# Patient Record
Sex: FEMALE | Race: WHITE | Employment: OTHER | URBAN - METROPOLITAN AREA
[De-identification: names, ages, dates, MRNs, and addresses within clinical notes are randomized per-mention and may not be internally consistent; named-entity substitution may affect disease eponyms.]

---

## 2017-01-17 ENCOUNTER — OFFICE VISIT (OUTPATIENT)
Dept: INTERNAL MEDICINE CLINIC | Age: 62
End: 2017-01-17

## 2017-01-17 VITALS
HEIGHT: 65 IN | OXYGEN SATURATION: 97 % | DIASTOLIC BLOOD PRESSURE: 74 MMHG | HEART RATE: 83 BPM | SYSTOLIC BLOOD PRESSURE: 125 MMHG | RESPIRATION RATE: 16 BRPM | TEMPERATURE: 97.9 F | WEIGHT: 215 LBS | BODY MASS INDEX: 35.82 KG/M2

## 2017-01-17 DIAGNOSIS — I10 ESSENTIAL HYPERTENSION: Primary | Chronic | ICD-10-CM

## 2017-01-17 DIAGNOSIS — E11.65 UNCONTROLLED TYPE 2 DIABETES MELLITUS WITH HYPERGLYCEMIA, WITH LONG-TERM CURRENT USE OF INSULIN (HCC): Chronic | ICD-10-CM

## 2017-01-17 DIAGNOSIS — S39.012A LUMBAR STRAIN, INITIAL ENCOUNTER: ICD-10-CM

## 2017-01-17 DIAGNOSIS — Z79.4 UNCONTROLLED TYPE 2 DIABETES MELLITUS WITH HYPERGLYCEMIA, WITH LONG-TERM CURRENT USE OF INSULIN (HCC): Chronic | ICD-10-CM

## 2017-01-17 DIAGNOSIS — E78.2 MIXED HYPERLIPIDEMIA: Chronic | ICD-10-CM

## 2017-01-17 DIAGNOSIS — E03.9 ACQUIRED HYPOTHYROIDISM: ICD-10-CM

## 2017-01-17 DIAGNOSIS — Z23 ENCOUNTER FOR IMMUNIZATION: ICD-10-CM

## 2017-01-17 PROBLEM — E66.9 OBESITY (BMI 30-39.9): Chronic | Status: ACTIVE | Noted: 2017-01-17

## 2017-01-17 RX ORDER — METFORMIN HYDROCHLORIDE 500 MG/1
500 TABLET, EXTENDED RELEASE ORAL 2 TIMES DAILY
COMMUNITY
Start: 2016-10-28 | End: 2019-09-26 | Stop reason: SDUPTHER

## 2017-01-17 RX ORDER — PEN NEEDLE, DIABETIC 32 GX 1/6"
NEEDLE, DISPOSABLE MISCELLANEOUS
COMMUNITY
Start: 2017-01-13 | End: 2019-12-20 | Stop reason: SDUPTHER

## 2017-01-17 RX ORDER — INSULIN LISPRO 100 [IU]/ML
20 INJECTION, SUSPENSION SUBCUTANEOUS 2 TIMES DAILY
COMMUNITY
Start: 2016-12-21 | End: 2019-06-20

## 2017-01-17 RX ORDER — EZETIMIBE 10 MG
10 TABLET ORAL DAILY
COMMUNITY
Start: 2016-12-21 | End: 2017-07-17 | Stop reason: ALTCHOICE

## 2017-01-17 RX ORDER — PREDNISONE 20 MG/1
TABLET ORAL
Qty: 18 TAB | Refills: 0 | Status: SHIPPED | OUTPATIENT
Start: 2017-01-17 | End: 2017-03-31

## 2017-01-17 RX ORDER — BUPROPION HYDROCHLORIDE 150 MG/1
150 TABLET ORAL
Qty: 30 TAB | Refills: 5 | Status: SHIPPED | OUTPATIENT
Start: 2017-01-17 | End: 2017-08-15 | Stop reason: SDUPTHER

## 2017-01-17 RX ORDER — DIAZEPAM 2 MG/1
2 TABLET ORAL
Qty: 15 TAB | Refills: 0 | Status: SHIPPED | OUTPATIENT
Start: 2017-01-17 | End: 2017-03-31

## 2017-01-17 NOTE — PATIENT INSTRUCTIONS
Learning About Carbohydrate  What is carbohydrate? Carbohydrate is an important nutrient you get from food. It's a great source of energy for your body and helps your brain and nervous system work properly. How does your body use carbohydrate? After you eat food with carbs in it, your body digests the carbohydrate and turns it into a kind of sugar that goes into your blood. The blood carries this sugar to the cells in your body. The cells use the sugar to give you energy. Extra sugar is stored in the cells for later use. If it isn't used, it turns into fat. Where does carbohydrate come from? The healthiest carbohydrate choices are breads, cereals, and pastas made with whole grains; brown rice; low-fat dairy products; vegetables; legumes such as peas, lentils, and beans; and fruits. Foods made from refined flour, including bread, pasta, doughnuts, cookies, and desserts, also contain carbohydrate. So do sweets such as candy and soda. How can you get the right kind and amount of carbs? Eating too much of anything can lead to weight gain. And that can lead to other health problems. Here are some tips to help you eat the right amount of the right kind of carbs so you have the nutrition and the energy you need:  · Eat 3 to 8 servings of grains (breads, cereals, rice, pasta) each day. For example, a serving is 1 slice of bread, 1 cup of boxed cereal, or ½ cup of cooked rice, cooked pasta, or cooked cereal. Go to www. choosemyplate.gov to learn how many servings you need. ¨ Buy bread that lists whole wheat (or other whole grains), stone-ground wheat, or cracked wheat as the first ingredient. ¨ Eat brown rice, bulgur, or millet instead of white rice. ¨ Eat pasta and cereals made from whole grain flour instead of refined flour. · Eat several servings a day of fresh fruits and vegetables.  These include raspberries, apples, figs, oranges, pears, prunes, broccoli, brussels sprouts, carrots, corn, peas, and beans. And there are lots of other fruits and vegetables to choose from. · Limit the amount of candy, desserts, and soda in your diet. Where can you learn more? Go to http://cira-corona.info/. Enter F199 in the search box to learn more about \"Learning About Carbohydrate. \"  Current as of: July 26, 2016  Content Version: 11.1  © 2006-2016 [employer]. Care instructions adapted under license by [employer] (which disclaims liability or warranty for this information). If you have questions about a medical condition or this instruction, always ask your healthcare professional. [place] [address] any warranty or liability for your use of this information. Back Strain: Care Instructions  Your Care Instructions    Back strain happens when you overstretch, or pull, a muscle in your back. You may hurt your back in an accident or when you exercise or lift something. Most back pain will get better with rest and time. You can take care of yourself at home to help your back heal.  Follow-up care is a key part of your treatment and safety. Be sure to make and go to all appointments, and call your doctor if you are having problems. It's also a good idea to know your test results and keep a list of the medicines you take. How can you care for yourself at home? · Try to stay as active as you can, but stop or reduce any activity that causes pain. · Put ice or a cold pack on the sore muscle for 10 to 20 minutes at a time to stop swelling. Try this every 1 to 2 hours for 3 days (when you are awake) or until the swelling goes down. Put a thin cloth between the ice pack and your skin. · After 2 or 3 days, apply a heating pad on low or a warm cloth to your back. Some doctors suggest that you go back and forth between hot and cold treatments. · Take pain medicines exactly as directed.   ¨ If the doctor gave you a prescription medicine for pain, take it as prescribed. ¨ If you are not taking a prescription pain medicine, ask your doctor if you can take an over-the-counter medicine. · Try sleeping on your side with a pillow between your legs. Or put a pillow under your knees when you lie on your back. These measures can ease pain in your lower back. · Return to your usual level of activity slowly. When should you call for help? Call 911 anytime you think you may need emergency care. For example, call if:  · You are unable to move a leg at all. Call your doctor now or seek immediate medical care if:  · You have new or worse symptoms in your legs, belly, or buttocks. Symptoms may include:  ¨ Numbness or tingling. ¨ Weakness. ¨ Pain. · You lose bladder or bowel control. Watch closely for changes in your health, and be sure to contact your doctor if you are not getting better as expected. Where can you learn more? Go to http://cria-corona.info/. Enter Z749 in the search box to learn more about \"Back Strain: Care Instructions. \"  Current as of: May 23, 2016  Content Version: 11.1  © 6975-9336 [employer]. Care instructions adapted under license by [employer] (which disclaims liability or warranty for this information). If you have questions about a medical condition or this instruction, always ask your healthcare professional. [address] any warranty or liability for your use of this information. Use ice to low back for 15 minutes twice a day. Do NOT use xanax when you take valium.

## 2017-01-17 NOTE — MR AVS SNAPSHOT
Visit Information Date & Time Provider Department Dept. Phone Encounter #  
 1/17/2017  9:15 AM Gill Palmer, [address] [phone] [id number] Follow-up Instructions Return in about 6 months (around 7/17/2017). Upcoming Health Maintenance Date Due HEMOGLOBIN A1C Q6M 1955 LIPID PANEL Q1 1953 MICROALBUMIN Q1 1/19/1963 EYE EXAM RETINAL OR DILATED Q1 1/19/1963 Pneumococcal 19-64 Medium Risk (1 of 1 - PPSV23) 1/19/1972 DTaP/Tdap/Td series (1 - Tdap) 1/19/1974 PAP AKA CERVICAL CYTOLOGY 1/19/1974 FOBT Q 1 YEAR AGE 50-75 8/6/2010 FOOT EXAM Q1 6/1/2017 BREAST CANCER SCRN MAMMOGRAM 4/11/2018 Allergies as of 1/17/2017  Review Complete On: 1/17/2017 By: [email or web address] III, DO Severity Noted Reaction Type Reactions Celebrex [Celecoxib]  10/24/2012    Other (comments) Hallucinations Codeine  10/24/2012    Nausea and Vomiting Erythromycin  10/24/2012    Nausea and Vomiting Current Immunizations  Never Reviewed No immunizations on file. Not reviewed this visit You Were Diagnosed With   
  
 Codes Comments Essential hypertension    -  Primary ICD-10-CM: I10 
ICD-9-CM: 401.9 Mixed hyperlipidemia     ICD-10-CM: E78.2 ICD-9-CM: 272.2 Acquired hypothyroidism     ICD-10-CM: E03.9 ICD-9-CM: 244.9 Uncontrolled type 2 diabetes mellitus with hyperglycemia, with long-term current use of insulin (HCC)     ICD-10-CM: E11.65, Z79.4 ICD-9-CM: 250.02, V58.67 Lumbar strain, initial encounter     ICD-10-CM: S39.012A ICD-9-CM: 811. 2 Vitals BP Pulse Temp Resp Height(growth percentile) Weight(growth percentile) 125/74 (BP 1 Location: Left arm, BP Patient Position: Sitting) 83 97.9 °F (36.6 °C) (Oral) 16 5' 5\" (1.651 m) 215 lb (97.5 kg) SpO2 BMI OB Status Smoking Status 97% 35.78 kg/m2 Postmenopausal Never Smoker Vitals History BMI and BSA Data Body Mass Index Body Surface Area 35.78 kg/m 2 2.11 m 2 Preferred Pharmacy Pharmacy Name Phone [address] [facility] [phone] Your Updated Medication List  
  
   
This list is accurate as of: 1/17/17  9:42 AM.  Always use your most recent med list.  
  
  
  
  
 atorvastatin 80 mg tablet Commonly known as:  LIPITOR Take 80 mg by mouth daily. buPROPion  mg tablet Commonly known as:  Ying Galas Take 1 Tab by mouth every morning. diazePAM 2 mg tablet Commonly known as:  VALIUM Take 1 Tab by mouth nightly as needed. Max Daily Amount: 2 mg. Indications: MUSCLE SPASM  
  
 furosemide 20 mg tablet Commonly known as:  LASIX Take 20 mg by mouth daily as needed. HumaLOG Mix 75-25 KwikPen 100 unit/mL (75-25) Inpn Generic drug:  Insulin Lisp & Lisp Prot (Hum) HYDROcodone-acetaminophen 5-325 mg per tablet Commonly known as:  Nelma Felix Take 1 Tab by mouth every four (4) hours as needed for Pain. Max Daily Amount: 6 Tabs. JANUMET 50-1,000 mg per tablet Generic drug:  SITagliptin-metFORMIN Take 1 Tab by mouth two (2) times daily (with meals). metFORMIN  mg tablet Commonly known as:  GLUCOPHAGE XR  
  
 mirabegron ER 25 mg ER tablet Commonly known as:  MYRBETRIQ Take 25 mg by mouth daily. nitroglycerin 0.4 mg SL tablet Commonly known as:  NITROSTAT Take 1 Tab by mouth every five (5) minutes as needed for Chest Pain (x3) for up to 360 days. NOVOFINE PLUS 32 gauge x 1/6\" Ndle Generic drug:  pen needle, diabetic ONETOUCH VERIO strip Generic drug:  glucose blood VI test strips  
  
 predniSONE 20 mg tablet Commonly known as:  DELTASONE  
60 mg daily x 3 days, then 40 mg daily x 3 days, then 20 mg daily x 3 days, then stop. PROBIOTIC 4X 10-15 mg Tbec Generic drug:  B.infantis-B.ani-B.long-B.bifi Take  by mouth. SYNTHROID 300 mcg tablet Generic drug:  levothyroxine Take 600 mcg by mouth every , Saturday. Indications: 600mg monday -saturday and 300mg on  TRESIBA FLEXTOUCH U-200 200 unit/mL (3 mL) Inpn Generic drug:  insulin degludec 40 Units by SubCUTAneous route daily. ZETIA 10 mg tablet Generic drug:  ezetimibe Prescriptions Printed Refills  
 diazePAM (VALIUM) 2 mg tablet 0 Sig: Take 1 Tab by mouth nightly as needed. Max Daily Amount: 2 mg. Indications: MUSCLE SPASM Class: Print Route: Oral  
  
Prescriptions Sent to Pharmacy Refills buPROPion XL (WELLBUTRIN XL) 150 mg tablet 5 Sig: Take 1 Tab by mouth every morning. Class: Normal  
 Pharmacy: [address], [address] Ph #: [phone] Route: Oral  
 predniSONE (DELTASONE) 20 mg tablet 0 Si mg daily x 3 days, then 40 mg daily x 3 days, then 20 mg daily x 3 days, then stop. Class: Normal  
 Pharmacy: [address], [address] Ph #: [phone] We Performed the Following HEMOGLOBIN A1C WITH EAG [58320 CPT(R)] LIPID PANEL [43046 CPT(R)] MICROALBUMIN, UR, RAND W/ MICROALBUMIN/CREA RATIO [id number] CPT(R)] TSH 3RD GENERATION [58524 CPT(R)] Follow-up Instructions Return in about 6 months (around 2017). Patient Instructions Learning About Carbohydrate What is carbohydrate? Carbohydrate is an important nutrient you get from food. It's a great source of energy for your body and helps your brain and nervous system work properly. How does your body use carbohydrate? After you eat food with carbs in it, your body digests the carbohydrate and turns it into a kind of sugar that goes into your blood. The blood carries this sugar to the cells in your body.  The cells use the sugar to give you energy. Extra sugar is stored in the cells for later use. If it isn't used, it turns into fat. Where does carbohydrate come from? The healthiest carbohydrate choices are breads, cereals, and pastas made with whole grains; brown rice; low-fat dairy products; vegetables; legumes such as peas, lentils, and beans; and fruits. Foods made from refined flour, including bread, pasta, doughnuts, cookies, and desserts, also contain carbohydrate. So do sweets such as candy and soda. How can you get the right kind and amount of carbs? Eating too much of anything can lead to weight gain. And that can lead to other health problems. Here are some tips to help you eat the right amount of the right kind of carbs so you have the nutrition and the energy you need: 
· Eat 3 to 8 servings of grains (breads, cereals, rice, pasta) each day. For example, a serving is 1 slice of bread, 1 cup of boxed cereal, or ½ cup of cooked rice, cooked pasta, or cooked cereal. Go to www. choosemyplate.gov to learn how many servings you need. ¨ Buy bread that lists whole wheat (or other whole grains), stone-ground wheat, or cracked wheat as the first ingredient. ¨ Eat brown rice, bulgur, or millet instead of white rice. ¨ Eat pasta and cereals made from whole grain flour instead of refined flour. · Eat several servings a day of fresh fruits and vegetables. These include raspberries, apples, figs, oranges, pears, prunes, broccoli, brussels sprouts, carrots, corn, peas, and beans. And there are lots of other fruits and vegetables to choose from. · Limit the amount of candy, desserts, and soda in your diet. Where can you learn more? Go to http://cira-corona.info/. Enter F199 in the search box to learn more about \"Learning About Carbohydrate. \" Current as of: July 26, 2016 Content Version: 11.1 © 6181-0888 [employer].  Care instructions adapted under license by [address] (which disclaims liability or warranty for this information). If you have questions about a medical condition or this instruction, always ask your healthcare professional. [address] any warranty or liability for your use of this information. Back Strain: Care Instructions Your Care Instructions Back strain happens when you overstretch, or pull, a muscle in your back. You may hurt your back in an accident or when you exercise or lift something. Most back pain will get better with rest and time. You can take care of yourself at home to help your back heal. 
Follow-up care is a key part of your treatment and safety. Be sure to make and go to all appointments, and call your doctor if you are having problems. It's also a good idea to know your test results and keep a list of the medicines you take. How can you care for yourself at home? · Try to stay as active as you can, but stop or reduce any activity that causes pain. · Put ice or a cold pack on the sore muscle for 10 to 20 minutes at a time to stop swelling. Try this every 1 to 2 hours for 3 days (when you are awake) or until the swelling goes down. Put a thin cloth between the ice pack and your skin. · After 2 or 3 days, apply a heating pad on low or a warm cloth to your back. Some doctors suggest that you go back and forth between hot and cold treatments. · Take pain medicines exactly as directed. ¨ If the doctor gave you a prescription medicine for pain, take it as prescribed. ¨ If you are not taking a prescription pain medicine, ask your doctor if you can take an over-the-counter medicine. · Try sleeping on your side with a pillow between your legs. Or put a pillow under your knees when you lie on your back. These measures can ease pain in your lower back. · Return to your usual level of activity slowly. When should you call for help? Call 911 anytime you think you may need emergency care. For example, call if: 
· You are unable to move a leg at all. Call your doctor now or seek immediate medical care if: 
· You have new or worse symptoms in your legs, belly, or buttocks. Symptoms may include: ¨ Numbness or tingling. ¨ Weakness. ¨ Pain. · You lose bladder or bowel control. Watch closely for changes in your health, and be sure to contact your doctor if you are not getting better as expected. Where can you learn more? Go to http://cira-corona.info/. Enter A149 in the search box to learn more about \"Back Strain: Care Instructions. \" Current as of: May 23, 2016 Content Version: 11.1 © 2245-8353 [employer]. Care instructions adapted under license by [employer] (which disclaims liability or warranty for this information). If you have questions about a medical condition or this instruction, always ask your healthcare professional. [place] [address] any warranty or liability for your use of this information. Use ice to low back for 15 minutes twice a day. Do NOT use xanax when you take valium. Introducing [facility]! Dear Jyoti Dumont: 
Thank you for requesting a [employer] account. Our records indicate that you already have an active ImpactMedia account. You can access your account anytime at [email or web address] Did you know that you can access your hospital and ER discharge instructions at any time in ImpactMedia? You can also review all of your test results from your hospital stay or ER visit. Additional Information If you have questions, please visit the Frequently Asked Questions section of the ImpactMedia website at [email or web address] Remember, ImpactMedia is NOT to be used for urgent needs. For medical emergencies, dial 911. Now available from your iPhone and Android! Please provide this summary of care documentation to your next provider. Your primary care clinician is listed as Cathy Bose If you have any questions after today's visit, please call [phone].

## 2017-01-17 NOTE — PROGRESS NOTES
Thalia Howell is a 59 y.o. female who presents for evaluation of routine follow up. Last seen by me oct 6. Had been doing well since then until last week, when she strained her low back with some right sided sciatica. Has had 3 low back sx in the past already.       ROS:  Constitutional: negative for fevers, chills, anorexia and weight loss  Eyes:   negative for visual disturbance and irritation  ENT:   negative for tinnitus,sore throat,nasal congestion,ear pain,hoarseness  Respiratory:  negative for cough, hemoptysis, dyspnea,wheezing  CV:   negative for chest pain, palpitations, lower extremity edema  GI:   negative for nausea, vomiting, diarrhea, abdominal pain,melena  Genitourinary: negative for frequency, dysuria and hematuria  Musculoskel: negative for myalgias, arthralgias, muscle weakness, joint pain.  ++low back pain  Neurological:  negative for headaches, dizziness, focal weakness, numbness  Psychiatric:    ++ for depression or anxiety      Past Medical History   Diagnosis Date    Depression     Diabetes ([facility] [address]     Hypercholesteremia     Hypertension     Hyperthyroidism     IBS (irritable bowel syndrome)     Urinary incontinence        Past Surgical History   Procedure Laterality Date    Hx back surgery       x2    Hx hysterectomy      Hx bunionectomy      Hx other surgical       Collapsed lung    Hx wisdom teeth extraction      Hx blepharoplasty Right        Family History   Problem Relation Age of Onset    Diabetes Mother     Heart Disease Mother     Cancer Father      pancreatic    Diabetes Sister     Anxiety Sister     Diabetes Brother     Anxiety Brother     Diabetes Brother     Anxiety Brother     Diabetes Sister     Anxiety Sister     Diabetes Sister     Anxiety Sister     Cancer Sister      lung and brain    Anxiety Sister     Anxiety Sister        Social History     Social History    Marital status:      Spouse name: N/A    Number of children: N/A    Years of education: N/A     Occupational History    Not on file. Social History Main Topics    Smoking status: Never Smoker    Smokeless tobacco: Never Used    Alcohol use Yes      Comment: occasionally    Drug use: No    Sexual activity: No     Other Topics Concern    Not on file     Social History Narrative            Visit Vitals    /74 (BP 1 Location: Left arm, BP Patient Position: Sitting)    Pulse 83    Temp 97.9 °F (36.6 °C) (Oral)    Resp 16    Ht 5' 5\" (1.651 m)    Wt 215 lb (97.5 kg)    SpO2 97%    BMI 35.78 kg/m2       Physical Examination:   General - Well appearing female  HEENT - PERRL, TM no erythema/opacification, normal nasal turbinates, no oropharyngeal erythema or exudate, MMM  Neck - supple, no bruits, no thyroidomegaly, no lymphadenopathy  Pulm - clear to auscultation bilaterally  Cardio - RRR, normal S1 S2, no murmur  Abd - soft, nontender, no masses, no HSM  Extrem - no edema, +2 distal pulses. ++mild straight leg raising on right. Negative on left. Neuro-  No focal deficits, CN intact     Assessment/Plan:    1. Lumbar strain with right sided sciatica--rx for prednisone and valium qhs. Add ice bid. Used to see Flexenclosure, but her prior sx has retired. 2.  Dm, type 2--was switched to 75/25 and glucophage by endo, dr Barney Arreola. Check a1c, urine micro  3.  hyperlipids--on lipitor and zetia, check flp  4. Hypothyroid--on synthroid, check tsh  5. Anxiety and depression--on wellbutrin  6.  oab--on mirabegron  7. Osteoporosis--on fosamax  8. Routine adult health maintenance--tdap given today. Had colon done over summer with dr Anjum Shelton, will get report. States had pneumovax about 4 years ago.       rtc 6 months        Aiden Castorena III, DO

## 2017-01-17 NOTE — PROGRESS NOTES
Reviewed record in preparation for visit and have obtained necessary documentation. Identified pt with two pt identifiers(name and ). Chief Complaint   Patient presents with    Diabetes     follow up    Cholesterol Problem       Health Maintenance Due   Topic Date Due    HEMOGLOBIN A1C Q6M  1953    LIPID PANEL Q1  1953    MICROALBUMIN Q1  1963    EYE EXAM RETINAL OR DILATED Q1  1963    Pneumococcal 19-64 Medium Risk (1 of 1 - PPSV23) 1972    DTaP/Tdap/Td series (1 - Tdap) 1974    PAP AKA CERVICAL CYTOLOGY  1974    FOBT Q 1 YEAR AGE 50-75  2010       Ms. Ab Espitia has a reminder for a \"due or due soon\" health maintenance. I have asked that she discuss health maintenance topic(s) due with Her  primary care provider. Coordination of Care Questionnaire:  :     1) Have you been to an emergency room, urgent care clinic since your last visit? no   Hospitalized since your last visit? no             2) Have you seen or consulted any other health care providers outside of [address] [place] [address] since your last visit? no  (Include any pap smears or colon screenings in this section.)      Patient is accompanied by self I have received verbal consent from Shaneka Fierro to discuss any/all medical information while they are present in the room.

## 2017-01-18 LAB
ALBUMIN/CREAT UR: 2.8 MG/G CREAT (ref 0–30)
CHOLEST SERPL-MCNC: 161 MG/DL (ref 100–199)
CREAT UR-MCNC: 122.8 MG/DL
EST. AVERAGE GLUCOSE BLD GHB EST-MCNC: 240 MG/DL
HBA1C MFR BLD: 10 % (ref 4.8–5.6)
HDLC SERPL-MCNC: 59 MG/DL
LDLC SERPL CALC-MCNC: 84 MG/DL (ref 0–99)
MICROALBUMIN UR-MCNC: 3.4 UG/ML
TRIGL SERPL-MCNC: 91 MG/DL (ref 0–149)
TSH SERPL DL<=0.005 MIU/L-ACNC: 42.85 UIU/ML (ref 0.45–4.5)
VLDLC SERPL CALC-MCNC: 18 MG/DL (ref 5–40)

## 2017-01-18 NOTE — PROGRESS NOTES
Diabetes very poorly controlled, a1c 10.0. Hopefully new changes by dr Karen Andrea will help. Cholesterol levels look fine. Thyroid test elevated, have akash add ft4. Will likely need to increase dose of synthroid.

## 2017-01-19 LAB
SPECIMEN STATUS REPORT, ROLRST: NORMAL
T4 FREE SERPL-MCNC: 0.85 NG/DL (ref 0.82–1.77)

## 2017-01-20 NOTE — PROGRESS NOTES
I called and spoke with patient. Two patient identifiers verified. Pt was made aware of  results and/or recommendations. Pt verbalized understanding. Pt states that she is already taking 600mcg of synthroid.

## 2017-01-20 NOTE — PROGRESS NOTES
Would have her take 600 mcg of synthroid every day now (tsh was very elevated 42.85, but ft4 was low normal 0.85).

## 2017-03-29 ENCOUNTER — HOSPITAL ENCOUNTER (OUTPATIENT)
Dept: MRI IMAGING | Age: 62
Discharge: HOME | End: 2017-03-29
Attending: ORTHOPAEDIC SURGERY
Payer: COMMERCIAL

## 2017-03-29 DIAGNOSIS — M54.32 BILATERAL SCIATICA: ICD-10-CM

## 2017-03-29 DIAGNOSIS — M54.31 BILATERAL SCIATICA: ICD-10-CM

## 2017-03-29 DIAGNOSIS — M54.50 LOW BACK PAIN: ICD-10-CM

## 2017-03-29 DIAGNOSIS — M17.0: ICD-10-CM

## 2017-03-29 PROCEDURE — 72158 MRI LUMBAR SPINE W/O & W/DYE: CPT

## 2017-03-29 PROCEDURE — A9585 GADOBUTROL INJECTION: HCPCS | Performed by: ORTHOPAEDIC SURGERY

## 2017-03-29 PROCEDURE — 74011250636 HC RX REV CODE- 250/636: Performed by: ORTHOPAEDIC SURGERY

## 2017-03-29 RX ADMIN — GADOBUTROL 10 ML: 604.72 INJECTION INTRAVENOUS at 18:47

## 2017-03-31 ENCOUNTER — OFFICE VISIT (OUTPATIENT)
Dept: INTERNAL MEDICINE CLINIC | Age: 62
End: 2017-03-31

## 2017-03-31 VITALS
DIASTOLIC BLOOD PRESSURE: 72 MMHG | TEMPERATURE: 98 F | HEART RATE: 88 BPM | RESPIRATION RATE: 16 BRPM | OXYGEN SATURATION: 96 % | BODY MASS INDEX: 34.82 KG/M2 | WEIGHT: 209 LBS | HEIGHT: 65 IN | SYSTOLIC BLOOD PRESSURE: 113 MMHG

## 2017-03-31 DIAGNOSIS — E03.9 ACQUIRED HYPOTHYROIDISM: ICD-10-CM

## 2017-03-31 DIAGNOSIS — H61.23 EXCESSIVE EAR WAX, BILATERAL: Primary | ICD-10-CM

## 2017-03-31 DIAGNOSIS — I10 ESSENTIAL HYPERTENSION: Chronic | ICD-10-CM

## 2017-03-31 DIAGNOSIS — E11.65 UNCONTROLLED TYPE 2 DIABETES MELLITUS WITH HYPERGLYCEMIA, WITH LONG-TERM CURRENT USE OF INSULIN (HCC): Chronic | ICD-10-CM

## 2017-03-31 DIAGNOSIS — Z79.4 UNCONTROLLED TYPE 2 DIABETES MELLITUS WITH HYPERGLYCEMIA, WITH LONG-TERM CURRENT USE OF INSULIN (HCC): Chronic | ICD-10-CM

## 2017-03-31 DIAGNOSIS — E78.2 MIXED HYPERLIPIDEMIA: Chronic | ICD-10-CM

## 2017-03-31 NOTE — PROGRESS NOTES
Florence Pinto is a 57 y.o. female who presents for evaluation of bilateral ear wax. Went to have her hearing aids tested and was told that both ears were full of wax, and that they were not allowed to remove ear wax there. Pt with no other new complaints--has chronic low back pains, as well as feet pains and PN.       ROS:  Constitutional: negative for fevers, chills, anorexia and weight loss  Eyes:   negative for visual disturbance and irritation  ENT:   negative for tinnitus,sore throat,nasal congestion,ear pain,hoarseness  Respiratory:  negative for cough, hemoptysis, dyspnea,wheezing  CV:   negative for chest pain, palpitations, lower extremity edema  GI:   negative for nausea, vomiting, diarrhea, abdominal pain,melena  Genitourinary: negative for frequency, dysuria and hematuria  Musculoskel: negative for myalgias, arthralgias, back pain, muscle weakness, joint pain  Neurological:  negative for headaches, dizziness, focal weakness, numbness  Psychiatric:     Negative for depression or anxiety      Past Medical History:   Diagnosis Date    Depression     [facility]     Hypercholesteremia     Hypertension     Hyperthyroidism     IBS (irritable bowel syndrome)     Urinary incontinence        Past Surgical History:   Procedure Laterality Date    HX BACK SURGERY      x2    HX BLEPHAROPLASTY Right     HX BUNIONECTOMY      HX HYSTERECTOMY      HX OTHER SURGICAL      Collapsed lung    HX WISDOM TEETH EXTRACTION         Family History   Problem Relation Age of Onset    Diabetes Mother     Heart Disease Mother     Cancer Father      pancreatic    Diabetes Sister     Anxiety Sister     Diabetes Brother     Anxiety Brother     Diabetes Brother     Anxiety Brother     Diabetes Sister     Anxiety Sister     Diabetes Sister     Anxiety Sister     Cancer Sister      lung and brain    Anxiety Sister     Anxiety Sister        Social History     Social History    Marital status:  Spouse name: N/A    Number of children: N/A    Years of education: N/A     Occupational History    Not on file. Social History Main Topics    Smoking status: Never Smoker    Smokeless tobacco: Never Used    Alcohol use Yes      Comment: occasionally    Drug use: No    Sexual activity: No     Other Topics Concern    Not on file     Social History Narrative            Visit Vitals    /72 (BP 1 Location: Left arm, BP Patient Position: Sitting)    Pulse 88    Temp 98 °F (36.7 °C) (Oral)    Resp 16    Ht 5' 5\" (1.651 m)    Wt 209 lb (94.8 kg)    SpO2 96%    BMI 34.78 kg/m2       Physical Examination:   General - Well appearing female  HEENT - PERRL, TM no erythema/opacification, normal nasal turbinates, no oropharyngeal erythema or exudate, MMM  Neck - supple, no bruits, no thyroidomegaly, no lymphadenopathy  Pulm - clear to auscultation bilaterally  Cardio - RRR, normal S1 S2, no murmur  Abd - soft, nontender, no masses, no HSM  Extrem - no edema, +2 distal pulses  Neuro-  No focal deficits, CN intact     Assessment/Plan:    1.  bilaral excessive ear wax--flushing them out, can use debrox at home  2. Uncontrolled dm, type 2--on 75/25 and glucophage. Follows with endo, dr Jing Casillas  3. Hypothyroid--on synthroid, very large doses, with dr Jing Casillas  4. Low back pain--follows with dr Yogi Dias, ortho. Scheduled for mri soon  5.  hyperlipids--on lipitor    rtc prn for regular visit.         Sinan Tirado III,

## 2017-03-31 NOTE — PROGRESS NOTES
Reviewed record in preparation for visit and have obtained necessary documentation. Identified pt with two pt identifiers(name and ). Chief Complaint   Patient presents with    Ear Fullness     bilateral       Health Maintenance Due   Topic Date Due    EYE EXAM RETINAL OR DILATED Q1  1963    Pneumococcal 19-64 Medium Risk (1 of 1 - PPSV23) 1972    PAP AKA CERVICAL CYTOLOGY  1974       Ms. Ricardo Rodriguez has a reminder for a \"due or due soon\" health maintenance. I have asked that she discuss health maintenance topic(s) due with Her  primary care provider. Coordination of Care Questionnaire:  :     1) Have you been to an emergency room, urgent care clinic since your last visit? no   Hospitalized since your last visit? no             2) Have you seen or consulted any other health care providers outside of [address] [place] [address] since your last visit? no  (Include any pap smears or colon screenings in this section.)      Patient is accompanied by self I have received verbal consent from Carry Roles to discuss any/all medical information while they are present in the room.

## 2017-03-31 NOTE — PATIENT INSTRUCTIONS
Earwax Blockage: Care Instructions  Your Care Instructions    Earwax is a natural substance that protects the ear canal. Normally, earwax drains from the ears and does not cause problems. Sometimes earwax builds up and hardens. Earwax blockage (also called cerumen impaction) can cause some loss of hearing and pain. When wax is tightly packed, you will need to have your doctor remove it. Follow-up care is a key part of your treatment and safety. Be sure to make and go to all appointments, and call your doctor if you are having problems. Its also a good idea to know your test results and keep a list of the medicines you take. How can you care for yourself at home? · Do not try to remove earwax with cotton swabs, fingers, or other objects. This can make the blockage worse and damage the eardrum. · If your doctor recommends that you try to remove earwax at home:  ¨ Soften and loosen the earwax with warm mineral oil. You also can try hydrogen peroxide mixed with an equal amount of room temperature water. Place 2 drops of the fluid, warmed to body temperature, in the ear two times a day for up to 5 days. ¨ Once the wax is loose and soft, all that is usually needed to remove it from the ear canal is a gentle, warm shower. Direct the water into the ear, then tip your head to let the earwax drain out. Dry your ear thoroughly with a hair dryer set on low. Hold the dryer several inches from your ear. ¨ If the warm mineral oil and shower do not work, use an over-the-counter wax softener followed by gentle flushing with an ear syringe each night for a week or two. Make sure the flushing solution is body temperature. Cool or hot fluids in the ear can cause dizziness. When should you call for help? Call your doctor now or seek immediate medical care if:  · Pus or blood drains from your ear. · Your ears are ringing or feel full. · You have a loss of hearing.   Watch closely for changes in your health, and be sure to contact your doctor if:  · You have pain or reduced hearing after 1 week of home treatment. · You have any new symptoms, such as nausea or balance problems. Where can you learn more? Go to http://cira-corona.info/. Enter S839 in the search box to learn more about \"Earwax Blockage: Care Instructions. \"  Current as of: May 27, 2016  Content Version: 11.2  © 7061-6629 [employer]oop[employer]. Care instructions adapted under license by IP[employer] (which disclaims liability or warranty for this information). If you have questions about a medical condition or this instruction, always ask your healthcare professional. [address] any warranty or liability for your use of this information. Can use debrox at home to help prevent excessive ear wax build up.

## 2017-03-31 NOTE — MR AVS SNAPSHOT
Visit Information Date & Time Provider Department Dept. Phone Encounter #  
 3/31/2017  9:00 AM Cathy Bose, [address] [id number] Follow-up Instructions Return if symptoms worsen or fail to improve. Your Appointments 7/17/2017  9:00 AM  
ROUTINE CARE with Mary Vee III, DO [facility] [address]) Appt Note: 6 month follow up  
 [facility] [address] [address]  
[address] [facility] [address] Upcoming Health Maintenance Date Due  
 EYE EXAM RETINAL OR DILATED Q1 1/19/1963 Pneumococcal 19-64 Medium Risk (1 of 1 - PPSV23) 1/19/1972 PAP AKA CERVICAL CYTOLOGY 1/19/1974 FOOT EXAM Q1 6/1/2017 HEMOGLOBIN A1C Q6M 7/17/2017 MICROALBUMIN Q1 1/17/2018 LIPID PANEL Q1 1/17/2018 BREAST CANCER SCRN MAMMOGRAM 4/11/2018 COLONOSCOPY 7/18/2026 DTaP/Tdap/Td series (2 - Td) 1/17/2027 Allergies as of 3/31/2017  Review Complete On: 3/31/2017 By: Mary Vee III, DO Severity Noted Reaction Type Reactions Celebrex [Celecoxib]  10/24/2012    Other (comments) Hallucinations Codeine  10/24/2012    Nausea and Vomiting Erythromycin  10/24/2012    Nausea and Vomiting Current Immunizations  Never Reviewed Name Date Tdap 1/17/2017 Not reviewed this visit You Were Diagnosed With   
  
 Codes Comments Excessive ear wax, bilateral    -  Primary ICD-10-CM: H61.23 
ICD-9-CM: 380.4 Uncontrolled type 2 diabetes mellitus with hyperglycemia, with long-term current use of insulin (HCC)     ICD-10-CM: E11.65, Z79.4 ICD-9-CM: 250.02, V58.67 Essential hypertension     ICD-10-CM: I10 
ICD-9-CM: 401.9 Acquired hypothyroidism     ICD-10-CM: E03.9 ICD-9-CM: 244.9 Mixed hyperlipidemia     ICD-10-CM: E78.2 ICD-9-CM: 272.2 Vitals BP Pulse Temp Resp Height(growth percentile) Weight(growth percentile) 113/72 (BP 1 Location: Left arm, BP Patient Position: Sitting) 88 98 °F (36.7 °C) (Oral) 16 5' 5\" (1.651 m) 209 lb (94.8 kg) SpO2 BMI OB Status Smoking Status 96% 34.78 kg/m2 Postmenopausal Never Smoker Vitals History BMI and BSA Data Body Mass Index Body Surface Area 34.78 kg/m 2 2.09 m 2 Preferred Pharmacy Pharmacy Name Phone [facility] [phone] , [facility] [place] [address] [phone] Your Updated Medication List  
  
   
This list is accurate as of: 3/31/17  9:41 AM.  Always use your most recent med list.  
  
  
  
  
 atorvastatin 80 mg tablet Commonly known as:  LIPITOR Take 80 mg by mouth daily. buPROPion  mg tablet Commonly known as:  Johnice Issa Take 1 Tab by mouth every morning. furosemide 20 mg tablet Commonly known as:  LASIX Take 20 mg by mouth daily as needed. HumaLOG Mix 75-25 KwikPen 100 unit/mL (75-25) Inpn Generic drug:  Insulin Lisp & Lisp Prot (Hum)  
  
 metFORMIN  mg tablet Commonly known as:  GLUCOPHAGE XR  
  
 mirabegron ER 25 mg ER tablet Commonly known as:  MYRBETRIQ Take 25 mg by mouth daily. NOVOFINE PLUS 32 gauge x 1/6\" Ndle Generic drug:  pen needle, diabetic ONETOUCH VERIO strip Generic drug:  glucose blood VI test strips PROBIOTIC 4X 10-15 mg Tbec Generic drug:  B.infantis-B.ani-B.long-B.bifi Take  by mouth. SYNTHROID 300 mcg tablet Generic drug:  levothyroxine Take 600 mcg by mouth every Tuesday Thursday, Saturday. Indications: 600mg monday -saturday and 300mg on Sunday ZETIA 10 mg tablet Generic drug:  ezetimibe Follow-up Instructions Return if symptoms worsen or fail to improve. Patient Instructions Earwax Blockage: Care Instructions Your Care Instructions Earwax is a natural substance that protects the ear canal. Normally, earwax drains from the ears and does not cause problems. Sometimes earwax builds up and hardens. Earwax blockage (also called cerumen impaction) can cause some loss of hearing and pain. When wax is tightly packed, you will need to have your doctor remove it. Follow-up care is a key part of your treatment and safety. Be sure to make and go to all appointments, and call your doctor if you are having problems. Its also a good idea to know your test results and keep a list of the medicines you take. How can you care for yourself at home? · Do not try to remove earwax with cotton swabs, fingers, or other objects. This can make the blockage worse and damage the eardrum. · If your doctor recommends that you try to remove earwax at home: ¨ Soften and loosen the earwax with warm mineral oil. You also can try hydrogen peroxide mixed with an equal amount of room temperature water. Place 2 drops of the fluid, warmed to body temperature, in the ear two times a day for up to 5 days. ¨ Once the wax is loose and soft, all that is usually needed to remove it from the ear canal is a gentle, warm shower. Direct the water into the ear, then tip your head to let the earwax drain out. Dry your ear thoroughly with a hair dryer set on low. Hold the dryer several inches from your ear. ¨ If the warm mineral oil and shower do not work, use an over-the-counter wax softener followed by gentle flushing with an ear syringe each night for a week or two. Make sure the flushing solution is body temperature. Cool or hot fluids in the ear can cause dizziness. When should you call for help? Call your doctor now or seek immediate medical care if: · Pus or blood drains from your ear. · Your ears are ringing or feel full. · You have a loss of hearing. Watch closely for changes in your health, and be sure to contact your doctor if: · You have pain or reduced hearing after 1 week of home treatment. · You have any new symptoms, such as nausea or balance problems. Where can you learn more? Go to http://cira-corona.info/. Enter U703 in the search box to learn more about \"Earwax Blockage: Care Instructions. \" Current as of: May 27, 2016 Content Version: 11.2 © 5546-7931 Securlinx Integration Software. Care instructions adapted under license by Nacuii (which disclaims liability or warranty for this information). If you have questions about a medical condition or this instruction, always ask your healthcare professional. [address] any warranty or liability for your use of this information. Can use debrox at home to help prevent excessive ear wax build up. Introducing [facility] & HEALTH SERVICES! Dear [facility]: 
Thank you for requesting a Bioscan account. Our records indicate that you already have an active Bioscan account. You can access your account anytime at [email or web address] Did you know that you can access your hospital and ER discharge instructions at any time in Bioscan? You can also review all of your test results from your hospital stay or ER visit. Additional Information If you have questions, please visit the Frequently Asked Questions section of the Bioscan website at [email or web address] Remember, Bioscan is NOT to be used for urgent needs. For medical emergencies, dial 911. Now available from your iPhone and Android! Please provide this summary of care documentation to your next provider. Your [facility] care clinician [facility] If you have any questions after today's visit, please call [phone].

## 2017-05-03 ENCOUNTER — OFFICE VISIT (OUTPATIENT)
Dept: INTERNAL MEDICINE CLINIC | Age: 62
End: 2017-05-03

## 2017-05-03 VITALS
DIASTOLIC BLOOD PRESSURE: 74 MMHG | SYSTOLIC BLOOD PRESSURE: 153 MMHG | HEART RATE: 87 BPM | RESPIRATION RATE: 18 BRPM | OXYGEN SATURATION: 96 % | TEMPERATURE: 97.9 F | HEIGHT: 65 IN | BODY MASS INDEX: 35.59 KG/M2 | WEIGHT: 213.6 LBS

## 2017-05-03 DIAGNOSIS — R55 SYNCOPE, UNSPECIFIED SYNCOPE TYPE: Primary | ICD-10-CM

## 2017-05-03 NOTE — MR AVS SNAPSHOT
Visit Information Date & Time Provider Department Dept. Phone Encounter #  
 5/3/2017  3:00 PM Michelle Pedersen, [place] [address] [place] [address],[address] [phone] [id number] Follow-up Instructions Return if symptoms worsen or fail to improve. Follow-up and Disposition History Your Appointments 7/17/2017  9:00 AM  
ROUTINE CARE with Jeronimo DO SHON Funes III [facility]) Appt Note: 6 month follow up  
 [facility] [address] [address]  
[address] [facility] [address] Upcoming Health Maintenance Date Due  
 FOOT EXAM Q1 6/1/2017 HEMOGLOBIN A1C Q6M 7/17/2017 INFLUENZA AGE 9 TO ADULT 8/1/2017 MICROALBUMIN Q1 1/17/2018 LIPID PANEL Q1 1/17/2018 EYE EXAM RETINAL OR DILATED Q1 2/6/2018 BREAST CANCER SCRN MAMMOGRAM 4/11/2018 PAP AKA CERVICAL CYTOLOGY 3/31/2020 COLONOSCOPY 7/18/2026 DTaP/Tdap/Td series (2 - Td) 1/17/2027 Allergies as of 5/3/2017  Review Complete On: 5/3/2017 By: Trini Mendenhall Severity Noted Reaction Type Reactions Celebrex [Celecoxib]  10/24/2012    Other (comments) Hallucinations Codeine  10/24/2012    Nausea and Vomiting Erythromycin  10/24/2012    Nausea and Vomiting Current Immunizations  Never Reviewed Name Date Tdap 1/17/2017 Not reviewed this visit You Were Diagnosed With   
  
 Codes Comments Syncope, unspecified syncope type    -  Primary ICD-10-CM: R55 
ICD-9-CM: 780. 2 Vitals BP Pulse Temp Resp Height(growth percentile) Weight(growth percentile) 153/74 (BP 1 Location: Left arm, BP Patient Position: Sitting) 87 97.9 °F (36.6 °C) (Oral) 18 5' 5\" (1.651 m) 213 lb 9.6 oz (96.9 kg) SpO2 BMI OB Status Smoking Status 96% 35.54 kg/m2 Postmenopausal Never Smoker Vitals History BMI and BSA Data Body Mass Index Body Surface Area 35.54 kg/m 2 2.11 m 2 Preferred Pharmacy Pharmacy Name Phone [facility] [phone] - [phone], [address] [place] [address] [phone] Your Updated Medication List  
  
   
This list is accurate as of: 5/3/17  4:19 PM.  Always use your most recent med list.  
  
  
  
  
 atorvastatin 80 mg tablet Commonly known as:  LIPITOR Take 80 mg by mouth daily. buPROPion  mg tablet Commonly known as:  Stu Gurrola Take 1 Tab by mouth every morning. furosemide 20 mg tablet Commonly known as:  LASIX Take 20 mg by mouth daily as needed. HumaLOG Mix 75-25 KwikPen 100 unit/mL (75-25) Inpn Generic drug:  Insulin Lisp & Lisp Prot (Hum)  
  
 metFORMIN  mg tablet Commonly known as:  GLUCOPHAGE XR  
  
 mirabegron ER 25 mg ER tablet Commonly known as:  MYRBETRIQ Take 25 mg by mouth daily. NOVOFINE PLUS 32 gauge x 1/6\" Ndle Generic drug:  pen needle, diabetic ONETOUCH VERIO strip Generic drug:  glucose blood VI test strips PROBIOTIC 4X 10-15 mg Tbec Generic drug:  B.infantis-B.ani-B.long-B.bifi Take  by mouth. SYNTHROID 300 mcg tablet Generic drug:  levothyroxine Take 600 mcg by mouth every Tuesday Thursday, Saturday. Indications: 600mg monday -saturday and 300mg on Sunday ZETIA 10 mg tablet Generic drug:  ezetimibe We Performed the Following CBC WITH AUTOMATED DIFF [87127 CPT(R)] METABOLIC PANEL, COMPREHENSIVE [46137 CPT(R)] Follow-up Instructions Return if symptoms worsen or fail to improve. Introducing [facility] & HEALTH SERVICES! Dear Yancy Miranda: 
Thank you for requesting a MyEdu account. Our records indicate that you already have an active MyEdu account. You can access your account anytime at [email or web address] Did you know that you can access your hospital and ER discharge instructions at any time in MultiPON [employer]? You can also review all of your test results from your hospital stay or ER visit. Additional Information If you have questions, please visit the Frequently Asked Questions section of the MultiPON Networks website at [email or web address] Remember, MultiPON Networks is NOT to be used for urgent needs. For medical emergencies, dial 911. Now available from your iPhone and Android! Please provide this summary of care documentation to your next provider. Your primary care clinician is listed as Wilver Infdeshaun If you have any questions after today's visit, please call [phone].

## 2017-05-03 NOTE — PROGRESS NOTES
SUBJECTIVE  Ms. Caty Davis presents today acutely for     Chief Complaint   Patient presents with    Loss of Consciousness     pt fainted on 5/1/17, once revived pt was disoriented, chills and jittery; pt did not go to ER    Jaw Pain     pt c.o jaw pain L side when yarning        Monday, 2 days ago: \"I took my morning sugar and it was 182. I took my 25 units of insulin and was getting ready to go down to eat. I called my friend and was on the phone with her. All of a sudden this feeling came over me and I blacked out. Then I woke up and was hollering. I didn't know where I was. My boyfriend came over and I couldn't get the meter to get my sugar reading. So, I took two glucose tablets, and that revived me a bit. The rest of the day I was cold and jittery. \"  Checked glc and it was 157. Called endocrinologist, Dr. Romaine Hook, who suggested follow up with PCP. \"Yesterday my sugar dropped from 331 to 77. \"     No N/V. No fevers. She had cardiac work up last year, including stress test, normal.     OBJECTIVE  Visit Vitals    /74 (BP 1 Location: Left arm, BP Patient Position: Sitting)    Pulse 87    Temp 97.9 °F (36.6 °C) (Oral)    Resp 18    Ht 5' 5\" (1.651 m)    Wt 213 lb 9.6 oz (96.9 kg)    SpO2 96%    BMI 35.54 kg/m2     Gen: Pleasant 57 y.o.  female in NAD.   HEENT: PERRLA. EOMI. OP moist and pink.  Neck: Supple.  No LAD.  HEART: RRR, No M/G/R.   LUNGS: CTAB No W/R.   ABDOMEN: S, NT, ND, BS+.   EXTREMITIES: Warm. No C/C/E. MUSCULOSKELETAL: Normal ROM, muscle strength 5/5 all groups. NEURO: Alert and oriented x 3.  Cranial nerves grossly intact.  No focal sensory or motor deficits noted. SKIN: Warm. Dry. No rashes or other lesions noted. ASSESSMENT / PLAN    ICD-10-CM ICD-9-CM    1. Syncope, unspecified syncope type--may have had hypoglycemia, though the chills and shakiness persisted during a time when gluc was normal or high--? Viral illness. BP not low; heart is RRR today.  Might want to rule out electrolyte abnormality or anemia. Negative recent cardiac work up [address], COMPREHENSIVE      CBC WITH AUTOMATED DIFF     I have reviewed with the patient details of the assessment and plan and all questions were answered. Relevant patient education was performed. Follow-up Disposition:  Return if symptoms worsen or fail to improve.

## 2017-05-04 LAB
ALBUMIN SERPL-MCNC: 4 G/DL (ref 3.6–4.8)
ALBUMIN/GLOB SERPL: 1.8 {RATIO} (ref 1.2–2.2)
ALP SERPL-CCNC: 60 IU/L (ref 39–117)
ALT SERPL-CCNC: 20 IU/L (ref 0–32)
AST SERPL-CCNC: 13 IU/L (ref 0–40)
BASOPHILS # BLD AUTO: 0 X10E3/UL (ref 0–0.2)
BASOPHILS NFR BLD AUTO: 0 %
BILIRUB SERPL-MCNC: 0.3 MG/DL (ref 0–1.2)
BUN SERPL-MCNC: 20 MG/DL (ref 8–27)
BUN/CREAT SERPL: 21 (ref 12–28)
CALCIUM SERPL-MCNC: 9.4 MG/DL (ref 8.7–10.3)
CHLORIDE SERPL-SCNC: 101 MMOL/L (ref 96–106)
CO2 SERPL-SCNC: 24 MMOL/L (ref 18–29)
CREAT SERPL-MCNC: 0.97 MG/DL (ref 0.57–1)
EOSINOPHIL # BLD AUTO: 0.2 X10E3/UL (ref 0–0.4)
EOSINOPHIL NFR BLD AUTO: 2 %
ERYTHROCYTE [DISTWIDTH] IN BLOOD BY AUTOMATED COUNT: 13.5 % (ref 12.3–15.4)
GLOBULIN SER CALC-MCNC: 2.2 G/DL (ref 1.5–4.5)
GLUCOSE SERPL-MCNC: 236 MG/DL (ref 65–99)
HCT VFR BLD AUTO: 36.1 % (ref 34–46.6)
HGB BLD-MCNC: 12 G/DL (ref 11.1–15.9)
IMM GRANULOCYTES # BLD: 0 X10E3/UL (ref 0–0.1)
IMM GRANULOCYTES NFR BLD: 0 %
LYMPHOCYTES # BLD AUTO: 2.5 X10E3/UL (ref 0.7–3.1)
LYMPHOCYTES NFR BLD AUTO: 36 %
MCH RBC QN AUTO: 28.5 PG (ref 26.6–33)
MCHC RBC AUTO-ENTMCNC: 33.2 G/DL (ref 31.5–35.7)
MCV RBC AUTO: 86 FL (ref 79–97)
MONOCYTES # BLD AUTO: 0.6 X10E3/UL (ref 0.1–0.9)
MONOCYTES NFR BLD AUTO: 9 %
NEUTROPHILS # BLD AUTO: 3.7 X10E3/UL (ref 1.4–7)
NEUTROPHILS NFR BLD AUTO: 53 %
PLATELET # BLD AUTO: 132 X10E3/UL (ref 150–379)
POTASSIUM SERPL-SCNC: 4.3 MMOL/L (ref 3.5–5.2)
PROT SERPL-MCNC: 6.2 G/DL (ref 6–8.5)
RBC # BLD AUTO: 4.21 X10E6/UL (ref 3.77–5.28)
SODIUM SERPL-SCNC: 139 MMOL/L (ref 134–144)
WBC # BLD AUTO: 7 X10E3/UL (ref 3.4–10.8)

## 2017-07-17 ENCOUNTER — OFFICE VISIT (OUTPATIENT)
Dept: INTERNAL MEDICINE CLINIC | Age: 62
End: 2017-07-17

## 2017-07-17 VITALS
OXYGEN SATURATION: 95 % | DIASTOLIC BLOOD PRESSURE: 71 MMHG | RESPIRATION RATE: 18 BRPM | WEIGHT: 210.5 LBS | BODY MASS INDEX: 35.07 KG/M2 | TEMPERATURE: 98.1 F | HEIGHT: 65 IN | SYSTOLIC BLOOD PRESSURE: 116 MMHG | HEART RATE: 81 BPM

## 2017-07-17 DIAGNOSIS — Z12.31 VISIT FOR SCREENING MAMMOGRAM: ICD-10-CM

## 2017-07-17 DIAGNOSIS — Z79.4 UNCONTROLLED TYPE 2 DIABETES MELLITUS WITH HYPERGLYCEMIA, WITH LONG-TERM CURRENT USE OF INSULIN (HCC): Primary | Chronic | ICD-10-CM

## 2017-07-17 DIAGNOSIS — I10 ESSENTIAL HYPERTENSION: Chronic | ICD-10-CM

## 2017-07-17 DIAGNOSIS — E78.2 MIXED HYPERLIPIDEMIA: Chronic | ICD-10-CM

## 2017-07-17 DIAGNOSIS — E03.9 ACQUIRED HYPOTHYROIDISM: ICD-10-CM

## 2017-07-17 DIAGNOSIS — E11.65 UNCONTROLLED TYPE 2 DIABETES MELLITUS WITH HYPERGLYCEMIA, WITH LONG-TERM CURRENT USE OF INSULIN (HCC): Primary | Chronic | ICD-10-CM

## 2017-07-17 DIAGNOSIS — E66.9 OBESITY (BMI 30-39.9): Chronic | ICD-10-CM

## 2017-07-17 RX ORDER — MELATONIN
5000 DAILY
COMMUNITY
End: 2021-12-21

## 2017-07-17 RX ORDER — LEVOTHYROXINE SODIUM 300 UG/1
600 TABLET ORAL
COMMUNITY
End: 2019-06-24

## 2017-07-17 NOTE — PATIENT INSTRUCTIONS
Diabetes Foot Health: Care Instructions  Your Care Instructions    When you have diabetes, your feet need extra care and attention. Diabetes can damage the nerve endings and blood vessels in your feet, making you less likely to notice when your feet are injured. Diabetes also limits your body's ability to fight infection and get blood to areas that need it. If you get a minor foot injury, it could become an ulcer or a serious infection. With good foot care, you can prevent most of these problems. Caring for your feet can be quick and easy. Most of the care can be done when you are bathing or getting ready for bed. Follow-up care is a key part of your treatment and safety. Be sure to make and go to all appointments, and call your doctor if you are having problems. Its also a good idea to know your test results and keep a list of the medicines you take. How can you care for yourself at home? · Keep your blood sugar close to normal by watching what and how much you eat, monitoring blood sugar, taking medicines if prescribed, and getting regular exercise. · Do not smoke. Smoking affects blood flow and can make foot problems worse. If you need help quitting, talk to your doctor about stop-smoking programs and medicines. These can increase your chances of quitting for good. · Eat a diet that is low in fats. High fat intake can cause fat to build up in your blood vessels and decrease blood flow. · Inspect your feet daily for blisters, cuts, cracks, or sores. If you cannot see well, use a mirror or have someone help you. · Take care of your feet:  [facility] your feet every day. Use warm (not hot) water. Check the water temperature with your wrists or other part of your body, not your feet. ¨ Dry your feet well. Pat them dry. Do not rub the skin on your feet too hard. Dry well between your toes. If the skin on your feet stays moist, bacteria or a fungus can grow, which can lead to infection.   ¨ Keep your skin soft. Use moisturizing skin cream to keep the skin on your feet soft and prevent calluses and cracks. But do not put the cream between your toes, and stop using any cream that causes a rash. ¨ Clean underneath your toenails carefully. Do not use a sharp object to clean underneath your toenails. Use the blunt end of a nail file or other rounded tool. ¨ Trim and file your toenails straight across to prevent ingrown toenails. Use a nail clipper, not scissors. Use an emery board to smooth the edges. · Change socks daily. Socks without seams are best, because seams often rub the feet. You can find socks for people with diabetes from specialty catalogs. · Look inside your shoes every day for things like gravel or torn linings, which could cause blisters or sores. · Buy shoes that fit well:  ¨ Look for shoes that have plenty of space around the toes. This helps prevent bunions and blisters. ¨ Try on shoes while wearing the kind of socks you will usually wear with the shoes. ¨ Avoid plastic shoes. They may rub your feet and cause blisters. Good shoes should be made of materials that are flexible and breathable, such as leather or cloth. ¨ Break in new shoes slowly by wearing them for no more than an hour a day for several days. Take extra time to check your feet for red areas, blisters, or other problems after you wear new shoes. · Do not go barefoot. Do not wear sandals, and do not wear shoes with very thin soles. Thin soles are easy to puncture. They also do not protect your feet from hot pavement or cold weather. · Have your doctor check your feet during each visit. If you have a foot problem, see your doctor. Do not try to treat an early foot problem at home. Home remedies or treatments that you can buy without a prescription (such as corn removers) can be harmful. · Always get early treatment for foot problems. A minor irritation can lead to a major problem if not properly cared for early.   When should you call for help? Call your doctor now or seek immediate medical care if:  · You have a foot sore, an ulcer or break in the skin that is not healing after 4 days, bleeding corns or calluses, or an ingrown toenail. · You have blue or black areas, which can mean bruising or blood flow problems. · You have peeling skin or tiny blisters between your toes or cracking or oozing of the skin. · You have a fever for more than 24 hours and a foot sore. · You have new numbness or tingling in your feet that does not go away after you move your feet or change positions. · You have unexplained or unusual swelling of the foot or ankle. Watch closely for changes in your health, and be sure to contact your doctor if:  · You cannot do proper foot care. Where can you learn more? Go to http://ciraRomotivecorona.info/. Enter A739 in the search box to learn more about \"Diabetes Foot Health: Care Instructions. \"  Current as of: March 13, 2017  Content Version: 11.3  © 8000-7493 [employer]. Care instructions adapted under license by [employer] (which disclaims liability or warranty for this information). If you have questions about a medical condition or this instruction, always ask your healthcare professional. [address] any warranty or liability for your use of this information. Learning About Low-Carbohydrate Diets for Weight Loss  What is a low-carbohydrate diet? Low-carb diets avoid foods that are high in carbohydrate. These high-carb foods include pasta, bread, rice, cereal, fruits, and starchy vegetables. Instead, these diets usually have you eat foods that are high in fat and protein. Many people lose weight quickly on a low-carb diet. But the early weight loss is water. People on this diet often gain the weight back after they start eating carbs again. Not all diet plans are safe or work well.  A lot of the evidence shows that low-carb diets aren't healthy. That's because these diets often don't include healthy foods like fruits and vegetables. Losing weight safely means balancing protein, fat, and carbs with every meal and snack. And low-carb diets don't always provide the vitamins, minerals, and fiber you need. If you have a serious medical condition, talk to your doctor before you try any diet. These conditions include kidney disease, heart disease, type 2 diabetes, high cholesterol, and high blood pressure. If you are pregnant, it may not be safe for your baby if you are on a low-carb diet. How can you lose weight safely? You might have heard that a diet plan helped another person lose weight. But that doesn't mean that it will work for you. It is very hard to stay on a diet that includes lots of big changes in your eating habits. If you want to get to a healthy weight and stay there, making healthy lifestyle changes will often work better than dieting. These steps can help. · Make a plan for change. Work with your doctor to create a plan that is right for you. · See a dietitian. He or she can show you how to make healthy changes in your eating habits. · Manage stress. If you have a lot of stress in your life, it can be hard to focus on making healthy changes to your daily habits. · Track your food and activity. You are likely to do better at losing weight if you keep track of what you eat and what you do. Follow-up care is a key part of your treatment and safety. Be sure to make and go to all appointments, and call your doctor if you are having problems. It's also a good idea to know your test results and keep a list of the medicines you take. Where can you learn more? Go to http://cira-corona.info/. Enter A121 in the search box to learn more about \"Learning About Low-Carbohydrate Diets for Weight Loss. \"  Current as of: December 8, 2016  Content Version: 11.3  © 7700-2714 [employer].  Care instructions adapted under license by [employer] (which disclaims liability or warranty for this information). If you have questions about a medical condition or this instruction, always ask your healthcare professional. [address] any warranty or liability for your use of this information.

## 2017-07-17 NOTE — PROGRESS NOTES
Jigar Burgos is a 57 y.o. female who presents for evaluation of routine follow up. Last seen by me march 31, 2017. Doing well. No complaints.  'sugars are up and down, but overall I feel great'  Has appt with endo on aug 1.       ROS:  Constitutional: negative for fevers, chills, anorexia and weight loss  Eyes:   negative for visual disturbance and irritation  ENT:   negative for tinnitus,sore throat,nasal congestion,ear pain,hoarseness  Respiratory:  negative for cough, hemoptysis, dyspnea,wheezing  CV:   negative for chest pain, palpitations, lower extremity edema  GI:   negative for nausea, vomiting, diarrhea, abdominal pain,melena  Genitourinary: negative for frequency, dysuria and hematuria  Musculoskel: negative for myalgias, arthralgias, back pain, muscle weakness, joint pain  Neurological:  negative for headaches, dizziness, focal weakness, numbness  Psychiatric:     Negative for depression or anxiety      Past Medical History:   Diagnosis Date    Depression     Diabetes ([facility] [address]     Hypercholesteremia     Hypertension     Hyperthyroidism     IBS (irritable bowel syndrome)     Urinary incontinence        Past Surgical History:   Procedure Laterality Date    HX BACK SURGERY      x2    HX BLEPHAROPLASTY Right     HX BUNIONECTOMY      HX HYSTERECTOMY      HX OTHER SURGICAL      Collapsed lung    HX WISDOM TEETH EXTRACTION         Family History   Problem Relation Age of Onset    Diabetes Mother     Heart Disease Mother     Cancer Father      pancreatic    Diabetes Sister     Anxiety Sister     Diabetes Brother     Anxiety Brother     Diabetes Brother     Anxiety Brother     Diabetes Sister     Anxiety Sister     Diabetes Sister     Anxiety Sister     Cancer Sister      lung and brain    Anxiety Sister     Anxiety Sister        Social History     Social History    Marital status:      Spouse name: N/A    Number of children: N/A    Years of education: N/A Occupational History    Not on file. Social History Main Topics    Smoking status: Never Smoker    Smokeless tobacco: Never Used    Alcohol use Yes      Comment: occasionally    Drug use: No    Sexual activity: No     Other Topics Concern    Not on file     Social History Narrative            Visit Vitals    /71 (BP 1 Location: Left arm, BP Patient Position: Sitting)    Pulse 81    Temp 98.1 °F (36.7 °C) (Oral)    Resp 18    Ht 5' 5\" (1.651 m)    Wt 210 lb 8 oz (95.5 kg)    SpO2 95%    BMI 35.03 kg/m2       Physical Examination:   General - Well appearing female  HEENT - PERRL, TM no erythema/opacification, normal nasal turbinates, no oropharyngeal erythema or exudate, MMM  Neck - supple, no bruits, no thyroidomegaly, no lymphadenopathy  Pulm - clear to auscultation bilaterally  Cardio - RRR, normal S1 S2, no murmur  Abd - soft, nontender, no masses, no HSM  Extrem - no edema, +2 distal pulses  Neuro-  No focal deficits, CN intact         Diabetic foot exam performed by Latia Barfield III, DO     Measurement  Response Nurse Comment Physician Comment   Monofilament  R - normal sensation with micro filament  L - normal sensation with micro filament     Pulse DP R - present  L - present     Pulse TP R - present  L - present     Structural deformity R - None  L - None     Skin Integrity / Deformity R - None  L - None        Reviewed by:                Assessment/Plan:    1. Uncontrolled dm, type 2--on 75/25 and glucophage. Last a1c 10.0, check again. Has follow up with endo in 2 weeks  2.  hyperlipids--check flp, on lipitor  3. PN from diabetes--supportive care, not currently on any meds  4.  oab--continue with myrbetriq  5. Hypothyroid--check tsh, also managed by endo  6.   Routine adult health maintenance--mammogram ordered    rtc 6 months       Latia Barfield III, DO

## 2017-07-17 NOTE — MR AVS SNAPSHOT
Visit Information Date & Time Provider Department Dept. Phone Encounter #  
 7/17/2017  9:00 AM Professor Ferrera [facility], [address] [id number] Follow-up Instructions Return in about 6 months (around 1/17/2018). Upcoming Health Maintenance Date Due HEMOGLOBIN A1C Q6M 7/17/2017 INFLUENZA AGE 9 TO ADULT 8/1/2017 MICROALBUMIN Q1 1/17/2018 LIPID PANEL Q1 1/17/2018 EYE EXAM RETINAL OR DILATED Q1 2/6/2018 BREAST CANCER SCRN MAMMOGRAM 4/11/2018 FOOT EXAM Q1 7/17/2018 PAP AKA CERVICAL CYTOLOGY 3/31/2020 COLONOSCOPY 7/18/2026 DTaP/Tdap/Td series (2 - Td) 1/17/2027 Allergies as of 7/17/2017  Review Complete On: 7/17/2017 By: Nidia Garcia III, DO Severity Noted Reaction Type Reactions Celebrex [Celecoxib]  10/24/2012    Other (comments) Hallucinations Codeine  10/24/2012    Nausea and Vomiting Erythromycin  10/24/2012    Nausea and Vomiting Current Immunizations  Never Reviewed Name Date Tdap 1/17/2017 Not reviewed this visit You Were Diagnosed With   
  
 Codes Comments Uncontrolled type 2 diabetes mellitus with hyperglycemia, with long-term current use of insulin (HCC)    -  Primary ICD-10-CM: E11.65, Z79.4 ICD-9-CM: 250.02, V58.67 Obesity (BMI 30-39. 9)     ICD-10-CM: E66.9 ICD-9-CM: 278.00 Mixed hyperlipidemia     ICD-10-CM: E78.2 ICD-9-CM: 272.2 Essential hypertension     ICD-10-CM: I10 
ICD-9-CM: 401.9 Acquired hypothyroidism     ICD-10-CM: E03.9 ICD-9-CM: 502. 9 Visit for screening mammogram     ICD-10-CM: Z12.31 
ICD-9-CM: V76.12 Vitals BP Pulse Temp Resp Height(growth percentile) Weight(growth percentile) 116/71 (BP 1 Location: Left arm, BP Patient Position: Sitting) 81 98.1 °F (36.7 °C) (Oral) 18 5' 5\" (1.651 m) 210 lb 8 oz (95.5 kg) SpO2 BMI OB Status Smoking Status 95% 35.03 kg/m2 Postmenopausal Never Smoker Vitals History BMI and BSA Data Body Mass Index Body Surface Area 35.03 kg/m 2 2.09 m 2 Preferred Pharmacy Pharmacy Name Phone [facility] [address] - [address], [address] AT [place] [address] [phone] Your Updated Medication List  
  
   
This list is accurate as of: 7/17/17  9:31 AM.  Always use your most recent med list.  
  
  
  
  
 atorvastatin 80 mg tablet Commonly known as:  LIPITOR Take 80 mg by mouth daily. buPROPion  mg tablet Commonly known as:  Chan Lacey Take 1 Tab by mouth every morning. furosemide 20 mg tablet Commonly known as:  LASIX Take 20 mg by mouth daily as needed. HumaLOG Mix 75-25 KwikPen 100 unit/mL (75-25) Inpn Generic drug:  Insulin Lisp & Lisp Prot (Hum) 20 Units as needed. metFORMIN  mg tablet Commonly known as:  GLUCOPHAGE XR Take 500 mg by mouth daily (with dinner). mirabegron ER 25 mg ER tablet Commonly known as:  MYRBETRIQ Take 25 mg by mouth daily. NOVOFINE PLUS 32 gauge x 1/6\" Ndle Generic drug:  pen needle, diabetic ONETOUCH VERIO strip Generic drug:  glucose blood VI test strips PROBIOTIC 4X 10-15 mg Tbec Generic drug:  B.infantis-B.ani-B.long-B.bifi Take  by mouth. SYNTHROID 300 mcg tablet Generic drug:  levothyroxine Take 600 mcg by mouth Daily (before breakfast). VITAMIN D3 1,000 unit tablet Generic drug:  cholecalciferol Take 1,000 Units by mouth daily. We Performed the Following HEMOGLOBIN A1C WITH EAG [57121 CPT(R)] LIPID PANEL [80997 CPT(R)] TSH 3RD GENERATION [84505 CPT(R)] Follow-up Instructions Return in about 6 months (around 1/17/2018). To-Do List   
 07/17/2017 Imaging:  [facility] MAMMO BI SCREENING INCL CAD Patient Instructions Diabetes Foot Health: Care Instructions Your Care Instructions When you have diabetes, your feet need extra care and attention. Diabetes can damage the nerve endings and blood vessels in your feet, making you less likely to notice when your feet are injured. Diabetes also limits your body's ability to fight infection and get blood to areas that need it. If you get a minor foot injury, it could become an ulcer or a serious infection. With good foot care, you can prevent most of these problems. Caring for your feet can be quick and easy. Most of the care can be done when you are bathing or getting ready for bed. Follow-up care is a key part of your treatment and safety. Be sure to make and go to all appointments, and call your doctor if you are having problems. Its also a good idea to know your test results and keep a list of the medicines you take. How can you care for yourself at home? · Keep your blood sugar close to normal by watching what and how much you eat, monitoring blood sugar, taking medicines if prescribed, and getting regular exercise. · Do not smoke. Smoking affects blood flow and can make foot problems worse. If you need help quitting, talk to your doctor about stop-smoking programs and medicines. These can increase your chances of quitting for good. · Eat a diet that is low in fats. High fat intake can cause fat to build up in your blood vessels and decrease blood flow. · Inspect your feet daily for blisters, cuts, cracks, or sores. If you cannot see well, use a mirror or have someone help you. · Take care of your feet: 
[facility] your feet every day. Use warm (not hot) water. Check the water temperature with your wrists or other part of your body, not your feet. ¨ Dry your feet well. Pat them dry. Do not rub the skin on your feet too hard. Dry well between your toes. If the skin on your feet stays moist, bacteria or a fungus can grow, which can lead to infection. ¨ Keep your skin soft.  Use moisturizing skin cream to keep the skin on your feet soft and prevent calluses and cracks. But do not put the cream between your toes, and stop using any cream that causes a rash. ¨ Clean underneath your toenails carefully. Do not use a sharp object to clean underneath your toenails. Use the blunt end of a nail file or other rounded tool. ¨ Trim and file your toenails straight across to prevent ingrown toenails. Use a nail clipper, not scissors. Use an emery board to smooth the edges. · Change socks daily. Socks without seams are best, because seams often rub the feet. You can find socks for people with diabetes from specialty catalogs. · Look inside your shoes every day for things like gravel or torn linings, which could cause blisters or sores. · Buy shoes that fit well: 
¨ Look for shoes that have plenty of space around the toes. This helps prevent bunions and blisters. ¨ Try on shoes while wearing the kind of socks you will usually wear with the shoes. ¨ Avoid plastic shoes. They may rub your feet and cause blisters. Good shoes should be made of materials that are flexible and breathable, such as leather or cloth. ¨ Break in new shoes slowly by wearing them for no more than an hour a day for several days. Take extra time to check your feet for red areas, blisters, or other problems after you wear new shoes. · Do not go barefoot. Do not wear sandals, and do not wear shoes with very thin soles. Thin soles are easy to puncture. They also do not protect your feet from hot pavement or cold weather. · Have your doctor check your feet during each visit. If you have a foot problem, see your doctor. Do not try to treat an early foot problem at home. Home remedies or treatments that you can buy without a prescription (such as corn removers) can be harmful. · Always get early treatment for foot problems. A minor irritation can lead to a major problem if not properly cared for early. When should you call for help? Call your doctor now or seek immediate medical care if: 
· You have a foot sore, an ulcer or break in the skin that is not healing after 4 days, bleeding corns or calluses, or an ingrown toenail. · You have blue or black areas, which can mean bruising or blood flow problems. · You have peeling skin or tiny blisters between your toes or cracking or oozing of the skin. · You have a fever for more than 24 hours and a foot sore. · You have new numbness or tingling in your feet that does not go away after you move your feet or change positions. · You have unexplained or unusual swelling of the foot or ankle. Watch closely for changes in your health, and be sure to contact your doctor if: 
· You cannot do proper foot care. Where can you learn more? Go to http://cira-corona.info/. Enter A739 in the search box to learn more about \"Diabetes Foot Health: Care Instructions. \" Current as of: March 13, 2017 Content Version: 11.3 © 0940-9654 [email or web address]. Care instructions adapted under license by [employer] (which disclaims liability or warranty for this information). If you have questions about a medical condition or this instruction, always ask your healthcare professional. [address] any warranty or liability for your use of this information. Learning About Low-Carbohydrate Diets for Weight Loss What is a low-carbohydrate diet? Low-carb diets avoid foods that are high in carbohydrate. These high-carb foods include pasta, bread, rice, cereal, fruits, and starchy vegetables. Instead, these diets usually have you eat foods that are high in fat and protein. Many people lose weight quickly on a low-carb diet. But the early weight loss is water. People on this diet often gain the weight back after they start eating carbs again. Not all diet plans are safe or work well.  A lot of the evidence shows that low-carb diets aren't healthy. That's because these diets often don't include healthy foods like fruits and vegetables. Losing weight safely means balancing protein, fat, and carbs with every meal and snack. And low-carb diets don't always provide the vitamins, minerals, and fiber you need. If you have a serious medical condition, talk to your doctor before you try any diet. These conditions include kidney disease, heart disease, type 2 diabetes, high cholesterol, and high blood pressure. If you are pregnant, it may not be safe for your baby if you are on a low-carb diet. How can you lose weight safely? You might have heard that a diet plan helped another person lose weight. But that doesn't mean that it will work for you. It is very hard to stay on a diet that includes lots of big changes in your eating habits. If you want to get to a healthy weight and stay there, making healthy lifestyle changes will often work better than dieting. These steps can help. · Make a plan for change. Work with your doctor to create a plan that is right for you. · See a dietitian. He or she can show you how to make healthy changes in your eating habits. · Manage stress. If you have a lot of stress in your life, it can be hard to focus on making healthy changes to your daily habits. · Track your food and activity. You are likely to do better at losing weight if you keep track of what you eat and what you do. Follow-up care is a key part of your treatment and safety. Be sure to make and go to all appointments, and call your doctor if you are having problems. It's also a good idea to know your test results and keep a list of the medicines you take. Where can you learn more? Go to http://cira-corona.info/. Enter A121 in the search box to learn more about \"Learning About Low-Carbohydrate Diets for Weight Loss. \" Current as of: December 8, 2016 Content Version: 11.3 © 0190-8848 [employer]wise[employer]orporated. Care instructions adapted under license by [employer] (which disclaims liability or warranty for this information). If you have questions about a medical condition or this instruction, always ask your healthcare professional. [address] any warranty or liability for your use of this information. Introducing [facility]! Dear Dioni Soto: 
Thank you for requesting a I-Pulse account. Our records indicate that you already have an active I-Pulse account. You can access your account anytime at [email or web address] Did you know that you can access your hospital and ER discharge instructions at any time in I-Pulse? You can also review all of your test results from your hospital stay or ER visit. Additional Information If you have questions, please visit the Frequently Asked Questions section of the I-Pulse website at [email or web address] Remember, I-Pulse is NOT to be used for urgent needs. For medical emergencies, dial 911. Now available from your iPhone and Android! Please provide this summary of care documentation to your next provider. Your primary care clinician is listed as Martha Hawkins If you have any questions after today's visit, please call [phone].

## 2017-07-18 LAB
CHOLEST SERPL-MCNC: 182 MG/DL (ref 100–199)
EST. AVERAGE GLUCOSE BLD GHB EST-MCNC: 258 MG/DL
HBA1C MFR BLD: 10.6 % (ref 4.8–5.6)
HDLC SERPL-MCNC: 67 MG/DL
LDLC SERPL CALC-MCNC: 99 MG/DL (ref 0–99)
TRIGL SERPL-MCNC: 81 MG/DL (ref 0–149)
TSH SERPL DL<=0.005 MIU/L-ACNC: 3.02 UIU/ML (ref 0.45–4.5)
VLDLC SERPL CALC-MCNC: 16 MG/DL (ref 5–40)

## 2017-07-18 NOTE — PROGRESS NOTES
Diabetes remains poorly controlled, a1c 10.6. Thyroid and cholesterol levels look good though. We will forward these labs to dr Niki Nicolas for his review, so he can adjust your insulin at your next visit with him in 2 weeks.

## 2017-07-18 NOTE — PROGRESS NOTES
Lab results reviewed with patient. Patient verbalized understanding and does not have any questions at this time. A1C results faxed to Dr. Perry Reyez office.

## 2017-08-02 ENCOUNTER — TELEPHONE (OUTPATIENT)
Dept: INTERNAL MEDICINE CLINIC | Age: 62
End: 2017-08-02

## 2017-08-22 ENCOUNTER — HOSPITAL ENCOUNTER (OUTPATIENT)
Dept: MAMMOGRAPHY | Age: 62
Discharge: HOME | End: 2017-08-22
Attending: INTERNAL MEDICINE
Payer: COMMERCIAL

## 2017-08-22 DIAGNOSIS — Z12.31 VISIT FOR SCREENING MAMMOGRAM: ICD-10-CM

## 2017-08-22 PROCEDURE — 77067 SCR MAMMO BI INCL CAD: CPT

## 2017-09-01 ENCOUNTER — TELEPHONE (OUTPATIENT)
Dept: INTERNAL MEDICINE CLINIC | Age: 62
End: 2017-09-01

## 2017-09-01 NOTE — TELEPHONE ENCOUNTER
Pt would like a call back from the nurse to find out information about a test that was recommended by the facility the pt had her mammogram with. Based on the results of the mammogram they are requesting that the pt have an additional test done. Pt needs to know if she needs to call contact her insurance company to get authorization for the test to make sure it is covered. Pt can be reached at [phone].        Message received & copied from [facility]

## 2017-09-05 ENCOUNTER — HOSPITAL ENCOUNTER (OUTPATIENT)
Dept: MAMMOGRAPHY | Age: 62
Discharge: HOME | End: 2017-09-05
Attending: INTERNAL MEDICINE
Payer: COMMERCIAL

## 2017-09-05 ENCOUNTER — HOSPITAL ENCOUNTER (OUTPATIENT)
Dept: ULTRASOUND IMAGING | Age: 62
Discharge: HOME | End: 2017-09-05
Attending: INTERNAL MEDICINE
Payer: COMMERCIAL

## 2017-09-05 DIAGNOSIS — R92.8 ABNORMAL MAMMOGRAM OF LEFT BREAST: ICD-10-CM

## 2017-09-05 PROCEDURE — 77065 DX MAMMO INCL CAD UNI: CPT

## 2017-09-05 PROCEDURE — 76642 ULTRASOUND BREAST LIMITED: CPT

## 2017-09-05 NOTE — TELEPHONE ENCOUNTER
Spoke with patient after 2 patient identifiers being note and she advised that she had had procedure done and that she was just hoping the insurance would cover procedure. Patient expressed understanding and has no further questions at this time.

## 2017-09-05 NOTE — LETTER
9/6/2017 3:42 PM 
 
Ms. Basil Rocha [facility]. [phone] Dear Basil Rocha: 
 
Please find your most recent results below. Resulted Orders US BREAST LT LIMITED=<3 QUAD Narrative INDICATION:  Abnormal screening mammogram, questioned left breast asymmetry. Family history breast carcinoma paternal aunt. COMPARISON:  8/22/2017 and before. FINDINGS:  The patient returned for additional digital MLO, ML and spot 
compression and MLO views of the left breast.  CAD analysis was utilized. The 
breast parenchyma is heterogeneously dense which may diminish the sensitivity of 
mammography. On the additional views the questioned asymmetry in the inferior 
left breast spreads out blending with the breast parenchyma. No suspicious 
masses or calcifications are identified. LEFT BREAST ULTRASOUND: Sonographic real-time images were obtained of the 
inferior left breast. No cystic or solid abnormality is detected. Impression IMPRESSION:  BI-RADS Assessment Category 2: Benign finding. Follow-up is suggested with mammography in one year or earlier if clinical 
symptoms warrant. The results of the mammogram and of the ultrasound examination were relayed to 
the patient. Tamara Maria RECOMMENDATIONS: 
Mammogram and ultrasound ok.  Repeat mammogram in one year. Please call me if you have any questions: [phone] Sincerely, 
 
 
Dr. Shruthi Vanegas

## 2017-09-06 NOTE — PROGRESS NOTES
I have attempted to contact this patient by phone with the following results:   Mammogram and ultrasound ok. Repeat mammogram in one year. Results mailed to patient's home.

## 2017-12-11 ENCOUNTER — TELEPHONE (OUTPATIENT)
Dept: INTERNAL MEDICINE CLINIC | Age: 62
End: 2017-12-11

## 2017-12-11 NOTE — TELEPHONE ENCOUNTER
On call message:  Patient called in on 12/11/17 complaining of worsening cough, pleuritic pain ,and low grade temperature elevation. She was advised to go to the urgent care center for further evaluation and possible chest xray.

## 2017-12-12 RX ORDER — AZITHROMYCIN 250 MG/1
250 TABLET, FILM COATED ORAL SEE ADMIN INSTRUCTIONS
Qty: 6 TAB | Refills: 0 | Status: SHIPPED | OUTPATIENT
Start: 2017-12-12 | End: 2017-12-17

## 2018-02-02 ENCOUNTER — TELEPHONE (OUTPATIENT)
Dept: INTERNAL MEDICINE CLINIC | Age: 63
End: 2018-02-02

## 2018-02-02 NOTE — TELEPHONE ENCOUNTER
Patient states she needs a call back in reference to getting an appt to be seen for persistent cough since [place] & also discuss getting Previnar 13 injection. Please call.  Thank you

## 2018-02-02 NOTE — TELEPHONE ENCOUNTER
Spoke with patient after 2 patient identifiers being note and advised per of appt on Thursday, February 15, 2018 10:30 AM, patient accepted . Patient expressed understanding and has no further questions at this time.

## 2018-02-15 ENCOUNTER — OFFICE VISIT (OUTPATIENT)
Dept: INTERNAL MEDICINE CLINIC | Age: 63
End: 2018-02-15

## 2018-02-15 ENCOUNTER — HOSPITAL ENCOUNTER (OUTPATIENT)
Dept: LAB | Age: 63
Discharge: HOME | End: 2018-02-15
Payer: MEDICARE

## 2018-02-15 VITALS
HEART RATE: 98 BPM | BODY MASS INDEX: 34.49 KG/M2 | WEIGHT: 207 LBS | HEIGHT: 65 IN | RESPIRATION RATE: 16 BRPM | SYSTOLIC BLOOD PRESSURE: 113 MMHG | DIASTOLIC BLOOD PRESSURE: 68 MMHG | TEMPERATURE: 98.1 F | OXYGEN SATURATION: 98 %

## 2018-02-15 DIAGNOSIS — E11.69 HYPERLIPIDEMIA ASSOCIATED WITH TYPE 2 DIABETES MELLITUS (HCC): Chronic | ICD-10-CM

## 2018-02-15 DIAGNOSIS — Z13.6 SCREENING FOR AAA (ABDOMINAL AORTIC ANEURYSM): ICD-10-CM

## 2018-02-15 DIAGNOSIS — E03.9 ACQUIRED HYPOTHYROIDISM: ICD-10-CM

## 2018-02-15 DIAGNOSIS — E78.5 HYPERLIPIDEMIA ASSOCIATED WITH TYPE 2 DIABETES MELLITUS (HCC): Chronic | ICD-10-CM

## 2018-02-15 DIAGNOSIS — I10 ESSENTIAL HYPERTENSION: Chronic | ICD-10-CM

## 2018-02-15 DIAGNOSIS — E11.65 UNCONTROLLED TYPE 2 DIABETES MELLITUS WITH HYPERGLYCEMIA, WITH LONG-TERM CURRENT USE OF INSULIN (HCC): Chronic | ICD-10-CM

## 2018-02-15 DIAGNOSIS — N32.81 OAB (OVERACTIVE BLADDER): Chronic | ICD-10-CM

## 2018-02-15 DIAGNOSIS — Z23 ENCOUNTER FOR IMMUNIZATION: ICD-10-CM

## 2018-02-15 DIAGNOSIS — Z00.00 WELCOME TO MEDICARE PREVENTIVE VISIT: Primary | ICD-10-CM

## 2018-02-15 DIAGNOSIS — M81.0 OSTEOPOROSIS WITHOUT CURRENT PATHOLOGICAL FRACTURE, UNSPECIFIED OSTEOPOROSIS TYPE: Chronic | ICD-10-CM

## 2018-02-15 DIAGNOSIS — Z79.4 UNCONTROLLED TYPE 2 DIABETES MELLITUS WITH HYPERGLYCEMIA, WITH LONG-TERM CURRENT USE OF INSULIN (HCC): Chronic | ICD-10-CM

## 2018-02-15 PROCEDURE — 83036 HEMOGLOBIN GLYCOSYLATED A1C: CPT

## 2018-02-15 PROCEDURE — 80053 COMPREHEN METABOLIC PANEL: CPT

## 2018-02-15 PROCEDURE — 36415 COLL VENOUS BLD VENIPUNCTURE: CPT

## 2018-02-15 PROCEDURE — 82043 UR ALBUMIN QUANTITATIVE: CPT

## 2018-02-15 RX ORDER — BUPROPION HYDROCHLORIDE 150 MG/1
150 TABLET ORAL
Qty: 90 TAB | Refills: 3 | Status: SHIPPED | OUTPATIENT
Start: 2018-02-15 | End: 2019-04-21 | Stop reason: SDUPTHER

## 2018-02-15 NOTE — PROGRESS NOTES
This is a \"Welcome to [employer]"  Initial Preventive Physical Examination (IPPE) providing Personalized Prevention Plan Services (Performed in the first 12 months of enrollment)    I have reviewed the patient's medical history in detail and updated the computerized patient record. History     Past Medical History:   Diagnosis Date    Depression     Diabetes ([address]     Hypercholesteremia     Hypertension     Hyperthyroidism     IBS (irritable bowel syndrome)     Urinary incontinence       Past Surgical History:   Procedure Laterality Date    HX BACK SURGERY      x2    HX BLEPHAROPLASTY Right     HX BUNIONECTOMY      HX HYSTERECTOMY      HX OTHER SURGICAL      Collapsed lung    HX WISDOM TEETH EXTRACTION       Current Outpatient Prescriptions   Medication Sig Dispense Refill    buPROPion XL (WELLBUTRIN XL) 150 mg tablet Take 1 Tab by mouth every morning. 90 Tab 3    levothyroxine (SYNTHROID) 300 mcg tablet Take 600 mcg by mouth Daily (before breakfast).  cholecalciferol (VITAMIN D3) 1,000 unit tablet Take 1,000 Units by mouth daily.  HUMALOG MIX 75-25 KWIKPEN 100 unit/mL (75-25) inpn 20 Units two (2) times a day.  metFORMIN ER (GLUCOPHAGE XR) 500 mg tablet Take 500 mg by mouth two (2) times a day.  NOVOFINE PLUS 32 gauge x 1/6\" ndle       atorvastatin (LIPITOR) 80 mg tablet Take 80 mg by mouth daily.  ONETOUCH VERIO strip       furosemide (LASIX) 20 mg tablet Take 20 mg by mouth daily as needed.  B.infantis-B.ani-B.long-B.bifi (PROBIOTIC 4X) 10-15 mg TbEC Take  by mouth.  mirabegron ER (MYRBETRIQ) 25 mg ER tablet Take 25 mg by mouth daily.        Allergies   Allergen Reactions    Celebrex [Celecoxib] Other (comments)     Hallucinations    Codeine Nausea and Vomiting    Erythromycin Nausea and Vomiting     Family History   Problem Relation Age of Onset    Diabetes Mother     Heart Disease Mother     Cancer Father      pancreatic    Diabetes Sister    [facility] Anxiety Sister     Diabetes Brother     Anxiety Brother     Diabetes Brother     Anxiety Brother     Diabetes Sister     Anxiety Sister     Diabetes Sister     Anxiety Sister     Cancer Sister      lung and brain    Anxiety Sister     Anxiety Sister     Breast Cancer Paternal Aunt      under 48     Social History   Substance Use Topics    Smoking status: Never Smoker    Smokeless tobacco: Never Used    Alcohol use Yes      Comment: occasionally     Diet, Lifestyle: The patient is prescribed and does not follow the special diet. Exercise level: moderately active    Depression Risk Screen     PHQ over the last two weeks 10/6/2016   Little interest or pleasure in doing things Not at all   Feeling down, depressed or hopeless Not at all   Total Score PHQ 2 0     Alcohol Risk Screen   You do not drink alcohol or very rarely. Typically 1-2 drinks 1-2 times per week. Functional Ability and Level of Safety   Hearing Loss  Hearing is good. Vision Screening  Vision is good. No exam data present      Activities of Daily Living  The home contains: handrails and grab bars  Patient does total self care    Fall Risk Screen  Fall Risk Assessment, last 12 mths 2/15/2018   Able to walk? Yes   Fall in past 12 months? No       Abuse Screen  Patient is not abused    Screening EKG   EKG order placed: Yes    Patient Care Team   Patient Care Team:  Clive Lennox, DO as PCP - General (Internal Medicine)  Aura Helms MD (Obstetrics & Gynecology)  Helder Black MD (Orthopedic Surgery)  Maik Barr MD (Ophthalmology)  Celeste Loza DO (Pain Management)  Aristides Botello MD (Cardiology)  Zach Engel MD (Gastroenterology)  Chivo Morris MD (Orthopedic Surgery)  Sabrina Goldstein MD (Endocrinology)     End of Life Planning   Advanced care planning directives were discussed with the patient and /or family/caregiver.      Assessment/Plan   Education and counseling provided:  Are appropriate based on today's review and evaluation  End-of-Life planning (with patient's consent)  Pneumococcal Vaccine  Influenza Vaccine  Screening Mammography  Screening Pap and pelvic (covered once every 2 years)  Colorectal cancer screening tests  Screening for glaucoma    Diagnoses and all orders for this visit:    1. Welcome to Medicare preventive visit  -     AMB POC EKG ROUTINE W/ 12 LEADS, INTER & REP    Other orders  -     buPROPion XL (WELLBUTRIN XL) 150 mg tablet; Take 1 Tab by mouth every morning. Health Maintenance Due   Topic Date Due    HEMOGLOBIN A1C Q6M  01/17/2018    MICROALBUMIN Q1  01/17/2018    Pneumococcal 65+ Low/Medium Risk (1 of 2 - PCV13) 01/19/2018    MEDICARE YEARLY EXAM  01/19/2018    EYE EXAM RETINAL OR DILATED Q1  02/06/2018       Bilateral great toe onychomycosis--check cmp, if lfts ok, will send in rx for lamisil  Uncontrolled dm, type 2--check a1c, urine micro. Follows with dr Corey Quiros 13 given today. abd us ordered to screen for AAA. Was never a smoker.     rtc 6 months

## 2018-02-15 NOTE — PATIENT INSTRUCTIONS
Medicare Wellness Visit, Female    The best way to live healthy is to have a healthy lifestyle by eating a well-balanced diet, exercising regularly, limiting alcohol and stopping smoking. Regular physical exams and screening tests are another way to keep healthy. Preventive exams provided by your health care provider can find health problems before they become diseases or illnesses. Preventive services including immunizations, screening tests, monitoring and exams can help you take care of your own health. All people over age 72 should have a pneumovax  and and a prevnar shot to prevent pneumonia. These are once in a lifetime unless you and your provider decide differently. All people over 65 should have a yearly flu shot and a tetanus vaccine every 10 years. A bone mass density to screen for osteoporosis or thinning of the bones should be done every 2 years after 65. Screening for diabetes mellitus with a blood sugar test should be done every year. Glaucoma is a disease of the eye due to increased ocular pressure that can lead to blindness and it should be done every year by an eye professional.    Cardiovascular screening tests that check for elevated lipids (fatty part of blood) which can lead to heart disease and strokes should be done every 5 years. Colorectal screening that evaluates for blood or polyps in your colon should be done yearly as a stool test or every five years as a flexible sigmoidoscope or every 10 years as a colonoscopy up to age 76. Breast cancer screening with a mammogram is recommended biennially  for women age 54-69. Screening for cervical cancer with a pap smear and pelvic exam is recommended for women after age 72 years every 2 years up to age 79 or when the provider and patient decide to stop. If there is a history of cervical abnormalities or other increased risk for cancer then the test is recommended yearly.     Hepatitis C screening is also recommended for anyone born between [birth year] through [address]. A shingles vaccine is also recommended once in a lifetime after age 61. Your Medicare Wellness Exam is recommended annually. Here is a list of your current Health Maintenance items with a due date:  Health Maintenance Due   Topic Date Due    Hemoglobin A1C    01/17/2018    Albumin Urine Test  01/17/2018    Pneumococcal Vaccine (1 of 2 - PCV13) 01/19/2018    Annual Well Visit  01/19/2018    Eye Exam  02/06/2018          Toenail Fungus: Care Instructions  Your Care Instructions  A toenail that is infected by a fungus usually turns white or yellow. As the fungus spreads, the nail turns a darker color and gets thicker, and its edges start to turn ragged and crumble. A bad infection can cause toe pain, and the nail may pull away from the toe. Toenails that are exposed to moisture and warmth a lot are more likely to get infected by a fungus. This can happen from wearing sweaty shoes often and from walking barefoot on shower floors. It is hard to treat toenail fungus, and the infection can return after it has cleared up. But medicines can sometimes get rid of toenail fungus for good. If the infection is very bad, or if it causes a lot of pain, you may need to have the nail removed. Follow-up care is a key part of your treatment and safety. Be sure to make and go to all appointments, and call your doctor if you are having problems. It's also a good idea to know your test results and keep a list of the medicines you take. How can you care for yourself at home? · Take your medicines exactly as prescribed. Call your doctor if you have any problems with your medicine. You will get more details on the specific medicines your doctor prescribes. · If your doctor gave you a cream or liquid to put on your toenail, use it exactly as directed. · Wash your feet often, and wash your hands after touching your feet. · Keep your toenails clean and dry.  Dry your feet completely after you bathe and before you put on shoes and socks. · Keep your toenails trimmed. · Change socks often. Wear dry socks that absorb moisture. · Do not go barefoot in public places. · Use a spray or powder that fights fungus on your feet and in your shoes. · Do not pick at the skin around your nails. · Do not use nail polish or fake nails on your toenails. When should you call for help? Call your doctor now or seek immediate medical care if:  ? · You have signs of infection, such as:  ¨ Increased pain, swelling, warmth, or redness. ¨ Red streaks leading from the site. ¨ Pus draining from the site. ¨ A fever. ? · You have new or increased toe pain. ? Watch closely for changes in your health, and be sure to contact your doctor if:  ? · You do not get better as expected. Where can you learn more? Go to http://cira-corona.info/. Enter D202 in the search box to learn more about \"Toenail Fungus: Care Instructions. \"  Current as of: October 13, 2016  Content Version: 11.4  © 1001-1782 [employer]. Care instructions adapted under license by [employer] (which disclaims liability or warranty for this information). If you have questions about a medical condition or this instruction, always ask your healthcare professional. [address] any warranty or liability for your use of this information. If your liver tests look ok, I will send in a prescription for you--most likely early next week.

## 2018-02-15 NOTE — MR AVS SNAPSHOT
[address] [facility] 
[phone] Patient: Chiquita Soto MRN: [id number] Visit Information Date & Time Provider Department Dept. Phone Encounter #  
 2/15/2018 10:30 AM Anne Shoemaker, [address] [id number] Follow-up Instructions Return in about 3 months (around 5/15/2018). Upcoming Health Maintenance Date Due HEMOGLOBIN A1C Q6M 1/17/2018 MICROALBUMIN Q1 1/17/2018 EYE EXAM RETINAL OR DILATED Q1 2/6/2018 FOOT EXAM Q1 7/17/2018 LIPID PANEL Q1 7/17/2018 GLAUCOMA SCREENING Q2Y 2/6/2019 Pneumococcal 65+ Low/Medium Risk (2 of 2 - PPSV23) 2/15/2019 MEDICARE YEARLY EXAM 2/16/2019 BREAST CANCER SCRN MAMMOGRAM 9/5/2019 PAP AKA CERVICAL CYTOLOGY 3/31/2020 COLONOSCOPY 7/18/2026 DTaP/Tdap/Td series (2 - Td) 1/17/2027 Allergies as of 2/15/2018  Review Complete On: 2/15/2018 By: Jonathan Currie III, DO Severity Noted Reaction Type Reactions Celebrex [Celecoxib]  10/24/2012    Other (comments) Hallucinations Codeine  10/24/2012    Nausea and Vomiting Erythromycin  10/24/2012    Nausea and Vomiting Current Immunizations  Reviewed on 2/15/2018 Name Date Tdap 1/17/2017 Reviewed by Anne Shoemaker DO on 2/15/2018 at 11:31 AM  
You Were Diagnosed With   
  
 Codes Comments Welcome to Medicare preventive visit    -  Primary ICD-10-CM: Z00.00 ICD-9-CM: V70.0 Essential hypertension     ICD-10-CM: I10 
ICD-9-CM: 401.9 Uncontrolled type 2 diabetes mellitus with hyperglycemia, with long-term current use of insulin (HCC)     ICD-10-CM: E11.65, Z79.4 ICD-9-CM: 250.02, V58.67 Hyperlipidemia associated with type 2 diabetes mellitus ([facility] [address]     ICD-10-CM: E11.69, E78.5 ICD-9-CM: 250.80, 272.4  Osteoporosis without current pathological fracture, unspecified osteoporosis type     ICD-10-CM: M81.0 ICD-9-CM: 733.00   
 OAB (overactive bladder)     ICD-10-CM: N32.81 ICD-9-CM: 596.51 Acquired hypothyroidism     ICD-10-CM: E03.9 ICD-9-CM: 244.9 Screening for AAA (abdominal aortic aneurysm)     ICD-10-CM: Z13.6 ICD-9-CM: V81.2 Vitals BP Pulse Temp Resp Height(growth percentile) Weight(growth percentile) 113/68 (BP 1 Location: Left arm, BP Patient Position: Sitting) 98 98.1 °F (36.7 °C) (Oral) 16 5' 5\" (1.651 m) 207 lb (93.9 kg) SpO2 BMI OB Status Smoking Status 98% 34.45 kg/m2 Postmenopausal Never Smoker Vitals History BMI and BSA Data Body Mass Index Body Surface Area 34.45 kg/m 2 2.08 m 2 Preferred Pharmacy Pharmacy Name Phone [address] [facility] [phone] Your Updated Medication List  
  
   
This list is accurate as of: 2/15/18 11:40 AM.  Always use your most recent med list.  
  
  
  
  
 atorvastatin 80 mg tablet Commonly known as:  LIPITOR Take 80 mg by mouth daily. buPROPion  mg tablet Commonly known as:  Ema Hire Take 1 Tab by mouth every morning. furosemide 20 mg tablet Commonly known as:  LASIX Take 20 mg by mouth daily as needed. HumaLOG Mix 75-25 KwikPen 100 unit/mL (75-25) Inpn Generic drug:  insulin lispro  & lisp protamine (human) 20 Units two (2) times a day. metFORMIN  mg tablet Commonly known as:  GLUCOPHAGE XR Take 500 mg by mouth two (2) times a day. mirabegron ER 25 mg ER tablet Commonly known as:  MYRBETRIQ Take 25 mg by mouth daily. NOVOFINE PLUS 32 gauge x 1/6\" Ndle Generic drug:  pen needle, diabetic ONETOUCH VERIO strip Generic drug:  glucose blood VI test strips PROBIOTIC 4X 10-15 mg Tbec Generic drug:  B.infantis-B.ani-B.long-B.bifi Take  by mouth. SYNTHROID 300 mcg tablet Generic drug:  levothyroxine Take 600 mcg by mouth Daily (before breakfast). VITAMIN D3 1,000 unit tablet Generic drug:  cholecalciferol Take 1,000 Units by mouth daily. Prescriptions Sent to Pharmacy Refills buPROPion XL (WELLBUTRIN XL) 150 mg tablet 3 Sig: Take 1 Tab by mouth every morning. Class: Normal  
 Pharmacy: [address], [address] [place] [address] #: [phone] Route: Oral  
  
We Performed the Following AMB POC EKG ROUTINE W/ 12 LEADS, INTER & REP [20909 CPT(R)] HEMOGLOBIN A1C WITH EAG [73572 CPT(R)] METABOLIC PANEL, COMPREHENSIVE [62767 CPT(R)] MICROALBUMIN, UR, RAND W/ MICROALBUMIN/CREA RATIO B6110792 CPT(R)] Follow-up Instructions Return in about 3 months (around 5/15/2018). To-Do List   
 02/18/2018 Imaging:  US EXAM SCREENING AAA Patient Instructions Medicare Wellness Visit, Female The best way to live healthy is to have a healthy lifestyle by eating a well-balanced diet, exercising regularly, limiting alcohol and stopping smoking. Regular physical exams and screening tests are another way to keep healthy. Preventive exams provided by your health care provider can find health problems before they become diseases or illnesses. Preventive services including immunizations, screening tests, monitoring and exams can help you take care of your own health. All people over age 72 should have a pneumovax  and and a prevnar shot to prevent pneumonia. These are once in a lifetime unless you and your provider decide differently. All people over 65 should have a yearly flu shot and a tetanus vaccine every 10 years. A bone mass density to screen for osteoporosis or thinning of the bones should be done every 2 years after 65. Screening for diabetes mellitus with a blood sugar test should be done every year.  
 
Glaucoma is a disease of the eye due to increased ocular pressure that can lead to blindness and it should be done every year by an eye professional. 
 
Cardiovascular screening tests that check for elevated lipids (fatty part of blood) which can lead to heart disease and strokes should be done every 5 years. Colorectal screening that evaluates for blood or polyps in your colon should be done yearly as a stool test or every five years as a flexible sigmoidoscope or every 10 years as a colonoscopy up to age 76. Breast cancer screening with a mammogram is recommended biennially  for women age 54-69. Screening for cervical cancer with a pap smear and pelvic exam is recommended for women after age 72 years every 2 years up to age 79 or when the provider and patient decide to stop. If there is a history of cervical abnormalities or other increased risk for cancer then the test is recommended yearly. Hepatitis C screening is also recommended for anyone born between 80 through [address]. A shingles vaccine is also recommended once in a lifetime after age 61. Your Medicare Wellness Exam is recommended annually. Here is a list of your current Health Maintenance items with a due date: 
Health Maintenance Due Topic Date Due  
 Hemoglobin A1C    01/17/2018  Albumin Urine Test  01/17/2018  Pneumococcal Vaccine (1 of 2 - PCV13) 01/19/2018 [address] [place] [address] Annual Well Visit  01/19/2018 [address] [place] [address] Eye Exam  02/06/2018 Toenail Fungus: Care Instructions Your Care Instructions A toenail that is infected by a fungus usually turns white or yellow. As the fungus spreads, the nail turns a darker color and gets thicker, and its edges start to turn ragged and crumble. A bad infection can cause toe pain, and the nail may pull away from the toe. Toenails that are exposed to moisture and warmth a lot are more likely to get infected by a fungus. This can happen from wearing sweaty shoes often and from walking barefoot on shower floors. It is hard to treat toenail fungus, and the infection can return after it has cleared up. But medicines can sometimes get rid of toenail fungus for good. If the infection is very bad, or if it causes a lot of pain, you may need to have the nail removed. Follow-up care is a key part of your treatment and safety. Be sure to make and go to all appointments, and call your doctor if you are having problems. It's also a good idea to know your test results and keep a list of the medicines you take. How can you care for yourself at home? · Take your medicines exactly as prescribed. Call your doctor if you have any problems with your medicine. You will get more details on the specific medicines your doctor prescribes. · If your doctor gave you a cream or liquid to put on your toenail, use it exactly as directed. · Wash your feet often, and wash your hands after touching your feet. · Keep your toenails clean and dry. Dry your feet completely after you bathe and before you put on shoes and socks. · Keep your toenails trimmed. · Change socks often. Wear dry socks that absorb moisture. · Do not go barefoot in public places. · Use a spray or powder that fights fungus on your feet and in your shoes. · Do not pick at the skin around your nails. · Do not use nail polish or fake nails on your toenails. When should you call for help? Call your doctor now or seek immediate medical care if: 
? · You have signs of infection, such as: 
¨ Increased pain, swelling, warmth, or redness. ¨ Red streaks leading from the site. ¨ Pus draining from the site. ¨ A fever. ? · You have new or increased toe pain. ? Watch closely for changes in your health, and be sure to contact your doctor if: 
? · You do not get better as expected. Where can you learn more? Go to http://cira-corona.info/. Enter D202 in the search box to learn more about \"Toenail Fungus: Care Instructions. \" Current as of: October 13, 2016 Content Version: 11.4 © 5295-5626 [employer]. Care instructions adapted under license by [employer] (which disclaims liability or warranty for this information). If you have questions about a medical condition or this instruction, always ask your healthcare professional. [address] any warranty or liability for your use of this information. If your liver tests look ok, I will send in a prescription for you--most likely early next week. Introducing [facility]! Dear Royal Engel: 
Thank you for requesting a My[employer] account. Our records indicate that you already have an active Myo[employer] account. You can access your account anytime at [email or web address] Did you know that you can access your hospital and ER discharge instructions at any time in MyoScience? You can also review all of your test results from your hospital stay or ER visit. Additional Information If you have questions, please visit the Frequently Asked Questions section of the MyoScience website at [email or web address] Remember, MyoScience is NOT to be used for urgent needs. For medical emergencies, dial 911. Now available from your iPhone and Android! Please provide this summary of care documentation to your next provider. Your primary care clinician is listed as Brittany Almeida If you have any questions after today's visit, please call [phone].

## 2018-02-15 NOTE — PROGRESS NOTES
Reviewed record in preparation for visit and have obtained necessary documentation. Identified pt with two pt identifiers(name and ). Chief Complaint   Patient presents with    Welcome To Medicare       Health Maintenance Due   Topic Date Due    HEMOGLOBIN A1C Q6M  2018    MICROALBUMIN Q1  2018    Pneumococcal 65+ Low/Medium Risk (1 of 2 - PCV13) 2018    MEDICARE YEARLY EXAM  2018    EYE EXAM RETINAL OR DILATED Q1  2018       Ms. Get Dawson has a reminder for a \"due or due soon\" health maintenance. I have asked that she discuss health maintenance topic(s) due with Her  primary care provider. Coordination of Care Questionnaire:  :     1) Have you been to an emergency room, urgent care clinic since your last visit? no   Hospitalized since your last visit? no             2) Have you seen or consulted any other health care providers outside of [address] [place] [address] since your last visit? no  (Include any pap smears or colon screenings in this section.)    3) Do you have an Advance Directive on file? no    4) Are you interested in receiving information on Advance Directives? yes    Patient is accompanied by self I have received verbal consent from Vania Roberto to discuss any/all medical information while they are present in the room.

## 2018-02-16 LAB
ALBUMIN SERPL-MCNC: 4.1 G/DL (ref 3.6–4.8)
ALBUMIN/CREAT UR: 3 MG/G CREAT (ref 0–30)
ALBUMIN/GLOB SERPL: 1.5 {RATIO} (ref 1.2–2.2)
ALP SERPL-CCNC: 72 IU/L (ref 39–117)
ALT SERPL-CCNC: 12 IU/L (ref 0–32)
AST SERPL-CCNC: 11 IU/L (ref 0–40)
BILIRUB SERPL-MCNC: 0.3 MG/DL (ref 0–1.2)
BUN SERPL-MCNC: 13 MG/DL (ref 8–27)
BUN/CREAT SERPL: 15 (ref 12–28)
CALCIUM SERPL-MCNC: 9.5 MG/DL (ref 8.7–10.3)
CHLORIDE SERPL-SCNC: 98 MMOL/L (ref 96–106)
CO2 SERPL-SCNC: 21 MMOL/L (ref 18–29)
CREAT SERPL-MCNC: 0.84 MG/DL (ref 0.57–1)
CREAT UR-MCNC: 188.3 MG/DL
EST. AVERAGE GLUCOSE BLD GHB EST-MCNC: 260 MG/DL
GFR SERPLBLD CREATININE-BSD FMLA CKD-EPI: 73 ML/MIN/1.73
GFR SERPLBLD CREATININE-BSD FMLA CKD-EPI: 84 ML/MIN/1.73
GLOBULIN SER CALC-MCNC: 2.7 G/DL (ref 1.5–4.5)
GLUCOSE SERPL-MCNC: 358 MG/DL (ref 65–99)
HBA1C MFR BLD: 10.7 % (ref 4.8–5.6)
MICROALBUMIN UR-MCNC: 5.6 UG/ML
POTASSIUM SERPL-SCNC: 4.1 MMOL/L (ref 3.5–5.2)
PROT SERPL-MCNC: 6.8 G/DL (ref 6–8.5)
SODIUM SERPL-SCNC: 141 MMOL/L (ref 134–144)

## 2018-02-18 NOTE — PROGRESS NOTES
Diabetes remains poorly controlled. Please have dr Jaclyn Espinosa do something to help, otherwise I would like you to see an endocrinologist here.

## 2018-02-19 NOTE — PROGRESS NOTES
Lab results reviewed with patient. Patient verbalized understanding and does not have any questions at this time. Per patient, she has an appointment scheduled with Dr. Emerald Fam on 04/09/18.

## 2018-02-21 ENCOUNTER — HOSPITAL ENCOUNTER (OUTPATIENT)
Dept: ULTRASOUND IMAGING | Age: 63
Discharge: HOME | End: 2018-02-21
Attending: INTERNAL MEDICINE
Payer: MEDICARE

## 2018-02-21 DIAGNOSIS — Z13.6 SCREENING FOR AAA (ABDOMINAL AORTIC ANEURYSM): ICD-10-CM

## 2018-02-21 PROCEDURE — 76706 US ABDL AORTA SCREEN AAA: CPT

## 2018-02-21 NOTE — LETTER
2/26/2018 4:59 PM 
 
Ms. Lexi George [address] [facility] [address] Dear Lexi George: 
 
Please find your most recent results below. Resulted Orders US EXAM SCREENING AAA Narrative Clinical indication: AAA screening. Real-time ultrasonography of the abdominal aorta. Proximally measurements are 23 
x 20 fourth millimeter. Mid aorta 21 x 22, distal aorta 20 x 1.9 cm. There is a 
large amount of bowel gas limiting somewhat evaluation. A focal aneurysm is not 
seen, the iliac arteries are of normal caliber 1.2 cm. Impression  
  impression: Mild tortuosity of the distal abdominal aorta. Limited by gas. RECOMMENDATIONS: 
  
    
  No signs of any aneurysm, but lots of gas!!!   
 
 
 
Please call me if you have any questions: [phone] Sincerely, 
 
 
Dr. Kolby Vieyra

## 2018-02-26 NOTE — PROGRESS NOTES
I have attempted to contact this patient by phone with the following results:  No signs of any aneurysm, but lots of gas!!! Detailed message left on patient's voicemail. Results mailed to patient's home.

## 2018-05-14 ENCOUNTER — OFFICE VISIT (OUTPATIENT)
Dept: INTERNAL MEDICINE CLINIC | Age: 63
End: 2018-05-14

## 2018-05-14 VITALS
RESPIRATION RATE: 18 BRPM | HEIGHT: 65 IN | SYSTOLIC BLOOD PRESSURE: 130 MMHG | DIASTOLIC BLOOD PRESSURE: 65 MMHG | TEMPERATURE: 97.9 F | BODY MASS INDEX: 34.16 KG/M2 | WEIGHT: 205 LBS | OXYGEN SATURATION: 97 % | HEART RATE: 91 BPM

## 2018-05-14 DIAGNOSIS — E78.5 HYPERLIPIDEMIA ASSOCIATED WITH TYPE 2 DIABETES MELLITUS (HCC): Chronic | ICD-10-CM

## 2018-05-14 DIAGNOSIS — R10.30 LOWER ABDOMINAL PAIN: Primary | ICD-10-CM

## 2018-05-14 DIAGNOSIS — Z79.4 UNCONTROLLED TYPE 2 DIABETES MELLITUS WITH HYPERGLYCEMIA, WITH LONG-TERM CURRENT USE OF INSULIN (HCC): Chronic | ICD-10-CM

## 2018-05-14 DIAGNOSIS — N32.81 OAB (OVERACTIVE BLADDER): Chronic | ICD-10-CM

## 2018-05-14 DIAGNOSIS — E03.9 ACQUIRED HYPOTHYROIDISM: ICD-10-CM

## 2018-05-14 DIAGNOSIS — E11.69 HYPERLIPIDEMIA ASSOCIATED WITH TYPE 2 DIABETES MELLITUS (HCC): Chronic | ICD-10-CM

## 2018-05-14 DIAGNOSIS — I10 ESSENTIAL HYPERTENSION: Chronic | ICD-10-CM

## 2018-05-14 DIAGNOSIS — F32.A DEPRESSION, UNSPECIFIED DEPRESSION TYPE: Chronic | ICD-10-CM

## 2018-05-14 DIAGNOSIS — E11.65 UNCONTROLLED TYPE 2 DIABETES MELLITUS WITH HYPERGLYCEMIA, WITH LONG-TERM CURRENT USE OF INSULIN (HCC): Chronic | ICD-10-CM

## 2018-05-14 NOTE — MR AVS SNAPSHOT
[address] 
[phone] Patient: Carlito Herbert MRN: [id number] DJE:[birth year] Visit Information Date & Time Provider Department Dept. Phone Encounter #  
 5/14/2018  8:45 AM Srinath Holliday, [address] [id number] Follow-up Instructions Return in about 6 months (around 11/14/2018). Upcoming Health Maintenance Date Due  
 EYE EXAM RETINAL OR DILATED Q1 5/31/2018* FOOT EXAM Q1 7/17/2018 LIPID PANEL Q1 7/17/2018 Influenza Age 5 to Adult 8/1/2018 HEMOGLOBIN A1C Q6M 8/15/2018 GLAUCOMA SCREENING Q2Y 2/6/2019 Pneumococcal 65+ Low/Medium Risk (2 of 2 - PPSV23) 2/15/2019 MICROALBUMIN Q1 2/15/2019 MEDICARE YEARLY EXAM 2/16/2019 BREAST CANCER SCRN MAMMOGRAM 9/5/2019 COLONOSCOPY 7/18/2026 DTaP/Tdap/Td series (2 - Td) 1/17/2027 *Topic was postponed. The date shown is not the original due date. Allergies as of 5/14/2018  Review Complete On: 5/14/2018 By: Sharyle Irani III, DO Severity Noted Reaction Type Reactions Celebrex [Celecoxib]  10/24/2012    Other (comments) Hallucinations Codeine  10/24/2012    Nausea and Vomiting Erythromycin  10/24/2012    Nausea and Vomiting Current Immunizations  Reviewed on 2/15/2018 Name Date Pneumococcal Conjugate (PCV-13) 2/15/2018 Tdap 1/17/2017 Not reviewed this visit You Were Diagnosed With   
  
 Codes Comments Lower abdominal pain    -  Primary ICD-10-CM: R10.30 ICD-9-CM: 789.09 Hyperlipidemia associated with type 2 diabetes mellitus ([facility] [address]     ICD-10-CM: E11.69, E78.5 ICD-9-CM: 250.80, 272.4 Uncontrolled type 2 diabetes mellitus with hyperglycemia, with long-term current use of insulin (HCC)     ICD-10-CM: E11.65, Z79.4 ICD-9-CM: 250.02, V58.67 Essential hypertension     ICD-10-CM: I10 
ICD-9-CM: 401.9 Depression, unspecified depression type     ICD-10-CM: F32.9 ICD-9-CM: 059 Acquired hypothyroidism     ICD-10-CM: E03.9 ICD-9-CM: 244.9   
 OAB (overactive bladder)     ICD-10-CM: N32.81 ICD-9-CM: 596.51 Vitals BP Pulse Temp Resp Height(growth percentile) Weight(growth percentile) 130/65 (BP 1 Location: Left arm, BP Patient Position: Sitting) 91 97.9 °F (36.6 °C) (Oral) 18 5' 5\" (1.651 m) 205 lb (93 kg) SpO2 BMI OB Status Smoking Status 97% 34.11 kg/m2 Postmenopausal Never Smoker BMI and BSA Data Body Mass Index Body Surface Area  
 34.11 kg/m 2 2.07 m 2 Preferred Pharmacy Pharmacy Name Phone [facility], [facility] [phone] Your Updated Medication List  
  
   
This list is accurate as of 5/14/18  9:39 AM.  Always use your most recent med list.  
  
  
  
  
 atorvastatin 80 mg tablet Commonly known as:  LIPITOR Take 80 mg by mouth daily. buPROPion  mg tablet Commonly known as:  Vernestine Salk Take 1 Tab by mouth every morning. furosemide 20 mg tablet Commonly known as:  LASIX Take 20 mg by mouth daily as needed. HumaLOG Mix 75-25 KwikPen 100 unit/mL (75-25) Inpn Generic drug:  insulin lispro  & lisp protamine (human) 20 Units two (2) times a day. metFORMIN  mg tablet Commonly known as:  GLUCOPHAGE XR Take 500 mg by mouth two (2) times a day. NOVOFINE PLUS 32 gauge x 1/6\" Ndle Generic drug:  pen needle, diabetic ONETOUCH VERIO strip Generic drug:  glucose blood VI test strips PROBIOTIC 4X 10-15 mg Tbec Generic drug:  B.infantis-B.ani-B.long-B.bifi Take  by mouth. SYNTHROID 300 mcg tablet Generic drug:  levothyroxine Take 600 mcg by mouth Daily (before breakfast). VITAMIN D3 1,000 unit tablet Generic drug:  cholecalciferol Take 1,000 Units by mouth daily. Follow-up Instructions Return in about 6 months (around 11/14/2018). To-Do List   
 05/14/2018 Imaging:  CT ABD W CONT   
  
 05/14/2018 Imaging:  CT PELV W CONT Patient Instructions Nutrition Tips for Diabetes: After Your Visit Your Care Instructions A healthy diet is important to manage diabetes. It helps you lose weight (if you need to) and keep it off. It gives you the nutrition and energy your body needs and helps prevent heart disease. But a diet for diabetes does not mean that you have to eat special foods. You can eat what your family eats, including occasional sweets and other favorites. But you do have to pay attention to how often you eat and how much you eat of certain foods. The right plan for you will give you meals that help you keep your blood sugar at healthy levels. Try to eat a variety of foods and to spread carbohydrate throughout the day. Carbohydrate raises blood sugar higher and more quickly than any other nutrient does. Carbohydrate is found in sugar, breads and cereals, fruit, starchy vegetables such as potatoes and corn, and milk and yogurt. You may want to work with a dietitian or diabetes educator to help you plan meals and snacks. A dietitian or diabetes educator also can help you lose weight if that is one of your goals. The following tips can help you enjoy your meals and stay healthy. Follow-up care is a key part of your treatment and safety. Be sure to make and go to all appointments, and call your doctor if you are having problems. Its also a good idea to know your test results and keep a list of the medicines you take. How can you care for yourself at home? · Learn which foods have carbohydrate and how much carbohydrate to eat. A dietitian or diabetes educator can help you learn to keep track of how much carbohydrate you eat. · Spread carbohydrate throughout the day. Eat some carbohydrate at all meals, but do not eat too much at any one time. · Plan meals to include food from all the food groups. These are the food groups and some example portion sizes: ¨ Grains: 1 slice of bread (1 ounce), ½ cup of cooked cereal, and 1/3 cup of cooked pasta or rice. These have about 15 grams of carbohydrate in a serving. Choose whole grains such as whole wheat bread or crackers, oatmeal, and brown rice more often than refined grains. ¨ Fruit: 1 small fresh fruit, such as an apple or orange; ½ of a banana; ½ cup of chopped, cooked, or canned fruit; ½ cup of fruit juice; 1 cup of melon or raspberries; and 2 tablespoons of dried fruit. These have about 15 grams of carbohydrate in a serving. ¨ Dairy: 1 cup of nonfat or low-fat milk and 2/3 cup of plain yogurt. These have about 15 grams of carbohydrate in a serving. ¨ Protein foods: Beef, chicken, turkey, fish, eggs, tofu, cheese, cottage cheese, and peanut butter. A serving size of meat is 3 ounces, which is about the size of a deck of cards. Examples of meat substitute serving sizes (equal to 1 ounce of meat) are 1/4 cup of cottage cheese, 1 egg, 1 tablespoon of peanut butter, and ½ cup of tofu. These have very little or no carbohydrate per serving. ¨ Vegetables: Starchy vegetables such as ½ cup of cooked dried beans, peas, potatoes, or corn have about 15 grams of carbohydrate. Nonstarchy vegetables have very little carbohydrate, such as 1 cup of raw leafy vegetables (such as spinach), ½ cup of other vegetables (cooked or chopped), and 3/4 cup of vegetable juice. · Use the plate format to plan meals. It is a good, quick way to make sure that you have a balanced meal. It also helps you spread carbohydrate throughout the day. You divide your plate by types of foods. Put vegetables on half the plate, meat or meat substitutes on one-quarter of the plate, and a grain or starchy vegetable (such as brown rice or a potato) in the final quarter of the plate.  To this you can add a small piece of fruit and 1 cup of milk or yogurt, depending on how much carbohydrate you are supposed to eat at a meal. 
· Talk to your dietitian or diabetes educator about ways to add limited amounts of sweets into your meal plan. You can eat these foods now and then, as long as you include the amount of carbohydrate they have in your daily carbohydrate allowance. · If you drink alcohol, limit it to no more than 1 drink a day for women and 2 drinks a day for men. If you are pregnant, no amount of alcohol is known to be safe. · Protein, fat, and fiber do not raise blood sugar as much as carbohydrate does. If you eat a lot of these nutrients in a meal, your blood sugar will rise more slowly than it would otherwise. · Limit saturated fats, such as those from meat and dairy products. Try to replace it with monounsaturated fat, such as olive oil. This is a healthier choice because people who have diabetes are at higher-than-average risk of heart disease. But use a modest amount of olive oil. A tablespoon of olive oil has 14 grams of fat and 120 calories. · Exercise lowers blood sugar. If you take insulin by shots or pump, you can use less than you would if you were not exercising. Keep in mind that timing matters. If you exercise within 1 hour after a meal, your body may need less insulin for that meal than it would if you exercised 3 hours after the meal. Test your blood sugar to find out how exercise affects your need for insulin. · Exercise on most days of the week. Aim for at least 30 minutes. Exercise helps you stay at a healthy weight and helps your body use insulin. Walking is an easy way to get exercise. Gradually increase the amount you walk every day. You also may want to swim, bike, or do other activities. When you eat out · Learn to estimate the serving sizes of foods that have carbohydrate. If you measure food at home, it will be easier to estimate the amount in a serving of restaurant food. · If the meal you order has too much carbohydrate (such as potatoes, corn, or baked beans), ask to have a low-carbohydrate food instead. Ask for a salad or green vegetables. · If you use insulin, check your blood sugar before and after eating out to help you plan how much to eat in the future. · If you eat more carbohydrate at a meal than you had planned, take a walk or do other exercise. This will help lower your blood sugar. Where can you learn more? Go to Talkray.be Enter O996 in the search box to learn more about \"Nutrition Tips for Diabetes: After Your Visit. \"  
© 9762-8311 Healthwise, [employer]. Care instructions adapted under license by [employer] (which disclaims liability or warranty for this information). This care instruction is for use with your licensed healthcare professional. If you have questions about a medical condition or this instruction, always ask your healthcare professional. [place] [address] any warranty or liability for your use of this information. Content Version: 64.5.042334; Current as of: June 4, 2014 Abdominal Pain: Care Instructions Your Care Instructions Abdominal pain has many possible causes. Some aren't serious and get better on their own in a few days. Others need more testing and treatment. If your pain continues or gets worse, you need to be rechecked and may need more tests to find out what is wrong. You may need surgery to correct the problem. Don't ignore new symptoms, such as fever, nausea and vomiting, urination problems, pain that gets worse, and dizziness. These may be signs of a more serious problem. Your doctor may have recommended a follow-up visit in the next 8 to 12 hours. If you are not getting better, you may need more tests or treatment. The doctor has checked you carefully, but problems can develop later. If you notice any problems or new symptoms, get medical treatment right away. Follow-up care is a key part of your treatment and safety. Be sure to make and go to all appointments, and call your doctor if you are having problems. It's also a good idea to know your test results and keep a list of the medicines you take. How can you care for yourself at home? · Rest until you feel better. · To prevent dehydration, drink plenty of fluids, enough so that your urine is light yellow or clear like water. Choose water and other caffeine-free clear liquids until you feel better. If you have kidney, heart, or liver disease and have to limit fluids, talk with your doctor before you increase the amount of fluids you drink. · If your stomach is upset, eat mild foods, such as rice, dry toast or crackers, bananas, and applesauce. Try eating several small meals instead of two or three large ones. · Wait until 48 hours after all symptoms have gone away before you have spicy foods, alcohol, and drinks that contain caffeine. · Do not eat foods that are high in fat. · Avoid anti-inflammatory medicines such as aspirin, ibuprofen (Advil, Motrin), and naproxen (Aleve). These can cause stomach upset. Talk to your doctor if you take daily aspirin for another health problem. When should you call for help? Call 911 anytime you think you may need emergency care. For example, call if: 
? · You passed out (lost consciousness). ? · You pass maroon or very bloody stools. ? · You vomit blood or what looks like coffee grounds. ? · You have new, severe belly pain. ?Call your doctor now or seek immediate medical care if: 
? · Your pain gets worse, especially if it becomes focused in one area of your belly. ? · You have a new or higher fever. ? · Your stools are black and look like tar, or they have streaks of blood. ? · You have unexpected vaginal bleeding. ? · You have symptoms of a urinary tract infection. These may include: 
¨ Pain when you urinate.  
¨ Urinating more often than usual. 
 ¨ Blood in your urine. ? · You are dizzy or lightheaded, or you feel like you may faint. ? Watch closely for changes in your health, and be sure to contact your doctor if: 
? · You are not getting better after 1 day (24 hours). Where can you learn more? Go to http://cira-corona.info/. Enter L712 in the search box to learn more about \"Abdominal Pain: Care Instructions. \" Current as of: March 20, 2017 Content Version: 11.4 © 7478-4399 [employer]. Care instructions adapted under license by [employer] (which disclaims liability or warranty for this information). If you have questions about a medical condition or this instruction, always ask your healthcare professional. [address] any warranty or liability for your use of this information. Introducing [facility] HE[facility]! Dear John Villatoro: 
Thank you for requesting a NiftyThrifty account. Our records indicate that you already have an active NiftyThrifty account. You can access your account anytime at [email or web address] Did you know that you can access your hospital and ER discharge instructions at any time in NiftyThrifty? You can also review all of your test results from your hospital stay or ER visit. Additional Information If you have questions, please visit the Frequently Asked Questions section of the NiftyThrifty website at [email or web address] Remember, NiftyThrifty is NOT to be used for urgent needs. For medical emergencies, dial 911. Now available from your iPhone and Android! Please provide this summary of care documentation to your next provider. Your primary care clinician is listed as Henrique Camargo If you have any questions after today's visit, please call [phone].

## 2018-05-14 NOTE — PROGRESS NOTES
Flavia Haro is a 70 y.o. female who presents for evaluation of routine follow up. Last seen by me feb 15, 2018 in welcome to medicare visit. Sugars remain poorly controlled. Her complaint today though is persistent yet intermittent bilateral groin pains. Typically 3-4 x per day, each episode lasting about 5 minutes. No change in bowels or bladder function. Has been having these pains for over a year.   Has plans to go to [place] this weekend for a music festival.      ROS:  Constitutional: negative for fevers, chills, anorexia and weight loss  Eyes:   negative for visual disturbance and irritation  ENT:   negative for tinnitus,sore throat,nasal congestion,ear pain,hoarseness  Respiratory:  negative for cough, hemoptysis, dyspnea,wheezing  CV:   negative for chest pain, palpitations, lower extremity edema  GI:   negative for nausea, vomiting, diarrhea, abdominal pain,melena  Genitourinary: negative for frequency, dysuria and hematuria  Musculoskel: negative for myalgias, arthralgias, back pain, muscle weakness, joint pain  Neurological:  negative for headaches, dizziness, focal weakness, numbness  Psychiatric:     Negative for depression or anxiety      Past Medical History:   Diagnosis Date    Depression     Diabetes ([facility] [address]     Hypercholesteremia     Hypertension     Hyperthyroidism     IBS (irritable bowel syndrome)     Urinary incontinence        Past Surgical History:   Procedure Laterality Date    HX BACK SURGERY      x2    HX BLEPHAROPLASTY Right     HX BUNIONECTOMY      HX HYSTERECTOMY      HX OTHER SURGICAL      Collapsed lung    HX WISDOM TEETH EXTRACTION         Family History   Problem Relation Age of Onset    Diabetes Mother     Heart Disease Mother     Cancer Father      pancreatic    Diabetes Sister     Anxiety Sister     Diabetes Brother     Anxiety Brother     Diabetes Brother     Anxiety Brother     Diabetes Sister     Anxiety Sister     Diabetes Sister    Kathy Wooten Anxiety Sister     Cancer Sister      lung and brain    Anxiety Sister     Anxiety Sister     Breast Cancer Paternal Aunt      under 48       Social History     Social History    Marital status:      Spouse name: N/A    Number of children: N/A    Years of education: N/A     Occupational History    Not on file. Social History Main Topics    Smoking status: Never Smoker    Smokeless tobacco: Never Used    Alcohol use Yes      Comment: occasionally    Drug use: No    Sexual activity: No     Other Topics Concern    Not on file     Social History Narrative            Visit Vitals    /65 (BP 1 Location: Left arm, BP Patient Position: Sitting)    Pulse 91    Temp 97.9 °F (36.6 °C) (Oral)    Resp 18    Ht 5' 5\" (1.651 m)    Wt 205 lb (93 kg)    SpO2 97%    BMI 34.11 kg/m2       Physical Examination:   General - Well appearing female  HEENT - PERRL, TM no erythema/opacification, normal nasal turbinates, no oropharyngeal erythema or exudate, MMM  Neck - supple, no bruits, no thyroidomegaly, no lymphadenopathy  Pulm - clear to auscultation bilaterally  Cardio - RRR, normal S1 S2, no murmur  Abd - soft, nontender, no masses, no HSM. Benign, no guard or rebound. Extrem - no edema, +2 distal pulses  Neuro-  No focal deficits, CN intact     Assessment/Plan:    1. Pelvic pain, lower abd pain--had transvaginal us done oct 2016 that was unremarkable. Will check CT abd/pelvis  2. Uncontrolled dm, type 2--follows with dr Isaiah Smith, on 75/25 and glucophage. Last a1c 10.7  3. Hypothyroid--on synthroid  4. Anxiety and depression--continue wellbutrin  5.  oab--no longer uses myrbetriq  6.  hyperlipids--continue lipitor  7. Excessive ear wax, r>l--flushed out today.     rtc 6 months        Rayma December III, DO

## 2018-05-14 NOTE — PROGRESS NOTES
Reviewed record in preparation for visit and have obtained necessary documentation. Identified pt with two pt identifiers(name and ). Chief Complaint   Patient presents with    Follow-up     3 mos f/u       There are no preventive care reminders to display for this patient. Ms. Sam Mike has a reminder for a \"due or due soon\" health maintenance. I have asked that she discuss this further with her primary care provider for follow-up on this health maintenance. Coordination of Care Questionnaire:  :     1) Have you been to an emergency room, urgent care clinic since your last visit? no   Hospitalized since your last visit? no             2) Have you seen or consulted any other health care providers outside of [address] [place] [address] since your last visit? no  (Include any pap smears or colon screenings in this section.)    3) In the event something were to happen to you and you were unable to speak on your behalf, do you have an Advance Directive/ Living Will in place stating your wishes? YES    Do you have an Advance Directive on file? no    4) Are you interested in receiving information on Advance Directives? NO    Patient is accompanied by self I have received verbal consent from Alon Saini to discuss any/all medical information while they are present in the room.

## 2018-05-14 NOTE — PATIENT INSTRUCTIONS
Nutrition Tips for Diabetes: After Your Visit  Your Care Instructions  A healthy diet is important to manage diabetes. It helps you lose weight (if you need to) and keep it off. It gives you the nutrition and energy your body needs and helps prevent heart disease. But a diet for diabetes does not mean that you have to eat special foods. You can eat what your family eats, including occasional sweets and other favorites. But you do have to pay attention to how often you eat and how much you eat of certain foods. The right plan for you will give you meals that help you keep your blood sugar at healthy levels. Try to eat a variety of foods and to spread carbohydrate throughout the day. Carbohydrate raises blood sugar higher and more quickly than any other nutrient does. Carbohydrate is found in sugar, breads and cereals, fruit, starchy vegetables such as potatoes and corn, and milk and yogurt. You may want to work with a dietitian or diabetes educator to help you plan meals and snacks. A dietitian or diabetes educator also can help you lose weight if that is one of your goals. The following tips can help you enjoy your meals and stay healthy. Follow-up care is a key part of your treatment and safety. Be sure to make and go to all appointments, and call your doctor if you are having problems. Its also a good idea to know your test results and keep a list of the medicines you take. How can you care for yourself at home? · Learn which foods have carbohydrate and how much carbohydrate to eat. A dietitian or diabetes educator can help you learn to keep track of how much carbohydrate you eat. · Spread carbohydrate throughout the day. Eat some carbohydrate at all meals, but do not eat too much at any one time. · Plan meals to include food from all the food groups.  These are the food groups and some example portion sizes:  ¨ Grains: 1 slice of bread (1 ounce), ½ cup of cooked cereal, and 1/3 cup of cooked pasta or rice. These have about 15 grams of carbohydrate in a serving. Choose whole grains such as whole wheat bread or crackers, oatmeal, and brown rice more often than refined grains. ¨ Fruit: 1 small fresh fruit, such as an apple or orange; ½ of a banana; ½ cup of chopped, cooked, or canned fruit; ½ cup of fruit juice; 1 cup of melon or raspberries; and 2 tablespoons of dried fruit. These have about 15 grams of carbohydrate in a serving. ¨ Dairy: 1 cup of nonfat or low-fat milk and 2/3 cup of plain yogurt. These have about 15 grams of carbohydrate in a serving. ¨ Protein foods: Beef, chicken, turkey, fish, eggs, tofu, cheese, cottage cheese, and peanut butter. A serving size of meat is 3 ounces, which is about the size of a deck of cards. Examples of meat substitute serving sizes (equal to 1 ounce of meat) are 1/4 cup of cottage cheese, 1 egg, 1 tablespoon of peanut butter, and ½ cup of tofu. These have very little or no carbohydrate per serving. ¨ Vegetables: Starchy vegetables such as ½ cup of cooked dried beans, peas, potatoes, or corn have about 15 grams of carbohydrate. Nonstarchy vegetables have very little carbohydrate, such as 1 cup of raw leafy vegetables (such as spinach), ½ cup of other vegetables (cooked or chopped), and 3/4 cup of vegetable juice. · Use the plate format to plan meals. It is a good, quick way to make sure that you have a balanced meal. It also helps you spread carbohydrate throughout the day. You divide your plate by types of foods. Put vegetables on half the plate, meat or meat substitutes on one-quarter of the plate, and a grain or starchy vegetable (such as brown rice or a potato) in the final quarter of the plate. To this you can add a small piece of fruit and 1 cup of milk or yogurt, depending on how much carbohydrate you are supposed to eat at a meal.  · Talk to your dietitian or diabetes educator about ways to add limited amounts of sweets into your meal plan.  You can eat these foods now and then, as long as you include the amount of carbohydrate they have in your daily carbohydrate allowance. · If you drink alcohol, limit it to no more than 1 drink a day for women and 2 drinks a day for men. If you are pregnant, no amount of alcohol is known to be safe. · Protein, fat, and fiber do not raise blood sugar as much as carbohydrate does. If you eat a lot of these nutrients in a meal, your blood sugar will rise more slowly than it would otherwise. · Limit saturated fats, such as those from meat and dairy products. Try to replace it with monounsaturated fat, such as olive oil. This is a healthier choice because people who have diabetes are at higher-than-average risk of heart disease. But use a modest amount of olive oil. A tablespoon of olive oil has 14 grams of fat and 120 calories. · Exercise lowers blood sugar. If you take insulin by shots or pump, you can use less than you would if you were not exercising. Keep in mind that timing matters. If you exercise within 1 hour after a meal, your body may need less insulin for that meal than it would if you exercised 3 hours after the meal. Test your blood sugar to find out how exercise affects your need for insulin. · Exercise on most days of the week. Aim for at least 30 minutes. Exercise helps you stay at a healthy weight and helps your body use insulin. Walking is an easy way to get exercise. Gradually increase the amount you walk every day. You also may want to swim, bike, or do other activities. When you eat out  · Learn to estimate the serving sizes of foods that have carbohydrate. If you measure food at home, it will be easier to estimate the amount in a serving of restaurant food. · If the meal you order has too much carbohydrate (such as potatoes, corn, or baked beans), ask to have a low-carbohydrate food instead. Ask for a salad or green vegetables.   · If you use insulin, check your blood sugar before and after eating out to help you plan how much to eat in the future. · If you eat more carbohydrate at a meal than you had planned, take a walk or do other exercise. This will help lower your blood sugar. Where can you learn more? Go to sofatutor.be  Enter N627 in the search box to learn more about \"Nutrition Tips for Diabetes: After Your Visit. \"   © 4036-9412 [employer]. Care instructions adapted under license by [facility] (which disclaims liability or warranty for this information). This care instruction is for use with your licensed healthcare professional. If you have questions about a medical condition or this instruction, always ask your healthcare professional. [address] any warranty or liability for your use of this information. Content Version: 31.8.527844; Current as of: June 4, 2014                 Abdominal Pain: Care Instructions  Your Care Instructions    Abdominal pain has many possible causes. Some aren't serious and get better on their own in a few days. Others need more testing and treatment. If your pain continues or gets worse, you need to be rechecked and may need more tests to find out what is wrong. You may need surgery to correct the problem. Don't ignore new symptoms, such as fever, nausea and vomiting, urination problems, pain that gets worse, and dizziness. These may be signs of a more serious problem. Your doctor may have recommended a follow-up visit in the next 8 to 12 hours. If you are not getting better, you may need more tests or treatment. The doctor has checked you carefully, but problems can develop later. If you notice any problems or new symptoms, get medical treatment right away. Follow-up care is a key part of your treatment and safety. Be sure to make and go to all appointments, and call your doctor if you are having problems. It's also a good idea to know your test results and keep a list of the medicines you take.   How can you care for yourself at home? · Rest until you feel better. · To prevent dehydration, drink plenty of fluids, enough so that your urine is light yellow or clear like water. Choose water and other caffeine-free clear liquids until you feel better. If you have kidney, heart, or liver disease and have to limit fluids, talk with your doctor before you increase the amount of fluids you drink. · If your stomach is upset, eat mild foods, such as rice, dry toast or crackers, bananas, and applesauce. Try eating several small meals instead of two or three large ones. · Wait until 48 hours after all symptoms have gone away before you have spicy foods, alcohol, and drinks that contain caffeine. · Do not eat foods that are high in fat. · Avoid anti-inflammatory medicines such as aspirin, ibuprofen (Advil, Motrin), and naproxen (Aleve). These can cause stomach upset. Talk to your doctor if you take daily aspirin for another health problem. When should you call for help? Call 911 anytime you think you may need emergency care. For example, call if:  ? · You passed out (lost consciousness). ? · You pass maroon or very bloody stools. ? · You vomit blood or what looks like coffee grounds. ? · You have new, severe belly pain. ?Call your doctor now or seek immediate medical care if:  ? · Your pain gets worse, especially if it becomes focused in one area of your belly. ? · You have a new or higher fever. ? · Your stools are black and look like tar, or they have streaks of blood. ? · You have unexpected vaginal bleeding. ? · You have symptoms of a urinary tract infection. These may include:  ¨ Pain when you urinate. ¨ Urinating more often than usual.  ¨ Blood in your urine. ? · You are dizzy or lightheaded, or you feel like you may faint. ? Watch closely for changes in your health, and be sure to contact your doctor if:  ? · You are not getting better after 1 day (24 hours). Where can you learn more?   Go to http://cira-corona.info/. Enter E023 in the search box to learn more about \"Abdominal Pain: Care Instructions. \"  Current as of: March 20, 2017  Content Version: 11.4  © 0701-7733 [employer]. Care instructions adapted under license by [employer] (which disclaims liability or warranty for this information). If you have questions about a medical condition or this instruction, always ask your healthcare professional. [place] [address] any warranty or liability for your use of this information.

## 2018-05-22 ENCOUNTER — TELEPHONE (OUTPATIENT)
Dept: INTERNAL MEDICINE CLINIC | Age: 63
End: 2018-05-22

## 2018-05-22 DIAGNOSIS — F41.9 ANXIETY: Primary | ICD-10-CM

## 2018-05-22 RX ORDER — ALPRAZOLAM 0.25 MG/1
.25-.5 TABLET ORAL
Qty: 4 TAB | Refills: 0 | Status: SHIPPED | OUTPATIENT
Start: 2018-05-22 | End: 2019-03-13 | Stop reason: SDUPTHER

## 2018-05-22 NOTE — TELEPHONE ENCOUNTER
Patient states she needs a call back in reference to Labs/Pre-test to be done for kidneys Prior to having CT scan done when over a certain age. Please call to advise.  Thank you

## 2018-05-22 NOTE — TELEPHONE ENCOUNTER
Spoke with patient after 2 patient identifiers being note and advised per Dr. Chris Farrell that he did not think she needed any labs prior to CT scan. Patient expressed understanding and has no further questions at this time.

## 2018-05-23 ENCOUNTER — HOSPITAL ENCOUNTER (OUTPATIENT)
Dept: CT IMAGING | Age: 63
Discharge: HOME | End: 2018-05-23
Attending: INTERNAL MEDICINE
Payer: MEDICARE

## 2018-05-23 DIAGNOSIS — R10.30 LOWER ABDOMINAL PAIN: ICD-10-CM

## 2018-05-23 LAB — CREAT BLD-MCNC: 0.8 MG/DL (ref 0.6–1.3)

## 2018-05-23 PROCEDURE — 74177 CT ABD & PELVIS W/CONTRAST: CPT

## 2018-05-23 PROCEDURE — 74011250636 HC RX REV CODE- 250/636: Performed by: INTERNAL MEDICINE

## 2018-05-23 PROCEDURE — 82565 ASSAY OF CREATININE: CPT

## 2018-05-23 PROCEDURE — 74011636320 HC RX REV CODE- 636/320: Performed by: INTERNAL MEDICINE

## 2018-05-23 PROCEDURE — 74011000255 HC RX REV CODE- 255: Performed by: INTERNAL MEDICINE

## 2018-05-23 RX ORDER — SODIUM CHLORIDE 0.9 % (FLUSH) 0.9 %
10 SYRINGE (ML) INJECTION
Status: COMPLETED | OUTPATIENT
Start: 2018-05-23 | End: 2018-05-23

## 2018-05-23 RX ORDER — SODIUM CHLORIDE 9 MG/ML
50 INJECTION, SOLUTION INTRAVENOUS
Status: COMPLETED | OUTPATIENT
Start: 2018-05-23 | End: 2018-05-23

## 2018-05-23 RX ORDER — BARIUM SULFATE 20 MG/ML
900 SUSPENSION ORAL
Status: COMPLETED | OUTPATIENT
Start: 2018-05-23 | End: 2018-05-23

## 2018-05-23 RX ADMIN — IOPAMIDOL 100 ML: 755 INJECTION, SOLUTION INTRAVENOUS at 07:37

## 2018-05-23 RX ADMIN — SODIUM CHLORIDE 50 ML/HR: 900 INJECTION, SOLUTION INTRAVENOUS at 07:37

## 2018-05-23 RX ADMIN — Medication 10 ML: at 07:37

## 2018-05-23 RX ADMIN — BARIUM SULFATE 900 ML: 21 SUSPENSION ORAL at 07:37

## 2018-05-23 NOTE — PROGRESS NOTES
Ct shows some gallstones, but otherwise ok. Gallbladder does not appear inflamed though, so doubt that is the cause of her abd pains. No new recs x work on weight loss.

## 2018-05-23 NOTE — LETTER
5/24/2018 9:14 AM 
 
Ms. Jeneen Skiff [address] [facility] [id number] Dear Jeneen Skiff: 
 
Please find your most recent results below. Resulted Orders CT ABD PELV W CONT Narrative INDICATION:  Lower abdominal pain, unspecified  Lower abdominal pain EXAM: CT Abdomen and CT Pelvis is performed with 100 mL Isovue 370 contrast IV 
without complication. CT dose reduction was achieved through use of a 
standardized protocol tailored for this examination and automatic exposure 
control for dose modulation. FINDINGS:  
There is no inflammation, ascites, pneumoperitoneum or significant adenopathy. Liver shows no significant finding. Gallbladder contains stones, without 
apparent inflammation. Bile ducts are not enlarged. Pancreas shows no mass or 
inflammation. Spleen is unremarkable. Adrenal glands are normal in size. Kidneys 
show no mass or hydronephrosis. Aorta is without aneurysm. Uterus is absent. Appendix is not seen. Bowels are not dilated. There is mild 
diffuse stool. The bladder is not distended. The distal ureters are not dilated. There is no apparent pelvic mass. Impression IMPRESSION: Cholelithiasis. No Acute Disease. RECOMMENDATIONS: 
CT Scan shows some gallstones, but otherwise ok. Gallbladder does not appear inflammed though, so doubt that is the cause of your abdominal pains. No new recommendations accept work on weight loss. Please call me if you have any questions: [phone] Sincerely, 
 
Dr. See Forrest

## 2018-05-24 ENCOUNTER — TELEPHONE (OUTPATIENT)
Dept: INTERNAL MEDICINE CLINIC | Age: 63
End: 2018-05-24

## 2018-05-24 NOTE — PROGRESS NOTES
I have attempted to contact this patient by phone with the following results:   CT Scan shows some gallstones, but otherwise ok. Gallbladder does not appear inflammed though, so doubt that is the cause of your abdominal pains. No new recommendations accept work on weight loss. Results mailed to patient's home.

## 2018-05-24 NOTE — TELEPHONE ENCOUNTER
Unable to reach patient LVM to return call. Want to tell her CT Scan shows some gallstones, but otherwise ok. Gallbladder does not appear inflammed though, so doubt that is the cause of your abdominal pains. No new recommendations accept work on weight loss.

## 2018-05-24 NOTE — TELEPHONE ENCOUNTER
Spoke with patient after 2 patient identifiers being note and advised per Dr. Jessie Norton shows some gallstones, but otherwise ok.  Gallbladder does not appear inflammed though, so doubt that is the cause of your abdominal pains.  No new recommendations accept work on weight loss. . Patient expressed understanding and has no further questions at this time.

## 2018-05-24 NOTE — TELEPHONE ENCOUNTER
Pt is returning a phone call to [employer] or [address]    Best contact: [phone]       Message received & copied from [facility]

## 2018-05-24 NOTE — TELEPHONE ENCOUNTER
Pt returning a call to practice regarding  results. [phone].        Message received & copied from [facility]

## 2018-08-13 ENCOUNTER — TELEPHONE (OUTPATIENT)
Dept: INTERNAL MEDICINE CLINIC | Age: 63
End: 2018-08-13

## 2018-08-14 ENCOUNTER — TELEPHONE (OUTPATIENT)
Dept: INTERNAL MEDICINE CLINIC | Age: 63
End: 2018-08-14

## 2018-08-14 NOTE — TELEPHONE ENCOUNTER
----- Message from Jazmin Raza sent at 8/14/2018 12:59 PM EDT -----  Regarding: Dr. Anisha Gee is returning a call from Ce within the office.  Best contact number: [phone]        Message copied/pasted from [facility]

## 2018-08-14 NOTE — TELEPHONE ENCOUNTER
Pt would like a call back in reference to an order being needed for a mammogram. Best contact number: [phone]       Message received & copied from [facility]

## 2018-08-14 NOTE — TELEPHONE ENCOUNTER
Pt is returning a call from Ce within the office.  Best contact number: [phone]     Copy/paste [facility]

## 2018-08-15 NOTE — TELEPHONE ENCOUNTER
Pt is returning Renee's call and would like for her to call back.  Callback: [phone]       Message received & copied from [facility]

## 2018-08-17 NOTE — TELEPHONE ENCOUNTER
Spoke with patient after 2 patient identifiers being note and advised that the mammo order had been placed. Patient expressed understanding and has no further questions at this time.

## 2018-10-22 ENCOUNTER — OFFICE VISIT (OUTPATIENT)
Dept: INTERNAL MEDICINE CLINIC | Age: 63
End: 2018-10-22

## 2018-10-22 VITALS
TEMPERATURE: 98 F | BODY MASS INDEX: 34.55 KG/M2 | OXYGEN SATURATION: 98 % | RESPIRATION RATE: 14 BRPM | WEIGHT: 207.4 LBS | HEIGHT: 65 IN | DIASTOLIC BLOOD PRESSURE: 71 MMHG | SYSTOLIC BLOOD PRESSURE: 124 MMHG | HEART RATE: 77 BPM

## 2018-10-22 DIAGNOSIS — B35.1 ONYCHOMYCOSIS: ICD-10-CM

## 2018-10-22 DIAGNOSIS — B37.2 YEAST INFECTION OF THE SKIN: ICD-10-CM

## 2018-10-22 DIAGNOSIS — E11.65 UNCONTROLLED TYPE 2 DIABETES MELLITUS WITH HYPERGLYCEMIA, WITH LONG-TERM CURRENT USE OF INSULIN (HCC): Primary | ICD-10-CM

## 2018-10-22 DIAGNOSIS — I10 ESSENTIAL HYPERTENSION: ICD-10-CM

## 2018-10-22 DIAGNOSIS — M25.552 PAIN OF BOTH HIP JOINTS: Primary | ICD-10-CM

## 2018-10-22 DIAGNOSIS — R10.30 INGUINAL PAIN, UNSPECIFIED LATERALITY: Primary | ICD-10-CM

## 2018-10-22 DIAGNOSIS — Z23 ENCOUNTER FOR IMMUNIZATION: ICD-10-CM

## 2018-10-22 DIAGNOSIS — E78.5 HYPERLIPIDEMIA ASSOCIATED WITH TYPE 2 DIABETES MELLITUS (HCC): ICD-10-CM

## 2018-10-22 DIAGNOSIS — R10.31 BILATERAL GROIN PAIN: ICD-10-CM

## 2018-10-22 DIAGNOSIS — R10.32 BILATERAL GROIN PAIN: ICD-10-CM

## 2018-10-22 DIAGNOSIS — R05.9 COUGH: ICD-10-CM

## 2018-10-22 DIAGNOSIS — N32.81 OAB (OVERACTIVE BLADDER): ICD-10-CM

## 2018-10-22 DIAGNOSIS — E03.9 ACQUIRED HYPOTHYROIDISM: ICD-10-CM

## 2018-10-22 DIAGNOSIS — Z79.4 UNCONTROLLED TYPE 2 DIABETES MELLITUS WITH HYPERGLYCEMIA, WITH LONG-TERM CURRENT USE OF INSULIN (HCC): Primary | ICD-10-CM

## 2018-10-22 DIAGNOSIS — E11.69 HYPERLIPIDEMIA ASSOCIATED WITH TYPE 2 DIABETES MELLITUS (HCC): ICD-10-CM

## 2018-10-22 DIAGNOSIS — H61.23 EXCESSIVE EAR WAX, BILATERAL: ICD-10-CM

## 2018-10-22 DIAGNOSIS — M25.551 PAIN OF BOTH HIP JOINTS: Primary | ICD-10-CM

## 2018-10-22 RX ORDER — TERBINAFINE HYDROCHLORIDE 250 MG/1
250 TABLET ORAL DAILY
Qty: 30 TAB | Refills: 3 | Status: SHIPPED | OUTPATIENT
Start: 2018-10-22 | End: 2018-11-20 | Stop reason: SDUPTHER

## 2018-10-22 RX ORDER — BENZONATATE 100 MG/1
100 CAPSULE ORAL
Qty: 30 CAP | Refills: 0 | Status: SHIPPED | OUTPATIENT
Start: 2018-10-22 | End: 2018-10-29

## 2018-10-22 RX ORDER — NYSTATIN 100000 [USP'U]/G
POWDER TOPICAL 4 TIMES DAILY
Qty: 15 G | Refills: 1 | Status: SHIPPED | OUTPATIENT
Start: 2018-10-22 | End: 2019-06-20

## 2018-10-22 NOTE — PROGRESS NOTES
Chief Complaint Patient presents with  Ear Pain Right Ear 1. Have you been to the ER, urgent care clinic since your last visit? Hospitalized since your last visit? No 
 
2. Have you seen or consulted any other health care providers outside of the [address] [place] [address] since your last visit? Include any pap smears or colon screening. No  
 
Flu vaccine- Pt refused Eye Exam4/2018

## 2018-10-22 NOTE — PATIENT INSTRUCTIONS
Yeast Skin Infection: Care Instructions Your Care Instructions Yeast normally lives on your skin. Sometimes too much yeast can overgrow in certain areas of the skin and cause an infection. The infection causes red, scaly, moist patches on your skin that may itch. Common areas for skin yeast infections are skin folds under the breasts or belly area. The warm and moist areas in the skin folds can make it easier for yeast to overgrow. Yeast infections also can be found on other parts of the body such as the groin or armpits. You will probably get a cream or ointment that contains an antifungal medicine. Examples of these are miconazole and clotrimazole. You put it on your skin to treat the infection. Your doctor may give you a prescription for the cream or ointment. Or you may be able to buy it without a prescription at most drugstores. If the infection is severe, the doctor will prescribe antifungal pills. A yeast infection usually goes away after about a week of treatment. But it's important to use the medicine for as long as your doctor tells you to. Follow-up care is a key part of your treatment and safety. Be sure to make and go to all appointments, and call your doctor if you are having problems. It's also a good idea to know your test results and keep a list of the medicines you take. How can you care for yourself at home? · Be safe with medicines. Take your medicines exactly as prescribed. Call your doctor if you think you are having a problem with your medicine. · Keep your skin clean and dry. Your doctor may suggest using powder that contains an antifungal medicine in the skin folds. · Wear loose clothing. When should you call for help? Call your doctor now or seek immediate medical care if: 
  · You have symptoms of infection, such as: 
? Increased pain, swelling, warmth, or redness. ? Red streaks leading from the area. ? Pus draining from the area. ? A fever.  Watch closely for changes in your health, and be sure to contact your doctor if: 
  · You do not get better as expected. Where can you learn more? Go to http://cira-corona.info/. Enter T062 in the search box to learn more about \"Yeast Skin Infection: Care Instructions. \" Current as of: April 18, 2018 Content Version: 11.8 © 3580-6830 [employer]. Care instructions adapted under license by [employer] (which disclaims liability or warranty for this information). If you have questions about a medical condition or this instruction, always ask your healthcare professional. [address] any warranty or liability for your use of this information. Kegel Exercises: Care Instructions Your Care Instructions Kegel exercises strengthen muscles around the bladder. These muscles control the flow of urine. Kegel exercises are sometime called \"pelvic floor\" exercises. They can help prevent urine leakage and keep the pelvic organs in place. A woman who just had a baby might want to try Kegel exercises. They can strengthen pelvic muscles that have been weakened by pregnancy and childbirth. A man or woman may use Kegel exercises to treat urine leakage. You do Kegel exercises by tightening the muscles you use when you urinate. You will likely need to do these exercises for several weeks to get better. Follow-up care is a key part of your treatment and safety. Be sure to make and go to all appointments, and call your doctor if you are having problems. It's also a good idea to know your test results and keep a list of the medicines you take. How can you care for yourself at home? · Do Kegel exercises. ? Find the muscles you need to strengthen. To do this, tighten the muscles that stop your urine while you are going to the bathroom. These are the same muscles you squeeze during Kegel exercises. ? Squeeze the muscles as hard as you can. Your belly and thighs should not move. ? Hold the squeeze for 3 seconds. Then relax for 3 seconds. ? Start with 3 seconds, and then add 1 second each week until you are able to squeeze for 10 seconds. ? Repeat the exercise 10 to 15 times for each session. Do three or more sessions each day. · You can check to see if you are using the right muscles. Place a finger in your vagina and squeeze around it. You are doing them right when you feel pressure around your finger. Your doctor may also suggest that you put special weights in your vagina while you do the exercises. · Do not smoke. It can irritate the bladder. If you need help quitting, talk to your doctor about stop-smoking programs and medicines. These can increase your chances of quitting for good. Where can you learn more? Go to http://ciraeZonocorona.info/. Enter R141 in the search box to learn more about \"Kegel Exercises: Care Instructions. \" Current as of: March 21, 2018 Content Version: 11.8 © 8810-2536 [employer]. Care instructions adapted under license by [employer] (which disclaims liability or warranty for this information). If you have questions about a medical condition or this instruction, always ask your healthcare professional. [place] [address] any warranty or liability for your use of this information. Vaccine Information Statement Influenza (Flu) Vaccine (Inactivated or Recombinant): What you need to know Many Vaccine Information Statements are available in Upper sorbian and other languages. See www.immunize.org/vis Hojas de Información Sobre Vacunas están disponibles en Español y en muchos otros idiomas. Visite www.immunize.org/vis 1. Why get vaccinated? Influenza (flu) is a contagious disease that spreads around the [place] every year, usually between October and May. Flu is caused by influenza viruses, and is spread mainly by coughing, sneezing, and close contact. Anyone can get flu. Flu strikes suddenly and can last several days. Symptoms vary by age, but can include: 
 fever/chills  sore throat  muscle aches  fatigue  cough  headache  runny or stuffy nose Flu can also lead to pneumonia and blood infections, and cause diarrhea and seizures in children. If you have a medical condition, such as heart or lung disease, flu can make it worse. Flu is more dangerous for some people. Infants and young children, people 72years of age and older, pregnant women, and people with certain health conditions or a weakened immune system are at greatest risk. Each year thousands of people in the [facility] die from flu, and many more are hospitalized. Flu vaccine can: 
 keep you from getting flu, 
 make flu less severe if you do get it, and 
 keep you from spreading flu to your family and other people. 2. Inactivated and recombinant flu vaccines A dose of flu vaccine is recommended every flu season. Children 6 months through 6years of age may need two doses during the same flu season. Everyone else needs only one dose each flu season. Some inactivated flu vaccines contain a very small amount of a mercury-based preservative called thimerosal. Studies have not shown thimerosal in vaccines to be harmful, but flu vaccines that do not contain thimerosal are available. There is no live flu virus in flu shots. They cannot cause the flu. There are many flu viruses, and they are always changing. Each year a new flu vaccine is made to protect against three or four viruses that are likely to cause disease in the upcoming flu season. But even when the vaccine doesnt exactly match these viruses, it may still provide some protection Flu vaccine cannot prevent: 
 flu that is caused by a virus not covered by the vaccine, or 
  illnesses that look like flu but are not. It takes about 2 weeks for protection to develop after vaccination, and protection lasts through the flu season. 3. Some people should not get this vaccine Tell the person who is giving you the vaccine:  If you have any severe, life-threatening allergies. If you ever had a life-threatening allergic reaction after a dose of flu vaccine, or have a severe allergy to any part of this vaccine, you may be advised not to get vaccinated. Most, but not all, types of flu vaccine contain a small amount of egg protein.  If you ever had Guillain-Barré Syndrome (also called GBS). Some people with a history of GBS should not get this vaccine. This should be discussed with your doctor.  If you are not feeling well. It is usually okay to get flu vaccine when you have a mild illness, but you might be asked to come back when you feel better. 4. Risks of a vaccine reaction With any medicine, including vaccines, there is a chance of reactions. These are usually mild and go away on their own, but serious reactions are also possible. Most people who get a flu shot do not have any problems with it. Minor problems following a flu shot include:  
 soreness, redness, or swelling where the shot was given  hoarseness  sore, red or itchy eyes  cough  fever  aches  headache  itching  fatigue If these problems occur, they usually begin soon after the shot and last 1 or 2 days. More serious problems following a flu shot can include the following:  There may be a small increased risk of Guillain-Barré Syndrome (GBS) after inactivated flu vaccine. This risk has been estimated at 1 or 2 additional cases per million people vaccinated. This is much lower than the risk of severe complications from flu, which can be prevented by flu vaccine.    
 
 Young children who get the flu shot along with pneumococcal vaccine (PCV13) and/or DTaP vaccine at the same time might be slightly more likely to have a seizure caused by fever. Ask your doctor for more information. Tell your doctor if a child who is getting flu vaccine has ever had a seizure. Problems that could happen after any injected vaccine:  People sometimes faint after a medical procedure, including vaccination. Sitting or lying down for about 15 minutes can help prevent fainting, and injuries caused by a fall. Tell your doctor if you feel dizzy, or have vision changes or ringing in the ears.  Some people get severe pain in the shoulder and have difficulty moving the arm where a shot was given. This happens very rarely.  Any medication can cause a severe allergic reaction. Such reactions from a vaccine are very rare, estimated at about 1 in a million doses, and would happen within a few minutes to a few hours after the vaccination. As with any medicine, there is a very remote chance of a vaccine causing a serious injury or death. The safety of vaccines is always being monitored. For more information, visit: www.cdc.gov/vaccinesafety/ 
 
 
The [facility] Vaccine Injury Compensation Program (VICP) is a federal program that was created to compensate people who may have been injured by certain vaccines. Persons who believe they may have been injured by a vaccine can learn about the program and about filing a claim by calling 1-503.170.2259 or visiting the [employer] website at www.[facility].gov/vaccinecompensation. There is a time limit to file a claim for compensation. 7. How can I learn more?  Ask your healthcare provider. He or she can give you the vaccine package insert or suggest other sources of information.  Call your local or state health department.  Contact the Centers for Disease Control and Prevention (CDC): 
- Call 0-325.653.9249 (1-800-CDC-INFO) or 
- Visit CDCs website at www.cdc.gov/flu Vaccine Information Statement Inactivated Influenza Vaccine 8/7/2015 
42 EZEKIEL Jackson [id number]-[id number] Department of [facility] and [employer] Centers for Disease Control and Prevention Office Use Only

## 2018-10-22 NOTE — PROGRESS NOTES
Freddy Larose is a 70 y.o. female who presents for evaluation of numerous complaints: Both ears hurt, both groins hurt, had rash under her left breast that still bothers her, (though it is much better than before), has toenail fungus, and having more issues with her oab. Had been on myrbetriq briefly since last visit with me (may 14) as it was started by uro/gyn then. She took it briefly and then stopped. States has been having cough and bilateral ear pains for about 3 weeks, and wanted to make sure it was not morphing into bronchitis. No f/c or other systemic complaints. Sugars remain poorly controlled. ROS: 
Constitutional: negative for fevers, chills, anorexia and weight loss Eyes:   negative for visual disturbance and irritation ENT:   negative for tinnitus,sore throat,nasal congestion,ear pain,hoarseness Respiratory:  negative for cough, hemoptysis, dyspnea,wheezing CV:   negative for chest pain, palpitations, lower extremity edema GI:   negative for nausea, vomiting, diarrhea, abdominal pain,melena Genitourinary: negative for frequency, dysuria and hematuria Musculoskel: negative for myalgias, arthralgias, back pain, muscle weakness, joint pain Neurological:  negative for headaches, dizziness, focal weakness, numbness Psychiatric:     Negative for depression or anxiety Past Medical History:  
Diagnosis Date  Depression  Diabetes ([address]  Hypercholesteremia  Hypertension  Hyperthyroidism  IBS (irritable bowel syndrome)  Urinary incontinence Past Surgical History:  
Procedure Laterality Date  HX BACK SURGERY    
 x2  
 HX BLEPHAROPLASTY Right  HX BUNIONECTOMY  HX HYSTERECTOMY  HX OTHER SURGICAL Collapsed lung  HX WISDOM TEETH EXTRACTION Family History Problem Relation Age of Onset  Diabetes Mother  Heart Disease Mother  Cancer Father   
     pancreatic  Diabetes Sister  Anxiety Sister  Diabetes Brother  Anxiety Brother  Diabetes Brother  Anxiety Brother  Diabetes Sister  Anxiety Sister  Diabetes Sister  Anxiety Sister  Cancer Sister   
     lung and brain  Anxiety Sister  Anxiety Sister  Breast Cancer Paternal Aunt   
     under 48 Social History Socioeconomic History  Marital status:  Spouse name: Not on file  Number of children: Not on file  Years of education: Not on file  Highest education level: Not on file Social Needs  Financial resource strain: Not on file  Food insecurity - worry: Not on file  Food insecurity - inability: Not on file  Transportation needs - medical: Not on file  Transportation needs - non-medical: Not on file Occupational History  Not on file Tobacco Use  Smoking status: Never Smoker  Smokeless tobacco: Never Used Substance and Sexual Activity  Alcohol use: Yes Comment: occasionally  Drug use: No  
 Sexual activity: No  
Other Topics Concern  Not on file Social History Narrative  Not on file Visit Vitals /71 (BP 1 Location: Left arm, BP Patient Position: Sitting) Pulse 77 Temp 98 °F (36.7 °C) (Oral) Resp 14 Ht 5' 5\" (1.651 m) Wt 207 lb 6.4 oz (94.1 kg) SpO2 98% BMI 34.51 kg/m² Physical Examination:  
General - Well appearing female HEENT - PERRL, TM no erythema/opacification, normal nasal turbinates, no oropharyngeal erythema or exudate, MMM.  ++wax in right ear Neck - supple, no bruits, no thyroidomegaly, no lymphadenopathy Pulm - clear to auscultation bilaterally--good air flow Cardio - RRR, normal S1 S2, no murmur Abd - soft, nontender, no masses, no HSM. Currently no signs of any yeast rash under either breast. 
Extrem - no edema, +2 distal pulses Neuro-  No focal deficits, CN intact Assessment/Plan: 1.  oab--rx to resume myrbetriq 2.  Onychomycosis--rx for lamisil, lfts were ok when last checked earlier this year 3. Uncontrolled dm, type 2--on insulin 75/25 and glucophage 4.  hyperlipids--on lipitor 5. Hypothyroid--on synthroid 6. Anxiety and depression--continue wellbutrin, prn xanax 7. Bilateral groin pain--xray ordered to check both hips 8. Breast skin infection--currently cleared up, but will give rx for nystatin powder 9. Cough--rx for tessalon pearles 10. Routine adult health maintenance--flu shot given today 
 
rtc nov for routine visit.  
 
 
 
Stacy Kohler III, DO

## 2018-11-01 ENCOUNTER — HOSPITAL ENCOUNTER (OUTPATIENT)
Dept: GENERAL RADIOLOGY | Age: 63
Discharge: HOME | End: 2018-11-01
Attending: ORTHOPAEDIC SURGERY
Payer: MEDICARE

## 2018-11-01 DIAGNOSIS — M16.12 PRIMARY OSTEOARTHRITIS OF LEFT HIP: ICD-10-CM

## 2018-11-01 DIAGNOSIS — M16.11 PRIMARY OSTEOARTHRITIS OF RIGHT HIP: ICD-10-CM

## 2018-11-01 PROCEDURE — 74011636320 HC RX REV CODE- 636/320: Performed by: ORTHOPAEDIC SURGERY

## 2018-11-01 PROCEDURE — 74011250636 HC RX REV CODE- 250/636: Performed by: ORTHOPAEDIC SURGERY

## 2018-11-01 PROCEDURE — 77030014113 XR INJ ASP LARGE JOINT / BURSA

## 2018-11-01 PROCEDURE — 77002 NEEDLE LOCALIZATION BY XRAY: CPT

## 2018-11-01 PROCEDURE — 74011000250 HC RX REV CODE- 250: Performed by: ORTHOPAEDIC SURGERY

## 2018-11-01 RX ORDER — TRIAMCINOLONE ACETONIDE 40 MG/ML
INJECTION, SUSPENSION INTRA-ARTICULAR; INTRAMUSCULAR
Status: DISPENSED
Start: 2018-11-01 | End: 2018-11-02

## 2018-11-01 RX ORDER — BUPIVACAINE HYDROCHLORIDE 5 MG/ML
5 INJECTION, SOLUTION EPIDURAL; INTRACAUDAL
Status: COMPLETED | OUTPATIENT
Start: 2018-11-01 | End: 2018-11-01

## 2018-11-01 RX ORDER — TRIAMCINOLONE ACETONIDE 40 MG/ML
80 INJECTION, SUSPENSION INTRA-ARTICULAR; INTRAMUSCULAR
Status: COMPLETED | OUTPATIENT
Start: 2018-11-01 | End: 2018-11-01

## 2018-11-01 RX ORDER — LIDOCAINE HYDROCHLORIDE 10 MG/ML
10 INJECTION INFILTRATION; PERINEURAL
Status: COMPLETED | OUTPATIENT
Start: 2018-11-01 | End: 2018-11-01

## 2018-11-01 RX ADMIN — TRIAMCINOLONE ACETONIDE 80 MG: 40 INJECTION, SUSPENSION INTRA-ARTICULAR; INTRAMUSCULAR at 12:06

## 2018-11-01 RX ADMIN — BUPIVACAINE HYDROCHLORIDE 25 MG: 5 INJECTION, SOLUTION EPIDURAL; INTRACAUDAL; PERINEURAL at 12:06

## 2018-11-01 RX ADMIN — SODIUM BICARBONATE 5 ML: 0.2 INJECTION, SOLUTION INTRAVENOUS at 12:02

## 2018-11-01 RX ADMIN — IOHEXOL 20 ML: 180 INJECTION INTRAVENOUS at 12:03

## 2018-11-01 RX ADMIN — LIDOCAINE HYDROCHLORIDE 1 ML: 10 INJECTION, SOLUTION INFILTRATION; PERINEURAL at 12:02

## 2018-11-01 NOTE — PROCEDURES
PROCEDURE:Bilateral hip injections. INDICATION:pain. ANESTHESIA:local.  COMPLICATION:NONE. SPECIMENS REMOVED:none. BLOOD LOSS:NONE. /ASSISTANT:ANOOP Morgan RECOMMENDATIONS:none. CONSENT OBTAINED:YES.  NOTES:none.

## 2018-11-12 ENCOUNTER — HOSPITAL ENCOUNTER (OUTPATIENT)
Dept: LAB | Age: 63
Discharge: HOME | End: 2018-11-12
Payer: MEDICARE

## 2018-11-12 ENCOUNTER — OFFICE VISIT (OUTPATIENT)
Dept: INTERNAL MEDICINE CLINIC | Age: 63
End: 2018-11-12

## 2018-11-12 VITALS
HEART RATE: 90 BPM | DIASTOLIC BLOOD PRESSURE: 76 MMHG | TEMPERATURE: 98 F | WEIGHT: 210 LBS | HEIGHT: 65 IN | BODY MASS INDEX: 34.99 KG/M2 | RESPIRATION RATE: 16 BRPM | OXYGEN SATURATION: 99 % | SYSTOLIC BLOOD PRESSURE: 176 MMHG

## 2018-11-12 DIAGNOSIS — J20.9 ACUTE BRONCHITIS, UNSPECIFIED ORGANISM: Primary | ICD-10-CM

## 2018-11-12 DIAGNOSIS — E11.69 HYPERLIPIDEMIA ASSOCIATED WITH TYPE 2 DIABETES MELLITUS (HCC): ICD-10-CM

## 2018-11-12 DIAGNOSIS — N32.81 OAB (OVERACTIVE BLADDER): ICD-10-CM

## 2018-11-12 DIAGNOSIS — B35.1 ONYCHOMYCOSIS: ICD-10-CM

## 2018-11-12 DIAGNOSIS — R05.9 COUGH: ICD-10-CM

## 2018-11-12 DIAGNOSIS — I10 ESSENTIAL HYPERTENSION: ICD-10-CM

## 2018-11-12 DIAGNOSIS — E78.5 HYPERLIPIDEMIA ASSOCIATED WITH TYPE 2 DIABETES MELLITUS (HCC): ICD-10-CM

## 2018-11-12 DIAGNOSIS — E11.65 UNCONTROLLED TYPE 2 DIABETES MELLITUS WITH HYPERGLYCEMIA, WITH LONG-TERM CURRENT USE OF INSULIN (HCC): ICD-10-CM

## 2018-11-12 DIAGNOSIS — Z79.4 UNCONTROLLED TYPE 2 DIABETES MELLITUS WITH HYPERGLYCEMIA, WITH LONG-TERM CURRENT USE OF INSULIN (HCC): ICD-10-CM

## 2018-11-12 PROCEDURE — 80053 COMPREHEN METABOLIC PANEL: CPT

## 2018-11-12 PROCEDURE — 36415 COLL VENOUS BLD VENIPUNCTURE: CPT

## 2018-11-12 PROCEDURE — 80061 LIPID PANEL: CPT

## 2018-11-12 PROCEDURE — 83036 HEMOGLOBIN GLYCOSYLATED A1C: CPT

## 2018-11-12 RX ORDER — HYDROCODONE POLISTIREX AND CHLORPHENIRAMINE POLISTIREX 10; 8 MG/5ML; MG/5ML
5 SUSPENSION, EXTENDED RELEASE ORAL
Qty: 115 ML | Refills: 0 | Status: SHIPPED | OUTPATIENT
Start: 2018-11-12 | End: 2019-03-15 | Stop reason: ALTCHOICE

## 2018-11-12 RX ORDER — AMOXICILLIN AND CLAVULANATE POTASSIUM 875; 125 MG/1; MG/1
1 TABLET, FILM COATED ORAL EVERY 12 HOURS
Qty: 20 TAB | Refills: 0 | Status: SHIPPED | OUTPATIENT
Start: 2018-11-12 | End: 2018-11-22

## 2018-11-12 RX ORDER — MELOXICAM 7.5 MG/1
15 TABLET ORAL DAILY
COMMUNITY
Start: 2018-10-26 | End: 2018-11-19 | Stop reason: SDUPTHER

## 2018-11-12 NOTE — PROGRESS NOTES
Reviewed record in preparation for visit and have obtained necessary documentation. Identified pt with two pt identifiers(name and ). Chief Complaint Patient presents with  Follow-up 6 month Health Maintenance Due Topic Date Due  Shingrix Vaccine Age 50> (1 of 2) 2003  
 EYE EXAM RETINAL OR DILATED Q1  2018  
 FOOT EXAM Q1  2018  LIPID PANEL Q1  2018  HEMOGLOBIN A1C Q6M  08/15/2018 Ms. Jigar Perez has a reminder for a \"due or due soon\" health maintenance. I have asked that she discuss health maintenance topic(s) due with Her  primary care provider. Coordination of Care Questionnaire: 
:  
 
1) Have you been to an emergency room, urgent care clinic since your last visit? no  
Hospitalized since your last visit? no          
 
2) Have you seen or consulted any other health care providers outside of [address] [place] [address] since your last visit? no  (Include any pap smears or colon screenings in this section.) 3) Do you have an Advance Directive on file? no 
 
4) Are you interested in receiving information on Advance Directives? NO Patient is accompanied by self I have received verbal consent from Jorden Mancia to discuss any/all medical information while they are present in the room.

## 2018-11-12 NOTE — PROGRESS NOTES
Sravanthi Davis is a 70 y.o. female who presents for evaluation of sick visit. Last seen by me oct 22, 2018. Started lamisil then for toenail fungus. Also started myrbetriq then to help with oab, but still has issues with leakage and oab. Comes in today with 2 weeks of steady, minimally productive cough. No f/c. Seemed to start with sinus congestion, and she continues to have headache. ROS: 
Constitutional: negative for fevers, chills, anorexia and weight loss Eyes:   negative for visual disturbance and irritation ENT:   negative for tinnitus,sore throat,nasal congestion,ear pain,hoarseness Respiratory:  negative for  hemoptysis, dyspnea,wheezing.  ++cough CV:   negative for chest pain, palpitations, lower extremity edema GI:   negative for nausea, vomiting, diarrhea, abdominal pain,melena Genitourinary: negative for frequency, dysuria and hematuria Musculoskel: negative for myalgias, arthralgias, back pain, muscle weakness, joint pain Neurological:  negative for headaches, dizziness, focal weakness, numbness Psychiatric:     Negative for depression or anxiety Past Medical History:  
Diagnosis Date  Depression  Diabetes [address] [address]  Hypercholesteremia  Hypertension  Hyperthyroidism  IBS (irritable bowel syndrome)  Urinary incontinence Past Surgical History:  
Procedure Laterality Date  HX BACK SURGERY    
 x2  
 HX BLEPHAROPLASTY Right  HX BUNIONECTOMY  HX HYSTERECTOMY  HX OTHER SURGICAL Collapsed lung  HX WISDOM TEETH EXTRACTION Family History Problem Relation Age of Onset  Diabetes Mother  Heart Disease Mother  Cancer Father   
     pancreatic  Diabetes Sister  Anxiety Sister  Diabetes Brother  Anxiety Brother  Diabetes Brother  Anxiety Brother  Diabetes Sister  Anxiety Sister  Diabetes Sister  Anxiety Sister  Cancer Sister   
     lung and brain  Anxiety Sister  Anxiety Sister  Breast Cancer Paternal Aunt   
     under 48 Social History Socioeconomic History  Marital status: SINGLE Spouse name: Not on file  Number of children: Not on file  Years of education: Not on file  Highest education level: Not on file Social Needs  Financial resource strain: Not on file  Food insecurity - worry: Not on file  Food insecurity - inability: Not on file  Transportation needs - medical: Not on file  Transportation needs - non-medical: Not on file Occupational History  Not on file Tobacco Use  Smoking status: Never Smoker  Smokeless tobacco: Never Used Substance and Sexual Activity  Alcohol use: Yes Comment: occasionally  Drug use: No  
 Sexual activity: No  
Other Topics Concern  Not on file Social History Narrative  Not on file Visit Vitals /76 (BP 1 Location: Left arm, BP Patient Position: Sitting) Pulse 90 Temp 98 °F (36.7 °C) (Oral) Resp 16 Ht 5' 5\" (1.651 m) Wt 210 lb (95.3 kg) SpO2 99% BMI 34.95 kg/m² Physical Examination:  
General - Well appearing female HEENT - PERRL, TM no erythema/opacification, normal nasal turbinates, no oropharyngeal erythema or exudate, MMM Neck - supple, no bruits, no thyroidomegaly, no lymphadenopathy Pulm - clear to auscultation bilaterally--surprisingly clear with good air flow Cardio - RRR, normal S1 S2, no murmur Abd - soft, nontender, no masses, no HSM Extrem - no edema, +2 distal pulses Neuro-  No focal deficits, CN intact Diabetic foot exam performed by Jody Valencia III, DO Measurement  Response Nurse Comment Physician Comment Monofilament  R - reduced sensation with micro filament L - reduced sensation with micro filament Pulse DP R - present L - present Pulse TP R - present L - present Structural deformity R - None L - None Skin Integrity / Deformity R - None L - None Reviewed by:   
 
  
 
  
Assessment/Plan: 1. Acute bronchitis with cough--rx for augmentin, tussionex. Continue with tessalon pearles as needed 2.  oab--continue myrbetriq, referral to urogyn, dr Ellen Mcdaniel 3. Uncontrolled dm, type 2--on 75/25 and glucophage. Check a1c. Has follow up with endo in dec 4.  hyperlipids--on lipitor, check flp 5.  dionisio--using cpap again 6. Anxiety and depression--continue wellbutrin, prn xanax 7. Onychomycosis, bilateral feet--on lamisil, check lfts 8. Hypothyroid--on synthroid 9.  htn--only on lasix, continue to monitor at home. rtc 6 months Puneet Couch III, DO

## 2018-11-12 NOTE — PATIENT INSTRUCTIONS
Bronchitis: Care Instructions Your Care Instructions Bronchitis is inflammation of the bronchial tubes, which carry air to the lungs. The tubes swell and produce mucus, or phlegm. The mucus and inflamed bronchial tubes make you cough. You may have trouble breathing. Most cases of bronchitis are caused by viruses like those that cause colds. Antibiotics usually do not help and they may be harmful. Bronchitis usually develops rapidly and lasts about 2 to 3 weeks in otherwise healthy people. Follow-up care is a key part of your treatment and safety. Be sure to make and go to all appointments, and call your doctor if you are having problems. It's also a good idea to know your test results and keep a list of the medicines you take. How can you care for yourself at home? · Take all medicines exactly as prescribed. Call your doctor if you think you are having a problem with your medicine. · Get some extra rest. 
· Take an over-the-counter pain medicine, such as acetaminophen (Tylenol), ibuprofen (Advil, Motrin), or naproxen (Aleve) to reduce fever and relieve body aches. Read and follow all instructions on the label. · Do not take two or more pain medicines at the same time unless the doctor told you to. Many pain medicines have acetaminophen, which is Tylenol. Too much acetaminophen (Tylenol) can be harmful. · Take an over-the-counter cough medicine that contains dextromethorphan to help quiet a dry, hacking cough so that you can sleep. Avoid cough medicines that have more than one active ingredient. Read and follow all instructions on the label. · Breathe moist air from a humidifier, hot shower, or sink filled with hot water. The heat and moisture will thin mucus so you can cough it out. · Do not smoke. Smoking can make bronchitis worse. If you need help quitting, talk to your doctor about stop-smoking programs and medicines. These can increase your chances of quitting for good. When should you call for help? Call 911 anytime you think you may need emergency care. For example, call if: 
  · You have severe trouble breathing.  
 Call your doctor now or seek immediate medical care if: 
  · You have new or worse trouble breathing.  
  · You cough up dark brown or bloody mucus (sputum).  
  · You have a new or higher fever.  
  · You have a new rash.  
 Watch closely for changes in your health, and be sure to contact your doctor if: 
  · You cough more deeply or more often, especially if you notice more mucus or a change in the color of your mucus.  
  · You are not getting better as expected. Where can you learn more? Go to http://cira-corona.info/. Enter H333 in the search box to learn more about \"Bronchitis: Care Instructions. \" Current as of: December 6, 2017 Content Version: 11.8 © 1154-1768 [employer], [employer]. Care instructions adapted under license by [employer] (which disclaims liability or warranty for this information). If you have questions about a medical condition or this instruction, always ask your healthcare professional. [address] any warranty or liability for your use of this information.

## 2018-11-13 LAB
ALBUMIN SERPL-MCNC: 4.2 G/DL (ref 3.6–4.8)
ALBUMIN/GLOB SERPL: 1.8 {RATIO} (ref 1.2–2.2)
ALP SERPL-CCNC: 91 IU/L (ref 39–117)
ALT SERPL-CCNC: 67 IU/L (ref 0–32)
AST SERPL-CCNC: 46 IU/L (ref 0–40)
BILIRUB SERPL-MCNC: 0.3 MG/DL (ref 0–1.2)
BUN SERPL-MCNC: 16 MG/DL (ref 8–27)
BUN/CREAT SERPL: 20 (ref 12–28)
CALCIUM SERPL-MCNC: 9.5 MG/DL (ref 8.7–10.3)
CHLORIDE SERPL-SCNC: 105 MMOL/L (ref 96–106)
CHOLEST SERPL-MCNC: 139 MG/DL (ref 100–199)
CO2 SERPL-SCNC: 23 MMOL/L (ref 20–29)
CREAT SERPL-MCNC: 0.8 MG/DL (ref 0.57–1)
EST. AVERAGE GLUCOSE BLD GHB EST-MCNC: 278 MG/DL
GLOBULIN SER CALC-MCNC: 2.3 G/DL (ref 1.5–4.5)
GLUCOSE SERPL-MCNC: 42 MG/DL (ref 65–99)
HBA1C MFR BLD: 11.3 % (ref 4.8–5.6)
HDLC SERPL-MCNC: 78 MG/DL
LDLC SERPL CALC-MCNC: 54 MG/DL (ref 0–99)
POTASSIUM SERPL-SCNC: 3.9 MMOL/L (ref 3.5–5.2)
PROT SERPL-MCNC: 6.5 G/DL (ref 6–8.5)
SODIUM SERPL-SCNC: 144 MMOL/L (ref 134–144)
TRIGL SERPL-MCNC: 36 MG/DL (ref 0–149)
VLDLC SERPL CALC-MCNC: 7 MG/DL (ref 5–40)

## 2018-11-13 RX ORDER — LISINOPRIL 5 MG/1
5 TABLET ORAL DAILY
Qty: 90 TAB | Refills: 3 | Status: SHIPPED | OUTPATIENT
Start: 2018-11-13 | End: 2018-11-19 | Stop reason: SDUPTHER

## 2018-11-13 NOTE — PROGRESS NOTES
Diabetes continues to worsen, a1c up to 11.3 for average sugar of 278. Make sure she follows up with endocrine. Lisinopril sent in to help with bp control, as well as to protect her kidneys from diabetes.

## 2018-11-13 NOTE — PROGRESS NOTES
Called, spoke to pt. Two identifiers confirmed. Pt notified of results per Dr. Mercedes Norris Pt stated she has an appointment with her endocrinologist next month. Pt verbalized understanding of information discussed w/ no further questions at this time.

## 2018-11-16 ENCOUNTER — HOSPITAL ENCOUNTER (OUTPATIENT)
Dept: MAMMOGRAPHY | Age: 63
Discharge: HOME | End: 2018-11-16
Attending: INTERNAL MEDICINE
Payer: MEDICARE

## 2018-11-16 DIAGNOSIS — Z12.31 VISIT FOR SCREENING MAMMOGRAM: ICD-10-CM

## 2018-11-16 PROCEDURE — 77063 BREAST TOMOSYNTHESIS BI: CPT

## 2018-11-19 RX ORDER — MELOXICAM 7.5 MG/1
15 TABLET ORAL DAILY
Qty: 180 TAB | Refills: 3 | Status: SHIPPED | OUTPATIENT
Start: 2018-11-19 | End: 2019-09-03 | Stop reason: SDUPTHER

## 2018-11-19 RX ORDER — TERBINAFINE HYDROCHLORIDE 250 MG/1
250 TABLET ORAL DAILY
Qty: 90 TAB | Refills: 3 | OUTPATIENT
Start: 2018-11-19

## 2018-11-19 RX ORDER — LISINOPRIL 5 MG/1
5 TABLET ORAL DAILY
Qty: 90 TAB | Refills: 3 | Status: SHIPPED | OUTPATIENT
Start: 2018-11-19 | End: 2020-02-07

## 2018-11-19 RX ORDER — ATORVASTATIN CALCIUM 80 MG/1
80 TABLET, FILM COATED ORAL DAILY
Qty: 90 TAB | Refills: 3 | Status: SHIPPED | OUTPATIENT
Start: 2018-11-19 | End: 2020-09-16 | Stop reason: ALTCHOICE

## 2018-11-19 NOTE — PROGRESS NOTES
Called, spoke to pt. Two identifiers confirmed. Pt notified of results per Dr. Garret David  Pt verbalized understanding of information discussed w/ no further questions at this time.

## 2018-12-05 ENCOUNTER — TELEPHONE (OUTPATIENT)
Dept: INTERNAL MEDICINE CLINIC | Age: 63
End: 2018-12-05

## 2018-12-05 NOTE — TELEPHONE ENCOUNTER
Pt is requesting a call back regarding referral information for \"Urologist\" due to misplacing paperwork .    Best contact: [phone]       Message received & copied from [facility]

## 2018-12-07 NOTE — TELEPHONE ENCOUNTER
Spoke with patient after 2 patient identifiers being note and advised of urology referral name and number. Patient expressed understanding and has no further questions at this time.

## 2019-01-03 ENCOUNTER — OFFICE VISIT (OUTPATIENT)
Dept: URGENT CARE | Age: 64
End: 2019-01-03

## 2019-01-03 ENCOUNTER — TELEPHONE (OUTPATIENT)
Dept: INTERNAL MEDICINE CLINIC | Age: 64
End: 2019-01-03

## 2019-01-03 VITALS
TEMPERATURE: 98 F | SYSTOLIC BLOOD PRESSURE: 147 MMHG | DIASTOLIC BLOOD PRESSURE: 70 MMHG | HEIGHT: 65 IN | RESPIRATION RATE: 14 BRPM | HEART RATE: 90 BPM | BODY MASS INDEX: 34.99 KG/M2 | WEIGHT: 210 LBS | OXYGEN SATURATION: 98 %

## 2019-01-03 DIAGNOSIS — S20.212A RIB CONTUSION, LEFT, INITIAL ENCOUNTER: Primary | ICD-10-CM
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
## 2019-01-03 RX ORDER — ACETAMINOPHEN 500 MG
500 TABLET ORAL
Qty: 30 TAB | Refills: 0 | Status: SHIPPED | OUTPATIENT
Start: 2019-01-03

## 2019-01-03 NOTE — TELEPHONE ENCOUNTER
Received triage call. Two pt identifiers confirmed. Pt states falling last night in the bath tub. Pt states that she fell and hit the side of the bath tub. Pt states hitting her L flank more underneath her L breast.   Pt c/o sharp pain (10/10) and having pain when breathing. Pt informed that there is no availability in the office this afternoon. Pt advised to go to UC or ED. Pt states she will go to UC. Pt verbalized understanding of information discussed w/ no further questions at this time.

## 2019-01-03 NOTE — PROGRESS NOTES
Here for left rib injury 1 day ago  Suction bath  came off wall and she fell and hit her ribs on edge of tub  Has since had pain. 7/10 worse with deep breathing, pushing on area and with torso rotation. Denies any head or neck injury or pain. Feels well otherwise without SOB/trouble breathing, hemoptysis, abdominal pain, abdominal distension. Is on Mobic for chronic pain. Has not taken any other meds. Overall unchanged. has a past medical history of Depression, Diabetes ([address] Hypercholesteremia, Hypertension, Hyperthyroidism, IBS (irritable bowel syndrome), and Urinary incontinence.              Past Medical History:   Diagnosis Date    Depression     Diabetes ([address]     Hypercholesteremia     Hypertension     Hyperthyroidism     IBS (irritable bowel syndrome)     Urinary incontinence         Past Surgical History:   Procedure Laterality Date    HX BACK SURGERY      x2    HX BLEPHAROPLASTY Right     HX BUNIONECTOMY      HX HYSTERECTOMY      HX OTHER SURGICAL      Collapsed lung    HX WISDOM TEETH EXTRACTION           Family History   Problem Relation Age of Onset    Diabetes Mother     Heart Disease Mother     Cancer Father         pancreatic    Diabetes Sister     Anxiety Sister     Diabetes Brother     Anxiety Brother     Diabetes Brother     Anxiety Brother     Diabetes Sister     Anxiety Sister     Diabetes Sister     Anxiety Sister     Cancer Sister         lung and brain    Anxiety Sister     Anxiety Sister     Breast Cancer Paternal Aunt         under 48        Social History     Socioeconomic History    Marital status: SINGLE     Spouse name: Not on file    Number of children: Not on file    Years of education: Not on file    Highest education level: Not on file   Social Needs    Financial resource strain: Not on file    Food insecurity - worry: Not on file    Food insecurity - inability: Not on file    Transportation needs - medical: Not on file   [address] [place] [address] Transportation needs - non-medical: Not on file   Occupational History    Not on file   Tobacco Use    Smoking status: Never Smoker    Smokeless tobacco: Never Used   Substance and Sexual Activity    Alcohol use: Yes     Comment: occasionally    Drug use: No    Sexual activity: No   Other Topics Concern    Not on file   Social History Narrative    Not on file                ALLERGIES: Celebrex [celecoxib]; Codeine; and Erythromycin    Review of Systems   Constitutional: Negative for chills and fever. Respiratory: Negative for cough, chest tightness, shortness of breath and wheezing. Cardiovascular: Negative for chest pain and palpitations. Gastrointestinal: Negative for blood in stool, nausea and vomiting. Musculoskeletal: Negative for gait problem and neck pain. Neurological: Negative for dizziness, syncope, weakness and headaches. All other systems reviewed and are negative. Vitals:    01/03/19 1530 01/03/19 1535   BP:  147/70   Pulse:  90   Resp:  14   Temp:  98 °F (36.7 °C)   SpO2:  98%   Weight: 210 lb (95.3 kg)    Height: 5' 5\" (1.651 m)        Physical Exam   Constitutional: She is oriented to person, place, and time. She appears well-developed and well-nourished. No distress. HENT:   Head: Normocephalic and atraumatic. Mouth/Throat: Oropharynx is clear and moist. No oropharyngeal exudate. TMs normal   Eyes: Conjunctivae and EOM are normal. Pupils are equal, round, and reactive to light. Abdominal: Soft. Bowel sounds are normal. She exhibits no distension and no mass. There is no tenderness. There is no rebound and no guarding. No abdominal pain on exam. No flank, abdominal or bruising on back. No distension. Negative cullens sign. Negative grey turners sign. Musculoskeletal:        Cervical back: Normal.        Arms:  Neurological: She is alert and oriented to person, place, and time. No cranial nerve deficit. Skin: Skin is warm and dry. No rash noted.  She is not diaphoretic. No pallor. Psychiatric: She has a normal mood and affect. Her behavior is normal. Thought content normal.       [facility]     Differential Diagnosis; Clinical Impression; Plan:       CLINICAL IMPRESSION:  (U21.853F) Rib contusion, left, initial encounter  (primary encounter diagnosis)  (R56.SMYN) Fall, initial encounter    Orders Placed This Encounter      XR RIBS LT W PA CXR MIN 3 V      acetaminophen (TYLENOL) 500 mg tablet      Plan:  1. No acute fracture noted on x ray  2. On Mobic, will ad on tylenol to therapy (cautioned with other acetiminophen containing drugs; dont mix)  3. Deep breathing exercises  4. ED immediately for new, worsening  Or changes      We have reviewed concerning signs/symptoms, normal vs abnormal progression of medical condition and when to seek immediate medical attention. XR Results (most recent):  Results from Appointment encounter on 01/03/19   XR RIBS LT W PA CXR MIN 3 V    Narrative EXAM:  XR RIBS LT W PA CXR MIN 3 V    INDICATION: Anterior lower left rib pain after fall yesterday    COMPARISON: 10/20/2016. TECHNIQUE: Frontal upright chest view and 3 views of the left ribs    FINDINGS: The cardiomediastinal contours are stable. The lungs and pleural  spaces are clear. There is no pneumothorax. There is no acute rib fracture or  other bony abnormality. The upper abdomen is unremarkable.       Impression IMPRESSION: No acute rib fracture or other acute abnormality in the chest.          Procedures

## 2019-01-03 NOTE — PATIENT INSTRUCTIONS
Any new, worsening or changes go immediately to emergency department. Bruised Rib: Care Instructions  Overview    You can get a bruised rib if you fall or get hit, such as in an accident or while playing sports. The medical term for a bruise is \"contusion. \" Small blood vessels get torn and leak blood under the skin. Most people think of a bruise as a black-and-blue area. But bones and muscles can also get bruised. An injury may damage the rib but not cause a bruise that you can see. Sometimes it can be hard to tell if a rib is bruised or broken. The symptoms may be the same. And a broken bone can't always be seen on an X-ray. But the treatment for a bruised rib is often the same as treatment for a broken one. An injury to the ribs can cause pain. The pain may be worse when you breathe deeply, cough, or sneeze. In most cases, a bruised rib will heal on its own. You can take pain medicine while the rib mends. Pain relief allows you to take deep breaths. Follow-up care is a key part of your treatment and safety. Be sure to make and go to all appointments, and call your doctor if you are having problems. It's also a good idea to know your test results and keep a list of the medicines you take. How can you care for yourself at home? · Rest and protect the injured or sore area. Stop, change, or take a break from any activity that causes pain. · Put ice or a cold pack on the area for 10 to 20 minutes at a time. Put a thin cloth between the ice and your skin. · After 2 or 3 days, if your swelling is gone, put a heating pad set on low or a warm cloth on your chest. Some doctors suggest that you go back and forth between hot and cold. Put a thin cloth between the heating pad and your skin. · Ask your doctor if you can take an over-the-counter pain medicine, such as acetaminophen (Tylenol), ibuprofen (Advil, Motrin), or naproxen (Aleve). Be safe with medicines.  Read and follow all instructions on the label.  · As your pain gets better, slowly return to your normal activities. Be patient. Rib bruises can take weeks or months to heal. If the pain gets worse, it may be a sign that you need to rest a while longer. When should you call for help? [id number] anytime you think you may need emergency care. For example, call if:    · You have severe trouble breathing.     Call your doctor now or seek immediate medical care if:    · You have trouble breathing.     · You have a fever.     · You have a new or worse cough.     · You have new or worse pain.    Watch closely for changes in your health, and be sure to contact your doctor if:    · You do not get better as expected. Where can you learn more? Go to http://cira-corona.info/. Enter R125 in the search box to learn more about \"Bruised Rib: Care Instructions. \"  Current as of: April 19, 2018  Content Version: 11.8  © 0578-8630 [employer]. Care instructions adapted under license by [employer] (which disclaims liability or warranty for this information). If you have questions about a medical condition or this instruction, always ask your healthcare professional. [address] any warranty or liability for your use of this information.

## 2019-01-10 ENCOUNTER — APPOINTMENT (OUTPATIENT)
Dept: GENERAL RADIOLOGY | Age: 64
End: 2019-01-10
Attending: EMERGENCY MEDICINE
Payer: MEDICARE

## 2019-01-10 ENCOUNTER — APPOINTMENT (OUTPATIENT)
Dept: CT IMAGING | Age: 64
End: 2019-01-10
Attending: EMERGENCY MEDICINE
Payer: MEDICARE

## 2019-01-10 ENCOUNTER — HOSPITAL ENCOUNTER (EMERGENCY)
Age: 64
Discharge: HOME | End: 2019-01-10
Attending: EMERGENCY MEDICINE | Admitting: EMERGENCY MEDICINE
Payer: MEDICARE

## 2019-01-10 VITALS
OXYGEN SATURATION: 100 % | BODY MASS INDEX: 34.99 KG/M2 | WEIGHT: 210 LBS | TEMPERATURE: 97.7 F | HEIGHT: 65 IN | RESPIRATION RATE: 20 BRPM | DIASTOLIC BLOOD PRESSURE: 90 MMHG | SYSTOLIC BLOOD PRESSURE: 158 MMHG | HEART RATE: 91 BPM

## 2019-01-10 DIAGNOSIS — R07.81 RIB PAIN ON LEFT SIDE: Primary | ICD-10-CM
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
## 2019-01-10 LAB
ALBUMIN SERPL-MCNC: 3.6 G/DL (ref 3.5–5)
ALBUMIN/GLOB SERPL: 0.9 {RATIO} (ref 1.1–2.2)
ALP SERPL-CCNC: 84 U/L (ref 45–117)
ALT SERPL-CCNC: 34 U/L (ref 12–78)
ANION GAP SERPL CALC-SCNC: 6 MMOL/L (ref 5–15)
AST SERPL-CCNC: 14 U/L (ref 15–37)
BASOPHILS # BLD: 0 K/UL (ref 0–0.1)
BASOPHILS NFR BLD: 1 % (ref 0–1)
BILIRUB SERPL-MCNC: 0.4 MG/DL (ref 0.2–1)
BUN SERPL-MCNC: 27 MG/DL (ref 6–20)
BUN/CREAT SERPL: 25 (ref 12–20)
CALCIUM SERPL-MCNC: 9.3 MG/DL (ref 8.5–10.1)
CHLORIDE SERPL-SCNC: 104 MMOL/L (ref 97–108)
CO2 SERPL-SCNC: 27 MMOL/L (ref 21–32)
CREAT BLD-MCNC: 0.9 MG/DL (ref 0.6–1.3)
CREAT SERPL-MCNC: 1.07 MG/DL (ref 0.55–1.02)
DIFFERENTIAL METHOD BLD: ABNORMAL
EOSINOPHIL # BLD: 0.2 K/UL (ref 0–0.4)
EOSINOPHIL NFR BLD: 2 % (ref 0–7)
ERYTHROCYTE [DISTWIDTH] IN BLOOD BY AUTOMATED COUNT: 12.7 % (ref 11.5–14.5)
GLOBULIN SER CALC-MCNC: 3.8 G/DL (ref 2–4)
GLUCOSE SERPL-MCNC: 312 MG/DL (ref 65–100)
HCT VFR BLD AUTO: 34.9 % (ref 35–47)
HGB BLD-MCNC: 12.3 G/DL (ref 11.5–16)
IMM GRANULOCYTES # BLD AUTO: 0 K/UL (ref 0–0.04)
IMM GRANULOCYTES NFR BLD AUTO: 0 % (ref 0–0.5)
LYMPHOCYTES # BLD: 2.9 K/UL (ref 0.8–3.5)
LYMPHOCYTES NFR BLD: 35 % (ref 12–49)
MCH RBC QN AUTO: 29.9 PG (ref 26–34)
MCHC RBC AUTO-ENTMCNC: 35.2 G/DL (ref 30–36.5)
MCV RBC AUTO: 84.9 FL (ref 80–99)
MONOCYTES # BLD: 0.7 K/UL (ref 0–1)
MONOCYTES NFR BLD: 9 % (ref 5–13)
NEUTS SEG # BLD: 4.5 K/UL (ref 1.8–8)
NEUTS SEG NFR BLD: 54 % (ref 32–75)
NRBC # BLD: 0 K/UL (ref 0–0.01)
NRBC BLD-RTO: 0 PER 100 WBC
PLATELET # BLD AUTO: 144 K/UL (ref 150–400)
PMV BLD AUTO: 11.3 FL (ref 8.9–12.9)
POTASSIUM SERPL-SCNC: 4.2 MMOL/L (ref 3.5–5.1)
PROT SERPL-MCNC: 7.4 G/DL (ref 6.4–8.2)
RBC # BLD AUTO: 4.11 M/UL (ref 3.8–5.2)
SODIUM SERPL-SCNC: 137 MMOL/L (ref 136–145)
WBC # BLD AUTO: 8.3 K/UL (ref 3.6–11)

## 2019-01-10 PROCEDURE — 36415 COLL VENOUS BLD VENIPUNCTURE: CPT

## 2019-01-10 PROCEDURE — 85025 COMPLETE CBC W/AUTO DIFF WBC: CPT

## 2019-01-10 PROCEDURE — 96372 THER/PROPH/DIAG INJ SC/IM: CPT

## 2019-01-10 PROCEDURE — 82565 ASSAY OF CREATININE: CPT

## 2019-01-10 PROCEDURE — 80053 COMPREHEN METABOLIC PANEL: CPT

## 2019-01-10 PROCEDURE — 74011250636 HC RX REV CODE- 250/636: Performed by: EMERGENCY MEDICINE

## 2019-01-10 PROCEDURE — 74177 CT ABD & PELVIS W/CONTRAST: CPT

## 2019-01-10 PROCEDURE — 99283 EMERGENCY DEPT VISIT LOW MDM: CPT

## 2019-01-10 PROCEDURE — 74011636320 HC RX REV CODE- 636/320: Performed by: EMERGENCY MEDICINE

## 2019-01-10 PROCEDURE — 71101 X-RAY EXAM UNILAT RIBS/CHEST: CPT

## 2019-01-10 PROCEDURE — 96374 THER/PROPH/DIAG INJ IV PUSH: CPT

## 2019-01-10 PROCEDURE — 74011250637 HC RX REV CODE- 250/637: Performed by: EMERGENCY MEDICINE

## 2019-01-10 PROCEDURE — 74011000250 HC RX REV CODE- 250: Performed by: EMERGENCY MEDICINE

## 2019-01-10 RX ORDER — SODIUM CHLORIDE 0.9 % (FLUSH) 0.9 %
10 SYRINGE (ML) INJECTION
Status: COMPLETED | OUTPATIENT
Start: 2019-01-10 | End: 2019-01-10

## 2019-01-10 RX ORDER — KETOROLAC TROMETHAMINE 30 MG/ML
15 INJECTION, SOLUTION INTRAMUSCULAR; INTRAVENOUS
Status: COMPLETED | OUTPATIENT
Start: 2019-01-10 | End: 2019-01-10

## 2019-01-10 RX ORDER — SODIUM CHLORIDE 0.9 % (FLUSH) 0.9 %
5-40 SYRINGE (ML) INJECTION AS NEEDED
Status: DISCONTINUED | OUTPATIENT
Start: 2019-01-10 | End: 2019-01-10 | Stop reason: HOSPADM

## 2019-01-10 RX ORDER — LIDOCAINE 4 G/100G
1 PATCH TOPICAL EVERY 24 HOURS
Status: DISCONTINUED | OUTPATIENT
Start: 2019-01-10 | End: 2019-01-10 | Stop reason: HOSPADM

## 2019-01-10 RX ORDER — MORPHINE SULFATE 10 MG/ML
5 INJECTION, SOLUTION INTRAMUSCULAR; INTRAVENOUS
Status: COMPLETED | OUTPATIENT
Start: 2019-01-10 | End: 2019-01-10

## 2019-01-10 RX ORDER — METHOCARBAMOL 500 MG/1
500 TABLET, FILM COATED ORAL 4 TIMES DAILY
Qty: 20 TAB | Refills: 0 | Status: SHIPPED | OUTPATIENT
Start: 2019-01-10 | End: 2019-06-27 | Stop reason: ALTCHOICE

## 2019-01-10 RX ORDER — HYDROCODONE BITARTRATE AND ACETAMINOPHEN 5; 325 MG/1; MG/1
1 TABLET ORAL
Qty: 15 TAB | Refills: 0 | Status: SHIPPED | OUTPATIENT
Start: 2019-01-10 | End: 2019-06-20 | Stop reason: ALTCHOICE

## 2019-01-10 RX ORDER — SODIUM CHLORIDE 9 MG/ML
50 INJECTION, SOLUTION INTRAVENOUS
Status: COMPLETED | OUTPATIENT
Start: 2019-01-10 | End: 2019-01-10

## 2019-01-10 RX ORDER — HYDROCODONE BITARTRATE AND ACETAMINOPHEN 7.5; 325 MG/1; MG/1
1 TABLET ORAL
Status: COMPLETED | OUTPATIENT
Start: 2019-01-10 | End: 2019-01-10

## 2019-01-10 RX ORDER — SODIUM CHLORIDE 0.9 % (FLUSH) 0.9 %
5-40 SYRINGE (ML) INJECTION EVERY 8 HOURS
Status: DISCONTINUED | OUTPATIENT
Start: 2019-01-10 | End: 2019-01-10 | Stop reason: HOSPADM

## 2019-01-10 RX ORDER — ONDANSETRON 4 MG/1
4 TABLET, ORALLY DISINTEGRATING ORAL
Status: COMPLETED | OUTPATIENT
Start: 2019-01-10 | End: 2019-01-10

## 2019-01-10 RX ADMIN — IOPAMIDOL 100 ML: 755 INJECTION, SOLUTION INTRAVENOUS at 09:23

## 2019-01-10 RX ADMIN — Medication 10 ML: at 09:23

## 2019-01-10 RX ADMIN — SODIUM CHLORIDE 50 ML/HR: 900 INJECTION, SOLUTION INTRAVENOUS at 09:23

## 2019-01-10 RX ADMIN — HYDROCODONE BITARTRATE AND ACETAMINOPHEN 1 TABLET: 7.5; 325 TABLET ORAL at 10:27

## 2019-01-10 RX ADMIN — MORPHINE SULFATE 5 MG: 10 INJECTION INTRAVENOUS at 08:30

## 2019-01-10 RX ADMIN — KETOROLAC TROMETHAMINE 15 MG: 30 INJECTION, SOLUTION INTRAMUSCULAR at 10:27

## 2019-01-10 RX ADMIN — ONDANSETRON 4 MG: 4 TABLET, ORALLY DISINTEGRATING ORAL at 08:30

## 2019-01-10 NOTE — ED TRIAGE NOTES
Patient discharged by Dr. Karma Ward. Patient provided with discharge instructions Rx and instructions on follow up care. Patient out of ED ambulatory accompanied by family.

## 2019-01-10 NOTE — LETTER
[address] 
[facility] EMERGENCY DEPT 
[address] [place] [address] [id number] [phone] Work Note Date: 1/10/2019 To Whom It May concern: Basil Rocha was seen and treated today in the emergency room by the following provider(s): 
Attending Provider: Jacob Sandoval DO. Basil Rocha may return to work on 1/14/2019.  
 
Sincerely, 
 
 
Elvis Perez DO

## 2019-01-10 NOTE — ED TRIAGE NOTES
Pt. Presents to Ed today for complaints of increased L rib pain. Patient states that she fell on her side and has been seen for the same problem but she has not gotten better. Pt. Alert and oriented x4. PT. Placed in position of comfort with call bell in reach.

## 2019-01-10 NOTE — ED PROVIDER NOTES
EMERGENCY DEPARTMENT HISTORY AND PHYSICAL EXAM 
 
 
Date: 1/10/2019 Patient Name: Gurmeet Silva History of Presenting Illness Chief Complaint Patient presents with  Rib Pain Pt states she fell 1 week ago and hit her LEFT rib cage on the side of the tub. Pt was seen after the fall and had xrays done. Pt states the pain has gotten worse x 2 days. LEFT rib pain History Provided By: Patient HPI: Gurmeet Silva, 70 y.o. female with PMHx significant for hyperthyroidism, hypercholesteremia, DM, IBS, and HTN, presents ambulatory to the ED with cc of sharp shooting 10/10 left rib pain since last night. She notes she had a fall last week on the bathtub and she was hit her left rib cage on the side of the tub. Pt notes having her xray done with no significant finding. However, she notes the sharp shooting pain returned yesterday worse with movement and breathing. She also notes associated SOB and nausea secondary to the pain. She denies cardiac history. She specifically denies cold like sxs like cough and fever, and denies vomiting and diarrhea. There are no other complaints, changes, or physical findings at this time. PCP: Becka Palomo,  No current facility-administered medications on file prior to encounter. Current Outpatient Medications on File Prior to Encounter Medication Sig Dispense Refill  acetaminophen (TYLENOL) 500 mg tablet Take 1 Tab by mouth every six (6) hours as needed for Pain. 30 Tab 0  
 terbinafine HCl (LAMISIL) 250 mg tablet Take 1 Tab by mouth daily. 90 Tab 0  
 meloxicam (MOBIC) 7.5 mg tablet Take 2 Tabs by mouth daily. 180 Tab 3  
 lisinopril (PRINIVIL, ZESTRIL) 5 mg tablet Take 1 Tab by mouth daily. 90 Tab 3  
 mirabegron ER (MYRBETRIQ) 50 mg ER tablet Take 1 Tab by mouth daily. 90 Tab 3  
 atorvastatin (LIPITOR) 80 mg tablet Take 1 Tab by mouth daily. 90 Tab 3  chlorpheniramine-HYDROcodone (TUSSIONEX) 10-8 mg/5 mL suspension Take 5 mL by mouth every twelve (12) hours as needed for Cough. Max Daily Amount: 10 mL. 115 mL 0  
 nystatin (MYCOSTATIN) powder Apply  to affected area four (4) times daily. 15 g 1  
 ALPRAZolam (XANAX) 0.25 mg tablet Take 1-2 Tabs by mouth daily as needed for Anxiety. Indications: anxiety 4 Tab 0  
 buPROPion XL (WELLBUTRIN XL) 150 mg tablet Take 1 Tab by mouth every morning. 90 Tab 3  
 levothyroxine (SYNTHROID) 300 mcg tablet Take 600 mcg by mouth Daily (before breakfast).  cholecalciferol (VITAMIN D3) 1,000 unit tablet Take 1,000 Units by mouth daily.  HUMALOG MIX 75-25 KWIKPEN 100 unit/mL (75-25) inpn 20 Units two (2) times a day.  metFORMIN ER (GLUCOPHAGE XR) 500 mg tablet Take 500 mg by mouth two (2) times a day.  NOVOFINE PLUS 32 gauge x 1/6\" ndle  B.infantis-B.ani-B.long-B.bifi (PROBIOTIC 4X) 10-15 mg TbEC Take  by mouth.  ONETOUCH VERIO strip  furosemide (LASIX) 20 mg tablet Take 20 mg by mouth daily as needed. Past History Past Medical History: 
Past Medical History:  
Diagnosis Date  Depression  Diabetes ([address]  Hypercholesteremia  Hypertension  Hyperthyroidism  IBS (irritable bowel syndrome)  Urinary incontinence Past Surgical History: 
Past Surgical History:  
Procedure Laterality Date  HX BACK SURGERY    
 x2  
 HX BLEPHAROPLASTY Right  HX BUNIONECTOMY  HX HYSTERECTOMY  HX OTHER SURGICAL Collapsed lung  HX WISDOM TEETH EXTRACTION Family History: 
Family History Problem Relation Age of Onset  Diabetes Mother  Heart Disease Mother  Cancer Father   
     pancreatic  Diabetes Sister  Anxiety Sister  Diabetes Brother  Anxiety Brother  Diabetes Brother  Anxiety Brother  Diabetes Sister  Anxiety Sister  Diabetes Sister  Anxiety Sister  Cancer Sister   
     lung and brain  Anxiety Sister  Anxiety Sister  Breast Cancer Paternal Aunt   
     under 48 Social History: 
Social History Tobacco Use  Smoking status: Never Smoker  Smokeless tobacco: Never Used Substance Use Topics  Alcohol use: Yes Comment: occasionally  Drug use: No  
 
 
Allergies: Allergies Allergen Reactions  Celebrex [Celecoxib] Other (comments) Hallucinations  Codeine Nausea and Vomiting  Erythromycin Nausea and Vomiting Review of Systems Review of Systems Constitutional: Negative. Negative for appetite change, chills, fatigue and fever. HENT: Negative. Negative for congestion, rhinorrhea, sinus pressure and sore throat. Eyes: Negative. Respiratory: Positive for shortness of breath. Negative for cough, choking, chest tightness and wheezing. Cardiovascular: Negative. Negative for chest pain, palpitations and leg swelling. Gastrointestinal: Positive for nausea. Negative for abdominal pain, constipation, diarrhea and vomiting. Endocrine: Negative. Genitourinary: Negative. Negative for difficulty urinating, dysuria, flank pain and urgency. Musculoskeletal: Negative. +left rib pain Skin: Negative. Neurological: Negative. Negative for dizziness, speech difficulty, weakness, light-headedness, numbness and headaches. Psychiatric/Behavioral: Negative. All other systems reviewed and are negative. Physical Exam  
Physical Exam  
Constitutional: She is oriented to person, place, and time. She appears well-developed and well-nourished. She appears distressed. HENT:  
Head: Normocephalic and atraumatic. Mouth/Throat: Oropharynx is clear and moist. No oropharyngeal exudate. Eyes: Conjunctivae and EOM are normal. Pupils are equal, round, and reactive to light. Neck: Normal range of motion. Neck supple. No JVD present. No tracheal deviation present. Cardiovascular: Normal rate, regular rhythm, normal heart sounds and intact distal pulses. No murmur heard. Pulmonary/Chest: Effort normal and breath sounds normal. No stridor. No respiratory distress. She has no wheezes. She has no rales. She exhibits tenderness (Left ribs, no crepitus, no sub q emphysema). Abdominal: Soft. She exhibits no distension. There is no tenderness. There is no rebound and no guarding. Musculoskeletal: Normal range of motion. She exhibits no edema or tenderness. Neurological: She is alert and oriented to person, place, and time. No cranial nerve deficit. No gross motor or sensory deficits Skin: Skin is warm and dry. She is not diaphoretic. Psychiatric: She has a normal mood and affect. Her behavior is normal.  
Nursing note and vitals reviewed. Diagnostic Study Results Labs - Recent Results (from the past 12 hour(s)) CBC WITH AUTOMATED DIFF Collection Time: 01/10/19  8:37 AM  
Result Value Ref Range WBC 8.3 3.6 - 11.0 K/uL  
 RBC 4.11 3.80 - 5.20 M/uL  
 HGB 12.3 11.5 - 16.0 g/dL HCT 34.9 (L) 35.0 - 47.0 % MCV 84.9 80.0 - 99.0 FL  
 MCH 29.9 26.0 - 34.0 PG  
 MCHC 35.2 30.0 - 36.5 g/dL  
 RDW 12.7 11.5 - 14.5 % PLATELET 001 (L) 040 - 400 K/uL MPV 11.3 8.9 - 12.9 FL  
 NRBC 0.0 0  WBC ABSOLUTE NRBC 0.00 0.00 - 0.01 K/uL NEUTROPHILS 54 32 - 75 % LYMPHOCYTES 35 12 - 49 % MONOCYTES 9 5 - 13 % EOSINOPHILS 2 0 - 7 % BASOPHILS 1 0 - 1 % IMMATURE GRANULOCYTES 0 0.0 - 0.5 % ABS. NEUTROPHILS 4.5 1.8 - 8.0 K/UL  
 ABS. LYMPHOCYTES 2.9 0.8 - 3.5 K/UL  
 ABS. MONOCYTES 0.7 0.0 - 1.0 K/UL  
 ABS. EOSINOPHILS 0.2 0.0 - 0.4 K/UL  
 ABS. BASOPHILS 0.0 0.0 - 0.1 K/UL  
 ABS. IMM. GRANS. 0.0 0.00 - 0.04 K/UL  
 DF AUTOMATED METABOLIC PANEL, COMPREHENSIVE Collection Time: 01/10/19  8:37 AM  
Result Value Ref Range Sodium 137 136 - 145 mmol/L Potassium 4.2 3.5 - 5.1 mmol/L Chloride 104 97 - 108 mmol/L  
 CO2 27 21 - 32 mmol/L Anion gap 6 5 - 15 mmol/L Glucose 312 (H) 65 - 100 mg/dL BUN 27 (H) 6 - 20 MG/DL Creatinine 1.07 (H) 0.55 - 1.02 MG/DL  
 BUN/Creatinine ratio 25 (H) 12 - 20 GFR est AA >60 >60 ml/min/1.73m2 GFR est non-AA 51 (L) >60 ml/min/1.73m2 Calcium 9.3 8.5 - 10.1 MG/DL Bilirubin, total 0.4 0.2 - 1.0 MG/DL  
 ALT (SGPT) 34 12 - 78 U/L  
 AST (SGOT) 14 (L) 15 - 37 U/L Alk. phosphatase 84 45 - 117 U/L Protein, total 7.4 6.4 - 8.2 g/dL Albumin 3.6 3.5 - 5.0 g/dL Globulin 3.8 2.0 - 4.0 g/dL A-G Ratio 0.9 (L) 1.1 - 2.2 POC CREATININE Collection Time: 01/10/19  8:52 AM  
Result Value Ref Range Creatinine (POC) 0.9 0.6 - 1.3 mg/dL GFRAA, POC >60 >60 ml/min/1.73m2 GFRNA, POC >60 >60 ml/min/1.73m2 Radiologic Studies -  
XR RIBS LT W PA CXR MIN 3 V Final Result INDICATION:  Left chest wall pain 
  
COMPARISON: 1/3/2019 
  
FINDINGS: 
  
PA view of the chest demonstrates a normal cardiomediastinal silhouette. The 
lungs are adequately expanded. There is no edema, effusion, consolidation, or 
pneumothorax. 3 views of the left ribs demonstrate no displaced fracture. 
  
IMPRESSION IMPRESSION: 
No acute process. CT Results  (Last 48 hours) 01/10/19 0923  CT ABD PELV W CONT Final result Impression:  IMPRESSION:  
Cholelithiasis. No acute abnormality. Narrative:  EXAM: CT ABD PELV W CONT INDICATION: severe left flank pain, GLF 1 week ago COMPARISON: 5/23/2018 CONTRAST: 100 mL of Isovue-370. TECHNIQUE:   
Following the uneventful intravenous administration of contrast, thin axial  
images were obtained through the abdomen and pelvis. Coronal and sagittal  
reconstructions were generated. Oral contrast was not administered. CT dose  
reduction was achieved through use of a standardized protocol tailored for this  
examination and automatic exposure control for dose modulation. FINDINGS:   
LUNG BASES: Clear. INCIDENTALLY IMAGED HEART AND MEDIASTINUM: Unremarkable. LIVER: Tiny hypodensity right hepatic lobe is stable. GALLBLADDER: Gallstones are noted. SPLEEN: No mass. PANCREAS: No mass or ductal dilatation. ADRENALS: Unremarkable. KIDNEYS: Subcentimeter left renal cyst is unchanged. STOMACH: Unremarkable. SMALL BOWEL: No dilatation or wall thickening. COLON: No dilatation or wall thickening. APPENDIX: Not visualized. PERITONEUM: No ascites or pneumoperitoneum. RETROPERITONEUM: No lymphadenopathy or aortic aneurysm. REPRODUCTIVE ORGANS: The uterus is surgically absent. URINARY BLADDER: No mass or calculus. BONES: The patient is status post fusion of the lumbar spine. ADDITIONAL COMMENTS: N/A Medical Decision Making I am the first provider for this patient. I reviewed the vital signs, available nursing notes, past medical history, past surgical history, family history and social history. Vital Signs-Reviewed the patient's vital signs. Patient Vitals for the past 12 hrs: 
 Temp Pulse Resp BP SpO2  
01/10/19 0722 97.7 °F (36.5 °C) 91 20 158/90 100 % Pulse Oximetry Analysis - 100% on RA Records Reviewed: Nursing Notes and Old Medical Records Provider Notes (Medical Decision Making): DDx: rib strain, rib fracture, PNX, constipation ED Course:  
Initial assessment performed. The patients presenting problems have been discussed, and they are in agreement with the care plan formulated and outlined with them. I have encouraged them to ask questions as they arise throughout their visit. PROGRESS NOTE: 
10:49 AM 
Has re-evaluated the patient. Pt notes feeling better. Will discharge pt. Written by DANIELLE Lofton, as dictated by Tiara Yanez DO. Disposition: 
DISCHARGE NOTE: 
10:55 AM 
The patient is ready for discharge. The patients signs, symptoms, diagnosis, and instructions for discharge have been discussed and the pt has conveyed their understanding.  The patient is to follow up as recommended with PCP or return to the ER should their symptoms worsen. Plan has been discussed and patient has conveyed their agreement. PLAN: 
1. Discharge Home Current Discharge Medication List  
  
START taking these medications Details HYDROcodone-acetaminophen (NORCO) 5-325 mg per tablet Take 1 Tab by mouth every four (4) hours as needed for Pain. Max Daily Amount: 6 Tabs. Qty: 15 Tab, Refills: 0 Associated Diagnoses: Rib pain on left side  
  
methocarbamol (ROBAXIN) 500 mg tablet Take 1 Tab by mouth four (4) times daily. Qty: 20 Tab, Refills: 0  
  
  
 
2. Follow-up Information Follow up With Specialties Details Why Contact Info Karen Rodriguez DO Internal Medicine   [address] [place] [address] 
[phone] Return to ED if worse Diagnosis Clinical Impression: 1. Rib pain on left side 2. Fall, initial encounter Attestations: This note is prepared by Yamil Vallejo, acting as Scribe for Laith Wang, [facility] DO Caridad: The scribe's documentation has been prepared under my direction and personally reviewed by me in its entirety. I confirm that the note above accurately reflects all work, treatment, procedures, and medical decision making performed by me.

## 2019-01-10 NOTE — DISCHARGE INSTRUCTIONS

## 2019-02-13 ENCOUNTER — TELEPHONE (OUTPATIENT)
Dept: INTERNAL MEDICINE CLINIC | Age: 64
End: 2019-02-13

## 2019-02-13 NOTE — TELEPHONE ENCOUNTER
Patient states she needs a call back in reference to being advised what to do about constipation x 2 weeks. Patient states she's tried Over the Counter & has not helped. Please call to advise.  Thank you

## 2019-02-14 NOTE — TELEPHONE ENCOUNTER
Called, spoke to pt. Two identifiers confirmed. Pt stated she has been constipated for 2 weeks. Pt is able to pass small 'balls' of stool but is still very distended. Pt stated she is taking OTC stool softeners, miralax and docolex. Pt also tried using an enema. Notified pt to try milk of magnesia and stop taking other stool softeners. Notified pt to call the office if she gets no relief. Pt verbalized understanding of information discussed w/ no further questions at this time.

## 2019-02-18 ENCOUNTER — TELEPHONE (OUTPATIENT)
Dept: INTERNAL MEDICINE CLINIC | Age: 64
End: 2019-02-18

## 2019-02-18 NOTE — TELEPHONE ENCOUNTER
Patient states she needs a call back to discuss constipation for over 1 week & why she would've had this happen. Patient states she has went to the bathroom now but wants to know what she could do so she won't have constipation that long again. Please call.  Thank you

## 2019-02-19 NOTE — TELEPHONE ENCOUNTER
Spoke with patient after 2 patient identifiers being note and advised that she had been constipated for like a week and a half, took MOM, and mag citrate. Gave contact info for Dr. Edin Hicks at [phone]. Patient expressed understanding and has no further questions at this time.

## 2019-02-28 ENCOUNTER — HOSPITAL ENCOUNTER (OUTPATIENT)
Dept: GENERAL RADIOLOGY | Age: 64
Discharge: HOME | End: 2019-02-28
Payer: MEDICARE

## 2019-02-28 DIAGNOSIS — K59.04 CHRONIC IDIOPATHIC CONSTIPATION: ICD-10-CM

## 2019-02-28 PROCEDURE — 74018 RADEX ABDOMEN 1 VIEW: CPT

## 2019-03-01 NOTE — PROGRESS NOTES
abd xray shows constipation. Would increase fluids, would add colace or senna-s as a stool softener, and add miralax twice daily. Also increase fiber in diet, fiber one cereal has lots of fiber in it.

## 2019-03-04 ENCOUNTER — TELEPHONE (OUTPATIENT)
Dept: INTERNAL MEDICINE CLINIC | Age: 64
End: 2019-03-04

## 2019-03-04 NOTE — TELEPHONE ENCOUNTER
Returned call to patient. Abdominal Xray results reviewed with patient. Patient verbalized understanding. Patient to follow instructions of DO and to office back with any questions or concerns.

## 2019-03-28 DIAGNOSIS — F41.9 ANXIETY: ICD-10-CM

## 2019-03-28 RX ORDER — ALPRAZOLAM 0.25 MG/1
0.25 TABLET ORAL
Qty: 10 TAB | Refills: 0 | Status: CANCELLED | OUTPATIENT
Start: 2019-03-28

## 2019-03-28 NOTE — TELEPHONE ENCOUNTER
Pt is asking for a one month script to go to [facility] and a 90 day to [facility]  (she states [facility] requested these two weeks ago?)       Pt also needs the Terbinafine  mg that is in previous medications, but not showing up on the current meds.

## 2019-04-01 DIAGNOSIS — F41.9 ANXIETY: Primary | ICD-10-CM

## 2019-04-01 RX ORDER — TERBINAFINE HYDROCHLORIDE 250 MG/1
250 TABLET ORAL DAILY
Qty: 90 TAB | Refills: 0 | Status: SHIPPED | OUTPATIENT
Start: 2019-04-01 | End: 2019-06-27 | Stop reason: ALTCHOICE

## 2019-04-01 RX ORDER — ALPRAZOLAM 0.25 MG/1
0.25 TABLET ORAL AS NEEDED
Qty: 10 TAB | Refills: 0 | Status: SHIPPED | OUTPATIENT
Start: 2019-04-01 | End: 2019-06-20 | Stop reason: SDUPTHER

## 2019-04-01 NOTE — TELEPHONE ENCOUNTER
----- Message from Phyllis Caller sent at 3/29/2019  4:54 PM EDT -----  Regarding: Dr. Alvaro Malloy   Pt is requesting a call back regarding her refill request for Rx Alprazolam .25mg, Terbinafine 250mg. Pt is completley out of Rx. Best contact is [phone].         Copy/paste envera

## 2019-04-21 RX ORDER — BUPROPION HYDROCHLORIDE 150 MG/1
TABLET ORAL
Qty: 90 TAB | Refills: 3 | Status: SHIPPED | OUTPATIENT
Start: 2019-04-21 | End: 2020-11-17 | Stop reason: DRUGHIGH

## 2019-05-21 ENCOUNTER — HOSPITAL ENCOUNTER (OUTPATIENT)
Dept: GENERAL RADIOLOGY | Age: 64
Discharge: HOME | End: 2019-05-21
Attending: PHYSICIAN ASSISTANT
Payer: MEDICARE

## 2019-05-21 DIAGNOSIS — E03.9 HYPOTHYROIDISM: ICD-10-CM

## 2019-05-21 DIAGNOSIS — K58.1 IRRITABLE BOWEL SYNDROME WITH CONSTIPATION: ICD-10-CM

## 2019-05-21 DIAGNOSIS — R13.10 DYSPHAGIA: ICD-10-CM

## 2019-05-21 DIAGNOSIS — Z79.1 LONG TERM (CURRENT) USE OF NON-STEROIDAL ANTI-INFLAMMATORIES (NSAID): ICD-10-CM

## 2019-05-21 PROCEDURE — 92611 MOTION FLUOROSCOPY/SWALLOW: CPT | Performed by: SPEECH-LANGUAGE PATHOLOGIST

## 2019-05-21 PROCEDURE — 74230 X-RAY XM SWLNG FUNCJ C+: CPT

## 2019-05-21 NOTE — PROGRESS NOTES
[address] [place] [address]    Speech Pathology Modified barium swallow Study  Patient: Shaneka Fierro (75 y.o. female)  Date: 5/21/2019  Referring Provider:  Lashawn Schmidt    SUBJECTIVE:   Patient reports sensation of food and drink becoming stuck, points above thyroid notch. Per patient UGI scheduled for July 5 ,2019. OBJECTIVE:   Past Medical History:   Past Medical History:   Diagnosis Date    Depression     Diabetes ([address]     Hypercholesteremia     Hypertension     Hyperthyroidism     IBS (irritable bowel syndrome)     Urinary incontinence      Past Surgical History:   Procedure Laterality Date    HX BACK SURGERY      x2    HX BLEPHAROPLASTY Right     HX BUNIONECTOMY      HX HYSTERECTOMY      HX OTHER SURGICAL      Collapsed lung    HX WISDOM TEETH EXTRACTION       Current Dietary Status:  Regular diet, thin liquids  Radiologist: Dr. Chacko Keepche Views: Lateral;Fluoro  Patient Position: Standing    Trial 1:   Consistency Presented: Thin liquid;Puree; Solid;Pill/Tablet   How Presented: Self-fed/presented;SLP-fed/presented;Cup/sip;Spoon;Straw;Successive swallows       Bolus Acceptance: No impairment   Bolus Formation/Control: No impairment:     Propulsion: No impairment   Oral Residue: None   Initiation of Swallow: No impairment   Timing: No impairment   Penetration: None   Aspiration/Timing: No evidence of aspiration   Pharyngeal Clearance: No residue                       Decreased Tongue Base Retraction?: No  Laryngeal Elevation: WFL (within functional limits)  Aspiration/Penetration Score: 1 (No penetration or aspiration-Contrast does not enter the airway)  Pharyngeal Symmetry: Not assessed  Pharyngeal-Esophageal Segment: No impairment  Pharyngeal Dysfunction: None    Oral Phase Severity: No impairment  Pharyngeal Phase Severity: N/A    ASSESSMENT :  Based on the objective data described above, the patient presents with intact oropharyngeal swallow. Patient with timely and complete mastication of solids, timely pharyngeal swallow and absent penetration/aspiration. Patient reported sensation of solids becoming stuck during MBS - pointed to base of neck. No evidence of pharyngeal or esophageal residue noted with this complaint. Risk of aspiration is low. Patient may safely resume regular diet, thin liquids. Given complaints consistent with esophageal dysphagia agree with UGI. PLAN/RECOMMENDATIONS :  Regular diet  Thin liquids  UGI - already scheduled     COMMUNICATION/EDUCATION:   The above findings and recommendations were discussed with: patient who verbalized understanding.     Thank you for this referral.  POLINA Simon  Time Calculation: 22 mins

## 2019-06-20 ENCOUNTER — OFFICE VISIT (OUTPATIENT)
Dept: INTERNAL MEDICINE CLINIC | Age: 64
End: 2019-06-20

## 2019-06-20 VITALS
WEIGHT: 204 LBS | BODY MASS INDEX: 33.99 KG/M2 | OXYGEN SATURATION: 97 % | DIASTOLIC BLOOD PRESSURE: 73 MMHG | TEMPERATURE: 97.6 F | HEIGHT: 65 IN | HEART RATE: 85 BPM | SYSTOLIC BLOOD PRESSURE: 128 MMHG | RESPIRATION RATE: 18 BRPM

## 2019-06-20 DIAGNOSIS — E03.9 ACQUIRED HYPOTHYROIDISM: ICD-10-CM

## 2019-06-20 DIAGNOSIS — N32.81 OAB (OVERACTIVE BLADDER): ICD-10-CM

## 2019-06-20 DIAGNOSIS — F41.9 ANXIETY: ICD-10-CM

## 2019-06-20 DIAGNOSIS — E11.65 UNCONTROLLED TYPE 2 DIABETES MELLITUS WITH HYPERGLYCEMIA, WITH LONG-TERM CURRENT USE OF INSULIN (HCC): ICD-10-CM

## 2019-06-20 DIAGNOSIS — Z00.00 MEDICARE ANNUAL WELLNESS VISIT, SUBSEQUENT: Primary | ICD-10-CM

## 2019-06-20 DIAGNOSIS — E11.21 TYPE 2 DIABETES WITH NEPHROPATHY (HCC): ICD-10-CM

## 2019-06-20 DIAGNOSIS — Z79.4 UNCONTROLLED TYPE 2 DIABETES MELLITUS WITH HYPERGLYCEMIA, WITH LONG-TERM CURRENT USE OF INSULIN (HCC): ICD-10-CM

## 2019-06-20 DIAGNOSIS — M81.0 OSTEOPOROSIS WITHOUT CURRENT PATHOLOGICAL FRACTURE, UNSPECIFIED OSTEOPOROSIS TYPE: ICD-10-CM

## 2019-06-20 DIAGNOSIS — E78.5 HYPERLIPIDEMIA ASSOCIATED WITH TYPE 2 DIABETES MELLITUS (HCC): ICD-10-CM

## 2019-06-20 DIAGNOSIS — E11.69 HYPERLIPIDEMIA ASSOCIATED WITH TYPE 2 DIABETES MELLITUS (HCC): ICD-10-CM

## 2019-06-20 DIAGNOSIS — I10 ESSENTIAL HYPERTENSION: ICD-10-CM

## 2019-06-20 DIAGNOSIS — B35.1 ONYCHOMYCOSIS: ICD-10-CM

## 2019-06-20 DIAGNOSIS — K58.1 IRRITABLE BOWEL SYNDROME WITH CONSTIPATION: ICD-10-CM

## 2019-06-20 RX ORDER — TROSPIUM CHLORIDE 20 MG/1
20 TABLET, FILM COATED ORAL
COMMUNITY
Start: 2019-06-18 | End: 2020-11-03 | Stop reason: ALTCHOICE

## 2019-06-20 RX ORDER — INSULIN GLARGINE 300 U/ML
20 INJECTION, SOLUTION SUBCUTANEOUS DAILY
COMMUNITY
Start: 2019-04-15 | End: 2019-06-27

## 2019-06-20 RX ORDER — INSULIN LISPRO 100 [IU]/ML
INJECTION, SOLUTION INTRAVENOUS; SUBCUTANEOUS
COMMUNITY
Start: 2019-06-12 | End: 2019-08-08

## 2019-06-20 RX ORDER — ALPRAZOLAM 0.25 MG/1
0.25 TABLET ORAL
Qty: 10 TAB | Refills: 0 | Status: SHIPPED | OUTPATIENT
Start: 2019-06-20 | End: 2020-01-23 | Stop reason: SDUPTHER

## 2019-06-20 NOTE — PROGRESS NOTES
Reviewed record in preparation for visit and have obtained necessary documentation. Identified pt with two pt identifiers(name and ). Chief Complaint   Patient presents with   Margaux Xavier Annual Wellness Visit       Health Maintenance Due   Topic Date Due    EYE EXAM RETINAL OR DILATED  1963    Shingrix Vaccine Age 50> (1 of 2) 2003    GLAUCOMA SCREENING Q2Y  2019    MICROALBUMIN Q1  02/15/2019    MEDICARE YEARLY EXAM  2019    HEMOGLOBIN A1C Q6M  2019       Ms. Ashley Linton has a reminder for a \"due or due soon\" health maintenance. I have asked that she discuss health maintenance topic(s) due with Her  primary care provider. Coordination of Care Questionnaire:  :     1) Have you been to an emergency room, urgent care clinic since your last visit? no   Hospitalized since your last visit? no             2) Have you seen or consulted any other health care providers outside of [address] [place] [address] since your last visit? no  (Include any pap smears or colon screenings in this section.)    3) Do you have an Advance Directive on file? no    4) Are you interested in receiving information on Advance Directives? NO    Patient is accompanied by self I have received verbal consent from Malathi Molina to discuss any/all medical information while they are present in the room.

## 2019-06-20 NOTE — PATIENT INSTRUCTIONS
Medicare Wellness Visit, Female The best way to live healthy is to have a lifestyle where you eat a well-balanced diet, exercise regularly, limit alcohol use, and quit all forms of tobacco/nicotine, if applicable. Regular preventive services are another way to keep healthy. Preventive services (vaccines, screening tests, monitoring & exams) can help personalize your care plan, which helps you manage your own care. Screening tests can find health problems at the earliest stages, when they are easiest to treat. [facility] follows the current, evidence-based guidelines published by the [facility] ([facility]) when recommending preventive services for our patients. Because we follow these guidelines, sometimes recommendations change over time as research supports it. (For example, mammograms used to be recommended annually. Even though Medicare will still pay for an annual mammogram, the newer guidelines recommend a mammogram every two years for women of average risk.) Of course, you and your doctor may decide to screen more often for some diseases, based on your risk and your health status. Preventive services for you include: - Medicare offers their members a free annual wellness visit, which is time for you and your primary care provider to discuss and plan for your preventive service needs. Take advantage of this benefit every year! 
-All adults over the age of 72 should receive the recommended pneumonia vaccines. Current USPSTF guidelines recommend a series of two vaccines for the best pneumonia protection.  
-All adults should have a flu vaccine yearly and a tetanus vaccine every 10 years. All adults age 61 and older should receive a shingles vaccine once in their lifetime.   
-A bone mass density test is recommended when a woman turns 65 to screen for osteoporosis. This test is only recommended one time, as a screening. Some providers will use this same test as a disease monitoring tool if you already have osteoporosis. -All adults age 38-68 who are overweight should have a diabetes screening test once every three years.  
-Other screening tests and preventive services for persons with diabetes include: an eye exam to screen for diabetic retinopathy, a kidney function test, a foot exam, and stricter control over your cholesterol.  
-Cardiovascular screening for adults with routine risk involves an electrocardiogram (ECG) at intervals determined by your doctor.  
-Colorectal cancer screenings should be done for adults age 54-65 with no increased risk factors for colorectal cancer. There are a number of acceptable methods of screening for this type of cancer. Each test has its own benefits and drawbacks. Discuss with your doctor what is most appropriate for you during your annual wellness visit. The different tests include: colonoscopy (considered the best screening method), a fecal occult blood test, a fecal DNA test, and sigmoidoscopy. -Breast cancer screenings are recommended every other year for women of normal risk, age 54-69. 
-Cervical cancer screenings for women over age 72 are only recommended with certain risk factors.  
-All adults born between [place] should be screened once for Hepatitis C. Here is a list of your current Health Maintenance items (your personalized list of preventive services) with a due date: 
Health Maintenance Due Topic Date Due [facility] Eye Exam  01/19/1965  Shingles Vaccine (1 of 2) 01/19/2003  Glaucoma Screening   02/06/2019  Albumin Urine Test  02/15/2019  Hemoglobin A1C    05/12/2019

## 2019-06-20 NOTE — PROGRESS NOTES
Yesica Lewis is a 75 y.o. female who presents for evaluation of awv. Last seen by me nov 12, 2018. Overall doing better/well. Weight down 6 lbs. Has been having some issues with constipation, though it has improved since started linzess. Has egd coming up in early July, dr Shanna Rutherford. Left knee bothers her at times, gets bolt of pain, and then feels like it is buckling. Has not had any falls. Denies any trauma.       ROS:  Constitutional: negative for fevers, chills, anorexia and weight loss  Eyes:   negative for visual disturbance and irritation  ENT:   negative for tinnitus,sore throat,nasal congestion,ear pain,hoarseness  Respiratory:  negative for cough, hemoptysis, dyspnea,wheezing  CV:   negative for chest pain, palpitations, lower extremity edema  GI:   negative for nausea, vomiting, diarrhea, abdominal pain,melena  Genitourinary: negative for frequency, dysuria and hematuria  Musculoskel: negative for myalgias, arthralgias, back pain, muscle weakness, joint pain  Neurological:  negative for headaches, dizziness, focal weakness, numbness  Psychiatric:     ++ for depression or anxiety      Past Medical History:   Diagnosis Date    Depression     Diabetes ([facility] [address]     Hypercholesteremia     Hypertension     Hyperthyroidism     IBS (irritable bowel syndrome)     Urinary incontinence        Past Surgical History:   Procedure Laterality Date    HX BACK SURGERY      x2    HX BLEPHAROPLASTY Right     HX BUNIONECTOMY      HX HYSTERECTOMY      HX OTHER SURGICAL      Collapsed lung    HX WISDOM TEETH EXTRACTION         Family History   Problem Relation Age of Onset    Diabetes Mother     Heart Disease Mother     Cancer Father         pancreatic    Diabetes Sister     Anxiety Sister     Diabetes Brother     Anxiety Brother     Diabetes Brother     Anxiety Brother     Diabetes Sister     Anxiety Sister     Diabetes Sister     Anxiety Sister     Cancer Sister         lung and brain    Anxiety Sister     Anxiety Sister     Breast Cancer Paternal Aunt         under 48       Social History     Socioeconomic History    Marital status: SINGLE     Spouse name: Not on file    Number of children: Not on file    Years of education: Not on file    Highest education level: Not on file   Occupational History    Not on file   Social Needs    Financial resource strain: Not on file    Food insecurity:     Worry: Not on file     Inability: Not on file    Transportation needs:     Medical: Not on file     Non-medical: Not on file   Tobacco Use    Smoking status: Never Smoker    Smokeless tobacco: Never Used   Substance and Sexual Activity    Alcohol use: Yes     Comment: occasionally    Drug use: No    Sexual activity: Not Currently   Lifestyle    Physical activity:     Days per week: Not on file     Minutes per session: Not on file    Stress: Not on file   Relationships    Social connections:     Talks on phone: Not on file     Gets together: Not on file     Attends [employer] service: Not on file     Active member of club or organization: Not on file     Attends meetings of clubs or organizations: Not on file     Relationship status: Not on file    Intimate partner violence:     Fear of current or ex partner: Not on file     Emotionally abused: Not on file     Physically abused: Not on file     Forced sexual activity: Not on file   Other Topics Concern    Not on file   Social History Narrative    Not on file            Visit Vitals  /73 (BP 1 Location: Right arm, BP Patient Position: Sitting)   Pulse 85   Temp 97.6 °F (36.4 °C) (Oral)   Resp 18   Ht 5' 5\" (1.651 m)   Wt 204 lb (92.5 kg)   SpO2 97%   BMI 33.95 kg/m²       Physical Examination:   General - Well appearing female  HEENT - PERRL, TM no erythema/opacification, normal nasal turbinates, no oropharyngeal erythema or exudate, MMM  Neck - supple, no bruits, no thyroidomegaly, no lymphadenopathy  Pulm - clear to auscultation bilaterally  Cardio - RRR, normal S1 S2, no murmur  Abd - soft, nontender, no masses, no HSM  Extrem - no edema, +2 distal pulses  Neuro-  No focal deficits, CN intact     Assessment/Plan:    1. Onychomycosis--lamisil has helped, will resume if lfts ok  2. Uncontrolled dm, type 2--check a1c, urine micro. Last was 11.3. On toujeo, glucophage. She follows with [place], dr Amelia Faye, but suspect will see if she wants to meet with dr glynn here, our pharmd.  Would like to try her on glp1. Pt states she tried victoza but side effects prohibited her from continuing. 3.  dionisio--continue cpap  4. Left knee pain, buckling--with no trauma, don't think xray will be of any value. If continues or worsens, would have her see ortho, but she declines referral today  5.  htn--controlled iwht lisinopril  6. Hypothyroid--on synthroid, check tsh  7. Anxiety and depression--doing well with wellbutrin  8.  oab--continue sanctura  9. Anxiety--rx given for xanax, which she uses sparingly to help her sleep    Has eye exam in august.  rtc 6 months. Has rx for shingrix. Dina Lockett III, DO            This is the Subsequent Medicare Annual Wellness Exam, performed 12 months or more after the Initial AWV or the last Subsequent AWV    I have reviewed the patient's medical history in detail and updated the computerized patient record.      History     Past Medical History:   Diagnosis Date    Depression     Diabetes ([facility] [address]     Hypercholesteremia     Hypertension     Hyperthyroidism     IBS (irritable bowel syndrome)     Urinary incontinence       Past Surgical History:   Procedure Laterality Date    HX BACK SURGERY      x2    HX BLEPHAROPLASTY Right     HX BUNIONECTOMY      HX HYSTERECTOMY      HX OTHER SURGICAL      Collapsed lung    HX WISDOM TEETH EXTRACTION       Current Outpatient Medications   Medication Sig Dispense Refill    TOUJEO SOLOSTAR U-300 INSULIN 300 unit/mL (1.5 mL) inpn pen 20 Units by SubCUTAneous route daily.  HUMALOG KWIKPEN INSULIN 100 unit/mL kwikpen by SubCUTAneous route Before breakfast, lunch, and dinner. Sliding scale 4-6 units TID before meals      trospium (SANCTURA) 20 mg tablet Take 20 mg by mouth Before breakfast and dinner.  linaclotide (LINZESS) 290 mcg cap capsule Take 290 mcg by mouth Daily (before breakfast).  buPROPion XL (WELLBUTRIN XL) 150 mg tablet TAKE 1 TABLET EVERY MORNING 90 Tab 3    ALPRAZolam (XANAX) 0.25 mg tablet Take 1 Tab by mouth daily as needed for Anxiety. 10 Tab 0    methocarbamol (ROBAXIN) 500 mg tablet Take 1 Tab by mouth four (4) times daily. 20 Tab 0    acetaminophen (TYLENOL) 500 mg tablet Take 1 Tab by mouth every six (6) hours as needed for Pain. 30 Tab 0    meloxicam (MOBIC) 7.5 mg tablet Take 2 Tabs by mouth daily. 180 Tab 3    lisinopril (PRINIVIL, ZESTRIL) 5 mg tablet Take 1 Tab by mouth daily. 90 Tab 3    atorvastatin (LIPITOR) 80 mg tablet Take 1 Tab by mouth daily. 90 Tab 3    levothyroxine (SYNTHROID) 300 mcg tablet Take 600 mcg by mouth Daily (before breakfast).  cholecalciferol (VITAMIN D3) 1,000 unit tablet Take 1,000 Units by mouth daily.  metFORMIN ER (GLUCOPHAGE XR) 500 mg tablet Take 500 mg by mouth two (2) times a day.  NOVOFINE PLUS 32 gauge x 1/6\" ndle       B.infantis-B.ani-B.long-B.bifi (PROBIOTIC 4X) 10-15 mg TbEC Take  by mouth.  ONETOUCH VERIO strip       furosemide (LASIX) 20 mg tablet Take 20 mg by mouth daily as needed.  terbinafine HCl (LAMISIL) 250 mg tablet Take 1 Tab by mouth daily. Indications: fungal infection of toenail 90 Tab 0    mirabegron ER (MYRBETRIQ) 50 mg ER tablet Take 1 Tab by mouth daily.  90 Tab 3     Allergies   Allergen Reactions    Celebrex [Celecoxib] Other (comments)     Hallucinations    Codeine Nausea and Vomiting    Erythromycin Nausea and Vomiting     Family History   Problem Relation Age of Onset    Diabetes Mother     Heart Disease Mother    [facility] Cancer Father         pancreatic    Diabetes Sister     Anxiety Sister     Diabetes Brother     Anxiety Brother     Diabetes Brother     Anxiety Brother     Diabetes Sister     Anxiety Sister     Diabetes Sister     Anxiety Sister     Cancer Sister         lung and brain    Anxiety Sister     Anxiety Sister     Breast Cancer Paternal Aunt         under 48     Social History     Tobacco Use    Smoking status: Never Smoker    Smokeless tobacco: Never Used   Substance Use Topics    Alcohol use: Yes     Comment: occasionally     Patient Active Problem List   Diagnosis Code    Acquired hypothyroidism E03.9    Osteoporosis M81.0    OAB (overactive bladder) N32.81    Essential hypertension I10    Depression F32.9    Obesity (BMI 30-39. 9) E66.9    Uncontrolled type 2 diabetes mellitus with hyperglycemia, with long-term current use of insulin ([facility]) E11.65, Z79.4    Hyperlipidemia associated with type 2 diabetes mellitus ([facility] [address] E11.69, E78.5       Depression Risk Factor Screening:     3 most recent PHQ Screens 10/6/2016   Little interest or pleasure in doing things Not at all   Feeling down, depressed, irritable, or hopeless Not at all   Total Score PHQ 2 0     Alcohol Risk Factor Screening: You do not drink alcohol or very rarely. Maybe one drink per week. Functional Ability and Level of Safety:   Hearing Loss  The patient wears hearing aids. Has used hearing aids in past, but not currently. Activities of Daily Living  The home contains: no safety equipment. Patient does total self care    Fall Risk  Fall Risk Assessment, last 12 mths 6/20/2019   Able to walk? Yes   Fall in past 12 months? No       Abuse Screen  Patient is not abused. Lives with  of 20 years.     Cognitive Screening   Evaluation of Cognitive Function:  Has your family/caregiver stated any concerns about your memory: no  Normal    Patient Care Team   Patient Care Team:  Rhonda Wiley DO as PCP - General (Internal Medicine)  Nic Bradshaw MD (Obstetrics & Gynecology)  Herb Caruso MD (Orthopedic Surgery)  Leydi Colvin MD (Ophthalmology)  Bradly Garcia DO (Pain Management)  Kell Maldonado MD (Cardiology)  Sarthak Denny MD (Gastroenterology)  Jovita Obregon MD (Orthopedic Surgery)  Brock Islas MD (Endocrinology)    Assessment/Plan   Education and counseling provided:  Are appropriate based on today's review and evaluation  End-of-Life planning (with patient's consent)  Pneumococcal Vaccine  Screening for glaucoma    Diagnoses and all orders for this visit:    1. Medicare annual wellness visit, subsequent    2.  Hyperlipidemia associated with type 2 diabetes mellitus [facility])        Health Maintenance Due   Topic Date Due    EYE EXAM RETINAL OR DILATED  01/19/1965    Shingrix Vaccine Age 50> (1 of 2) 01/19/2003    GLAUCOMA SCREENING Q2Y  02/06/2019    MICROALBUMIN Q1  02/15/2019    HEMOGLOBIN A1C Q6M  05/12/2019

## 2019-06-21 LAB
ALBUMIN SERPL-MCNC: 4.1 G/DL (ref 3.6–4.8)
ALBUMIN/CREAT UR: 3.3 MG/G CREAT (ref 0–30)
ALBUMIN/GLOB SERPL: 1.6 {RATIO} (ref 1.2–2.2)
ALP SERPL-CCNC: 85 IU/L (ref 39–117)
ALT SERPL-CCNC: 19 IU/L (ref 0–32)
AST SERPL-CCNC: 13 IU/L (ref 0–40)
BILIRUB SERPL-MCNC: 0.3 MG/DL (ref 0–1.2)
BUN SERPL-MCNC: 19 MG/DL (ref 8–27)
BUN/CREAT SERPL: 20 (ref 12–28)
CALCIUM SERPL-MCNC: 9.7 MG/DL (ref 8.7–10.3)
CHLORIDE SERPL-SCNC: 106 MMOL/L (ref 96–106)
CHOLEST SERPL-MCNC: 165 MG/DL (ref 100–199)
CO2 SERPL-SCNC: 24 MMOL/L (ref 20–29)
CREAT SERPL-MCNC: 0.93 MG/DL (ref 0.57–1)
CREAT UR-MCNC: 158.8 MG/DL
EST. AVERAGE GLUCOSE BLD GHB EST-MCNC: 246 MG/DL
GLOBULIN SER CALC-MCNC: 2.5 G/DL (ref 1.5–4.5)
GLUCOSE SERPL-MCNC: 100 MG/DL (ref 65–99)
HBA1C MFR BLD: 10.2 % (ref 4.8–5.6)
HDLC SERPL-MCNC: 69 MG/DL
LDLC SERPL CALC-MCNC: 83 MG/DL (ref 0–99)
MICROALBUMIN UR-MCNC: 5.3 UG/ML
POTASSIUM SERPL-SCNC: 4.4 MMOL/L (ref 3.5–5.2)
PROT SERPL-MCNC: 6.6 G/DL (ref 6–8.5)
SODIUM SERPL-SCNC: 140 MMOL/L (ref 134–144)
T4 FREE SERPL-MCNC: 2.99 NG/DL (ref 0.82–1.77)
TRIGL SERPL-MCNC: 65 MG/DL (ref 0–149)
TSH SERPL DL<=0.005 MIU/L-ACNC: 0.04 UIU/ML (ref 0.45–4.5)
VLDLC SERPL CALC-MCNC: 13 MG/DL (ref 5–40)

## 2019-06-21 NOTE — PROGRESS NOTES
Diabetes remains poorly controlled, though a bit improved. a1c down to 10.2. She says she has tried victoza before, but would like to try another glp1, perhaps once weekly? Please help her. Her thyroid is also off, need to decrease dose of her synthroid. i'm guessing endo, dr Marybeth Vergara has been managing that as well. If she has really been on 600 mcg daily, I would decrease it to 575. Cholesterol, kidneys, and liver all look fine.

## 2019-06-21 NOTE — PROGRESS NOTES
Notified patient using two identifiers. Patient was informed of recent lab. Patient verbalized understanding.

## 2019-06-24 ENCOUNTER — TELEPHONE (OUTPATIENT)
Dept: INTERNAL MEDICINE CLINIC | Age: 64
End: 2019-06-24

## 2019-06-24 RX ORDER — LEVOTHYROXINE SODIUM 300 UG/1
450 TABLET ORAL
Qty: 90 TAB | Refills: 0
Start: 2019-06-24 | End: 2019-10-01 | Stop reason: DRUGHIGH

## 2019-06-24 NOTE — TELEPHONE ENCOUNTER
Pharmacy Progress Note - Telephone Encounter    S/O: Ms. Bjorn Skaggs 75 y.o. female, referred by Dr. Cade Lopez, was contacted via an outbound telephone call to discuss her recent lab results and diabetes management today. Verified patients identifiers (name & ) per HIPAA policy. T2DM:  - Latest A1c - 10.2% - pt aware that her goal should be \"7-8%\" ; hx of working with Dr. Cantu Reveal   - Reports hx of being on mixed insulin - experienced hypoglycemia w/ this on days when she skipped meals  - Now on Toujeo 20 units daily and Humalog 4-6 units TID - has been on this combo since May 2019  - Reports compliance to meds ; endorses Humalog is expensive for her  - Checking SMBGs - reports AM readings are 150-160s ; PM readings usually in the 200s   - Works at a [employer] store    Hypothyroidism:  - Pt reports her levels are always \"changing\"  - Pt reports to taking levothyroxine 600 mcg (using 300 mcg tab) daily Monday - Friday, 300 mcg on Saturday and none on   - Direction differs from med list direction of 600 mcg daily   - Most recent TSH was 0.036     Lab Results   Component Value Date/Time    Sodium 140 2019 08:50 AM    Potassium 4.4 2019 08:50 AM    Chloride 106 2019 08:50 AM    CO2 24 2019 08:50 AM    Anion gap 6 01/10/2019 08:37 AM    Glucose 100 (H) 2019 08:50 AM    BUN 19 2019 08:50 AM    Creatinine 0.93 2019 08:50 AM    BUN/Creatinine ratio 20 2019 08:50 AM    GFR est AA 74 2019 08:50 AM    GFR est non-AA 64 2019 08:50 AM    Calcium 9.7 2019 08:50 AM    Bilirubin, total 0.3 2019 08:50 AM    AST (SGOT) 13 2019 08:50 AM    Alk.  phosphatase 85 2019 08:50 AM    Protein, total 6.6 2019 08:50 AM    Albumin 4.1 2019 08:50 AM    Globulin 3.8 01/10/2019 08:37 AM    A-G Ratio 1.6 2019 08:50 AM    ALT (SGPT) 19 2019 08:50 AM     Lab Results   Component Value Date/Time    TSH 0.036 (L) 06/20/2019 08:50 AM    TSH 3.020 07/17/2017 10:25 AM     Lab Results   Component Value Date/Time    Cholesterol, total 165 06/20/2019 08:50 AM    HDL Cholesterol 69 06/20/2019 08:50 AM    LDL, calculated 83 06/20/2019 08:50 AM    VLDL, calculated 13 06/20/2019 08:50 AM    Triglyceride 65 06/20/2019 08:50 AM     Lab Results   Component Value Date/Time    Hemoglobin A1c 10.2 (H) 06/20/2019 08:50 AM     Lab Results   Component Value Date/Time    Microalb/Creat ratio (ug/mg creat.) 3.3 06/20/2019 08:49 AM       Estimated Creatinine Clearance: 66.9 mL/min (based on SCr of 0.93 mg/dL). A/P:  - Discussed patient's lab results. Type 2 Diabetes:  - A1c elevated for her goal < 7%. - Pt will come in for follow up on Thursday, 6/27/19  - Bring in glucometer for review. Will further discuss med access at this visit     Hypothyroidism:  - Will clarify levothyroxine directions w/ provider   - Patient endorses understanding to the provided information. All questions were answered at this time.      Thank you,  Tavia Calzada, PharmD, CDE

## 2019-06-24 NOTE — TELEPHONE ENCOUNTER
Clarified levothyroxine recommendations w/ Dr. Arreola Him. Given pt's current levothyroxine 300 mcg tab strength and reported regimen - recommend adjusting regimen to 450 mcg daily. Recommendations accepted. Attempted to contact patient. Voicemail left for patient to return my call.     Tavia Ohara, PharmD, CDE

## 2019-06-27 ENCOUNTER — OFFICE VISIT (OUTPATIENT)
Dept: INTERNAL MEDICINE CLINIC | Age: 64
End: 2019-06-27

## 2019-06-27 DIAGNOSIS — Z79.4 TYPE 2 DIABETES MELLITUS WITH HYPERGLYCEMIA, WITH LONG-TERM CURRENT USE OF INSULIN (HCC): Primary | ICD-10-CM

## 2019-06-27 DIAGNOSIS — E11.65 TYPE 2 DIABETES MELLITUS WITH HYPERGLYCEMIA, WITH LONG-TERM CURRENT USE OF INSULIN (HCC): Primary | ICD-10-CM

## 2019-06-27 DIAGNOSIS — Z71.89 ENCOUNTER FOR MEDICATION REVIEW AND COUNSELING: ICD-10-CM

## 2019-06-27 RX ORDER — BISMUTH SUBSALICYLATE 262 MG
1 TABLET,CHEWABLE ORAL DAILY
COMMUNITY
End: 2021-12-21

## 2019-06-27 RX ORDER — INSULIN GLARGINE 300 U/ML
24 INJECTION, SOLUTION SUBCUTANEOUS DAILY
Qty: 15 ML | Refills: 0
Start: 2019-06-27 | End: 2019-09-03

## 2019-06-27 RX ORDER — CALCIUM POLYCARBOPHIL 625 MG
625 TABLET ORAL DAILY
COMMUNITY
End: 2021-12-21

## 2019-06-27 NOTE — PATIENT INSTRUCTIONS
Your Visit Summary:     · For your levothyroxine 300 mcg - take 1.5 tablets daily in the morning  · Continue metformin 500 mg - 1 tablet twice daily   · Increase Toujeo 24 units daily  · Continue with Humalog: give before eat meal  · For blood sugar between 130 - 170: give 4 units  · For blood sugar between 171 - 200: give 6 units  · For blood sugar between 201 - 250: give 8 units    Check and document your blood sugar first thing in the morning (fasting), before a meal, and before bedtime. Bring your meter/log to all future visits. Your blood sugar goals:  - Fasting blood sugar goal: 80 - 130   - 2 hours after a meal: less than 180     When you experience symptoms of low blood sugar (example: less than 70):  - Confirm low reading by checking your blood sugar.   - Then treat with 15 grams of carbohydrates. - Wait 15 minutes to recheck blood sugar.   - Then eat a protein containing meal/snack to prevent another low blood sugar episode. In addition to taking your medications as directed, improving your blood sugar involves modifying your nutrition and maximizing the amount of physical activity. Nutrition:  - When reviewing a nutrition label, focus on the serving size, total calories, fat (and type of fats), total carbohydrates, sugar (and amount of added sugar), amount of fiber (good for your digestive), and amount of protein. Refer to your nutrition label guide for more information.  - For a meal : max 45 - 60 grams of carbohydrates  - For a snack: max 15 grams of carbohydrates  - Reduce amount of saturated and trans fat. Consider more unsaturated fat options as they are better for your heart health.    - Have at least 1 serving of lean fat protein with each meal.    - Increase fiber intake slowly to prevent constipation.   - Substitute fruit juices for the whole fruit    Physical Activity:  - Aim for 30 minutes of consistent, moderately intensive, physical activity a day for 5 days or an average of 150 minutes per week. Start slow, increase as tolerated. For example: Walk every day, working up to 30 minutes of brisk walking, 5 days a week--or split the 30 minutes into two 15-minute or three 10-minute walks.     - If you sit for a long time, get up and move/stretch every 90 minutes

## 2019-06-28 NOTE — PROGRESS NOTES
Pharmacy Progress Note - Diabetes Management    S/O: Ms. Angela Diana is a 75 y.o. female , referred by Dr. Sandie Penny DO, with a PMH of T2DM, HTN, HLD, Hypothyroidism, OAB, IBS, Osteoporosis, was seen today for diabetes management. Also sees Dr. Kyle Salmeron (endo). Patient's last A1c was 10.2% (June 2019). SHx:  - Works part time at [employer]  - Lives w/ her   - Traveling to  [facility] in mid July to end of July     Medication Adherence/Access:  - Brought in home medications including prescription/non-prescriptions:  yes  - Endorses barriers to affording/accessing medications : yes - states she's paying $200-$300 for 3 month supply for Humalog & Toujeo each  - Uses a pill box/other methods to organize medications: no  - Endorses adherence to current regimen?: yes   Note - Several pill bottles (qty #90) filled from December - January still had > 1/2 tabs remaining in them  - Uses "[facility] mail order pharmacy ;  Rx insurance is: [employer] [facility]     Diabetes Management:  Diabetes History:  - Endorses 20 yr hx of diabetes ; hx of participating in a DSME class  - Family hx: both parents had DM ;   - Previous anti-hyperglycemic agents includes:  - Started on mixed insulin 75/25 in 2015 - transitioned to [facility] and Humalog 4 months ago d/t frequency of hypoglycemia    Current anti-hyperglycemic regimen includes:    - Metformin  mg BID   - Toujeo 20 units daily   - Humalog 4-6 units TID --- hx of taking up to 10 units TID \"my blood sugars dropped\"   - If BG < 170 - gives 4 units  - If BG > 170 - gives 6 units -- gives 6 units most days of the week    ACC/AHA ASCVD Risk Assessment/Prevention:  Risk Factors: Age and Diabetes    - On ASA: NO  - On a moderate/high intensity statin: YES -  Atorvastatin 80 mg ; LDL: 83 ; HDL: 69 ; non-HDL: 96  Does not like to take atorvastatin 80 mg d/t it's large size   - On ACE/ARB therapy: YES - lisinopril   - Nicotine dependence?:  No    ROS:  Endorses: at this time  - Symptoms of Hyperglycemia: none  - Symptoms of Hypoglycemia: none  - \"very scared of blood sugar dropping\"     Self Monitoring Blood Glucose (SMBG) or CGM:  - Brought in home glucometer today:  yes - has 2 meter - one she keeps upstairs for \"fasting\" and one \"for the rest of the day\"  - Conducts/scans: generally 2x/day  - interested in [employer] system    Date Before Breakfast Before Dinner   6/19/2019  447   6/20/2019 213    6/22/2019 171 326   6/23/2019 222    6/24/2019 153 134   6/25/2019 173 354   6/26/2019 263 327   Avg 199 318     Nutrition:  - Eats 2-3 meals/day - \"not sure what I should eat because I'm concern about my bowel movements\" ; \"I was told I could not eat anything sugar free\"   - has an endoscopy in July w/ Dr. Charles Kaufman ; on linzess for constipation   - Breakfast: (usually 10 - 11 AM) most days cereal w/ milk or toast ; sundays - large breakfast - egg, pascal/sausage / 2 toasts, gravy, potatoes  - Lunch: usually skips but when she does - burgers/fries ; lunch yesterday: turkey club + taco salad  - Dinner (7-8 PM): tends to prepare \"Home \" ready made meals - varies depending on the week ; yesterday: taco salad + watermelon  - Snack(s): chocolate, chips, cookies, fritos - \"weakness for her\"   - Beverage(s): water, diet donna mist, not a big fan of juices  - Alcohol consumption? No    Physical Activity:   None at this time  States she has a pool - used to do aquatic exercises 2-3x/week    Vitals:   Wt Readings from Last 3 Encounters:   06/20/19 204 lb (92.5 kg)   01/10/19 210 lb (95.3 kg)   01/03/19 210 lb (95.3 kg)     BP Readings from Last 3 Encounters:   06/20/19 128/73   01/10/19 158/90   01/03/19 147/70     Pulse Readings from Last 3 Encounters:   06/20/19 85   01/10/19 91   01/03/19 90       Past Medical History:   Diagnosis Date    Depression     [facility]     Hypercholesteremia     Hypertension     Hyperthyroidism     IBS (irritable bowel syndrome)     Urinary incontinence      Allergies   Allergen Reactions    Celebrex [Celecoxib] Other (comments)     Hallucinations    Codeine Nausea and Vomiting    Erythromycin Nausea and Vomiting       Current Outpatient Medications   Medication Sig    calcium polycarbophil (FIBER LAXATIVE, CA POLYCARBO,) 625 mg tablet Take 625 mg by mouth daily.  multivitamin (ONE A DAY) tablet Take 1 Tab by mouth daily.  TOUJEO SOLOSTAR U-300 INSULIN 300 unit/mL (1.5 mL) inpn pen 24 Units by SubCUTAneous route daily.  levothyroxine (SYNTHROID) 300 mcg tablet Take 1.5 Tabs by mouth Daily (before breakfast).  HUMALOG KWIKPEN INSULIN 100 unit/mL kwikpen by SubCUTAneous route Before breakfast, lunch, and dinner. Sliding scale 4-6 units TID before meals    trospium (SANCTURA) 20 mg tablet Take 20 mg by mouth Before breakfast and dinner.  linaclotide (LINZESS) 290 mcg cap capsule Take 290 mcg by mouth Daily (before breakfast).  buPROPion XL (WELLBUTRIN XL) 150 mg tablet TAKE 1 TABLET EVERY MORNING    meloxicam (MOBIC) 7.5 mg tablet Take 2 Tabs by mouth daily.  lisinopril (PRINIVIL, ZESTRIL) 5 mg tablet Take 1 Tab by mouth daily.  cholecalciferol (VITAMIN D3) 1,000 unit tablet Take 2,000 Units by mouth daily.  metFORMIN ER (GLUCOPHAGE XR) 500 mg tablet Take 500 mg by mouth two (2) times a day.  NOVOFINE PLUS 32 gauge x 1/6\" ndle     B.infantis-B.ani-B.long-B.bifi (PROBIOTIC 4X) 10-15 mg TbEC Take 2 Gum by mouth daily.  ONETOUCH VERIO strip     ALPRAZolam (XANAX) 0.25 mg tablet Take 1 Tab by mouth daily as needed for Anxiety. Indications: anxious    acetaminophen (TYLENOL) 500 mg tablet Take 1 Tab by mouth every six (6) hours as needed for Pain.  atorvastatin (LIPITOR) 80 mg tablet Take 1 Tab by mouth daily.  furosemide (LASIX) 20 mg tablet Take 20 mg by mouth daily as needed. No current facility-administered medications for this visit.         Lab Results   Component Value Date/Time    Sodium 140 06/20/2019 08:50 AM    Potassium 4.4 06/20/2019 08:50 AM    Chloride 106 06/20/2019 08:50 AM    CO2 24 06/20/2019 08:50 AM    Anion gap 6 01/10/2019 08:37 AM    Glucose 100 (H) 06/20/2019 08:50 AM    BUN 19 06/20/2019 08:50 AM    Creatinine 0.93 06/20/2019 08:50 AM    BUN/Creatinine ratio 20 06/20/2019 08:50 AM    GFR est AA 74 06/20/2019 08:50 AM    GFR est non-AA 64 06/20/2019 08:50 AM    Calcium 9.7 06/20/2019 08:50 AM       Lab Results   Component Value Date/Time    WBC 8.3 01/10/2019 08:37 AM    HGB 12.3 01/10/2019 08:37 AM    HCT 34.9 (L) 01/10/2019 08:37 AM    PLATELET 501 (L) 02/99/1209 08:37 AM    MCV 84.9 01/10/2019 08:37 AM       Lab Results   Component Value Date/Time    Microalb/Creat ratio (ug/mg creat.) 3.3 06/20/2019 08:49 AM     HbA1c:  Lab Results   Component Value Date/Time    Hemoglobin A1c 10.2 (H) 06/20/2019 08:50 AM     CrCl: 67 ml/min    A/P:    Medication Adherence/Access:  - Note several pill bottles from her current supply had > 1 /2 of qty present. Diabetes Management:  - Per ADA guidelines, Pt's A1c is not at her goal < 7%  - Discussed the role of insulin resistance, recent A1c relative to diabetes complications, current pharmacological therapy and patient's glycemic control.  - Increase Toujeo 24 units daily  · - Continue with Humalog: give before eat meal  ? For blood sugar between 130 - 170: give 4 units  ? For blood sugar between 171 - 200: give 6 units  ? For blood sugar between 201 - 250: give 8 units    - Continue Metformin 500 mg BID   - Will check rx coverage for statin alternative, would recommend crestor 40 mg daily for smaller pill size.    - Will look into  Julien coverage option     SMBG/CGM Review:  - Current SMBG(s) elevated for goal of < 130 (fasting) and < 180 (post prandial)  - Reviewed and provided resource discussing s/sx of hypoglycemia and management  - Bring glucometer/log/ to all future visits    Nutrition/Lifestyle Modifications:  - Pt is good baseline understanding of the role of carbohydrates & her glycemic control. Was able to recall recommended carb allowance per meal/snack. Recognize her IBS/constipation have been challenging her in meal planning  - Recommend ~30 minutes consistent, moderately intensive, exercise/day or ~150 minutes/week. Start small, stay consistent, and increase length and types of exercise, as tolerated. - Discuss goal is to reduce at least 7% of current total body weight.   - Reviewed pt about reading nutrition labels w/ a focus on carbohydrate/protein/sugar/fiber/fat content.  - Recommend moderating and diversifying carbohydrate intake to max of~45-60 grams/meal and 15 grams/snack ; at least 1 serving of protein/meal.  Reviewed low-carb alternatives to existing food choices. - Provided ADA resources on 1500 calories diet, nutrition label, food groups to assist Pt w/ decision making/meal planning. Established patient-centered goal(s) to follow up on at the next visit:    - Resume going to the pool 2-3x/week    Medication reconciliation was completed during the visit. Medications Discontinued During This Encounter   Medication Reason    methocarbamol (ROBAXIN) 500 mg tablet Therapy Completed    terbinafine HCl (LAMISIL) 250 mg tablet Therapy Completed    TOUJEO SOLOSTAR U-300 INSULIN 300 unit/mL (1.5 mL) inpn pen        Patient verbalized understanding of the information presented and all of the patients questions were answered. AVS was handed to the patient. Patient advised to call the office with any additional questions or concerns. Notifications of recommendations will be sent to Dr. Edin Diaz DO for review. Patient will return to clinic in 2 week(s) for follow up.      Thank you for the consult,  Tavia Oliveros, PharmD, CDE

## 2019-07-01 ENCOUNTER — TELEPHONE (OUTPATIENT)
Dept: INTERNAL MEDICINE CLINIC | Age: 64
End: 2019-07-01

## 2019-07-01 DIAGNOSIS — B35.1 ONYCHOMYCOSIS: Primary | ICD-10-CM

## 2019-07-01 DIAGNOSIS — E03.9 ACQUIRED HYPOTHYROIDISM: ICD-10-CM

## 2019-07-01 RX ORDER — TERBINAFINE HYDROCHLORIDE 250 MG/1
250 TABLET ORAL DAILY
Qty: 30 TAB | Refills: 1 | Status: SHIPPED | OUTPATIENT
Start: 2019-07-01 | End: 2019-09-03 | Stop reason: SDUPTHER

## 2019-07-01 NOTE — TELEPHONE ENCOUNTER
Pharmacy Progress Note - Telephone Encounter    S/O: Ms. Bravo Poag 75 y.o. female contacted office/me via an inbound telephone call to discuss her Lamisil refill today. Verified patients identifiers (name & ) per HIPAA policy.     - States refill approval requesting during PCP visit was delayed until her liver function was checked. Lab Results   Component Value Date/Time    Sodium 140 2019 08:50 AM    Potassium 4.4 2019 08:50 AM    Chloride 106 2019 08:50 AM    CO2 24 2019 08:50 AM    Anion gap 6 01/10/2019 08:37 AM    Glucose 100 (H) 2019 08:50 AM    BUN 19 2019 08:50 AM    Creatinine 0.93 2019 08:50 AM    BUN/Creatinine ratio 20 2019 08:50 AM    GFR est AA 74 2019 08:50 AM    GFR est non-AA 64 2019 08:50 AM    Calcium 9.7 2019 08:50 AM    Bilirubin, total 0.3 2019 08:50 AM    AST (SGOT) 13 2019 08:50 AM    Alk. phosphatase 85 2019 08:50 AM    Protein, total 6.6 2019 08:50 AM    Albumin 4.1 2019 08:50 AM    Globulin 3.8 01/10/2019 08:37 AM    A-G Ratio 1.6 2019 08:50 AM    ALT (SGPT) 19 2019 08:50 AM     Estimated Creatinine Clearance: 66.9 mL/min (based on SCr of 0.93 mg/dL). A/P:  - LFTs remain within range. - Will send in Lamisil 250 mg PO daily. Qty #30 w/ 1 refill.   VORB per Dr. Jose Martin Tomas   - Recheck labs in 6-8 weeks    Thank you,  Tavia Chirinos, PharmD, CDE

## 2019-07-01 NOTE — TELEPHONE ENCOUNTER
Pharmacy Progress Note - Telephone Call    Ms. Melinda Bowens 75 y.o. was contacted via an outbound telephone call regarding her Lamisil refill request today. A voicemail was left for patient to return my call.        Thank you,  Tavia Calzada, PharmD, CDE

## 2019-07-05 ENCOUNTER — HOSPITAL ENCOUNTER (OUTPATIENT)
Age: 64
Setting detail: OUTPATIENT SURGERY
Discharge: HOME | End: 2019-07-05
Attending: SPECIALIST | Admitting: SPECIALIST
Payer: MEDICARE

## 2019-07-05 ENCOUNTER — ANESTHESIA (OUTPATIENT)
Dept: ENDOSCOPY | Age: 64
End: 2019-07-05
Payer: MEDICARE

## 2019-07-05 ENCOUNTER — ANESTHESIA EVENT (OUTPATIENT)
Dept: ENDOSCOPY | Age: 64
End: 2019-07-05
Payer: MEDICARE

## 2019-07-05 VITALS
BODY MASS INDEX: 32.97 KG/M2 | SYSTOLIC BLOOD PRESSURE: 158 MMHG | TEMPERATURE: 97.6 F | HEART RATE: 75 BPM | HEIGHT: 66 IN | WEIGHT: 205.13 LBS | RESPIRATION RATE: 17 BRPM | OXYGEN SATURATION: 96 % | DIASTOLIC BLOOD PRESSURE: 78 MMHG

## 2019-07-05 LAB
GLUCOSE BLD STRIP.AUTO-MCNC: 156 MG/DL (ref 65–100)
SERVICE CMNT-IMP: ABNORMAL

## 2019-07-05 PROCEDURE — 74011250636 HC RX REV CODE- 250/636

## 2019-07-05 PROCEDURE — 76040000019: Performed by: SPECIALIST

## 2019-07-05 PROCEDURE — 74011250636 HC RX REV CODE- 250/636: Performed by: SPECIALIST

## 2019-07-05 PROCEDURE — 76060000031 HC ANESTHESIA FIRST 0.5 HR: Performed by: SPECIALIST

## 2019-07-05 PROCEDURE — 88305 TISSUE EXAM BY PATHOLOGIST: CPT

## 2019-07-05 PROCEDURE — 77030019988 HC FCPS ENDOSC DISP BSC -B: Performed by: SPECIALIST

## 2019-07-05 PROCEDURE — 82962 GLUCOSE BLOOD TEST: CPT

## 2019-07-05 RX ORDER — LIDOCAINE HYDROCHLORIDE 20 MG/ML
INJECTION, SOLUTION EPIDURAL; INFILTRATION; INTRACAUDAL; PERINEURAL AS NEEDED
Status: DISCONTINUED | OUTPATIENT
Start: 2019-07-05 | End: 2019-07-05 | Stop reason: HOSPADM

## 2019-07-05 RX ORDER — SODIUM CHLORIDE 0.9 % (FLUSH) 0.9 %
5-40 SYRINGE (ML) INJECTION EVERY 8 HOURS
Status: DISCONTINUED | OUTPATIENT
Start: 2019-07-05 | End: 2019-07-05 | Stop reason: HOSPADM

## 2019-07-05 RX ORDER — PROPOFOL 10 MG/ML
INJECTION, EMULSION INTRAVENOUS AS NEEDED
Status: DISCONTINUED | OUTPATIENT
Start: 2019-07-05 | End: 2019-07-05 | Stop reason: HOSPADM

## 2019-07-05 RX ORDER — DEXTROMETHORPHAN/PSEUDOEPHED 2.5-7.5/.8
1.2 DROPS ORAL
Status: DISCONTINUED | OUTPATIENT
Start: 2019-07-05 | End: 2019-07-05 | Stop reason: HOSPADM

## 2019-07-05 RX ORDER — SODIUM CHLORIDE 0.9 % (FLUSH) 0.9 %
5-40 SYRINGE (ML) INJECTION AS NEEDED
Status: DISCONTINUED | OUTPATIENT
Start: 2019-07-05 | End: 2019-07-05 | Stop reason: HOSPADM

## 2019-07-05 RX ORDER — EPINEPHRINE 0.1 MG/ML
1 INJECTION INTRACARDIAC; INTRAVENOUS
Status: DISCONTINUED | OUTPATIENT
Start: 2019-07-05 | End: 2019-07-05 | Stop reason: HOSPADM

## 2019-07-05 RX ORDER — ATROPINE SULFATE 0.1 MG/ML
0.5 INJECTION INTRAVENOUS
Status: DISCONTINUED | OUTPATIENT
Start: 2019-07-05 | End: 2019-07-05 | Stop reason: HOSPADM

## 2019-07-05 RX ORDER — OMEPRAZOLE 20 MG/1
20 CAPSULE, DELAYED RELEASE ORAL DAILY
Qty: 30 CAP | Refills: 3 | Status: SHIPPED | OUTPATIENT
Start: 2019-07-05 | End: 2019-09-03 | Stop reason: ALTCHOICE

## 2019-07-05 RX ORDER — FLUMAZENIL 0.1 MG/ML
0.2 INJECTION INTRAVENOUS
Status: DISCONTINUED | OUTPATIENT
Start: 2019-07-05 | End: 2019-07-05 | Stop reason: HOSPADM

## 2019-07-05 RX ORDER — SODIUM CHLORIDE 9 MG/ML
50 INJECTION, SOLUTION INTRAVENOUS CONTINUOUS
Status: DISCONTINUED | OUTPATIENT
Start: 2019-07-05 | End: 2019-07-05 | Stop reason: HOSPADM

## 2019-07-05 RX ADMIN — PROPOFOL 50 MG: 10 INJECTION, EMULSION INTRAVENOUS at 09:01

## 2019-07-05 RX ADMIN — SODIUM CHLORIDE 50 ML/HR: 900 INJECTION, SOLUTION INTRAVENOUS at 08:06

## 2019-07-05 RX ADMIN — PROPOFOL 50 MG: 10 INJECTION, EMULSION INTRAVENOUS at 09:04

## 2019-07-05 RX ADMIN — LIDOCAINE HYDROCHLORIDE 100 MG: 20 INJECTION, SOLUTION EPIDURAL; INFILTRATION; INTRACAUDAL; PERINEURAL at 09:01

## 2019-07-05 RX ADMIN — PROPOFOL 50 MG: 10 INJECTION, EMULSION INTRAVENOUS at 09:07

## 2019-07-05 NOTE — H&P
Gastroenterology Outpatient History and Physical    Patient: Mavis Green    Physician: Denise Peter MD    Vital Signs: Blood pressure 138/66, pulse 82, temperature 98 °F (36.7 °C), resp. rate 12, height 5' 5.5\" (1.664 m), weight 93 kg (205 lb 2 oz), SpO2 98 %. Allergies: Allergies   Allergen Reactions    Celebrex [Celecoxib] Other (comments)     Hallucinations    Codeine Nausea and Vomiting    Erythromycin Nausea and Vomiting       Chief Complaint: Dysphagia    History of Present Illness: 74 yo F for EGD for dysphagia.     Justification for Procedure: above    History:  Past Medical History:   Diagnosis Date    Depression     Diabetes ([facility]     Hypercholesteremia     Hypertension     Hyperthyroidism     IBS (irritable bowel syndrome)     Urinary incontinence       Past Surgical History:   Procedure Laterality Date    HX BACK SURGERY      x2    HX BLEPHAROPLASTY Right     HX BUNIONECTOMY      HX HYSTERECTOMY      HX OTHER SURGICAL      Collapsed lung    HX WISDOM TEETH EXTRACTION        Social History     Socioeconomic History    Marital status: SINGLE     Spouse name: Not on file    Number of children: Not on file    Years of education: Not on file    Highest education level: Not on file   Tobacco Use    Smoking status: Never Smoker    Smokeless tobacco: Never Used   Substance and Sexual Activity    Alcohol use: Yes     Frequency: 2-3 times a week     Comment: occasionally    Drug use: No    Sexual activity: Not Currently      Family History   Problem Relation Age of Onset    Diabetes Mother     Heart Disease Mother     Cancer Father         pancreatic    Diabetes Sister    Kenyshazia Jesús Anxiety Sister     Diabetes Brother     Anxiety Brother     Diabetes Brother     Anxiety Brother     Diabetes Sister     Anxiety Sister     Diabetes Sister     Anxiety Sister     Cancer Sister         lung and brain    Anxiety Sister     Anxiety Sister     Breast Cancer Paternal Aunt under 50       Medications:   Prior to Admission medications    Medication Sig Start Date End Date Taking? Authorizing Provider   terbinafine HCl (LAMISIL) 250 mg tablet Take 1 Tab by mouth daily. 7/1/19  Yes Soham Cameron III, DO   calcium polycarbophil (FIBER LAXATIVE, CA POLYCARBO,) 625 mg tablet Take 625 mg by mouth daily. Yes Provider, Historical   multivitamin (ONE A DAY) tablet Take 1 Tab by mouth daily. Yes Provider, Historical   TOUJEO SOLOSTAR U-300 INSULIN 300 unit/mL (1.5 mL) inpn pen 24 Units by SubCUTAneous route daily. 6/27/19  Yes Soham Cameron III, DO   levothyroxine (SYNTHROID) 300 mcg tablet Take 1.5 Tabs by mouth Daily (before breakfast). 6/24/19  Yes Soham Cameron III, DO   HUMALOG KWIKPEN INSULIN 100 unit/mL kwikpen by SubCUTAneous route Before breakfast, lunch, and dinner. Sliding scale 4-6 units TID before meals 6/12/19  Yes Provider, Historical   trospium (SANCTURA) 20 mg tablet Take 20 mg by mouth Before breakfast and dinner. 6/18/19  Yes Provider, Historical   linaclotide (LINZESS) 290 mcg cap capsule Take 290 mcg by mouth Daily (before breakfast). Yes Provider, Historical   ALPRAZolam (XANAX) 0.25 mg tablet Take 1 Tab by mouth daily as needed for Anxiety. Indications: anxious 6/20/19  Yes Soham Cameron III, DO   buPROPion XL (WELLBUTRIN XL) 150 mg tablet TAKE 1 TABLET EVERY MORNING 4/21/19  Yes Soham Cameron III, DO   meloxicam (MOBIC) 7.5 mg tablet Take 2 Tabs by mouth daily. 11/19/18 11/19/19 Yes Soham Cameron III, DO   lisinopril (PRINIVIL, ZESTRIL) 5 mg tablet Take 1 Tab by mouth daily. 11/19/18  Yes Soham Cameron III, DO   atorvastatin (LIPITOR) 80 mg tablet Take 1 Tab by mouth daily. 11/19/18  Yes Soham Cameron III, DO   cholecalciferol (VITAMIN D3) 1,000 unit tablet Take 2,000 Units by mouth daily. Yes Provider, Historical   metFORMIN ER (GLUCOPHAGE XR) 500 mg tablet Take 500 mg by mouth two (2) times a day. 10/28/16  Yes Provider, Historical   NOVOFINE PLUS 32 gauge x 1/6\" ndle  1/13/17  Yes Provider, Historical   B.infantis-B.ani-B.long-B.bifi (PROBIOTIC 4X) 10-15 mg TbEC Take 2 Gum by mouth daily. Yes Other, MD Eryn   furosemide (LASIX) 20 mg tablet Take 20 mg by mouth daily as needed. 1/30/16  Yes Provider, Historical   acetaminophen (TYLENOL) 500 mg tablet Take 1 Tab by mouth every six (6) hours as needed for Pain. 1/3/19   Boby Man NP   ONETOUCH VERIO strip  12/15/15   Provider, Historical       Physical Exam:   General: alert, no distress   HEENT: Head: Normocephalic, no lesions, without obvious abnormality.    Heart: regular rate and rhythm, S1, S2 normal, no murmur, click, rub or gallop   Lungs: chest clear, no wheezing, rales, normal symmetric air entry   Abdominal: soft,NT/ND + BS   Neurological: Grossly normal   Extremities: extremities normal, atraumatic, no cyanosis or edema     Findings/Diagnosis: Dysphagia    Plan of Care/Planned Procedure: EGD

## 2019-07-05 NOTE — ANESTHESIA POSTPROCEDURE EVALUATION
Procedure(s):  ESOPHAGOGASTRODUODENOSCOPY (EGD)  ESOPHAGOGASTRODUODENAL (EGD) BIOPSY  ESOPHAGEAL DILATION. total IV anesthesia    Anesthesia Post Evaluation        Patient location during evaluation: PACU  Note status: Adequate. Level of consciousness: responsive to verbal stimuli and sleepy but conscious  Pain management: satisfactory to patient  Airway patency: patent  Anesthetic complications: no  Cardiovascular status: acceptable  Respiratory status: acceptable  Hydration status: acceptable  Comments: +Post-Anesthesia Evaluation and Assessment    Patient: Mitchell Alcantar MRN: [id number]  SSN: [id number]   YOB: 1955  Age: 75 y.o. Sex: female      Cardiovascular Function/Vital Signs    /78   Pulse 87   Temp 36.4 °C (97.6 °F)   Resp 14   Ht 5' 5.5\" (1.664 m)   Wt 93 kg (205 lb 2 oz)   SpO2 96%   BMI 33.62 kg/m²     Patient is status post Procedure(s):  ESOPHAGOGASTRODUODENOSCOPY (EGD)  ESOPHAGOGASTRODUODENAL (EGD) BIOPSY  ESOPHAGEAL DILATION. Nausea/Vomiting: Controlled. Postoperative hydration reviewed and adequate. Pain:  Pain Scale 1: Numeric (0 - 10) (07/05/19 0932)  Pain Intensity 1: 0 (07/05/19 0932)   Managed. Neurological Status: At baseline. Mental Status and Level of Consciousness: Arousable. Pulmonary Status:   O2 Device: Room air (07/05/19 5951)   Adequate oxygenation and airway patent. Complications related to anesthesia: None    Post-anesthesia assessment completed. No concerns. Signed By: Shreya Peña DO    7/5/2019  Post anesthesia nausea and vomiting:  controlled      Vitals Value Taken Time   /78 7/5/2019  9:44 AM   Temp 36.4 °C (97.6 °F) 7/5/2019  9:32 AM   Pulse 78 7/5/2019  9:45 AM   Resp 14 7/5/2019  9:45 AM   SpO2 96 % 7/5/2019  9:45 AM   Vitals shown include unvalidated device data.

## 2019-07-05 NOTE — ANESTHESIA PREPROCEDURE EVALUATION
Relevant Problems   No relevant active problems       Anesthetic History   No history of anesthetic complications            Review of Systems / Medical History  Patient summary reviewed, nursing notes reviewed and pertinent labs reviewed    Pulmonary  Within defined limits                 Neuro/Psych         Psychiatric history (depression)     Cardiovascular    Hypertension          Hyperlipidemia    Exercise tolerance: >4 METS  Comments: 2-2018 EKG:  SR   GI/Hepatic/Renal               Comments: IBS    Normal modified Barium Swallow 5-21-19 Endo/Other    Diabetes: using insulin  Hypothyroidism  Obesity     Other Findings   Comments: Hx of collapsed lung           Physical Exam    Airway  Mallampati: II  TM Distance: 4 - 6 cm  Neck ROM: normal range of motion   Mouth opening: Normal     Cardiovascular  Regular rate and rhythm,  S1 and S2 normal,  no murmur, click, rub, or gallop             Dental    Dentition: Bridges     Pulmonary  Breath sounds clear to auscultation               Abdominal  GI exam deferred       Other Findings            Anesthetic Plan    ASA: 3  Anesthesia type: total IV anesthesia          Induction: Intravenous  Anesthetic plan and risks discussed with: Patient

## 2019-07-05 NOTE — DISCHARGE INSTRUCTIONS
Basil Gutierrez  [id number]  1955    EGD DISCHARGE INSTRUCTIONS  Discomfort:  Sore throat- throat lozenges or warm salt water gargle  redness at IV site- apply warm compress to area; if redness or soreness persist- contact your physician  Gaseous discomfort- walking, belching will help relieve any discomfort  You may not operate a vehicle for 12 hours  You may not engage in an occupation involving machinery or appliances for rest of today. You may not drink alcoholic beverages for at least 12 hours  Avoid making any critical decisions for at least 24 hour  DIET  You may resume your regular diet - however -  remember your colon is empty and a heavy meal will produce gas. Avoid these foods:  vegetables, fried / greasy foods, carbonated drinks  MEDICATIONS        Regarding Aspirin or Nonsteroidal medications specifically, please see below. ACTIVITY  You may resume your normal daily activities. Spend the remainder of the day resting -  avoid any strenuous activity. CALL M.D. ANY SIGN OF   Increasing pain, nausea, vomiting  Abdominal distension (swelling)  New increased bleeding (oral or rectal)  Fever (chills)  Pain in chest area  Bloody discharge from nose or mouth  Shortness of breath    ONLY  Tylenol as needed for pain. Follow-up Instructions:   Call Dr. Anju Browne for results of procedure / biopsy in 4-5 days at telephone #  [phone]              Take omeprazole for reflux and make a follow up visit with Dr. Anju Browne' office.

## 2019-07-05 NOTE — ROUTINE PROCESS
Viktor Martha 1955 
[id number] Situation: 
Verbal report received from: Braden Kirby RN Procedure: Procedure(s): ESOPHAGOGASTRODUODENOSCOPY (EGD) ESOPHAGOGASTRODUODENAL (EGD) BIOPSY 
ESOPHAGEAL DILATION Background: 
 
Preoperative diagnosis: IRRITABLE BOWEL SYNDROME, DYSPHAGIA Postoperative diagnosis: esophagitis :  Dr. Charity Foley Assistant(s): Endoscopy Technician-1: Severiano Agee Endoscopy RN-1: Holly Gibbons Specimens:  
ID Type Source Tests Collected by Time Destination 1 : gastric biopsy for pathology Preservative Gastric  Lionel Gomez MD 7/5/2019 9072 Pathology 2 : duodenum biopsy for pathology Preservative Duodenum  Lionel Gomez MD 7/5/2019 1193 Pathology 3 : Gastroesophageal junction (GE) biopsy for pathology Preservative GE Junction  Lionel Gomez MD 7/5/2019 1074 Pathology 4 : mid esophagus biopsy for pathology Preservative Esophagus, Mid  Lionel Gomez MD 7/5/2019 6750 Pathology H. Pylori  no Assessment: 
Intra-procedure medications Anesthesia gave intra-procedure sedation and medications, see anesthesia flow sheet yes Intravenous fluids: NS@ [facility] Vital signs stable yes Abdominal assessment: round and soft  yes Recommendation: 
Discharge patient per MD order yes. Family or Anshul Flick, friend Permission to share finding with family or friend yes

## 2019-07-05 NOTE — H&P
Gastroenterology Outpatient History and Physical    Patient: Mily Garay    Physician: Edgard Patterson MD    Vital Signs: Blood pressure 138/66, pulse 82, temperature 98 °F (36.7 °C), resp. rate 12, height 5' 5.5\" (1.664 m), weight 93 kg (205 lb 2 oz), SpO2 98 %. Allergies:    Allergies   Allergen Reactions    Celebrex [Celecoxib] Other (comments)     Hallucinations    Codeine Nausea and Vomiting    Erythromycin Nausea and Vomiting     Chief Complaint: Dysphagia    History of Present Illness: 76 yo WF for dysphagia    Justification for Procedure: above    History:  Past Medical History:   Diagnosis Date    Depression     Diabetes ([facility] [address]     Hypercholesteremia     Hypertension     Hyperthyroidism     IBS (irritable bowel syndrome)     Urinary incontinence       Past Surgical History:   Procedure Laterality Date    HX BACK SURGERY      x2    HX BLEPHAROPLASTY Right     HX BUNIONECTOMY      HX HYSTERECTOMY      HX OTHER SURGICAL      Collapsed lung    HX WISDOM TEETH EXTRACTION        Social History     Socioeconomic History    Marital status: SINGLE     Spouse name: Not on file    Number of children: Not on file    Years of education: Not on file    Highest education level: Not on file   Tobacco Use    Smoking status: Never Smoker    Smokeless tobacco: Never Used   Substance and Sexual Activity    Alcohol use: Yes     Frequency: 2-3 times a week     Comment: occasionally    Drug use: No    Sexual activity: Not Currently      Family History   Problem Relation Age of Onset    Diabetes Mother     Heart Disease Mother     Cancer Father         pancreatic    Diabetes Sister    [facility] Anxiety Sister     Diabetes Brother     Anxiety Brother     Diabetes Brother     Anxiety Brother     Diabetes Sister     Anxiety Sister     Diabetes Sister     Anxiety Sister     Cancer Sister         lung and brain    Anxiety Sister     Anxiety Sister     Breast Cancer Paternal Aunt         under 48 Medications:   Prior to Admission medications    Medication Sig Start Date End Date Taking? Authorizing Provider   terbinafine HCl (LAMISIL) 250 mg tablet Take 1 Tab by mouth daily. 7/1/19  Yes Soham Cameron III, DO   calcium polycarbophil (FIBER LAXATIVE, CA POLYCARBO,) 625 mg tablet Take 625 mg by mouth daily. Yes Provider, Historical   multivitamin (ONE A DAY) tablet Take 1 Tab by mouth daily. Yes Provider, Historical   TOUJEO SOLOSTAR U-300 INSULIN 300 unit/mL (1.5 mL) inpn pen 24 Units by SubCUTAneous route daily. 6/27/19  Yes Soham Cameron III, DO   levothyroxine (SYNTHROID) 300 mcg tablet Take 1.5 Tabs by mouth Daily (before breakfast). 6/24/19  Yes Soham Cameron III, DO   HUMALOG KWIKPEN INSULIN 100 unit/mL kwikpen by SubCUTAneous route Before breakfast, lunch, and dinner. Sliding scale 4-6 units TID before meals 6/12/19  Yes Provider, Historical   trospium (SANCTURA) 20 mg tablet Take 20 mg by mouth Before breakfast and dinner. 6/18/19  Yes Provider, Historical   linaclotide (LINZESS) 290 mcg cap capsule Take 290 mcg by mouth Daily (before breakfast). Yes Provider, Historical   ALPRAZolam (XANAX) 0.25 mg tablet Take 1 Tab by mouth daily as needed for Anxiety. Indications: anxious 6/20/19  Yes Soham Cameron III, DO   buPROPion XL (WELLBUTRIN XL) 150 mg tablet TAKE 1 TABLET EVERY MORNING 4/21/19  Yes Soham Cameron III, DO   meloxicam (MOBIC) 7.5 mg tablet Take 2 Tabs by mouth daily. 11/19/18 11/19/19 Yes Soham Cameron III, DO   lisinopril (PRINIVIL, ZESTRIL) 5 mg tablet Take 1 Tab by mouth daily. 11/19/18  Yes Soham Cameron III, DO   atorvastatin (LIPITOR) 80 mg tablet Take 1 Tab by mouth daily. 11/19/18  Yes Soham Cameron III, DO   cholecalciferol (VITAMIN D3) 1,000 unit tablet Take 2,000 Units by mouth daily. Yes Provider, Historical   metFORMIN ER (GLUCOPHAGE XR) 500 mg tablet Take 500 mg by mouth two (2) times a day.  10/28/16 Yes Provider, Historical   NOVOFINE PLUS 32 gauge x 1/6\" ndle  1/13/17  Yes Provider, Historical   B.infantis-B.ani-B.long-B.bifi (PROBIOTIC 4X) 10-15 mg TbEC Take 2 Gum by mouth daily. Yes Other, MD Eryn   furosemide (LASIX) 20 mg tablet Take 20 mg by mouth daily as needed. 1/30/16  Yes Provider, Historical   acetaminophen (TYLENOL) 500 mg tablet Take 1 Tab by mouth every six (6) hours as needed for Pain. 1/3/19   Nico Rae NP   ONETOUCH VERIO strip  12/15/15   Provider, Historical       Physical Exam:   General: alert, no distress   HEENT: Head: Normocephalic, no lesions, without obvious abnormality.    Heart: regular rate and rhythm, S1, S2 normal, no murmur, click, rub or gallop   Lungs: chest clear, no wheezing, rales, normal symmetric air entry   Abdominal: soft, NT/ND+ BS   Neurological: Grossly normal   Extremities: extremities normal, atraumatic, no cyanosis or edema     Findings/Diagnosis: Dysphagia    Plan of Care/Planned Procedure: EGD with possible dilation

## 2019-07-05 NOTE — PROCEDURES
Esophagogastroduodenoscopy Procedure Note      Loli Palomo  1955  [id number]    Indication:  Dysphagia     Endoscopist: Elier Fu MD    Referring Provider:  Mago Millard DO    Sedation:  MAC anesthesia Propofol    Procedure Details:  After infomed consent was obtained for the procedure, with all risks and benefits of procedure explained the patient was taken to the endoscopy suite and placed in the left lateral decubitus position. Following sequential administration of sedation as per above, the endoscope was inserted into the mouth and advanced under direct vision to second portion of the duodenum. A careful inspection was made as the gastroscope was withdrawn, including a retroflexed view of the proximal stomach; findings and interventions are described below. Findings:     Esophagus:   + The distal esophagus showed an irregular squamocolumnar junction (Z line) located at 38 cm from the incisors that was approximately 1 cm in lengthy. S/P Bx. There was a linear erosion just above area c/w reflux esophagitis s/p Bx.  + Normal lumen s/p mid esophageal bx to r/o EoE.    + Mild resistance with scope advancement s/p dilation with 54 FR Savary over wire. Stomach:   + Mild erythema c/w gastritis s/p Bx. Duodenum:   - The bulb and post bulbar mucosa is normal in appearance to the second portion. The duodenal folds appeared normal.  Cold forceps biopsies to r/o celiac. Therapies:    esophageal dilation with savary sizes 54 FR  biopsy of esophagus  biopsy of stomach antrum  biopsy of duodenal second portion    Specimen: Specimens were collected as described and send to the laboratory. Complications:   None were encountered during the procedure. EBL:  < 10 ml.           Recommendations:   -Start Acid suppression  -F/U Path  -F/U with Dr. Roxana Lenz MD  7/5/2019  9:15 AM

## 2019-07-08 ENCOUNTER — OFFICE VISIT (OUTPATIENT)
Dept: INTERNAL MEDICINE CLINIC | Age: 64
End: 2019-07-08

## 2019-07-08 DIAGNOSIS — E11.65 TYPE 2 DIABETES MELLITUS WITH HYPERGLYCEMIA, WITH LONG-TERM CURRENT USE OF INSULIN (HCC): Primary | ICD-10-CM

## 2019-07-08 DIAGNOSIS — Z79.4 TYPE 2 DIABETES MELLITUS WITH HYPERGLYCEMIA, WITH LONG-TERM CURRENT USE OF INSULIN (HCC): Primary | ICD-10-CM

## 2019-07-08 NOTE — PROGRESS NOTES
Called to inform patient that [facility] system is not covered by Pt's Medicare plan.      Tavia Braden, JayleenD, CDE

## 2019-07-08 NOTE — PATIENT INSTRUCTIONS
· Continue Toujeo 24 units daily  · Increase Humalog to 8 units before each meal.  If your blood sugar before a meal is between 130 -170, give 6 units. · I will check in to see how much your [place] will be with your insurance.

## 2019-07-08 NOTE — PROGRESS NOTES
Pharmacy Progress Note - Diabetes Management    S/O: Ms. Sherice Hendrickson is a 75 y.o. female , referred by Dr. Elba Dumont DO, with a PMH of T2DM, HTN, HLD, Hypothyroidism, OAB, IBS, Osteoporosis, was seen today for diabetes management. Also sees Dr. Ana Oakes (endo). Patient's last A1c was 10.2% (June 2019). Interim history: S/p endoscopy w/ Dr. Elba Dumont on 7/5/19. Dx'ed with GERD and started on omeprazole 20 mg daily. Waiting on biopsy results. Reports \"still feels tired\" after this procedure. Dysphagia has improved. Denies any changes to her bowel movements. Current regimen: Toujeo 24 units daily  Metformin 500 mg BID  Humalog -   ? For blood sugar between 130 - 170: give 4 units  ? For blood sugar between 171 - 200: give 6 units  ? For blood sugar between 201 - 250: give 8 units    - Very hesitant about any increase in Humalog dose - \"worried about my sugars dropping fast\" ;  Reports to giving most 4-6 units most days    SMBG:  - Brought in glucometer for review  - Checking 2-3x/day  - Fasting today: 222    Date Before Breakfast Before Lunch Before Dinner Bedtime Notes   6/28/2019   165     6/30/2019 115  164     7/1/2019 214  162     7/2/2019 180  297     7/3/2019 299   503 Gave additional Humalog 8 units   7/4/2019  250  243    7/5/2019 229  205     7/6/2019 164 205 273     7/7/2019 207  348     7/8/2019 222       Avg 204 228 231 373      Nutrition  - Reports large meals yesterday   - Breakfast was - egg + 1 serving of hash browns + polish sausage + 2 slices of toast + watermelon  - Dinner: 1 hamburger + 1 serving pasta salad + 1 cup of watermelon  - Leaving for trip to [place] on Sunday - will not be back until the end of July    Physical Activity:  - Reports to going back to the pool 3-4 x/week      Past Medical History:   Diagnosis Date    Depression     Diabetes ([address]     Hypercholesteremia     Hypertension     Hyperthyroidism     IBS (irritable bowel syndrome)     Urinary incontinence      Allergies   Allergen Reactions    Celebrex [Celecoxib] Other (comments)     Hallucinations    Codeine Nausea and Vomiting    Erythromycin Nausea and Vomiting     Current Outpatient Medications   Medication Sig    omeprazole (PRILOSEC) 20 mg capsule Take 1 Cap by mouth daily. Indications: gastroesophageal reflux disease    terbinafine HCl (LAMISIL) 250 mg tablet Take 1 Tab by mouth daily.  calcium polycarbophil (FIBER LAXATIVE, CA POLYCARBO,) 625 mg tablet Take 625 mg by mouth daily.  multivitamin (ONE A DAY) tablet Take 1 Tab by mouth daily.  TOUJEO SOLOSTAR U-300 INSULIN 300 unit/mL (1.5 mL) inpn pen 24 Units by SubCUTAneous route daily.  levothyroxine (SYNTHROID) 300 mcg tablet Take 1.5 Tabs by mouth Daily (before breakfast).  HUMALOG KWIKPEN INSULIN 100 unit/mL kwikpen by SubCUTAneous route Before breakfast, lunch, and dinner. Sliding scale 4-6 units TID before meals    trospium (SANCTURA) 20 mg tablet Take 20 mg by mouth Before breakfast and dinner.  linaclotide (LINZESS) 290 mcg cap capsule Take 290 mcg by mouth Daily (before breakfast).  ALPRAZolam (XANAX) 0.25 mg tablet Take 1 Tab by mouth daily as needed for Anxiety. Indications: anxious    buPROPion XL (WELLBUTRIN XL) 150 mg tablet TAKE 1 TABLET EVERY MORNING    acetaminophen (TYLENOL) 500 mg tablet Take 1 Tab by mouth every six (6) hours as needed for Pain.  meloxicam (MOBIC) 7.5 mg tablet Take 2 Tabs by mouth daily.  lisinopril (PRINIVIL, ZESTRIL) 5 mg tablet Take 1 Tab by mouth daily.  atorvastatin (LIPITOR) 80 mg tablet Take 1 Tab by mouth daily.  cholecalciferol (VITAMIN D3) 1,000 unit tablet Take 2,000 Units by mouth daily.  metFORMIN ER (GLUCOPHAGE XR) 500 mg tablet Take 500 mg by mouth two (2) times a day.  NOVOFINE PLUS 32 gauge x 1/6\" ndle     B.infantis-B.ani-B.long-B.bifi (PROBIOTIC 4X) 10-15 mg TbEC Take 2 Gum by mouth daily.     ONETOUCH VERIO strip     furosemide (LASIX) 20 mg tablet Take 20 mg by mouth daily as needed. No current facility-administered medications for this visit. Wt Readings from Last 3 Encounters:   07/05/19 205 lb 2 oz (93 kg)   06/20/19 204 lb (92.5 kg)   01/10/19 210 lb (95.3 kg)     BP Readings from Last 3 Encounters:   07/05/19 158/78   06/20/19 128/73   01/10/19 158/90     Lab Results   Component Value Date/Time    Hemoglobin A1c 10.2 (H) 06/20/2019 08:50 AM    Hemoglobin A1c 11.3 (H) 11/12/2018 09:50 AM    Hemoglobin A1c 10.7 (H) 02/15/2018 11:52 AM     A/P:  - Per ADA,  A1c is not at her goal of <7%. SMBGs remain elevated for fasting and post-prandial  - Increase Humalog to 8 units before meals. If pre-meal check is between 130 - 170, decrease dose to 6 units  - Continue Toujeo 24 units and Metformin 500 mg BID    - Emphasize importance of reducing overall carb intake and consistent physical activity. - Will check BP/weight at the next visit    - Continue to check SMBG three times daily - bring meter to future visits   - Will send in [employer] system to check on Medicare Part B coverage.     - RTC in 4 weeks for diabetes management  - Pt endorsed understanding of the information provided. All questions were answered.      Thank you for the consult,  Tavia Lion, PharmD, CDE

## 2019-07-10 ENCOUNTER — TELEPHONE (OUTPATIENT)
Dept: INTERNAL MEDICINE CLINIC | Age: 64
End: 2019-07-10

## 2019-07-10 NOTE — TELEPHONE ENCOUNTER
Pharmacy Progress Note - Telephone Encounter    S/O: Ms. Douglas Cardenas 75 y.o. female, was contacted via an outbound telephone call today. Verified patients identifiers (name & ) per HIPAA policy.     - Informed pt that [facility] is not covered by her Medicare. Encourage Pt to continue checking SMBGs. - Pt informs biopsy results from recent endoscopy were benign. Dr. Debbie Escudero wants her to take an acid reducer in addition to new prilosec 20 mg therapy. Does not know which one to get. Advised patient to contact GI for clarification.    - Patient endorses understanding to the provided information. All questions were answered at this time.      Thank you,  Tavia Dai, PharmD, CDE

## 2019-07-29 ENCOUNTER — TELEPHONE (OUTPATIENT)
Dept: INTERNAL MEDICINE CLINIC | Age: 64
End: 2019-07-29

## 2019-07-29 NOTE — TELEPHONE ENCOUNTER
Pharmacy Progress Note - Telephone Encounter    S/O: Ms. Suze Kim 75 y.o. female, referred by Dr. Andrés Estrada, was contacted via an outbound telephone call to discuss her diabetes missed appt today. Verified patients identifiers (name & ) per HIPAA policy. Current regimen: Toujeo 24 units daily  Metformin 500 mg BID  Humalog to 8 units before meals. If pre-meal check is between 130 - 170, decrease dose to 6 units    - Recently got back from her [facility] vacation. Forgot about her appt today. - Reports SMBGs have improved. \"Can still be better. \"     Lab Results   Component Value Date/Time    Hemoglobin A1c 10.2 (H) 2019 08:50 AM     A/P:  - Continue w/ current regimen.   - Rescheduled visit for  @ 10:30 AM.     - Patient endorses understanding to the provided information. All questions were answered at this time.      Thank you,  Tavia Escudero, PharmD, CDE

## 2019-08-08 ENCOUNTER — OFFICE VISIT (OUTPATIENT)
Dept: INTERNAL MEDICINE CLINIC | Age: 64
End: 2019-08-08

## 2019-08-08 VITALS
HEART RATE: 94 BPM | BODY MASS INDEX: 33.49 KG/M2 | HEIGHT: 65 IN | SYSTOLIC BLOOD PRESSURE: 122 MMHG | DIASTOLIC BLOOD PRESSURE: 75 MMHG | WEIGHT: 201 LBS | OXYGEN SATURATION: 96 %

## 2019-08-08 DIAGNOSIS — E11.21 TYPE 2 DIABETES WITH NEPHROPATHY (HCC): Primary | ICD-10-CM

## 2019-08-08 RX ORDER — INSULIN LISPRO 100 [IU]/ML
4-20 INJECTION, SOLUTION INTRAVENOUS; SUBCUTANEOUS
Qty: 15 ML | Refills: 0
Start: 2019-08-08 | End: 2020-12-16

## 2019-08-08 RX ORDER — LACTULOSE 10 G/15ML
20 SOLUTION ORAL; RECTAL
COMMUNITY
End: 2020-09-16

## 2019-08-08 NOTE — PROGRESS NOTES
Pharmacy Progress Note - Diabetes Management    S/O: Ms. Vee Rousseau is a 75 y.o. female , referred by Dr. Clair Coleman DO, with a PMH of T2DM, HTN, HLD, Hypothyroidism, OAB, IBS, Osteoporosis, was seen today for diabetes management.     Also sees Dr. Michele Pablo (endo). Patient's last A1c was 10.2% (June 2019).      Interim update:   Started lactulose for constipation - taking only ~ 10 mL TID PRN. Reports this is working well for her sx. No longer on miralax. Diabetes Management:  Current anti-hyperglycemic regimen includes:    - Toujeo 24 units daily   - Humalog KwikPen - 8 units before each meals - if blood sugar is between 130 - 170 give 6 units. Pt reports using the following self adjusted scale for Humalog  - BG > 200 - give 4 units  - BG > 250 - give 8 units  - BG > 300 - give 10 units ---> reports to giving this 3-4x/week     ROS:  Endorses:  - Symptoms of Hyperglycemia: none  - Symptoms of Hypoglycemia: none today  - Reports BG spikes but then sharply decreases after drinking alcohol . Reports waking up in the middle of the night w/ low BG at least once a week. Could not report specific readings. Self Monitoring Blood Glucose (SMBG) or CGM:  - Brought in 1 of her two glucometers  - fasting today: 249  - Reports fasting (175-299)  And pre dinner range: 250-300    Date Before Breakfast Before Lunch Before Dinner   8/2/2019 314     8/7/2019   232   8/8/2019 249         Nutrition:  - Eats 2 meals/day   - Breakfast: no variance - none yesterday   - Lunch: 1 TV dinner (roast beef + mashedpotato + green peas) - (50 grams/carb)  - Dinner: last night - small hamburger (w/ bun), small fries + 1 smirnoff ice (32 grams carb/botttl  - Snack(s): endorses to eating candy, 1 bar of chocolate sarmad cracker (24 grams/carb)  - Beverage(s):  No variance  - Alcohol consumption?  Yes - endorses 1-2x/day week    Physical Activity:   Limited to swimming in her pool - still 1-2 x/week  Wt down 4 lbs since July     Other:  Brought in CBD, hemp oil solution and cream for review. Received as a \"sample. \" Interested in using them for pain and insomnia       Vitals: Wt Readings from Last 3 Encounters:   08/08/19 201 lb (91.2 kg)   07/05/19 205 lb 2 oz (93 kg)   06/20/19 204 lb (92.5 kg)     BP Readings from Last 3 Encounters:   08/08/19 122/75   07/05/19 158/78   06/20/19 128/73     Pulse Readings from Last 3 Encounters:   08/08/19 94   07/05/19 75   06/20/19 85       Past Medical History:   Diagnosis Date    Depression     Diabetes ([facility] [address]     Hypercholesteremia     Hypertension     Hyperthyroidism     IBS (irritable bowel syndrome)     Urinary incontinence      Allergies   Allergen Reactions    Celebrex [Celecoxib] Other (comments)     Hallucinations    Codeine Nausea and Vomiting    Erythromycin Nausea and Vomiting       Current Outpatient Medications   Medication Sig    HUMALOG KWIKPEN INSULIN 100 unit/mL kwikpen 4-20 Units by SubCUTAneous route Before breakfast, lunch, and dinner.  omeprazole (PRILOSEC) 20 mg capsule Take 1 Cap by mouth daily. Indications: gastroesophageal reflux disease    terbinafine HCl (LAMISIL) 250 mg tablet Take 1 Tab by mouth daily.  calcium polycarbophil (FIBER LAXATIVE, CA POLYCARBO,) 625 mg tablet Take 625 mg by mouth daily.  levothyroxine (SYNTHROID) 300 mcg tablet Take 1.5 Tabs by mouth Daily (before breakfast).  meloxicam (MOBIC) 7.5 mg tablet Take 2 Tabs by mouth daily.  lisinopril (PRINIVIL, ZESTRIL) 5 mg tablet Take 1 Tab by mouth daily.  cholecalciferol (VITAMIN D3) 1,000 unit tablet Take 2,000 Units by mouth daily.  B.infantis-B.ani-B.long-B.bifi (PROBIOTIC 4X) 10-15 mg TbEC Take 2 Gum by mouth daily.  furosemide (LASIX) 20 mg tablet Take 20 mg by mouth daily as needed.  lactulose (CHRONULAC) 10 gram/15 mL solution Take 20 g by mouth three (3) times daily as needed.  Taking 10 ml TID as needed    multivitamin (ONE A DAY) tablet Take 1 Tab by mouth daily.  TOUJEO SOLOSTAR U-300 INSULIN 300 unit/mL (1.5 mL) inpn pen 24 Units by SubCUTAneous route daily.  trospium (SANCTURA) 20 mg tablet Take 20 mg by mouth Before breakfast and dinner.  linaclotide (LINZESS) 290 mcg cap capsule Take 290 mcg by mouth Daily (before breakfast).  ALPRAZolam (XANAX) 0.25 mg tablet Take 1 Tab by mouth daily as needed for Anxiety. Indications: anxious    buPROPion XL (WELLBUTRIN XL) 150 mg tablet TAKE 1 TABLET EVERY MORNING    acetaminophen (TYLENOL) 500 mg tablet Take 1 Tab by mouth every six (6) hours as needed for Pain.  atorvastatin (LIPITOR) 80 mg tablet Take 1 Tab by mouth daily.  metFORMIN ER (GLUCOPHAGE XR) 500 mg tablet Take 500 mg by mouth two (2) times a day.  NOVOFINE PLUS 32 gauge x 1/6\" ndle     ONETOUCH VERIO strip      No current facility-administered medications for this visit.         Lab Results   Component Value Date/Time    Sodium 140 06/20/2019 08:50 AM    Potassium 4.4 06/20/2019 08:50 AM    Chloride 106 06/20/2019 08:50 AM    CO2 24 06/20/2019 08:50 AM    Anion gap 6 01/10/2019 08:37 AM    Glucose 100 (H) 06/20/2019 08:50 AM    BUN 19 06/20/2019 08:50 AM    Creatinine 0.93 06/20/2019 08:50 AM    BUN/Creatinine ratio 20 06/20/2019 08:50 AM    GFR est AA 74 06/20/2019 08:50 AM    GFR est non-AA 64 06/20/2019 08:50 AM    Calcium 9.7 06/20/2019 08:50 AM       Lab Results   Component Value Date/Time    WBC 8.3 01/10/2019 08:37 AM    HGB 12.3 01/10/2019 08:37 AM    HCT 34.9 (L) 01/10/2019 08:37 AM    PLATELET 227 (L) 52/93/4897 08:37 AM    MCV 84.9 01/10/2019 08:37 AM       Lab Results   Component Value Date/Time    Microalb/Creat ratio (ug/mg creat.) 3.3 06/20/2019 08:49 AM       HbA1c:  Lab Results   Component Value Date/Time    Hemoglobin A1c 10.2 (H) 06/20/2019 08:50 AM     No components found for: 2     Lab Results   Component Value Date/Time    Hemoglobin A1c 10.2 (H) 06/20/2019 08:50 AM    Hemoglobin A1c 11.3 (H) 11/12/2018 09:50 AM    Hemoglobin A1c 10.7 (H) 02/15/2018 11:52 AM     Estimated Creatinine Clearance: 66.4 mL/min (based on SCr of 0.93 mg/dL). A/P:    Medication Adherence/Access:  - Pt is adherent to current medication regimen. Diabetes Management:  - Per ADA guidelines, Pt's A1c is not at her goal of <7%. BP remains at goal of <130/80   - Hyperglycemia significantly influenced by her meal excursions.  - Continue with Toujeo 24 units daily. Pt unwilling to adjust Metformin regimen d/t GI concern. - Adjust Humalog to:  (need to check before meal): give before each meal   · If less than 100 - no Humalog  · 101 - 150 - give 4 units   · 151 - 180 - give 8 units   · 181 - 200 - give 12 units  · 201 - 250 - give 16 units  · Above 250 - give 20 units     SMBG/CGM Review:  - Current SMBG(s) remains elevated for fasting and post prandial goals. Reviewed these again today. - Recommend checking fasting, pre meal, pre-HS. Need to document nocturnal hypoglycemia. - Reviewed and provided resource discussing s/sx of hypoglycemia and management  - Bring glucometer/log/ to all future visits. Nutrition/Lifestyle Modifications:  - Carbs remain a significant portion of pt's meal planning. Reviewed recommended max allowance for snacks and meals.   - Discussed the effects of acute alcohol consumption on glycemic control. Discussed serving size. Other:  - Will review CBD & hemp oil samples. Discussed potential for DDI w/ current medications. Established patient-centered goal(s) to follow up on at the next visit:    Need to check and document frequency of her nocturnal hypoglycemia       Medication reconciliation was completed during the visit. Medications Discontinued During This Encounter   Medication Reason    HUMALOG KWIKPEN INSULIN 100 unit/mL kwikpen        Patient verbalized understanding of the information presented and all of the patients questions were answered. AVS was handed to the patient.  Patient advised to call the office with any additional questions or concerns. Notifications of recommendations will be sent to Dr. Tiff Rodriguez DO for review. Patient will return to clinic in 3 week(s) for follow up.      Thank you for the consult,  Tavia Shaw, PharmD, CDE

## 2019-08-08 NOTE — PATIENT INSTRUCTIONS
· Check and document when you experience a low blood sugar episode in the middle of the night. Whether or not you had an alcoholic beverage that day. · Bring your meter in to the next visit. · Continue Toujeo 24 units once a day. · For your Humalog (meal time insulin):  If your pre-meal blood sugar is between  · If less than 100 - no Humalog  · 101 - 150 - give 4 units   · 151 - 180 - give 8 units   · 181 - 200 - give 12 units  · 201 - 250 - give 16 units  · Above 250 - give 20 units

## 2019-08-27 ENCOUNTER — TELEPHONE (OUTPATIENT)
Dept: INTERNAL MEDICINE CLINIC | Age: 64
End: 2019-08-27

## 2019-08-27 NOTE — TELEPHONE ENCOUNTER
Pharmacy Progress Note - Telephone Encounter    S/O: Ms. Namita Murphy 75 y.o. female, referred by Dr. Sandoval Friday, was contacted via an outbound telephone call to discuss r/s diabetes management today. Verified patients identifiers (name & ) per HIPAA policy. Reports SMBGs Krystyna Moss doing better. \" Eating less sweets and drinking less alcohol compared to the last visit. - Fasting 91 today. Yesterday was 102. - Last dinner at HS: 300 --> Humalog 6 units before dinner ; has not needed to more than 10 units of Humalog.  - Still on Toujeo 24 units daily. A/P:  - Continue present regimen. - Appointment r/s'ed for  at 10:15 AM.   - Patient endorses understanding to the provided information. All questions were answered at this time.        Thank you,  Tavia Alicea, PharmD, CDE

## 2019-08-27 NOTE — TELEPHONE ENCOUNTER
Patient states she needs to re-schedule her cancelled appt from today, 8/27/19 as she is sick. Patient states she would like to re-schedule for something Early in the morning. Please call. Thank you

## 2019-08-29 ENCOUNTER — HOSPITAL ENCOUNTER (OUTPATIENT)
Dept: MRI IMAGING | Age: 64
Discharge: HOME | End: 2019-08-29
Attending: SPECIALIST
Payer: MEDICARE

## 2019-08-29 DIAGNOSIS — K59.04 CHRONIC IDIOPATHIC CONSTIPATION: ICD-10-CM

## 2019-08-29 DIAGNOSIS — K59.02 OUTLET DYSFUNCTION CONSTIPATION: ICD-10-CM

## 2019-08-29 PROCEDURE — 72195 MRI PELVIS W/O DYE: CPT

## 2019-09-03 ENCOUNTER — OFFICE VISIT (OUTPATIENT)
Dept: INTERNAL MEDICINE CLINIC | Age: 64
End: 2019-09-03

## 2019-09-03 DIAGNOSIS — E11.21 TYPE 2 DIABETES WITH NEPHROPATHY (HCC): Primary | ICD-10-CM

## 2019-09-03 RX ORDER — INSULIN GLARGINE 300 U/ML
26 INJECTION, SOLUTION SUBCUTANEOUS DAILY
Qty: 15 ML | Refills: 0
Start: 2019-09-03 | End: 2020-09-16 | Stop reason: DRUGHIGH

## 2019-09-03 RX ORDER — FUROSEMIDE 20 MG/1
20 TABLET ORAL
Qty: 90 TAB | Refills: 0 | Status: SHIPPED | OUTPATIENT
Start: 2019-09-03 | End: 2020-09-13

## 2019-09-03 RX ORDER — MELOXICAM 7.5 MG/1
15 TABLET ORAL DAILY
Qty: 180 TAB | Refills: 3 | Status: SHIPPED | OUTPATIENT
Start: 2019-09-03 | End: 2020-07-20 | Stop reason: ALTCHOICE

## 2019-09-03 RX ORDER — TERBINAFINE HYDROCHLORIDE 250 MG/1
250 TABLET ORAL DAILY
Qty: 30 TAB | Refills: 0 | Status: SHIPPED | OUTPATIENT
Start: 2019-09-03 | End: 2019-11-08 | Stop reason: SDUPTHER

## 2019-09-03 RX ORDER — BLOOD SUGAR DIAGNOSTIC
STRIP MISCELLANEOUS
Qty: 100 STRIP | Refills: 11 | Status: SHIPPED | OUTPATIENT
Start: 2019-09-03 | End: 2020-04-09 | Stop reason: SDUPTHER

## 2019-09-03 NOTE — PATIENT INSTRUCTIONS
· Continue Metformin 500 mg - 1 tab twice daily  · Increase Toujeo to 26 units once day    · For your meal time insulin (Humalog): Give 6 units before each meal.  If your blood sugar is above 250, give 8 units.      · Bring your meters to all future appointments

## 2019-09-03 NOTE — PROGRESS NOTES
Pharmacy Progress Note - Diabetes Management    S/O: Ms. Vee Rousseau is a 75 y.o. female , referred by Dr. Galdino Canada, DO, with a PMH of T2DM, HTN, HLD, Hypothyroidism, OAB, IBS, Osteoporosis, was seen today for diabetes management.     Also sees Dr. Anoop Villela (endo). Patient's last A1c was 10.2% (June 2019).       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 With straining there is an anterior rectocele and minimal  vaginal prolapse. \"  States she's waiting to hear back from Dr. Galdino Canada about next steps. Working on Jongla at this time. Still on Lactulose 10 mL TID - taking every other day. Still on Linzess daily. No longer on omeprazole. - Reports upcoming right eye cataract surgery w/ Dr. Maribell Lieberman ([address] [place] [address]).) on 10/21/19  - She will be out of town for the next 2 weeks.      Diabetes Management:  Current anti-hyperglycemic regimen includes:    - Toujeo Solarstar - 24 units daily ---> reports compliance to this dose   - Humalog - gives before meals  ---> states she usually give 4-6 units if BG > 200 ; max 8 units if BG > 300   · If less than 100 - no Humalog  · 101 - 150 - give 4 units   · 151 - 180 - give 8 units   · 181 - 200 - give 12 units  · 201 - 250 - give 16 units  · Above 250 - give 20 units   - Metformin 500 mg ER BID      ROS:  Endorses:  - Symptoms of Hyperglycemia: none  - Symptoms of Hypoglycemia: none    Self Monitoring Blood Glucose (SMBG) or CGM:  - Brought in home glucometer today:  yes  - she has 2 meters   - Conducts/scans: ~2-3x/day   - Fasting SMBG today: 91    Date Before Breakfast Before Lunch Before Dinner Bedtime   8/12/2019 196  154 196   8/13/2019   170 319   8/14/2019 174  161    8/15/2019 110   176   8/16/2019 123  198    8/17/2019 148   153   8/18/2019 133      8/19/2019  152     8/23/2019  246 162    8/24/2019 268   264   8/25/2019 102  174 8/26/2019 97   358   8/27/2019 91 196 105 183   8/28/2019  207 273 275   8/30/2019 223 97 182    8/31/2019   292    9/1/2019 216      9/2/2019  270     9/3/2019 91      Avg 152 195 196 241     Nutrition:  - Eats 3 meals/day ; denies significant changes to variety  - Dinner: last night - leftover hamburger + side of potato salad @ 6PM  - Snack(s): reports decreased in \"amounts of sweets\" - could not specify qty   - Beverage(s): water, diet sodas  - Alcohol consumption? Yes  - states 2 drinks in the past week - reports decreased in qty compared to previous visits    Physical Activity:   yes  - Swims in her pool - usually at least 2 days/week  - last drink was on Sunday (1 smirnoff)    Other:  - Request refills for meloxicam 7.5 mg , terbinafine 250 mg tabs and test strips    Vitals: Wt Readings from Last 3 Encounters:   08/08/19 201 lb (91.2 kg)   07/05/19 205 lb 2 oz (93 kg)   06/20/19 204 lb (92.5 kg)     BP Readings from Last 3 Encounters:   08/08/19 122/75   07/05/19 158/78   06/20/19 128/73     Pulse Readings from Last 3 Encounters:   08/08/19 94   07/05/19 75   06/20/19 85       Past Medical History:   Diagnosis Date    Depression     [facility] ([email or web address]     Hypercholesteremia     Hypertension     Hyperthyroidism     IBS (irritable bowel syndrome)     Urinary incontinence      Allergies   Allergen Reactions    Celebrex [Celecoxib] Other (comments)     Hallucinations    Codeine Nausea and Vomiting    Erythromycin Nausea and Vomiting       Current Outpatient Medications   Medication Sig    HUMALOG KWIKPEN INSULIN 100 unit/mL kwikpen 4-20 Units by SubCUTAneous route Before breakfast, lunch, and dinner.  lactulose (CHRONULAC) 10 gram/15 mL solution Take 20 g by mouth three (3) times daily as needed. Taking 10 ml TID as needed    omeprazole (PRILOSEC) 20 mg capsule Take 1 Cap by mouth daily.  Indications: gastroesophageal reflux disease    terbinafine HCl (LAMISIL) 250 mg tablet Take 1 Tab by mouth daily.    calcium polycarbophil (FIBER LAXATIVE, CA POLYCARBO,) 625 mg tablet Take 625 mg by mouth daily.  multivitamin (ONE A DAY) tablet Take 1 Tab by mouth daily.  TOUJEO SOLOSTAR U-300 INSULIN 300 unit/mL (1.5 mL) inpn pen 24 Units by SubCUTAneous route daily.  levothyroxine (SYNTHROID) 300 mcg tablet Take 1.5 Tabs by mouth Daily (before breakfast).  trospium (SANCTURA) 20 mg tablet Take 20 mg by mouth Before breakfast and dinner.  linaclotide (LINZESS) 290 mcg cap capsule Take 290 mcg by mouth Daily (before breakfast).  ALPRAZolam (XANAX) 0.25 mg tablet Take 1 Tab by mouth daily as needed for Anxiety. Indications: anxious    buPROPion XL (WELLBUTRIN XL) 150 mg tablet TAKE 1 TABLET EVERY MORNING    acetaminophen (TYLENOL) 500 mg tablet Take 1 Tab by mouth every six (6) hours as needed for Pain.  meloxicam (MOBIC) 7.5 mg tablet Take 2 Tabs by mouth daily.  lisinopril (PRINIVIL, ZESTRIL) 5 mg tablet Take 1 Tab by mouth daily.  atorvastatin (LIPITOR) 80 mg tablet Take 1 Tab by mouth daily.  cholecalciferol (VITAMIN D3) 1,000 unit tablet Take 2,000 Units by mouth daily.  metFORMIN ER (GLUCOPHAGE XR) 500 mg tablet Take 500 mg by mouth two (2) times a day.  NOVOFINE PLUS 32 gauge x 1/6\" ndle     B.infantis-B.ani-B.long-B.bifi (PROBIOTIC 4X) 10-15 mg TbEC Take 2 Gum by mouth daily.  ONETOUCH VERIO strip     furosemide (LASIX) 20 mg tablet Take 20 mg by mouth daily as needed. No current facility-administered medications for this visit.         Lab Results   Component Value Date/Time    Sodium 140 06/20/2019 08:50 AM    Potassium 4.4 06/20/2019 08:50 AM    Chloride 106 06/20/2019 08:50 AM    CO2 24 06/20/2019 08:50 AM    Anion gap 6 01/10/2019 08:37 AM    Glucose 100 (H) 06/20/2019 08:50 AM    BUN 19 06/20/2019 08:50 AM    Creatinine 0.93 06/20/2019 08:50 AM    BUN/Creatinine ratio 20 06/20/2019 08:50 AM    GFR est AA 74 06/20/2019 08:50 AM    GFR est non-AA 64 06/20/2019 08:50 AM    Calcium 9.7 06/20/2019 08:50 AM    Bilirubin, total 0.3 06/20/2019 08:50 AM    AST (SGOT) 13 06/20/2019 08:50 AM    Alk. phosphatase 85 06/20/2019 08:50 AM    Protein, total 6.6 06/20/2019 08:50 AM    Albumin 4.1 06/20/2019 08:50 AM    Globulin 3.8 01/10/2019 08:37 AM    A-G Ratio 1.6 06/20/2019 08:50 AM    ALT (SGPT) 19 06/20/2019 08:50 AM     Lab Results   Component Value Date/Time    Cholesterol, total 165 06/20/2019 08:50 AM    HDL Cholesterol 69 06/20/2019 08:50 AM    LDL, calculated 83 06/20/2019 08:50 AM    VLDL, calculated 13 06/20/2019 08:50 AM    Triglyceride 65 06/20/2019 08:50 AM     Lab Results   Component Value Date/Time    WBC 8.3 01/10/2019 08:37 AM    HGB 12.3 01/10/2019 08:37 AM    HCT 34.9 (L) 01/10/2019 08:37 AM    PLATELET 111 (L) 99/77/4699 08:37 AM    MCV 84.9 01/10/2019 08:37 AM       Lab Results   Component Value Date/Time    Microalb/Creat ratio (ug/mg creat.) 3.3 06/20/2019 08:49 AM       HbA1c:  Lab Results   Component Value Date/Time    Hemoglobin A1c 10.2 (H) 06/20/2019 08:50 AM     Lab Results   Component Value Date/Time    Hemoglobin A1c 10.2 (H) 06/20/2019 08:50 AM    Hemoglobin A1c 11.3 (H) 11/12/2018 09:50 AM    Hemoglobin A1c 10.7 (H) 02/15/2018 11:52 AM       A/P:    Medication Adherence/Access:  - Pt is not adherent to current medication regimen. Diabetes Management:  - Per ADA guidelines, Pt's A1c is not at her goal <7%. - Pt remains resistant to make any changes to metformin therapy. Fearful of GI SE. Continue with 500 mg BID.    - Increase Toujeo to 26 units daily   - She is resistant to Humalog adjustment.  Simplify Humalog dose to 6 units before each meal.  If BG > 250, give 8 units.     - Recommend A1c, TSH at the next visit   - Weight check at the next visit    SMBG/CGM Review:  - Current SMBG(s) remains elevated for goal of <130 and <180 post prandials ; reviewed goals again today  - Bring glucometer/log to all future visits  - Will send in test strip rx to mail order pharmacy per pt's request     Nutrition/Lifestyle Modifications:  - Continue to recommend decrease in alcohol intake and carb intake    Medication reconciliation was completed during the visit. Medications Discontinued During This Encounter   Medication Reason    omeprazole (PRILOSEC) 20 mg capsule Therapy Completed    ONETOUCH VERIO strip Reorder    furosemide (LASIX) 20 mg tablet Reorder    TOUJEO SOLOSTAR U-300 INSULIN 300 unit/mL (1.5 mL) inpn pen      Patient verbalized understanding of the information presented and all of the patients questions were answered. AVS was handed to the patient. Patient advised to call the office with any additional questions or concerns. Notifications of recommendations will be sent to Dr. Blanca López DO for review. Patient will return to clinic in 3 week(s) for follow up.      Thank you for the consult,  Tavia Quick, PharmD, CDE

## 2019-09-25 ENCOUNTER — OFFICE VISIT (OUTPATIENT)
Dept: INTERNAL MEDICINE CLINIC | Age: 64
End: 2019-09-25

## 2019-09-25 ENCOUNTER — HOSPITAL ENCOUNTER (OUTPATIENT)
Dept: LAB | Age: 64
Discharge: HOME | End: 2019-09-25
Payer: MEDICARE

## 2019-09-25 VITALS
SYSTOLIC BLOOD PRESSURE: 127 MMHG | HEIGHT: 65 IN | HEART RATE: 80 BPM | BODY MASS INDEX: 34.32 KG/M2 | OXYGEN SATURATION: 97 % | WEIGHT: 206 LBS | DIASTOLIC BLOOD PRESSURE: 71 MMHG

## 2019-09-25 DIAGNOSIS — E11.65 UNCONTROLLED TYPE 2 DIABETES MELLITUS WITH HYPERGLYCEMIA, WITH LONG-TERM CURRENT USE OF INSULIN (HCC): Primary | Chronic | ICD-10-CM

## 2019-09-25 DIAGNOSIS — Z79.4 UNCONTROLLED TYPE 2 DIABETES MELLITUS WITH HYPERGLYCEMIA, WITH LONG-TERM CURRENT USE OF INSULIN (HCC): Primary | Chronic | ICD-10-CM

## 2019-09-25 DIAGNOSIS — E03.9 ACQUIRED HYPOTHYROIDISM: ICD-10-CM

## 2019-09-25 PROCEDURE — 36415 COLL VENOUS BLD VENIPUNCTURE: CPT

## 2019-09-25 PROCEDURE — 80053 COMPREHEN METABOLIC PANEL: CPT

## 2019-09-25 PROCEDURE — 83036 HEMOGLOBIN GLYCOSYLATED A1C: CPT

## 2019-09-25 PROCEDURE — 84443 ASSAY THYROID STIM HORMONE: CPT

## 2019-09-25 NOTE — PATIENT INSTRUCTIONS
· Resume Metformin 500 mg tablet once a day  · Continue Toujeo to 26 units once day     · For your meal time insulin (Humalog): Give 6 units before each meal.  If your blood sugar is above 250, give 8 units.      · Bring your meters to all future appointments

## 2019-09-25 NOTE — PROGRESS NOTES
Pharmacy Progress Note - Diabetes Management    S/O: Ms. Vee Rousseau is a 75 y.o. female , referred by Dr. Grajeda Patient, DO, with a PMH of T2DM, HTN, HLD, Hypothyroidism, OAB, IBS, Osteoporosis, was seen today for diabetes management.     Also sees Dr. Nicole Massey (endo). Patient's last A1c was 10.2% (June 2019).        Interim update:   Recently returned from [facility] on Saturday. Endorses to [facility] Rousseau a lot. Did have some alcohol\"   Saw Dr. Johny Hilton about \"rectal prolapse\" last week ; still having constipation - was instructed to avoid all caffeine and artificial sweetener containing food items/beverages; keeping a daily log of bathroom trips/urinary output; will have follow up in 8 weeks.    Still needs to schedule PT appt with [facility]  Will have cataract surgery mid Oct    Medication Adherence/Access:  - Brought in home medications including prescription/non-prescriptions:  no  - Endorses barriers to affording/accessing medications : no  - Uses a pill box/other methods to organize medications: no  - Endorses adherence to current regimen?: No -----> has not been taking Metformin --\"does not like the way it makes my stomach feels\" ; feeling \"comes and goes\"     Diabetes Management:  Current anti-hyperglycemic regimen includes:    - Toujeo Solarstar 26 units daily  - Humalog - give 6 units before meals - if > 250, give 8 units   - Metformin 500 mg ER BID     ROS:  Today, Pt endorses:  - Symptoms of Hyperglycemia: none  - Symptoms of Hypoglycemia: none    Self Monitoring Blood Glucose (SMBG) or CGM:  - Brought in home glucometers today:  yes - she has 2  - Conducts/scans: 2-3x/day  - Fasting SMBG today: 162  - SMBG range:    Date Before Breakfast Before Lunch Before Dinner Bedtime   9/4/2019  102 146    9/5/2019 150  148    9/6/2019  238     9/7/2019 181  188    9/8/2019 138   236   9/9/2019 73  169    9/10/2019    196   9/11/2019 210      9/12/2019  82  207   9/13/2019  115     9/14/2019 93      9/15/2019 125  65    9/16/2019 93 114 131    9/17/2019 118  242    9/18/2019   266    9/19/2019 129      9/20/2019  268     9/21/2019 165      9/22/2019  219 136    9/24/2019  106 157    9/25/2019 162      Avg 136 156 165 213     Nutrition:  - Eats 2-3meals/day   - see note above   - Alcohol consumption? Yes    Physical Activity:   no  None recently       Vitals: Wt Readings from Last 3 Encounters:   09/25/19 206 lb (93.4 kg)   08/08/19 201 lb (91.2 kg)   07/05/19 205 lb 2 oz (93 kg)     BP Readings from Last 3 Encounters:   09/25/19 127/71   08/08/19 122/75   07/05/19 158/78     Pulse Readings from Last 3 Encounters:   09/25/19 80   08/08/19 94   07/05/19 75       Past Medical History:   Diagnosis Date    Depression     [facility] ([facility]     Hypercholesteremia     Hypertension     Hyperthyroidism     IBS (irritable bowel syndrome)     Urinary incontinence      Allergies   Allergen Reactions    Celebrex [Celecoxib] Other (comments)     Hallucinations    Codeine Nausea and Vomiting    Erythromycin Nausea and Vomiting       Current Outpatient Medications   Medication Sig    ONETOUCH VERIO strip Use to check blood sugar three times daily. E11.9    furosemide (LASIX) 20 mg tablet Take 1 Tab by mouth daily as needed (for edema).  TOUJEO SOLOSTAR U-300 INSULIN 300 unit/mL (1.5 mL) inpn pen 26 Units by SubCUTAneous route daily.  terbinafine HCl (LAMISIL) 250 mg tablet Take 1 Tab by mouth daily.  meloxicam (MOBIC) 7.5 mg tablet Take 2 Tabs by mouth daily.  HUMALOG KWIKPEN INSULIN 100 unit/mL kwikpen 4-20 Units by SubCUTAneous route Before breakfast, lunch, and dinner. (Patient taking differently: 6-8 Units by SubCUTAneous route Before breakfast, lunch, and dinner.)    calcium polycarbophil (FIBER LAXATIVE, CA POLYCARBO,) 625 mg tablet Take 625 mg by mouth daily.  multivitamin (ONE A DAY) tablet Take 1 Tab by mouth daily.     levothyroxine (SYNTHROID) 300 mcg tablet Take 1.5 Tabs by mouth Daily (before breakfast).  trospium (SANCTURA) 20 mg tablet Take 20 mg by mouth Before breakfast and dinner.  linaclotide (LINZESS) 290 mcg cap capsule Take 290 mcg by mouth Daily (before breakfast).  ALPRAZolam (XANAX) 0.25 mg tablet Take 1 Tab by mouth daily as needed for Anxiety. Indications: anxious    buPROPion XL (WELLBUTRIN XL) 150 mg tablet TAKE 1 TABLET EVERY MORNING    acetaminophen (TYLENOL) 500 mg tablet Take 1 Tab by mouth every six (6) hours as needed for Pain.  lisinopril (PRINIVIL, ZESTRIL) 5 mg tablet Take 1 Tab by mouth daily.  atorvastatin (LIPITOR) 80 mg tablet Take 1 Tab by mouth daily.  cholecalciferol (VITAMIN D3) 1,000 unit tablet Take 2,000 Units by mouth daily.  NOVOFINE PLUS 32 gauge x 1/6\" ndle     B.infantis-B.ani-B.long-B.bifi (PROBIOTIC 4X) 10-15 mg TbEC Take 2 Gum by mouth daily.  lactulose (CHRONULAC) 10 gram/15 mL solution Take 20 g by mouth three (3) times daily as needed. Taking 10 ml TID as needed ; taking every other day    metFORMIN ER (GLUCOPHAGE XR) 500 mg tablet Take 500 mg by mouth two (2) times a day. No current facility-administered medications for this visit.         Lab Results   Component Value Date/Time    Sodium 140 06/20/2019 08:50 AM    Potassium 4.4 06/20/2019 08:50 AM    Chloride 106 06/20/2019 08:50 AM    CO2 24 06/20/2019 08:50 AM    Anion gap 6 01/10/2019 08:37 AM    Glucose 100 (H) 06/20/2019 08:50 AM    BUN 19 06/20/2019 08:50 AM    Creatinine 0.93 06/20/2019 08:50 AM    BUN/Creatinine ratio 20 06/20/2019 08:50 AM    GFR est AA 74 06/20/2019 08:50 AM    GFR est non-AA 64 06/20/2019 08:50 AM    Calcium 9.7 06/20/2019 08:50 AM       Lab Results   Component Value Date/Time    Cholesterol, total 165 06/20/2019 08:50 AM    HDL Cholesterol 69 06/20/2019 08:50 AM    LDL, calculated 83 06/20/2019 08:50 AM    VLDL, calculated 13 06/20/2019 08:50 AM    Triglyceride 65 06/20/2019 08:50 AM       Lab Results   Component Value Date/Time WBC 8.3 01/10/2019 08:37 AM    HGB 12.3 01/10/2019 08:37 AM    HCT 34.9 (L) 01/10/2019 08:37 AM    PLATELET 845 (L) 56/77/7907 08:37 AM    MCV 84.9 01/10/2019 08:37 AM       Lab Results   Component Value Date/Time    Microalb/Creat ratio (ug/mg creat.) 3.3 06/20/2019 08:49 AM       HbA1c:  Lab Results   Component Value Date/Time    Hemoglobin A1c 10.2 (H) 06/20/2019 08:50 AM     No components found for: 2     Lab Results   Component Value Date/Time    Hemoglobin A1c 10.2 (H) 06/20/2019 08:50 AM    Hemoglobin A1c 11.3 (H) 11/12/2018 09:50 AM    Hemoglobin A1c 10.7 (H) 02/15/2018 11:52 AM     Estimated Creatinine Clearance: 67.3 mL/min (by C-G formula based on SCr of 0.93 mg/dL). A/P:    Medication Adherence/Access:  - Pt is not adherent to current medication regimen. Diabetes Management:  - Per ADA guidelines, Pt's A1c is not at her goal of  < 7%. BP is at goal of <130/80.   - Afternoon elevated BG driven primarily by patient's nutritional excursions. Discussed importance of limiting alcohol intake to max 1 serving/day. - Recognize patient's concern for GI intolerance w/ metformin. She is on ER dose. Metformin's previous fill date supports nonadherence. - Recommend patient resume metformin 500 mg ER at 1 tab daily with largest meal  - Continue Toujeo at 26 units daily and Humalog 6 units before each meals (give 8 units if BG > 250) for now. Anticipate we may need to adjust more. Recall patient has been very hesitant with insulin adjustments  - Will check A1c, TSH, and CMP today. Medication reconciliation was completed during the visit. There are no discontinued medications. Patient verbalized understanding of the information presented and all of the patients questions were answered. AVS was handed to the patient. Patient advised to call the office with any additional questions or concerns.     Notifications of recommendations will be sent to Dr. Clair Coleman DO for review. Patient will return to clinic in 5 week(s) for follow up.      Thank you for the consult,  Tavia Chirinos, JayleenD, CDE

## 2019-09-26 ENCOUNTER — TELEPHONE (OUTPATIENT)
Dept: INTERNAL MEDICINE CLINIC | Age: 64
End: 2019-09-26

## 2019-09-26 LAB
ALBUMIN SERPL-MCNC: 4.1 G/DL (ref 3.6–4.8)
ALBUMIN/GLOB SERPL: 2 {RATIO} (ref 1.2–2.2)
ALP SERPL-CCNC: 78 IU/L (ref 39–117)
ALT SERPL-CCNC: 13 IU/L (ref 0–32)
AST SERPL-CCNC: 7 IU/L (ref 0–40)
BILIRUB SERPL-MCNC: 0.3 MG/DL (ref 0–1.2)
BUN SERPL-MCNC: 22 MG/DL (ref 8–27)
BUN/CREAT SERPL: 23 (ref 12–28)
CALCIUM SERPL-MCNC: 9.4 MG/DL (ref 8.7–10.3)
CHLORIDE SERPL-SCNC: 104 MMOL/L (ref 96–106)
CO2 SERPL-SCNC: 24 MMOL/L (ref 20–29)
CREAT SERPL-MCNC: 0.97 MG/DL (ref 0.57–1)
EST. AVERAGE GLUCOSE BLD GHB EST-MCNC: 166 MG/DL
GLOBULIN SER CALC-MCNC: 2.1 G/DL (ref 1.5–4.5)
GLUCOSE SERPL-MCNC: 187 MG/DL (ref 65–99)
HBA1C MFR BLD: 7.4 % (ref 4.8–5.6)
POTASSIUM SERPL-SCNC: 4.6 MMOL/L (ref 3.5–5.2)
PROT SERPL-MCNC: 6.2 G/DL (ref 6–8.5)
SODIUM SERPL-SCNC: 141 MMOL/L (ref 134–144)
TSH SERPL DL<=0.005 MIU/L-ACNC: <0.006 UIU/ML (ref 0.45–4.5)

## 2019-09-26 RX ORDER — METFORMIN HYDROCHLORIDE 500 MG/1
500 TABLET, EXTENDED RELEASE ORAL 2 TIMES DAILY
Qty: 180 TAB | Refills: 11 | Status: SHIPPED | OUTPATIENT
Start: 2019-09-26 | End: 2020-10-06

## 2019-09-26 NOTE — TELEPHONE ENCOUNTER
Pharmacy Progress Note - Telephone Encounter    S/O: Ms. Sherice Hendrickson 75 y.o. female, referred by Dr. Wade Dillon, was contacted via an outbound telephone call to discuss her recent lab results today. Verified patients identifiers (name & ) per HIPAA policy. Lab Results   Component Value Date/Time    Sodium 141 2019 10:56 AM    Potassium 4.6 2019 10:56 AM    Chloride 104 2019 10:56 AM    CO2 24 2019 10:56 AM    Anion gap 6 01/10/2019 08:37 AM    Glucose 187 (H) 2019 10:56 AM    BUN 22 2019 10:56 AM    Creatinine 0.97 2019 10:56 AM    BUN/Creatinine ratio 23 2019 10:56 AM    GFR est AA 70 2019 10:56 AM    GFR est non-AA 61 2019 10:56 AM    Calcium 9.4 2019 10:56 AM    Bilirubin, total 0.3 2019 10:56 AM    AST (SGOT) 7 2019 10:56 AM    Alk. phosphatase 78 2019 10:56 AM    Protein, total 6.2 2019 10:56 AM    Albumin 4.1 2019 10:56 AM    Globulin 3.8 01/10/2019 08:37 AM    A-G Ratio 2.0 2019 10:56 AM    ALT (SGPT) 13 2019 10:56 AM     Lab Results   Component Value Date/Time    Hemoglobin A1c 7.4 (H) 2019 10:56 AM    Hemoglobin A1c 10.2 (H) 2019 08:50 AM    Hemoglobin A1c 11.3 (H) 2018 09:50 AM     Estimated Creatinine Clearance: 64.5 mL/min (based on SCr of 0.97 mg/dL). A/P:    - Discussed lab findings with patient. TSH/FT4 remains pending.   - Continue with present regimen. - Will send Metformin 500 mg ER rx to [facility] mail order per patient's request.   - Patient endorses understanding to the provided information. All questions were answered at this time.      Thank you,  Reny Reynoso, PharmD, CDE     Medications Discontinued During This Encounter   Medication Reason    metFORMIN ER (GLUCOPHAGE XR) 500 mg tablet Reorder

## 2019-09-30 ENCOUNTER — TELEPHONE (OUTPATIENT)
Dept: INTERNAL MEDICINE CLINIC | Age: 64
End: 2019-09-30

## 2019-09-30 RX ORDER — NITROFURANTOIN 25; 75 MG/1; MG/1
100 CAPSULE ORAL 2 TIMES DAILY
COMMUNITY
End: 2019-11-06 | Stop reason: ALTCHOICE

## 2019-09-30 NOTE — TELEPHONE ENCOUNTER
Pharmacy Progress Note - Telephone Encounter    S/O: Ms. Lakisha Shah 75 y.o. female contacted office/me via an inbound telephone call to discuss new abx started today today. Verified patients identifiers (name & ) per HIPAA policy. - Reports to seeing Dr. Sophie Green. Was told she had bacteria in her urine.  - Macrobid 100 mg BID x 5 days started for UTI. A/P:  - Appreciate patient's update. Will update her med list.    - Patient endorses understanding to the provided information. All questions were answered at this time.        Thank you,  Tavia Wakefield, PharmD, CDE

## 2019-10-01 ENCOUNTER — TELEPHONE (OUTPATIENT)
Dept: INTERNAL MEDICINE CLINIC | Age: 64
End: 2019-10-01

## 2019-10-01 RX ORDER — LEVOTHYROXINE SODIUM 100 UG/1
100 TABLET ORAL SEE ADMIN INSTRUCTIONS
Qty: 60 TAB | Refills: 1 | Status: SHIPPED | OUTPATIENT
Start: 2019-10-01 | End: 2020-03-05 | Stop reason: SDUPTHER

## 2019-10-01 RX ORDER — LEVOTHYROXINE SODIUM 300 UG/1
300 TABLET ORAL SEE ADMIN INSTRUCTIONS
Qty: 60 TAB | Refills: 1
Start: 2019-10-01 | End: 2020-06-25

## 2019-10-18 LAB
SPECIMEN STATUS REPORT, ROLRST: NORMAL
T4 FREE SERPL-MCNC: 2.89 NG/DL (ref 0.82–1.77)

## 2019-10-18 NOTE — PROGRESS NOTES
Thyroid dose is too high. I think we already adjusted her synthroid dose down another notch or two, but just wanted to confirm with you.

## 2019-10-28 ENCOUNTER — TELEPHONE (OUTPATIENT)
Dept: INTERNAL MEDICINE CLINIC | Age: 64
End: 2019-10-28

## 2019-10-28 NOTE — TELEPHONE ENCOUNTER
Pharmacy Progress Note - Telephone Encounter    S/O: Ms. Colt Lopez 75 y.o. female contacted office/me via an inbound telephone call to discuss her upcoming DM appointment today. Verified patients identifiers (name & ) per HIPAA policy.     - Request to r/s appt to next week d/t schedule conflicts. Has appt with Dr. Savannah Fong () that day. - Request for assistance w/ ordering new CPAP equipment - \"mine broke\"  - Reports to taking updated levothyroxine dosing w/o issues    A/P:  - Diabetes management appt rescheduled for  @ 9:15 AM. Bring in glucometer.  - Will defer to [facility] regarding CPAP equipment.   - Patient endorses understanding to the provided information. All questions were answered at this time.        Thank you,  Tavia Marrero, PharmD, CDE

## 2019-10-29 ENCOUNTER — TELEPHONE (OUTPATIENT)
Dept: INTERNAL MEDICINE CLINIC | Age: 64
End: 2019-10-29

## 2019-10-29 NOTE — TELEPHONE ENCOUNTER
Called, spoke to pt. Two identifiers confirmed. Notified pt to either contact her sleep doctor to get a new CPAP machine or call the company for replacement. Pt verbalized understanding of information discussed w/ no further questions at this time.

## 2019-10-29 NOTE — TELEPHONE ENCOUNTER
Samara, [address] [facility]             Patient return call     Caller's first and last name and relationship (if not the patient):       Best contact number(s): [phone]       Whose call is being returned: Jeremy Coronado       Details to clarify the request: Pt is returning Sarita's phone call in reference to a CPap machine.        [employer]     Message received & copied from [employer]

## 2019-11-06 ENCOUNTER — OFFICE VISIT (OUTPATIENT)
Dept: INTERNAL MEDICINE CLINIC | Age: 64
End: 2019-11-06

## 2019-11-06 VITALS
OXYGEN SATURATION: 95 % | HEIGHT: 65 IN | WEIGHT: 204 LBS | DIASTOLIC BLOOD PRESSURE: 75 MMHG | SYSTOLIC BLOOD PRESSURE: 139 MMHG | BODY MASS INDEX: 33.99 KG/M2 | HEART RATE: 91 BPM

## 2019-11-06 DIAGNOSIS — E11.21 TYPE 2 DIABETES WITH NEPHROPATHY (HCC): Primary | ICD-10-CM

## 2019-11-06 NOTE — PROGRESS NOTES
Pharmacy Progress Note - Diabetes Management    S/O: Ms. Vee Rousseau is a 75 y.o. female , referred by Dr. Raffaele Gutierrez DO, with a PMH of T2DM, HTN, HLD, Hypothyroidism, OAB, IBS, Osteoporosis, was seen today for diabetes management.   Also sees Dr. Florence Arevalo (endo). Patient's last A1c was 7.4% (Sept 2019). Current anti-hyperglycemic regimen includes:    - Toujeo 26 units daily  - Humalog - 6 units before each meals three times daily; if BG > 250, give 8 units  - Metformin 500 mg --> reports she's taking 1 tab BID now; recall she stopped therapy during last visit. ROS:  Today, Pt endorses:  - Symptoms of Hyperglycemia: fatigue  - Symptoms of Hypoglycemia: none    Self Monitoring Blood Glucose (SMBG) or CGM:  - Brought in home glucometer/blood glucose log today:  yes - has 2 glucometers  - Fasting today: 153  Date Before Breakfast Before Lunch Before Dinner Bedtime   10/16/2019    176   10/17/2019 80      10/19/2019  150     10/20/2019   100 178   10/21/2019   246    10/22/2019 141  207 169   10/23/2019 95 181 159 198   10/24/2019 122 165  140   10/25/2019  137 140 175   10/26/2019 306 108 159    10/27/2019 304 177 82    10/28/2019 113 135 144 140   10/29/2019   128    10/30/2019 111  234 192   10/31/2019  106 284    11/1/2019  171  297   11/2/2019 97 162     11/3/2019  224  194   11/4/2019  128 285 198   11/5/2019 125  222    11/6/2019 152      Avg 150 154 184 187     Nutrition/Lifestyle Modifications:  - Eats 3 meals/day   - Denies much changes to nutrition  - States she's not snacking on sweets as much  - Alcohol consumption? Yes - endorses to 2-3 beverages 2 weeks ago     - Physical Activity:   - Endorses bilateral knee pain ; taking mobic 15 mg BID   - Wt down 2 lbs today; Pt states home checks have been at 200 lbs. Vitals:   Wt Readings from Last 3 Encounters:   11/06/19 204 lb (92.5 kg)   09/25/19 206 lb (93.4 kg)   08/08/19 201 lb (91.2 kg)     BP Readings from Last 3 Encounters: 11/06/19 139/75   09/25/19 127/71   08/08/19 122/75     Pulse Readings from Last 3 Encounters:   11/06/19 91   09/25/19 80   08/08/19 94       Past Medical History:   Diagnosis Date    Depression     Diabetes (HCC)     Hypercholesteremia     Hypertension     Hyperthyroidism     IBS (irritable bowel syndrome)     Urinary incontinence      Allergies   Allergen Reactions    Celebrex [Celecoxib] Other (comments)     Hallucinations    Codeine Nausea and Vomiting    Erythromycin Nausea and Vomiting       Current Outpatient Medications   Medication Sig    levothyroxine (SYNTHROID) 100 mcg tablet Take 1 Tab by mouth See Admin Instructions. Take 1 tablet (100 mcg) by mouth daily with 300 mcg dosage strength to total 400 mcg daily.  levothyroxine (SYNTHROID) 300 mcg tablet Take 1 Tab by mouth See Admin Instructions. Take 1 tablet (100 mcg) daily with 300 mcg dosage strength to total 400 mcg daily.  nitrofurantoin, macrocrystal-monohydrate, (MACROBID) 100 mg capsule Take 100 mg by mouth two (2) times a day.  metFORMIN ER (GLUCOPHAGE XR) 500 mg tablet Take 1 Tab by mouth two (2) times a day.  ONETOUCH VERIO strip Use to check blood sugar three times daily. E11.9    furosemide (LASIX) 20 mg tablet Take 1 Tab by mouth daily as needed (for edema).  TOUJEO SOLOSTAR U-300 INSULIN 300 unit/mL (1.5 mL) inpn pen 26 Units by SubCUTAneous route daily.  terbinafine HCl (LAMISIL) 250 mg tablet Take 1 Tab by mouth daily.  meloxicam (MOBIC) 7.5 mg tablet Take 2 Tabs by mouth daily.  HUMALOG KWIKPEN INSULIN 100 unit/mL kwikpen 4-20 Units by SubCUTAneous route Before breakfast, lunch, and dinner. (Patient taking differently: 6-8 Units by SubCUTAneous route Before breakfast, lunch, and dinner.)    lactulose (CHRONULAC) 10 gram/15 mL solution Take 20 g by mouth three (3) times daily as needed.  Taking 10 ml TID as needed ; taking every other day    calcium polycarbophil (FIBER LAXATIVE, CA POLYCARBO,) 625 mg tablet Take 625 mg by mouth daily.  multivitamin (ONE A DAY) tablet Take 1 Tab by mouth daily.  trospium (SANCTURA) 20 mg tablet Take 20 mg by mouth Before breakfast and dinner.  linaclotide (LINZESS) 290 mcg cap capsule Take 290 mcg by mouth Daily (before breakfast).  ALPRAZolam (XANAX) 0.25 mg tablet Take 1 Tab by mouth daily as needed for Anxiety. Indications: anxious    buPROPion XL (WELLBUTRIN XL) 150 mg tablet TAKE 1 TABLET EVERY MORNING    acetaminophen (TYLENOL) 500 mg tablet Take 1 Tab by mouth every six (6) hours as needed for Pain.  lisinopril (PRINIVIL, ZESTRIL) 5 mg tablet Take 1 Tab by mouth daily.  atorvastatin (LIPITOR) 80 mg tablet Take 1 Tab by mouth daily.  cholecalciferol (VITAMIN D3) 1,000 unit tablet Take 2,000 Units by mouth daily.  NOVOFINE PLUS 32 gauge x 1/6\" ndle     B.infantis-B.ani-B.long-B.bifi (PROBIOTIC 4X) 10-15 mg TbEC Take 2 Gum by mouth daily. No current facility-administered medications for this visit.         Lab Results   Component Value Date/Time    Sodium 141 09/25/2019 10:56 AM    Potassium 4.6 09/25/2019 10:56 AM    Chloride 104 09/25/2019 10:56 AM    CO2 24 09/25/2019 10:56 AM    Anion gap 6 01/10/2019 08:37 AM    Glucose 187 (H) 09/25/2019 10:56 AM    BUN 22 09/25/2019 10:56 AM    Creatinine 0.97 09/25/2019 10:56 AM    BUN/Creatinine ratio 23 09/25/2019 10:56 AM    GFR est AA 70 09/25/2019 10:56 AM    GFR est non-AA 61 09/25/2019 10:56 AM    Calcium 9.4 09/25/2019 10:56 AM       Lab Results   Component Value Date/Time    Protein, total 6.2 09/25/2019 10:56 AM    Albumin 4.1 09/25/2019 10:56 AM       Lab Results   Component Value Date/Time    Cholesterol, total 165 06/20/2019 08:50 AM    HDL Cholesterol 69 06/20/2019 08:50 AM    LDL, calculated 83 06/20/2019 08:50 AM    VLDL, calculated 13 06/20/2019 08:50 AM    Triglyceride 65 06/20/2019 08:50 AM       Lab Results   Component Value Date/Time    WBC 8.3 01/10/2019 08:37 AM    HGB 12.3 01/10/2019 08:37 AM    HCT 34.9 (L) 01/10/2019 08:37 AM    PLATELET 581 (L) 61/50/4000 08:37 AM    MCV 84.9 01/10/2019 08:37 AM       Lab Results   Component Value Date/Time    Microalb/Creat ratio (ug/mg creat.) 3.3 06/20/2019 08:49 AM     HbA1c:  Lab Results   Component Value Date/Time    Hemoglobin A1c 7.4 (H) 09/25/2019 10:56 AM     Lab Results   Component Value Date/Time    Hemoglobin A1c 7.4 (H) 09/25/2019 10:56 AM    Hemoglobin A1c 10.2 (H) 06/20/2019 08:50 AM    Hemoglobin A1c 11.3 (H) 11/12/2018 09:50 AM     Estimated Creatinine Clearance: 64.1 mL/min (by C-G formula based on SCr of 0.97 mg/dL). A/P:    Diabetes Management:  - Per ADA guidelines, Pt's A1c is not at her goal of <7%. - Current SMBG fluctuations driven by nutrition variance. Continue to emphasize importance of limiting alcohol intake. - Conitnue Toujeo 26 units daily. - Continue Humalog 6 units before meals ; if BG > 250, give 8 units    - Continue with Metformin 500 mg BID   - Recommend annual flu shot. Pt wants to get at [address] Check: Vitals, Weight and HbA1c at the next visit. Medication reconciliation was completed during the visit. There are no discontinued medications. Patient verbalized understanding of the information presented and all of the patients questions were answered. AVS was handed to the patient. Patient advised to call the office with any additional questions or concerns. Notifications of recommendations will be sent to Dr. Eleno Gonzalez DO for review. Patient will return to clinic in 5 week(s) for follow up.      Thank you for the consult,  Tavia Jerome, PharmD, CDE

## 2019-11-06 NOTE — PATIENT INSTRUCTIONS
For your insulin(s):    · Toujeo is your long acting insulin. Give  26 units once daily  · Humalog is your short acting (meal time) insulin. Give  6 units before each meal, if blood sugar is above 250, give 8 units.      · Continue Metformin 500 mg - 1 tab twice daily

## 2019-11-08 RX ORDER — TERBINAFINE HYDROCHLORIDE 250 MG/1
250 TABLET ORAL DAILY
Qty: 30 TAB | Refills: 0 | Status: SHIPPED | OUTPATIENT
Start: 2019-11-08 | End: 2020-01-23 | Stop reason: SDUPTHER

## 2019-12-02 ENCOUNTER — OFFICE VISIT (OUTPATIENT)
Dept: INTERNAL MEDICINE CLINIC | Age: 64
End: 2019-12-02

## 2019-12-02 ENCOUNTER — TELEPHONE (OUTPATIENT)
Dept: INTERNAL MEDICINE CLINIC | Age: 64
End: 2019-12-02

## 2019-12-02 VITALS
SYSTOLIC BLOOD PRESSURE: 177 MMHG | WEIGHT: 211 LBS | OXYGEN SATURATION: 98 % | RESPIRATION RATE: 16 BRPM | HEIGHT: 65 IN | TEMPERATURE: 97.8 F | DIASTOLIC BLOOD PRESSURE: 91 MMHG | HEART RATE: 88 BPM | BODY MASS INDEX: 35.16 KG/M2

## 2019-12-02 DIAGNOSIS — E11.21 TYPE 2 DIABETES WITH NEPHROPATHY (HCC): ICD-10-CM

## 2019-12-02 DIAGNOSIS — I10 ESSENTIAL HYPERTENSION: ICD-10-CM

## 2019-12-02 DIAGNOSIS — Z79.4 TYPE 2 DIABETES MELLITUS WITH HYPERGLYCEMIA, WITH LONG-TERM CURRENT USE OF INSULIN (HCC): ICD-10-CM

## 2019-12-02 DIAGNOSIS — J01.10 ACUTE NON-RECURRENT FRONTAL SINUSITIS: Primary | ICD-10-CM

## 2019-12-02 DIAGNOSIS — B34.9 VIRAL ILLNESS: ICD-10-CM

## 2019-12-02 DIAGNOSIS — E11.65 TYPE 2 DIABETES MELLITUS WITH HYPERGLYCEMIA, WITH LONG-TERM CURRENT USE OF INSULIN (HCC): ICD-10-CM

## 2019-12-02 DIAGNOSIS — E66.01 SEVERE OBESITY (HCC): ICD-10-CM

## 2019-12-02 DIAGNOSIS — E03.9 ACQUIRED HYPOTHYROIDISM: ICD-10-CM

## 2019-12-02 LAB
QUICKVUE INFLUENZA TEST: NEGATIVE
VALID INTERNAL CONTROL?: YES

## 2019-12-02 NOTE — TELEPHONE ENCOUNTER
Called, spoke to pt. Two identifiers confirmed. Appointment scheduled for today @ 215 with Dr. Stefanie Zhou.   Pt verbalized understanding of information discussed w/ no further questions at this time.

## 2019-12-02 NOTE — PROGRESS NOTES
Vishnu Bunn is a 75 y.o. female who presents for evaluation of sick visit. Last seen by me June 20, 2019 in [facility]. Was feeling pretty normal over weekend, but woke up this am with headache, cough, sinus pressure and sore throat. Wanted to be checked out, scheduled to return to work on Thursday. ROS:  Constitutional: negative for fevers, chills, anorexia and weight loss  Eyes:   negative for visual disturbance and irritation  ENT:   negative for tinnitus,ear pain,hoarseness.   ++sore throat and nasal congestion  Respiratory:  negative for  hemoptysis, dyspnea,wheezing.  ++cough  CV:   negative for chest pain, palpitations, lower extremity edema  GI:   negative for nausea, vomiting, diarrhea, abdominal pain,melena  Genitourinary: negative for frequency, dysuria and hematuria  Musculoskel: negative for myalgias, arthralgias, back pain, muscle weakness, joint pain  Neurological:  negative for headaches, dizziness, focal weakness, numbness  Psychiatric:     Negative for depression or anxiety      Past Medical History:   Diagnosis Date    Depression     Diabetes ([facility] [address]     Hypercholesteremia     Hypertension     Hyperthyroidism     IBS (irritable bowel syndrome)     Urinary incontinence        Past Surgical History:   Procedure Laterality Date    HX BACK SURGERY      x2    HX BLEPHAROPLASTY Right     HX BUNIONECTOMY      HX HYSTERECTOMY      HX OTHER SURGICAL      Collapsed lung    HX WISDOM TEETH EXTRACTION         Family History   Problem Relation Age of Onset    Diabetes Mother     Heart Disease Mother     Cancer Father         pancreatic    Diabetes Sister     Anxiety Sister     Diabetes Brother     Anxiety Brother     Diabetes Brother     Anxiety Brother     Diabetes Sister     Anxiety Sister     Diabetes Sister     Anxiety Sister     Cancer Sister         lung and brain    Anxiety Sister     Anxiety Sister     Breast Cancer Paternal Aunt         under 48       Social History     Socioeconomic History    Marital status: SINGLE     Spouse name: Not on file    Number of children: Not on file    Years of education: Not on file    Highest education level: Not on file   Occupational History    Not on file   Social Needs    Financial resource strain: Not on file    Food insecurity:     Worry: Not on file     Inability: Not on file    Transportation needs:     Medical: Not on file     Non-medical: Not on file   Tobacco Use    Smoking status: Never Smoker    Smokeless tobacco: Never Used   Substance and Sexual Activity    Alcohol use: Yes     Frequency: 2-3 times a week     Comment: occasionally    Drug use: No    Sexual activity: Not Currently   Lifestyle    Physical activity:     Days per week: Not on file     Minutes per session: Not on file    Stress: Not on file   Relationships    Social connections:     Talks on phone: Not on file     Gets together: Not on file     Attends Congregation service: Not on file     Active member of club or organization: Not on file     Attends meetings of clubs or organizations: Not on file     Relationship status: Not on file    Intimate partner violence:     Fear of current or ex partner: Not on file     Emotionally abused: Not on file     Physically abused: Not on file     Forced sexual activity: Not on file   Other Topics Concern    Not on file   Social History Narrative    Not on file            Visit Vitals  BP (!) 177/91 (BP 1 Location: Left arm, BP Patient Position: Sitting)   Pulse 88   Temp 97.8 °F (36.6 °C) (Oral)   Resp 16   Ht 5' 5\" (1.651 m)   Wt 211 lb (95.7 kg)   SpO2 98%   BMI 35.11 kg/m²       Physical Examination:   General - Well appearing female  HEENT - PERRL, TM no erythema/opacification, normal nasal turbinates, no oropharyngeal erythema or exudate, MMM  Neck - supple, no bruits, no thyroidomegaly, no lymphadenopathy  Pulm - clear to auscultation bilaterally  Cardio - RRR, normal S1 S2, no murmur  Abd - soft, nontender, no masses, no HSM  Extrem - no edema, +2 distal pulses  Neuro-  No focal deficits, CN intact    Rapid flu negative. Assessment/Plan:    1. Acute viral illness--swab for flu. Supportive care otherwise. Stay hydrated. 2.  Sinusitis--add flonase or nasocort. Don't think she needs any abx  3.  dionisio--continue cpap  4. Dm, type 2--on toujeo. Last a1c down to 7.4  5. Hypothyroid--on synthroid  6. Anxiety and depression--continue wellbutrin  7.   Rectal prolapse--was supposed to start therapy this am  8.  hyperlipids--on lipitor    rtc for regular visit        Cesilia Amaya III, DO

## 2019-12-02 NOTE — PATIENT INSTRUCTIONS
Office Policies Phone calls/patient messages: Please allow up to 24 hours for someone in the office to contact you about your call or message. Be mindful your provider may be out of the office or your message may require further review. We encourage you to use [employer] for your messages as this is a faster, more efficient way to communicate with our office Medication Refills: 
         
Prescription medications require 48-72 business hours to process. We encourage you to use [employer] for your refills. For controlled medications: Please allow 72 business hours to process. Certain medications may require you to  a written prescription at our office. NO narcotic/controlled medications will be prescribed after 4pm Monday through Friday or on weekends Form/Paperwork Completion: 
         
Please note a $25 fee may incur for all paperwork for completed by our providers. We ask that you allow 7-10 business days. Pre-payment is due prior to picking up/faxing the completed form. You may also download your forms to [employer] to have your doctor print off. Viral Infections: Care Instructions Your Care Instructions You don't feel well, but it's not clear what's causing it. You may have a viral infection. Viruses cause many illnesses, such as the common cold, influenza, fever, rashes, and the diarrhea, nausea, and vomiting that are often called \"stomach flu. \" You may wonder if antibiotic medicines could make you feel better. But antibiotics only treat infections caused by bacteria. They don't work on viruses. The good news is that viral infections usually aren't serious. Most will go away in a few days without medical treatment. In the meantime, there are a few things you can do to make yourself more comfortable. Follow-up care is a key part of your treatment and safety.  Be sure to make and go to all appointments, and call your doctor if you are having problems. It's also a good idea to know your test results and keep a list of the medicines you take. How can you care for yourself at home? · Get plenty of rest if you feel tired. · Take an over-the-counter pain medicine if needed, such as acetaminophen (Tylenol), ibuprofen (Advil, Motrin), or naproxen (Aleve). Read and follow all instructions on the label. · Be careful when taking over-the-counter cold or flu medicines and Tylenol at the same time. Many of these medicines have acetaminophen, which is Tylenol. Read the labels to make sure that you are not taking more than the recommended dose. Too much acetaminophen (Tylenol) can be harmful. · Drink plenty of fluids, enough so that your urine is light yellow or clear like water. If you have kidney, heart, or liver disease and have to limit fluids, talk with your doctor before you increase the amount of fluids you drink. · Stay home from work, school, and other public places while you have a fever. When should you call for help? Call 911 anytime you think you may need emergency care. For example, call if: 
  · You have severe trouble breathing.  
  · You passed out (lost consciousness).  
 Call your doctor now or seek immediate medical care if: 
  · You seem to be getting much sicker.  
  · You have a new or higher fever.  
  · You have blood in your stools.  
  · You have new belly pain, or your pain gets worse.  
  · You have a new rash.  
 Watch closely for changes in your health, and be sure to contact your doctor if: 
  · You start to get better and then get worse.  
  · You do not get better as expected. Where can you learn more? Go to http://cira-corona.info/. Enter D078 in the search box to learn more about \"Viral Infections: Care Instructions. \" Current as of: June 9, 2019 Content Version: 12.2 © 0094-8795 [employer]. Care instructions adapted under license by [employer] (which disclaims liability or warranty for this information). If you have questions about a medical condition or this instruction, always ask your healthcare professional. [address] any warranty or liability for your use of this information. Drink plenty of fluids. Can use honey to help with sore throat. Add flonase or nasocort to help with sinus congestion.

## 2019-12-02 NOTE — TELEPHONE ENCOUNTER
#[phone] pt has ear ache, sore throat, cough (terrible)  and feels like laying in bed only. Pt wants to be seen today. Is there any way she can? Please call as soon as possible.

## 2019-12-09 ENCOUNTER — TELEPHONE (OUTPATIENT)
Dept: INTERNAL MEDICINE CLINIC | Age: 64
End: 2019-12-09

## 2019-12-09 RX ORDER — AMOXICILLIN AND CLAVULANATE POTASSIUM 875; 125 MG/1; MG/1
1 TABLET, FILM COATED ORAL EVERY 12 HOURS
Qty: 20 TAB | Refills: 0 | Status: SHIPPED | OUTPATIENT
Start: 2019-12-09 | End: 2019-12-19

## 2019-12-09 NOTE — TELEPHONE ENCOUNTER
Soham Cameron III, DO  You 6 minutes ago (12:58 PM)     Augmentin sent in. Routing comment        Pt notified.

## 2019-12-09 NOTE — TELEPHONE ENCOUNTER
----- Message from Renae Lo sent at 12/9/2019  9:02 AM EST -----  Regarding: Dr. Casrto Adalberto Message/Vendor Calls    Caller's first and last name: Patient      Reason for call: Patient is requesting to obtain a cough prescription for her viral infection. Ms. Bee Prabhakar stated that she isn't feel better.        Callback required yes/no and why:      Best contact number(s): [phone]      Details to clarify the request:      Renae Lo      Copy/paste envera

## 2019-12-20 ENCOUNTER — OFFICE VISIT (OUTPATIENT)
Dept: INTERNAL MEDICINE CLINIC | Age: 64
End: 2019-12-20

## 2019-12-20 VITALS
DIASTOLIC BLOOD PRESSURE: 82 MMHG | TEMPERATURE: 97.9 F | SYSTOLIC BLOOD PRESSURE: 123 MMHG | HEART RATE: 90 BPM | BODY MASS INDEX: 33.82 KG/M2 | OXYGEN SATURATION: 98 % | HEIGHT: 65 IN | RESPIRATION RATE: 16 BRPM | WEIGHT: 203 LBS

## 2019-12-20 DIAGNOSIS — N32.81 OAB (OVERACTIVE BLADDER): ICD-10-CM

## 2019-12-20 DIAGNOSIS — Z79.4 UNCONTROLLED TYPE 2 DIABETES MELLITUS WITH HYPERGLYCEMIA, WITH LONG-TERM CURRENT USE OF INSULIN (HCC): Primary | Chronic | ICD-10-CM

## 2019-12-20 DIAGNOSIS — M25.562 CHRONIC PAIN OF BOTH KNEES: ICD-10-CM

## 2019-12-20 DIAGNOSIS — G89.29 CHRONIC PAIN OF BOTH KNEES: ICD-10-CM

## 2019-12-20 DIAGNOSIS — E11.21 TYPE 2 DIABETES WITH NEPHROPATHY (HCC): ICD-10-CM

## 2019-12-20 DIAGNOSIS — Z79.4 TYPE 2 DIABETES MELLITUS WITH HYPERGLYCEMIA, WITH LONG-TERM CURRENT USE OF INSULIN (HCC): ICD-10-CM

## 2019-12-20 DIAGNOSIS — E11.65 TYPE 2 DIABETES MELLITUS WITH HYPERGLYCEMIA, WITH LONG-TERM CURRENT USE OF INSULIN (HCC): ICD-10-CM

## 2019-12-20 DIAGNOSIS — M25.561 CHRONIC PAIN OF BOTH KNEES: ICD-10-CM

## 2019-12-20 DIAGNOSIS — E03.9 ACQUIRED HYPOTHYROIDISM: ICD-10-CM

## 2019-12-20 DIAGNOSIS — G47.33 OSA (OBSTRUCTIVE SLEEP APNEA): ICD-10-CM

## 2019-12-20 DIAGNOSIS — I10 ESSENTIAL HYPERTENSION: ICD-10-CM

## 2019-12-20 DIAGNOSIS — E11.65 UNCONTROLLED TYPE 2 DIABETES MELLITUS WITH HYPERGLYCEMIA, WITH LONG-TERM CURRENT USE OF INSULIN (HCC): Primary | Chronic | ICD-10-CM

## 2019-12-20 LAB — HBA1C MFR BLD HPLC: 7.2 %

## 2019-12-20 RX ORDER — PEN NEEDLE, DIABETIC 32 GX 1/6"
NEEDLE, DISPOSABLE MISCELLANEOUS
Qty: 300 PEN NEEDLE | Refills: 3 | Status: SHIPPED | OUTPATIENT
Start: 2019-12-20

## 2019-12-20 NOTE — PROGRESS NOTES
Identified pt with two pt identifiers(name and ). Reviewed record in preparation for visit and have obtained necessary documentation. Chief Complaint   Patient presents with    Hypertension    Diabetes    Cholesterol Problem      Visit Vitals  /82 (BP 1 Location: Left arm, BP Patient Position: Sitting)   Pulse 90   Temp 97.9 °F (36.6 °C) (Oral)   Resp 16   Ht 5' 5\" (1.651 m)   Wt 203 lb (92.1 kg)   SpO2 98%   BMI 33.78 kg/m²       Pain Scale: 0 - No pain/10  Pain Location:     Health Maintenance Due   Topic    EYE EXAM RETINAL OR DILATED     Shingrix Vaccine Age 50> (1 of 2)    GLAUCOMA SCREENING Q2Y     FOOT EXAM Q1        Coordination of Care Questionnaire:  :   1) Have you been to an emergency room, urgent care, or hospitalized since your last visit? If yes, where when, and reason for visit? no       2. Have seen or consulted any other health care provider since your last visit? If yes, where when, and reason for visit? NO      3) Do you have an Advanced Directive/ Living Will in place? NO  If yes, do we have a copy on file NO  If no, would you like information NO    Patient is accompanied by self I have received verbal consent from Samira Levine to discuss any/all medical information while they are present in the room.

## 2019-12-20 NOTE — PROGRESS NOTES
Aniya Kumar is a 75 y.o. female who presents for evaluation of routine follow up. Last seen by me dec 2, 2019 in sick visit. Eventually got better, called in augmentin about a week later. Still with mild cough, but markedly improved. Weight down 8 lbs. Both knees have been hurting her, no falls though. ROS:  Constitutional: negative for fevers, chills, anorexia and weight loss  Eyes:   negative for visual disturbance and irritation  ENT:   negative for tinnitus,sore throat,nasal congestion,ear pain,hoarseness  Respiratory:  negative for cough, hemoptysis, dyspnea,wheezing  CV:   negative for chest pain, palpitations, lower extremity edema  GI:   negative for nausea, vomiting, diarrhea, abdominal pain,melena  Genitourinary: negative for frequency, dysuria and hematuria  Musculoskel: negative for myalgias, arthralgias, back pain, muscle weakness.   ++both knees joint pain  Neurological:  negative for headaches, dizziness, focal weakness, numbness  Psychiatric:     Negative for depression or anxiety      Past Medical History:   Diagnosis Date    Depression     [facility] ([facility] [address]     Hypercholesteremia     Hypertension     Hyperthyroidism     IBS (irritable bowel syndrome)     Urinary incontinence        Past Surgical History:   Procedure Laterality Date    HX BACK SURGERY      x2    HX BLEPHAROPLASTY Right     HX BUNIONECTOMY      HX HYSTERECTOMY      HX OTHER SURGICAL      Collapsed lung    HX WISDOM TEETH EXTRACTION         Family History   Problem Relation Age of Onset    Diabetes Mother     Heart Disease Mother     Cancer Father         pancreatic    Diabetes Sister     Anxiety Sister     Diabetes Brother     Anxiety Brother     Diabetes Brother     Anxiety Brother     Diabetes Sister     Anxiety Sister     Diabetes Sister     Anxiety Sister     Cancer Sister         lung and brain    Anxiety Sister     Anxiety Sister     Breast Cancer Paternal Aunt         under 48 Social History     Socioeconomic History    Marital status: SINGLE     Spouse name: Not on file    Number of children: Not on file    Years of education: Not on file    Highest education level: Not on file   Occupational History    Not on file   Social Needs    Financial resource strain: Not on file    Food insecurity:     Worry: Not on file     Inability: Not on file    Transportation needs:     Medical: Not on file     Non-medical: Not on file   Tobacco Use    Smoking status: Never Smoker    Smokeless tobacco: Never Used   Substance and Sexual Activity    Alcohol use: Yes     Frequency: 2-3 times a week     Comment: occasionally    Drug use: No    Sexual activity: Not Currently   Lifestyle    Physical activity:     Days per week: Not on file     Minutes per session: Not on file    Stress: Not on file   Relationships    Social connections:     Talks on phone: Not on file     Gets together: Not on file     Attends Scientologist service: Not on file     Active member of club or organization: Not on file     Attends meetings of clubs or organizations: Not on file     Relationship status: Not on file    Intimate partner violence:     Fear of current or ex partner: Not on file     Emotionally abused: Not on file     Physically abused: Not on file     Forced sexual activity: Not on file   Other Topics Concern    Not on file   Social History Narrative    Not on file            Visit Vitals  /82 (BP 1 Location: Left arm, BP Patient Position: Sitting)   Pulse 90   Temp 97.9 °F (36.6 °C) (Oral)   Resp 16   Ht 5' 5\" (1.651 m)   Wt 203 lb (92.1 kg)   SpO2 98%   BMI 33.78 kg/m²       Physical Examination:   General - Well appearing female  HEENT - PERRL, TM no erythema/opacification, normal nasal turbinates, no oropharyngeal erythema or exudate, MMM  Neck - supple, no bruits, no thyroidomegaly, no lymphadenopathy  Pulm - clear to auscultation bilaterally  Cardio - RRR, normal S1 S2, no murmur  Abd - soft, nontender, no masses, no HSM  Extrem - no edema, +2 distal pulses  Neuro-  No focal deficits, CN intact     Assessment/Plan:    1. Bilateral knee pain--suspect oa. Referral to dr Janki Witt. 2.  dionisio--has not had cpap in years. Referral to pulmonary  3. Uncontrolled dm, type 2--a1c today down to 7.2. Continue with glucophage, toujeo and ssi humalog. Has been working with dr glynn, our pharmd, and has made great progress. 4.  htn--controlled with lisinopril  5.  hyperlipids--on lipitor  6. Hypothyroid--on synthroid  7. Anxiety and depression--continue wellbutrin  8. Rectal prolapse    Had eye exam with dr dede hoffman.   rtc 6 months        Tasneem Ortiz III, DO

## 2019-12-27 ENCOUNTER — TELEPHONE (OUTPATIENT)
Dept: INTERNAL MEDICINE CLINIC | Age: 64
End: 2019-12-27

## 2019-12-27 ENCOUNTER — CLINICAL SUPPORT (OUTPATIENT)
Dept: INTERNAL MEDICINE CLINIC | Age: 64
End: 2019-12-27

## 2019-12-27 ENCOUNTER — HOSPITAL ENCOUNTER (OUTPATIENT)
Dept: LAB | Age: 64
Discharge: HOME | End: 2019-12-27
Payer: MEDICARE

## 2019-12-27 DIAGNOSIS — R35.0 URINE FREQUENCY: Primary | ICD-10-CM

## 2019-12-27 LAB
BILIRUB UR QL STRIP: NORMAL
GLUCOSE UR-MCNC: NORMAL MG/DL
KETONES P FAST UR STRIP-MCNC: NORMAL MG/DL
PH UR STRIP: 5 [PH] (ref 4.6–8)
PROT UR QL STRIP: NORMAL
SP GR UR STRIP: 1.02 (ref 1–1.03)
UA UROBILINOGEN AMB POC: NORMAL (ref 0.2–1)
URINALYSIS CLARITY POC: CLEAR
URINALYSIS COLOR POC: NORMAL
URINE BLOOD POC: NORMAL
URINE LEUKOCYTES POC: NORMAL
URINE NITRITES POC: POSITIVE

## 2019-12-27 PROCEDURE — 87088 URINE BACTERIA CULTURE: CPT

## 2019-12-27 PROCEDURE — 87077 CULTURE AEROBIC IDENTIFY: CPT

## 2019-12-27 PROCEDURE — 87086 URINE CULTURE/COLONY COUNT: CPT

## 2019-12-27 PROCEDURE — 87186 SC STD MICRODIL/AGAR DIL: CPT

## 2019-12-27 RX ORDER — NITROFURANTOIN 25; 75 MG/1; MG/1
100 CAPSULE ORAL 2 TIMES DAILY
Qty: 10 CAP | Refills: 0 | Status: SHIPPED | OUTPATIENT
Start: 2019-12-27 | End: 2020-01-01

## 2019-12-27 NOTE — TELEPHONE ENCOUNTER
----- Message from Lauren Eid sent at 12/27/2019  8:55 AM EST -----  Regarding: Nisah/Telephone  Contact: [phone]  Caller's first and last name: Maricruz Sellers  Reason for call: Pt request meds for UTI. Callback required yes/no and why: Yes  Best contact number(s): [phone]  Details to clarify the request: Please send rx to the [address] [phone].

## 2019-12-27 NOTE — TELEPHONE ENCOUNTER
Called, spoke to pt. Two identifiers confirmed. Notified pt to come by the office for urine check. Pt verbalized understanding of information discussed w/ no further questions at this time.

## 2019-12-27 NOTE — TELEPHONE ENCOUNTER
Pt states she needs something called in for UTI. Pt went to work and had to go home due to the pain. Please call pt to let her know.   #[phone]

## 2019-12-29 LAB — BACTERIA UR CULT: ABNORMAL

## 2019-12-29 NOTE — PROGRESS NOTES
Urine cx grew enterobacter, sn to macrobid, so she should be feeling better. Make sure to finish rx.

## 2019-12-31 ENCOUNTER — TELEPHONE (OUTPATIENT)
Dept: INTERNAL MEDICINE CLINIC | Age: 64
End: 2019-12-31

## 2019-12-31 NOTE — TELEPHONE ENCOUNTER
----- Message from John Charles sent at 12/30/2019  4:47 PM EST -----  Regarding: Dr. Gordon George first and last name: Austin Garcia  Reason for call:  returning call  Callback required yes/no and why: yes   Best contact number(s): [phone]  Details to clarify the request: Pt received a call at 4:20 today and she was wondering what it was for.

## 2020-01-06 ENCOUNTER — OFFICE VISIT (OUTPATIENT)
Dept: INTERNAL MEDICINE CLINIC | Age: 65
End: 2020-01-06

## 2020-01-06 VITALS — OXYGEN SATURATION: 96 % | DIASTOLIC BLOOD PRESSURE: 76 MMHG | HEART RATE: 86 BPM | SYSTOLIC BLOOD PRESSURE: 119 MMHG

## 2020-01-06 DIAGNOSIS — R35.0 URINE FREQUENCY: Primary | ICD-10-CM

## 2020-01-06 LAB
BILIRUB UR QL STRIP: NEGATIVE
GLUCOSE UR-MCNC: NEGATIVE MG/DL
KETONES P FAST UR STRIP-MCNC: NEGATIVE MG/DL
PH UR STRIP: 5.5 [PH] (ref 4.6–8)
PROT UR QL STRIP: NEGATIVE
SP GR UR STRIP: 1.02 (ref 1–1.03)
UA UROBILINOGEN AMB POC: NORMAL (ref 0.2–1)
URINALYSIS CLARITY POC: CLEAR
URINALYSIS COLOR POC: YELLOW
URINE BLOOD POC: NEGATIVE
URINE LEUKOCYTES POC: NORMAL
URINE NITRITES POC: NEGATIVE

## 2020-01-06 RX ORDER — NITROFURANTOIN 25; 75 MG/1; MG/1
100 CAPSULE ORAL 2 TIMES DAILY
Qty: 10 CAP | Refills: 0 | Status: SHIPPED | OUTPATIENT
Start: 2020-01-06 | End: 2020-04-15 | Stop reason: ALTCHOICE

## 2020-01-06 NOTE — PATIENT INSTRUCTIONS
· Keep up the great work. · Continue Metformin 500 mg ER twice daily. · Continue Toujeo 26 units daily. · Humalog is your short acting (meal time) insulin. Give  6 units before each meal, if blood sugar is above 250, give 8 units.

## 2020-01-06 NOTE — PROGRESS NOTES
Pharmacy Progress Note - Diabetes Management    S/O: Ms. Vee Rousseau is a 75 y.o. female , referred by Dr. Pia Harrington DO, with a PMH of T2DM, HTN, HLD, Hypothyroidism, OAB, IBS, Osteoporosis, was seen today for diabetes management.   Also sees Dr. Barbara Alvarez (endo). Patient's last A1c was 7.2% (Dec 2019), a decrease from 7.4% (Sept 2019). \"I feel awful today. \"      Interim update:   Completed Augmentin therapy earlier in December for URI. Urine cx on 12/27/19 grew > 100K colonies E. Cloacea. Completed Macrobid 100 mg BID x 5 days on Friday. Reports dysuria and polyuria persists. \"May had had a low grade fever last week\"     Current anti-hyperglycemic regimen includes: Toujeo 26 units daily  Humalog 6 units before each meals TID ; if BG > 250, give 8 units ---> reports to giving 10 units if BG > 250   Metformin 500 mg ER - 1 tab BID    ROS:  Today, Pt endorses:  - Symptoms of Hyperglycemia: none  - Symptoms of Hypoglycemia: jitteriness and weak - today --> Reports fasting was 258 this morning; POC BG in office was 79 ---> corrected symptomatic hypoBG w/ apple juice --> improved to 125    Self Monitoring Blood Glucose (SMBG) or CGM:  - Brought in home glucometers  today:  yes - note she has 2 meters  - Reports SMBGs are much higher recently w/ recent illness    Date Before Breakfast Before Lunch Before Dinner Bedtime   12/27/2019 403      12/28/2019    174   12/29/2019   266 303   12/30/2019   324    12/31/2019   350    1/1/2020 166   192   1/2/2020  142     1/3/2020  108  179   1/4/2020 169   182   1/5/2020    493   Avg 246 125 313 253        Nutrition/Lifestyle Modifications:  - Eats 2-3 meals/day   - Endorses lack of appetite ; could not recall dinner last night;  - Beverage(s): water   - Alcohol consumption? Yes - 1 beverage this past Friday    - Physical Activity:   None recently     Vitals:   Wt Readings from Last 3 Encounters:   12/20/19 203 lb (92.1 kg)   12/02/19 211 lb (95.7 kg)   11/06/19 204 lb (92.5 kg)     BP Readings from Last 3 Encounters:   01/06/20 119/76   12/20/19 123/82   12/02/19 (!) 177/91     Pulse Readings from Last 3 Encounters:   01/06/20 86   12/20/19 90   12/02/19 88       Past Medical History:   Diagnosis Date    Depression     Diabetes (HCC)     Hypercholesteremia     Hypertension     Hyperthyroidism     IBS (irritable bowel syndrome)     Urinary incontinence      Allergies   Allergen Reactions    Celebrex [Celecoxib] Other (comments)     Hallucinations    Codeine Nausea and Vomiting    Erythromycin Nausea and Vomiting       Current Outpatient Medications   Medication Sig    NOVOFINE PLUS 32 gauge x 1/6\" ndle Check sugars 4x daily.  terbinafine HCl (LAMISIL) 250 mg tablet Take 1 Tab by mouth daily.  levothyroxine (SYNTHROID) 100 mcg tablet Take 1 Tab by mouth See Admin Instructions. Take 1 tablet (100 mcg) by mouth daily with 300 mcg dosage strength to total 400 mcg daily.  levothyroxine (SYNTHROID) 300 mcg tablet Take 1 Tab by mouth See Admin Instructions. Take 1 tablet (100 mcg) daily with 300 mcg dosage strength to total 400 mcg daily.  metFORMIN ER (GLUCOPHAGE XR) 500 mg tablet Take 1 Tab by mouth two (2) times a day.  ONETOUCH VERIO strip Use to check blood sugar three times daily. E11.9    furosemide (LASIX) 20 mg tablet Take 1 Tab by mouth daily as needed (for edema).  TOUJEO SOLOSTAR U-300 INSULIN 300 unit/mL (1.5 mL) inpn pen 26 Units by SubCUTAneous route daily.  meloxicam (MOBIC) 7.5 mg tablet Take 2 Tabs by mouth daily.  HUMALOG KWIKPEN INSULIN 100 unit/mL kwikpen 4-20 Units by SubCUTAneous route Before breakfast, lunch, and dinner. (Patient taking differently: 6-8 Units by SubCUTAneous route Before breakfast, lunch, and dinner.)    lactulose (CHRONULAC) 10 gram/15 mL solution Take 20 g by mouth three (3) times daily as needed.  Taking 10 ml TID as needed ; taking every other day    calcium polycarbophil (FIBER LAXATIVE, CA POLYCARBO,) 625 mg tablet Take 625 mg by mouth daily.  multivitamin (ONE A DAY) tablet Take 1 Tab by mouth daily.  trospium (SANCTURA) 20 mg tablet Take 20 mg by mouth Before breakfast and dinner.  linaclotide (LINZESS) 290 mcg cap capsule Take 290 mcg by mouth Daily (before breakfast).  ALPRAZolam (XANAX) 0.25 mg tablet Take 1 Tab by mouth daily as needed for Anxiety. Indications: anxious    buPROPion XL (WELLBUTRIN XL) 150 mg tablet TAKE 1 TABLET EVERY MORNING    acetaminophen (TYLENOL) 500 mg tablet Take 1 Tab by mouth every six (6) hours as needed for Pain.  lisinopril (PRINIVIL, ZESTRIL) 5 mg tablet Take 1 Tab by mouth daily.  atorvastatin (LIPITOR) 80 mg tablet Take 1 Tab by mouth daily.  cholecalciferol (VITAMIN D3) 1,000 unit tablet Take 2,000 Units by mouth daily.  B.infantis-B.ani-B.long-B.bifi (PROBIOTIC 4X) 10-15 mg TbEC Take 2 Gum by mouth daily. No current facility-administered medications for this visit.         Lab Results   Component Value Date/Time    Sodium 141 09/25/2019 10:56 AM    Potassium 4.6 09/25/2019 10:56 AM    Chloride 104 09/25/2019 10:56 AM    CO2 24 09/25/2019 10:56 AM    Anion gap 6 01/10/2019 08:37 AM    Glucose 187 (H) 09/25/2019 10:56 AM    BUN 22 09/25/2019 10:56 AM    Creatinine 0.97 09/25/2019 10:56 AM    BUN/Creatinine ratio 23 09/25/2019 10:56 AM    GFR est AA 70 09/25/2019 10:56 AM    GFR est non-AA 61 09/25/2019 10:56 AM    Calcium 9.4 09/25/2019 10:56 AM       Lab Results   Component Value Date/Time    Protein, total 6.2 09/25/2019 10:56 AM    Albumin 4.1 09/25/2019 10:56 AM       Lab Results   Component Value Date/Time    Cholesterol, total 165 06/20/2019 08:50 AM    HDL Cholesterol 69 06/20/2019 08:50 AM    LDL, calculated 83 06/20/2019 08:50 AM    VLDL, calculated 13 06/20/2019 08:50 AM    Triglyceride 65 06/20/2019 08:50 AM       Lab Results   Component Value Date/Time    WBC 8.3 01/10/2019 08:37 AM    HGB 12.3 01/10/2019 08:37 AM    HCT 34.9 (L) 01/10/2019 08:37 AM    PLATELET 517 (L) 07/73/5101 08:37 AM    MCV 84.9 01/10/2019 08:37 AM       Lab Results   Component Value Date/Time    Microalb/Creat ratio (ug/mg creat.) 3.3 06/20/2019 08:49 AM       HbA1c:  Lab Results   Component Value Date/Time    Hemoglobin A1c 7.4 (H) 09/25/2019 10:56 AM    Hemoglobin A1c (POC) 7.2 12/20/2019 08:22 AM     Lab Results   Component Value Date/Time    Hemoglobin A1c 7.4 (H) 09/25/2019 10:56 AM    Hemoglobin A1c 10.2 (H) 06/20/2019 08:50 AM    Hemoglobin A1c 11.3 (H) 11/12/2018 09:50 AM       Last Point of Care HGB A1C  Hemoglobin A1c (POC)   Date Value Ref Range Status   12/20/2019 7.2 % Final      Results for orders placed or performed in visit on 01/06/20   AMB POC URINALYSIS DIP STICK AUTO W/O MICRO     Status: None   Result Value Ref Range Status    Color (UA POC) Yellow  Final    Clarity (UA POC) Clear  Final    Glucose (UA POC) Negative Negative Final    Bilirubin (UA POC) Negative Negative Final    Ketones (UA POC) Negative Negative Final    Specific gravity (UA POC) 1.020 1.001 - 1.035 Final    Blood (UA POC) Negative Negative Final    pH (UA POC) 5.5 4.6 - 8.0 Final    Protein (UA POC) Negative Negative Final    Urobilinogen (UA POC) 0.2 mg/dL 0.2 - 1 Final    Nitrites (UA POC) Negative Negative Final    Leukocyte esterase (UA POC) 1+ Negative Final     A/P:    Diabetes Management:  - Per ADA guidelines, Pt's A1c is almost at her goal of <7%. BP controlled for goal.   - Concern for recent labile glycemia and risk for hypoglycemia.    - Continue Toujeo 26 units daily  - Emphasize Humalog direction of 6 units before meals TID; if BG > 250, give 8 units.  - Continue Metformin 500 mg ER BID    UTI:  - POC UA grew 1+ leuk. - Will send in Marobid 100 mg BID x 5 days VORD per Dr. Antonio Gaona. Check: Vitals and Weight at the next visit. Medication reconciliation was completed during the visit. There are no discontinued medications.     Patient verbalized understanding of the information presented and all of the patients questions were answered. AVS was handed to the patient. Patient advised to call the office with any additional questions or concerns. Notifications of recommendations will be sent to Dr. Dinesh Pang DO for review. Patient will return to clinic in 4 week(s) for follow up.      Thank you for the consult,  Tavia Melendez, PharmD, BCACP, CDE

## 2020-01-16 ENCOUNTER — TELEPHONE (OUTPATIENT)
Dept: INTERNAL MEDICINE CLINIC | Age: 65
End: 2020-01-16

## 2020-01-16 NOTE — TELEPHONE ENCOUNTER
----- Message from Michael Araiza sent at 1/15/2020  6:35 PM EST -----  Regarding: Dr. Raf Calhoun first and last name: self    Reason for call: referral    Callback required yes/no and why: yes    Best contact number(s): [phone]    Details to clarify the request: Pt stated she did receive the name of the Dr. she was referred to on the second referral page for [facility]

## 2020-01-16 NOTE — TELEPHONE ENCOUNTER
Called, spoke to pt. Two identifiers confirmed. Contact information provided to pt for Dr. Guille Kam. Pt verbalized understanding of information discussed w/ no further questions at this time.

## 2020-01-23 DIAGNOSIS — F41.9 ANXIETY: ICD-10-CM

## 2020-01-24 RX ORDER — ALPRAZOLAM 0.25 MG/1
0.25 TABLET ORAL
Qty: 10 TAB | Refills: 0 | Status: SHIPPED | OUTPATIENT
Start: 2020-01-24 | End: 2020-12-16 | Stop reason: SDUPTHER

## 2020-01-24 RX ORDER — TERBINAFINE HYDROCHLORIDE 250 MG/1
250 TABLET ORAL DAILY
Qty: 30 TAB | Refills: 0 | Status: SHIPPED | OUTPATIENT
Start: 2020-01-24 | End: 2020-03-26 | Stop reason: SDUPTHER

## 2020-01-24 NOTE — TELEPHONE ENCOUNTER
PCP: Sandie Penny DO    Last appt: 1/6/2020  Future Appointments   Date Time Provider Cj Love   2/12/2020 10:30 AM [place], [address] [place] [address]   6/22/2020  8:45 AM Sandie Penny DO [facility] STEPHANIE SCHED       Requested Prescriptions     Pending Prescriptions Disp Refills    ALPRAZolam (XANAX) 0.25 mg tablet 10 Tab 0     Sig: Take 1 Tab by mouth daily as needed for Anxiety. Indications: anxious    terbinafine HCl (LAMISIL) 250 mg tablet 30 Tab 0     Sig: Take 1 Tab by mouth daily.

## 2020-02-07 RX ORDER — LISINOPRIL 5 MG/1
TABLET ORAL
Qty: 90 TAB | Refills: 4 | Status: SHIPPED | OUTPATIENT
Start: 2020-02-07 | End: 2021-02-24 | Stop reason: SDUPTHER

## 2020-02-10 NOTE — PROGRESS NOTES
Pharmacy Progress Note - Diabetes Management    S/O: Ms. Vee Rousseau is a 75 y.o. female , referred by Dr. Shahram Vang DO, with a PMH of T2DM, HTN, HLD, Hypothyroidism, OAB, IBS, Osteoporosis, was seen today for diabetes management.   Also sees Dr. Mireya Jacobo (endo). Patient's last A1c was 7.2% (Dec 2019). Interim update: Patient saw doctor for her knee two weeks ago and was prescribed a steroid taper pack. She took two tablets of dose pack and BG spiked to over 600. She reports she did not take anymore doses of the steroid pack. Current anti-hyperglycemic regimen includes: Toujeo 26 units daily  Humalog 6 units before each meals TID ; if BG > 250, give 8 units  Metformin 500 mg ER - 1 tab BID    ROS:  Today, Pt endorses:  - Symptoms of Hyperglycemia: none  - Symptoms of Hypoglycemia: dizziness (typically occurs between 10 am - 12 pm)    Self Monitoring Blood Glucose (SMBG) or CGM:  - Brought in home glucometer/blood glucose log/CGM reader today:  yes  - Conducts/scans: Yes   Date AM PM   2/4/2020 298 176   2/5/2020 385 (2:45am)  140 (10:00am) 213    2/6/2020 130 (12:00pm) 225   2/7/2020 125 214    2/8/2020 168 (12:00pm) 155   2/9/2020 - 403    2/10/2020 242 -   2/11/2020 - 265   2/12/2020 173 -       Vitals:   Wt Readings from Last 3 Encounters:   02/12/20 206 lb 12.8 oz (93.8 kg)   12/20/19 203 lb (92.1 kg)   12/02/19 211 lb (95.7 kg)     BP Readings from Last 3 Encounters:   02/12/20 134/79   01/06/20 119/76   12/20/19 123/82     Pulse Readings from Last 3 Encounters:   02/12/20 85   01/06/20 86   12/20/19 90       Past Medical History:   Diagnosis Date    Depression     Diabetes ([address]     Hypercholesteremia     Hypertension     Hyperthyroidism     IBS (irritable bowel syndrome)     Urinary incontinence      Allergies   Allergen Reactions    Celebrex [Celecoxib] Other (comments)     Hallucinations    Codeine Nausea and Vomiting    Erythromycin Nausea and Vomiting Current Outpatient Medications   Medication Sig    lisinopril (PRINIVIL, ZESTRIL) 5 mg tablet TAKE 1 TABLET DAILY    ALPRAZolam (XANAX) 0.25 mg tablet Take 1 Tab by mouth daily as needed for Anxiety. Indications: anxious    terbinafine HCl (LAMISIL) 250 mg tablet Take 1 Tab by mouth daily.  nitrofurantoin, macrocrystal-monohydrate, (MACROBID) 100 mg capsule Take 1 Cap by mouth two (2) times a day. For 5 days    NOVOFINE PLUS 32 gauge x 1/6\" ndle Check sugars 4x daily.  levothyroxine (SYNTHROID) 100 mcg tablet Take 1 Tab by mouth See Admin Instructions. Take 1 tablet (100 mcg) by mouth daily with 300 mcg dosage strength to total 400 mcg daily.  levothyroxine (SYNTHROID) 300 mcg tablet Take 1 Tab by mouth See Admin Instructions. Take 1 tablet (100 mcg) daily with 300 mcg dosage strength to total 400 mcg daily.  metFORMIN ER (GLUCOPHAGE XR) 500 mg tablet Take 1 Tab by mouth two (2) times a day.  ONETOUCH VERIO strip Use to check blood sugar three times daily. E11.9    furosemide (LASIX) 20 mg tablet Take 1 Tab by mouth daily as needed (for edema).  TOUJEO SOLOSTAR U-300 INSULIN 300 unit/mL (1.5 mL) inpn pen 26 Units by SubCUTAneous route daily.  meloxicam (MOBIC) 7.5 mg tablet Take 2 Tabs by mouth daily.  HUMALOG KWIKPEN INSULIN 100 unit/mL kwikpen 4-20 Units by SubCUTAneous route Before breakfast, lunch, and dinner. (Patient taking differently: 6-8 Units by SubCUTAneous route Before breakfast, lunch, and dinner.)    lactulose (CHRONULAC) 10 gram/15 mL solution Take 20 g by mouth three (3) times daily as needed. Taking 10 ml TID as needed ; taking every other day    calcium polycarbophil (FIBER LAXATIVE, CA POLYCARBO,) 625 mg tablet Take 625 mg by mouth daily.  multivitamin (ONE A DAY) tablet Take 1 Tab by mouth daily.  trospium (SANCTURA) 20 mg tablet Take 20 mg by mouth Before breakfast and dinner.     linaclotide (LINZESS) 290 mcg cap capsule Take 290 mcg by mouth Daily (before breakfast).  buPROPion XL (WELLBUTRIN XL) 150 mg tablet TAKE 1 TABLET EVERY MORNING    acetaminophen (TYLENOL) 500 mg tablet Take 1 Tab by mouth every six (6) hours as needed for Pain.  atorvastatin (LIPITOR) 80 mg tablet Take 1 Tab by mouth daily.  cholecalciferol (VITAMIN D3) 1,000 unit tablet Take 2,000 Units by mouth daily.  B.infantis-B.ani-B.long-B.bifi (PROBIOTIC 4X) 10-15 mg TbEC Take 2 Gum by mouth daily. No current facility-administered medications for this visit. Lab Results   Component Value Date/Time    Sodium 141 09/25/2019 10:56 AM    Potassium 4.6 09/25/2019 10:56 AM    Chloride 104 09/25/2019 10:56 AM    CO2 24 09/25/2019 10:56 AM    Anion gap 6 01/10/2019 08:37 AM    Glucose 187 (H) 09/25/2019 10:56 AM    BUN 22 09/25/2019 10:56 AM    Creatinine 0.97 09/25/2019 10:56 AM    BUN/Creatinine ratio 23 09/25/2019 10:56 AM    GFR est AA 70 09/25/2019 10:56 AM    GFR est non-AA 61 09/25/2019 10:56 AM    Calcium 9.4 09/25/2019 10:56 AM    Bilirubin, total 0.3 09/25/2019 10:56 AM    AST (SGOT) 7 09/25/2019 10:56 AM    Alk.  phosphatase 78 09/25/2019 10:56 AM    Protein, total 6.2 09/25/2019 10:56 AM    Albumin 4.1 09/25/2019 10:56 AM    Globulin 3.8 01/10/2019 08:37 AM    A-G Ratio 2.0 09/25/2019 10:56 AM    ALT (SGPT) 13 09/25/2019 10:56 AM     Lab Results   Component Value Date/Time    Cholesterol, total 165 06/20/2019 08:50 AM    HDL Cholesterol 69 06/20/2019 08:50 AM    LDL, calculated 83 06/20/2019 08:50 AM    VLDL, calculated 13 06/20/2019 08:50 AM    Triglyceride 65 06/20/2019 08:50 AM     Lab Results   Component Value Date/Time    WBC 8.3 01/10/2019 08:37 AM    HGB 12.3 01/10/2019 08:37 AM    HCT 34.9 (L) 01/10/2019 08:37 AM    PLATELET 308 (L) 82/26/6716 08:37 AM    MCV 84.9 01/10/2019 08:37 AM       Lab Results   Component Value Date/Time    Microalb/Creat ratio (ug/mg creat.) 3.3 06/20/2019 08:49 AM       Lab Results   Component Value Date/Time    Hemoglobin A1c 7.4 (H) 09/25/2019 10:56 AM    Hemoglobin A1c 10.2 (H) 06/20/2019 08:50 AM    Hemoglobin A1c 11.3 (H) 11/12/2018 09:50 AM     Hemoglobin A1c (POC)   Date Value Ref Range Status   12/20/2019 7.2 % Final        Estimated Creatinine Clearance: 63.7 mL/min (by C-G formula based on SCr of 0.97 mg/dL). A/P:    Medication Adherence/Access:  - Pt is adherent to current medication regimen. Diabetes Management:  - Per ADA guidelines, Pt's A1c is not at goal of < 7%. - Current SMBG(s)/CGM trend above goal of  mg/dL for FBG and < 180 mg/dL for PPBG. Patient BG did spike (above 600 around beginning of February) during her course of steroids and was trending down towards the end of last week. Instructed patient to continue current diabetic regimen since she is no longer on steroids and is trending back down to normal levels. - Continue Toujeo 26 units daily, Humalog 6 units three times daily and increase to 8 units if BG > 250, metformin 500 mg ER twice daily  - Take Humalog 2 units if BG is over 600   - Bring glucometer/log/ CGM reader to all future visits      Check: Vitals, Weight and HbA1c at the next visit. Medication reconciliation was completed during the visit. There are no discontinued medications. Patient verbalized understanding of the information presented and all of the patients questions were answered. AVS was handed to the patient. Patient advised to call the office with any additional questions or concerns. Notifications of recommendations will be sent to Dr. Tawana Rick DO for review. Patient will return to clinic in 9 week(s) for follow up.      Thank you for the consult,  Odilia Schilling, PharmD

## 2020-02-12 ENCOUNTER — OFFICE VISIT (OUTPATIENT)
Dept: INTERNAL MEDICINE CLINIC | Age: 65
End: 2020-02-12

## 2020-02-12 VITALS
OXYGEN SATURATION: 98 % | HEIGHT: 65 IN | WEIGHT: 206.8 LBS | BODY MASS INDEX: 34.45 KG/M2 | DIASTOLIC BLOOD PRESSURE: 79 MMHG | HEART RATE: 85 BPM | SYSTOLIC BLOOD PRESSURE: 134 MMHG

## 2020-02-12 DIAGNOSIS — Z79.4 UNCONTROLLED TYPE 2 DIABETES MELLITUS WITH HYPERGLYCEMIA, WITH LONG-TERM CURRENT USE OF INSULIN (HCC): Primary | ICD-10-CM

## 2020-02-12 DIAGNOSIS — E11.65 UNCONTROLLED TYPE 2 DIABETES MELLITUS WITH HYPERGLYCEMIA, WITH LONG-TERM CURRENT USE OF INSULIN (HCC): Primary | ICD-10-CM

## 2020-02-12 NOTE — PATIENT INSTRUCTIONS
· Keep up the great work. · Continue Metformin 500 mg ER twice daily. · Continue Toujeo 26 units daily. · Humalog is your short acting (meal time) insulin.  Give  6 units before each meal, if blood sugar is above 250, give 8 units.  
· If sugar is above 600 give 2 units of Humalog and recheck in 2 to 3 hours.

## 2020-02-13 NOTE — PROGRESS NOTES
Pharmacy Progress Note - Diabetes Management    S/O: Ms. Thalia Howell 77 y.o. was seen today together with Neto Bustillo, PharmD. I have read and agree with Hattie's documentation. Last A1c 7.2% (Dec 2019). Interim history:  - Completed previous macrobid therapy for UTI. No other abx since. - Recent course of steroid pack for arthritis. Stopped after two doses d/t hyperglycemia (BG > 600). - No recent follow with Dr. Jessica Sanchez (GI) - appt was r/s.   - Has sleep study appt scheduled for April   - Has follow up with Dr. Wade Chen (Endo) at the end of this month. Current regimen: Toujeo 26 units daily  Humalog 6 units TID before meals. If pre-meal BG > 250, give 8 units  Metformin 500 mg ER twice daily     Missed dose 1-2x since last visit   Endorses trouble affording her insulin and Linzess. SMBG:  - Continues to monitor SMBG - with her 2 meters. See Hattie's note for details. - Fasting today: 173  - Reported feeling \"low\" several times in the morning - does not check SMBG when this happens. Addresses sx with food which later resolves. - No EtOH. Wt Readings from Last 3 Encounters:   02/12/20 206 lb 12.8 oz (93.8 kg)   12/20/19 203 lb (92.1 kg)   12/02/19 211 lb (95.7 kg)     BP Readings from Last 3 Encounters:   02/12/20 134/79   01/06/20 119/76   12/20/19 123/82     Pulse Readings from Last 3 Encounters:   02/12/20 85   01/06/20 86   12/20/19 90     Lab Results   Component Value Date/Time    Hemoglobin A1c 7.4 (H) 09/25/2019 10:56 AM    Hemoglobin A1c 10.2 (H) 06/20/2019 08:50 AM    Hemoglobin A1c 11.3 (H) 11/12/2018 09:50 AM     Estimated Creatinine Clearance: 63.7 mL/min (by C-G formula based on SCr of 0.97 mg/dL). A/P:  Medication Access:  - Depending on patient's income and medical expense report, she may qualify for insulin PAP and linzess PAP. Provide patient w/ this information today. T2DM:  - Last A1c was close her goal of <7%. BP controlled.    - SMBG still elevated for fasting and post prandial goals. Emphasized importance of checking BG when she experiences s/sx consistent w/ hypoglycemia to prevent overcorrection.   - Continue with Toujeo 26 units daily and Metformin 500 mg ER BID  - For Humalog - 6 units TID before meals. If pre-meal BG > 250, give 8 units.    - During an acute course of steroid therapy, if BG was as high as 600, call office to discuss plan. Would recommend patient give an extra 2 units and monitor SMBG closely thereafter.      - Patient will RTC in 8 weeks for follow up.     Thank you for the consult,  Tavia Calzada, JayleenD, CDE

## 2020-03-12 RX ORDER — LEVOTHYROXINE SODIUM 100 UG/1
100 TABLET ORAL SEE ADMIN INSTRUCTIONS
Qty: 90 TAB | Refills: 3 | Status: SHIPPED | OUTPATIENT
Start: 2020-03-12 | End: 2020-03-16 | Stop reason: SDUPTHER

## 2020-03-16 RX ORDER — LEVOTHYROXINE SODIUM 100 UG/1
100 TABLET ORAL SEE ADMIN INSTRUCTIONS
Qty: 90 TAB | Refills: 3 | Status: SHIPPED | OUTPATIENT
Start: 2020-03-16 | End: 2020-06-25 | Stop reason: DRUGHIGH

## 2020-03-26 RX ORDER — TERBINAFINE HYDROCHLORIDE 250 MG/1
250 TABLET ORAL DAILY
Qty: 30 TAB | Refills: 0 | Status: SHIPPED | OUTPATIENT
Start: 2020-03-26 | End: 2020-06-04 | Stop reason: SDUPTHER

## 2020-04-10 RX ORDER — BLOOD SUGAR DIAGNOSTIC
STRIP MISCELLANEOUS
Qty: 100 STRIP | Refills: 11 | Status: SHIPPED | OUTPATIENT
Start: 2020-04-10 | End: 2020-04-15 | Stop reason: ALTCHOICE

## 2020-04-10 NOTE — TELEPHONE ENCOUNTER
PCP: Marissa Patterson DO    Last appt: 2/12/2020  Future Appointments   Date Time Provider Cj Love   4/15/2020  9:30 AM Idris Paniagua, [address] [place] [address]   6/22/2020  8:45 AM Anyi Arriola DO [facility] STEPHANIE SCHED       Requested Prescriptions     Pending Prescriptions Disp Refills    OneTouch Verio strip 100 Strip 11     Sig: Use to check blood sugar three times daily.  E11.9

## 2020-04-13 ENCOUNTER — TELEPHONE (OUTPATIENT)
Dept: INTERNAL MEDICINE CLINIC | Age: 65
End: 2020-04-13

## 2020-04-15 ENCOUNTER — VIRTUAL VISIT (OUTPATIENT)
Dept: INTERNAL MEDICINE CLINIC | Age: 65
End: 2020-04-15

## 2020-04-15 DIAGNOSIS — E11.21 TYPE 2 DIABETES WITH NEPHROPATHY (HCC): Primary | ICD-10-CM

## 2020-04-15 NOTE — PROGRESS NOTES
Pharmacy Progress Note - Diabetes Management    S/O: Ms. Vee Rousseau is a 75 y.o. female , referred by Dr. Fahad Jeong DO, with a PMH of T2DM, HTN, HLD, Hypothyroidism, OAB, IBS, Osteoporosis, was seen today for diabetes management.   Also sees Dr. Fly Coates (endo). Patient's last A1c was 7.2% (UPN 3921).      Pt consented to virtual visit d/t COVID 19. Interim update:  Reports insurance requires a new glucometer. Ran out of One Touch Verio test strips for the past week. Still waiting on new meter to arrive  Recently saw Dr. Fly Coates. Reports A1c was in the 8%. No changes to DM regimen. TSH checked - reports levothyroxine dose was adjusted to 400 mg Monday - Saturday daily. None on Sunday. Completed sleep study. CPAP adjusted. Current anti-hyperglycemic regimen include(s):    - Toujeo 26 units daily  Humalog 6 units TID before meals. If pre-meal BG > 250, give 8 units  Metformin 500 mg ER twice daily       ROS:  Today, Pt endorses:  - Symptoms of Hyperglycemia: none  - Symptoms of Hypoglycemia: none    Self Monitoring Blood Glucose (SMBG) or CGM:  Can't afford CGM - $120 /month  AM BG range: 72, 155, -156, 199 - usually 120-150s  PM BG range: 150-190s ; some readings in the 220, 240s. Did have several BG < 70 usually in the early afternoon. 156 --- > 46 ---. Recheck to 123 after correction    Nutrition/Lifestyle Modifications:  - Reports decreased in appetite ; no alcohol  - Not as active d/t COVID 19. No recent wt check. Vitals:   Wt Readings from Last 3 Encounters:   02/12/20 206 lb 12.8 oz (93.8 kg)   12/20/19 203 lb (92.1 kg)   12/02/19 211 lb (95.7 kg)     BP Readings from Last 3 Encounters:   02/12/20 134/79   01/06/20 119/76   12/20/19 123/82     Pulse Readings from Last 3 Encounters:   02/12/20 85   01/06/20 86   12/20/19 90       Past Medical History:   Diagnosis Date    Depression     Diabetes ([facility] [address]     Hypercholesteremia     Hypertension     Hyperthyroidism     IBS (irritable bowel syndrome)     Urinary incontinence      Allergies   Allergen Reactions    Celebrex [Celecoxib] Other (comments)     Hallucinations    Codeine Nausea and Vomiting    Erythromycin Nausea and Vomiting       Current Outpatient Medications   Medication Sig    OneTouch Verio strip Use to check blood sugar three times daily. E11.9    terbinafine HCL (LAMISIL) 250 mg tablet Take 1 Tab by mouth daily.  levothyroxine (SYNTHROID) 100 mcg tablet Take 1 Tab by mouth See Admin Instructions. Take 1 tablet (100 mcg) by mouth daily with 300 mcg dosage strength to total 400 mcg daily.  lisinopril (PRINIVIL, ZESTRIL) 5 mg tablet TAKE 1 TABLET DAILY    ALPRAZolam (XANAX) 0.25 mg tablet Take 1 Tab by mouth daily as needed for Anxiety. Indications: anxious    nitrofurantoin, macrocrystal-monohydrate, (MACROBID) 100 mg capsule Take 1 Cap by mouth two (2) times a day. For 5 days    NOVOFINE PLUS 32 gauge x 1/6\" ndle Check sugars 4x daily.  levothyroxine (SYNTHROID) 300 mcg tablet Take 1 Tab by mouth See Admin Instructions. Take 1 tablet (100 mcg) daily with 300 mcg dosage strength to total 400 mcg daily.  metFORMIN ER (GLUCOPHAGE XR) 500 mg tablet Take 1 Tab by mouth two (2) times a day.  furosemide (LASIX) 20 mg tablet Take 1 Tab by mouth daily as needed (for edema).  TOUJEO SOLOSTAR U-300 INSULIN 300 unit/mL (1.5 mL) inpn pen 26 Units by SubCUTAneous route daily.  meloxicam (MOBIC) 7.5 mg tablet Take 2 Tabs by mouth daily.  HUMALOG KWIKPEN INSULIN 100 unit/mL kwikpen 4-20 Units by SubCUTAneous route Before breakfast, lunch, and dinner. (Patient taking differently: 6-8 Units by SubCUTAneous route Before breakfast, lunch, and dinner.)    lactulose (CHRONULAC) 10 gram/15 mL solution Take 20 g by mouth three (3) times daily as needed.  Taking 10 ml TID as needed ; taking every other day    calcium polycarbophil (FIBER LAXATIVE, CA POLYCARBO,) 625 mg tablet Take 625 mg by mouth daily.  multivitamin (ONE A DAY) tablet Take 1 Tab by mouth daily.  trospium (SANCTURA) 20 mg tablet Take 20 mg by mouth Before breakfast and dinner.  linaclotide (LINZESS) 290 mcg cap capsule Take 290 mcg by mouth Daily (before breakfast).  buPROPion XL (WELLBUTRIN XL) 150 mg tablet TAKE 1 TABLET EVERY MORNING    acetaminophen (TYLENOL) 500 mg tablet Take 1 Tab by mouth every six (6) hours as needed for Pain.  atorvastatin (LIPITOR) 80 mg tablet Take 1 Tab by mouth daily.  cholecalciferol (VITAMIN D3) 1,000 unit tablet Take 2,000 Units by mouth daily.  B.infantis-B.ani-B.long-B.bifi (PROBIOTIC 4X) 10-15 mg TbEC Take 2 Gum by mouth daily. No current facility-administered medications for this visit. Lab Results   Component Value Date/Time    Sodium 141 09/25/2019 10:56 AM    Potassium 4.6 09/25/2019 10:56 AM    Chloride 104 09/25/2019 10:56 AM    CO2 24 09/25/2019 10:56 AM    Anion gap 6 01/10/2019 08:37 AM    Glucose 187 (H) 09/25/2019 10:56 AM    BUN 22 09/25/2019 10:56 AM    Creatinine 0.97 09/25/2019 10:56 AM    BUN/Creatinine ratio 23 09/25/2019 10:56 AM    GFR est AA 70 09/25/2019 10:56 AM    GFR est non-AA 61 09/25/2019 10:56 AM    Calcium 9.4 09/25/2019 10:56 AM    Bilirubin, total 0.3 09/25/2019 10:56 AM    AST (SGOT) 7 09/25/2019 10:56 AM    Alk.  phosphatase 78 09/25/2019 10:56 AM    Protein, total 6.2 09/25/2019 10:56 AM    Albumin 4.1 09/25/2019 10:56 AM    Globulin 3.8 01/10/2019 08:37 AM    A-G Ratio 2.0 09/25/2019 10:56 AM    ALT (SGPT) 13 09/25/2019 10:56 AM       Lab Results   Component Value Date/Time    Cholesterol, total 165 06/20/2019 08:50 AM    HDL Cholesterol 69 06/20/2019 08:50 AM    LDL, calculated 83 06/20/2019 08:50 AM    VLDL, calculated 13 06/20/2019 08:50 AM    Triglyceride 65 06/20/2019 08:50 AM     Lab Results   Component Value Date/Time    WBC 8.3 01/10/2019 08:37 AM    HGB 12.3 01/10/2019 08:37 AM    HCT 34.9 (L) 01/10/2019 08:37 AM PLATELET 732 (L) 30/42/6410 08:37 AM    MCV 84.9 01/10/2019 08:37 AM     Lab Results   Component Value Date/Time    Microalb/Creat ratio (ug/mg creat.) 3.3 06/20/2019 08:49 AM     HbA1c:  Lab Results   Component Value Date/Time    Hemoglobin A1c 7.4 (H) 09/25/2019 10:56 AM    Hemoglobin A1c (POC) 7.2 12/20/2019 08:22 AM     Last Point of Care HGB A1C  Hemoglobin A1c (POC)   Date Value Ref Range Status   12/20/2019 7.2 % Final        CrCl cannot be calculated (Patient's most recent lab result is older than the maximum 180 days allowed. ). A/P:    Diabetes Management:  - Per ADA guidelines, Pt's A1c is not at goal of < 7%.   - Continue with Humalog 6 units TID - if pre-meal BG < 130, give 2 units.   - Continue Toujeo 26 units daily  And Metformin 500 mg ER BID  - Still waiting on proof of income to assist with insulin PAP. - Will request ov notes from Dr. Jamie Dawn. Check: Vitals, Weight and Medication Adherence at the next visit. Medication reconciliation was completed during the visit. Medications Discontinued During This Encounter   Medication Reason    OneTouch Verio strip Alternate Therapy    nitrofurantoin, macrocrystal-monohydrate, (MACROBID) 100 mg capsule Therapy Completed       Notifications of recommendations will be sent to Dr. Jannet Salamanca DO for review. Will follow up with patient in 4 week(s). Thank you for the consult,  Tavia Santamaria, PharmD, BCACP, CDE                                  CLINICAL PHARMACY CONSULT: MED RECONCILIATION/REVIEW ADDENDUM    For Pharmacy Admin Tracking Only    PHSO: PHSO Patient?: No  Total # of Interventions Recommended: Count: 3  - Discontinued Medication #: 2 Discontinue Reason(s): Acute Therapy Complete and No Longer Used  - Updated Order #: 1 Updated Order Reason(s):  Other    Time Spent (min): 30

## 2020-05-11 ENCOUNTER — DOCUMENTATION ONLY (OUTPATIENT)
Dept: INTERNAL MEDICINE CLINIC | Age: 65
End: 2020-05-11

## 2020-05-11 NOTE — PROGRESS NOTES
Pharmacy Progress Note     Patient assistance applications for [facility], and [place] submitted today.          Thank you for the consult,  Tavia Grewal, PharmD, BCACP, CDE

## 2020-05-14 ENCOUNTER — TELEPHONE (OUTPATIENT)
Dept: INTERNAL MEDICINE CLINIC | Age: 65
End: 2020-05-14

## 2020-05-14 NOTE — TELEPHONE ENCOUNTER
Pharmacy Progress Note - Diabetes Management    S/O: Ms. Vee Rousseau is a 77 y.o. female , referred by Dr. Andrea Smith DO, with a PMH of T2DM, HTN, HLD, Hypothyroidism, OAB, IBS, Osteoporosis, was seen today for diabetes management.   Also sees Dr. Ana Flor (endo). Patient's last A1c was 7.2% (QAG 5888).      Interim update:   Eye exam yesterday - no changes. At [employer]. Now unemployed     Current anti-hyperglycemic regimen include(s):    - Toujeo 26 units daily  - Humalog 6 units TID before meals.  If pre-meal BG > 250, give 8 units  - Metformin 500 mg ER twice daily     ROS:  Today, Pt endorses:  - Symptoms of Hyperglycemia: fatigue  - Symptoms of Hypoglycemia: none    Self Monitoring Blood Glucose (SMBG) or CGM:  Reports to checking 2x/day; not checking daily  Fasting and pre HS readings now back up to 200-300 range within the past 2-3 weeks  \"Not eating as I should\"    Nutrition/Lifestyle Modifications:  - Eating 1-2 meals/day max ; not hungry. When she does eat - will overeat   - No alcohol    Not very active. Hoping to get back to swimming in her pool w/ warmer weather      Vitals: Wt Readings from Last 3 Encounters:   02/12/20 206 lb 12.8 oz (93.8 kg)   12/20/19 203 lb (92.1 kg)   12/02/19 211 lb (95.7 kg)     BP Readings from Last 3 Encounters:   02/12/20 134/79   01/06/20 119/76   12/20/19 123/82     Pulse Readings from Last 3 Encounters:   02/12/20 85   01/06/20 86   12/20/19 90       Past Medical History:   Diagnosis Date    Depression     Diabetes ([facility] [address]     Hypercholesteremia     Hypertension     Hyperthyroidism     IBS (irritable bowel syndrome)     Urinary incontinence      Allergies   Allergen Reactions    Celebrex [Celecoxib] Other (comments)     Hallucinations    Codeine Nausea and Vomiting    Erythromycin Nausea and Vomiting       Current Outpatient Medications   Medication Sig    terbinafine HCL (LAMISIL) 250 mg tablet Take 1 Tab by mouth daily.     levothyroxine (SYNTHROID) 100 mcg tablet Take 1 Tab by mouth See Admin Instructions. Take 1 tablet (100 mcg) by mouth daily with 300 mcg dosage strength to total 400 mcg daily. (Patient taking differently: Take 100 mcg by mouth See Admin Instructions. Take 1 tablet (100 mcg) by mouth daily except for Sunday with 300 mcg dosage strength to total 400 mcg daily. None on Sunday.)    lisinopril (PRINIVIL, ZESTRIL) 5 mg tablet TAKE 1 TABLET DAILY    ALPRAZolam (XANAX) 0.25 mg tablet Take 1 Tab by mouth daily as needed for Anxiety. Indications: anxious    NOVOFINE PLUS 32 gauge x 1/6\" ndle Check sugars 4x daily.  levothyroxine (SYNTHROID) 300 mcg tablet Take 1 Tab by mouth See Admin Instructions. Take 1 tablet (100 mcg) daily with 300 mcg dosage strength to total 400 mcg daily. (Patient taking differently: Take 300 mcg by mouth See Admin Instructions. Take 1 tablet (100 mcg) by mouth daily except for Sunday with 300 mcg dosage strength to total 400 mcg daily. None on Sunday)    metFORMIN ER (GLUCOPHAGE XR) 500 mg tablet Take 1 Tab by mouth two (2) times a day.  furosemide (LASIX) 20 mg tablet Take 1 Tab by mouth daily as needed (for edema).  TOUJEO SOLOSTAR U-300 INSULIN 300 unit/mL (1.5 mL) inpn pen 26 Units by SubCUTAneous route daily.  meloxicam (MOBIC) 7.5 mg tablet Take 2 Tabs by mouth daily.  HUMALOG KWIKPEN INSULIN 100 unit/mL kwikpen 4-20 Units by SubCUTAneous route Before breakfast, lunch, and dinner. (Patient taking differently: 6-8 Units by SubCUTAneous route Before breakfast, lunch, and dinner.)    lactulose (CHRONULAC) 10 gram/15 mL solution Take 20 g by mouth three (3) times daily as needed. Taking 10 ml TID as needed ; taking every other day    calcium polycarbophil (FIBER LAXATIVE, CA POLYCARBO,) 625 mg tablet Take 625 mg by mouth daily.  multivitamin (ONE A DAY) tablet Take 1 Tab by mouth daily.  trospium (SANCTURA) 20 mg tablet Take 20 mg by mouth Before breakfast and dinner.     linaclotide (LINZESS) 290 mcg cap capsule Take 290 mcg by mouth Daily (before breakfast).  buPROPion XL (WELLBUTRIN XL) 150 mg tablet TAKE 1 TABLET EVERY MORNING    acetaminophen (TYLENOL) 500 mg tablet Take 1 Tab by mouth every six (6) hours as needed for Pain.  atorvastatin (LIPITOR) 80 mg tablet Take 1 Tab by mouth daily.  cholecalciferol (VITAMIN D3) 1,000 unit tablet Take 2,000 Units by mouth daily.  B.infantis-B.ani-B.long-B.bifi (PROBIOTIC 4X) 10-15 mg TbEC Take 2 Gum by mouth daily. No current facility-administered medications for this visit. Lab Results   Component Value Date/Time    Sodium 141 09/25/2019 10:56 AM    Potassium 4.6 09/25/2019 10:56 AM    Chloride 104 09/25/2019 10:56 AM    CO2 24 09/25/2019 10:56 AM    Anion gap 6 01/10/2019 08:37 AM    Glucose 187 (H) 09/25/2019 10:56 AM    BUN 22 09/25/2019 10:56 AM    Creatinine 0.97 09/25/2019 10:56 AM    BUN/Creatinine ratio 23 09/25/2019 10:56 AM    GFR est AA 70 09/25/2019 10:56 AM    GFR est non-AA 61 09/25/2019 10:56 AM    Calcium 9.4 09/25/2019 10:56 AM    Bilirubin, total 0.3 09/25/2019 10:56 AM    AST (SGOT) 7 09/25/2019 10:56 AM    Alk.  phosphatase 78 09/25/2019 10:56 AM    Protein, total 6.2 09/25/2019 10:56 AM    Albumin 4.1 09/25/2019 10:56 AM    Globulin 3.8 01/10/2019 08:37 AM    A-G Ratio 2.0 09/25/2019 10:56 AM    ALT (SGPT) 13 09/25/2019 10:56 AM       Lab Results   Component Value Date/Time    Cholesterol, total 165 06/20/2019 08:50 AM    HDL Cholesterol 69 06/20/2019 08:50 AM    LDL, calculated 83 06/20/2019 08:50 AM    VLDL, calculated 13 06/20/2019 08:50 AM    Triglyceride 65 06/20/2019 08:50 AM       Lab Results   Component Value Date/Time    WBC 8.3 01/10/2019 08:37 AM    HGB 12.3 01/10/2019 08:37 AM    HCT 34.9 (L) 01/10/2019 08:37 AM    PLATELET 254 (L) 71/07/7295 08:37 AM    MCV 84.9 01/10/2019 08:37 AM       Lab Results   Component Value Date/Time    Microalb/Creat ratio (ug/mg creat.) 3.3 06/20/2019 08:49 AM HbA1c:  Lab Results   Component Value Date/Time    Hemoglobin A1c 7.4 (H) 09/25/2019 10:56 AM    Hemoglobin A1c (POC) 7.2 12/20/2019 08:22 AM     No components found for: 2     Last Point of Care HGB A1C  Hemoglobin A1c (POC)   Date Value Ref Range Status   12/20/2019 7.2 % Final        CrCl cannot be calculated (Patient's most recent lab result is older than the maximum 180 days allowed. ). A/P:    Diabetes Management:  - Per ADA guidelines, Pt's A1c is not at goal of < 7%   - Based on reported SMBG readings, will increase Toujeo to 28 units daily.  - Increase Humalog to 8 units TID. If pre meal BG > 200, give 9 units. > 250, give 10 units. Recall she's very sensitive to Humalog therapy,  - Continue metformin at 500 mg ER BID    - Emphasized importance of eating smaller, more frequent meals throughout the day to minimize risk of over eating. Check: Vitals, Weight, HbA1c and Medication Adherence at the next visit. Medication reconciliation was completed during the visit. There are no discontinued medications. Notifications of recommendations will be sent to Dr. Etelvina Xiong DO for review.     Will follow up with patient in 2-3 week(s) telephonically    Thank you for the consult,  Tavia Marina, PharmD, BCACP, CDE                                                      CLINICAL PHARMACY CONSULT: MED RECONCILIATION/REVIEW ADDENDUM    For Pharmacy Admin Tracking Only    PHSO: PHSO Patient?: Yes  Total # of Interventions Recommended: Count: 1  - Increased Dose #: 1    20 mins

## 2020-06-02 ENCOUNTER — TELEPHONE (OUTPATIENT)
Dept: INTERNAL MEDICINE CLINIC | Age: 65
End: 2020-06-02

## 2020-06-02 DIAGNOSIS — B35.1 ONYCHOMYCOSIS: Primary | ICD-10-CM

## 2020-06-02 RX ORDER — TERBINAFINE HYDROCHLORIDE 250 MG/1
250 TABLET ORAL DAILY
Qty: 30 TAB | Refills: 0 | OUTPATIENT
Start: 2020-06-02

## 2020-06-02 NOTE — TELEPHONE ENCOUNTER
Pharmacy Progress Note - Telephone Call    Ms. Stevenson Slot 77 y.o. was contacted via an outbound telephone call regarding her Linzess PAP today. A voicemail was left for patient to return my call.      Linzess 290 mcg is now available for     Thank you,  Tavia Menard, JayleenD, BCACP, CDE          CLINICAL PHARMACY CONSULT: MED RECONCILIATION/REVIEW ADDENDUM    For Pharmacy Admin Tracking Only    PHSO: PHSO Patient?: Yes  Total # of Interventions Recommended: Count: 1    Time Spent (min): 5

## 2020-06-02 NOTE — TELEPHONE ENCOUNTER
Pharmacy Progress Note - Telephone Encounter    S/O: Ms. Madelene Riedel 77 y.o. female contacted office/me via an inbound telephone call today. Verified patients identifiers (name & ) per HIPAA policy.     - Shared updates regarding [facility] Vivas. - Pt states she will  this PAP supply. - Patient endorses understanding to the provided information. All questions answered at this time.        Thank you,  Tavia Jolley, PharmD, BCACP, CDE

## 2020-06-03 ENCOUNTER — HOSPITAL ENCOUNTER (OUTPATIENT)
Dept: LAB | Age: 65
Discharge: HOME | End: 2020-06-03
Payer: MEDICARE

## 2020-06-03 PROCEDURE — 80076 HEPATIC FUNCTION PANEL: CPT

## 2020-06-03 PROCEDURE — 36415 COLL VENOUS BLD VENIPUNCTURE: CPT

## 2020-06-04 LAB
ALBUMIN SERPL-MCNC: 3.9 G/DL (ref 3.8–4.8)
ALP SERPL-CCNC: 85 IU/L (ref 39–117)
ALT SERPL-CCNC: 15 IU/L (ref 0–32)
AST SERPL-CCNC: 13 IU/L (ref 0–40)
BILIRUB DIRECT SERPL-MCNC: 0.09 MG/DL (ref 0–0.4)
BILIRUB SERPL-MCNC: 0.4 MG/DL (ref 0–1.2)
PROT SERPL-MCNC: 6.4 G/DL (ref 6–8.5)

## 2020-06-04 RX ORDER — TERBINAFINE HYDROCHLORIDE 250 MG/1
250 TABLET ORAL DAILY
Qty: 30 TAB | Refills: 1 | Status: SHIPPED | OUTPATIENT
Start: 2020-06-04 | End: 2020-07-28 | Stop reason: SDUPTHER

## 2020-06-04 NOTE — PROGRESS NOTES
Called, spoke to pt. Two identifiers confirmed. Pt notified of results/recommendations per Dr. Sindhu Holder. Pt verbalized understanding of information discussed w/ no further questions at this time.

## 2020-06-22 ENCOUNTER — VIRTUAL VISIT (OUTPATIENT)
Dept: INTERNAL MEDICINE CLINIC | Age: 65
End: 2020-06-22

## 2020-06-22 ENCOUNTER — TELEPHONE (OUTPATIENT)
Dept: INTERNAL MEDICINE CLINIC | Age: 65
End: 2020-06-22

## 2020-06-22 DIAGNOSIS — B35.1 ONYCHOMYCOSIS: ICD-10-CM

## 2020-06-22 DIAGNOSIS — N32.81 OAB (OVERACTIVE BLADDER): ICD-10-CM

## 2020-06-22 DIAGNOSIS — M81.0 OSTEOPOROSIS WITHOUT CURRENT PATHOLOGICAL FRACTURE, UNSPECIFIED OSTEOPOROSIS TYPE: ICD-10-CM

## 2020-06-22 DIAGNOSIS — E66.01 SEVERE OBESITY (HCC): ICD-10-CM

## 2020-06-22 DIAGNOSIS — I10 ESSENTIAL HYPERTENSION: ICD-10-CM

## 2020-06-22 DIAGNOSIS — Z00.00 MEDICARE ANNUAL WELLNESS VISIT, SUBSEQUENT: Primary | ICD-10-CM

## 2020-06-22 DIAGNOSIS — Z79.4 UNCONTROLLED TYPE 2 DIABETES MELLITUS WITH HYPERGLYCEMIA, WITH LONG-TERM CURRENT USE OF INSULIN (HCC): ICD-10-CM

## 2020-06-22 DIAGNOSIS — M25.552 HIP PAIN, BILATERAL: ICD-10-CM

## 2020-06-22 DIAGNOSIS — E78.5 HYPERLIPIDEMIA ASSOCIATED WITH TYPE 2 DIABETES MELLITUS (HCC): ICD-10-CM

## 2020-06-22 DIAGNOSIS — E11.65 UNCONTROLLED TYPE 2 DIABETES MELLITUS WITH HYPERGLYCEMIA, WITH LONG-TERM CURRENT USE OF INSULIN (HCC): ICD-10-CM

## 2020-06-22 DIAGNOSIS — M25.551 HIP PAIN, BILATERAL: ICD-10-CM

## 2020-06-22 DIAGNOSIS — E11.21 TYPE 2 DIABETES WITH NEPHROPATHY (HCC): ICD-10-CM

## 2020-06-22 DIAGNOSIS — E03.9 ACQUIRED HYPOTHYROIDISM: ICD-10-CM

## 2020-06-22 DIAGNOSIS — E11.69 HYPERLIPIDEMIA ASSOCIATED WITH TYPE 2 DIABETES MELLITUS (HCC): ICD-10-CM

## 2020-06-22 NOTE — PATIENT INSTRUCTIONS
Office Policies Phone calls/patient messages: Please allow up to 24 hours for someone in the office to contact you about your call or message. Be mindful your provider may be out of the office or your message may require further review. We encourage you to use [employer] for your messages as this is a faster, more efficient way to communicate with our office Medication Refills: 
         
Prescription medications require 48-72 business hours to process. We encourage you to use [employer] for your refills. For controlled medications: Please allow 72 business hours to process. Certain medications may require you to  a written prescription at our office. NO narcotic/controlled medications will be prescribed after 4pm Monday through Friday or on weekends Form/Paperwork Completion: 
         
Please note a $25 fee may incur for all paperwork for completed by our providers. We ask that you allow 7-10 business days. Pre-payment is due prior to picking up/faxing the completed form. You may also download your forms to [employer] to have your doctor print off. This is an established visit conducted via telemedicine. The patient has been instructed that this meets HIPAA criteria and acknowledges and agrees to this method of visitation. Panda Carl LPN 
36/96/92 
0:93 PM 
 
Medicare Wellness Visit, Female The best way to live healthy is to have a lifestyle where you eat a well-balanced diet, exercise regularly, limit alcohol use, and quit all forms of tobacco/nicotine, if applicable. Regular preventive services are another way to keep healthy. Preventive services (vaccines, screening tests, monitoring & exams) can help personalize your care plan, which helps you manage your own care. Screening tests can find health problems at the earliest stages, when they are easiest to treat. [facility] follows the current, evidence-based guidelines published by the [facility] ([facility]) when recommending preventive services for our patients. Because we follow these guidelines, sometimes recommendations change over time as research supports it. (For example, mammograms used to be recommended annually. Even though Medicare will still pay for an annual mammogram, the newer guidelines recommend a mammogram every two years for women of average risk). Of course, you and your doctor may decide to screen more often for some diseases, based on your risk and your co-morbidities (chronic disease you are already diagnosed with). Preventive services for you include: - Medicare offers their members a free annual wellness visit, which is time for you and your primary care provider to discuss and plan for your preventive service needs. Take advantage of this benefit every year! 
-All adults over the age of 72 should receive the recommended pneumonia vaccines. Current USPSTF guidelines recommend a series of two vaccines for the best pneumonia protection.  
-All adults should have a flu vaccine yearly and a tetanus vaccine every 10 years.  
-All adults age 48 and older should receive the shingles vaccines (series of two vaccines).      
-All adults age 38-68 who are overweight should have a diabetes screening test once every three years.  
-All adults born between [birth year] and 1967 should be screened once for Hepatitis C. 
-Other screening tests and preventive services for persons with diabetes include: an eye exam to screen for diabetic retinopathy, a kidney function test, a foot exam, and stricter control over your cholesterol.  
-Cardiovascular screening for adults with routine risk involves an electrocardiogram (ECG) at intervals determined by your doctor.  
-Colorectal cancer screenings should be done for adults age 54-65 with no increased risk factors for colorectal cancer. There are a number of acceptable methods of screening for this type of cancer. Each test has its own benefits and drawbacks. Discuss with your doctor what is most appropriate for you during your annual wellness visit. The different tests include: colonoscopy (considered the best screening method), a fecal occult blood test, a fecal DNA test, and sigmoidoscopy. 
 
-A bone mass density test is recommended when a woman turns 65 to screen for osteoporosis. This test is only recommended one time, as a screening. Some providers will use this same test as a disease monitoring tool if you already have osteoporosis. -Breast cancer screenings are recommended every other year for women of normal risk, age 54-69. 
-Cervical cancer screenings for women over age 72 are only recommended with certain risk factors. Here is a list of your current Health Maintenance items (your personalized list of preventive services) with a due date: 
Health Maintenance Due Topic Date Due  Shingles Vaccine (1 of 2) 01/19/2003 [address] [place] [address] Diabetic Foot Care  11/12/2019 [address] [place] [address] Annual Well Visit  06/20/2020

## 2020-06-22 NOTE — PROGRESS NOTES
Ghazal Bethea is a 77 y.o. female who was seen by synchronous (real-time) audio-video technology on 6/22/2020. Consent: Ghazal Bethea, who was seen by synchronous (real-time) audio-video technology, and/or her healthcare decision maker, is aware that this patient-initiated, Telehealth encounter on 6/22/2020 is a billable service, with coverage as determined by her insurance carrier. She is aware that she may receive a bill and has provided verbal consent to proceed: Yes. Assessment & Plan:   Diagnoses and all orders for this visit:    1. Medicare annual wellness visit, subsequent    2. Type 2 diabetes with nephropathy ([facility] [address] [facility]    3. Severe obesity ([facility] [address]    4. Uncontrolled type 2 diabetes mellitus with hyperglycemia, with long-term current use of insulin ([facility] [address]    5. Onychomycosis    6. Acquired hypothyroidism    7. Essential hypertension    8. OAB (overactive bladder)    9. Osteoporosis without current pathological fracture, unspecified osteoporosis type        I spent at least 35 minutes on this visit with this established patient. [id number]  Subjective: Ghazal Bethea is a 77 y.o. female who was seen for Annual Wellness Visit    Last seen by me dec 20, 2019. Overall doing well, though still has lots of orthopedics aches and pains. Did not find dr Zeferino Grier very helpful. No longer taking lipitor, but diabetes has improved since working with dr glynn, our pharmd.  This is a virtual visit due to covid 23. Prior to Admission medications    Medication Sig Start Date End Date Taking? Authorizing Provider   terbinafine HCL (LAMISIL) 250 mg tablet Take 1 Tab by mouth daily. 6/4/20  Yes Soham Cameron III, DO   levothyroxine (SYNTHROID) 100 mcg tablet Take 1 Tab by mouth See Admin Instructions. Take 1 tablet (100 mcg) by mouth daily with 300 mcg dosage strength to total 400 mcg daily. Patient taking differently: Take 100 mcg by mouth See Admin Instructions.  Take 1 tablet (100 mcg) by mouth daily except for Sunday with 300 mcg dosage strength to total 400 mcg daily. None on Sunday. 3/16/20  Yes Soham Cameron III,    lisinopril (PRINIVIL, ZESTRIL) 5 mg tablet TAKE 1 TABLET DAILY 2/7/20  Yes Soham Cameron III, DO   ALPRAZolam (XANAX) 0.25 mg tablet Take 1 Tab by mouth daily as needed for Anxiety. Indications: anxious 1/24/20  Yes Daron Moore, DO   NOVOFINE PLUS 32 gauge x 1/6\" ndle Check sugars 4x daily. 12/20/19  Yes Soham Cameron III, DO   levothyroxine (SYNTHROID) 300 mcg tablet Take 1 Tab by mouth See Admin Instructions. Take 1 tablet (100 mcg) daily with 300 mcg dosage strength to total 400 mcg daily. Patient taking differently: Take 300 mcg by mouth See Admin Instructions. Take 1 tablet (100 mcg) by mouth daily except for Sunday with 300 mcg dosage strength to total 400 mcg daily. None on Omid 10/1/19  Yes Soham Cameron III,    metFORMIN ER (GLUCOPHAGE XR) 500 mg tablet Take 1 Tab by mouth two (2) times a day. 9/26/19  Yes Soham Cameron III, DO   furosemide (LASIX) 20 mg tablet Take 1 Tab by mouth daily as needed (for edema). 9/3/19  Yes Soham Cameron III, DO   TOUJEO SOLOSTAR U-300 INSULIN 300 unit/mL (1.5 mL) inpn pen 26 Units by SubCUTAneous route daily. Patient taking differently: 28 Units by SubCUTAneous route daily. 9/3/19  Yes Soham Cameron III, DO   HUMALOG KWIKPEN INSULIN 100 unit/mL kwikpen 4-20 Units by SubCUTAneous route Before breakfast, lunch, and dinner. Patient taking differently: 6-8 Units by SubCUTAneous route Before breakfast, lunch, and dinner. 8/8/19  Yes Soham Cameron III, DO   lactulose (CHRONULAC) 10 gram/15 mL solution Take 20 g by mouth three (3) times daily as needed. Taking 10 ml TID as needed ; taking every other day   Yes Provider, Historical   calcium polycarbophil (FIBER LAXATIVE, CA POLYCARBO,) 625 mg tablet Take 625 mg by mouth daily.    Yes Provider, Historical   multivitamin (ONE A DAY) tablet Take 1 Tab by mouth daily. Yes Provider, Historical   trospium (SANCTURA) 20 mg tablet Take 20 mg by mouth Before breakfast and dinner. 6/18/19  Yes Provider, Historical   linaclotide (LINZESS) 290 mcg cap capsule Take 290 mcg by mouth Daily (before breakfast). Yes Provider, Historical   buPROPion XL (WELLBUTRIN XL) 150 mg tablet TAKE 1 TABLET EVERY MORNING 4/21/19  Yes Soham Cameron III, DO   acetaminophen (TYLENOL) 500 mg tablet Take 1 Tab by mouth every six (6) hours as needed for Pain. 1/3/19  Yes Vandana Harry NP   cholecalciferol (VITAMIN D3) 1,000 unit tablet Take 2,000 Units by mouth daily. Yes Provider, Historical   B.infantis-B.ani-B.long-B.bifi (PROBIOTIC 4X) 10-15 mg TbEC Take 2 Gum by mouth daily. Yes Other, MD Eryn   meloxicam (MOBIC) 7.5 mg tablet Take 2 Tabs by mouth daily. 9/3/19 9/2/20  Tacos FU III, DO   atorvastatin (LIPITOR) 80 mg tablet Take 1 Tab by mouth daily. 11/19/18   Tacos Horton III, DO     Allergies   Allergen Reactions    Celebrex [Celecoxib] Other (comments)     Hallucinations    Codeine Nausea and Vomiting    Erythromycin Nausea and Vomiting           ROS      Objective: There were no vitals taken for this visit. General: alert, cooperative, no distress   Mental  status: normal mood, behavior, speech, dress, motor activity, and thought processes, able to follow commands   HENT: NCAT   Neck: no visualized mass   Resp: no respiratory distress   Neuro: no gross deficits   Skin: no discoloration or lesions of concern on visible areas   Psychiatric: normal affect, consistent with stated mood, no evidence of hallucinations     Additional exam findings:     1. Uncontrolled dm, type 2--check a1c, urine micro. On toujeo, glucophage, meal time humalog  2.  hyperlipids--off lipitor, check flp, cmp  3. Bilateral hip and knee pains--referral to ortho, dr George Russ. She asks about getting handicap placard--I declined for now.   4. Hypothyroid--on synthroid, check tsh  5. htn--continue lasix, lisinopril  6. Onychomycosis--check flp, on lamisil  7.  oab--continue sanctura  8.  dionisio--not sure if she is using her cpap  9. anxieity and depression--continue wellbutrin, prn xanax  10. Post menopausal bone loss--check dexa scan    Reminded to get shingrix. rtc 6 months. We discussed the expected course, resolution and complications of the diagnosis(es) in detail. Medication risks, benefits, costs, interactions, and alternatives were discussed as indicated. I advised her to contact the office if her condition worsens, changes or fails to improve as anticipated. She expressed understanding with the diagnosis(es) and plan. Karlene Ann is a 77 y.o. female who was evaluated by a video visit encounter for concerns as above. Patient identification was verified prior to start of the visit. A caregiver was present when appropriate. Due to this being a TeleHealth encounter (During [facility] public [facility] emergency), evaluation of the following organ systems was limited: Vitals/Constitutional/EENT/Resp/CV/GI//MS/Neuro/Skin/Heme-Lymph-Imm. Pursuant to the emergency declaration under the [facility], [facility]5 waiver authority and the [employer] Act, this Virtual  Visit was conducted, with patient's (and/or legal guardian's) consent, to reduce the patient's risk of exposure to COVID-19 and provide necessary medical care. Services were provided through a video synchronous discussion virtually to substitute for in-person clinic visit. Patient and provider were located at their individual homes. Kadie Allred III, DO          This is the Subsequent Medicare Annual Wellness Exam, performed 12 months or more after the Initial AWV or the last Subsequent AWV    Consent:  Karlene Ann, who was seen by synchronous (real-time) audio-video technology, and/or her healthcare decision maker, is aware that this patient-initiated, Telehealth encounter on 6/22/2020 is a billable service. While AWVs are fully covered by Medicare, any services rendered on this date that are not included in an AWV are subject to additional billing, with coverage as determined by her insurance carrier. She is aware that she may receive a bill for any such additional services and has provided verbal consent to proceed: Yes. I have reviewed the patient's medical history in detail and updated the computerized patient record. History     Patient Active Problem List   Diagnosis Code    Acquired hypothyroidism E03.9    Osteoporosis M81.0    OAB (overactive bladder) N32.81    Essential hypertension I10    Depression F32.9    Obesity (BMI 30-39. 9) E66.9    Uncontrolled type 2 diabetes mellitus with hyperglycemia, with long-term current use of insulin ([facility]) E11.65, Z79.4    Hyperlipidemia associated with type 2 diabetes mellitus ([facility] [address] E11.69, E78.5    Type 2 diabetes with nephropathy ([facility]) E11.21    Severe obesity ([facility] [address] E66.01     Past Medical History:   Diagnosis Date    Depression     Diabetes ([facility] [address]     Hypercholesteremia     Hypertension     Hyperthyroidism     IBS (irritable bowel syndrome)     Urinary incontinence       Past Surgical History:   Procedure Laterality Date    HX BACK SURGERY      x2    HX BLEPHAROPLASTY Right     HX BUNIONECTOMY      HX HYSTERECTOMY      HX OTHER SURGICAL      Collapsed lung    HX WISDOM TEETH EXTRACTION       Current Outpatient Medications   Medication Sig Dispense Refill    terbinafine HCL (LAMISIL) 250 mg tablet Take 1 Tab by mouth daily. 30 Tab 1    levothyroxine (SYNTHROID) 100 mcg tablet Take 1 Tab by mouth See Admin Instructions. Take 1 tablet (100 mcg) by mouth daily with 300 mcg dosage strength to total 400 mcg daily. (Patient taking differently: Take 100 mcg by mouth See Admin Instructions.  Take 1 tablet (100 mcg) by mouth daily except for Sunday with 300 mcg dosage strength to total 400 mcg daily. None on Sunday.) 90 Tab 3    lisinopril (PRINIVIL, ZESTRIL) 5 mg tablet TAKE 1 TABLET DAILY 90 Tab 4    ALPRAZolam (XANAX) 0.25 mg tablet Take 1 Tab by mouth daily as needed for Anxiety. Indications: anxious 10 Tab 0    NOVOFINE PLUS 32 gauge x 1/6\" ndle Check sugars 4x daily. 300 Pen Needle 3    levothyroxine (SYNTHROID) 300 mcg tablet Take 1 Tab by mouth See Admin Instructions. Take 1 tablet (100 mcg) daily with 300 mcg dosage strength to total 400 mcg daily. (Patient taking differently: Take 300 mcg by mouth See Admin Instructions. Take 1 tablet (100 mcg) by mouth daily except for Sunday with 300 mcg dosage strength to total 400 mcg daily. None on Sunday) 60 Tab 1    metFORMIN ER (GLUCOPHAGE XR) 500 mg tablet Take 1 Tab by mouth two (2) times a day. 180 Tab 11    furosemide (LASIX) 20 mg tablet Take 1 Tab by mouth daily as needed (for edema). 90 Tab 0    TOUJEO SOLOSTAR U-300 INSULIN 300 unit/mL (1.5 mL) inpn pen 26 Units by SubCUTAneous route daily. (Patient taking differently: 28 Units by SubCUTAneous route daily. ) 15 mL 0    HUMALOG KWIKPEN INSULIN 100 unit/mL kwikpen 4-20 Units by SubCUTAneous route Before breakfast, lunch, and dinner. (Patient taking differently: 6-8 Units by SubCUTAneous route Before breakfast, lunch, and dinner.) 15 mL 0    lactulose (CHRONULAC) 10 gram/15 mL solution Take 20 g by mouth three (3) times daily as needed. Taking 10 ml TID as needed ; taking every other day      calcium polycarbophil (FIBER LAXATIVE, CA POLYCARBO,) 625 mg tablet Take 625 mg by mouth daily.  multivitamin (ONE A DAY) tablet Take 1 Tab by mouth daily.  trospium (SANCTURA) 20 mg tablet Take 20 mg by mouth Before breakfast and dinner.  linaclotide (LINZESS) 290 mcg cap capsule Take 290 mcg by mouth Daily (before breakfast).       buPROPion XL (WELLBUTRIN XL) 150 mg tablet TAKE 1 TABLET EVERY MORNING 90 Tab 3  acetaminophen (TYLENOL) 500 mg tablet Take 1 Tab by mouth every six (6) hours as needed for Pain. 30 Tab 0    cholecalciferol (VITAMIN D3) 1,000 unit tablet Take 2,000 Units by mouth daily.  B.infantis-B.ani-B.long-B.bifi (PROBIOTIC 4X) 10-15 mg TbEC Take 2 Gum by mouth daily.  meloxicam (MOBIC) 7.5 mg tablet Take 2 Tabs by mouth daily. 180 Tab 3    atorvastatin (LIPITOR) 80 mg tablet Take 1 Tab by mouth daily. 90 Tab 3     Allergies   Allergen Reactions    Celebrex [Celecoxib] Other (comments)     Hallucinations    Codeine Nausea and Vomiting    Erythromycin Nausea and Vomiting       Family History   Problem Relation Age of Onset    Diabetes Mother     Heart Disease Mother     Cancer Father         pancreatic    Diabetes Sister     Anxiety Sister     Diabetes Brother     Anxiety Brother     Diabetes Brother     Anxiety Brother     Diabetes Sister     Anxiety Sister     Diabetes Sister     Anxiety Sister     Cancer Sister         lung and brain    Anxiety Sister     Anxiety Sister     Breast Cancer Paternal Aunt         under 48     Social History     Tobacco Use    Smoking status: Never Smoker    Smokeless tobacco: Never Used   Substance Use Topics    Alcohol use: Yes     Frequency: 2-3 times a week     Comment: occasionally       Depression Risk Factor Screening:     3 most recent PHQ Screens 6/22/2020   Little interest or pleasure in doing things Not at all   Feeling down, depressed, irritable, or hopeless Not at all   Total Score PHQ 2 0       Alcohol Risk Factor Screening:   Do you average 1 drink per night or more than 7 drinks a week:  No    On any one occasion in the past three months have you have had more than 3 drinks containing alcohol:  No      Functional Ability and Level of Safety:   Hearing: Hearing is good. Activities of Daily Living:   The home contains: chair lift  Patient does total self care     Ambulation: with mild difficulty, but does not need any devices. Fall Risk:  Fall Risk Assessment, last 12 mths 6/22/2020   Able to walk? Yes   Fall in past 12 months? No     Abuse Screen:  Patient is not abused       Cognitive Screening   Has your family/caregiver stated any concerns about your memory: no    Cognitive Screening: Normal - MMSE (Mini Mental Status Exam)    Patient Care Team   Patient Care Team:  Mayank Roe DO as PCP - General (Internal Medicine)  Mayank Roe DO as PCP - [facility] Empaneled Provider  Abida Galvan MD (Obstetrics & Gynecology)  Manjula Rutledge MD (Orthopedic Surgery)  Dennis Carbone MD (Ophthalmology)  Mian Harvey DO (Pain Management)  Dragan Fontenot MD (Cardiology)  Ashwini Rees MD (Gastroenterology)  Fletcher Garcia MD (Orthopedic Surgery)  Jessica Hunter MD (Endocrinology)  Matt Mejia as Pharmacist (Internal Medicine)  Kelby Sena MD (Ophthalmology)    Assessment/Plan   Education and counseling provided:  Are appropriate based on today's review and evaluation  End-of-Life planning (with patient's consent)  Pneumococcal Vaccine  Influenza Vaccine  Screening Mammography  Bone mass measurement (DEXA)    Diagnoses and all orders for this visit:    1. Medicare annual wellness visit, subsequent        Health Maintenance Due   Topic Date Due    Shingrix Vaccine Age 49> (1 of 2) 01/19/2003    Foot Exam Q1  11/12/2019    Medicare Yearly Exam  06/20/2020       Alirio Graf is a 77 y.o. female who was evaluated by an audio-video encounter for concerns as above. Patient identification was verified prior to start of the visit. A caregiver was present when appropriate. Due to this being a TeleHealth encounter (During [facility] public health emergency), evaluation of the following organ systems was limited: Vitals/Constitutional/EENT/Resp/CV/GI//MS/Neuro/Skin/Heme-Lymph-Imm.   Pursuant to the emergency declaration under the [facility], [address] waiver authority and the [employer] Act, this Virtual Visit was conducted, with patient's (and/or legal guardian's) consent, to reduce the patient's risk of exposure to COVID-19 and provide necessary medical care. Services were provided through a synchronous discussion virtually to substitute for in-person clinic visit. I was in the office. The patient was at home.       Mady Andrews III, DO

## 2020-06-23 ENCOUNTER — TELEPHONE (OUTPATIENT)
Dept: INTERNAL MEDICINE CLINIC | Age: 65
End: 2020-06-23

## 2020-06-23 ENCOUNTER — HOSPITAL ENCOUNTER (OUTPATIENT)
Dept: LAB | Age: 65
Discharge: HOME | End: 2020-06-23
Payer: MEDICARE

## 2020-06-23 PROCEDURE — 84439 ASSAY OF FREE THYROXINE: CPT

## 2020-06-23 PROCEDURE — 80061 LIPID PANEL: CPT

## 2020-06-23 PROCEDURE — 82043 UR ALBUMIN QUANTITATIVE: CPT

## 2020-06-23 PROCEDURE — 80053 COMPREHEN METABOLIC PANEL: CPT

## 2020-06-23 PROCEDURE — 83036 HEMOGLOBIN GLYCOSYLATED A1C: CPT

## 2020-06-23 PROCEDURE — 84443 ASSAY THYROID STIM HORMONE: CPT

## 2020-06-23 PROCEDURE — 36415 COLL VENOUS BLD VENIPUNCTURE: CPT

## 2020-06-23 PROCEDURE — 85025 COMPLETE CBC W/AUTO DIFF WBC: CPT

## 2020-06-23 NOTE — TELEPHONE ENCOUNTER
----- Message from Page Ivory sent at 6/23/2020 11:49 AM EDT -----  Regarding: Dr. Lotus Espinal first and last name:   Viridiana Hill  Reason for call:  wanted to see if labwork was sent over to [facility] before pt would go to there  Callback required yes/no and why: yes   Best contact number(s): [phone]  Details to clarify the request:

## 2020-06-24 LAB
ALBUMIN SERPL-MCNC: 4.3 G/DL (ref 3.8–4.8)
ALBUMIN/CREAT UR: 3 MG/G CREAT (ref 0–29)
ALBUMIN/GLOB SERPL: 1.9 {RATIO} (ref 1.2–2.2)
ALP SERPL-CCNC: 96 IU/L (ref 39–117)
ALT SERPL-CCNC: 16 IU/L (ref 0–32)
AST SERPL-CCNC: 9 IU/L (ref 0–40)
BASOPHILS # BLD AUTO: 0.1 X10E3/UL (ref 0–0.2)
BASOPHILS NFR BLD AUTO: 1 %
BILIRUB SERPL-MCNC: 0.4 MG/DL (ref 0–1.2)
BUN SERPL-MCNC: 15 MG/DL (ref 8–27)
BUN/CREAT SERPL: 17 (ref 12–28)
CALCIUM SERPL-MCNC: 9.7 MG/DL (ref 8.7–10.3)
CHLORIDE SERPL-SCNC: 101 MMOL/L (ref 96–106)
CHOLEST SERPL-MCNC: 188 MG/DL (ref 100–199)
CO2 SERPL-SCNC: 23 MMOL/L (ref 20–29)
CREAT SERPL-MCNC: 0.9 MG/DL (ref 0.57–1)
CREAT UR-MCNC: 164.9 MG/DL
EOSINOPHIL # BLD AUTO: 0.1 X10E3/UL (ref 0–0.4)
EOSINOPHIL NFR BLD AUTO: 2 %
ERYTHROCYTE [DISTWIDTH] IN BLOOD BY AUTOMATED COUNT: 12.7 % (ref 11.7–15.4)
EST. AVERAGE GLUCOSE BLD GHB EST-MCNC: 194 MG/DL
GLOBULIN SER CALC-MCNC: 2.3 G/DL (ref 1.5–4.5)
GLUCOSE SERPL-MCNC: 250 MG/DL (ref 65–99)
HBA1C MFR BLD: 8.4 % (ref 4.8–5.6)
HCT VFR BLD AUTO: 35.2 % (ref 34–46.6)
HDLC SERPL-MCNC: 60 MG/DL
HGB BLD-MCNC: 12.3 G/DL (ref 11.1–15.9)
IMM GRANULOCYTES # BLD AUTO: 0 X10E3/UL (ref 0–0.1)
IMM GRANULOCYTES NFR BLD AUTO: 0 %
LDLC SERPL CALC-MCNC: 108 MG/DL (ref 0–99)
LYMPHOCYTES # BLD AUTO: 2.3 X10E3/UL (ref 0.7–3.1)
LYMPHOCYTES NFR BLD AUTO: 35 %
MCH RBC QN AUTO: 30 PG (ref 26.6–33)
MCHC RBC AUTO-ENTMCNC: 34.9 G/DL (ref 31.5–35.7)
MCV RBC AUTO: 86 FL (ref 79–97)
MICROALBUMIN UR-MCNC: 5.6 UG/ML
MONOCYTES # BLD AUTO: 0.5 X10E3/UL (ref 0.1–0.9)
MONOCYTES NFR BLD AUTO: 8 %
NEUTROPHILS # BLD AUTO: 3.6 X10E3/UL (ref 1.4–7)
NEUTROPHILS NFR BLD AUTO: 54 %
PLATELET # BLD AUTO: 144 X10E3/UL (ref 150–450)
POTASSIUM SERPL-SCNC: 4.1 MMOL/L (ref 3.5–5.2)
PROT SERPL-MCNC: 6.6 G/DL (ref 6–8.5)
RBC # BLD AUTO: 4.1 X10E6/UL (ref 3.77–5.28)
SODIUM SERPL-SCNC: 138 MMOL/L (ref 134–144)
T4 FREE SERPL-MCNC: 2.38 NG/DL (ref 0.82–1.77)
TRIGL SERPL-MCNC: 101 MG/DL (ref 0–149)
TSH SERPL DL<=0.005 MIU/L-ACNC: 0.02 UIU/ML (ref 0.45–4.5)
VLDLC SERPL CALC-MCNC: 20 MG/DL (ref 5–40)
WBC # BLD AUTO: 6.6 X10E3/UL (ref 3.4–10.8)

## 2020-06-25 ENCOUNTER — TELEPHONE (OUTPATIENT)
Dept: INTERNAL MEDICINE CLINIC | Age: 65
End: 2020-06-25

## 2020-06-25 RX ORDER — LEVOTHYROXINE SODIUM 300 UG/1
300 TABLET ORAL DAILY
Qty: 60 TAB | Refills: 1
Start: 2020-06-25 | End: 2021-06-16

## 2020-06-25 NOTE — TELEPHONE ENCOUNTER
Pharmacy Progress Note - Telephone Encounter    S/O: Ms. Twan Severino 77 y.o. female contacted office/me via an inbound telephone call to discuss her recent lab results today. Verified patients identifiers (name & ) per HIPAA policy. - Reports she went to the podiatrist today for an ingrown toenail.    - Has not been feeling well. - Reports to giving Toujeo 28 units daily. States Aly [place] has been \"stinging more\" even w/ rotating sites. - Continues with Humalog dose. 6 units TID - if pre-meal BG < 130, give 2 units.     - Admits several readings of 200-300s this past three days. Averaging between 8-10 units of Humalog.     - Reports to giving levothyroxine (has 100 mcg and 300 mcg tab strengths): 400 mg on Monday - Saturday. None on .   - Reports Dr. Jenny Llamas wants for her to try a different statin. She has been off of lipitor for at least 3 months. Could not afford crestor. Wt Readings from Last 3 Encounters:   20 206 lb 12.8 oz (93.8 kg)   19 203 lb (92.1 kg)   19 211 lb (95.7 kg)     Lab Results   Component Value Date/Time    Sodium 138 2020 01:20 PM    Potassium 4.1 2020 01:20 PM    Chloride 101 2020 01:20 PM    CO2 23 2020 01:20 PM    Anion gap 6 01/10/2019 08:37 AM    Glucose 250 (H) 2020 01:20 PM    BUN 15 2020 01:20 PM    Creatinine 0.90 2020 01:20 PM    BUN/Creatinine ratio 17 2020 01:20 PM    GFR est AA 77 2020 01:20 PM    GFR est non-AA 66 2020 01:20 PM    Calcium 9.7 2020 01:20 PM    Bilirubin, total 0.4 2020 01:20 PM    Alk.  phosphatase 96 2020 01:20 PM    Protein, total 6.6 2020 01:20 PM    Albumin 4.3 2020 01:20 PM    Globulin 3.8 01/10/2019 08:37 AM    A-G Ratio 1.9 2020 01:20 PM    ALT (SGPT) 16 2020 01:20 PM    AST (SGOT) 9 2020 01:20 PM     Lab Results   Component Value Date/Time    Cholesterol, total 188 2020 01:20 PM    HDL Cholesterol 60 06/23/2020 01:20 PM    LDL, calculated 108 (H) 06/23/2020 01:20 PM    VLDL, calculated 20 06/23/2020 01:20 PM    Triglyceride 101 06/23/2020 01:20 PM     Lab Results   Component Value Date/Time    TSH 0.022 (L) 06/23/2020 01:20 PM    TSH <0.006 (L) 09/25/2019 10:56 AM     Lab Results   Component Value Date/Time    Hemoglobin A1c 8.4 (H) 06/23/2020 01:20 PM    Hemoglobin A1c 7.4 (H) 09/25/2019 10:56 AM    Hemoglobin A1c 10.2 (H) 06/20/2019 08:50 AM     Estimated Creatinine Clearance: 68.7 mL/min (by C-G formula based on SCr of 0.9 mg/dL). A/P:  - Discuss patient's recent lab results. A1c above goal of <7%. LDL above goal < 70. Would recommend resuming statin therapy   - Given Toujeo's acidic profile, will see if she would tolerate Jenna Graces. Can provide sample. - Continue with Humalog dose for now. - Given current TSH level, decrease levothyroxine to 300 mg daily (~10% reduction from current dose). - Patient endorses understanding to the provided information. All questions answered at this time.        Thank you,  Tavia Araiza, PharmD, BCACP, CDE     Medications Discontinued During This Encounter   Medication Reason    levothyroxine (SYNTHROID) 100 mcg tablet Dose Adjustment    levothyroxine (SYNTHROID) 300 mcg tablet                        CLINICAL PHARMACY CONSULT: MED RECONCILIATION/REVIEW ADDENDUM    For Pharmacy Admin Tracking Only    PHSO: PHSO Patient?: Yes  Total # of Interventions Recommended: Count: 3  - Discontinued Medication #: 2 Discontinue Reason(s): Needs Additional Medication Therapy  - New Order #: 1 New Medication Order Reason(s): Needs Additional Medication Therapy    Time Spent (min): 15

## 2020-06-25 NOTE — TELEPHONE ENCOUNTER
Pharmacy Progress Note - Telephone Call    Ms. Christena Nissen 77 y.o. was contacted via an outbound telephone call regarding her recent lab results today. A voicemail was left for patient to return my call.        Thank you,  Tavia Mata, PharmD, BCACP, CDE          CLINICAL PHARMACY CONSULT: MED RECONCILIATION/REVIEW ADDENDUM    For Pharmacy Admin Tracking Only    PHSO: PHSO Patient?: Yes

## 2020-06-25 NOTE — PROGRESS NOTES
Dm bit worse, but still markedly improved. Thyroid dose appears to still be bit too much. Would decrease dose by 10% (could you figure out how to do that for her).

## 2020-06-30 RX ORDER — INSULIN DEGLUDEC INJECTION 100 U/ML
INJECTION, SOLUTION SUBCUTANEOUS
Qty: 1 PEN | Refills: 0 | Status: SHIPPED | COMMUNITY
Start: 2020-06-30 | End: 2020-09-16

## 2020-06-30 NOTE — PROGRESS NOTES
Pharmacy Progress Note     Pt picked up Tresiba sample to replace Toujeo and Linzess PAP medications today. Pt reports Dr. Shaikh Oar adjusted levothyroxine to 400 mcg daily - Monday to Friday. None on Saturday and Sunday.        Thank you for the consult,  Tavia Schuler, PharmD, BCACP, CDE         CLINICAL PHARMACY CONSULT: MED RECONCILIATION/REVIEW ADDENDUM    For Pharmacy Admin Tracking Only    PHSO: PHSO Patient?: Yes  Total # of Interventions Recommended: Count: 1  - New Order #: 1 New Medication Order Reason(s): Cost Conversion    Time Spent (min): 15

## 2020-07-08 DIAGNOSIS — M25.552 LEFT HIP PAIN: ICD-10-CM

## 2020-07-08 DIAGNOSIS — M25.562 LEFT KNEE PAIN, UNSPECIFIED CHRONICITY: Primary | ICD-10-CM

## 2020-07-08 DIAGNOSIS — M25.561 RIGHT KNEE PAIN, UNSPECIFIED CHRONICITY: ICD-10-CM

## 2020-07-08 DIAGNOSIS — M54.50 LUMBAR PAIN: ICD-10-CM

## 2020-07-08 DIAGNOSIS — M25.551 RIGHT HIP PAIN: ICD-10-CM

## 2020-07-15 ENCOUNTER — OFFICE VISIT (OUTPATIENT)
Dept: ORTHOPEDIC SURGERY | Age: 65
End: 2020-07-15

## 2020-07-15 VITALS
HEART RATE: 102 BPM | TEMPERATURE: 97.3 F | RESPIRATION RATE: 20 BRPM | OXYGEN SATURATION: 98 % | SYSTOLIC BLOOD PRESSURE: 133 MMHG | DIASTOLIC BLOOD PRESSURE: 73 MMHG

## 2020-07-15 DIAGNOSIS — M70.61 TROCHANTERIC BURSITIS, RIGHT HIP: Primary | ICD-10-CM

## 2020-07-16 PROBLEM — M70.61 TROCHANTERIC BURSITIS, RIGHT HIP: Status: ACTIVE | Noted: 2020-07-16

## 2020-07-16 RX ORDER — BETAMETHASONE SODIUM PHOSPHATE AND BETAMETHASONE ACETATE 3; 3 MG/ML; MG/ML
6 INJECTION, SUSPENSION INTRA-ARTICULAR; INTRALESIONAL; INTRAMUSCULAR; SOFT TISSUE ONCE
Qty: 1 ML | Refills: 0
Start: 2020-07-16 | End: 2020-07-16

## 2020-07-16 NOTE — PROGRESS NOTES
7/16/2020    Chief Complaint: Right hip pain    HPI: This is a 69-year-old female with a complicated history of low back pain, history of fusion, who also complains of right-sided hip pain and bilateral knee pain. Complains of right hip pain which is activity dependent. The patient has had activity dependent pain for years. The pain is located in the lateral aspect of the hip, it radiates somewhat distally and laterally, it is severe in intensity. The patient complains of difficulty sleeping on the right side, limitation in the normal activities of daily living. The patient has tried activity modification, she has taken over the counter pain medications, she has not had physical therapy, injections have not been attempted. No other surgery or pertinent history to this hip. Past Medical History:   Diagnosis Date    Depression     Diabetes ([address]     Hypercholesteremia     Hypertension     Hyperthyroidism     IBS (irritable bowel syndrome)     Urinary incontinence        Past Surgical History:   Procedure Laterality Date    HX BACK SURGERY      x2    HX BLEPHAROPLASTY Right     HX BUNIONECTOMY      HX HYSTERECTOMY      HX OTHER SURGICAL      Collapsed lung    HX WISDOM TEETH EXTRACTION         Current Outpatient Medications on File Prior to Visit   Medication Sig Dispense Refill    insulin degludec Aleksandar Pouch FlexTouch U-100) 100 unit/mL (3 mL) inpn Inject 28 units under the skin daily 1 Pen 0    levothyroxine (SYNTHROID) 300 mcg tablet Take 1 Tab by mouth daily. 60 Tab 1    terbinafine HCL (LAMISIL) 250 mg tablet Take 1 Tab by mouth daily. 30 Tab 1    lisinopril (PRINIVIL, ZESTRIL) 5 mg tablet TAKE 1 TABLET DAILY 90 Tab 4    ALPRAZolam (XANAX) 0.25 mg tablet Take 1 Tab by mouth daily as needed for Anxiety. Indications: anxious 10 Tab 0    NOVOFINE PLUS 32 gauge x 1/6\" ndle Check sugars 4x daily.  300 Pen Needle 3    metFORMIN ER (GLUCOPHAGE XR) 500 mg tablet Take 1 Tab by mouth two (2) times a day. 180 Tab 11    furosemide (LASIX) 20 mg tablet Take 1 Tab by mouth daily as needed (for edema). 90 Tab 0    TOUJEO SOLOSTAR U-300 INSULIN 300 unit/mL (1.5 mL) inpn pen 26 Units by SubCUTAneous route daily. (Patient taking differently: 28 Units by SubCUTAneous route daily. ) 15 mL 0    meloxicam (MOBIC) 7.5 mg tablet Take 2 Tabs by mouth daily. 180 Tab 3    HUMALOG KWIKPEN INSULIN 100 unit/mL kwikpen 4-20 Units by SubCUTAneous route Before breakfast, lunch, and dinner. (Patient taking differently: 6-8 Units by SubCUTAneous route Before breakfast, lunch, and dinner.) 15 mL 0    lactulose (CHRONULAC) 10 gram/15 mL solution Take 20 g by mouth three (3) times daily as needed. Taking 10 ml TID as needed ; taking every other day      calcium polycarbophil (FIBER LAXATIVE, CA POLYCARBO,) 625 mg tablet Take 625 mg by mouth daily.  multivitamin (ONE A DAY) tablet Take 1 Tab by mouth daily.  trospium (SANCTURA) 20 mg tablet Take 20 mg by mouth Before breakfast and dinner.  linaclotide (LINZESS) 290 mcg cap capsule Take 290 mcg by mouth Daily (before breakfast).  buPROPion XL (WELLBUTRIN XL) 150 mg tablet TAKE 1 TABLET EVERY MORNING 90 Tab 3    acetaminophen (TYLENOL) 500 mg tablet Take 1 Tab by mouth every six (6) hours as needed for Pain. 30 Tab 0    atorvastatin (LIPITOR) 80 mg tablet Take 1 Tab by mouth daily. 90 Tab 3    cholecalciferol (VITAMIN D3) 1,000 unit tablet Take 2,000 Units by mouth daily.  B.infantis-B.ani-B.long-B.bifi (PROBIOTIC 4X) 10-15 mg TbEC Take 2 Gum by mouth daily. No current facility-administered medications on file prior to visit.         Allergies   Allergen Reactions    Celebrex [Celecoxib] Other (comments)     Hallucinations    Codeine Nausea and Vomiting    Erythromycin Nausea and Vomiting       Family History   Problem Relation Age of Onset    Diabetes Mother     Heart Disease Mother     Cancer Father         pancreatic    Diabetes Sister  Anxiety Sister     Diabetes Brother     Anxiety Brother     Diabetes Brother     Anxiety Brother     Diabetes Sister     Anxiety Sister     Diabetes Sister     Anxiety Sister     Cancer Sister         lung and brain    Anxiety Sister     Anxiety Sister     Breast Cancer Paternal Aunt         under 48       Social History     Socioeconomic History    Marital status: SINGLE     Spouse name: Not on file    Number of children: Not on file    Years of education: Not on file    Highest education level: Not on file   Tobacco Use    Smoking status: Never Smoker    Smokeless tobacco: Never Used   Substance and Sexual Activity    Alcohol use: Yes     Frequency: 2-3 times a week     Comment: occasionally    Drug use: No    Sexual activity: Not Currently         Review of Systems:       General: Denies headache, lethargy, fever, weight loss  Ears/Nose/Throat: Denies ear discharge, drainage, nosebleeds, hoarse voice, dental problems  Cardiovascular: Denies chest pain, shortness of breath  Lungs: Denies chest pain, breathing problems, wheezing, pneumonia  Stomach: Denies stomach pain, heartburn, constipation, irritable bowel  Skin: Denies rash, sores, open wounds  Musculoskeletal: Admits to lateral hip pain, this pain is severe and causes difficulty walking. This pain is severe, made better by rest.   There is decreased mobility, and moderate difficulty doing normal activities of daily living secondary to the pain. Genitourinary: Denies dysuria, hematuria, polyuria  Gastrointestinal: Denies constipation, obstipation, diarrhea  Neurological: Denies changes in sight, smell, hearing, taste, seizures. Denies loss of consciousness.   Psychiatric: Denies depression, sleep pattern changes, anxiety, change in personality  Endocrine: Denies mood swings, heat or cold intolerance  Hematologic/Lymphatic: Denies anemia, purpura, petechia  Allergic/Immunologic: Denies swelling of throat, pain or swelling at lymph nodes      Physical Examination:    Visit Vitals  /73 (BP 1 Location: Right arm, BP Patient Position: Sitting)   Pulse (!) 102   Temp 97.3 °F (36.3 °C)   Resp 20   SpO2 98%        General: AOX3, no apparent distress  Psychiatric: mood and affect appropriate  Lungs: breathing is symmetric and unlabored bilaterally  Heart: regular rate and rhythm  Abdomen: no guarding  Head: normocephalic, atraumatic  Skin: No significant abnormalities, good turgor  Sensation intact to light touch: L1-S1 dermatomes  Muscular exam: 5/5 strength in all major muscle groups unless noted in specialty exam.    Extremities      Left upper extremity: Full active and passive range of motion without pain, deformity, no open wound, strength 5/5 in all major muscle groups. Right upper extremity: Full active and passive range of motion without pain, deformity, no open wound, strength 5/5 in all major muscle groups. Left lower extremity: Knee is noted to have a mild effusion. medial joint line tenderness to palpation with a varus deformity. Patellar crepitus with range of motion is noted. Range of motion is 0 to 120. Sensation is intact to light touch L1-S1 dermatomes. Knee flexion and extension strength is 5/5 with Tibialis anterior, EHL, FHL being 5/5 as well. No other gross deformity or deficit is noted. Right lower extremity:  No deformity is noted. Circumduction of the hip causes no pain in the groin. Palpation of the abductors as well as lateral aspect of the hip at the peritrochanteric bursa region is  painful and reproductive of the chief complaint. Range of motion is limited, with a equivocal impingement sign. BROOKE test is equivocal.  Gait is antalgic. Sensation is intact to light touch in the L1-S1 dermatomes. Skin is intact about the hip.   Hip flexion strength is 5/5 and abduction strength is 5/5, hip extension and knee extension as well as tibialis anterior and EHL/GCS are 5/5 strength. Diagnostics:    Pertinent Xrays: No x-rays are available of the pelvis, however knees indicate no significant osteoarthritic changes. Assessment: Peritrochanteric pain syndrome, right hip,  Early OA bilateral knees    Plan: This patient and I did discuss at length the anatomy, which I drew out in office. We discussed the potential causes of the issue, as well as the treatment options, which are largely nonoperative. We discussed that a mixture of anti-inflammatory analgesics, cortisone injections, as well as consistent physical therapy for 4-6 weeks is the standard of care of this issue. Evidence indicates that over 90% of patients can begin to resolve their issues in that time. The patient will proceed with bursa injection. The patient will then follow up in 4-6 weeks for a clinical check. Procedures:    After consent was signed, the patient's right hip was prepped using a chlorhexidine scrub. Once  sterile, a timeout was performed and a 22 gauge needle was used to inject 5cc of 1% lidocaine through the subcutaneous tissues and iliotibial band in the peritrochanteric area nearest the apex of tenderness. Once adequate anesthesia was confirmed, a mixture of 6mg of betamethasone and 5 cc of 1% lidocaine were injected below the iliotibial band in the same area. Needle was removed and a sterile dressing applied. Patient tolerated well without complication. Post injection instructions were given. Follow up 1 week    Ms. Gera Kasper has a reminder for a \"due or due soon\" health maintenance. I have asked that she contact her primary care provider for follow-up on this health maintenance.

## 2020-07-20 ENCOUNTER — TELEPHONE (OUTPATIENT)
Dept: ORTHOPEDIC SURGERY | Age: 65
End: 2020-07-20

## 2020-07-20 DIAGNOSIS — M70.61 TROCHANTERIC BURSITIS, RIGHT HIP: Primary | ICD-10-CM

## 2020-07-20 RX ORDER — DICLOFENAC SODIUM 50 MG/1
50 TABLET, DELAYED RELEASE ORAL 3 TIMES DAILY
Qty: 60 TAB | Refills: 1 | Status: SHIPPED | OUTPATIENT
Start: 2020-07-20 | End: 2020-11-03 | Stop reason: ALTCHOICE

## 2020-07-20 NOTE — TELEPHONE ENCOUNTER
Pt wanting to know if you can send in an antiinflammatory for her. She doesn't want any gabapentin and she doesn't want anything that's going to mess with her sugar levels.

## 2020-07-21 ENCOUNTER — HOSPITAL ENCOUNTER (OUTPATIENT)
Dept: BONE DENSITY | Age: 65
Discharge: HOME | End: 2020-07-21
Attending: INTERNAL MEDICINE
Payer: MEDICARE

## 2020-07-21 DIAGNOSIS — M81.0 OSTEOPOROSIS WITHOUT CURRENT PATHOLOGICAL FRACTURE, UNSPECIFIED OSTEOPOROSIS TYPE: ICD-10-CM

## 2020-07-21 PROCEDURE — 77080 DXA BONE DENSITY AXIAL: CPT

## 2020-07-28 ENCOUNTER — HOSPITAL ENCOUNTER (OUTPATIENT)
Dept: PHYSICAL THERAPY | Age: 65
Discharge: HOME | End: 2020-07-28
Payer: MEDICARE

## 2020-07-28 PROCEDURE — 97161 PT EVAL LOW COMPLEX 20 MIN: CPT

## 2020-07-28 PROCEDURE — 97110 THERAPEUTIC EXERCISES: CPT

## 2020-07-28 PROCEDURE — 97014 ELECTRIC STIMULATION THERAPY: CPT

## 2020-07-28 RX ORDER — TERBINAFINE HYDROCHLORIDE 250 MG/1
250 TABLET ORAL DAILY
Qty: 30 TAB | Refills: 1 | Status: SHIPPED | OUTPATIENT
Start: 2020-07-28 | End: 2020-10-04 | Stop reason: SDUPTHER

## 2020-07-28 NOTE — PROGRESS NOTES
PT INITIAL EVALUATION NOTE - [facility] 2-15    Patient Name: Vishnu Frame  Date:2020  : 1955  [x]  Patient  Verified  Payor: [place] MED[place]ARE / Plan: [place] MEDICARE PART A & B / Product Type: Medicare /    In time:0900  Out time:1015  Total Treatment Time (min): 40  Total Timed Codes (min): 25  1:1 Treatment Time ( only): 25   Visit #: 1     Treatment Area: Pain in right hip [M25.551]    SUBJECTIVE  Pain Level (0-10 scale): 2/10 now, worst 10/10,  Any medication changes, allergies to medications, adverse drug reactions, diagnosis change, or new procedure performed?: [] No    [x] Yes (see summary sheet for update)  Subjective:    R hip pain began years ago but worsen over the past 6 months especially over quaratine. Pain is worse walking, bending over and standing prolonged, better lying down. Pain is burning and sharp and stays around the hip. Had cortisone injection 7/15/20 but wore off after 3 days.  prescribed Diclofenac which she is slowly taking up to prescribed amount. Walking tolerance is about 5 min before she has to stop and sit. Has had 3 back surgeries in the past the last was  L4-L5 fusion. OBJECTIVE/EXAMINATION  Posture: Forward trunk, forward head, decreased lumbar lordosis. Other Observations:  --  Gait and Functional Mobility:  Ambulates  Without device, no trunk leaning but little arm swing or trunk rotation, decreased step length bilaterally. Patient reports her gait is more antalgic after walking a few more minutes  Palpation: TTP over glut insertion at [facility] joint and along iliac crest on the R, mild tenderness in the glut mms belly, piriformis, glut med and greater trochanter. R hip ROM:   AROM   PROM   Flexion   90   100   Extension/PF  --   --   Abd   --   --   Add   --   --   IR/Sup/Ev  3   5 p! ER/Pron/Inv  30   40    Joint Mobility Assessment: decreased R hip joint mobility in all ranges in the R>L.  Decreased SI joint mobility on the R    Flexibility: very poor hamstring flexibility bilat, ~45deg knee ext with hip flexed to 90deg, poor hip rotator flexibility especially in the IR direction      LOWER QUARTER   MUSCLE STRENGTH  KEY       R  L  0 - No Contraction  Knee ext  3+/5  4/5  1 - Trace            flex  3+/5  4/5  2 - Poor   Hip ext   3+/5 p!  4/5  3 - Fair          flex   3+/5  4/5  4 - Good         abd  3+/5  4/5  5 - Normal         add  3+/5  4/5            MMT:   SLR: R x6 to fatigue, L x10 to fatigue  Difficulty achieving full range bridge against gravity  Neurological: Reflexes / Sensations: denies numbness/tingling or sensation changes  Special Tests: Trendelenberg: neg    Stork: positive      Manohar: neg     Wojciech: positive                 H.S. SLR: neg    Piriformis Ext: --        Hip Scour: pos       Modality rationale: decrease pain, increase tissue extensibility and increase muscle contraction/control to improve the patients ability to walk without pain   Min Type Additional Details   15 [] Estim: []Att   [x]Unatt        []TENS instruct                  [x]IFC  []Premod   []NMES                     []Other:  []w/US   []w/ice   [x]w/heat  Position: [facility]  Location: low back    []  Traction: [] Cervical       []Lumbar                       [] Prone          []Supine                       []Intermittent   []Continuous Lbs:  [] before manual  [] after manual  []w/heat    []  Ultrasound: []Continuous   [] Pulsed at:                           []1MHz   []3MHz Location:  W/cm2:    [] Paraffin         Location:   []w/heat    []  Ice     []  Heat  []  Ice massage Position:  Location:    []  Laser  []  Other: Position:  Location:      []  Vasopneumatic Device Pressure:       [] lo [] med [] hi   Temperature:      [x] Skin assessment post-treatment:  [x]intact []redness- no adverse reaction    []redness  adverse reaction:     25 min Therapeutic Exercise:  [x] See flow sheet :   Rationale: increase ROM and increase strength to improve the patients ability to walk without pain            With   [] TE   [] TA   [] neuro   [] other: Patient Education: [x] Review HEP    [] Progressed/Changed HEP based on:   [] positioning   [] body mechanics   [] transfers   [] heat/ice application    [] other:      Other Objective/Functional Measures:  FOTO: 52/100    Pain Level (0-10 scale) post treatment: 3/10    ASSESSMENT/Changes in Function:     [x]  See Plan of [address] [place] [address], PT 7/28/2020

## 2020-07-28 NOTE — PROGRESS NOTES
[address] [facility]. [address] [place], [address]    Phone: [phone]  Fax: [phone]        Plan of Care/Statement of Necessity for Physical Therapy Services  2-15    Patient name: Edward Verde  : 1955  Provider#: [id number]  Referral source: Shelba Sandifer, DO      Medical/Treatment Diagnosis: Pain in right hip [M25.551]     Prior Hospitalization: see medical history     Comorbidities: DM, arthritis, HTN, depression,  Hx of back sx x3  Prior Level of Function: independent and working, working out a few times a week  Medications: Verified on Patient Summary List  Start of Care: 18      Onset Date: 6 months prior   The Plan of Care and following information is based on the information from the initial evaluation. Assessment/ key information: Patient is a 77 y.o female who presents to OP clinic with c/o of R hip pain that worsens during ambulation and standing activities. She demonstrates decreased hip and core strength, decreased hip ROM, poor felxibility, tissue tenderness and turgor, impaired gait mechanics and overall decreased functional mobility. S/s consistent with SI joint dysfunction. She will benefit from skilled PT to address impairements to allow decreased pain with walking, standing, lifting heavy loads and house chores.      Evaluation Complexity History HIGH Complexity :3+ comorbidities / personal factors will impact the outcome/ POC ; Examination HIGH Complexity : 4+ Standardized tests and measures addressing body structure, function, activity limitation and / or participation in recreation  ;Presentation LOW Complexity : Stable, uncomplicated  ;Clinical Decision Making MEDIUM Complexity : FOTO score of 26-74  Overall Complexity Rating: LOW     Problem List: pain affecting function, decrease ROM, decrease strength, impaired gait/ balance, decrease ADL/ functional abilitiies, decrease activity tolerance, decrease flexibility/ joint mobility and decrease transfer abilities   Treatment Plan may include any combination of the following: Therapeutic exercise, Therapeutic activities, Neuromuscular re-education, Physical agent/modality, Gait/balance training, Manual therapy, Patient education, Self Care training, Functional mobility training and Home safety training  Patient / Family readiness to learn indicated by: asking questions and trying to perform skills  Persons(s) to be included in education: patient (P)  Barriers to Learning/Limitations: None  Patient Goal (s): no pain  Patient Self Reported Health Status: good  Rehabilitation Potential: good    Short Term Goals: To be accomplished in 4 visits treatments:   1. Patient will be independent with HEP. 2. Patient will be able to walk 5 minutes without increased pain. 3. Patient will be able to stand 5 minutes without increased pain. Long Term Goals: To be accomplished in 8 treatments:   1. Patient will be able to perform 1 hour of housework with no more than 2/10 pain. 2. Patient will be able to walk 15 minutes without increased pain. 3. Patient will improve FOTO score to 55/100 to demonstrate increased function. Frequency / Duration: Patient to be seen 2 times per week for 4-6 weeks. Patient/ Caregiver education and instruction: self care, activity modification and exercises    [x]  Plan of care has been reviewed with PTA      Certification Period: 7/28/20 - 10/27/20  Rosi Flores, PT 7/28/2020     ________________________________________________________________________    I certify that the above Therapy Services are being furnished while the patient is under my care. I agree with the treatment plan and certify that this therapy is necessary.     [de-identified] Signature:____________________  Date:____________Time: _________

## 2020-08-03 ENCOUNTER — APPOINTMENT (OUTPATIENT)
Dept: PHYSICAL THERAPY | Age: 65
End: 2020-08-03
Payer: MEDICARE

## 2020-08-04 ENCOUNTER — HOSPITAL ENCOUNTER (OUTPATIENT)
Dept: PHYSICAL THERAPY | Age: 65
Discharge: HOME | End: 2020-08-04
Payer: MEDICARE

## 2020-08-04 PROCEDURE — 97014 ELECTRIC STIMULATION THERAPY: CPT

## 2020-08-04 PROCEDURE — 97110 THERAPEUTIC EXERCISES: CPT

## 2020-08-04 NOTE — PROGRESS NOTES
PT DAILY TREATMENT NOTE - [facility] 2-15    Patient Name: Edward Verde  Date:2020  : 1955  [x]  Patient  Verified  Payor: [place] MEDICARE / Plan: [place] MEDICARE PART A & B / Product Type: Medicare /    In GVCE:[id number]  Out time:1055  Total Treatment Time (min): 68  Total Timed Codes (min): 53  1:1 Treatment Time ( [address] only): 39   Visit #:  2    Treatment Area: Pain in right hip [M25.551]    SUBJECTIVE  Pain Level (0-10 scale): 0/10  Any medication changes, allergies to medications, adverse drug reactions, diagnosis change, or new procedure performed?: [x] No    [] Yes (see summary sheet for update)  Subjective functional status/changes:   [] No changes reported  Feeling much better after IE. Doing exercises in the pool. OBJECTIVE              Modality rationale: decrease pain, increase tissue extensibility and increase muscle contraction/control to improve the patients ability to walk without pain    Min Type Additional Details   15 []? Estim: []? Att   [x]? Unatt        []? TENS instruct                  [x]?IFC  []? Premod   []? NMES                     []?Other:  []?w/US   []?w/ice   [x]?w/heat  Position: [facility]  Location: low back     []? Traction: []? Cervical       []? Lumbar                       []? Prone          []? Supine                       []?Intermittent   []? Continuous Lbs:  []? before manual  []? after manual  []?w/heat     []? Ultrasound: []? Continuous   []? Pulsed at:                           []? 1MHz   []? 3MHz Location:  W/cm2:     []? Paraffin         Location:   []?w/heat     []? Ice     []? Heat  []? Ice massage Position:  Location:     []? Laser  []? Other: Position:  Location:        []? Vasopneumatic Device Pressure:       []? lo []? med []? hi   Temperature:       [x]? Skin assessment post-treatment:  [x]? intact []? redness- no adverse reaction    []? redness - adverse reaction:      45 min Therapeutic Exercise:  [x]?  See flow sheet :   Rationale: increase ROM and increase strength to improve the patients ability to walk without pain            With   [] TE   [] TA   [] neuro   [] other: Patient Education: [x] Review HEP    [] Progressed/Changed HEP based on:   [] positioning   [] body mechanics   [] transfers   [] heat/ice application    [] other:      Other Objective/Functional Measures: --     Pain Level (0-10 scale) post treatment: 0/10    ASSESSMENT/Changes in Function:   Tolerated addition of new exercises well without increased pain. Stretches very effective today as she has limited flexibility in many joints. Patient will continue to benefit from skilled PT services to modify and progress therapeutic interventions, address functional mobility deficits, address ROM deficits, address strength deficits, analyze and address soft tissue restrictions, analyze and cue movement patterns, analyze and modify body mechanics/ergonomics and assess and modify postural abnormalities to attain remaining goals. []  See Plan of Care  []  See progress note/recertification  []  See Discharge Summary         Progress towards goals / Updated goals:  Short Term Goals: To be accomplished in 4 visits treatments:              1. Patient will be independent with HEP. 2. Patient will be able to walk 5 minutes without increased pain. 3. Patient will be able to stand 5 minutes without increased pain. Long Term Goals: To be accomplished in 8 treatments:              1. Patient will be able to perform 1 hour of housework with no more than 2/10 pain. 2. Patient will be able to walk 15 minutes without increased pain. 3. Patient will improve FOTO score to 55/100 to demonstrate increased function.     PLAN  [x]  Upgrade activities as tolerated     [x]  Continue plan of care  [x]  Update interventions per flow sheet       []  Discharge due to:_  []  Other:_      Emil Solomon, PT 8/4/2020

## 2020-08-06 ENCOUNTER — APPOINTMENT (OUTPATIENT)
Dept: PHYSICAL THERAPY | Age: 65
End: 2020-08-06
Payer: MEDICARE

## 2020-08-11 ENCOUNTER — HOSPITAL ENCOUNTER (OUTPATIENT)
Dept: PHYSICAL THERAPY | Age: 65
Discharge: HOME | End: 2020-08-11
Payer: MEDICARE

## 2020-08-11 PROCEDURE — 97014 ELECTRIC STIMULATION THERAPY: CPT | Performed by: PHYSICAL THERAPIST

## 2020-08-11 PROCEDURE — 97110 THERAPEUTIC EXERCISES: CPT | Performed by: PHYSICAL THERAPIST

## 2020-08-11 NOTE — PROGRESS NOTES
PT DAILY TREATMENT NOTE - [facility] 2-15    Patient Name: Samira Levine  Date:2020  : 1955  [x]  Patient  Verified  Payor: [place] MEDICARE / Plan: [place] MEDICARE PART A & B / Product Type: Medicare /    In time: 950 A  Out time:1055 A  Total Treatment Time (min): 65  Total Timed Codes (min): 50  1:1 Treatment Time ([facility] only): 40  Visit #:  3    Treatment Area: Pain in right hip [M25.551]    SUBJECTIVE  Pain Level (0-10 scale):  4  Any medication changes, allergies to medications, adverse drug reactions, diagnosis change, or new procedure performed?: [x] No    [] Yes (see summary sheet for update)  Subjective functional status/changes:   [] No changes reported    Pt reports pain is a bit higher today because she slept wrong last night. Pool has been broken so she hasn't been in it as much as she was in the past.     OBJECTIVE              Modality rationale: decrease pain, increase tissue extensibility and increase muscle contraction/control to improve the patients ability to walk without pain    Min Type Additional Details   15 []? Estim: []? Att   [x]? Unatt        []? TENS instruct                  [x]?IFC  []? Premod   []? NMES                     []?Other:  []?w/US   []?w/ice   [x]?w/heat  Position: hooklying  Location: low back      [x]? Skin assessment post-treatment:  [x]? intact []? redness- no adverse reaction    []? redness - adverse reaction:      50 min Therapeutic Exercise:  [x]? See flow sheet :   Rationale: increase ROM and increase strength to improve the patients ability to walk without pain            With   [] TE   [] TA   [] neuro   [] other: Patient Education: [x] Review HEP    [] Progressed/Changed HEP based on:   [] positioning   [] body mechanics   [] transfers   [] heat/ice application    [] other:      Other Objective/Functional Measures:    Pain Level (0-10 scale) post treatment: 0/10    ASSESSMENT/Changes in Function:     Significant difficulty noted with sit to stand at hi low table. Requires max cues for posterior weight shift and glute utilization. []  See Plan of Care  []  See progress note/recertification  []  See Discharge Summary         Progress towards goals / Updated goals:  Short Term Goals: To be accomplished in 4 visits treatments:              1. Patient will be independent with HEP. 2. Patient will be able to walk 5 minutes without increased pain. 3. Patient will be able to stand 5 minutes without increased pain. Long Term Goals: To be accomplished in 8 treatments:              1. Patient will be able to perform 1 hour of housework with no more than 2/10 pain. 2. Patient will be able to walk 15 minutes without increased pain. 3. Patient will improve FOTO score to 55/100 to demonstrate increased function. PLAN  [x]  Upgrade activities as tolerated     [x]  Continue plan of care  [x]  Update interventions per flow sheet       []  Discharge due to:_  []  Other:_      Varsha Elizabeth.  Manolo, PT 8/11/2020

## 2020-08-13 ENCOUNTER — HOSPITAL ENCOUNTER (OUTPATIENT)
Dept: PHYSICAL THERAPY | Age: 65
Discharge: HOME | End: 2020-08-13
Payer: MEDICARE

## 2020-08-13 PROCEDURE — 97110 THERAPEUTIC EXERCISES: CPT | Performed by: PHYSICAL THERAPIST

## 2020-08-13 PROCEDURE — 97014 ELECTRIC STIMULATION THERAPY: CPT | Performed by: PHYSICAL THERAPIST

## 2020-08-13 NOTE — PROGRESS NOTES
PT DAILY TREATMENT NOTE - [facility] 2-15    Patient Name: Beverly Villarreal  Date:2020  : 1955  [x]  Patient  Verified  Payor: [place] MEDICARE / Plan: [place] MEDICARE PART A & B / Product Type: Medicare /    In time: 945 A  Out time: 1100 A  Total Treatment Time (min): 75  Total Timed Codes (min): 60  1:1 Treatment Time [facility] only):60  Visit #:  4    Treatment Area: Pain in right hip [M25.551]    SUBJECTIVE  Pain Level (0-10 scale):  2-3   Any medication changes, allergies to medications, adverse drug reactions, diagnosis change, or new procedure performed?: [x] No    [] Yes (see summary sheet for update)  Subjective functional status/changes:   [] No changes reported    Pt reports increased soreness after last session. OBJECTIVE              Modality rationale: decrease pain, increase tissue extensibility and increase muscle contraction/control to improve the patients ability to walk without pain    Min Type Additional Details   15 []? Estim: []? Att   [x]? Unatt        []? TENS instruct                  [x]?IFC  []? Premod   []? NMES                     []?Other:  []?w/US   []?w/ice   [x]?w/heat  Position: hooklying  Location: low back      [x]? Skin assessment post-treatment:  [x]? intact []? redness- no adverse reaction    []? redness - adverse reaction:      60 min Therapeutic Exercise:  [x]? See flow sheet :   Rationale: increase ROM and increase strength to improve the patients ability to walk without pain            With   [] TE   [] TA   [] neuro   [] other: Patient Education: [x] Review HEP    [] Progressed/Changed HEP based on:   [] positioning   [] body mechanics   [] transfers   [] heat/ice application    [] other:      Other Objective/Functional Measures:    Pain Level (0-10 scale) post treatment: 0/10    ASSESSMENT/Changes in Function:     Pt requires cues for upright posture during modified tandem balance today.   []  See Plan of Care  []  See progress note/recertification  []  See Discharge Summary Progress towards goals / Updated goals:  Short Term Goals: To be accomplished in 4 visits treatments:              1. Patient will be independent with HEP. 2. Patient will be able to walk 5 minutes without increased pain. 3. Patient will be able to stand 5 minutes without increased pain. Long Term Goals: To be accomplished in 8 treatments:              1. Patient will be able to perform 1 hour of housework with no more than 2/10 pain. 2. Patient will be able to walk 15 minutes without increased pain. 3. Patient will improve FOTO score to 55/100 to demonstrate increased function. PLAN  [x]  Upgrade activities as tolerated     [x]  Continue plan of care  [x]  Update interventions per flow sheet       []  Discharge due to:_  []  Other:_      Mervin Hatchet. Creger, PT 8/13/2020

## 2020-08-19 ENCOUNTER — HOSPITAL ENCOUNTER (OUTPATIENT)
Dept: PHYSICAL THERAPY | Age: 65
Discharge: HOME | End: 2020-08-19
Payer: MEDICARE

## 2020-08-19 PROCEDURE — 97014 ELECTRIC STIMULATION THERAPY: CPT

## 2020-08-19 PROCEDURE — 97110 THERAPEUTIC EXERCISES: CPT

## 2020-08-19 NOTE — PROGRESS NOTES
PT DAILY TREATMENT NOTE - [facility] 2-15    Patient Name: Georgiana Lion  Date:2020  : 1955  [x]  Patient  Verified  Payor: Aleta Dhaliwal / Plan: [place] MEDICARE PART A & B / Product Type: Medicare /    In BPVZ:1045 Out time: 1100  Total Treatment Time (min): 65  Total Timed Codes (min): 40  1:1 Treatment Time [facility] only):40  Visit #:  5    Treatment Area: Pain in right hip [M25.551]    SUBJECTIVE  Pain Level (0-10 scale):  2/10  Any medication changes, allergies to medications, adverse drug reactions, diagnosis change, or new procedure performed?: [x] No    [] Yes (see summary sheet for update)  Subjective functional status/changes:   [] No changes reported  Knees bothering her more recently. Still haven't been able to get in the pool. OBJECTIVE              Modality rationale: decrease pain, increase tissue extensibility and increase muscle contraction/control to improve the patients ability to walk without pain    Min Type Additional Details   15 []? Estim: []? Att   [x]? Unatt        []? TENS instruct                  [x]?IFC  []? Premod   []? NMES                     []?Other:  []?w/US   []?w/ice   [x]?w/heat  Position: hooklying  Location: low back      [x]? Skin assessment post-treatment:  [x]? intact []? redness- no adverse reaction    []? redness  adverse reaction:      50 min Therapeutic Exercise:  [x]? See flow sheet :   Rationale: increase ROM and increase strength to improve the patients ability to walk without pain            With   [] TE   [] TA   [] neuro   [] other: Patient Education: [x] Review HEP    [] Progressed/Changed HEP based on:   [] positioning   [] body mechanics   [] transfers   [] heat/ice application    [] other:      Other Objective/Functional Measures:    Pain Level (0-10 scale) post treatment: 0/10    ASSESSMENT/Changes in Function:   Increased stiffness and soreness continues today but may be related to OA and the weather.   Patient will continue to benefit from skilled PT services to modify and progress therapeutic interventions, address functional mobility deficits, address ROM deficits, address strength deficits, analyze and address soft tissue restrictions, analyze and cue movement patterns, analyze and modify body mechanics/ergonomics and assess and modify postural abnormalities to attain remaining goals. []  See Plan of Care  []  See progress note/recertification  []  See Discharge Summary         Progress towards goals / Updated goals:  Short Term Goals: To be accomplished in 4 visits treatments:              1. Patient will be independent with HEP. MET              2. Patient will be able to walk 5 minutes without increased pain. Partially met              3. Patient will be able to stand 5 minutes without increased pain. MET  Long Term Goals: To be accomplished in 8 treatments:              1. Patient will be able to perform 1 hour of housework with no more than 2/10 pain. 2. Patient will be able to walk 15 minutes without increased pain. 3. Patient will improve FOTO score to 55/100 to demonstrate increased function.     PLAN  [x]  Upgrade activities as tolerated     [x]  Continue plan of care  [x]  Update interventions per flow sheet       []  Discharge due to:_  []  Other:_      Yulissa Ingram, PT 8/19/2020

## 2020-08-20 ENCOUNTER — TELEPHONE (OUTPATIENT)
Dept: INTERNAL MEDICINE CLINIC | Age: 65
End: 2020-08-20

## 2020-08-20 NOTE — TELEPHONE ENCOUNTER
Pharmacy Progress Note - Telephone Encounter    S/O: Ms. Aniya Kumar 77 y.o. female contacted office/me via an inbound telephone call today. Verified patients identifiers (name & ) per HIPAA policy.     - Discuss she is running low on your Linzess PAP supply (has ~20 left). Would like assistance w/ refill  - Discuss [facility] PAP sent her a letter asking for additional information. A/P:  - Pt due for DM f/u  - Will look into Linzess refill  - Now scheduled for 20 for DM f/u  - Patient endorses understanding to the provided information. All questions answered at this time.        Thank you,  Tavia Ibarra, PharmD, BCACP, CDE     CLINICAL PHARMACY CONSULT: MED RECONCILIATION/REVIEW ADDENDUM    For Pharmacy Admin Tracking Only    PHSO: PHSO Patient?: Yes

## 2020-08-21 ENCOUNTER — APPOINTMENT (OUTPATIENT)
Dept: PHYSICAL THERAPY | Age: 65
End: 2020-08-21
Payer: MEDICARE

## 2020-08-26 ENCOUNTER — TELEPHONE (OUTPATIENT)
Dept: INTERNAL MEDICINE CLINIC | Age: 65
End: 2020-08-26

## 2020-08-26 ENCOUNTER — HOSPITAL ENCOUNTER (OUTPATIENT)
Dept: PHYSICAL THERAPY | Age: 65
Discharge: HOME | End: 2020-08-26
Payer: MEDICARE

## 2020-08-26 PROCEDURE — 97110 THERAPEUTIC EXERCISES: CPT

## 2020-08-26 PROCEDURE — 97014 ELECTRIC STIMULATION THERAPY: CPT

## 2020-08-26 PROCEDURE — 97140 MANUAL THERAPY 1/> REGIONS: CPT

## 2020-08-26 NOTE — TELEPHONE ENCOUNTER
Pharmacy Progress Note - Medication Access    Submitted request for  Linzess refill. Pt # [id number]. Ms. Marco Duncan 77 y.o. is approved until the end of Dec 2020.      Tavia Cortes, JayleenD, BCACP, CDE         CLINICAL PHARMACY CONSULT: MED RECONCILIATION/REVIEW ADDENDUM    For Pharmacy Admin Tracking Only    PHSO: PHSO Patient?: Yes  Total # of Interventions Recommended: Count: 1  - Refills Provided #: 1

## 2020-08-26 NOTE — PROGRESS NOTES
PT DAILY TREATMENT NOTE - [facility] 2-15    Patient Name: Tracee Crystal  Date:2020  : 1955  [x]  Patient  Verified  Payor: VA MEDICARE / Plan: VA MEDICARE PART A & B / Product Type: Medicare /    In [id number]:[id number] Out time: 1009  Total Treatment Time (min): 76  Total Timed Codes (min): 60  1:1 Treatment Time [facility] only):60  Visit #:  6    Treatment Area: Pain in right hip [M25.551]    SUBJECTIVE  Pain Level (0-10 scale):  0/10  Any medication changes, allergies to medications, adverse drug reactions, diagnosis change, or new procedure performed?: [x] No    [] Yes (see summary sheet for update)  Subjective functional status/changes:   [] No changes reported  Was able to get back into the pool which is helping some. OBJECTIVE              Modality rationale: decrease pain, increase tissue extensibility and increase muscle contraction/control to improve the patients ability to walk without pain    Min Type Additional Details   15 []? Estim: []? Att   [x]? Unatt        []? TENS instruct                  [x]?IFC  []? Premod   []? NMES                     []?Other:  []?w/US   []?w/ice   [x]?w/heat  Position: hooklying  Location: low back      [x]? Skin assessment post-treatment:  [x]? intact []? redness- no adverse reaction    []? redness  adverse reaction:      50 min Therapeutic Exercise:  [x]?  See flow sheet :   Rationale: increase ROM and increase strength to improve the patients ability to walk without pain      10 min Manual Therapy: STM/MMFR to Right glut muscles along R sacral boarder    Rationale: decrease pain, increase ROM, increase tissue extensibility, decrease trigger points and increase postural awareness to improve the patients ability to walk without pain            With   [] TE   [] TA   [] neuro   [] other: Patient Education: [x] Review HEP    [] Progressed/Changed HEP based on:   [] positioning   [] body mechanics   [] transfers   [] heat/ice application    [] other:      Other Objective/Functional Measures:    Pain Level (0-10 scale) post treatment: 0/10    ASSESSMENT/Changes in Function:   Decreased tightness and turgor in the R glut max and med but still bothersome in weight bearing activities. Tolerating further distances walking. Patient will continue to benefit from skilled PT services to modify and progress therapeutic interventions, address functional mobility deficits, address ROM deficits, address strength deficits, analyze and address soft tissue restrictions, analyze and cue movement patterns, analyze and modify body mechanics/ergonomics and assess and modify postural abnormalities to attain remaining goals. []  See Plan of Care  []  See progress note/recertification  []  See Discharge Summary         Progress towards goals / Updated goals:  Short Term Goals: To be accomplished in 4 visits treatments:              1. Patient will be independent with HEP. MET              2. Patient will be able to walk 5 minutes without increased pain. Partially met              3. Patient will be able to stand 5 minutes without increased pain. MET  Long Term Goals: To be accomplished in 8 treatments:              1. Patient will be able to perform 1 hour of housework with no more than 2/10 pain. 2. Patient will be able to walk 15 minutes without increased pain. 3. Patient will improve FOTO score to 55/100 to demonstrate increased function.     PLAN  [x]  Upgrade activities as tolerated     [x]  Continue plan of care  [x]  Update interventions per flow sheet       []  Discharge due to:_  []  Other:_      Marvin Olivia, PT 8/26/2020

## 2020-08-28 ENCOUNTER — HOSPITAL ENCOUNTER (OUTPATIENT)
Dept: PHYSICAL THERAPY | Age: 65
Discharge: HOME | End: 2020-08-28
Payer: MEDICARE

## 2020-08-28 PROCEDURE — 97014 ELECTRIC STIMULATION THERAPY: CPT

## 2020-08-28 PROCEDURE — 97110 THERAPEUTIC EXERCISES: CPT

## 2020-08-28 NOTE — PROGRESS NOTES
[address] [place] [address] Physical Therapy  [address] [place] [address]    Phone: [phone]  Fax: [phone]        Progress Note    Name: Denys Bunn   : 1955   MD: Christopher Turner, DO       Treatment Diagnosis: Pain in right hip [M25.551]  Start of Care: 20    Visits from Start of Care:  7  Missed Visits: 1    Summary of Care:Mrs. Arianna Goodman has been making great progress since beginning OPPT. She has improved core and hip strength, gait mechanics and flexibility allowing her to walk longer distances. She continues to have increased pain with prolonged standing and walking and recreational activities; will continue to benefit from skilled PT to address impairments to achieve goals listed below. Short Term Goals: To be accomplished in 4 visits treatments:               0. Patient will be independent with HEP. MET              2. Patient will be able to walk 5 minutes without increased pain. Partially met  [phone]. Patient will be able to stand 5 minutes without increased pain. MET    Long Term Goals: To be accomplished in 8 treatments:              1. Patient will be able to perform 1 hour of housework with no more than 2/10 pain.              2. Patient will be able to walk 15 minutes without increased pain.              3. Patient will improve FOTO score to 55/100 to demonstrate increased function.     Assessment / Recommendations:     Continue PT 2x/week for 3-4 more weeks      Ana Rai, PT 2020     ________________________________________________________________________  NOTE TO PHYSICIAN:  Please complete the following and fax to: [facility]: VBA:[phone]   . Retain this original for your records. If you are unable to process this request in 24 hours, please contact our office.        ____ I have read the above report and request that my patient continue therapy with the following changes/special instructions:  ____ I have read the above report and request that my patient be discharged from therapy    Physician's Signature:_________________ Date:___________Time:__________

## 2020-08-28 NOTE — PROGRESS NOTES
PT DAILY TREATMENT NOTE - [facility] 2-15    Patient Name: Bree Espinoza  Date:2020  : 1955  [x]  Patient  Verified  Payor: Salma Gal / Plan: [place] MEDICARE PART A & B / Product Type: Medicare /    In time:1200 Out time: 1304  Total Treatment Time (min): 64  Total Timed Codes (min): 49  1:1 Treatment Time [facility] only):45  Visit #:  7    Treatment Area: Pain in right hip [M25.551]    SUBJECTIVE  Pain Level (0-10 scale):  0/10  Any medication changes, allergies to medications, adverse drug reactions, diagnosis change, or new procedure performed?: [x] No    [] Yes (see summary sheet for update)  Subjective functional status/changes:   [] No changes reported  Did stretched with the ball at home which helped    OBJECTIVE              Modality rationale: decrease pain, increase tissue extensibility and increase muscle contraction/control to improve the patients ability to walk without pain    Min Type Additional Details   15 []? Estim: []? Att   [x]? Unatt        []? TENS instruct                  [x]?IFC  []? Premod   []? NMES                     []?Other:  []?w/US   []?w/ice   [x]?w/heat  Position: hooklying  Location: low back      [x]? Skin assessment post-treatment:  [x]? intact []? redness- no adverse reaction    []? redness  adverse reaction:      49 min Therapeutic Exercise:  [x]?  See flow sheet :   Rationale: increase ROM and increase strength to improve the patients ability to walk without pain      - min Manual Therapy: STM/MMFR to Right glut muscles along R sacral boarder    Rationale: decrease pain, increase ROM, increase tissue extensibility, decrease trigger points and increase postural awareness to improve the patients ability to walk without pain            With   [] TE   [] TA   [] neuro   [] other: Patient Education: [x] Review HEP    [] Progressed/Changed HEP based on:   [] positioning   [] body mechanics   [] transfers   [] heat/ice application    [] other:      Other Objective/Functional Measures: FOTO: 47/100    Pain Level (0-10 scale) post treatment: 0/10    ASSESSMENT/Changes in Function:   Good tolerance, less pain today. Improving gait mechanics  Patient will continue to benefit from skilled PT services to modify and progress therapeutic interventions, address functional mobility deficits, address ROM deficits, address strength deficits, analyze and address soft tissue restrictions, analyze and cue movement patterns, analyze and modify body mechanics/ergonomics and assess and modify postural abnormalities to attain remaining goals. []  See Plan of Care  [x]  See progress note/recertification  []  See Discharge Summary         Progress towards goals / Updated goals:  Short Term Goals: To be accomplished in 4 visits treatments:               1. Patient will be independent with HEP. MET              2. Patient will be able to walk 5 minutes without increased pain. Partially met              3. Patient will be able to stand 5 minutes without increased pain. MET  Long Term Goals: To be accomplished in 8 treatments:              1. Patient will be able to perform 1 hour of housework with no more than 2/10 pain. 2. Patient will be able to walk 15 minutes without increased pain. 3. Patient will improve FOTO score to 55/100 to demonstrate increased function.     PLAN  [x]  Upgrade activities as tolerated     [x]  Continue plan of care  [x]  Update interventions per flow sheet       []  Discharge due to:_  []  Other:_      Tommy Tyson, PT 8/28/2020

## 2020-09-02 ENCOUNTER — HOSPITAL ENCOUNTER (OUTPATIENT)
Dept: PHYSICAL THERAPY | Age: 65
Discharge: HOME | End: 2020-09-02
Payer: MEDICARE

## 2020-09-02 PROCEDURE — 97110 THERAPEUTIC EXERCISES: CPT

## 2020-09-02 PROCEDURE — 97014 ELECTRIC STIMULATION THERAPY: CPT

## 2020-09-02 NOTE — PROGRESS NOTES
PT DAILY TREATMENT NOTE - [facility] 2-15    Patient Name: Francisco Chris  Date:2020  : 1955  [x]  Patient  Verified  Payor: VA MEDICARE / Plan: VA MEDICARE PART A & B / Product Type: Medicare /    In time: 0900 Out time: 1020  Total Treatment Time (min): 80  Total Timed Codes (min): 60  1:1 Treatment Time [facility] only):53  Visit #:  8    Treatment Area: Pain in right hip [M25.551]    SUBJECTIVE  Pain Level (0-10 scale):  0/10  Any medication changes, allergies to medications, adverse drug reactions, diagnosis change, or new procedure performed?: [x] No    [] Yes (see summary sheet for update)  Subjective functional status/changes:   [] No changes reported  Was able to get in the pool last weekend. Sat in the car then at the hairdresser for longer than tolerated and feeling stiff today. OBJECTIVE              Modality rationale: decrease pain, increase tissue extensibility and increase muscle contraction/control to improve the patients ability to walk without pain    Min Type Additional Details   15 []? Estim: []? Att  [x]? Unatt        []? TENS instruct                  [x]?IFC  []? Premod   []? NMES                     []?Other:  []?w/US   []?w/ice   [x]?w/heat  Position: hooklying  Location: low back      [x]? Skin assessment post-treatment:  [x]? intact []? redness- no adverse reaction    []? redness - adverse reaction:      60 min Therapeutic Exercise:  [x]?  See flow sheet :   Rationale: increase ROM and increase strength to improve the patients ability to walk without pain      - min Manual Therapy: STM/MMFR to Right glut muscles along R sacral boarder    Rationale: decrease pain, increase ROM, increase tissue extensibility, decrease trigger points and increase postural awareness to improve the patients ability to walk without pain            With   [] TE   [] TA   [] neuro   [] other: Patient Education: [x] Review HEP    [] Progressed/Changed HEP based on:   [] positioning   [] body mechanics   [] transfers [] heat/ice application    [] other:      Other Objective/Functional Measures:--    Pain Level (0-10 scale) post treatment: 0/10    ASSESSMENT/Changes in Function:   Increased stiffness in hip and low back joints today. Improving with recall of exercises but still requiring cues throughout. Patient will continue to benefit from skilled PT services to modify and progress therapeutic interventions, address functional mobility deficits, address ROM deficits, address strength deficits, analyze and address soft tissue restrictions, analyze and cue movement patterns, analyze and modify body mechanics/ergonomics and assess and modify postural abnormalities to attain remaining goals. []  See Plan of Care  []  See progress note/recertification  []  See Discharge Summary         Progress towards goals / Updated goals:  Short Term Goals: To be accomplished in 4 visits treatments:               1. Patient will be independent with HEP. MET              2. Patient will be able to walk 5 minutes without increased pain. Partially met              3. Patient will be able to stand 5 minutes without increased pain. MET  Long Term Goals: To be accomplished in 8 treatments:              1. Patient will be able to perform 1 hour of housework with no more than 2/10 pain. progressing              2. Patient will be able to walk 15 minutes without increased pain. - progressing              3. Patient will improve FOTO score to 55/100 to demonstrate increased function. - progressing    PLAN  [x]  Upgrade activities as tolerated     [x]  Continue plan of care  [x]  Update interventions per flow sheet       []  Discharge due to:_  []  Other:_      Les Austin, PT 9/2/2020

## 2020-09-04 ENCOUNTER — HOSPITAL ENCOUNTER (OUTPATIENT)
Dept: PHYSICAL THERAPY | Age: 65
Discharge: HOME | End: 2020-09-04
Payer: MEDICARE

## 2020-09-04 ENCOUNTER — APPOINTMENT (OUTPATIENT)
Dept: PHYSICAL THERAPY | Age: 65
End: 2020-09-04
Payer: MEDICARE

## 2020-09-04 PROCEDURE — 97110 THERAPEUTIC EXERCISES: CPT

## 2020-09-04 PROCEDURE — 97014 ELECTRIC STIMULATION THERAPY: CPT

## 2020-09-09 ENCOUNTER — HOSPITAL ENCOUNTER (OUTPATIENT)
Dept: PHYSICAL THERAPY | Age: 65
Discharge: HOME | End: 2020-09-09
Payer: MEDICARE

## 2020-09-09 PROCEDURE — 97014 ELECTRIC STIMULATION THERAPY: CPT

## 2020-09-09 PROCEDURE — 97110 THERAPEUTIC EXERCISES: CPT

## 2020-09-09 NOTE — PROGRESS NOTES
PT DAILY TREATMENT NOTE - [facility] 2-15    Patient Name: Marco Duncan  Date:2020  : 1955  [x]  Patient  Verified  Payor: [place] MED[place]ARE / Plan: [place] MEDICARE PART A & B / Product Type: Medicare /    In time: 1000 Out time: 1105  Total Treatment Time (min): 65  Total Timed Codes (min): 50  1:1 Treatment Time [facility] only):50  Visit #:  10    Treatment Area: Pain in right hip [M25.551]    SUBJECTIVE  Pain Level (0-10 scale):  0/10  Any medication changes, allergies to medications, adverse drug reactions, diagnosis change, or new procedure performed?: [x] No    [] Yes (see summary sheet for update)  Subjective functional status/changes:   [] No changes reported  Had a few hypoglycemic episodes over the weekend which has made her fatigued. OBJECTIVE              Modality rationale: decrease pain, increase tissue extensibility and increase muscle contraction/control to improve the patients ability to walk without pain    Min Type Additional Details   15 []? Estim: []? Att  [x]? Unatt        []? TENS instruct                  [x]?IFC  []? Premod   []? NMES                     []?Other:  []?w/US   []?w/ice   [x]?w/heat  Position: hooklying  Location: low back      [x]? Skin assessment post-treatment:  [x]? intact []? redness- no adverse reaction    []? redness - adverse reaction:      50 min Therapeutic Exercise:  [x]?  See flow sheet :   Rationale: increase ROM and increase strength to improve the patients ability to walk without pain      - min Manual Therapy: STM/MMFR to Right glut muscles along R sacral boarder    Rationale: decrease pain, increase ROM, increase tissue extensibility, decrease trigger points and increase postural awareness to improve the patients ability to walk without pain            With   [] TE   [] TA   [] neuro   [] other: Patient Education: [x] Review HEP    [] Progressed/Changed HEP based on:   [] positioning   [] body mechanics   [] transfers   [] heat/ice application    [] other:      Other Objective/Functional Measures:--    Pain Level (0-10 scale) post treatment: 0/10    ASSESSMENT/Changes in Function:   Overall decreased energy and endurance from low blood sugars over the weekend but overall she has much less carryover over the weekend back into her Wednesday appts. Patient will continue to benefit from skilled PT services to modify and progress therapeutic interventions, address functional mobility deficits, address ROM deficits, address strength deficits, analyze and address soft tissue restrictions, analyze and cue movement patterns, analyze and modify body mechanics/ergonomics and assess and modify postural abnormalities to attain remaining goals. []  See Plan of Care  []  See progress note/recertification  []  See Discharge Summary         Progress towards goals / Updated goals:  Short Term Goals: To be accomplished in 4 visits treatments:               1. Patient will be independent with HEP. MET              2. Patient will be able to walk 5 minutes without increased pain. Partially met              3. Patient will be able to stand 5 minutes without increased pain. MET  Long Term Goals: To be accomplished in 8 treatments:              1. Patient will be able to perform 1 hour of housework with no more than 2/10 pain. progressing              2. Patient will be able to walk 15 minutes without increased pain. - progressing              3. Patient will improve FOTO score to 55/100 to demonstrate increased function. - progressing    PLAN  [x]  Upgrade activities as tolerated     [x]  Continue plan of care  [x]  Update interventions per flow sheet       []  Discharge due to:_  []  Other:_      Nadia Norris, PT 9/9/2020

## 2020-09-11 ENCOUNTER — HOSPITAL ENCOUNTER (OUTPATIENT)
Dept: PHYSICAL THERAPY | Age: 65
Discharge: HOME | End: 2020-09-11
Payer: MEDICARE

## 2020-09-11 PROCEDURE — 97014 ELECTRIC STIMULATION THERAPY: CPT

## 2020-09-11 PROCEDURE — 97110 THERAPEUTIC EXERCISES: CPT

## 2020-09-11 NOTE — PROGRESS NOTES
PT DAILY TREATMENT NOTE - [facility] 2-15    Patient Name: Samir Chandler  Date:2020  : 1955  [x]  Patient  Verified  Payor: [place] MEDICARE / Plan: [place] MEDICARE PART A & B / Product Type: Medicare /    In time: 1200 Out time: 105  Total Treatment Time (min): 65  Total Timed Codes (min): 50  1:1 Treatment Time [facility] only):50  Visit #:  11    Treatment Area: Pain in right hip [M25.551]    SUBJECTIVE  Pain Level (0-10 scale):  0/10  Any medication changes, allergies to medications, adverse drug reactions, diagnosis change, or new procedure performed?: [x] No    [] Yes (see summary sheet for update)  Subjective functional status/changes:   [] No changes reported  Walking is still her biggest limitation. Her tolerance is ~ 3 minutes before she experiences the onset of right low back pain. No other changes. OBJECTIVE              Modality rationale: decrease pain, increase tissue extensibility and increase muscle contraction/control to improve the patients ability to walk without pain    Min Type Additional Details   15 []? Estim: []? Att  [x]? Unatt        []? TENS instruct                  [x]?IFC  []? Premod   []? NMES                     []?Other:  []?w/US   []?w/ice   [x]?w/heat  Position: hooklying  Location: low back      [x]? Skin assessment post-treatment:  [x]? intact []? redness- no adverse reaction    []? redness - adverse reaction:      43 min Therapeutic Exercise:  [x]?  See flow sheet :   Rationale: increase ROM and increase strength to improve the patients ability to walk without pain      7 min Manual Therapy: manual stretches:  Hip flexor, hamstring, piriformis, SKC, LTR   Rationale: decrease pain, increase ROM, increase tissue extensibility, decrease trigger points and increase postural awareness to improve the patients ability to walk without pain            With   [] TE   [] TA   [] neuro   [] other: Patient Education: [x] Review HEP    [] Progressed/Changed HEP based on:   [] positioning   [] body mechanics   [] transfers   [] heat/ice application    [] other:      Other Objective/Functional Measures:--    Pain Level (0-10 scale) post treatment: 0/10    ASSESSMENT/Changes in Function:   Patient with very limited length of her hip flexor's and quads b/l. Deconditioned. Patient will continue to benefit from skilled PT services to modify and progress therapeutic interventions, address functional mobility deficits, address ROM deficits, address strength deficits, analyze and address soft tissue restrictions, analyze and cue movement patterns, analyze and modify body mechanics/ergonomics and assess and modify postural abnormalities to attain remaining goals. []  See Plan of Care  []  See progress note/recertification  []  See Discharge Summary         Progress towards goals / Updated goals:  Short Term Goals: To be accomplished in 4 visits treatments:               1. Patient will be independent with HEP. MET              2. Patient will be able to walk 5 minutes without increased pain. Partially met              3. Patient will be able to stand 5 minutes without increased pain. MET  Long Term Goals: To be accomplished in 8 treatments:              1. Patient will be able to perform 1 hour of housework with no more than 2/10 pain. progressing              2. Patient will be able to walk 15 minutes without increased pain. - progressing              3. Patient will improve FOTO score to 55/100 to demonstrate increased function. - progressing    PLAN  [x]  Upgrade activities as tolerated     [x]  Continue plan of care  [x]  Update interventions per flow sheet       []  Discharge due to:_  []  Other:_      Tez Ohara, PT 9/11/2020

## 2020-09-13 RX ORDER — FUROSEMIDE 20 MG/1
TABLET ORAL
Qty: 90 TAB | Refills: 3 | Status: SHIPPED | OUTPATIENT
Start: 2020-09-13 | End: 2021-12-21 | Stop reason: SDUPTHER

## 2020-09-15 ENCOUNTER — TELEPHONE (OUTPATIENT)
Dept: INTERNAL MEDICINE CLINIC | Age: 65
End: 2020-09-15

## 2020-09-16 ENCOUNTER — HOSPITAL ENCOUNTER (OUTPATIENT)
Dept: PHYSICAL THERAPY | Age: 65
Discharge: HOME | End: 2020-09-16
Payer: MEDICARE

## 2020-09-16 ENCOUNTER — OFFICE VISIT (OUTPATIENT)
Dept: INTERNAL MEDICINE CLINIC | Age: 65
End: 2020-09-16

## 2020-09-16 VITALS
TEMPERATURE: 97.3 F | OXYGEN SATURATION: 98 % | SYSTOLIC BLOOD PRESSURE: 118 MMHG | BODY MASS INDEX: 35.32 KG/M2 | DIASTOLIC BLOOD PRESSURE: 73 MMHG | WEIGHT: 212 LBS | HEART RATE: 77 BPM | HEIGHT: 65 IN

## 2020-09-16 DIAGNOSIS — E11.21 TYPE 2 DIABETES WITH NEPHROPATHY (HCC): Primary | ICD-10-CM

## 2020-09-16 PROCEDURE — 97110 THERAPEUTIC EXERCISES: CPT

## 2020-09-16 PROCEDURE — 97014 ELECTRIC STIMULATION THERAPY: CPT

## 2020-09-16 RX ORDER — LEVOTHYROXINE SODIUM 100 UG/1
100 TABLET ORAL SEE ADMIN INSTRUCTIONS
COMMUNITY
End: 2021-06-17 | Stop reason: CLARIF

## 2020-09-16 RX ORDER — PRAVASTATIN SODIUM 20 MG/1
20 TABLET ORAL
COMMUNITY
End: 2021-10-25 | Stop reason: SDUPTHER

## 2020-09-16 RX ORDER — INSULIN GLARGINE 300 U/ML
30 INJECTION, SOLUTION SUBCUTANEOUS DAILY
Qty: 2 PEN | Refills: 0 | Status: SHIPPED | COMMUNITY
Start: 2020-09-16 | End: 2020-09-30 | Stop reason: SDUPTHER

## 2020-09-16 NOTE — PROGRESS NOTES
Pharmacy Progress Note - Diabetes Management    S/O: Ms. Vee Rousseau is a 77 y.o. female , referred by Dr. Joseph Castle DO, with a PMH of T2DM, HTN, HLD, Hypothyroidism, OAB, IBS, Osteoporosis, was seen today for diabetes management.   Last A1c was 8.4% (June 2020), an increase from 7.2% (Dec 2019). Interim update:   Now taking diclofenac 50 mg TID for knee pain (Dr. Joel Matias). Reports some improvement. Also doing PT twice weekly  -- note on PT days, she will wait to give her Humalog dose after PT and meals. Current anti-hyperglycemic regimen include(s):    - Toujeo 30 units daily  - Humalog sliding scale - reports to giving mostly 8 units - 10 units BID   - Metformin 500 mg ER - 1 BID    - Recently restarted pravastatin 20 mg daily     ROS:  Today, Pt endorses:  - Symptoms of Hyperglycemia: none  - Symptoms of Hypoglycemia: none  Usually sx if BG < 100. Admits she will overcorrect w/ sodas and food    Self Monitoring Blood Glucose (SMBG) or CGM:  - Brought in home glucometer today:  yes   Note date/time settings were off. Fasting     54 107    83  264   90     119     110  246   228  317   94 146    132  253   89  244   108  172   102  197   132       Nutrition/Lifestyle Modifications:  - Reports to eating less. \"BG spikes when I eat things I should not\"   - Last EtOH was 2 weeks ago. Vitals:   Wt Readings from Last 3 Encounters:   09/16/20 96.2 kg (212 lb)   02/12/20 93.8 kg (206 lb 12.8 oz)   12/20/19 92.1 kg (203 lb)     BP Readings from Last 3 Encounters:   09/16/20 118/73   07/15/20 133/73   02/12/20 134/79     Pulse Readings from Last 3 Encounters:   09/16/20 77   07/15/20 (!) 102   02/12/20 85       Past Medical History:   Diagnosis Date    Depression     Diabetes (HCC)     Hypercholesteremia     Hypertension     Hyperthyroidism     IBS (irritable bowel syndrome)     Urinary incontinence      Allergies   Allergen Reactions    Celebrex [Celecoxib] Other (comments) Hallucinations    Codeine Nausea and Vomiting    Erythromycin Nausea and Vomiting       Current Outpatient Medications   Medication Sig    furosemide (LASIX) 20 mg tablet TAKE 1 TABLET DAILY AS NEEDED FOR EDEMA    terbinafine HCL (LAMISIL) 250 mg tablet Take 1 Tab by mouth daily.  diclofenac EC (VOLTAREN) 50 mg EC tablet Take 1 Tab by mouth three (3) times daily.  insulin degludec Evan Ree FlexTouch U-100) 100 unit/mL (3 mL) inpn Inject 28 units under the skin daily    levothyroxine (SYNTHROID) 300 mcg tablet Take 1 Tab by mouth daily.  lisinopril (PRINIVIL, ZESTRIL) 5 mg tablet TAKE 1 TABLET DAILY    ALPRAZolam (XANAX) 0.25 mg tablet Take 1 Tab by mouth daily as needed for Anxiety. Indications: anxious    NOVOFINE PLUS 32 gauge x 1/6\" ndle Check sugars 4x daily.  metFORMIN ER (GLUCOPHAGE XR) 500 mg tablet Take 1 Tab by mouth two (2) times a day.  TOUJEO SOLOSTAR U-300 INSULIN 300 unit/mL (1.5 mL) inpn pen 26 Units by SubCUTAneous route daily. (Patient taking differently: 28 Units by SubCUTAneous route daily.)    HUMALOG KWIKPEN INSULIN 100 unit/mL kwikpen 4-20 Units by SubCUTAneous route Before breakfast, lunch, and dinner. (Patient taking differently: 6-8 Units by SubCUTAneous route Before breakfast, lunch, and dinner.)    lactulose (CHRONULAC) 10 gram/15 mL solution Take 20 g by mouth three (3) times daily as needed. Taking 10 ml TID as needed ; taking every other day    calcium polycarbophil (FIBER LAXATIVE, CA POLYCARBO,) 625 mg tablet Take 625 mg by mouth daily.  multivitamin (ONE A DAY) tablet Take 1 Tab by mouth daily.  trospium (SANCTURA) 20 mg tablet Take 20 mg by mouth Before breakfast and dinner.  linaclotide (LINZESS) 290 mcg cap capsule Take 290 mcg by mouth Daily (before breakfast).  buPROPion XL (WELLBUTRIN XL) 150 mg tablet TAKE 1 TABLET EVERY MORNING    acetaminophen (TYLENOL) 500 mg tablet Take 1 Tab by mouth every six (6) hours as needed for Pain.     atorvastatin (LIPITOR) 80 mg tablet Take 1 Tab by mouth daily.  cholecalciferol (VITAMIN D3) 1,000 unit tablet Take 2,000 Units by mouth daily.  B.infantis-B.ani-B.long-B.bifi (PROBIOTIC 4X) 10-15 mg TbEC Take 2 Gum by mouth daily. No current facility-administered medications for this visit. Lab Results   Component Value Date/Time    Sodium 138 06/23/2020 01:20 PM    Potassium 4.1 06/23/2020 01:20 PM    Chloride 101 06/23/2020 01:20 PM    CO2 23 06/23/2020 01:20 PM    Anion gap 6 01/10/2019 08:37 AM    Glucose 250 (H) 06/23/2020 01:20 PM    BUN 15 06/23/2020 01:20 PM    Creatinine 0.90 06/23/2020 01:20 PM    BUN/Creatinine ratio 17 06/23/2020 01:20 PM    GFR est AA 77 06/23/2020 01:20 PM    GFR est non-AA 66 06/23/2020 01:20 PM    Calcium 9.7 06/23/2020 01:20 PM    Bilirubin, total 0.4 06/23/2020 01:20 PM    Alk.  phosphatase 96 06/23/2020 01:20 PM    Protein, total 6.6 06/23/2020 01:20 PM    Albumin 4.3 06/23/2020 01:20 PM    Globulin 3.8 01/10/2019 08:37 AM    A-G Ratio 1.9 06/23/2020 01:20 PM    ALT (SGPT) 16 06/23/2020 01:20 PM       Lab Results   Component Value Date/Time    Cholesterol, total 188 06/23/2020 01:20 PM    HDL Cholesterol 60 06/23/2020 01:20 PM    LDL, calculated 108 (H) 06/23/2020 01:20 PM    VLDL, calculated 20 06/23/2020 01:20 PM    Triglyceride 101 06/23/2020 01:20 PM       Lab Results   Component Value Date/Time    WBC 6.6 06/23/2020 01:20 PM    HGB 12.3 06/23/2020 01:20 PM    HCT 35.2 06/23/2020 01:20 PM    PLATELET 570 (L) 15/52/5061 01:20 PM    MCV 86 06/23/2020 01:20 PM       Lab Results   Component Value Date/Time    Microalb/Creat ratio (ug/mg creat.) 3 06/23/2020 01:20 PM       HbA1c:  Lab Results   Component Value Date/Time    Hemoglobin A1c 8.4 (H) 06/23/2020 01:20 PM    Hemoglobin A1c (POC) 7.2 12/20/2019 08:22 AM     No components found for: 2     Last Point of Care HGB A1C  Hemoglobin A1c (POC)   Date Value Ref Range Status   12/20/2019 7.2 % Final        Estimated Creatinine Clearance: 69.6 mL/min (by C-G formula based on SCr of 0.9 mg/dL). A/P:    Diabetes Management:  - Per ADA guidelines, Pt's A1c is not at goal of < 7%. - Emphasize importance of not overcorrect for hypoglycemic episodes. - Continue Toujeo 30 units daily. - Continue with Humalog sliding scale of 8-10 units before meals.  - Continue with metformin  500 mg ER BID  - Still waiting on insulin PAP verdict.    - Flu shot at the next visit per patient's request.     Medication reconciliation was completed during the visit. Medications Discontinued During This Encounter   Medication Reason    insulin degludec Perla Clunes FlexTouch U-100) 100 unit/mL (3 mL) inpn Not A Current Medication    TOUJEO SOLOSTAR U-300 INSULIN 300 unit/mL (1.5 mL) inpn pen Dose Adjustment    atorvastatin (LIPITOR) 80 mg tablet Alternate Therapy    lactulose (CHRONULAC) 10 gram/15 mL solution Not A Current Medication       Patient verbalized understanding of the information presented and all of the patients questions were answered. AVS was handed to the patient. Patient advised to call the office with any additional questions or concerns. Notifications of recommendations will be sent to Dr. Aracelis Choi DO for review. Patient will return to clinic in 6 week(s) for follow up. Thank you for the consult,  Thu Aye Weiss PharmD, BCACP, CDE          CLINICAL PHARMACY CONSULT: MED RECONCILIATION/REVIEW ADDENDUM    For Pharmacy Admin Tracking Only    PHSO: PHSO Patient?: No  Total # of Interventions Recommended: Count: 5  - Discontinued Medication #: 4 Discontinue Reason(s): Needs Additional Medication Therapy and No Longer Used  - New Order #: 1 New Medication Order Reason(s):  Adherence    Time Spent (min): 30    Thu Wil Sierra PharmD  [address]

## 2020-09-16 NOTE — PROGRESS NOTES
PT DAILY TREATMENT NOTE - [facility] 2-15    Patient Name: Chevy Hilton  Date:2020  : 1955  [x]  Patient  Verified  Payor: [place] MEDICARE / Plan: [place] MEDICARE PART A & B / Product Type: Medicare /    In time: 3636 Out time: 1050  Total Treatment Time (min): 63  Total Timed Codes (min): 48  1:1 Treatment Time [facility] only):48  Visit #:  12    Treatment Area: Pain in right hip [M25.551]    SUBJECTIVE  Pain Level (0-10 scale):  2/10  Any medication changes, allergies to medications, adverse drug reactions, diagnosis change, or new procedure performed?: [x] No    [] Yes (see summary sheet for update)  Subjective functional status/changes:   [] No changes reported  Patient reports feeling a little nauseous today. Going to see her doctor today afterwards. Blood Glucose was good today. Pain is about the same. OBJECTIVE              Modality rationale: decrease pain, increase tissue extensibility and increase muscle contraction/control to improve the patients ability to walk without pain    Min Type Additional Details   15 []? Estim: []? Att  [x]? Unatt        []? TENS instruct                  [x]?IFC  []? Premod   []? NMES                     []?Other:  []?w/US   []?w/ice   [x]?w/heat  Position: hooklying  Location: low back      [x]? Skin assessment post-treatment:  [x]? intact []? redness- no adverse reaction    []? redness - adverse reaction:      43 min Therapeutic Exercise:  [x]?  See flow sheet :   Rationale: increase ROM and increase strength to improve the patients ability to walk without pain      -- min Manual Therapy: manual stretches:  Hip flexor, hamstring, piriformis, SKC, LTR   Rationale: decrease pain, increase ROM, increase tissue extensibility, decrease trigger points and increase postural awareness to improve the patients ability to walk without pain            With   [] TE   [] TA   [] neuro   [] other: Patient Education: [x] Review HEP    [] Progressed/Changed HEP based on:   [] positioning   [] body mechanics   [] transfers   [] heat/ice application    [] other:      Other Objective/Functional Measures:--    Pain Level (0-10 scale) post treatment: 1/10    ASSESSMENT/Changes in Function:   Overall not feeling well today. Going to see her MD afterwards. Poor tolerance. Patient will continue to benefit from skilled PT services to modify and progress therapeutic interventions, address functional mobility deficits, address ROM deficits, address strength deficits, analyze and address soft tissue restrictions, analyze and cue movement patterns, analyze and modify body mechanics/ergonomics and assess and modify postural abnormalities to attain remaining goals. []  See Plan of Care  []  See progress note/recertification  []  See Discharge Summary         Progress towards goals / Updated goals:  Short Term Goals: To be accomplished in 4 visits treatments:               1. Patient will be independent with HEP. MET              2. Patient will be able to walk 5 minutes without increased pain. Partially met              3. Patient will be able to stand 5 minutes without increased pain. MET  Long Term Goals: To be accomplished in 8 treatments:              1. Patient will be able to perform 1 hour of housework with no more than 2/10 pain. progressing              2. Patient will be able to walk 15 minutes without increased pain. - progressing              3. Patient will improve FOTO score to 55/100 to demonstrate increased function. - progressing    PLAN  [x]  Upgrade activities as tolerated     [x]  Continue plan of care  [x]  Update interventions per flow sheet       []  Discharge due to:_  []  Other:_      Rosalio Montero, PT 9/16/2020

## 2020-09-16 NOTE — PATIENT INSTRUCTIONS
· Continue Toujeo 30 units daily · Continue current Humalog dose. · Remember to get your annual flu shot · Bring a snack with you to your physical therapy session

## 2020-09-23 ENCOUNTER — HOSPITAL ENCOUNTER (OUTPATIENT)
Dept: PHYSICAL THERAPY | Age: 65
Discharge: HOME | End: 2020-09-23
Payer: MEDICARE

## 2020-09-23 PROCEDURE — 97110 THERAPEUTIC EXERCISES: CPT

## 2020-09-23 PROCEDURE — 97014 ELECTRIC STIMULATION THERAPY: CPT

## 2020-09-23 NOTE — PROGRESS NOTES
PT DAILY TREATMENT NOTE - [facility] 2-15    Patient Name: Clista Apley  Date:2020  : 1955  [x]  Patient  Verified  Payor: [place] MEDICARE / Plan: [place] MEDICARE PART A & B / Product Type: Medicare /    In time: 8581 Out time: 1013  Total Treatment Time (min): 66  Total Timed Codes (min): 51  1:1 Treatment Time ( only): 45  Visit #:  13    Treatment Area: Pain in right hip [M25.551]    SUBJECTIVE  Pain Level (0-10 scale):  1/10  Any medication changes, allergies to medications, adverse drug reactions, diagnosis change, or new procedure performed?: [x] No    [] Yes (see summary sheet for update)  Subjective functional status/changes:   [] No changes reported  Patient reports she went to a music festival over the weekend and walked around on grass which was fine but felt pain when walking on the sidewalk. Felt better with rest and not sore the next day    OBJECTIVE              Modality rationale: decrease pain, increase tissue extensibility and increase muscle contraction/control to improve the patients ability to walk without pain    Min Type Additional Details   15 []? Estim: []? Att  [x]? Unatt        []? TENS instruct                  [x]?IFC  []? Premod   []? NMES                     []?Other:  []?w/US   []?w/ice   [x]?w/heat  Position: hooklying  Location: low back      [x]? Skin assessment post-treatment:  [x]? intact []? redness- no adverse reaction    []? redness  adverse reaction:      51 min Therapeutic Exercise:  [x]?  See flow sheet :   Rationale: increase ROM and increase strength to improve the patients ability to walk without pain      -- min Manual Therapy: manual stretches:  Hip flexor, hamstring, piriformis, SKC, LTR   Rationale: decrease pain, increase ROM, increase tissue extensibility, decrease trigger points and increase postural awareness to improve the patients ability to walk without pain            With   [] TE   [] TA   [] neuro   [] other: Patient Education: [x] Review HEP    [] Progressed/Changed HEP based on:   [] positioning   [] body mechanics   [] transfers   [] heat/ice application    [] other:      Other Objective/Functional Measures:--    Pain Level (0-10 scale) post treatment: 1/10    ASSESSMENT/Changes in Function:   Performing exercises much better today. Has been able to manage better on her own at home and over the weekends. Will work towards d/c soon. Patient will continue to benefit from skilled PT services to modify and progress therapeutic interventions, address functional mobility deficits, address ROM deficits, address strength deficits, analyze and address soft tissue restrictions, analyze and cue movement patterns, analyze and modify body mechanics/ergonomics and assess and modify postural abnormalities to attain remaining goals. []  See Plan of Care  []  See progress note/recertification  []  See Discharge Summary         Progress towards goals / Updated goals:  Short Term Goals: To be accomplished in 4 visits treatments:               1. Patient will be independent with HEP. MET              2. Patient will be able to walk 5 minutes without increased pain. Partially met              3. Patient will be able to stand 5 minutes without increased pain. MET  Long Term Goals: To be accomplished in 8 treatments:              1. Patient will be able to perform 1 hour of housework with no more than 2/10 pain. progressing              2. Patient will be able to walk 15 minutes without increased pain. - progressing              3. Patient will improve FOTO score to 55/100 to demonstrate increased function. - progressing    PLAN  [x]  Upgrade activities as tolerated     [x]  Continue plan of care  [x]  Update interventions per flow sheet       []  Discharge due to:_  []  Other:_      Macie Gómez, PT 9/23/2020

## 2020-09-25 ENCOUNTER — HOSPITAL ENCOUNTER (OUTPATIENT)
Dept: PHYSICAL THERAPY | Age: 65
Discharge: HOME | End: 2020-09-25
Payer: MEDICARE

## 2020-09-25 PROCEDURE — 97110 THERAPEUTIC EXERCISES: CPT

## 2020-09-25 PROCEDURE — 97014 ELECTRIC STIMULATION THERAPY: CPT

## 2020-09-25 NOTE — PROGRESS NOTES
PT DAILY TREATMENT NOTE - [facility] 2-15    Patient Name: Vishnu Frame  Date:2020  : 1955  [x]  Patient  Verified  Payor: [place] MEDICARE / Plan: [place] MEDICARE PART A & B / Product Type: Medicare /    In time: 5233 Out time: 1135  Total Treatment Time (min): 65  Total Timed Codes (min): 50  1:1 Treatment Time ( only): 45  Visit #:  14    Treatment Area: Pain in right hip [M25.551]    SUBJECTIVE  Pain Level (0-10 scale):  0/10  Any medication changes, allergies to medications, adverse drug reactions, diagnosis change, or new procedure performed?: [x] No    [] Yes (see summary sheet for update)  Subjective functional status/changes:   [] No changes reported  Feeling good today in the hips. Cold weather bothers her knee OA    OBJECTIVE              Modality rationale: decrease pain, increase tissue extensibility and increase muscle contraction/control to improve the patients ability to walk without pain    Min Type Additional Details   15 []? Estim: []? Att  [x]? Unatt        []? TENS instruct                  [x]?IFC  []? Premod   []? NMES                     []?Other:  []?w/US   []?w/ice   [x]?w/heat  Position: hooklying  Location: low back      [x]? Skin assessment post-treatment:  [x]? intact []? redness- no adverse reaction    []? redness  adverse reaction:      50 min Therapeutic Exercise:  [x]?  See flow sheet :   Rationale: increase ROM and increase strength to improve the patients ability to walk without pain      -- min Manual Therapy: manual stretches:  Hip flexor, hamstring, piriformis, SKC, LTR   Rationale: decrease pain, increase ROM, increase tissue extensibility, decrease trigger points and increase postural awareness to improve the patients ability to walk without pain            With   [] TE   [] TA   [] neuro   [] other: Patient Education: [x] Review HEP    [] Progressed/Changed HEP based on:   [] positioning   [] body mechanics   [] transfers   [] heat/ice application    [] other:      Other Objective/Functional Measures:--    Pain Level (0-10 scale) post treatment: 1/10    ASSESSMENT/Changes in Function:   Increased stiffness and a little sore today but may be due to colder weather and low sugars this morning. Good recall of exercises. Will prep for d/c next session. Patient will continue to benefit from skilled PT services to modify and progress therapeutic interventions, address functional mobility deficits, address ROM deficits, address strength deficits, analyze and address soft tissue restrictions, analyze and cue movement patterns, analyze and modify body mechanics/ergonomics and assess and modify postural abnormalities to attain remaining goals. []  See Plan of Care  []  See progress note/recertification  []  See Discharge Summary         Progress towards goals / Updated goals:  Short Term Goals: To be accomplished in 4 visits treatments:               1. Patient will be independent with HEP. MET              2. Patient will be able to walk 5 minutes without increased pain. Partially met              3. Patient will be able to stand 5 minutes without increased pain. MET  Long Term Goals: To be accomplished in 8 treatments:              1. Patient will be able to perform 1 hour of housework with no more than 2/10 pain. progressing              2. Patient will be able to walk 15 minutes without increased pain. - progressing              3. Patient will improve FOTO score to 55/100 to demonstrate increased function. - progressing    PLAN  [x]  Upgrade activities as tolerated     [x]  Continue plan of care  [x]  Update interventions per flow sheet       []  Discharge due to:_  []  Other:_      Maikol Ponce, PT 9/25/2020

## 2020-09-30 ENCOUNTER — TELEPHONE (OUTPATIENT)
Dept: INTERNAL MEDICINE CLINIC | Age: 65
End: 2020-09-30

## 2020-09-30 ENCOUNTER — HOSPITAL ENCOUNTER (OUTPATIENT)
Dept: PHYSICAL THERAPY | Age: 65
Discharge: HOME | End: 2020-09-30
Payer: MEDICARE

## 2020-09-30 PROCEDURE — 97110 THERAPEUTIC EXERCISES: CPT

## 2020-09-30 PROCEDURE — 97014 ELECTRIC STIMULATION THERAPY: CPT

## 2020-09-30 RX ORDER — INSULIN GLARGINE 300 U/ML
30 INJECTION, SOLUTION SUBCUTANEOUS DAILY
Qty: 2 PEN | Refills: 0 | Status: SHIPPED | COMMUNITY
Start: 2020-09-30 | End: 2020-11-03 | Stop reason: ALTCHOICE

## 2020-09-30 NOTE — PROGRESS NOTES
Toujeo sample reorder.       Thu Tamala Duverney, PharmD, BCACP, [facility]      CLINICAL PHARMACY CONSULT: MED RECONCILIATION/REVIEW ADDENDUM    For Pharmacy Admin Tracking Only    PHSO: PHSO Patient?: Yes  Total # of Interventions Recommended: Count: 1  - Refills Provided #: 1

## 2020-09-30 NOTE — PROGRESS NOTES
[facility] Physical Therapy  [address] [place] [address]    Phone: [phone]  Fax: [phone]      Discharge Summary  2-15    Patient name: Tracy Joshua  : 1955  Provider#: [id number]  Referral source: Tika Palomo,       Medical/Treatment Diagnosis: Pain in right hip [M25.551]     Prior Hospitalization: see medical history     Comorbidities: DM, arthritis, HTN, depression,  Hx of back sx x3  Prior Level of Function:independent and working a few days a week  Medications: Verified on Patient Summary List    Start of Care: 20      Onset Date:6 months prior   Visits from Start of Care: 15     Missed Visits: 1  Reporting Period : 20 to 20      ASSESSMENT/SUMMARY OF CARE: Ms. Kalee Francois has done a great job in PT working to improve flexibility, core strength, and decreased R hip pain allowing her to walk longer and further distances with less pain. She has been diligent with her home exercise program and is now ready to d/c from clinic and continue on her own at home. Will d/c at this time. Short Term Goals: To be accomplished in 4 visits treatments:              1. Patient will be independent with HEP.- MET              2. Patient will be able to walk 5 minutes without increased pain. - MET              3. Patient will be able to stand 5 minutes without increased pain. - MET  Long Term Goals: To be accomplished in 8 treatments:              1. Patient will be able to perform 1 hour of housework with no more than 2/10 pain - Not met              2. Patient will be able to walk 15 minutes without increased pain. MET              3. Patient will improve FOTO score to 55/100 to demonstrate increased function. - Almost met        RECOMMENDATIONS:  [x]Discontinue therapy: [x]Patient has reached or is progressing toward set goals      []Patient is non-compliant or has abdicated      []Due to lack of appreciable progress towards set goals    Rui Jacobsen, PT 9/30/2020

## 2020-09-30 NOTE — PROGRESS NOTES
PT DAILY TREATMENT NOTE - [facility] 2-15    Patient Name: Tanisha Deal  Date:2020  : 1955  [x]  Patient  Verified  Payor: VA MEDICARE / Plan: VA MEDICARE PART A & B / Product Type: Medicare /    In time: 0900 Out time: 1004  Total Treatment Time (min): 64   Total Timed Codes (min): 49  1:1 Treatment Time ([facility] only): 45   Visit #:  15    Treatment Area: Pain in right hip [M25.551]    SUBJECTIVE  Pain Level (0-10 scale):  0/10  Any medication changes, allergies to medications, adverse drug reactions, diagnosis change, or new procedure performed?: [x] No    [] Yes (see summary sheet for update)  Subjective functional status/changes:   [] No changes reported  Says she is feeling good today and had a pep in her step coming in today    OBJECTIVE              Modality rationale: decrease pain, increase tissue extensibility and increase muscle contraction/control to improve the patients ability to walk without pain    Min Type Additional Details   15 []? Estim: []? Att  [x]? Unatt        []? TENS instruct                  [x]?IFC  []? Premod   []? NMES                     []?Other:  []?w/US   []?w/ice   [x]?w/heat  Position: hooklying  Location: low back      [x]? Skin assessment post-treatment:  [x]? intact []? redness- no adverse reaction    []? redness  adverse reaction:      49 min Therapeutic Exercise:  [x]?  See flow sheet :   Rationale: increase ROM and increase strength to improve the patients ability to walk without pain      -- min Manual Therapy: manual stretches:  Hip flexor, hamstring, piriformis, SKC, LTR   Rationale: decrease pain, increase ROM, increase tissue extensibility, decrease trigger points and increase postural awareness to improve the patients ability to walk without pain            With   [] TE   [] TA   [] neuro   [] other: Patient Education: [x] Review HEP    [] Progressed/Changed HEP based on:   [] positioning   [] body mechanics   [] transfers   [] heat/ice application    [] other: Other Objective/Functional Measures:54/100    Pain Level (0-10 scale) post treatment: 0/10    ASSESSMENT/Changes in Function:   See Discharge Summary    []  See Plan of Care  []  See progress note/recertification  [x]  See Discharge Summary         Progress towards goals / Updated goals:  Short Term Goals: To be accomplished in 4 visits treatments:               1. Patient will be independent with HEP. MET              2. Patient will be able to walk 5 minutes without increased pain. MET              3. Patient will be able to stand 5 minutes without increased pain. MET  Long Term Goals: To be accomplished in 8 treatments:              1. Patient will be able to perform 1 hour of housework with no more than 2/10 pain. MET              2. Patient will be able to walk 15 minutes without increased pain. - Not Met              3. Patient will improve FOTO score to 55/100 to demonstrate increased function. - Almost MET  PLAN  []  Upgrade activities as tolerated     []  Continue plan of care  []  Update interventions per flow sheet       [x]  Discharge due to:_HEP  []  Other:_      Trever Spear, PT 9/30/2020

## 2020-10-05 RX ORDER — TERBINAFINE HYDROCHLORIDE 250 MG/1
250 TABLET ORAL DAILY
Qty: 30 TAB | Refills: 1 | Status: SHIPPED | OUTPATIENT
Start: 2020-10-05 | End: 2021-01-07 | Stop reason: SDUPTHER

## 2020-10-06 RX ORDER — METFORMIN HYDROCHLORIDE 500 MG/1
TABLET, EXTENDED RELEASE ORAL
Qty: 180 TAB | Refills: 3 | Status: SHIPPED | OUTPATIENT
Start: 2020-10-06 | End: 2020-12-16

## 2020-10-07 ENCOUNTER — TELEPHONE (OUTPATIENT)
Dept: INTERNAL MEDICINE CLINIC | Age: 65
End: 2020-10-07

## 2020-10-07 NOTE — TELEPHONE ENCOUNTER
Pharmacy Progress Note - Telephone Encounter    S/O: Ms. Gonzalez Me 77 y.o. female contacted office/me via an inbound telephone call to discuss her diabetes management today. Verified patients identifiers (name & ) per HIPAA policy.     - Had an appt with ELIZABETH Macdonald last week. Had A1c completed there. F/u on 10/15/20. [facility]. Ph# [phone]. Fax # [phone]. Pt states PA would like for me to contact office to discuss patient's recent DM management.     - Reports Virgle Hose was added to current regimen. - Toujeo 30 units daily was replaced by Aquilino Justin 25 units daily  - Recommend to take Humalog 10 units TID before meals. Skip if BG < 70. If BG < 90, give 5 units. +2 units for every BG > 150. Pt reports she continues to give 8-10 units before meals.    - Continues Metformin 1 gm in the morning.     - Was given a 14 day trial of Julien CGM. Now scanning once every 4 hrs. Reports more readings in the 60s. Highest reading was 186. Wt Readings from Last 3 Encounters:   20 212 lb (96.2 kg)   20 206 lb 12.8 oz (93.8 kg)   19 203 lb (92.1 kg)     Lab Results   Component Value Date/Time    Sodium 138 2020 01:20 PM    Potassium 4.1 2020 01:20 PM    Chloride 101 2020 01:20 PM    CO2 23 2020 01:20 PM    Anion gap 6 01/10/2019 08:37 AM    Glucose 250 (H) 2020 01:20 PM    BUN 15 2020 01:20 PM    Creatinine 0.90 2020 01:20 PM    BUN/Creatinine ratio 17 2020 01:20 PM    GFR est AA 77 2020 01:20 PM    GFR est non-AA 66 2020 01:20 PM    Calcium 9.7 2020 01:20 PM     Lab Results   Component Value Date/Time    Hemoglobin A1c 8.4 (H) 2020 01:20 PM    Hemoglobin A1c 7.4 (H) 2019 10:56 AM    Hemoglobin A1c 10.2 (H) 2019 08:50 AM     Estimated Creatinine Clearance: 69.6 mL/min (by C-G formula based on SCr of 0.9 mg/dL).     A/P:  - Appreciate patient's update.  - Need to stay hydrated w/ jardiance therapy. - Continue to scan Galdamez often. - Will request for lab results. - Patient endorses understanding to the provided information. All questions answered at this time.      Thank you,  Tavia Martinez, PharmD, BCACP, [facility]          CLINICAL PHARMACY CONSULT: MED RECONCILIATION/REVIEW ADDENDUM    For Pharmacy Admin Tracking Only    PHSO: PHSO Patient?: Yes

## 2020-10-07 NOTE — TELEPHONE ENCOUNTER
Pharmacy Progress Note     Called and left voicemail for Edwardo Gomezma regarding Ms. Kay Lighter 77 steve Carney

## 2020-10-07 NOTE — LETTER
10/7/2020 Ms. Kinsey Smith [address] [facility] [id number] : 34- To whom this may concern: 
 
Please see Ms. Rousseau's recent lab results below. Recent diabetes management regimen include: Toujeo 30 units daily, Humalog sliding scale (patient usually gives 8-10 units before meals three times daily), and metformin 500 mg BID. Consistent insulin access has been a challenge for Ms. Gela Fay. We are waiting to hear back from [employer] regarding insulin patient assistance. She was denied assistance by [employer]. Wt Readings from Last 3 Encounters:  
20 212 lb (96.2 kg) 20 206 lb 12.8 oz (93.8 kg) 19 203 lb (92.1 kg) BP Readings from Last 3 Encounters:  
20 118/73  
07/15/20 133/73  
20 134/79 Pulse Readings from Last 3 Encounters:  
20 77  
07/15/20 (!) 102  
20 85 Lab Results Component Value Date/Time Sodium 138 2020 01:20 PM  
 Potassium 4.1 2020 01:20 PM  
 Chloride 101 2020 01:20 PM  
 CO2 23 2020 01:20 PM  
 Anion gap 6 01/10/2019 08:37 AM  
 Glucose 250 (H) 2020 01:20 PM  
 BUN 15 2020 01:20 PM  
 Creatinine 0.90 2020 01:20 PM  
 BUN/Creatinine ratio 17 2020 01:20 PM  
 GFR est AA 77 2020 01:20 PM  
 GFR est non-AA 66 2020 01:20 PM  
 Calcium 9.7 2020 01:20 PM  
 
Lab Results Component Value Date/Time Cholesterol, total 188 2020 01:20 PM  
 HDL Cholesterol 60 2020 01:20 PM  
 LDL, calculated 108 (H) 2020 01:20 PM  
 VLDL, calculated 20 2020 01:20 PM  
 Triglyceride 101 2020 01:20 PM  
 
Lab Results Component Value Date/Time Hemoglobin A1c 8.4 (H) 2020 01:20 PM  
 Hemoglobin A1c 7.4 (H) 2019 10:56 AM  
 Hemoglobin A1c 10.2 (H) 2019 08:50 AM  
 
Estimated Creatinine Clearance: 69.6 mL/min (by C-G formula based on SCr of 0.9 mg/dL).  
 
 
Sincerely, 
 
 
 Thu Parul Valencia, PharmD, [facility]

## 2020-11-02 ENCOUNTER — DOCUMENTATION ONLY (OUTPATIENT)
Dept: INTERNAL MEDICINE CLINIC | Age: 65
End: 2020-11-02

## 2020-11-02 NOTE — PROGRESS NOTES
Pharmacy Progress Note     Confirmed with [employer]. Ms. Rossi Mendiola 77 y.o.  is now approved for [place] and Novolog until Dec 31, 2020.       Thank you for the consult,  Tavia Robertson, PharmD, BCACP, [facility]          CLINICAL PHARMACY CONSULT: MED RECONCILIATION/REVIEW ADDENDUM    For Pharmacy Admin Tracking Only    PHSO: PHSO Patient?: Yes  Time Spent (min): 15

## 2020-11-02 NOTE — PROGRESS NOTES
Pharmacy Progress Note - Diabetes Management    S/O: Ms. Vee Rousseau is a 77 y.o. female , referred by Dr. Angela Bloom DO, with a PMH of T2DM, HTN, HLD, Hypothyroidism, OAB, IBS, Osteoporosis, was seen today for diabetes management.   Last A1c was 8% (Oct 2020), a decrease from 8.4% (June 2020), an increase from 7.2% (Dec 2019). Interim update: Followed by ELIZABETH Hernandez (Endocrine):  Now on [place] instead of Toujeo. Jardiance added last month. A1c was 8% (Oct 2020). No longer taking trospium and diclofenac. Levothyroxine dose was adjusted to 250 mcg daily with 100 mcg and 300 mcg dose strengths. Vitamin D increased to 4000 international units daily    Current anti-hyperglycemic regimen include(s):    - Tresiba 20 units daily  - Humalog 10 units before meals TID. Skip if BG< 70  ---> reports dose recently changed to 6 units breakfast, 6 units lunch, and 8 units with dinner ---> mainly takes 3-6 units before meals. - Metformin 1 gm daily  - Jardiance 25 mg daily    ROS:  Today, Pt endorses:  - Symptoms of Hyperglycemia: none  - Symptoms of Hypoglycemia: none    Self Monitoring Blood Glucose (SMBG) or CGM:  Still using Julien 2  CGM  Scanning 5-6x/day  Fasting today 177; HS last night 189. Midnight - 6 AM 6 AM - Noon Noon - 6 PM 6 PM - Midnight Average % Time in Target Range   7 Day Average 142 110 147 139 134 82%   14 Day Average 148 116 138 151 138    30 Day Average 125 102 121 135 121      Low blood sugar:     Midnight - 6 AM 6 AM - Noon Noon - 6 PM 6 PM - Midnight   7 Day Low  1 1     14 Day Low 2 1 1 2       Vitals:   Wt Readings from Last 3 Encounters:   11/03/20 203 lb 6.4 oz (92.3 kg)   09/16/20 212 lb (96.2 kg)   02/12/20 206 lb 12.8 oz (93.8 kg)     BP Readings from Last 3 Encounters:   11/03/20 126/68   09/16/20 118/73   07/15/20 133/73     Pulse Readings from Last 3 Encounters:   11/03/20 75   09/16/20 77   07/15/20 (!) 102       Past Medical History:   Diagnosis Date    Depression     Diabetes ([facility]     Hypercholesteremia     Hypertension     Hyperthyroidism     IBS (irritable bowel syndrome)     Urinary incontinence      Allergies   Allergen Reactions    Celebrex [Celecoxib] Other (comments)     Hallucinations    Codeine Nausea and Vomiting    Erythromycin Nausea and Vomiting       Current Outpatient Medications   Medication Sig    metFORMIN ER (GLUCOPHAGE XR) 500 mg tablet TAKE 1 TABLET TWICE A DAY    terbinafine HCL (LAMISIL) 250 mg tablet Take 1 Tab by mouth daily.  insulin glargine U-300 conc (Toujeo SoloStar U-300 Insulin) 300 unit/mL (1.5 mL) inpn pen 30 Units by SubCUTAneous route daily. Indications: type 2 diabetes mellitus    pravastatin (PRAVACHOL) 20 mg tablet Take 20 mg by mouth nightly.  levothyroxine (SYNTHROID) 100 mcg tablet Take 400 mcg by mouth See Admin Instructions. Take 400 mg daily Monday- Friday. Has 100 mg and 300 mg strengths.  furosemide (LASIX) 20 mg tablet TAKE 1 TABLET DAILY AS NEEDED FOR EDEMA (Patient taking differently: Take 20 mg by mouth daily as needed.)    diclofenac EC (VOLTAREN) 50 mg EC tablet Take 1 Tab by mouth three (3) times daily.  levothyroxine (SYNTHROID) 300 mcg tablet Take 1 Tab by mouth daily. (Patient taking differently: Take 400 mcg by mouth See Admin Instructions. Take 400 mg daily Monday- Friday. Has 100 mg and 300 mg strengths.)    lisinopril (PRINIVIL, ZESTRIL) 5 mg tablet TAKE 1 TABLET DAILY (Patient taking differently: Take 5 mg by mouth daily. Sandie Farris )    ALPRAZolam (XANAX) 0.25 mg tablet Take 1 Tab by mouth daily as needed for Anxiety. Indications: anxious    NOVOFINE PLUS 32 gauge x 1/6\" ndle Check sugars 4x daily.  HUMALOG KWIKPEN INSULIN 100 unit/mL kwikpen 4-20 Units by SubCUTAneous route Before breakfast, lunch, and dinner.  (Patient taking differently: 8-10 Units by SubCUTAneous route Before breakfast, lunch, and dinner.)    calcium polycarbophil (FIBER LAXATIVE, CA POLYCARBO,) 625 mg tablet Take 625 mg by mouth daily.  multivitamin (ONE A DAY) tablet Take 1 Tab by mouth daily.  trospium (SANCTURA) 20 mg tablet Take 20 mg by mouth Before breakfast and dinner.  linaclotide (LINZESS) 290 mcg cap capsule Take 290 mcg by mouth Daily (before breakfast).  buPROPion XL (WELLBUTRIN XL) 150 mg tablet TAKE 1 TABLET EVERY MORNING (Patient taking differently: Take 150 mg by mouth daily.)    acetaminophen (TYLENOL) 500 mg tablet Take 1 Tab by mouth every six (6) hours as needed for Pain.  cholecalciferol (VITAMIN D3) 1,000 unit tablet Take 2,000 Units by mouth daily.  B.infantis-B.ani-B.long-B.bifi (PROBIOTIC 4X) 10-15 mg TbEC Take 2 Gum by mouth daily. No current facility-administered medications for this visit. Lab Results   Component Value Date/Time    Sodium 138 06/23/2020 01:20 PM    Potassium 4.1 06/23/2020 01:20 PM    Chloride 101 06/23/2020 01:20 PM    CO2 23 06/23/2020 01:20 PM    Anion gap 6 01/10/2019 08:37 AM    Glucose 250 (H) 06/23/2020 01:20 PM    BUN 15 06/23/2020 01:20 PM    Creatinine 0.90 06/23/2020 01:20 PM    BUN/Creatinine ratio 17 06/23/2020 01:20 PM    GFR est AA 77 06/23/2020 01:20 PM    GFR est non-AA 66 06/23/2020 01:20 PM    Calcium 9.7 06/23/2020 01:20 PM    Bilirubin, total 0.4 06/23/2020 01:20 PM    Alk.  phosphatase 96 06/23/2020 01:20 PM    Protein, total 6.6 06/23/2020 01:20 PM    Albumin 4.3 06/23/2020 01:20 PM    Globulin 3.8 01/10/2019 08:37 AM    A-G Ratio 1.9 06/23/2020 01:20 PM    ALT (SGPT) 16 06/23/2020 01:20 PM       Lab Results   Component Value Date/Time    Cholesterol, total 188 06/23/2020 01:20 PM    HDL Cholesterol 60 06/23/2020 01:20 PM    LDL, calculated 108 (H) 06/23/2020 01:20 PM    VLDL, calculated 20 06/23/2020 01:20 PM    Triglyceride 101 06/23/2020 01:20 PM       Lab Results   Component Value Date/Time    WBC 6.6 06/23/2020 01:20 PM    HGB 12.3 06/23/2020 01:20 PM    HCT 35.2 06/23/2020 01:20 PM    PLATELET 460 (L) 06/23/2020 01:20 PM    MCV 86 06/23/2020 01:20 PM       Lab Results   Component Value Date/Time    Microalb/Creat ratio (ug/mg creat.) 3 06/23/2020 01:20 PM       HbA1c:  Lab Results   Component Value Date/Time    Hemoglobin A1c 8.4 (H) 06/23/2020 01:20 PM    Hemoglobin A1c (POC) 7.2 12/20/2019 08:22 AM     Last Point of Care HGB A1C  Hemoglobin A1c (POC)   Date Value Ref Range Status   12/20/2019 7.2 % Final        Estimated Creatinine Clearance: 68.1 mL/min (by C-G formula based on SCr of 0.9 mg/dL). A/P:    Diabetes Management:  - Per ADA guidelines, Pt's A1c is not at goal of < 7%. - Now covered for What's [employer] PAP   - Continue Tresiba 20 units daily. Jardiance 25 mg daily and Metformin 1 gm daily  - For Humalog:  · Give 6 units before each meals three times daily. · If pre meal sugar is below 90, skip  · If above , give 3 units  · If above 150, continue 6 units  - Need to scan sensor more often    Medication reconciliation was completed during the visit. Medications Discontinued During This Encounter   Medication Reason    trospium (SANCTURA) 20 mg tablet Therapy Completed    diclofenac EC (VOLTAREN) 50 mg EC tablet Therapy Completed    insulin glargine U-300 conc (Toujeo SoloStar U-300 Insulin) 300 unit/mL (1.5 mL) inpn pen Alternate Therapy     Patient verbalized understanding of the information presented and all of the patients questions were answered. AVS was handed to the patient. Patient advised to call the office with any additional questions or concerns. Notifications of recommendations will be sent to Dr. Kane Mir DO for review. Patient will return to clinic in 6 week(s) for follow up. Same day as her PCP appt.      Thank you for the consult,  Tavia Ram, PharmD, [facility]

## 2020-11-03 ENCOUNTER — OFFICE VISIT (OUTPATIENT)
Dept: INTERNAL MEDICINE CLINIC | Age: 65
End: 2020-11-03

## 2020-11-03 VITALS
TEMPERATURE: 97.5 F | WEIGHT: 203.4 LBS | OXYGEN SATURATION: 95 % | DIASTOLIC BLOOD PRESSURE: 68 MMHG | HEART RATE: 75 BPM | HEIGHT: 65 IN | SYSTOLIC BLOOD PRESSURE: 126 MMHG | BODY MASS INDEX: 33.89 KG/M2

## 2020-11-03 DIAGNOSIS — E11.65 UNCONTROLLED TYPE 2 DIABETES MELLITUS WITH HYPERGLYCEMIA, WITH LONG-TERM CURRENT USE OF INSULIN (HCC): Primary | ICD-10-CM

## 2020-11-03 DIAGNOSIS — Z79.4 UNCONTROLLED TYPE 2 DIABETES MELLITUS WITH HYPERGLYCEMIA, WITH LONG-TERM CURRENT USE OF INSULIN (HCC): Primary | ICD-10-CM

## 2020-11-03 RX ORDER — FLASH GLUCOSE SENSOR
1 KIT MISCELLANEOUS SEE ADMIN INSTRUCTIONS
COMMUNITY
Start: 2020-10-16

## 2020-11-03 RX ORDER — EMPAGLIFLOZIN 25 MG/1
26 TABLET, FILM COATED ORAL DAILY
COMMUNITY
Start: 2020-10-15 | End: 2020-12-16

## 2020-11-03 RX ORDER — INSULIN DEGLUDEC INJECTION 100 U/ML
20 INJECTION, SOLUTION SUBCUTANEOUS DAILY
COMMUNITY
Start: 2020-10-15 | End: 2021-02-14

## 2020-11-03 NOTE — PATIENT INSTRUCTIONS
· Keep up the great work. Continue to scan your sensor often. · Continue with Tresiba 20 units daily, Jardiance 25 mg daily, and metformin 1000 mg daily. · For your Humalog: continue with the current scale. · Give 6 units before each meals three times daily. · If pre meal sugar is below 90, skip · If above , give 3 units · If above 150, continue 6 units I will see you on the same day as your appointment with Dr. Leann Lord

## 2020-11-12 ENCOUNTER — TELEPHONE (OUTPATIENT)
Dept: INTERNAL MEDICINE CLINIC | Age: 65
End: 2020-11-12

## 2020-11-17 RX ORDER — BUPROPION HYDROCHLORIDE 300 MG/1
300 TABLET ORAL
Qty: 90 TAB | Refills: 3 | Status: SHIPPED | OUTPATIENT
Start: 2020-11-17 | End: 2021-12-21 | Stop reason: ALTCHOICE

## 2020-12-09 ENCOUNTER — TELEPHONE (OUTPATIENT)
Dept: INTERNAL MEDICINE CLINIC | Age: 65
End: 2020-12-09

## 2020-12-09 NOTE — TELEPHONE ENCOUNTER
Pharmacy Progress Note - Telephone Encounter    S/O: Ms. Kay Lighter 77 y.o. female, referred by Dr. Sherryle Brodie, was contacted via an outbound telephone call to discuss her Linzess  today. Verified patients identifiers (name & ) per HIPAA policy.     - Linzess 648 mcg PAP supply came in. #90    A/P:  - Patient will stop by to  the supply next week. - Patient endorses understanding to the provided information. All questions answered at this time.        Thank you,  Tavia Cardenas, PharmD, BCACP, [facility]      CLINICAL PHARMACY CONSULT: MED RECONCILIATION/REVIEW ADDENDUM    For Pharmacy Admin Tracking Only    PHSO: PHSO Patient?: Yes    Time Spent (min): 5

## 2020-12-16 ENCOUNTER — OFFICE VISIT (OUTPATIENT)
Dept: INTERNAL MEDICINE CLINIC | Age: 65
End: 2020-12-16
Payer: MEDICARE

## 2020-12-16 ENCOUNTER — TRANSCRIBE ORDER (OUTPATIENT)
Dept: SCHEDULING | Age: 65
End: 2020-12-16

## 2020-12-16 ENCOUNTER — OFFICE VISIT (OUTPATIENT)
Dept: INTERNAL MEDICINE CLINIC | Age: 65
End: 2020-12-16

## 2020-12-16 VITALS
HEART RATE: 88 BPM | TEMPERATURE: 97.2 F | BODY MASS INDEX: 34.16 KG/M2 | OXYGEN SATURATION: 98 % | HEIGHT: 65 IN | RESPIRATION RATE: 20 BRPM | SYSTOLIC BLOOD PRESSURE: 124 MMHG | DIASTOLIC BLOOD PRESSURE: 82 MMHG | WEIGHT: 205 LBS

## 2020-12-16 VITALS
SYSTOLIC BLOOD PRESSURE: 124 MMHG | BODY MASS INDEX: 34.16 KG/M2 | HEIGHT: 65 IN | WEIGHT: 205 LBS | OXYGEN SATURATION: 98 % | HEART RATE: 88 BPM | DIASTOLIC BLOOD PRESSURE: 82 MMHG | TEMPERATURE: 97.2 F

## 2020-12-16 DIAGNOSIS — E11.65 UNCONTROLLED TYPE 2 DIABETES MELLITUS WITH HYPERGLYCEMIA, WITH LONG-TERM CURRENT USE OF INSULIN (HCC): Primary | ICD-10-CM

## 2020-12-16 DIAGNOSIS — Z12.31 SCREENING MAMMOGRAM, ENCOUNTER FOR: Primary | ICD-10-CM

## 2020-12-16 DIAGNOSIS — N32.81 OAB (OVERACTIVE BLADDER): ICD-10-CM

## 2020-12-16 DIAGNOSIS — I10 ESSENTIAL HYPERTENSION: ICD-10-CM

## 2020-12-16 DIAGNOSIS — F41.1 GAD (GENERALIZED ANXIETY DISORDER): ICD-10-CM

## 2020-12-16 DIAGNOSIS — F41.9 ANXIETY: ICD-10-CM

## 2020-12-16 DIAGNOSIS — Z99.89 OSA ON CPAP: ICD-10-CM

## 2020-12-16 DIAGNOSIS — E11.69 HYPERLIPIDEMIA ASSOCIATED WITH TYPE 2 DIABETES MELLITUS (HCC): ICD-10-CM

## 2020-12-16 DIAGNOSIS — Z79.4 UNCONTROLLED TYPE 2 DIABETES MELLITUS WITH HYPERGLYCEMIA, WITH LONG-TERM CURRENT USE OF INSULIN (HCC): Primary | ICD-10-CM

## 2020-12-16 DIAGNOSIS — E03.9 ACQUIRED HYPOTHYROIDISM: ICD-10-CM

## 2020-12-16 DIAGNOSIS — E11.21 TYPE 2 DIABETES WITH NEPHROPATHY (HCC): ICD-10-CM

## 2020-12-16 DIAGNOSIS — M81.0 OSTEOPOROSIS WITHOUT CURRENT PATHOLOGICAL FRACTURE, UNSPECIFIED OSTEOPOROSIS TYPE: ICD-10-CM

## 2020-12-16 DIAGNOSIS — G47.33 OSA ON CPAP: ICD-10-CM

## 2020-12-16 DIAGNOSIS — E78.5 HYPERLIPIDEMIA ASSOCIATED WITH TYPE 2 DIABETES MELLITUS (HCC): ICD-10-CM

## 2020-12-16 PROCEDURE — G8752 SYS BP LESS 140: HCPCS | Performed by: INTERNAL MEDICINE

## 2020-12-16 PROCEDURE — G8427 DOCREV CUR MEDS BY ELIG CLIN: HCPCS | Performed by: INTERNAL MEDICINE

## 2020-12-16 PROCEDURE — G0463 HOSPITAL OUTPT CLINIC VISIT: HCPCS | Performed by: INTERNAL MEDICINE

## 2020-12-16 PROCEDURE — G9899 SCRN MAM PERF RSLTS DOC: HCPCS | Performed by: INTERNAL MEDICINE

## 2020-12-16 PROCEDURE — G8754 DIAS BP LESS 90: HCPCS | Performed by: INTERNAL MEDICINE

## 2020-12-16 PROCEDURE — G8417 CALC BMI ABV UP PARAM F/U: HCPCS | Performed by: INTERNAL MEDICINE

## 2020-12-16 PROCEDURE — 3017F COLORECTAL CA SCREEN DOC REV: CPT | Performed by: INTERNAL MEDICINE

## 2020-12-16 PROCEDURE — 1101F PT FALLS ASSESS-DOCD LE1/YR: CPT | Performed by: INTERNAL MEDICINE

## 2020-12-16 PROCEDURE — G8536 NO DOC ELDER MAL SCRN: HCPCS | Performed by: INTERNAL MEDICINE

## 2020-12-16 PROCEDURE — 1090F PRES/ABSN URINE INCON ASSESS: CPT | Performed by: INTERNAL MEDICINE

## 2020-12-16 PROCEDURE — 2022F DILAT RTA XM EVC RTNOPTHY: CPT | Performed by: INTERNAL MEDICINE

## 2020-12-16 PROCEDURE — 99213 OFFICE O/P EST LOW 20 MIN: CPT | Performed by: INTERNAL MEDICINE

## 2020-12-16 PROCEDURE — G9717 DOC PT DX DEP/BP F/U NT REQ: HCPCS | Performed by: INTERNAL MEDICINE

## 2020-12-16 PROCEDURE — 3052F HG A1C>EQUAL 8.0%<EQUAL 9.0%: CPT | Performed by: INTERNAL MEDICINE

## 2020-12-16 RX ORDER — EMPAGLIFLOZIN 25 MG/1
25 TABLET, FILM COATED ORAL DAILY
Qty: 30 TAB | Refills: 2
Start: 2020-12-16 | End: 2022-05-25 | Stop reason: ALTCHOICE

## 2020-12-16 RX ORDER — ALPRAZOLAM 0.25 MG/1
0.25 TABLET ORAL
Qty: 30 TAB | Refills: 0 | Status: SHIPPED | OUTPATIENT
Start: 2020-12-16 | End: 2022-06-22 | Stop reason: SDUPTHER

## 2020-12-16 RX ORDER — INSULIN LISPRO 100 [IU]/ML
3-4 INJECTION, SOLUTION INTRAVENOUS; SUBCUTANEOUS
Qty: 15 ML | Refills: 0
Start: 2020-12-16 | End: 2022-03-31 | Stop reason: CLARIF

## 2020-12-16 RX ORDER — METFORMIN HYDROCHLORIDE 500 MG/1
2000 TABLET, EXTENDED RELEASE ORAL
Qty: 180 TAB | Refills: 3
Start: 2020-12-16 | End: 2020-12-16

## 2020-12-16 RX ORDER — METFORMIN HYDROCHLORIDE 500 MG/1
1000 TABLET, EXTENDED RELEASE ORAL
Qty: 180 TAB | Refills: 3
Start: 2020-12-16 | End: 2021-08-16 | Stop reason: SDUPTHER

## 2020-12-16 NOTE — PATIENT INSTRUCTIONS
· Adjust Humalog to: · If blood sugar before meal is between 150-199: give 3 units · If blood sugar before meal is between 200-250: give 4 units · If blood sugar before meal is between 251-300: give 5 units · Continue Tresiba 20 units daily · Continue metformin 1000 mg daily in the morning with food and jardiance 25 mg daily.

## 2020-12-16 NOTE — PROGRESS NOTES
Genesis Cheung is a 77 y.o. female who presents for evaluation of routine follow up. Last seen by me June 22, 2020 in awv, also a virtual visit. Since then, had to increase her dose of wellbutrin for her EFFIE, and it has helped nicely. Continues to make progress with her DM, a1c down to 8.4. Has a free style sarah device now, and is having some readings into the 60s on occasion. Sleeping better with cpap. Was never called to start prolia.       ROS:  Constitutional: negative for fevers, chills, anorexia and weight loss  Eyes:   negative for visual disturbance and irritation  ENT:   negative for tinnitus,sore throat,nasal congestion,ear pain,hoarseness  Respiratory:  negative for cough, hemoptysis, dyspnea,wheezing  CV:   negative for chest pain, palpitations, lower extremity edema  GI:   negative for nausea, vomiting, diarrhea, abdominal pain,melena  Genitourinary: negative for frequency, dysuria and hematuria  Musculoskel: negative for myalgias, arthralgias, back pain, muscle weakness, joint pain  Neurological:  negative for headaches, dizziness, focal weakness, numbness  Psychiatric:     Negative for depression or anxiety      Past Medical History:   Diagnosis Date    Depression     Diabetes ([facility] [address]     Hypercholesteremia     Hypertension     Hyperthyroidism     IBS (irritable bowel syndrome)     Urinary incontinence        Past Surgical History:   Procedure Laterality Date    HX BACK SURGERY      x2    HX BLEPHAROPLASTY Right     HX BUNIONECTOMY      HX HYSTERECTOMY      HX OTHER SURGICAL      Collapsed lung    HX WISDOM TEETH EXTRACTION         Family History   Problem Relation Age of Onset    Diabetes Mother     Heart Disease Mother     Cancer Father         pancreatic    Diabetes Sister     Anxiety Sister     Diabetes Brother     Anxiety Brother     Diabetes Brother     Anxiety Brother     Diabetes Sister     Anxiety Sister     Diabetes Sister     Anxiety Sister     Cancer Sister         lung and brain    Anxiety Sister     Anxiety Sister     Breast Cancer Paternal Aunt         under 48       Social History     Socioeconomic History    Marital status: SINGLE     Spouse name: Not on file    Number of children: Not on file    Years of education: Not on file    Highest education level: Not on file   Occupational History    Not on file   Social Needs    Financial resource strain: Not on file    Food insecurity     Worry: Not on file     Inability: Not on file    Transportation needs     Medical: Not on file     Non-medical: Not on file   Tobacco Use    Smoking status: Never Smoker    Smokeless tobacco: Never Used   Substance and Sexual Activity    Alcohol use: Yes     Frequency: 2-3 times a week     Comment: occasionally    Drug use: No    Sexual activity: Not Currently   Lifestyle    Physical activity     Days per week: Not on file     Minutes per session: Not on file    Stress: Not on file   Relationships    Social connections     Talks on phone: Not on file     Gets together: Not on file     Attends Yazidism service: Not on file     Active member of club or organization: Not on file     Attends meetings of clubs or organizations: Not on file     Relationship status: Not on file    Intimate partner violence     Fear of current or ex partner: Not on file     Emotionally abused: Not on file     Physically abused: Not on file     Forced sexual activity: Not on file   Other Topics Concern    Not on file   Social History Narrative    Not on file            Visit Vitals  /82 (BP 1 Location: Left arm, BP Patient Position: Sitting)   Pulse 88   Temp 97.2 °F (36.2 °C) (Temporal)   Resp 20   Ht 5' 5\" (1.651 m)   Wt 205 lb (93 kg)   SpO2 98%   BMI 34.11 kg/m²       Physical Examination:   General - Well appearing female  HEENT - PERRL, TM no erythema/opacification, normal nasal turbinates, no oropharyngeal erythema or exudate, MMM  Neck - supple, no bruits, no thyroidomegaly, no lymphadenopathy  Pulm - clear to auscultation bilaterally  Cardio - RRR, normal S1 S2, no murmur  Abd - soft, nontender, no masses, no HSM  Extrem - no edema, +2 distal pulses  Neuro-  No focal deficits, CN intact     Assessment/Plan:    1.  Dm, type 2--has free style sarah now. Continue with jardiance, glucoophage, tresiba, mealtime humalog. Last a1c 8.4  2.  hyperlipids--on pravachol  3.  dionisio--continue cpap  4. EFFIE--much improved with increased dose of wellbutrin. Refill for prn xanax given  5. Hypothyroid--on synthroid  6.  ibs-constipation--on linzess  7.  oab--could not continue trospium due to cost, but might resume in new year with new insurance. Also discussed botox injections. 8.  Osteoporosis--prolia ordered again  9.  bilaterla hip and knee osteoarthritis--she plans to contact ortho, dr Yohannes Babin, again    Has mamm next week.   rtc 6 months        Dez Martínezu III, DO

## 2020-12-16 NOTE — PATIENT INSTRUCTIONS

## 2020-12-16 NOTE — PROGRESS NOTES
Pharmacy Progress Note - Diabetes Management    S/O: Ms. Vee Rousseau is a 65 y. o. female , referred by Dr. Shaina Michele DO, with a PMH of T2DM, HTN, HLD, Hypothyroidism, OAB, IBS, Osteoporosis, was seen today for diabetes management.   Last A1c was 8% (Oct 2020), a decrease from 8.4% (June 2020), an increase from 7.2% (Dec 2019).      Interim update:   Changing rx insurance next year to [facility] Medicare Advantage: [phone]  ID: [id number]    Current anti-hyperglycemic regimen include(s):    - Tresiba 20 units daily  - Humalog - Give 6 units before each meals three times daily. · If pre meal sugar is below 90, skip  · If above , give 3 units  · If above 150, continue 6 units  - Reports to giving 3 units if pre-meal BG > 150 , otherwise, BG drops <100. Giving Humalog 2-3x/day  - Metformin 1000 mg daily in the morning   - Jardiance 25 mg daily    ROS:  Today, Pt endorses:  - Symptoms of Hyperglycemia: none  - Symptoms of Hypoglycemia: none    Self Monitoring Blood Glucose (SMBG) or CGM:  Using Julien 2 CGM. C/o sensor falling off often. Scanning 7-8x/day      Midnight - 6 AM 6 AM - Noon Noon - 6 PM 6 PM - Midnight Average   7 Day Average 130 123 143 170 140   14 Day Average 132 115 142 180 142   30 Day Average 127 107 138 178 138     Hypoglycemia:       Midnight - 6 AM 6 AM - Noon Noon - 6 PM 6 PM - Midnight Total Lows   7 Day Low  1 0 1 0 2   14 Day Low 3 0 1 0 4   30 Day Low 7 2 5 1 15        Nutrition/Lifestyle Modifications:  Denies alcohol consumption  2-3 meals/day. Denies changes to portion sizes  Dinner last night: country fried steak + \"spoonful of potato\"  Beverage: powerade zero, water, or coffee   Snacking on m&m and ice cream bar occasionally at night   Wt 200 lbs at home       Vitals:   Wt Readings from Last 3 Encounters:   12/16/20 205 lb (93 kg)   11/03/20 203 lb 6.4 oz (92.3 kg)   09/16/20 212 lb (96.2 kg)     BP Readings from Last 3 Encounters:   12/16/20 124/82   12/16/20 124/82   11/03/20 126/68     Pulse Readings from Last 3 Encounters:   12/16/20 88   12/16/20 88   11/03/20 75       Past Medical History:   Diagnosis Date    Depression     Diabetes (HCC)     Hypercholesteremia     Hypertension     Hyperthyroidism     IBS (irritable bowel syndrome)     Urinary incontinence      Allergies   Allergen Reactions    Celebrex [Celecoxib] Other (comments)     Hallucinations    Codeine Nausea and Vomiting    Erythromycin Nausea and Vomiting       Current Outpatient Medications   Medication Sig    buPROPion XL (WELLBUTRIN XL) 300 mg XL tablet Take 1 Tab by mouth every morning.  Jardiance 25 mg tablet Take 26 mg by mouth daily. [facility] Vickie Cuff FlexTouch U-100 100 unit/mL (3 mL) inpn 20 Units by SubCUTAneous route daily.  FreeStyle Julien 2 Sensor kit 1 Each by Other route See Salvatore Shaw. Use to scan sensor three times daily    metFORMIN ER (GLUCOPHAGE XR) 500 mg tablet TAKE 1 TABLET TWICE A DAY (Patient taking differently: Take 1,000 mg by mouth daily.)    terbinafine HCL (LAMISIL) 250 mg tablet Take 1 Tab by mouth daily.  pravastatin (PRAVACHOL) 20 mg tablet Take 20 mg by mouth nightly.  levothyroxine (SYNTHROID) 100 mcg tablet Take 100 mcg by mouth See Admin Instructions. Take together with 300 mcg dose to total 250 mcg daily    furosemide (LASIX) 20 mg tablet TAKE 1 TABLET DAILY AS NEEDED FOR EDEMA (Patient taking differently: Take 20 mg by mouth daily as needed.)    levothyroxine (SYNTHROID) 300 mcg tablet Take 1 Tab by mouth daily. (Patient taking differently: Take 150 mcg by mouth See Admin Instructions. Take together with 100 mcg dose to total 250 mcg daily)    lisinopril (PRINIVIL, ZESTRIL) 5 mg tablet TAKE 1 TABLET DAILY (Patient taking differently: Take 5 mg by mouth daily. [place] )    ALPRAZolam (XANAX) 0.25 mg tablet Take 1 Tab by mouth daily as needed for Anxiety. Indications: anxious    NOVOFINE PLUS 32 gauge x 1/6\" ndle Check sugars 4x daily.     HUMALOG KWIKPEN INSULIN 100 unit/mL kwikpen 4-20 Units by SubCUTAneous route Before breakfast, lunch, and dinner. (Patient taking differently: 4-6 Units by SubCUTAneous route Before breakfast, lunch, and dinner.)    calcium polycarbophil (FIBER LAXATIVE, CA POLYCARBO,) 625 mg tablet Take 625 mg by mouth daily.  multivitamin (ONE A DAY) tablet Take 1 Tab by mouth daily.  linaclotide (LINZESS) 290 mcg cap capsule Take 290 mcg by mouth Daily (before breakfast).  acetaminophen (TYLENOL) 500 mg tablet Take 1 Tab by mouth every six (6) hours as needed for Pain.  cholecalciferol (VITAMIN D3) 1,000 unit tablet Take 4,000 Units by mouth daily.  B.infantis-B.ani-B.long-B.bifi (PROBIOTIC 4X) 10-15 mg TbEC Take 2 Gum by mouth daily. No current facility-administered medications for this visit. Lab Results   Component Value Date/Time    Sodium 138 06/23/2020 01:20 PM    Potassium 4.1 06/23/2020 01:20 PM    Chloride 101 06/23/2020 01:20 PM    CO2 23 06/23/2020 01:20 PM    Anion gap 6 01/10/2019 08:37 AM    Glucose 250 (H) 06/23/2020 01:20 PM    BUN 15 06/23/2020 01:20 PM    Creatinine 0.90 06/23/2020 01:20 PM    BUN/Creatinine ratio 17 06/23/2020 01:20 PM    GFR est AA 77 06/23/2020 01:20 PM    GFR est non-AA 66 06/23/2020 01:20 PM    Calcium 9.7 06/23/2020 01:20 PM    Bilirubin, total 0.4 06/23/2020 01:20 PM    Alk.  phosphatase 96 06/23/2020 01:20 PM    Protein, total 6.6 06/23/2020 01:20 PM    Albumin 4.3 06/23/2020 01:20 PM    Globulin 3.8 01/10/2019 08:37 AM    A-G Ratio 1.9 06/23/2020 01:20 PM    ALT (SGPT) 16 06/23/2020 01:20 PM       Lab Results   Component Value Date/Time    Cholesterol, total 188 06/23/2020 01:20 PM    HDL Cholesterol 60 06/23/2020 01:20 PM    LDL, calculated 108 (H) 06/23/2020 01:20 PM    VLDL, calculated 20 06/23/2020 01:20 PM    Triglyceride 101 06/23/2020 01:20 PM       Lab Results   Component Value Date/Time    WBC 6.6 06/23/2020 01:20 PM    HGB 12.3 06/23/2020 01:20 PM HCT 35.2 06/23/2020 01:20 PM    PLATELET 620 (L) 78/91/7594 01:20 PM    MCV 86 06/23/2020 01:20 PM       Lab Results   Component Value Date/Time    Microalb/Creat ratio (ug/mg creat.) 3 06/23/2020 01:20 PM       HbA1c:  Lab Results   Component Value Date/Time    Hemoglobin A1c 8.4 (H) 06/23/2020 01:20 PM    Hemoglobin A1c (POC) 7.2 12/20/2019 08:22 AM     No components found for: 2     Last Point of Care HGB A1C  Hemoglobin A1c (POC)   Date Value Ref Range Status   12/20/2019 7.2 % Final        Estimated Creatinine Clearance: 68.4 mL/min (by C-G formula based on SCr of 0.9 mg/dL). A/P:    Diabetes Management:  - Per ADA guidelines, Pt's A1c is not at goal of < 7%.   - CGM trends are improving. Discuss concern for nocturnal hypoglycemia  - Adjust Humalog to:  - premeal -199: give 3 units  - pre meal -250: give 4 units   - pre meal BG  251-300: give 5 units   - Continue Tresiba 20 units daily and jardiance 25 mg daily  - Continue metformin 1 gm daily in the AM  - 2nd shingrix dose will be due next month     Medication reconciliation was completed during the visit. Medications Discontinued During This Encounter   Medication Reason    HUMALOG KWIKPEN INSULIN 100 unit/mL kwikpen     Jardiance 25 mg tablet     metFORMIN ER (GLUCOPHAGE XR) 500 mg tablet     metFORMIN ER (GLUCOPHAGE XR) 500 mg tablet      Orders Placed This Encounter    HumaLOG KwikPen Insulin 100 unit/mL kwikpen     Sig: 3-4 Units by SubCUTAneous route Before breakfast, lunch, and dinner. Give 3 units for blood sugar between 150-200; give 4 units for blood sugar 200-250. Indications: type 2 diabetes mellitus     Dispense:  15 mL     Refill:  0    Jardiance 25 mg tablet     Sig: Take 1 Tab by mouth daily. Dispense:  30 Tab     Refill:  2    DISCONTD: metFORMIN ER (GLUCOPHAGE XR) 500 mg tablet     Sig: Take 4 Tabs by mouth daily (with breakfast).      Dispense:  180 Tab     Refill:  3    metFORMIN ER (GLUCOPHAGE XR) 500 mg tablet     Sig: Take 2 Tabs by mouth daily (with breakfast). Dispense:  180 Tab     Refill:  3       Patient verbalized understanding of the information presented and all of the patients questions were answered. AVS was handed to the patient. Patient advised to call the office with any additional questions or concerns. Notifications of recommendations will be sent to Dr. Alistair Harvey DO for review. Patient will return to clinic in 9 week(s) for follow up. Thank you for the consult,  Tavia Frances, PharmD, BCACP, CDCES        CLINICAL PHARMACY CONSULT: MED RECONCILIATION/REVIEW ADDENDUM    For Pharmacy Admin Tracking Only    PHSO: PHSO Patient?: Yes  Total # of Interventions Recommended: Count: 4  - Discontinued Medication #: 2 Discontinue Reason(s): Needs Additional Medication Therapy  - Updated Order #: 2 Updated Order Reason(s):  Other    Time Spent (min): 20

## 2020-12-23 ENCOUNTER — HOSPITAL ENCOUNTER (OUTPATIENT)
Dept: MAMMOGRAPHY | Age: 65
Discharge: HOME | End: 2020-12-23
Attending: INTERNAL MEDICINE
Payer: MEDICARE

## 2020-12-23 DIAGNOSIS — Z12.31 SCREENING MAMMOGRAM, ENCOUNTER FOR: ICD-10-CM

## 2020-12-23 PROCEDURE — 77063 BREAST TOMOSYNTHESIS BI: CPT

## 2020-12-28 RX ORDER — NYSTATIN 100000 [USP'U]/G
POWDER TOPICAL 4 TIMES DAILY
Qty: 30 G | Refills: 2 | Status: SHIPPED | OUTPATIENT
Start: 2020-12-28 | End: 2021-12-21

## 2021-01-05 DIAGNOSIS — B35.1 ONYCHOMYCOSIS: Primary | ICD-10-CM

## 2021-01-05 RX ORDER — TERBINAFINE HYDROCHLORIDE 250 MG/1
250 TABLET ORAL DAILY
Qty: 30 TAB | Refills: 1 | OUTPATIENT
Start: 2021-01-05

## 2021-01-06 ENCOUNTER — HOSPITAL ENCOUNTER (OUTPATIENT)
Dept: LAB | Age: 66
Discharge: HOME | End: 2021-01-06
Payer: MEDICARE

## 2021-01-06 PROCEDURE — 80053 COMPREHEN METABOLIC PANEL: CPT

## 2021-01-06 PROCEDURE — 36415 COLL VENOUS BLD VENIPUNCTURE: CPT

## 2021-01-07 LAB
ALBUMIN SERPL-MCNC: 4.4 G/DL (ref 3.8–4.8)
ALBUMIN/GLOB SERPL: 1.8 {RATIO} (ref 1.2–2.2)
ALP SERPL-CCNC: 75 IU/L (ref 39–117)
ALT SERPL-CCNC: 15 IU/L (ref 0–32)
AST SERPL-CCNC: 13 IU/L (ref 0–40)
BILIRUB SERPL-MCNC: 0.4 MG/DL (ref 0–1.2)
BUN SERPL-MCNC: 19 MG/DL (ref 8–27)
BUN/CREAT SERPL: 16 (ref 12–28)
CALCIUM SERPL-MCNC: 9.5 MG/DL (ref 8.7–10.3)
CHLORIDE SERPL-SCNC: 104 MMOL/L (ref 96–106)
CO2 SERPL-SCNC: 24 MMOL/L (ref 20–29)
CREAT SERPL-MCNC: 1.2 MG/DL (ref 0.57–1)
GLOBULIN SER CALC-MCNC: 2.4 G/DL (ref 1.5–4.5)
GLUCOSE SERPL-MCNC: 204 MG/DL (ref 65–99)
POTASSIUM SERPL-SCNC: 4.2 MMOL/L (ref 3.5–5.2)
PROT SERPL-MCNC: 6.8 G/DL (ref 6–8.5)
SODIUM SERPL-SCNC: 141 MMOL/L (ref 134–144)

## 2021-01-07 RX ORDER — TERBINAFINE HYDROCHLORIDE 250 MG/1
250 TABLET ORAL DAILY
Qty: 30 TAB | Refills: 1 | Status: SHIPPED | OUTPATIENT
Start: 2021-01-07 | End: 2021-02-11 | Stop reason: SDUPTHER

## 2021-01-11 ENCOUNTER — DOCUMENTATION ONLY (OUTPATIENT)
Dept: INTERNAL MEDICINE CLINIC | Age: 66
End: 2021-01-11

## 2021-01-11 NOTE — PROGRESS NOTES
Pharmacy Progress Note - Medication Access    PAP application for Jardiance submitted for Ms. Conn Mar 77 y.o. today.      Thank you for the consult,  Jayleen SmithD, BCACP, [facility]        CLINICAL PHARMACY CONSULT: MED RECONCILIATION/REVIEW ADDENDUM    For Pharmacy Admin Tracking Only    PHSO: PHSO Patient?: Yes  Total # of Interventions Recommended: Count: 1  - New Order #: 1 New Medication Order Reason(s): Cost Conversion

## 2021-01-19 ENCOUNTER — TELEPHONE (OUTPATIENT)
Dept: INTERNAL MEDICINE CLINIC | Age: 66
End: 2021-01-19

## 2021-01-19 NOTE — TELEPHONE ENCOUNTER
Pharmacy Progress Note - Telephone Encounter    Ms. Colette Ortega 74 y.o. female, referred by Dr. Tomy Bowling, was contacted via an outbound telephone call to discuss her PAP  today. Verified patients identifiers (name & ) per HIPAA policy.     - Now approved for Jardiance PAP until Dec 31, 2021  - Patient endorses understanding to the provided information. All questions answered at this time.        Thank you,  Tavia Brunson, PharmD, BCACP, [facility]      CLINICAL PHARMACY CONSULT: MED RECONCILIATION/REVIEW ADDENDUM    For Pharmacy Admin Tracking Only    PHSO: PHSO Patient?: Yes

## 2021-02-11 RX ORDER — TERBINAFINE HYDROCHLORIDE 250 MG/1
250 TABLET ORAL DAILY
Qty: 30 TAB | Refills: 1 | Status: SHIPPED | OUTPATIENT
Start: 2021-02-11 | End: 2021-05-02

## 2021-02-12 ENCOUNTER — TELEPHONE (OUTPATIENT)
Dept: INTERNAL MEDICINE CLINIC | Age: 66
End: 2021-02-12

## 2021-02-14 RX ORDER — INSULIN DEGLUDEC INJECTION 100 U/ML
17 INJECTION, SOLUTION SUBCUTANEOUS DAILY
Qty: 15 ML | Refills: 1
Start: 2021-02-14 | End: 2021-06-16

## 2021-02-14 NOTE — TELEPHONE ENCOUNTER
Pharmacy Progress Note - Telephone Encounter    S/O: Ms. Joey Arvizu 74 y.o. female contacted office/me via an inbound telephone call to discuss her recent DM regimen changes today. Verified patients identifiers (name & ) per HIPAA policy. - Saw Eduardo Ware NP ([facility]) this week for routine follow up. Had labs checked. - Reports having a 7 lb wt loss. Braxton Марина reduced to 17 units daily  - Humlog scale modified to:   - 140-200: 2 units   - 201-250: 3 units   - 251-300: 5 units   - 301-350: 6 units  - Still on Jardiance 25 mg daily. Received PAP supply. A/P:  - Appreciate patient's updates. Will update med list.  - Patient endorses understanding to the provided information. All questions answered at this time. Medications Discontinued During This Encounter   Medication Reason   Mary Rumpf FlexTouch U-100 100 unit/mL (3 mL) inpn      Orders Placed This Encounter   Mary Rumpf FlexTouch U-100 100 unit/mL (3 mL) inpn     Si Units by SubCUTAneous route daily. Indications: type 2 diabetes mellitus     Dispense:  15 mL     Refill:  1       Thank you,  Tavia Merchant, PharmD, BCACP, CDCES      CLINICAL PHARMACY CONSULT: MED RECONCILIATION/REVIEW ADDENDUM    For Pharmacy Admin Tracking Only    PHSO: PHSO Patient?: Yes  Total # of Interventions Recommended: Count: 1  - Updated Order #: 1 Updated Order Reason(s):  Other    Time Spent (min): 10

## 2021-02-23 ENCOUNTER — TELEPHONE (OUTPATIENT)
Dept: INTERNAL MEDICINE CLINIC | Age: 66
End: 2021-02-23

## 2021-02-23 NOTE — TELEPHONE ENCOUNTER
Pharmacy Progress Note - Telephone Encounter    S/O: Ms. Declan Woodward 74 y.o. female contacted office/me via an inbound telephone call to discuss her Humalog today. Verified patients identifiers (name & ) per HIPAA policy.     - Has 1 box of Humalog left  - PAP  Dec 31, 2020. A/P:  - Will address at her upcoming appt with me on 3/3/21.   - Patient endorses understanding to the provided information. All questions answered at this time.      Thank you,  Tavia Santamaria, PharmD, BCACP, [facility]      CLINICAL PHARMACY CONSULT: MED RECONCILIATION/REVIEW ADDENDUM    For Pharmacy Admin Tracking Only    PHSO: PHSO Patient?: Yes

## 2021-02-24 NOTE — TELEPHONE ENCOUNTER
Yuridia Arthur, from [facility]   [phone]  order [id number]    Daniel Ortiz states the patient requested a refill from [facility] for Lisinopril. She states this is the final attempt and multiple faxes have been sent for the request (electronic fax).     She states please refill:   lisinopril (PRINIVIL, ZESTRIL) 5 mg tablet    To:  [address], [address] [place] [address]  [phone]

## 2021-02-25 RX ORDER — LISINOPRIL 5 MG/1
TABLET ORAL
Qty: 90 TAB | Refills: 3 | Status: SHIPPED | OUTPATIENT
Start: 2021-02-25 | End: 2022-05-17

## 2021-03-03 ENCOUNTER — OFFICE VISIT (OUTPATIENT)
Dept: INTERNAL MEDICINE CLINIC | Age: 66
End: 2021-03-03

## 2021-03-03 VITALS
HEART RATE: 79 BPM | DIASTOLIC BLOOD PRESSURE: 74 MMHG | HEIGHT: 65 IN | BODY MASS INDEX: 33.22 KG/M2 | OXYGEN SATURATION: 97 % | WEIGHT: 199.4 LBS | SYSTOLIC BLOOD PRESSURE: 118 MMHG

## 2021-03-03 DIAGNOSIS — E11.65 UNCONTROLLED TYPE 2 DIABETES MELLITUS WITH HYPERGLYCEMIA, WITH LONG-TERM CURRENT USE OF INSULIN (HCC): Primary | ICD-10-CM

## 2021-03-03 DIAGNOSIS — E11.21 TYPE 2 DIABETES WITH NEPHROPATHY (HCC): ICD-10-CM

## 2021-03-03 DIAGNOSIS — Z79.4 UNCONTROLLED TYPE 2 DIABETES MELLITUS WITH HYPERGLYCEMIA, WITH LONG-TERM CURRENT USE OF INSULIN (HCC): Primary | ICD-10-CM
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
## 2021-03-03 NOTE — PATIENT INSTRUCTIONS
· Keep up the great work! · Consider changing your claritin to zyrtec --- kid's version (chewable) is 5 mg -- so take 2 tabs daily · Continue Jardiance 25 mg daily , metformin 1000 mg daily · Continue Tresiba 17 units daily · Continue Humalog with the current scale: three times daily before meals: · 140-200 = 2 units · 201-250 = 3 units · 251-300 = 5 units · 351-400 = 6 units

## 2021-03-11 ENCOUNTER — TELEPHONE (OUTPATIENT)
Dept: INTERNAL MEDICINE CLINIC | Age: 66
End: 2021-03-11

## 2021-03-11 RX ORDER — TROSPIUM CHLORIDE ER 60 MG/1
60 CAPSULE ORAL
Qty: 30 CAP | Refills: 1 | Status: SHIPPED | OUTPATIENT
Start: 2021-03-11 | End: 2021-06-01 | Stop reason: SDUPTHER

## 2021-03-11 NOTE — PROGRESS NOTES
Pharmacy Progress Note     Entry for Ms. Conn Mar 74 y.o. 's trospium per Dr. Toshia Clark. Orders Placed This Encounter    trospium (SANCTURA XL) 60 mg capsule     Sig: Take 1 Cap by mouth Daily (before breakfast).      Dispense:  30 Cap     Refill:  1     Thank you for the consult,  Tavia Lima, PharmD, BCACP, [facility]                CLINICAL PHARMACY CONSULT: MED RECONCILIATION/REVIEW ADDENDUM    For Pharmacy Admin Tracking Only    PHSO: PHSO Patient?: Yes  Total # of Interventions Recommended: Count: 1  - New Order #: 1 New Medication Order Reason(s): Needs Additional Medication Therapy

## 2021-03-11 NOTE — TELEPHONE ENCOUNTER
Pharmacy Progress Note - Telephone Encounter    S/O: Ms. Twan Severino 74 y.o. female, referred by Dr. Isaiah Mata, was contacted via an outbound telephone call today. Verified patients identifiers (name & ) per HIPAA policy. - Reports she received her [place] vaccine today. - Reports she received both doses of Shingrix at [place] last year. A/P:  - Shared updates on trospium 60 mg ER daily. - Contacted [facility] for Shingrix vaccination history. - Patient endorses understanding to the provided information. All questions answered at this time. Patient's Immunization History:    Immunizations by Immunization family     Influenza Vaccine 10/28/2017 10/22/2018 10/22/2018 2019     10/22/2020       Pneumococcal Conjugate (PCV) 2/15/2018       Pneumococcal Polysaccharide (PPSV-23) 2019       Sars-cov-2 (Covid-19) Vaccine, Unspecified  3/11/2021       Tdap 2017       Zoster Vaccine, Unspecified Formulation 10/22/2020 2020            Thank you,  Tavia Gandara Che, PharmD, BCACP, CDCES      CLINICAL PHARMACY CONSULT: MED RECONCILIATION/REVIEW ADDENDUM    For Pharmacy Admin Tracking Only    PHSO: PHSO Patient?: Yes  Total # of Interventions Recommended: Count: 2  - New Order #: 1 New Medication Order Reason(s): Needs Additional Medication Therapy  - Updated Order #: 1 Updated Order Reason(s):  Other    Time Spent (min): 15

## 2021-04-06 ENCOUNTER — HOSPITAL ENCOUNTER (OUTPATIENT)
Dept: GENERAL RADIOLOGY | Age: 66
Discharge: HOME | End: 2021-04-06
Payer: MEDICARE

## 2021-04-06 DIAGNOSIS — M25.561 RIGHT KNEE PAIN, UNSPECIFIED CHRONICITY: ICD-10-CM

## 2021-04-06 DIAGNOSIS — M25.551 RIGHT HIP PAIN: ICD-10-CM

## 2021-04-06 DIAGNOSIS — M25.562 LEFT KNEE PAIN, UNSPECIFIED CHRONICITY: ICD-10-CM

## 2021-04-06 DIAGNOSIS — M25.551 RIGHT HIP PAIN: Primary | ICD-10-CM

## 2021-04-06 PROCEDURE — 73562 X-RAY EXAM OF KNEE 3: CPT

## 2021-04-06 PROCEDURE — 73502 X-RAY EXAM HIP UNI 2-3 VIEWS: CPT

## 2021-04-08 ENCOUNTER — OFFICE VISIT (OUTPATIENT)
Dept: ORTHOPEDIC SURGERY | Age: 66
End: 2021-04-08
Payer: MEDICARE

## 2021-04-08 VITALS
DIASTOLIC BLOOD PRESSURE: 70 MMHG | OXYGEN SATURATION: 98 % | HEIGHT: 65 IN | SYSTOLIC BLOOD PRESSURE: 117 MMHG | HEART RATE: 88 BPM | BODY MASS INDEX: 33.32 KG/M2 | TEMPERATURE: 97.6 F | WEIGHT: 200 LBS

## 2021-04-08 DIAGNOSIS — M17.0 BILATERAL PRIMARY OSTEOARTHRITIS OF KNEE: ICD-10-CM

## 2021-04-08 DIAGNOSIS — M70.61 TROCHANTERIC BURSITIS, RIGHT HIP: Primary | ICD-10-CM

## 2021-04-08 PROCEDURE — G8752 SYS BP LESS 140: HCPCS | Performed by: ORTHOPAEDIC SURGERY

## 2021-04-08 PROCEDURE — 3017F COLORECTAL CA SCREEN DOC REV: CPT | Performed by: ORTHOPAEDIC SURGERY

## 2021-04-08 PROCEDURE — G8427 DOCREV CUR MEDS BY ELIG CLIN: HCPCS | Performed by: ORTHOPAEDIC SURGERY

## 2021-04-08 PROCEDURE — 1101F PT FALLS ASSESS-DOCD LE1/YR: CPT | Performed by: ORTHOPAEDIC SURGERY

## 2021-04-08 PROCEDURE — G8754 DIAS BP LESS 90: HCPCS | Performed by: ORTHOPAEDIC SURGERY

## 2021-04-08 PROCEDURE — 99213 OFFICE O/P EST LOW 20 MIN: CPT | Performed by: ORTHOPAEDIC SURGERY

## 2021-04-08 PROCEDURE — G8417 CALC BMI ABV UP PARAM F/U: HCPCS | Performed by: ORTHOPAEDIC SURGERY

## 2021-04-08 PROCEDURE — G9899 SCRN MAM PERF RSLTS DOC: HCPCS | Performed by: ORTHOPAEDIC SURGERY

## 2021-04-08 PROCEDURE — G9717 DOC PT DX DEP/BP F/U NT REQ: HCPCS | Performed by: ORTHOPAEDIC SURGERY

## 2021-04-08 PROCEDURE — 1090F PRES/ABSN URINE INCON ASSESS: CPT | Performed by: ORTHOPAEDIC SURGERY

## 2021-04-08 PROCEDURE — 20610 DRAIN/INJ JOINT/BURSA W/O US: CPT | Performed by: ORTHOPAEDIC SURGERY

## 2021-04-08 PROCEDURE — G8536 NO DOC ELDER MAL SCRN: HCPCS | Performed by: ORTHOPAEDIC SURGERY

## 2021-04-08 RX ORDER — BETAMETHASONE SODIUM PHOSPHATE AND BETAMETHASONE ACETATE 3; 3 MG/ML; MG/ML
6 INJECTION, SUSPENSION INTRA-ARTICULAR; INTRALESIONAL; INTRAMUSCULAR; SOFT TISSUE ONCE
Qty: 1 ML | Refills: 0
Start: 2021-04-08 | End: 2021-04-08

## 2021-04-08 RX ORDER — MELOXICAM 15 MG/1
15 TABLET ORAL DAILY
Qty: 60 TAB | Refills: 1 | Status: SHIPPED | OUTPATIENT
Start: 2021-04-08 | End: 2021-11-26 | Stop reason: ALTCHOICE

## 2021-04-08 NOTE — PROGRESS NOTES
Identified pt with two pt identifiers (name and ). Reviewed chart in preparation for visit and have obtained necessary documentation. Nehemias Fischer is a 74 y.o. female  Chief Complaint   Patient presents with    Knee Pain     Bilateral Knee    Hip Pain     RT hip     Visit Vitals  /70 (BP 1 Location: Right arm, BP Patient Position: Sitting, BP Cuff Size: Large adult)   Pulse 88   Temp 97.6 °F (36.4 °C) (Tympanic)   Ht 5' 5\" (1.651 m)   Wt 200 lb (90.7 kg)   SpO2 98%   BMI 33.28 kg/m²     1. Have you been to the ER, urgent care clinic since your last visit? Hospitalized since your last visit? No    2. Have you seen or consulted any other health care providers outside of the [address] [place] [address] since your last visit? Include any pap smears or colon screening.  No

## 2021-04-08 NOTE — LETTER
4/8/2021    Patient: Noam Jaimes   YOB: 1955   Date of Visit: 4/8/2021     Diana Diaz III, DO  [address]  [facility]  [address] [employer]    Dear Romario Cool,      Thank you for referring Ms. Sabine Santana to [facility] for evaluation. My notes for this consultation are attached. If you have questions, please do not hesitate to call me. I look forward to following your patient along with you.       Sincerely,    Neto Blum, DO

## 2021-04-08 NOTE — PROGRESS NOTES
4/8/2021      CC: Bilateral knee pain, right hip pain    HPI:      This is a 74y.o. year old female who presents for a follow up visit. The patient was last seen and diagnosed with peritrochanteric pain syndrome, right hip, osteoarthritis bilateral knees. The patient's treatments since the most recent visit have comprised of injection in July, of last year, she did go to therapy, she states that she still does some of the exercises. .   The patient has had variable relief of the chief complaint. She currently has had a bad week or 2 with pain. PMH:  Past Medical History:   Diagnosis Date    Depression     Diabetes ([address]     Hypercholesteremia     Hypertension     Hyperthyroidism     IBS (irritable bowel syndrome)     Urinary incontinence        PSxHx:  Past Surgical History:   Procedure Laterality Date    HX BACK SURGERY      x2    HX BLEPHAROPLASTY Right     HX BUNIONECTOMY      HX HYSTERECTOMY      HX OTHER SURGICAL      Collapsed lung    HX WISDOM TEETH EXTRACTION         Meds:    Current Outpatient Medications:     meloxicam (MOBIC) 15 mg tablet, Take 1 Tab by mouth daily. , Disp: 60 Tab, Rfl: 1    betamethasone ([employer] Soluspan) 6 mg/mL injection, 1 mL by Intra artICUlar route once for 1 dose., Disp: 1 mL, Rfl: 0    lisinopriL (PRINIVIL, ZESTRIL) 5 mg tablet, TAKE 1 TABLET DAILY, Disp: 90 Tab, Rfl: 3    Tresiba FlexTouch U-100 100 unit/mL (3 mL) inpn, 17 Units by SubCUTAneous route daily. Indications: type 2 diabetes mellitus, Disp: 15 mL, Rfl: 1    terbinafine HCL (LAMISIL) 250 mg tablet, Take 1 Tab by mouth daily. , Disp: 30 Tab, Rfl: 1    nystatin (MYCOSTATIN) powder, Apply  to affected area four (4) times daily. , Disp: 30 g, Rfl: 2    HumaLOG KwikPen Insulin 100 unit/mL kwikpen, 3-4 Units by SubCUTAneous route Before breakfast, lunch, and dinner. Give 3 units for blood sugar between 150-200; give 4 units for blood sugar 200-250.   Indications: type 2 diabetes mellitus, Disp: 13 mL, Rfl: 0    Jardiance 25 mg tablet, Take 1 Tab by mouth daily. , Disp: 30 Tab, Rfl: 2    metFORMIN ER (GLUCOPHAGE XR) 500 mg tablet, Take 2 Tabs by mouth daily (with breakfast). , Disp: 180 Tab, Rfl: 3    ALPRAZolam (XANAX) 0.25 mg tablet, Take 1 Tab by mouth daily as needed for Anxiety. Indications: anxious, Disp: 30 Tab, Rfl: 0    buPROPion XL (WELLBUTRIN XL) 300 mg XL tablet, Take 1 Tab by mouth every morning., Disp: 90 Tab, Rfl: 3    FreeStyle Julien 2 Sensor kit, 1 Each by Other route See Salvatore Shaw. Use to scan sensor three times daily, Disp: , Rfl:     pravastatin (PRAVACHOL) 20 mg tablet, Take 20 mg by mouth nightly., Disp: , Rfl:     levothyroxine (SYNTHROID) 100 mcg tablet, Take 100 mcg by mouth See Admin Instructions. Take together with 300 mcg dose to total 250 mcg daily, Disp: , Rfl:     furosemide (LASIX) 20 mg tablet, TAKE 1 TABLET DAILY AS NEEDED FOR EDEMA (Patient taking differently: Take 20 mg by mouth daily as needed.), Disp: 90 Tab, Rfl: 3    levothyroxine (SYNTHROID) 300 mcg tablet, Take 1 Tab by mouth daily. (Patient taking differently: Take 150 mcg by mouth See Admin Instructions. Take together with 100 mcg dose to total 250 mcg daily), Disp: 60 Tab, Rfl: 1    NOVOFINE PLUS 32 gauge x 1/6\" ndle, Check sugars 4x daily. , Disp: 300 Pen Needle, Rfl: 3    calcium polycarbophil (FIBER LAXATIVE, CA POLYCARBO,) 625 mg tablet, Take 625 mg by mouth daily. , Disp: , Rfl:     multivitamin (ONE A DAY) tablet, Take 1 Tab by mouth daily. , Disp: , Rfl:     linaclotide (LINZESS) 290 mcg cap capsule, Take 290 mcg by mouth Daily (before breakfast). , Disp: , Rfl:     acetaminophen (TYLENOL) 500 mg tablet, Take 1 Tab by mouth every six (6) hours as needed for Pain., Disp: 30 Tab, Rfl: 0    cholecalciferol (VITAMIN D3) 1,000 unit tablet, Take 4,000 Units by mouth daily. , Disp: , Rfl:     B.infantis-B.ani-B.long-B.bifi (PROBIOTIC 4X) 10-15 mg TbEC, Take 2 Gum by mouth daily. , Disp: , Rfl:    trospium (SANCTURA XL) 60 mg capsule, Take 1 Cap by mouth Daily (before breakfast). , Disp: 30 Cap, Rfl: 1    All: Allergies   Allergen Reactions    Celebrex [Celecoxib] Other (comments)     Hallucinations    Codeine Nausea and Vomiting    Erythromycin Nausea and Vomiting       Social Hx:  Social History     Socioeconomic History    Marital status: SINGLE     Spouse name: Not on file    Number of children: Not on file    Years of education: Not on file    Highest education level: Not on file   Tobacco Use    Smoking status: Never Smoker    Smokeless tobacco: Never Used   Substance and Sexual Activity    Alcohol use: Yes     Frequency: 2-3 times a week     Comment: occasionally    Drug use: No    Sexual activity: Not Currently       Family Hx:  Family History   Problem Relation Age of Onset    Diabetes Mother     Heart Disease Mother     Cancer Father         pancreatic    Diabetes Sister     Anxiety Sister     Diabetes Brother     Anxiety Brother     Diabetes Brother     Anxiety Brother     Diabetes Sister     Anxiety Sister     Diabetes Sister     Anxiety Sister     Cancer Sister         lung and brain    Anxiety Sister     Anxiety Sister     Breast Cancer Paternal Aunt         under 48         Review of Systems:       General: Denies headache, lethargy, fever, weight loss  Ears/Nose/Throat: Denies ear discharge, drainage, nosebleeds, hoarse voice, dental problems  Cardiovascular: Denies chest pain, shortness of breath  Lungs: Denies chest pain, breathing problems, wheezing, pneumonia  Stomach: Denies stomach pain, heartburn, constipation, irritable bowel  Skin: Denies rash, sores, open wounds  Musculoskeletal: Right hip, bilateral knee pain  Genitourinary: Denies dysuria, hematuria, polyuria  Gastrointestinal: Denies constipation, obstipation, diarrhea  Neurological: Denies changes in sight, smell, hearing, taste, seizures. Denies loss of consciousness.   Psychiatric: Denies depression, sleep pattern changes, anxiety, change in personality  Endocrine: Denies mood swings, heat or cold intolerance  Hematologic/Lymphatic: Denies anemia, purpura, petechia  Allergic/Immunologic: Denies swelling of throat, pain or swelling at lymph nodes      Physical Examination:    Visit Vitals  /70 (BP 1 Location: Right arm, BP Patient Position: Sitting, BP Cuff Size: Large adult)   Pulse 88   Temp 97.6 °F (36.4 °C) (Tympanic)   Ht 5' 5\" (1.651 m)   Wt 200 lb (90.7 kg)   SpO2 98%   BMI 33.28 kg/m²        General: AOX3, no apparent distress  Psychiatric: mood and affect appropriate  Lungs: breathing is symmetric and unlabored bilaterally  Heart: regular rate and rhythm  Abdomen: no guarding  Head: normocephalic, atraumatic  Skin: No significant abnormalities, good turgor  Sensation intact to light touch: C5-T1 dermatomes  Muscular exam: 5/5 strength in all major muscle groups unless noted in specialty exam.    Extremities       Right lower extremity: Tenderness palpation is noted peritrochanteric area. Abduction strength is 5-5 the hip. Circumduction is pain-free. Tenderness palpation is noted medially on the knee. No effusion. ACL, PCL, LCL, MCL are all intact ligamentous testing. Knee flexion extension are 5 out of 5. Hip flexion extension is 5 out of 5. Capillary refill is less than 2 seconds distally. Sensation is intact light touch in the L1-S1 dermatomes. Left lower extremity: Hip circumduction is pain-free. Tenderness palpation is noted medially on the knee. No effusion. ACL, PCL, LCL, MCL are all intact ligamentous testing. Knee flexion extension are 5 out of 5. Hip flexion extension is 5 out of 5. Capillary refill is less than 2 seconds distally. Sensation is intact light touch in the L1-S1 dermatomes. Diagnostics:    Pertinent Diagnostics: X-rays are reviewed, bilateral knee and right hip are reviewed.   She does have mild to moderate degenerative changes of the medial compartment of the bilateral knees. Small calcification at the tip of the greater trochanter on the right side, previous lumbar fusion is noted. Assessment: Peritrochanteric pain syndrome, right hip, bilateral knee osteoarthritis  Plan: This patient I had a thorough discussion regarding her pathophysiology, the anatomy, we went over the x-rays today in office together. Overall, the patient does have a significant amount of pain. We will start her on meloxicam for anti-inflammatories, I will perform a peritrochanteric injection, we have prescribed a TENS unit, I also represcribed her physical therapy and she will come back for viscosupplementation injections. The reasoning for viscosupplementation is that she is diabetic and too much cortisone would cause potentially a dangerous rise in her blood glucose. She stated her understanding and satisfaction and will follow-up once viscosupplementation is available. Ms. Paula Chairez has a reminder for a \"due or due soon\" health maintenance. I have asked that she contact her primary care provider for follow-up on this health maintenance.

## 2021-04-19 ENCOUNTER — HOSPITAL ENCOUNTER (OUTPATIENT)
Dept: PHYSICAL THERAPY | Age: 66
Discharge: HOME | End: 2021-04-19
Payer: MEDICARE

## 2021-04-19 PROCEDURE — 97162 PT EVAL MOD COMPLEX 30 MIN: CPT | Performed by: PHYSICAL THERAPIST

## 2021-04-19 NOTE — PROGRESS NOTES
PT INITIAL EVALUATION NOTE - [facility] 2-15    Patient Name: Maggie Unger  Date:2021  : 1955  [x]  Patient  Verified  Payor: [facility] MEDICARE / Plan: [employer] [address] / Product Type: Managed Care Medicare /    In time: 7597  Out time: 1481  Total Treatment Time (min): 48  Total Timed Codes (min): 0  1:1 Treatment Time ( [address] only): 0   Visit #: 1     Treatment Area: Right hip pain [M25.551]    SUBJECTIVE  Pain Level (0-10 scale): 5  Any medication changes, allergies to medications, adverse drug reactions, diagnosis change, or new procedure performed?: [] No    [x] Yes (see summary sheet for update)  Subjective:    Pt reports that her right hip began to bother her 3-4 years ago. She states a history of 3 back surgeries. Last surgery was in . She recently had PT in July/2020. She had done pretty well but in the last month she worked a number of days in a row and had the pain flare up again. She received a cortisone injection which she believes helped for ~2 days. She indicates the right hip just posterior to the greater trochanter. She describes this as a burning pain that is constant with movement. It also bothers her occasionally with prolonged sitting ~2 hours. She also complains of 2 year history of left groin pain. 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 She has a stool to sit on as she can't stand for longer than 30 minutes. She requires a lot of rest in between bouts of activity. She denies any numbness or tingling.             OBJECTIVE/EXAMINATION  Posture:  Seated rounded shoulders, forward head, back appears stiff with rotation  Other Observations:  NA  Gait and Functional Mobility:  Antalgic, decreased trunk rotation, toe out right >left  Palpation: tenderness posterior to greater trochancter    Lumbar ROM   Flex 75%  Extension 25%  Rotation ~25%   SB 50%  Joint Mobility Assessment: NT    Flexibility: decresaed hamstring and piriformis    LOWER QUARTER   MUSCLE STRENGTH  KEY       R  L  0 - No Contraction  Knee ext  5  5  1 - Trace            flex  4  4  2 - Poor   Hip ext   NT  NT  3 - Fair          flex   4  4  4 - Good         abd  2  3  5 - Normal         add  NT  NT      Ankle DF  4  4                PF  4  4                INV  NT  NT                EV  NT  NT    Neurological: Reflexes / Sensations: grossly intact  Special Tests: Trendelenberg: +    Stork: +      Manohar: NT     Wojciech: NT                 H.S. SLR: +    Piriformis Ext: +      Long Sit: NT     Hip Scour: + left      Knee Varus/Valgus Stress: NT  Knee Lachmans: NT      Thesslay Test: NT        Other Objective/Functional Measures: FOTO Functional Measure: 36/100                         Pain Level (0-10 scale) post treatment: 4-5    ASSESSMENT/Changes in Function:     [x]  See Plan of [place], PT 4/19/2021

## 2021-04-21 ENCOUNTER — HOSPITAL ENCOUNTER (OUTPATIENT)
Dept: PHYSICAL THERAPY | Age: 66
Discharge: HOME | End: 2021-04-21
Payer: MEDICARE

## 2021-04-21 PROCEDURE — 97110 THERAPEUTIC EXERCISES: CPT

## 2021-04-21 NOTE — PROGRESS NOTES
[facility] Physical Therapy  [address] [place]  Phone: [phone] Fax: [phone]     Plan of Care/Statement of Necessity for Physical Therapy Services  2-15    Patient name: Ruchi Fleming  : 1955  Provider#: [id number]  Referral source: Howard Biswas DO      Medical/Treatment Diagnosis: Right hip pain [M25.551]     Prior Hospitalization: see medical history     Comorbidities: Anxiety or Panic Disorders, Arthritis, Back pain, BMI over 30, Depression, Diabetes  Type I or II, Gastrointestinal Disease, Headaches, Hepatitis, Tuberculosis, or other blood-borne condition, High Blood Pressure,  Incontinence, Kidney, Bladder, Prostate or Urination Problems, Osteoporosis, Prior Surgery, Sleep dysfunction  Prior Level of Function: 20 minutes seldom if ever  Medications: Verified on Patient Summary List  Start of Care: 21      Onset Date: chronic   The Plan of Care and following information is based on the information from the initial evaluation. Assessment/ key information: pt is a 73 yo female who is in today to begin therapy for right hip pain. She reports that this pain has bothered her for 3-4 years but has recently worsened. She completed a course of therapy last year summer and recently received an injection that she reports only helped for 2 days. Her pain is located over the trochanteric bursa and she also reports burning in that area with movement. She also reports a 2 year history of left groin pain. She presents with decreased core stability and hip strength right >left. Lumbar ROM is decreased into extension and rotation and she has decreased flexibility in the lower extremities. Pt is a good candidate for PT and will benefit from skilled services to address these deficits and work toward the goals listed below.                 Evaluation Complexity History HIGH Complexity :3+ comorbidities / personal factors will impact the outcome/ POC ; Examination HIGH Complexity : 4+ Standardized tests and measures addressing body structure, function, activity limitation and / or participation in recreation  ;Presentation MEDIUM Complexity : Evolving with changing characteristics  ; Clinical Decision Making MEDIUM Complexity : FOTO score of 26-74  Overall Complexity Rating: MEDIUM    Problem List: pain affecting function, decrease ROM, decrease strength, impaired gait/ balance, decrease ADL/ functional abilitiies, decrease activity tolerance, decrease flexibility/ joint mobility and decrease transfer abilities   Treatment Plan may include any combination of the following: Therapeutic exercise, Therapeutic activities, Neuromuscular re-education, Physical agent/modality, Gait/balance training, Manual therapy, Patient education, Self Care training, Functional mobility training, Home safety training and Stair training  Patient / Family readiness to learn indicated by: asking questions, trying to perform skills and interest  Persons(s) to be included in education: patient (P)  Barriers to Learning/Limitations: None  Patient Goal (s): be able to walk without pain  Patient Self Reported Health Status: good  Rehabilitation Potential: good    Short Term Goals: To be accomplished in 8-10 treatments:   Pt will be I with HEP  Pt will complain of pain 2-3/10 with all activity  Pt will increase ROM to complete all ADL's more efficiently  Pt will be able to maintain positions for 30 min without increased symptoms    Long Term Goals:  To be accomplished in 14-16 treatments:   Pt will complain of pain 0-1/10 with all activity  Pt will increase strength to stabilize the core and improve her ability to perform proper lifting mechanics 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 Patient/ Caregiver education and instruction: self care, activity modification and exercises    [x]  Plan of care has been reviewed with PTA        Certification Period: 4/19/21-7/18/21  Tamie De Anda, PT 4/21/2021     ________________________________________________________________________    I certify that the above Therapy Services are being furnished while the patient is under my care. I agree with the treatment plan and certify that this therapy is necessary.     [de-identified] Signature:____________________  Date:____________Time: _________         Nigel Samuels DO

## 2021-04-21 NOTE — PROGRESS NOTES
PT DAILY TREATMENT NOTE - [facility] 2-15    Patient Name: Alanis Tena  Date:2021  : 1955  [x]  Patient  Verified  Payor: [employer] / Plan: [email or web address] / Product Type: Managed Care Medicare /    In time:915  Out time:959  Total Treatment Time (min): 44  Total Timed Codes (min): 39  1:1 Treatment Time ( [address] only): 39   Visit #:2  Treatment Area: Right hip pain [M25.551]    SUBJECTIVE  Pain Level (0-10 scale): 510  Any medication changes, allergies to medications, adverse drug reactions, diagnosis change, or new procedure performed?: [x] No    [] Yes (see summary sheet for update)  Subjective functional status/changes:   [] No changes reported  Patient is not sure what she did yesterday but she was in intense pain with nothing seeming to help reduce the pain. OBJECTIVE    39 min Therapeutic Exercise:  [x] See flow sheet :   Rationale: increase ROM and increase strength to improve the patients ability to perform ADLs and reduce pain levels    With   [] TE   [] TA   [] Neuro   [] SC   [] other: Patient Education: [x] Review HEP    [] Progressed/Changed HEP based on:   [] positioning   [] body mechanics   [] transfers   [] heat/ice application    [] other:      Other Objective/Functional Measures: none noted     Pain Level (0-10 scale) post treatment: 2/10    ASSESSMENT/Changes in Function:   Patient experienced increased pain with excessive guarding of the L hip flexors during HS stretch. Patient was able to tolerated all strengthening therex well, will continue to progress as tolerated.   Patient will continue to benefit from skilled PT services to modify and progress therapeutic interventions, address functional mobility deficits, address ROM deficits, address strength deficits, analyze and address soft tissue restrictions, analyze and cue movement patterns, analyze and modify body mechanics/ergonomics and assess and modify postural abnormalities to attain remaining goals. [x]  See Plan of Care  []  See progress note/recertification  []  See Discharge Summary         Progress towards goals / Updated goals:  Patient is progressing towards goals.      PLAN  [x]  Upgrade activities as tolerated     [x]  Continue plan of care  [x]  Update interventions per flow sheet       []  Discharge due to:_  []  Other:_      Audie Pinto, PTA 4/21/2021

## 2021-04-26 ENCOUNTER — HOSPITAL ENCOUNTER (OUTPATIENT)
Dept: PHYSICAL THERAPY | Age: 66
Discharge: HOME | End: 2021-04-26
Payer: MEDICARE

## 2021-04-26 PROCEDURE — 97110 THERAPEUTIC EXERCISES: CPT

## 2021-04-26 NOTE — PROGRESS NOTES
PT DAILY TREATMENT NOTE - [facility] -15    Patient Name: Cinda Langston  Date:2021  : 1955  [x]  Patient  Verified  Payor: [employer] MEDICARE / Plan: [address] / Product Type: Managed Care Medicare /    In time:920  Out time:1013  Total Treatment Time (min): 53  Total Timed Codes (min): 53  1:1 Treatment Time ([address] only): 48   Visit #:  3    Treatment Area: Right hip pain [M25.551]    SUBJECTIVE  Pain Level (0-10 scale): 810  Any medication changes, allergies to medications, adverse drug reactions, diagnosis change, or new procedure performed?: [x] No    [] Yes (see summary sheet for update)  Subjective functional status/changes:   [] No changes reported  Patient reports she has been having the pain in the L hip flexors constantly for the past few days. Her glutes have been quite sore since last visit. OBJECTIVE    53 min Therapeutic Exercise:  [] See flow sheet :   Rationale: increase ROM and increase strength to improve the patients ability to perform ADLs and reduce pain levels    With   [] TE   [] TA   [] Neuro   [] SC   [] other: Patient Education: [x] Review HEP    [] Progressed/Changed HEP based on:   [] positioning   [] body mechanics   [] transfers   [] heat/ice application    [] other:      Other Objective/Functional Measures: none noted     Pain Level (0-10 scale) post treatment: 2-3/10    ASSESSMENT/Changes in Function:   Altered hamstring stretch to sitting utilizing a stool to limit hip flexor and knee pain. Able to tolerated standing hip flexor stretch much better than supine. Improved glute activation. Tolerated all therex, will continue to progress as tolerated.   Patient will continue to benefit from skilled PT services to modify and progress therapeutic interventions, address functional mobility deficits, address ROM deficits, address strength deficits, analyze and address soft tissue restrictions, analyze and cue movement patterns, analyze and modify body mechanics/ergonomics and assess and modify postural abnormalities to attain remaining goals. [x]  See Plan of Care  []  See progress note/recertification  []  See Discharge Summary         Progress towards goals / Updated goals:  Patient is progressing towards goals.      PLAN  [x]  Upgrade activities as tolerated     [x]  Continue plan of care  [x]  Update interventions per flow sheet       []  Discharge due to:_  []  Other:_      Ej Puri, PTA 4/26/2021

## 2021-04-27 ENCOUNTER — TELEPHONE (OUTPATIENT)
Dept: ORTHOPEDIC SURGERY | Age: 66
End: 2021-04-27

## 2021-04-27 NOTE — TELEPHONE ENCOUNTER
S/w Jared Gupta at [facility] per Jared Gupta Synvisc is preferred drug and does not require PA. Call reference #[id number]. Injections ordered, and phone call placed to patient to schedule. No answer. LVM requesting return call.

## 2021-04-28 ENCOUNTER — HOSPITAL ENCOUNTER (OUTPATIENT)
Dept: PHYSICAL THERAPY | Age: 66
Discharge: HOME | End: 2021-04-28
Payer: MEDICARE

## 2021-04-28 PROCEDURE — 97110 THERAPEUTIC EXERCISES: CPT | Performed by: PHYSICAL THERAPIST

## 2021-04-28 NOTE — PROGRESS NOTES
PT DAILY TREATMENT NOTE - [facility] 2-15    Patient Name: Noam Jaimes  Date:2021  : 1955  [x]  Patient  Verified  Payor: [employer] MEDICARE / Plan: [address] / Product Type: Managed Care Medicare /    In time:1004  Out time:1048  Total Treatment Time (min): 44  Total Timed Codes (min): 44  1:1 Treatment Time ( [address] only): 40  Visit #:  4    Treatment Area: Right hip pain [M25.551]    SUBJECTIVE  Pain Level (0-10 scale): 2/10  Any medication changes, allergies to medications, adverse drug reactions, diagnosis change, or new procedure performed?: [x] No    [] Yes (see summary sheet for update)  Subjective functional status/changes:   [] No changes reported  Pt reports that she did some planting an gardening most of the day yesterday but had a work area set up so she wouldn't have to bend much. Mainly complains of knee pain     OBJECTIVE    44 min Therapeutic Exercise:  [] See flow sheet :   Rationale: increase ROM and increase strength to improve the patients ability to perform ADLs and reduce pain levels    With   [] TE   [] TA   [] Neuro   [] SC   [] other: Patient Education: [x] Review HEP    [] Progressed/Changed HEP based on:   [] positioning   [] body mechanics   [] transfers   [] heat/ice application    [] other:      Other Objective/Functional Measures: none noted     Pain Level (0-10 scale) post treatment: 2/10    ASSESSMENT/Changes in Function:   Pt is progressing slowly. She is able to complete all there-ex with some minor discomfort. She is able to modify where necessary. A lot of her complaints remain to be in Bilateral knees and bilateral groin. She struggles to get into hip extension secondary to hip flexor tightness. This makes it more challenging to engage her gluteals which creates increased hip pain . This is happening more on the right than the left.     Patient will continue to benefit from skilled PT services to modify and progress therapeutic interventions, address functional mobility deficits, address ROM deficits, address strength deficits, analyze and address soft tissue restrictions, analyze and cue movement patterns, analyze and modify body mechanics/ergonomics and assess and modify postural abnormalities to attain remaining goals. [x]  See Plan of Care  []  See progress note/recertification  []  See Discharge Summary         Progress towards goals / Updated goals:  Patient is progressing towards goals.      PLAN  [x]  Upgrade activities as tolerated     [x]  Continue plan of care  [x]  Update interventions per flow sheet       []  Discharge due to:_  []  Other:_      Vladimir Hagen, PT 4/28/2021

## 2021-04-29 ENCOUNTER — OFFICE VISIT (OUTPATIENT)
Dept: ORTHOPEDIC SURGERY | Age: 66
End: 2021-04-29
Payer: MEDICARE

## 2021-04-29 VITALS
WEIGHT: 202 LBS | HEART RATE: 83 BPM | BODY MASS INDEX: 33.66 KG/M2 | OXYGEN SATURATION: 98 % | TEMPERATURE: 97.8 F | DIASTOLIC BLOOD PRESSURE: 63 MMHG | SYSTOLIC BLOOD PRESSURE: 95 MMHG | HEIGHT: 65 IN

## 2021-04-29 DIAGNOSIS — M17.0 BILATERAL PRIMARY OSTEOARTHRITIS OF KNEE: Primary | ICD-10-CM

## 2021-04-29 PROCEDURE — 20610 DRAIN/INJ JOINT/BURSA W/O US: CPT | Performed by: ORTHOPAEDIC SURGERY

## 2021-04-29 RX ORDER — HYLAN G-F 20 16MG/2ML
48 SYRINGE (ML) INTRAARTICULAR
Qty: 1 SYRINGE | Refills: 0
Start: 2021-04-29 | End: 2021-04-29

## 2021-04-29 NOTE — PROGRESS NOTES
Identified pt with two pt identifiers (name and ). Reviewed chart in preparation for visit and have obtained necessary documentation. Racheal Mckeon is a 74 y.o. female  Chief Complaint   Patient presents with    Joint Or Tendon Injection     Bilateral Knee 1st Synvisc     Visit Vitals  BP 95/63 (BP 1 Location: Right arm, BP Patient Position: Sitting, BP Cuff Size: Large adult)   Pulse 83   Temp 97.8 °F (36.6 °C) (Tympanic)   Ht 5' 5\" (1.651 m)   Wt 202 lb (91.6 kg)   SpO2 98%   BMI 33.61 kg/m²     1. Have you been to the ER, urgent care clinic since your last visit? Hospitalized since your last visit? No    2. Have you seen or consulted any other health care providers outside of the [address] [place] [address] since your last visit? Include any pap smears or colon screening.  No

## 2021-04-29 NOTE — PROGRESS NOTES
Date of Procedure: 4/29/2021  PROCEDURE NOTE: Bilateral knee injection of Synvisc     Consent was obtained from the patient. The correct site was identified after confirmation with the patient. Following identification and confirmation of the correct site with the patient, the superolateral right knee was prepped in the normal sterile fashion. A local anesthetic of 1% lidocaine without epinephrine was then administered to the local tissues. Following, an injection of prefilled Synvisc 20 mg supplementation syringe was injected. The needle was then withdrawn and the injection site dressed with a sterile bandage at the conclusion. The procedure was well tolerated by the patient. No immediate adverse reactions were noted. Attention was then turned to the left knee. Following identification and confirmation of the correct site with the patient, the superolateral left knee was prepped in the normal sterile fashion. A local anesthetic of 1% lidocaine without epinephrine was then administered to the local tissues. Following, an injection of prefilled Synvisc 20 mg supplementation syringe was injected. The needle was then withdrawn and the injection site dressed with a sterile bandage at the conclusion. The procedure was well tolerated by the patient. No immediate adverse reactions were noted.   Post injection instructions were given

## 2021-05-03 ENCOUNTER — HOSPITAL ENCOUNTER (OUTPATIENT)
Dept: PHYSICAL THERAPY | Age: 66
Discharge: HOME | End: 2021-05-03
Payer: MEDICARE

## 2021-05-03 PROCEDURE — 97110 THERAPEUTIC EXERCISES: CPT

## 2021-05-03 NOTE — PROGRESS NOTES
PT DAILY TREATMENT NOTE - [facility] 2-15    Patient Name: Froy Muller  Date:5/3/2021  : 1955  [x]  Patient  Verified  Payor: [employer] MEDICARE / Plan: [email or web address] / Product Type: Managed Care Medicare /    In time:830  Out time:915  Total Treatment Time (min): 45  Total Timed Codes (min): 40  1:1 Treatment Time ([address] only): 40   Visit #: 5    Treatment Area: Right hip pain [M25.551]    SUBJECTIVE  Pain Level (0-10 scale): 3/10  Any medication changes, allergies to medications, adverse drug reactions, diagnosis change, or new procedure performed?: [x] No    [] Yes (see summary sheet for update)  Subjective functional status/changes:   [] No changes reported  Patient reports she has been feeling much better over all. OBJECTIVE    45 min Therapeutic Exercise:  [] See flow sheet :   Rationale: increase ROM and increase strength to improve the patients ability to perform ADLs and reduce pain levels    With   [] TE   [] TA   [] Neuro   [] SC   [] other: Patient Education: [x] Review HEP    [] Progressed/Changed HEP based on:   [] positioning   [] body mechanics   [] transfers   [] heat/ice application    [] other:      Other Objective/Functional Measures: none noted     Pain Level (0-10 scale) post treatment: 3/10    ASSESSMENT/Changes in Function:   Patient tolerated all therex well, will continue to progress as tolerated. Patient will continue to benefit from skilled PT services to modify and progress therapeutic interventions, address functional mobility deficits, address ROM deficits, address strength deficits, analyze and address soft tissue restrictions, analyze and cue movement patterns, analyze and modify body mechanics/ergonomics and assess and modify postural abnormalities to attain remaining goals.      [x]  See Plan of Care  []  See progress note/recertification  []  See Discharge Summary         Progress towards goals / Updated goals:  Patient is progressing towards goals.      PLAN  [x]  Upgrade activities as tolerated     [x]  Continue plan of care  [x]  Update interventions per flow sheet       []  Discharge due to:_  []  Other:_      Tone Wild, YASH 5/3/2021

## 2021-05-05 ENCOUNTER — HOSPITAL ENCOUNTER (OUTPATIENT)
Dept: PHYSICAL THERAPY | Age: 66
Discharge: HOME | End: 2021-05-05
Payer: MEDICARE

## 2021-05-05 PROCEDURE — 97110 THERAPEUTIC EXERCISES: CPT

## 2021-05-05 NOTE — PROGRESS NOTES
PT DAILY TREATMENT NOTE - [facility] 2-15    Patient Name: Daniel Valdez  Date:2021  : 1955  [x]  Patient  Verified  Payor: [employer] MEDICARE / Plan: [address] / Product Type: Managed Care Medicare /    In time:915  Out time:1000  Total Treatment Time (min): 45  Total Timed Codes (min): 40  1:1 Treatment Time ([address] only): 40   Visit #:6    Treatment Area: Right hip pain [M25.551]    SUBJECTIVE  Pain Level (0-10 scale): 5/10  Any medication changes, allergies to medications, adverse drug reactions, diagnosis change, or new procedure performed?: [x] No    [] Yes (see summary sheet for update)  Subjective functional status/changes:   [] No changes reported  Patient reports she woke up today with a bit more discomfort than usual, but still nothing limiting her. OBJECTIVE    45 min Therapeutic Exercise:  [x] See flow sheet :   Rationale: increase ROM and increase strength to improve the patients ability to perform ADLs and reduce pain levels     With   [] TE   [] TA   [] Neuro   [] SC   [] other: Patient Education: [x] Review HEP    [] Progressed/Changed HEP based on:   [] positioning   [] body mechanics   [] transfers   [] heat/ice application    [] other:      Other Objective/Functional Measures: none noted     Pain Level (0-10 scale) post treatment: 5/10    ASSESSMENT/Changes in Function:   Patient tolerated all new and progressed therex well, will continue to progress as tolerated. Patient will continue to benefit from skilled PT services to modify and progress therapeutic interventions, address functional mobility deficits, address ROM deficits, address strength deficits, analyze and address soft tissue restrictions, analyze and cue movement patterns, analyze and modify body mechanics/ergonomics and assess and modify postural abnormalities to attain remaining goals.      [x]  See Plan of Care  []  See progress note/recertification  []  See Discharge Summary Progress towards goals / Updated goals:  Patient is progressing towards goals.      PLAN  [x]  Upgrade activities as tolerated     [x]  Continue plan of care  [x]  Update interventions per flow sheet       []  Discharge due to:_  []  Other:_      Tj Mirza, YASH 5/5/2021

## 2021-05-06 ENCOUNTER — OFFICE VISIT (OUTPATIENT)
Dept: ORTHOPEDIC SURGERY | Age: 66
End: 2021-05-06
Payer: MEDICARE

## 2021-05-06 VITALS
SYSTOLIC BLOOD PRESSURE: 112 MMHG | WEIGHT: 202 LBS | DIASTOLIC BLOOD PRESSURE: 71 MMHG | BODY MASS INDEX: 33.66 KG/M2 | HEIGHT: 65 IN | TEMPERATURE: 97.8 F | HEART RATE: 84 BPM | OXYGEN SATURATION: 99 %

## 2021-05-06 DIAGNOSIS — M17.0 BILATERAL PRIMARY OSTEOARTHRITIS OF KNEE: Primary | ICD-10-CM

## 2021-05-06 PROCEDURE — 20610 DRAIN/INJ JOINT/BURSA W/O US: CPT | Performed by: ORTHOPAEDIC SURGERY

## 2021-05-06 RX ORDER — HYLAN G-F 20 16MG/2ML
48 SYRINGE (ML) INTRAARTICULAR
Qty: 1 SYRINGE | Refills: 0
Start: 2021-05-06 | End: 2021-05-06

## 2021-05-06 NOTE — PROGRESS NOTES
Date of Procedure: 5/6/2021  PROCEDURE NOTE: Bilateral knee injection of Synvisc     Consent was obtained from the patient. The correct site was identified after confirmation with the patient. Following identification and confirmation of the correct site with the patient, the superolateral right knee was prepped in the normal sterile fashion. A local anesthetic of 1% lidocaine without epinephrine was then administered to the local tissues. Following, an injection of prefilled Synvisc supplementation syringe was injected. The needle was then withdrawn and the injection site dressed with a sterile bandage at the conclusion. The procedure was well tolerated by the patient. No immediate adverse reactions were noted. Attention was then turned to the left knee. Following identification and confirmation of the correct site with the patient, the superolateral left knee was prepped in the normal sterile fashion. A local anesthetic of 1% lidocaine without epinephrine was then administered to the local tissues. Following, an injection of prefilled Synvisc supplementation syringe was injected. The needle was then withdrawn and the injection site dressed with a sterile bandage at the conclusion. The procedure was well tolerated by the patient. No immediate adverse reactions were noted.   Post injection instructions were given

## 2021-05-06 NOTE — PROGRESS NOTES
Identified pt with two pt identifiers (name and ). Reviewed chart in preparation for visit and have obtained necessary documentation. Sher Holder is a 74 y.o. female  Chief Complaint   Patient presents with    Joint Or Tendon Injection     Bilateral Knee 2nd Synvisc     Visit Vitals  /71 (BP 1 Location: Left upper arm, BP Patient Position: Sitting, BP Cuff Size: Large adult)   Pulse 84   Temp 97.8 °F (36.6 °C) (Tympanic)   Ht 5' 5\" (1.651 m)   Wt 202 lb (91.6 kg)   SpO2 99%   BMI 33.61 kg/m²     1. Have you been to the ER, urgent care clinic since your last visit? Hospitalized since your last visit? No    2. Have you seen or consulted any other health care providers outside of the [address] [place] [address] since your last visit? Include any pap smears or colon screening.  No

## 2021-05-10 ENCOUNTER — HOSPITAL ENCOUNTER (OUTPATIENT)
Dept: PHYSICAL THERAPY | Age: 66
Discharge: HOME | End: 2021-05-10
Payer: MEDICARE

## 2021-05-10 PROCEDURE — 97110 THERAPEUTIC EXERCISES: CPT

## 2021-05-10 NOTE — PROGRESS NOTES
PT DAILY TREATMENT NOTE - [facility] 2-15    Patient Name: Manny Marx  Date:5/10/2021  : 1955  [x]  Patient  Verified  Payor: [facility] MEDICARE / Plan: [address] / Product Type: Managed Care Medicare /    In time:915  Out time:1001  Total Treatment Time (min): 46  Total Timed Codes (min): 41  1:1 Treatment Time ( [address] only): 41   Visit #: 7    Treatment Area: Right hip pain [M25.551]    SUBJECTIVE  Pain Level (0-10 scale): 2/10  Any medication changes, allergies to medications, adverse drug reactions, diagnosis change, or new procedure performed?: [x] No    [] Yes (see summary sheet for update)  Subjective functional status/changes:   [] No changes reported  Patient reports she was cutting her bushes in 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 She felt fine while doing it, but when she stood back up her back began to hurt her. This lasted through , but today she feels much better. OBJECTIVE    46 min Therapeutic Exercise:  [x] See flow sheet :   Rationale: increase ROM and increase strength to improve the patients ability to perform ADLs and reduce pain levels    With   [] TE   [] TA   [] Neuro   [] SC   [] other: Patient Education: [x] Review HEP    [] Progressed/Changed HEP based on:   [] positioning   [] body mechanics   [] transfers   [] heat/ice application    [] other:      Other Objective/Functional Measures: none noted     Pain Level (0-10 scale) post treatment: 0/10    ASSESSMENT/Changes in Function:   Patient tolerated all therex well, will continue to progress as tolerated.   Patient will continue to benefit from skilled PT services to modify and progress therapeutic interventions, address functional mobility deficits, address ROM deficits, address strength deficits, analyze and address soft tissue restrictions, analyze and cue movement patterns, analyze and modify body mechanics/ergonomics and assess and modify postural abnormalities to attain remaining goals. [x]  See Plan of Care  []  See progress note/recertification  []  See Discharge Summary         Progress towards goals / Updated goals:  Patient is progressing towards goals.      PLAN  [x]  Upgrade activities as tolerated     [x]  Continue plan of care  [x]  Update interventions per flow sheet       []  Discharge due to:_  []  Other:_      Mel Martinez, PTA 5/10/2021

## 2021-05-12 ENCOUNTER — TELEPHONE (OUTPATIENT)
Dept: INTERNAL MEDICINE CLINIC | Age: 66
End: 2021-05-12

## 2021-05-12 ENCOUNTER — HOSPITAL ENCOUNTER (OUTPATIENT)
Dept: PHYSICAL THERAPY | Age: 66
Discharge: HOME | End: 2021-05-12
Payer: MEDICARE

## 2021-05-12 PROCEDURE — 97110 THERAPEUTIC EXERCISES: CPT | Performed by: PHYSICAL THERAPIST

## 2021-05-12 NOTE — PROGRESS NOTES
PT DAILY TREATMENT NOTE - [facility] 2-15    Patient Name: Derick Ernst  Date:2021  : 1955  [x]  Patient  Verified  Payor: [employer] MEDICARE / Plan: [employer] / Product Type: Managed Care Medicare /    In time:1015  Out time:1058  Total Treatment Time (min): 43  Total Timed Codes (min):43  1:1 Treatment Time ( [address] only): 37  Visit #: 8    Treatment Area: Right hip pain [M25.551]    SUBJECTIVE  Pain Level (0-10 scale): 0/10  Any medication changes, allergies to medications, adverse drug reactions, diagnosis change, or new procedure performed?: [x] No    [] Yes (see summary sheet for update)  Subjective functional status/changes:   [] No changes reported  Pt reports that she is doing well today but does complain of pain in the left knee     OBJECTIVE    43 min Therapeutic Exercise:  [x] See flow sheet :   Rationale: increase ROM and increase strength to improve the patients ability to perform ADLs and reduce pain levels    With   [] TE   [] TA   [] Neuro   [] SC   [] other: Patient Education: [x] Review HEP    [] Progressed/Changed HEP based on:   [] positioning   [] body mechanics   [] transfers   [] heat/ice application    [] other:      Other Objective/Functional Measures: none noted     Pain Level (0-10 scale) post treatment: 0/10    ASSESSMENT/Changes in Function:   Pt is able to complete all there-ex with minor discomfort in the left knee with sit to stand and hip abduction. Cues to engage gluteals was not successful in easing this pain. She completed core press and side stepping without increased symptoms. Will continue to progress as able.     Patient will continue to benefit from skilled PT services to modify and progress therapeutic interventions, address functional mobility deficits, address ROM deficits, address strength deficits, analyze and address soft tissue restrictions, analyze and cue movement patterns, analyze and modify body mechanics/ergonomics and assess and modify postural abnormalities to attain remaining goals. [x]  See Plan of Care  []  See progress note/recertification  []  See Discharge Summary         Progress towards goals / Updated goals:  Patient is progressing towards goals.      PLAN  [x]  Upgrade activities as tolerated     [x]  Continue plan of care  [x]  Update interventions per flow sheet       []  Discharge due to:_  []  Other:_      Garcia Moreno, PT 5/12/2021

## 2021-05-12 NOTE — TELEPHONE ENCOUNTER
Pharmacy Progress Note - Telephone Encounter    S/O: Ms. Parviz Zavala 74 y.o. female contacted office/me via an inbound telephone call to discuss recent DM medication changes today. Verified patients identifiers (name & ) per HIPAA policy. - Reports she saw Dr. Sharon Ramirez (Endo) yesterday for follow up. Reports several medication changes were made. Had labs checked. Results pending.  - Reports recent steroid injection for ongoing back pain. BG spiked to 200-300s. BG readings are higher in the evening most days. AM readings run in the 70-90s.   \"I get scared by the lower readings in the morning and skip the morning insulin\"    Recall: from 3/3/21 (A1c was <7%)  Current anti-hyperglycemic regimen include(s):    - Metformin 1000 mg daily  - Tresiba U-100 - 17 units daily  - Humlog scale modified to:             - 140-200: 2 units             - 201-250: 3 units             - 251-300: 5 units             - 301-350: 6 units  - Jardiance 25 mg daily    New changes made yesterday:  - Change Tresiba U-100 to give 6 units BID  - Change Humalog to 2 units in the morning and 6 units before dinner.   - Start Ozempic 0.25 mg weekly x 2 weeks, then increase to 0.5 mg weekly  - Continue metformin 1 gm daily and Jardiance 25 mg daily    Has f/u with Dr Leonor Blanco in 1 month  Still scanning Julien CGM    Wt Readings from Last 3 Encounters:   21 202 lb (91.6 kg)   21 202 lb (91.6 kg)   21 200 lb (90.7 kg)     Lab Results   Component Value Date/Time    Sodium 141 2021 11:02 AM    Potassium 4.2 2021 11:02 AM    Chloride 104 2021 11:02 AM    CO2 24 2021 11:02 AM    Anion gap 6 01/10/2019 08:37 AM    Glucose 204 (H) 2021 11:02 AM    BUN 19 2021 11:02 AM    Creatinine 1.20 (H) 2021 11:02 AM    BUN/Creatinine ratio 16 2021 11:02 AM    GFR est AA 54 (L) 2021 11:02 AM    GFR est non-AA 47 (L) 2021 11:02 AM    Calcium 9.5 2021 11:02 AM     Lab Results Component Value Date/Time    Hemoglobin A1c 8.4 (H) 06/23/2020 01:20 PM    Hemoglobin A1c 7.4 (H) 09/25/2019 10:56 AM    Hemoglobin A1c 10.2 (H) 06/20/2019 08:50 AM     Last Point of Care HGB A1C  Hemoglobin A1c (POC)   Date Value Ref Range Status   12/20/2019 7.2 % Final          A/P:  - Appreciate patient's update. Will share with Dr Lynn Grier  - Given recent BG fluctuations and current medication changes, emphasize importance of checking CGM often  - Pt has scheduled appt for June 16th.    - Still waiting for recent lab results   - Patient endorses understanding to the provided information. All questions answered at this time. Thank you,  Tavia Dooley, PharmD, BCACP, Sweetie [address] in place:  Yes   Recommendation Provided To: Patient/Caregiver: 1 via Telephone   Time Spent (min): 15

## 2021-05-13 ENCOUNTER — OFFICE VISIT (OUTPATIENT)
Dept: ORTHOPEDIC SURGERY | Age: 66
End: 2021-05-13
Payer: MEDICARE

## 2021-05-13 VITALS
SYSTOLIC BLOOD PRESSURE: 112 MMHG | HEIGHT: 65 IN | WEIGHT: 201 LBS | HEART RATE: 69 BPM | DIASTOLIC BLOOD PRESSURE: 72 MMHG | BODY MASS INDEX: 33.49 KG/M2 | TEMPERATURE: 97.6 F | OXYGEN SATURATION: 98 %

## 2021-05-13 DIAGNOSIS — M17.0 BILATERAL PRIMARY OSTEOARTHRITIS OF KNEE: Primary | ICD-10-CM

## 2021-05-13 PROCEDURE — 20610 DRAIN/INJ JOINT/BURSA W/O US: CPT | Performed by: ORTHOPAEDIC SURGERY

## 2021-05-13 NOTE — PROGRESS NOTES
Identified pt with two pt identifiers (name and ). Reviewed chart in preparation for visit and have obtained necessary documentation. Beverly Villarreal is a 74 y.o. female  Chief Complaint   Patient presents with    Joint Or Tendon Injection     Bilateral Knee 3rd Synvisc     Visit Vitals  /72 (BP 1 Location: Right arm, BP Patient Position: Sitting, BP Cuff Size: Large adult)   Pulse 69   Temp 97.6 °F (36.4 °C) (Tympanic)   Ht 5' 5\" (1.651 m)   Wt 201 lb (91.2 kg)   SpO2 98%   BMI 33.45 kg/m²     1. Have you been to the ER, urgent care clinic since your last visit? Hospitalized since your last visit? No    2. Have you seen or consulted any other health care providers outside of the [address] [place] [address] since your last visit? Include any pap smears or colon screening.  No

## 2021-05-14 NOTE — PROGRESS NOTES
Date of Procedure: 5/13/2021  PROCEDURE NOTE: Bilateral knee injection of Synvisc     Consent was obtained from the patient. The correct site was identified after confirmation with the patient. Following identification and confirmation of the correct site with the patient, the superolateral right knee was prepped in the normal sterile fashion. A local anesthetic of 1% lidocaine without epinephrine was then administered to the local tissues. Following, an injection of prefilled visco-3 supplementation syringe was injected. The needle was then withdrawn and the injection site dressed with a sterile bandage at the conclusion. The procedure was well tolerated by the patient. No immediate adverse reactions were noted. Attention was then turned to the left knee. Following identification and confirmation of the correct site with the patient, the superolateral left knee was prepped in the normal sterile fashion. A local anesthetic of 1% lidocaine without epinephrine was then administered to the local tissues. Following, an injection of prefilled visco 3 supplementation syringe was injected. The needle was then withdrawn and the injection site dressed with a sterile bandage at the conclusion. The procedure was well tolerated by the patient. No immediate adverse reactions were noted.   Post injection instructions were given

## 2021-05-17 ENCOUNTER — HOSPITAL ENCOUNTER (OUTPATIENT)
Dept: PHYSICAL THERAPY | Age: 66
Discharge: HOME | End: 2021-05-17
Payer: MEDICARE

## 2021-05-17 PROCEDURE — 97535 SELF CARE MNGMENT TRAINING: CPT

## 2021-05-17 PROCEDURE — 97110 THERAPEUTIC EXERCISES: CPT

## 2021-05-17 NOTE — PROGRESS NOTES
PT DAILY TREATMENT NOTE - [facility] 2-15    Patient Name: Sherrie Camara  Date:2021  : 1955  [x]  Patient  Verified  Payor: [employer] MEDICARE / Plan: [address] / Product Type: Managed Care Medicare /    In time:915  Out time:1000  Total Treatment Time (min): 45  Total Timed Codes (min): 40  1:1 Treatment Time ([facility] only): 40   Visit #: 9  Treatment Area: Right hip pain [M25.551]    SUBJECTIVE  Pain Level (0-10 scale): 2/10  Any medication changes, allergies to medications, adverse drug reactions, diagnosis change, or new procedure performed?: [x] No    [] Yes (see summary sheet for update)  Subjective functional status/changes:   [] No changes reported  Patient reports on Saturday she was doing yard work, washing her car, and cleaning/moving furniture. She felt fine while doing all of this but at the end of the day she began to experience pain. Her R hip will continue to be intermittent, but the majority of her pain has now transitioned to her lower back. Patient feels like she is 45% better since the first day. Will typically only be able to  one place for 20-25 minutes    OBJECTIVE    35 min Therapeutic Exercise:  [x] See flow sheet :   Rationale: increase ROM and increase strength to improve the patients ability to perform ADLs and reduce pain levels    10 min Self Care/Home Management:  [x]  See flow sheet :   Rationale: increase ROM and increase strength  to improve the patients ability to perform ADLs and reduce pain levels    With   [] TE   [] TA   [] Neuro   [] SC   [] other: Patient Education: [x] Review HEP    [] Progressed/Changed HEP based on:   [] positioning   [] body mechanics   [] transfers   [] heat/ice application    [] other:      Other Objective/Functional Measures:  FOTO: 49      Pain Level (0-10 scale) post treatment: 1/10    ASSESSMENT/Changes in Function:   Patient will experience pain in the knees with sit to stands, but no pain in the hips or back. Fatigue quickly with glute strengthening. Will continue once a week for the next three. Patient will continue to benefit from skilled PT services to modify and progress therapeutic interventions, address functional mobility deficits, address ROM deficits, address strength deficits, analyze and address soft tissue restrictions, analyze and cue movement patterns, analyze and modify body mechanics/ergonomics and assess and modify postural abnormalities to attain remaining goals. []  See Plan of Care  [x]  See progress note/recertification  []  See Discharge Summary         Progress towards goals / Updated goals:  Short Term Goals: To be accomplished in 8-10 treatments:              Pt will be I with HEP met  Pt will complain of pain 2-3/10 with all activity progressing towards  Pt will increase ROM to complete all ADL's more efficiently met  Pt will be able to maintain positions for 30 min without increased symptoms progressing towards     Long Term Goals:  To be accomplished in 14-16 treatments:              Pt will complain of pain 0-1/10 with all activity progressing towards  Pt will increase strength to stabilize the core and improve her ability to perform proper lifting mechanics to 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

## 2021-05-19 ENCOUNTER — HOSPITAL ENCOUNTER (OUTPATIENT)
Dept: PHYSICAL THERAPY | Age: 66
Discharge: HOME | End: 2021-05-19
Payer: MEDICARE

## 2021-05-19 PROCEDURE — 97110 THERAPEUTIC EXERCISES: CPT | Performed by: PHYSICAL THERAPIST

## 2021-05-19 NOTE — PROGRESS NOTES
[facility] 74. Physical Therapy  [address] ([address]), [address] [place]  Phone: [phone] Fax: [phone]    Progress Note    Name: Froy Muller   : 1955   MD: Jey Marr DO       Treatment Diagnosis: Right hip pain [M25.551]  Start of Care: 2021    Visits from Start of Care: 10  Missed Visits: 0    Summary of Care:Pt has completed 10 visits of therapy that have addressed hip weakness and core stability. She has progressed nicely and is having lower reported pain levels and has ability to complete more activities. She is only limited by the discomfort in her knee at this time. Will plan to continue strengthening the hip 1x/wk for 2-3 additional visits as she works toward her established goals    Assessment / Recommendations:     Goal:  Short Term Goals: To be accomplished in 8-10 treatments:              Pt will be I with HEP MEt  Pt will complain of pain 2-3/10 with all activity MET  Pt will increase ROM to complete all ADL's more efficiently MEt  Pt will be able to maintain positions for 30 min without increased symptoms Progressing Toward     Long Term Goals:  To be accomplished in 14-16 treatments:              Pt will complain of pain 0-1/10 with all activity Progressing Toward  Pt will increase strength to stabilize the core and improve her ability to perform proper lifting mechanics to 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

## 2021-05-19 NOTE — PROGRESS NOTES
PT DAILY TREATMENT NOTE - [facility] -15    Patient Name: Adin Chin  Date:2021  : 1955  [x]  Patient  Verified  Payor: [employer] MEDICARE / Plan: [employer] / Product Type: Managed Care Medicare /    In time:1005  Out time:1053  Total Treatment Time (min): 48  Total Timed Codes (min): 48  1:1 Treatment Time ( [address] only): 48  Visit #: 9  Treatment Area: Right hip pain [M25.551]    SUBJECTIVE  Pain Level (0-10 scale): 2/10  Any medication changes, allergies to medications, adverse drug reactions, diagnosis change, or new procedure performed?: [x] No    [] Yes (see summary sheet for update)  Subjective functional status/changes:   [] No changes reported  Pt reports she has some knee pain today     OBJECTIVE    48 min Therapeutic Exercise:  [x] See flow sheet :   Rationale: increase ROM and increase strength to improve the patients ability to perform ADLs and reduce pain levels     min Self Care/Home Management:  [x]  See flow sheet :   Rationale: increase ROM and increase strength  to improve the patients ability to perform ADLs and reduce pain levels    With   [] TE   [] TA   [] Neuro   [] SC   [] other: Patient Education: [x] Review HEP    [] Progressed/Changed HEP based on:   [] positioning   [] body mechanics   [] transfers   [] heat/ice application    [] other:      Other Objective/Functional Measures: FOTO: 49      Pain Level (0-10 scale) post treatment: 1/10    ASSESSMENT/Changes in Function:   Pt is progressing well and is able to complete all activity without increased symptoms. Will continue 1x/wk for 2-3 visits.   Patient will continue to benefit from skilled PT services to modify and progress therapeutic interventions, address functional mobility deficits, address ROM deficits, address strength deficits, analyze and address soft tissue restrictions, analyze and cue movement patterns, analyze and modify body mechanics/ergonomics and assess and modify postural abnormalities to attain remaining goals. []  See Plan of Care  [x]  See progress note/recertification  []  See Discharge Summary         Progress towards goals / Updated goals:  Short Term Goals: To be accomplished in 8-10 treatments:              Pt will be I with HEP met  Pt will complain of pain 2-3/10 with all activity progressing towards  Pt will increase ROM to complete all ADL's more efficiently met  Pt will be able to maintain positions for 30 min without increased symptoms progressing towards     Long Term Goals:  To be accomplished in 14-16 treatments:              Pt will complain of pain 0-1/10 with all activity progressing towards  Pt will increase strength to stabilize the core and improve her ability to perform proper lifting mechanics to 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

## 2021-05-24 ENCOUNTER — APPOINTMENT (OUTPATIENT)
Dept: PHYSICAL THERAPY | Age: 66
End: 2021-05-24
Payer: MEDICARE

## 2021-05-26 ENCOUNTER — HOSPITAL ENCOUNTER (OUTPATIENT)
Dept: PHYSICAL THERAPY | Age: 66
Discharge: HOME | End: 2021-05-26
Payer: MEDICARE

## 2021-05-26 PROCEDURE — 97110 THERAPEUTIC EXERCISES: CPT | Performed by: PHYSICAL THERAPIST

## 2021-05-26 NOTE — PROGRESS NOTES
PT DAILY TREATMENT NOTE - [facility] -15    Patient Name: Adin Chin  Date:2021  : 1955  [x]  Patient  Verified  Payor: [employer] MEDICARE / Plan: [address] / Product Type: Managed Care Medicare /    In time:1003  Out time:1047  Total Treatment Time (min): 44  Total Timed Codes (min): 44  1:1 Treatment Time ([facility] only): 40  Visit #: 9  Treatment Area: Right hip pain [M25.551]    SUBJECTIVE  Pain Level (0-10 scale):0/10  Any medication changes, allergies to medications, adverse drug reactions, diagnosis change, or new procedure performed?: [x] No    [] Yes (see summary sheet for update)  Subjective functional status/changes:   [] No changes reported  Pt reports that she is doing well. Was able to get out to the music festival.  States that she had some minor back pain 3-4/10 but she had to leave early secondary to the heat    OBJECTIVE    44 min Therapeutic Exercise:  [x] See flow sheet :   Rationale: increase ROM and increase strength to improve the patients ability to perform ADLs and reduce pain levels     min Self Care/Home Management:  [x]  See flow sheet :   Rationale: increase ROM and increase strength  to improve the patients ability to perform ADLs and reduce pain levels    With   [] TE   [] TA   [] Neuro   [] SC   [] other: Patient Education: [x] Review HEP    [] Progressed/Changed HEP based on:   [] positioning   [] body mechanics   [] transfers   [] heat/ice application    [] other:      Other Objective/Functional Measures: NA     Pain Level (0-10 scale) post treatment: 0/10    ASSESSMENT/Changes in Function:   Pt is progressing and tolerating all activity well. She has some lingering low level pain day to day, but functionally has seen good progress. Will plan ~2 visits prior to DC.   Still requires some cues for form and positioning  Patient will continue to benefit from skilled PT services to modify and progress therapeutic interventions, address functional mobility deficits, address ROM deficits, address strength deficits, analyze and address soft tissue restrictions, analyze and cue movement patterns, analyze and modify body mechanics/ergonomics and assess and modify postural abnormalities to attain remaining goals. []  See Plan of Care  [x]  See progress note/recertification  []  See Discharge Summary         Progress towards goals / Updated goals:  Short Term Goals: To be accomplished in 8-10 treatments:              Pt will be I with HEP met  Pt will complain of pain 2-3/10 with all activity progressing towards  Pt will increase ROM to complete all ADL's more efficiently met  Pt will be able to maintain positions for 30 min without increased symptoms progressing towards     Long Term Goals:  To be accomplished in 14-16 treatments:              Pt will complain of pain 0-1/10 with all activity progressing towards  Pt will increase strength to stabilize the core and improve her ability to perform proper lifting mechanics to 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

## 2021-06-02 ENCOUNTER — HOSPITAL ENCOUNTER (OUTPATIENT)
Dept: PHYSICAL THERAPY | Age: 66
Discharge: HOME | End: 2021-06-02
Payer: MEDICARE

## 2021-06-02 PROCEDURE — 97110 THERAPEUTIC EXERCISES: CPT

## 2021-06-02 RX ORDER — TERBINAFINE HYDROCHLORIDE 250 MG/1
TABLET ORAL
Qty: 30 TABLET | Refills: 1 | OUTPATIENT
Start: 2021-06-02

## 2021-06-02 RX ORDER — TROSPIUM CHLORIDE ER 60 MG/1
60 CAPSULE ORAL
Qty: 90 CAPSULE | Refills: 3 | Status: SHIPPED | OUTPATIENT
Start: 2021-06-02 | End: 2022-02-04 | Stop reason: SDUPTHER

## 2021-06-02 NOTE — PROGRESS NOTES
PT DAILY TREATMENT NOTE - [facility] 2-15    Patient Name: Micheal Delay  Date:2021  : 1955  [x]  Patient  Verified  Payor: [facility] MEDICARE / Plan: [address] [address] / Product Type: Managed Care Medicare /    In time:1003  Out time:1047  Total Treatment Time (min): 44  Total Timed Codes (min): 39  1:1 Treatment Time ( [address] only): 44   Visit #:  26    Treatment Area: Right hip pain [M25.551]    SUBJECTIVE  Pain Level (0-10 scale): 0/10  Any medication changes, allergies to medications, adverse drug reactions, diagnosis change, or new procedure performed?: [x] No    [] Yes (see summary sheet for update)  Subjective functional status/changes:   [] No changes reported  Patient reports she was able to ambulate around the furniture store all day over the weekend without any pain. OBJECTIVE    44 min Therapeutic Exercise:  [x] See flow sheet :   Rationale: increase ROM and increase strength to improve the patients ability to perform ADLs and reduce pain levels    With   [] TE   [] TA   [] Neuro   [] SC   [] other: Patient Education: [x] Review HEP    [] Progressed/Changed HEP based on:   [] positioning   [] body mechanics   [] transfers   [] heat/ice application    [] other:      Other Objective/Functional Measures: none noted     Pain Level (0-10 scale) post treatment: 0/10    ASSESSMENT/Changes in Function:     Patient tolerated all progressed therex well, will continue to progress as tolerated. Patient will continue to benefit from skilled PT services to modify and progress therapeutic interventions, address functional mobility deficits, address ROM deficits, address strength deficits, analyze and address soft tissue restrictions, analyze and cue movement patterns, analyze and modify body mechanics/ergonomics and assess and modify postural abnormalities to attain remaining goals.      [x]  See Plan of Care  []  See progress note/recertification  []  See Discharge Summary Progress towards goals / Updated goals:  Patient is progressing towards goals.      PLAN  [x]  Upgrade activities as tolerated     [x]  Continue plan of care  [x]  Update interventions per flow sheet       []  Discharge due to:_  []  Other:_      Tj Mirza, YASH 6/2/2021

## 2021-06-02 NOTE — TELEPHONE ENCOUNTER
Future Appointments:  Future Appointments   Date Time Provider Cj Kate   6/2/2021 10:00 AM Maren Adams, PTA [facility]   6/9/2021 10:00 AM Rebekah Couch, PT [facility]   6/16/2021  8:45 AM Cande Lo [facility] BS AMB   6/16/2021  9:15 AM Jamaica Arce, PHARMD [facility] BS AMB        Last Appointment With Me:  12/16/2020     Requested Prescriptions     Pending Prescriptions Disp Refills    trospium (SANCTURA XL) 60 mg capsule 30 Capsule 1     Sig: Take 1 Capsule by mouth Daily (before breakfast).     terbinafine HCL (LAMISIL) 250 mg tablet 30 Tablet 1

## 2021-06-03 ENCOUNTER — PATIENT MESSAGE (OUTPATIENT)
Dept: INTERNAL MEDICINE CLINIC | Age: 66
End: 2021-06-03

## 2021-06-03 RX ORDER — TERBINAFINE HYDROCHLORIDE 250 MG/1
TABLET ORAL
Qty: 30 TABLET | Refills: 1 | OUTPATIENT
Start: 2021-06-03

## 2021-06-09 ENCOUNTER — HOSPITAL ENCOUNTER (OUTPATIENT)
Dept: PHYSICAL THERAPY | Age: 66
Discharge: HOME | End: 2021-06-09
Payer: MEDICARE

## 2021-06-09 PROCEDURE — 97110 THERAPEUTIC EXERCISES: CPT | Performed by: PHYSICAL THERAPIST

## 2021-06-09 NOTE — PROGRESS NOTES
[facility]  [address] ([address]), [address] [place]  Phone: [phone] Fax: [phone]    Discharge Summary 2-15    Patient name: Dora Alvarez  : 1955  Provider#: [id number]  Referral source: Brian Iniguez DO      Medical/Treatment Diagnosis: Right hip pain [M25.551]     Prior Hospitalization: see medical history     Comorbidities: See Plan of Care  Prior Level of Function: See Plan of Care  Medications: Verified on Patient Summary List    Start of Care: 21      Onset Date:chronic   Visits from Start of Care: 13     Missed Visits: 0  Reporting Period : 21 to 21    Assessment/Summary of care: Pt has completed 1 visits of therapy that have worked to address core weakness and gluteal strength. She has progressed well and reports 100% progress since eval.  She has returned to all activity and was even able to dance last weekend she reports. She will be DC'd at this time have reached her treatment goals.       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

## 2021-06-09 NOTE — PROGRESS NOTES
PT DAILY TREATMENT NOTE - [facility] 2-15    Patient Name: Lizeth Jorgensen  Date:2021  : 1955  [x]  Patient  Verified  Payor: [place] [employer] MEDICARE / Plan: [employer] [address] / Product Type: Managed Care Medicare /    In time:1009  Out time:1055  Total Treatment Time (min): 46  Total Timed Codes (min): 46  1:1 Treatment Time (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 OBJECTIVE    46 min Therapeutic Exercise:  [x] See flow sheet :   Rationale: increase ROM and increase strength to improve the patients ability to perform ADLs and reduce pain levels    With   [] TE   [] TA   [] Neuro   [] SC   [] other: Patient Education: [x] Review HEP    [] Progressed/Changed HEP based on:   [] positioning   [] body mechanics   [] transfers   [] heat/ice application    [] other:      Other Objective/Functional Measures: none noted     Pain Level (0-10 scale) post treatment: 0/10    ASSESSMENT/Changes in Function:     Pt reports 100% improvement since starting and reports that she is back to feeling like going out. She went dancing over the weekend and reports no issues.   She will be DC'd at this time to continue her HEP indpendently  Patient will continue to benefit from skilled PT services to modify and progress therapeutic interventions, address functional mobility deficits, address ROM deficits, address strength deficits, analyze and address soft tissue restrictions, analyze and cue movement patterns, analyze and modify body mechanics/ergonomics and assess and modify postural abnormalities to attain remaining goals. [x]  See Plan of Care  []  See progress note/recertification  [x]  See Discharge Summary         Progress towards goals / Updated goals:  Patient is progressing towards goals.      PLAN  []  Upgrade activities as tolerated     []  Continue plan of care  []  Update interventions per flow sheet       [x]  Discharge due to:_  []  Other:_      Brisa Alvares, PT 6/9/2021

## 2021-06-13 NOTE — PROGRESS NOTES
Pharmacy Progress Note - Diabetes Management    S/O: Ms. Vee Rousseau is a [id number]. female , referred by Dr. Michelle Gandhi DO, with a PMH of T2DM, HTN, HLD, Hypothyroidism, OAB, IBS, Osteoporosis, was seen today for diabetes management follow up.   Last A1c was 8% (Oct 2020), a decrease from 8.4% (June 2020), an increase from 7.2% (Dec 2019).      Pt was seen following her PCP appt today. She presents today w/ significant abdominal pain. Poor PO intake in the past few weeks. Wt's down 13 lbs. Pt is to complete abdominal US later this morning. Interim update:   Recent DM regimen changes - see note 5/12/21   Reports most recent A1c was 7.4% (~May 2021)  Saw Marian Nelson NP (Endo) this Monday for f/u. Next f/u 8/2/21  Ozempic stopped d/t severe GI cramps. Taking levothyroxine 300 mcg daily    Current anti-hyperglycemic regimen include(s):    - Metformin 1000 mg daily  - Tresiba U-100 - 6 units BID -- reports dose was adjusted to 6 units in the AM and 4 units in the evening  - Humalog 2 units AM, 6 units before dinner  --- reports 2 units before each meal  - Jardiance 25 mg daily   - Ozempic 0.25 mg weekly x 2 weeks, then 0.5 mg weekly --- stopped taking it last week    ROS:  Today, Pt endorses:  - Symptoms of Hyperglycemia: none  - Symptoms of Hypoglycemia: none    Self Monitoring Blood Glucose (SMBG) or CGM:  Julien CGM  Scanning 4x/day     Midnight - 6 AM 6 AM - Noon Noon - 6 PM 6 PM - Midnight Average % Time in Target Range   14 Day Average 89 88 100 86 91 94%   30 Day Average 96 98 108 100 100    90 Day Average 106 108 112 109 109      Hypoglycemia (BG <70) Midnight - 6 AM 6 AM - Noon Noon - 6 PM 6 PM - Midnight Total Lows   7 days 2 2 0 2 6   14 days 2 2 0 2 6       Nutrition/Lifestyle Modifications:  Reports nausea and abdominal cramping occur when she eats  Trying to stay hydrated. Feels very weak.    Wt down 13 lbs since last visit    Running low on her linzess     The 10-year ASCVD risk score (Elisabeth Rizzo et al., 2013) is: 14.1%    Values used to calculate the score:      Age: 74 years      Sex: Female      Is Non- : No      Diabetic: Yes      Tobacco smoker: No      Systolic Blood Pressure: 212 mmHg      Is BP treated: Yes      HDL Cholesterol: 60 mg/dL      Total Cholesterol: 188 mg/dL     Vitals: Wt Readings from Last 3 Encounters:   06/16/21 188 lb 3.2 oz (85.4 kg)   05/13/21 201 lb (91.2 kg)   05/06/21 202 lb (91.6 kg)     BP Readings from Last 3 Encounters:   06/16/21 125/68   05/13/21 112/72   05/06/21 112/71     Pulse Readings from Last 3 Encounters:   06/16/21 90   05/13/21 69   05/06/21 84       Past Medical History:   Diagnosis Date    Depression     Diabetes (HCC)     Hypercholesteremia     Hypertension     Hyperthyroidism     IBS (irritable bowel syndrome)     Urinary incontinence      Allergies   Allergen Reactions    Celebrex [Celecoxib] Other (comments)     Hallucinations    Codeine Nausea and Vomiting    Erythromycin Nausea and Vomiting       Current Outpatient Medications   Medication Sig    trospium (SANCTURA XL) 60 mg capsule Take 1 Capsule by mouth Daily (before breakfast).  terbinafine HCL (LAMISIL) 250 mg tablet TAKE 1 TABLET BY MOUTH  DAILY    meloxicam (MOBIC) 15 mg tablet Take 1 Tab by mouth daily.  lisinopriL (PRINIVIL, ZESTRIL) 5 mg tablet TAKE 1 TABLET DAILY    Georganna Dys FlexTouch U-100 100 unit/mL (3 mL) inpn 17 Units by SubCUTAneous route daily. Indications: type 2 diabetes mellitus    nystatin (MYCOSTATIN) powder Apply  to affected area four (4) times daily.  HumaLOG KwikPen Insulin 100 unit/mL kwikpen 3-4 Units by SubCUTAneous route Before breakfast, lunch, and dinner. Give 3 units for blood sugar between 150-200; give 4 units for blood sugar 200-250. Indications: type 2 diabetes mellitus    Jardiance 25 mg tablet Take 1 Tab by mouth daily.     metFORMIN ER (GLUCOPHAGE XR) 500 mg tablet Take 2 Tabs by mouth daily (with breakfast).  ALPRAZolam (XANAX) 0.25 mg tablet Take 1 Tab by mouth daily as needed for Anxiety. Indications: anxious    buPROPion XL (WELLBUTRIN XL) 300 mg XL tablet Take 1 Tab by mouth every morning.  FreeStyle Julien 2 Sensor kit 1 Each by Other route See Salvatore Shaw. Use to scan sensor three times daily    pravastatin (PRAVACHOL) 20 mg tablet Take 20 mg by mouth nightly.  levothyroxine (SYNTHROID) 100 mcg tablet Take 100 mcg by mouth See Admin Instructions. Take together with 300 mcg dose to total 250 mcg daily    furosemide (LASIX) 20 mg tablet TAKE 1 TABLET DAILY AS NEEDED FOR EDEMA (Patient taking differently: Take 20 mg by mouth daily as needed.)    levothyroxine (SYNTHROID) 300 mcg tablet Take 1 Tab by mouth daily. (Patient taking differently: Take 150 mcg by mouth See Admin Instructions. Take together with 100 mcg dose to total 250 mcg daily)    NOVOFINE PLUS 32 gauge x 1/6\" ndle Check sugars 4x daily.  calcium polycarbophil (FIBER LAXATIVE, CA POLYCARBO,) 625 mg tablet Take 625 mg by mouth daily.  multivitamin (ONE A DAY) tablet Take 1 Tab by mouth daily.  linaclotide (LINZESS) 290 mcg cap capsule Take 290 mcg by mouth Daily (before breakfast).  acetaminophen (TYLENOL) 500 mg tablet Take 1 Tab by mouth every six (6) hours as needed for Pain.  cholecalciferol (VITAMIN D3) 1,000 unit tablet Take 4,000 Units by mouth daily.  B.infantis-B.ani-B.long-B.bifi (PROBIOTIC 4X) 10-15 mg TbEC Take 2 Gum by mouth daily. No current facility-administered medications for this visit.        Lab Results   Component Value Date/Time    Sodium 141 01/06/2021 11:02 AM    Potassium 4.2 01/06/2021 11:02 AM    Chloride 104 01/06/2021 11:02 AM    CO2 24 01/06/2021 11:02 AM    Anion gap 6 01/10/2019 08:37 AM    Glucose 204 (H) 01/06/2021 11:02 AM    BUN 19 01/06/2021 11:02 AM    Creatinine 1.20 (H) 01/06/2021 11:02 AM    BUN/Creatinine ratio 16 01/06/2021 11:02 AM    GFR est AA 54 (L) 01/06/2021 11:02 AM    GFR est non-AA 47 (L) 01/06/2021 11:02 AM    Calcium 9.5 01/06/2021 11:02 AM    Bilirubin, total 0.4 01/06/2021 11:02 AM    Alk. phosphatase 75 01/06/2021 11:02 AM    Protein, total 6.8 01/06/2021 11:02 AM    Albumin 4.4 01/06/2021 11:02 AM    Globulin 3.8 01/10/2019 08:37 AM    A-G Ratio 1.8 01/06/2021 11:02 AM    ALT (SGPT) 15 01/06/2021 11:02 AM       Lab Results   Component Value Date/Time    Cholesterol, total 188 06/23/2020 01:20 PM    HDL Cholesterol 60 06/23/2020 01:20 PM    LDL, calculated 108 (H) 06/23/2020 01:20 PM    VLDL, calculated 20 06/23/2020 01:20 PM    Triglyceride 101 06/23/2020 01:20 PM       Lab Results   Component Value Date/Time    WBC 6.6 06/23/2020 01:20 PM    HGB 12.3 06/23/2020 01:20 PM    HCT 35.2 06/23/2020 01:20 PM    PLATELET 118 (L) 37/39/4277 01:20 PM    MCV 86 06/23/2020 01:20 PM       Lab Results   Component Value Date/Time    Microalb/Creat ratio (ug/mg creat.) 3 06/23/2020 01:20 PM       HbA1c:  Lab Results   Component Value Date/Time    Hemoglobin A1c 8.4 (H) 06/23/2020 01:20 PM    Hemoglobin A1c (POC) 7.2 12/20/2019 08:22 AM     No components found for: 2     Last Point of Care HGB A1C  Hemoglobin A1c (POC)   Date Value Ref Range Status   12/20/2019 7.2 % Final        CrCl cannot be calculated (Unknown ideal weight. ). A/P:    Diabetes Management:  - Per ADA guidelines, Pt's A1c is not at goal of < 7%. - Given current PO intake status, frequency of hypoglycemia, and wt changes, recommend for patient to hold off on Humalog  - If BG continues to decrease <80 in 1 week, decrease Tresiba to 6 units daily  - Continue metformin 1 gm daily and Jardiance 25 mg daily. Emphasize importance of hydration.     - Will request for recent endocrinology lab results & notes        Other:  Will need to apply for [employer] - new Linzess PAP program.     Check: Vitals and Weight at the next visit.        Medication reconciliation was completed during the visit. Medications Discontinued During This Encounter   Medication Reason   Tevin Moore FlexTouch U-100 100 unit/mL (3 mL) inpn      Patient verbalized understanding of the information presented and all of the patients questions were answered. AVS was handed to the patient. Patient advised to call the office with any additional questions or concerns. Notifications of recommendations will be sent to Dr. Satish Dietz DO for review. Patient will return to clinic in 10 week(s) for follow up. Thank you for the consult,  Tavia Dooley, PharmD, BCACP, [address] [place] [address] in place:  Yes   Recommendation Provided To: Patient/Caregiver: 1 via In person   Intervention Detail: Dose Adjustment: 1, reason: Therapy De-escalation   Total # of Interventions Accepted: 1   Intervention Accepted By:   [facility] Time Spent (min): 20

## 2021-06-16 ENCOUNTER — TELEPHONE (OUTPATIENT)
Dept: INTERNAL MEDICINE CLINIC | Age: 66
End: 2021-06-16

## 2021-06-16 ENCOUNTER — APPOINTMENT (OUTPATIENT)
Dept: INTERNAL MEDICINE CLINIC | Age: 66
End: 2021-06-16

## 2021-06-16 ENCOUNTER — HOSPITAL ENCOUNTER (OUTPATIENT)
Dept: ULTRASOUND IMAGING | Age: 66
Discharge: HOME | End: 2021-06-16
Attending: INTERNAL MEDICINE
Payer: MEDICARE

## 2021-06-16 ENCOUNTER — OFFICE VISIT (OUTPATIENT)
Dept: INTERNAL MEDICINE CLINIC | Age: 66
End: 2021-06-16

## 2021-06-16 ENCOUNTER — OFFICE VISIT (OUTPATIENT)
Dept: INTERNAL MEDICINE CLINIC | Age: 66
End: 2021-06-16
Attending: INTERNAL MEDICINE
Payer: MEDICARE

## 2021-06-16 VITALS
TEMPERATURE: 97 F | HEIGHT: 65 IN | DIASTOLIC BLOOD PRESSURE: 68 MMHG | SYSTOLIC BLOOD PRESSURE: 125 MMHG | OXYGEN SATURATION: 99 % | RESPIRATION RATE: 18 BRPM | BODY MASS INDEX: 31.36 KG/M2 | WEIGHT: 188.2 LBS | HEART RATE: 90 BPM

## 2021-06-16 DIAGNOSIS — K80.00 CALCULUS OF GALLBLADDER WITH ACUTE CHOLECYSTITIS WITHOUT OBSTRUCTION: Primary | ICD-10-CM

## 2021-06-16 DIAGNOSIS — E11.21 TYPE 2 DIABETES WITH NEPHROPATHY (HCC): Primary | ICD-10-CM

## 2021-06-16 DIAGNOSIS — I10 ESSENTIAL HYPERTENSION: ICD-10-CM

## 2021-06-16 DIAGNOSIS — E78.5 HYPERLIPIDEMIA ASSOCIATED WITH TYPE 2 DIABETES MELLITUS (HCC): ICD-10-CM

## 2021-06-16 DIAGNOSIS — R10.11 CONTINUOUS RUQ ABDOMINAL PAIN: ICD-10-CM

## 2021-06-16 DIAGNOSIS — K80.00 CALCULUS OF GALLBLADDER WITH ACUTE CHOLECYSTITIS WITHOUT OBSTRUCTION: ICD-10-CM

## 2021-06-16 DIAGNOSIS — Z79.4 UNCONTROLLED TYPE 2 DIABETES MELLITUS WITH HYPERGLYCEMIA, WITH LONG-TERM CURRENT USE OF INSULIN (HCC): ICD-10-CM

## 2021-06-16 DIAGNOSIS — E11.21 TYPE 2 DIABETES WITH NEPHROPATHY (HCC): ICD-10-CM

## 2021-06-16 DIAGNOSIS — E03.9 ACQUIRED HYPOTHYROIDISM: ICD-10-CM

## 2021-06-16 DIAGNOSIS — K81.9 CHOLECYSTITIS: Primary | ICD-10-CM

## 2021-06-16 DIAGNOSIS — E11.69 HYPERLIPIDEMIA ASSOCIATED WITH TYPE 2 DIABETES MELLITUS (HCC): ICD-10-CM

## 2021-06-16 DIAGNOSIS — E66.01 SEVERE OBESITY (HCC): ICD-10-CM

## 2021-06-16 DIAGNOSIS — E11.65 UNCONTROLLED TYPE 2 DIABETES MELLITUS WITH HYPERGLYCEMIA, WITH LONG-TERM CURRENT USE OF INSULIN (HCC): ICD-10-CM

## 2021-06-16 DIAGNOSIS — Z99.89 OSA ON CPAP: ICD-10-CM

## 2021-06-16 DIAGNOSIS — G47.33 OSA ON CPAP: ICD-10-CM

## 2021-06-16 DIAGNOSIS — Z00.00 MEDICARE ANNUAL WELLNESS VISIT, SUBSEQUENT: ICD-10-CM

## 2021-06-16 LAB
COMMENT, HOLDF: NORMAL
SAMPLES BEING HELD,HOLD: NORMAL

## 2021-06-16 PROCEDURE — 3046F HEMOGLOBIN A1C LEVEL >9.0%: CPT | Performed by: INTERNAL MEDICINE

## 2021-06-16 PROCEDURE — G8754 DIAS BP LESS 90: HCPCS | Performed by: INTERNAL MEDICINE

## 2021-06-16 PROCEDURE — G0439 PPPS, SUBSEQ VISIT: HCPCS | Performed by: INTERNAL MEDICINE

## 2021-06-16 PROCEDURE — G8536 NO DOC ELDER MAL SCRN: HCPCS | Performed by: INTERNAL MEDICINE

## 2021-06-16 PROCEDURE — 2022F DILAT RTA XM EVC RTNOPTHY: CPT | Performed by: INTERNAL MEDICINE

## 2021-06-16 PROCEDURE — G9899 SCRN MAM PERF RSLTS DOC: HCPCS | Performed by: INTERNAL MEDICINE

## 2021-06-16 PROCEDURE — 1090F PRES/ABSN URINE INCON ASSESS: CPT | Performed by: INTERNAL MEDICINE

## 2021-06-16 PROCEDURE — 3017F COLORECTAL CA SCREEN DOC REV: CPT | Performed by: INTERNAL MEDICINE

## 2021-06-16 PROCEDURE — G8752 SYS BP LESS 140: HCPCS | Performed by: INTERNAL MEDICINE

## 2021-06-16 PROCEDURE — 99214 OFFICE O/P EST MOD 30 MIN: CPT | Performed by: INTERNAL MEDICINE

## 2021-06-16 PROCEDURE — G8427 DOCREV CUR MEDS BY ELIG CLIN: HCPCS | Performed by: INTERNAL MEDICINE

## 2021-06-16 PROCEDURE — G9717 DOC PT DX DEP/BP F/U NT REQ: HCPCS | Performed by: INTERNAL MEDICINE

## 2021-06-16 PROCEDURE — 1101F PT FALLS ASSESS-DOCD LE1/YR: CPT | Performed by: INTERNAL MEDICINE

## 2021-06-16 PROCEDURE — 76700 US EXAM ABDOM COMPLETE: CPT

## 2021-06-16 PROCEDURE — G8417 CALC BMI ABV UP PARAM F/U: HCPCS | Performed by: INTERNAL MEDICINE

## 2021-06-16 RX ORDER — INSULIN DEGLUDEC INJECTION 100 U/ML
6 INJECTION, SOLUTION SUBCUTANEOUS DAILY
Qty: 15 ML | Refills: 1
Start: 2021-06-16 | End: 2021-08-24

## 2021-06-16 NOTE — PROGRESS NOTES
Called patient, 2 identifiers. Advised I called and scheduled her an appt with General Surgery  Dr. Imtiaz Mccord at 8:10am and to bring insurance card, ID and Covid vaccination card. Patient expressed understanding of information given and had no further questions at this time.

## 2021-06-16 NOTE — PROGRESS NOTES
Ruchi Fleming is a 74 y.o. female who presents for evaluation of awv. Last seen by me dec 16, 2020. She is not doing well. Was started on ozempic about 4 weeks ago, and has not done well since then. Has been having ruq and upper abd pain for a few weeks now. No appetite. Lots of nausea, no vomiting. Weight is down 17 lbs. Had abd ultrasound done about 5 years ago, and she had gallstones then. Last ozempic dose was last Wednesday. Also still struggles with chronic low back and hip pains. Has seen dr Indio Carrasco a few times. ROS:  Constitutional: negative for fevers, chills, anorexia and weight loss  Eyes:   negative for visual disturbance and irritation  ENT:   negative for tinnitus,sore throat,nasal congestion,ear pain,hoarseness  Respiratory:  negative for cough, hemoptysis, dyspnea,wheezing  CV:   negative for chest pain, palpitations, lower extremity edema  GI:   negative for nausea, vomiting, diarrhea,melena.   ++abd pain with nausea  Genitourinary: negative for frequency, dysuria and hematuria  Musculoskel: negative for myalgias, arthralgias, back pain, muscle weakness, joint pain  Neurological:  negative for headaches, dizziness, focal weakness, numbness  Psychiatric:     Negative for depression or anxiety      Past Medical History:   Diagnosis Date    Depression     Diabetes ([address]     Hypercholesteremia     Hypertension     Hyperthyroidism     IBS (irritable bowel syndrome)     Urinary incontinence        Past Surgical History:   Procedure Laterality Date    HX BACK SURGERY      x2    HX BLEPHAROPLASTY Right     HX BUNIONECTOMY      HX HYSTERECTOMY      HX OTHER SURGICAL      Collapsed lung    HX WISDOM TEETH EXTRACTION         Family History   Problem Relation Age of Onset    Diabetes Mother     Heart Disease Mother     Cancer Father         pancreatic    Diabetes Sister     Anxiety Sister     Diabetes Brother     Anxiety Brother     Diabetes Brother     Anxiety Brother  Diabetes Sister     Anxiety Sister     Diabetes Sister     Anxiety Sister     Cancer Sister         lung and brain    Anxiety Sister     Anxiety Sister     Breast Cancer Paternal Aunt         under 48       Social History     Socioeconomic History    Marital status: SINGLE     Spouse name: Not on file    Number of children: Not on file    Years of education: Not on file    Highest education level: Not on file   Occupational History    Not on file   Tobacco Use    Smoking status: Never Smoker    Smokeless tobacco: Never Used   Substance and Sexual Activity    Alcohol use: Yes     Comment: occasionally    Drug use: No    Sexual activity: Not Currently   Other Topics Concern    Not on file   Social History Narrative    Not on file     Social Determinants of Health     Financial Resource Strain:     Difficulty of Paying Living Expenses:    Food Insecurity:     Worried About Running Out of Food in the Last Year:     [address] in the Last Year:    Transportation Needs:     Lack of Transportation (Medical):      Lack of Transportation (Non-Medical):    Physical Activity:     Days of Exercise per Week:     Minutes of Exercise per Session:    Stress:     Feeling of Stress :    Social Connections:     Frequency of Communication with Friends and Family:     Frequency of Social Gatherings with Friends and Family:     Attends Islam Services:     Active Member of Clubs or Organizations:     Attends Club or Organization Meetings:     Marital Status:    Intimate Partner Violence:     Fear of Current or Ex-Partner:     Emotionally Abused:     Physically Abused:     Sexually Abused:             Visit Vitals  /68 (BP 1 Location: Left upper arm, BP Patient Position: Sitting)   Pulse 90   Temp 97 °F (36.1 °C) (Temporal)   Resp 18   Ht 5' 5\" (1.651 m)   Wt 188 lb 3.2 oz (85.4 kg)   SpO2 99%   BMI 31.32 kg/m²       Physical Examination:   General - Well appearing female  HEENT - PERRL, TM no erythema/opacification, normal nasal turbinates, no oropharyngeal erythema or exudate, MMM  Neck - supple, no bruits, no thyroidomegaly, no lymphadenopathy  Pulm - clear to auscultation bilaterally  Cardio - RRR, normal S1 S2, no murmur  Abd - soft, ++tender, no masses, no HSM.  ++diffusely tender, worse ruq. No guard, no rebound. ++bs throughout  Extrem - no edema, +2 distal pulses  Neuro-  No focal deficits, CN intact     Assessment/Plan:    1.  ruq abd pain--suspect acute cholecystitis. Stopped ozempic. She has stat ultrasound for 11 am today. Check cbc, lipase, cmp  2. Uncontrolled dm, type 2--check a1c, urine micro. Stopped ozempic. On tresiba, and humalog with meals. Also on jardiance, glucophage  3. Hypothyroid--check tsh, on synthroid  4.  htn--controlled with lisinopril. Check cbc, cmp  5.  ibs-constipation--uses linzess  6.  hyperlipids--on pravachol, check flp, cmp  7. EFFIE--continue wellbutirn, prn xanax    rtc 3 months        Charlsie Zion III, DO            This is the Subsequent Medicare Annual Wellness Exam, performed 12 months or more after the Initial AWV or the last Subsequent AWV    I have reviewed the patient's medical history in detail and updated the computerized patient record. Assessment/Plan   Education and counseling provided:  Are appropriate based on today's review and evaluation  End-of-Life planning (with patient's consent)  Pneumococcal Vaccine  Influenza Vaccine    1.  Medicare annual wellness visit, subsequent       Depression Risk Factor Screening     3 most recent PHQ Screens 6/22/2020   Little interest or pleasure in doing things Not at all   Feeling down, depressed, irritable, or hopeless Not at all   Total Score PHQ 2 0       Alcohol Risk Screen    Do you average more than 1 drink per night or more than 7 drinks a week:  No    On any one occasion in the past three months have you have had more than 3 drinks containing alcohol: No        Functional Ability and Level of Safety    Hearing: Hearing is good. Activities of Daily Living: The home contains: grab bars  Patient does total self care      Ambulation: with no difficulty     Fall Risk:  Fall Risk Assessment, last 12 mths 5/13/2021   Able to walk? Yes   Fall in past 12 months? -      Abuse Screen:  Patient is not abused       Cognitive Screening    Has your family/caregiver stated any concerns about your memory: no     Cognitive Screening: Normal - MMSE (Mini Mental Status Exam)    Health Maintenance Due     Health Maintenance Due   Topic Date Due    Foot Exam Q1  11/12/2019    A1C test (Diabetic or Prediabetic)  06/23/2021    Lipid Screen  06/23/2021       Patient Care Team   Patient Care Team:  Parth Dunham DO as PCP - General (Internal Medicine)  Parth Dunham DO as PCP - [facility] Empaneled Provider  Davide Jones MD (Obstetrics & Gynecology)  Karey Ortega MD (Orthopedic Surgery)  Soham Gregory MD (Ophthalmology)  Niyah Spence DO (Pain Management)  Pricila Lebron MD (Cardiology)  Rabia uDggan MD (Gastroenterology)  Alex Mclean MD (Orthopedic Surgery)  Eneida Ayon MD (Endocrinology)  Russel Camargo as Pharmacist (Internal Medicine)  Sridhar Cross MD (Ophthalmology)    History     Patient Active Problem List   Diagnosis Code    Acquired hypothyroidism E03.9    Osteoporosis M81.0    OAB (overactive bladder) N32.81    Essential hypertension I10    Depression F32.9    Obesity (BMI 30-39. 9) E66.9    Uncontrolled type 2 diabetes mellitus with hyperglycemia, with long-term current use of insulin ([facility]) E11.65, Z79.4    Hyperlipidemia associated with type 2 diabetes mellitus ([address] E11.69, E78.5    Type 2 diabetes with nephropathy ([address] E11.21    Severe obesity ([address] E66.01    Trochanteric bursitis, right hip M70.61    Bilateral primary osteoarthritis of knee M17.0     Past Medical History:   Diagnosis Date    Depression     Diabetes ([facility]     Hypercholesteremia     Hypertension     Hyperthyroidism     IBS (irritable bowel syndrome)     Urinary incontinence       Past Surgical History:   Procedure Laterality Date    HX BACK SURGERY      x2    HX BLEPHAROPLASTY Right     HX BUNIONECTOMY      HX HYSTERECTOMY      HX OTHER SURGICAL      Collapsed lung    HX WISDOM TEETH EXTRACTION       Current Outpatient Medications   Medication Sig Dispense Refill    trospium (SANCTURA XL) 60 mg capsule Take 1 Capsule by mouth Daily (before breakfast). 90 Capsule 3    terbinafine HCL (LAMISIL) 250 mg tablet TAKE 1 TABLET BY MOUTH  DAILY 30 Tab 1    meloxicam (MOBIC) 15 mg tablet Take 1 Tab by mouth daily. 60 Tab 1    lisinopriL (PRINIVIL, ZESTRIL) 5 mg tablet TAKE 1 TABLET DAILY 90 Tab 3    Tresiba FlexTouch U-100 100 unit/mL (3 mL) inpn 17 Units by SubCUTAneous route daily. Indications: type 2 diabetes mellitus 15 mL 1    nystatin (MYCOSTATIN) powder Apply  to affected area four (4) times daily. 30 g 2    HumaLOG KwikPen Insulin 100 unit/mL kwikpen 3-4 Units by SubCUTAneous route Before breakfast, lunch, and dinner. Give 3 units for blood sugar between 150-200; give 4 units for blood sugar 200-250. Indications: type 2 diabetes mellitus 15 mL 0    Jardiance 25 mg tablet Take 1 Tab by mouth daily. 30 Tab 2    metFORMIN ER (GLUCOPHAGE XR) 500 mg tablet Take 2 Tabs by mouth daily (with breakfast). 180 Tab 3    ALPRAZolam (XANAX) 0.25 mg tablet Take 1 Tab by mouth daily as needed for Anxiety. Indications: anxious 30 Tab 0    buPROPion XL (WELLBUTRIN XL) 300 mg XL tablet Take 1 Tab by mouth every morning. 90 Tab 3    FreeStyle Julien 2 Sensor kit 1 Each by Other route See Salvatore Shaw. Use to scan sensor three times daily      pravastatin (PRAVACHOL) 20 mg tablet Take 20 mg by mouth nightly.  levothyroxine (SYNTHROID) 100 mcg tablet Take 100 mcg by mouth See Admin Instructions.  Take together with 300 mcg dose to total 250 mcg daily      furosemide (LASIX) 20 mg tablet TAKE 1 TABLET DAILY AS NEEDED FOR EDEMA (Patient taking differently: Take 20 mg by mouth daily as needed.) 90 Tab 3    NOVOFINE PLUS 32 gauge x 1/6\" ndle Check sugars 4x daily. 300 Pen Needle 3    calcium polycarbophil (FIBER LAXATIVE, CA POLYCARBO,) 625 mg tablet Take 625 mg by mouth daily.  multivitamin (ONE A DAY) tablet Take 1 Tab by mouth daily.  linaclotide (LINZESS) 290 mcg cap capsule Take 290 mcg by mouth Daily (before breakfast).  acetaminophen (TYLENOL) 500 mg tablet Take 1 Tab by mouth every six (6) hours as needed for Pain. 30 Tab 0    cholecalciferol (VITAMIN D3) 1,000 unit tablet Take 4,000 Units by mouth daily.  B.infantis-B.ani-B.long-B.bifi (PROBIOTIC 4X) 10-15 mg TbEC Take 2 Gum by mouth daily.        Allergies   Allergen Reactions    Celebrex [Celecoxib] Other (comments)     Hallucinations    Codeine Nausea and Vomiting    Erythromycin Nausea and Vomiting       Family History   Problem Relation Age of Onset    Diabetes Mother     Heart Disease Mother     Cancer Father         pancreatic    Diabetes Sister     Anxiety Sister     Diabetes Brother     Anxiety Brother     Diabetes Brother     Anxiety Brother     Diabetes Sister     Anxiety Sister     Diabetes Sister     Anxiety Sister     Cancer Sister         lung and brain    Anxiety Sister     Anxiety Sister     Breast Cancer Paternal Aunt         under 48     Social History     Tobacco Use    Smoking status: Never Smoker    Smokeless tobacco: Never Used   Substance Use Topics    Alcohol use: Yes     Comment: occasionally         Soham Cameron III, DO

## 2021-06-16 NOTE — PROGRESS NOTES
I already spoke with pt. She has bad gallbladder. She needs an appt to see general surgery--please call to get her one for asap.   Referral placed to dr Sapna Steinberg, but she can see whomever is available first.

## 2021-06-16 NOTE — PATIENT INSTRUCTIONS
Medicare Wellness Visit, Female The best way to live healthy is to have a lifestyle where you eat a well-balanced diet, exercise regularly, limit alcohol use, and quit all forms of tobacco/nicotine, if applicable. Regular preventive services are another way to keep healthy. Preventive services (vaccines, screening tests, monitoring & exams) can help personalize your care plan, which helps you manage your own care. Screening tests can find health problems at the earliest stages, when they are easiest to treat. [facility] follows the current, evidence-based guidelines published by the [facility] (RUSTSTF) when recommending preventive services for our patients. Because we follow these guidelines, sometimes recommendations change over time as research supports it. (For example, mammograms used to be recommended annually. Even though Medicare will still pay for an annual mammogram, the newer guidelines recommend a mammogram every two years for women of average risk). Of course, you and your doctor may decide to screen more often for some diseases, based on your risk and your co-morbidities (chronic disease you are already diagnosed with). Preventive services for you include: - Medicare offers their members a free annual wellness visit, which is time for you and your primary care provider to discuss and plan for your preventive service needs. Take advantage of this benefit every year! 
-All adults over the age of 72 should receive the recommended pneumonia vaccines. Current USPSTF guidelines recommend a series of two vaccines for the best pneumonia protection.  
-All adults should have a flu vaccine yearly and a tetanus vaccine every 10 years.  
-All adults age 48 and older should receive the shingles vaccines (series of two vaccines).      
-All adults age 38-68 who are overweight should have a diabetes screening test once every three years.  
-All adults born between [birth year] and 1967 should be screened once for Hepatitis C. 
-Other screening tests and preventive services for persons with diabetes include: an eye exam to screen for diabetic retinopathy, a kidney function test, a foot exam, and stricter control over your cholesterol.  
-Cardiovascular screening for adults with routine risk involves an electrocardiogram (ECG) at intervals determined by your doctor.  
-Colorectal cancer screenings should be done for adults age 54-65 with no increased risk factors for colorectal cancer. There are a number of acceptable methods of screening for this type of cancer. Each test has its own benefits and drawbacks. Discuss with your doctor what is most appropriate for you during your annual wellness visit. The different tests include: colonoscopy (considered the best screening method), a fecal occult blood test, a fecal DNA test, and sigmoidoscopy. 
 
-A bone mass density test is recommended when a woman turns 65 to screen for osteoporosis. This test is only recommended one time, as a screening. Some providers will use this same test as a disease monitoring tool if you already have osteoporosis. -Breast cancer screenings are recommended every other year for women of normal risk, age 54-69. 
-Cervical cancer screenings for women over age 72 are only recommended with certain risk factors. Here is a list of your current Health Maintenance items (your personalized list of preventive services) with a due date: 
Health Maintenance Due Topic Date Due  
 Diabetic Foot Care  11/12/2019  Hemoglobin A1C    06/23/2021  Cholesterol Test   06/23/2021

## 2021-06-16 NOTE — TELEPHONE ENCOUNTER
Pharmacy Progress Note - Telephone Call    Ms. Kasia Mas 74 y.o. was contacted via an outbound telephone call regarding her Linzess PAP today. A voicemail was left for patient to return my call. Thank you,  Tavia Coello, PharmD, BCACP, [address] [facility] in place:  Yes   Recommendation Provided To: Patient/Caregiver: 1 via Telephone   Intervention Detail: Patient Access Assistance/Sample Provided

## 2021-06-16 NOTE — PATIENT INSTRUCTIONS
· Hold off on Humalog · Stay hydrated · In a week, if you continue to experience low blood sugars (less than 80), decrease your Tresiba to 6 units daily

## 2021-06-17 ENCOUNTER — OFFICE VISIT (OUTPATIENT)
Dept: SURGERY | Age: 66
End: 2021-06-17
Payer: MEDICARE

## 2021-06-17 VITALS
SYSTOLIC BLOOD PRESSURE: 108 MMHG | BODY MASS INDEX: 30.99 KG/M2 | DIASTOLIC BLOOD PRESSURE: 59 MMHG | TEMPERATURE: 97.6 F | RESPIRATION RATE: 16 BRPM | WEIGHT: 186 LBS | HEART RATE: 85 BPM | HEIGHT: 65 IN | OXYGEN SATURATION: 100 %

## 2021-06-17 DIAGNOSIS — K80.20 CALCULUS OF GALLBLADDER WITHOUT CHOLECYSTITIS WITHOUT OBSTRUCTION: Primary | ICD-10-CM

## 2021-06-17 DIAGNOSIS — E66.01 SEVERE OBESITY (HCC): ICD-10-CM

## 2021-06-17 LAB
BASOPHILS # BLD: 0 K/UL (ref 0–0.1)
BASOPHILS NFR BLD: 1 % (ref 0–1)
CHOLEST SERPL-MCNC: 175 MG/DL
DIFFERENTIAL METHOD BLD: ABNORMAL
EOSINOPHIL # BLD: 0.2 K/UL (ref 0–0.4)
EOSINOPHIL NFR BLD: 2 % (ref 0–7)
ERYTHROCYTE [DISTWIDTH] IN BLOOD BY AUTOMATED COUNT: 13 % (ref 11.5–14.5)
EST. AVERAGE GLUCOSE BLD GHB EST-MCNC: 134 MG/DL
HBA1C MFR BLD: 6.3 % (ref 4–5.6)
HCT VFR BLD AUTO: 38.8 % (ref 35–47)
HDLC SERPL-MCNC: 71 MG/DL
HDLC SERPL: 2.5 {RATIO} (ref 0–5)
HGB BLD-MCNC: 12.9 G/DL (ref 11.5–16)
IMM GRANULOCYTES # BLD AUTO: 0 K/UL (ref 0–0.04)
IMM GRANULOCYTES NFR BLD AUTO: 1 % (ref 0–0.5)
LDLC SERPL CALC-MCNC: 80.2 MG/DL (ref 0–100)
LIPASE SERPL-CCNC: 89 U/L (ref 73–393)
LYMPHOCYTES # BLD: 2.7 K/UL (ref 0.8–3.5)
LYMPHOCYTES NFR BLD: 34 % (ref 12–49)
MCH RBC QN AUTO: 30.6 PG (ref 26–34)
MCHC RBC AUTO-ENTMCNC: 33.2 G/DL (ref 30–36.5)
MCV RBC AUTO: 92.2 FL (ref 80–99)
MONOCYTES # BLD: 0.6 K/UL (ref 0–1)
MONOCYTES NFR BLD: 8 % (ref 5–13)
NEUTS SEG # BLD: 4.4 K/UL (ref 1.8–8)
NEUTS SEG NFR BLD: 54 % (ref 32–75)
NRBC # BLD: 0 K/UL (ref 0–0.01)
NRBC BLD-RTO: 0 PER 100 WBC
PLATELET # BLD AUTO: 155 K/UL (ref 150–400)
PMV BLD AUTO: 12.3 FL (ref 8.9–12.9)
RBC # BLD AUTO: 4.21 M/UL (ref 3.8–5.2)
TRIGL SERPL-MCNC: 119 MG/DL (ref ?–150)
TSH SERPL DL<=0.05 MIU/L-ACNC: 0.53 UIU/ML (ref 0.36–3.74)
VLDLC SERPL CALC-MCNC: 23.8 MG/DL
WBC # BLD AUTO: 8 K/UL (ref 3.6–11)

## 2021-06-17 PROCEDURE — 99204 OFFICE O/P NEW MOD 45 MIN: CPT | Performed by: SURGERY

## 2021-06-17 RX ORDER — LEVOTHYROXINE SODIUM 300 UG/1
300 TABLET ORAL
COMMUNITY
End: 2021-09-28 | Stop reason: SDUPTHER

## 2021-06-17 NOTE — LETTER
6/17/2021    Patient: Nehemias Fischer   YOB: 1955   Date of Visit: 6/17/2021     Sandi Bryan III, DO  [address]  [address]  [facility]  [address] [employer]    Dear Renae Camacho,      Thank you for referring Ms. Robbie Fink to [facility] for evaluation. My notes for this consultation are attached. If you have questions, please do not hesitate to call me. I look forward to following your patient along with you.       Sincerely,    Javid Costa MD

## 2021-06-17 NOTE — PERIOP NOTES
[facility]  Preoperative Instructions        Surgery Date 6/21/21          Time of Arrival 0845    1. On the day of your surgery, please report to the Surgical Services Registration Desk and sign in at your designated time. The Surgery Center is located to the right of the Emergency Room. 2. You must have someone with you to drive you home. You should not drive a car for 24 hours following surgery. Please make arrangements for a friend or family member to stay with you for the first 24 hours after your surgery. 3. Do not have anything to eat or drink (including water, gum, mints, coffee, juice) after midnight ?6/20/21? Demi Heman ? This may not apply to medications prescribed by your physician. ?(Please note below the special instructions with medications to take the morning of your procedure.)    4. We recommend you do not drink any alcoholic beverages for 24 hours before and after your surgery. 5. Contact your surgeons office for instructions on the following medications: non-steroidal anti-inflammatory drugs (i.e. Advil, Aleve), vitamins, and supplements. (Some surgeons will want you to stop these medications prior to surgery and others may allow you to take them)  **If you are currently taking Plavix, Coumadin, Aspirin and/or other blood-thinning agents, contact your surgeon for instructions. ** Your surgeon will partner with the physician prescribing these medications to determine if it is safe to stop or if you need to continue taking. Please do not stop taking these medications without instructions from your surgeon    6. Wear comfortable clothes. Wear glasses instead of contacts. Do not bring any money or jewelry. Please bring picture ID, insurance card, and any prearranged co-payment or hospital payment. Do not wear make-up, particularly mascara the morning of your surgery. Do not wear nail polish, particularly if you are having foot /hand surgery.   Wear your hair loose or down, no ponytails, buns, josé miguel pins or clips. All body piercings must be removed. Please shower with antibacterial soap for three consecutive days before and on the morning of surgery, but do not apply any lotions, powders or deodorants after the shower on the day of surgery. Please use a fresh towels after each shower. Please sleep in clean clothes and change bed linens the night before surgery. Please do not shave for 48 hours prior to surgery. Shaving of the face is acceptable. 7. You should understand that if you do not follow these instructions your surgery may be cancelled. If your physical condition changes (I.e. fever, cold or flu) please contact your surgeon as soon as possible. 8. It is important that you be on time. If a situation occurs where you may be late, please call [phone] (OR Holding Area). 9. If you have any questions and or problems, please call [phone] (Pre-admission Testing). 10. Your surgery time may be subject to change. You will receive a phone call the evening prior if your time changes. 11.  If having outpatient surgery, you must have someone to drive you here, stay with you during the duration of your stay, and to drive you home at time of discharge. Special Instructions:     TAKE ALL MEDICATIONS DAY OF SURGERY EXCEPT:  Patient is not taking any medications DOS    I understand a pre-operative phone call will be made to verify my surgery time. In the event that I am not available, I give permission for a message to be left on my answering service and/or with another person?   Yes [phone]         ___________________      __________   _________    (Signature of Patient)             (Witness)                (Date and Time)

## 2021-06-17 NOTE — PROGRESS NOTES
Identified pt with two pt identifiers(name and ). Reviewed record in preparation for visit and have obtained necessary documentation. All patient medications has been reviewed. Chief Complaint   Patient presents with    Abdominal Pain     Seen at the request of Dr. Bell Garza for eval of gallbladder       Health Maintenance Due   Topic    Foot Exam Q1        Vitals:    21 0804   BP: (!) 108/59   Pulse: 85   Resp: 16   Temp: 97.6 °F (36.4 °C)   TempSrc: Temporal   SpO2: 100%   Weight: 84.4 kg (186 lb)   Height: 5' 5\" (1.651 m)   PainSc:   5   PainLoc: Abdomen       4. Have you been to the ER, urgent care clinic since your last visit? Hospitalized since your last visit? No    5. Have you seen or consulted any other health care providers outside of the [address] [place] [address] since your last visit? Include any pap smears or colon screening. No      Patient is accompanied by spouse I have received verbal consent from Ruchi Fleming to discuss any/all medical information while they are present in the room.

## 2021-06-17 NOTE — PROGRESS NOTES
A1c down to 6. 3.   weight loss is really helping. Hold ozempic for now as discussed. Other labs look good. I also reviewed her labs from her endocrine done earlier this week, and they look ok as well.

## 2021-06-17 NOTE — PROGRESS NOTES
Surgery History and Physical    Subjective: Miguel Berry is a 74 y.o. female who presents for evaluation of epigastric pain. It started about two weeks ago. She always had upper GI problems. She has decreased oral intake over the past two weeks. No fevers. Having bowel function. Patient still reports chronic pain. Laying down makes the pain worse. RUQ US: 1. Cholelithiasis with positive ultrasound Mayberry's sign. Past Medical History:   Diagnosis Date    Depression     Diabetes ([address]     Hypercholesteremia     Hypertension     Hyperthyroidism     IBS (irritable bowel syndrome)     Urinary incontinence      Past Surgical History:   Procedure Laterality Date    HX BACK SURGERY      x2    HX BLEPHAROPLASTY Right     HX BUNIONECTOMY      HX HYSTERECTOMY      HX OTHER SURGICAL      Collapsed lung    HX WISDOM TEETH EXTRACTION        Family History   Problem Relation Age of Onset    Diabetes Mother     Heart Disease Mother     Cancer Father         pancreatic    Diabetes Sister     Anxiety Sister     Diabetes Brother     Anxiety Brother     Diabetes Brother     Anxiety Brother     Diabetes Sister     Anxiety Sister     Diabetes Sister     Anxiety Sister     Cancer Sister         lung and brain    Anxiety Sister     Anxiety Sister     Breast Cancer Paternal Aunt         under 48     Social History     Tobacco Use    Smoking status: Never Smoker    Smokeless tobacco: Never Used   Substance Use Topics    Alcohol use: Yes     Comment: occasionally      Prior to Admission medications    Medication Sig Start Date End Date Taking? Authorizing Provider   Ben Leger FlexTouch U-100 100 unit/mL (3 mL) inpn 6 Units by SubCUTAneous route daily. Indications: type 2 diabetes mellitus 6/16/21   Jeny FU III, DO   trospium (SANCTURA XL) 60 mg capsule Take 1 Capsule by mouth Daily (before breakfast).  6/2/21   Soham Cameron III, DO   terbinafine HCL (LAMISIL) 250 mg tablet TAKE 1 TABLET BY MOUTH  DAILY 5/2/21   Richard Bienenstock B III, DO   meloxicam (MOBIC) 15 mg tablet Take 1 Tab by mouth daily. 4/8/21   Howard Biswas, DO   lisinopriL (PRINIVIL, ZESTRIL) 5 mg tablet TAKE 1 TABLET DAILY 2/25/21   Richard Bienenstock B III, DO   nystatin (MYCOSTATIN) powder Apply  to affected area four (4) times daily. 12/28/20   Luis Verma MD   HumaLOG KwikPen Insulin 100 unit/mL kwikpen 3-4 Units by SubCUTAneous route Before breakfast, lunch, and dinner. Give 3 units for blood sugar between 150-200; give 4 units for blood sugar 200-250. Indications: type 2 diabetes mellitus 12/16/20   Richard Bienenstock B III, DO   Jardiance 25 mg tablet Take 1 Tab by mouth daily. 12/16/20   Richard Bienenstock B III, DO   metFORMIN ER (GLUCOPHAGE XR) 500 mg tablet Take 2 Tabs by mouth daily (with breakfast). 12/16/20   Anne Marie Cameron III, DO   ALPRAZolam (XANAX) 0.25 mg tablet Take 1 Tab by mouth daily as needed for Anxiety. Indications: anxious 12/16/20   Richard Bienenstock B III, DO   buPROPion XL (WELLBUTRIN XL) 300 mg XL tablet Take 1 Tab by mouth every morning. 11/17/20   Marybeth Macario, DO   FreeStyle Julien 2 Sensor kit 1 Each by Other route See Salvatore Shaw. Use to scan sensor three times daily 10/16/20   Provider, Historical   pravastatin (PRAVACHOL) 20 mg tablet Take 20 mg by mouth nightly. Provider, Historical   levothyroxine (SYNTHROID) 100 mcg tablet Take 100 mcg by mouth See Admin Instructions. Take together with 300 mcg dose to total 250 mcg daily    Provider, Historical   furosemide (LASIX) 20 mg tablet TAKE 1 TABLET DAILY AS NEEDED FOR EDEMA  Patient taking differently: Take 20 mg by mouth daily as needed. 9/13/20   Richard Bienenstock B III, DO   NOVOFINE PLUS 32 gauge x 1/6\" ndle Check sugars 4x daily. 12/20/19   Richard Bienenstock B III, DO   calcium polycarbophil (FIBER LAXATIVE, CA POLYCARBO,) 625 mg tablet Take 625 mg by mouth daily.     Provider, Historical   multivitamin (ONE A DAY) tablet Take 1 Tab by mouth daily. Provider, Historical   linaclotide (LINZESS) 290 mcg cap capsule Take 290 mcg by mouth Daily (before breakfast). Provider, Historical   acetaminophen (TYLENOL) 500 mg tablet Take 1 Tab by mouth every six (6) hours as needed for Pain. 1/3/19   Kate Calvo NP   cholecalciferol (VITAMIN D3) 1,000 unit tablet Take 4,000 Units by mouth daily. Provider, Historical   B.infantis-B.ani-B.long-B.bifi (PROBIOTIC 4X) 10-15 mg TbEC Take 2 Gum by mouth daily. Other, MD Eryn      Allergies   Allergen Reactions    Celebrex [Celecoxib] Other (comments)     Hallucinations    Codeine Nausea and Vomiting    Erythromycin Nausea and Vomiting       Review of Systems:  A comprehensive review of systems was negative except for that written in the History of Present Illness. Objective:     Visit Vitals  BP (!) 108/59 (BP 1 Location: Left upper arm, BP Patient Position: Sitting, BP Cuff Size: Adult)   Pulse 85   Temp 97.6 °F (36.4 °C) (Temporal)   Resp 16   Ht 5' 5\" (1.651 m)   Wt 84.4 kg (186 lb)   SpO2 100%   BMI 30.95 kg/m²       Physical Exam:  Physical Exam:  General:  Alert, cooperative, no distress, appears stated age. Eyes:  Conjunctivae/corneas clear. Ears:  Normal external ear canals both ears. Nose: Nares normal. Septum midline. Mouth/Throat: Lips, mucosa, and tongue normal. Teeth and gums normal.   Neck: Supple, symmetrical, trachea midline   Back:   Symmetric, no curvature. ROM normal.    Lungs:   Clear to auscultation bilaterally. Heart:  Regular rate and rhythm   Abdomen:   Soft, mild RUQ tenderness, no rebound/guarding. Bowel sounds normal. No masses,  No organomegaly. Extremities: Extremities normal, atraumatic, no cyanosis or edema.    Skin: Skin color, texture, turgor normal. No rashes or lesions         Assessment:     74year old female with symptomatic cholelithiasis    Plan:     I Recommend we proceed with Laparoscopic Cholecystectomy with Intraoperative Cholangiogram.  Risks, Benefits, and Alternatives of the procedure were discussed with the patient and family including:  the risk of the anesthesia, bleeding, infection, injury to the intestines, injury to the ducts, and the lack of symptomatic improvement. The patient is agreeable to proceed.   -has received her covid vaccine    Sunny Springer MD

## 2021-06-21 ENCOUNTER — ANESTHESIA (OUTPATIENT)
Dept: SURGERY | Age: 66
End: 2021-06-21
Payer: MEDICARE

## 2021-06-21 ENCOUNTER — ANESTHESIA EVENT (OUTPATIENT)
Dept: SURGERY | Age: 66
End: 2021-06-21
Payer: MEDICARE

## 2021-06-21 ENCOUNTER — HOSPITAL ENCOUNTER (OUTPATIENT)
Age: 66
Discharge: HOME | End: 2021-06-22
Attending: SURGERY | Admitting: SURGERY
Payer: MEDICARE

## 2021-06-21 ENCOUNTER — DOCUMENTATION ONLY (OUTPATIENT)
Dept: INTERNAL MEDICINE CLINIC | Age: 66
End: 2021-06-21

## 2021-06-21 ENCOUNTER — APPOINTMENT (OUTPATIENT)
Dept: GENERAL RADIOLOGY | Age: 66
End: 2021-06-21
Attending: SURGERY
Payer: MEDICARE

## 2021-06-21 DIAGNOSIS — E11.9 CONTROLLED TYPE 2 DIABETES MELLITUS WITHOUT COMPLICATION, WITH LONG-TERM CURRENT USE OF INSULIN (HCC): ICD-10-CM

## 2021-06-21 DIAGNOSIS — Z79.4 CONTROLLED TYPE 2 DIABETES MELLITUS WITHOUT COMPLICATION, WITH LONG-TERM CURRENT USE OF INSULIN (HCC): ICD-10-CM

## 2021-06-21 DIAGNOSIS — K80.20 CALCULUS OF GALLBLADDER WITHOUT CHOLECYSTITIS WITHOUT OBSTRUCTION: Primary | ICD-10-CM

## 2021-06-21 LAB
GLUCOSE BLD STRIP.AUTO-MCNC: 119 MG/DL (ref 65–117)
GLUCOSE BLD STRIP.AUTO-MCNC: 154 MG/DL (ref 65–117)
GLUCOSE BLD STRIP.AUTO-MCNC: 165 MG/DL (ref 65–117)
SERVICE CMNT-IMP: ABNORMAL

## 2021-06-21 PROCEDURE — 2709999900 HC NON-CHARGEABLE SUPPLY: Performed by: SURGERY

## 2021-06-21 PROCEDURE — 76210000016 HC OR PH I REC 1 TO 1.5 HR: Performed by: SURGERY

## 2021-06-21 PROCEDURE — 74011250637 HC RX REV CODE- 250/637: Performed by: SURGERY

## 2021-06-21 PROCEDURE — 77030038276 HC GRSP ENDO ALLIGTR DISP TELE -C: Performed by: SURGERY

## 2021-06-21 PROCEDURE — 74011250636 HC RX REV CODE- 250/636: Performed by: ANESTHESIOLOGY

## 2021-06-21 PROCEDURE — 74011250636 HC RX REV CODE- 250/636: Performed by: SURGERY

## 2021-06-21 PROCEDURE — 74011000250 HC RX REV CODE- 250: Performed by: SURGERY

## 2021-06-21 PROCEDURE — 77030011280 HC ELECTRD MPLR J&J -B: Performed by: SURGERY

## 2021-06-21 PROCEDURE — 76060000032 HC ANESTHESIA 0.5 TO 1 HR: Performed by: SURGERY

## 2021-06-21 PROCEDURE — 77030008603 HC TRCR ENDOSC EPATH J&J -C: Performed by: SURGERY

## 2021-06-21 PROCEDURE — 74011000250 HC RX REV CODE- 250: Performed by: ANESTHESIOLOGY

## 2021-06-21 PROCEDURE — 77030026438 HC STYL ET INTUB CARD -A: Performed by: ANESTHESIOLOGY

## 2021-06-21 PROCEDURE — 77030004818 HC CATH CHOLGM TELE -B: Performed by: SURGERY

## 2021-06-21 PROCEDURE — 76010000138 HC OR TIME 0.5 TO 1 HR: Performed by: SURGERY

## 2021-06-21 PROCEDURE — 77030040361 HC SLV COMPR DVT MDII -B: Performed by: SURGERY

## 2021-06-21 PROCEDURE — 82962 GLUCOSE BLOOD TEST: CPT

## 2021-06-21 PROCEDURE — 88304 TISSUE EXAM BY PATHOLOGIST: CPT

## 2021-06-21 PROCEDURE — 77030031139 HC SUT VCRL2 J&J -A: Performed by: SURGERY

## 2021-06-21 PROCEDURE — 74300 X-RAY BILE DUCTS/PANCREAS: CPT | Performed by: SURGERY

## 2021-06-21 PROCEDURE — 74300 X-RAY BILE DUCTS/PANCREAS: CPT

## 2021-06-21 PROCEDURE — 74011250636 HC RX REV CODE- 250/636

## 2021-06-21 PROCEDURE — 77030008684 HC TU ET CUF COVD -B: Performed by: ANESTHESIOLOGY

## 2021-06-21 PROCEDURE — 77030012770 HC TRCR OPT FX AMR -B: Performed by: SURGERY

## 2021-06-21 PROCEDURE — 77030012961 HC IRR KT CYSTO/TUR ICUM -A: Performed by: SURGERY

## 2021-06-21 PROCEDURE — 77030009932 HC PRB FT CTRL J&J -B: Performed by: SURGERY

## 2021-06-21 PROCEDURE — 77030002933 HC SUT MCRYL J&J -A: Performed by: SURGERY

## 2021-06-21 PROCEDURE — 77030020263 HC SOL INJ SOD CL0.9% LFCR 1000ML: Performed by: SURGERY

## 2021-06-21 PROCEDURE — 77030010513 HC APPL CLP LIG J&J -C: Performed by: SURGERY

## 2021-06-21 PROCEDURE — 47563 LAPARO CHOLECYSTECTOMY/GRAPH: CPT | Performed by: SURGERY

## 2021-06-21 PROCEDURE — 77030010507 HC ADH SKN DERMBND J&J -B: Performed by: SURGERY

## 2021-06-21 PROCEDURE — 77030009848 HC PASSR SUT SET COOP -C: Performed by: SURGERY

## 2021-06-21 PROCEDURE — 77030016151 HC PROTCTR LNS DFOG COVD -B: Performed by: SURGERY

## 2021-06-21 PROCEDURE — 74011250636 HC RX REV CODE- 250/636: Performed by: NURSE ANESTHETIST, CERTIFIED REGISTERED

## 2021-06-21 PROCEDURE — 74011000636 HC RX REV CODE- 636: Performed by: SURGERY

## 2021-06-21 PROCEDURE — 74011636637 HC RX REV CODE- 636/637: Performed by: SURGERY

## 2021-06-21 RX ORDER — MAGNESIUM SULFATE 100 %
4 CRYSTALS MISCELLANEOUS AS NEEDED
Status: DISCONTINUED | OUTPATIENT
Start: 2021-06-21 | End: 2021-06-22 | Stop reason: HOSPADM

## 2021-06-21 RX ORDER — SODIUM CHLORIDE 0.9 % (FLUSH) 0.9 %
5-40 SYRINGE (ML) INJECTION AS NEEDED
Status: DISCONTINUED | OUTPATIENT
Start: 2021-06-21 | End: 2021-06-22 | Stop reason: HOSPADM

## 2021-06-21 RX ORDER — SODIUM CHLORIDE 0.9 % (FLUSH) 0.9 %
5-40 SYRINGE (ML) INJECTION EVERY 8 HOURS
Status: DISCONTINUED | OUTPATIENT
Start: 2021-06-21 | End: 2021-06-22 | Stop reason: HOSPADM

## 2021-06-21 RX ORDER — ONDANSETRON 2 MG/ML
INJECTION INTRAMUSCULAR; INTRAVENOUS AS NEEDED
Status: DISCONTINUED | OUTPATIENT
Start: 2021-06-21 | End: 2021-06-21 | Stop reason: HOSPADM

## 2021-06-21 RX ORDER — HYDROMORPHONE HYDROCHLORIDE 2 MG/ML
INJECTION, SOLUTION INTRAMUSCULAR; INTRAVENOUS; SUBCUTANEOUS AS NEEDED
Status: DISCONTINUED | OUTPATIENT
Start: 2021-06-21 | End: 2021-06-21 | Stop reason: HOSPADM

## 2021-06-21 RX ORDER — INSULIN LISPRO 100 [IU]/ML
INJECTION, SOLUTION INTRAVENOUS; SUBCUTANEOUS
Status: DISCONTINUED | OUTPATIENT
Start: 2021-06-21 | End: 2021-06-22 | Stop reason: HOSPADM

## 2021-06-21 RX ORDER — NALOXONE HYDROCHLORIDE 0.4 MG/ML
0.4 INJECTION, SOLUTION INTRAMUSCULAR; INTRAVENOUS; SUBCUTANEOUS AS NEEDED
Status: DISCONTINUED | OUTPATIENT
Start: 2021-06-21 | End: 2021-06-22 | Stop reason: HOSPADM

## 2021-06-21 RX ORDER — SODIUM CHLORIDE 0.9 % (FLUSH) 0.9 %
5-40 SYRINGE (ML) INJECTION AS NEEDED
Status: DISCONTINUED | OUTPATIENT
Start: 2021-06-21 | End: 2021-06-21 | Stop reason: HOSPADM

## 2021-06-21 RX ORDER — SODIUM CHLORIDE, SODIUM LACTATE, POTASSIUM CHLORIDE, CALCIUM CHLORIDE 600; 310; 30; 20 MG/100ML; MG/100ML; MG/100ML; MG/100ML
50 INJECTION, SOLUTION INTRAVENOUS CONTINUOUS
Status: DISCONTINUED | OUTPATIENT
Start: 2021-06-21 | End: 2021-06-22 | Stop reason: HOSPADM

## 2021-06-21 RX ORDER — MELOXICAM 7.5 MG/1
15 TABLET ORAL DAILY
Status: DISCONTINUED | OUTPATIENT
Start: 2021-06-22 | End: 2021-06-22 | Stop reason: HOSPADM

## 2021-06-21 RX ORDER — ONDANSETRON 2 MG/ML
4 INJECTION INTRAMUSCULAR; INTRAVENOUS AS NEEDED
Status: DISCONTINUED | OUTPATIENT
Start: 2021-06-21 | End: 2021-06-21 | Stop reason: HOSPADM

## 2021-06-21 RX ORDER — ROCURONIUM BROMIDE 10 MG/ML
INJECTION, SOLUTION INTRAVENOUS AS NEEDED
Status: DISCONTINUED | OUTPATIENT
Start: 2021-06-21 | End: 2021-06-21 | Stop reason: HOSPADM

## 2021-06-21 RX ORDER — OXYCODONE HYDROCHLORIDE 5 MG/1
5 TABLET ORAL
Status: DISCONTINUED | OUTPATIENT
Start: 2021-06-21 | End: 2021-06-22 | Stop reason: HOSPADM

## 2021-06-21 RX ORDER — DEXAMETHASONE SODIUM PHOSPHATE 4 MG/ML
INJECTION, SOLUTION INTRA-ARTICULAR; INTRALESIONAL; INTRAMUSCULAR; INTRAVENOUS; SOFT TISSUE AS NEEDED
Status: DISCONTINUED | OUTPATIENT
Start: 2021-06-21 | End: 2021-06-21 | Stop reason: HOSPADM

## 2021-06-21 RX ORDER — POLYETHYLENE GLYCOL 3350 17 G/17G
17 POWDER, FOR SOLUTION ORAL DAILY
Status: DISCONTINUED | OUTPATIENT
Start: 2021-06-22 | End: 2021-06-22 | Stop reason: HOSPADM

## 2021-06-21 RX ORDER — SUCCINYLCHOLINE CHLORIDE 20 MG/ML
INJECTION INTRAMUSCULAR; INTRAVENOUS AS NEEDED
Status: DISCONTINUED | OUTPATIENT
Start: 2021-06-21 | End: 2021-06-21 | Stop reason: HOSPADM

## 2021-06-21 RX ORDER — FENTANYL CITRATE 50 UG/ML
25 INJECTION, SOLUTION INTRAMUSCULAR; INTRAVENOUS
Status: DISCONTINUED | OUTPATIENT
Start: 2021-06-21 | End: 2021-06-21 | Stop reason: HOSPADM

## 2021-06-21 RX ORDER — MIDAZOLAM HYDROCHLORIDE 1 MG/ML
INJECTION, SOLUTION INTRAMUSCULAR; INTRAVENOUS AS NEEDED
Status: DISCONTINUED | OUTPATIENT
Start: 2021-06-21 | End: 2021-06-21 | Stop reason: HOSPADM

## 2021-06-21 RX ORDER — ALPRAZOLAM 0.25 MG/1
0.25 TABLET ORAL
Status: DISCONTINUED | OUTPATIENT
Start: 2021-06-21 | End: 2021-06-22 | Stop reason: HOSPADM

## 2021-06-21 RX ORDER — PROPOFOL 10 MG/ML
INJECTION, EMULSION INTRAVENOUS AS NEEDED
Status: DISCONTINUED | OUTPATIENT
Start: 2021-06-21 | End: 2021-06-21 | Stop reason: HOSPADM

## 2021-06-21 RX ORDER — BUPIVACAINE HYDROCHLORIDE 5 MG/ML
INJECTION, SOLUTION EPIDURAL; INTRACAUDAL AS NEEDED
Status: DISCONTINUED | OUTPATIENT
Start: 2021-06-21 | End: 2021-06-21 | Stop reason: HOSPADM

## 2021-06-21 RX ORDER — LEVOTHYROXINE SODIUM 150 UG/1
300 TABLET ORAL
Status: DISCONTINUED | OUTPATIENT
Start: 2021-06-22 | End: 2021-06-22 | Stop reason: HOSPADM

## 2021-06-21 RX ORDER — ACETAMINOPHEN 325 MG/1
650 TABLET ORAL
Status: DISCONTINUED | OUTPATIENT
Start: 2021-06-21 | End: 2021-06-22 | Stop reason: HOSPADM

## 2021-06-21 RX ORDER — LISINOPRIL 5 MG/1
5 TABLET ORAL DAILY
Status: DISCONTINUED | OUTPATIENT
Start: 2021-06-22 | End: 2021-06-22 | Stop reason: HOSPADM

## 2021-06-21 RX ORDER — TERBINAFINE HYDROCHLORIDE 250 MG/1
250 TABLET ORAL DAILY
Status: DISCONTINUED | OUTPATIENT
Start: 2021-06-22 | End: 2021-06-22 | Stop reason: HOSPADM

## 2021-06-21 RX ORDER — LIDOCAINE HYDROCHLORIDE 20 MG/ML
INJECTION, SOLUTION EPIDURAL; INFILTRATION; INTRACAUDAL; PERINEURAL AS NEEDED
Status: DISCONTINUED | OUTPATIENT
Start: 2021-06-21 | End: 2021-06-21 | Stop reason: HOSPADM

## 2021-06-21 RX ORDER — FUROSEMIDE 20 MG/1
20 TABLET ORAL
Status: DISCONTINUED | OUTPATIENT
Start: 2021-06-21 | End: 2021-06-22 | Stop reason: HOSPADM

## 2021-06-21 RX ORDER — GLYCOPYRROLATE 0.2 MG/ML
INJECTION INTRAMUSCULAR; INTRAVENOUS AS NEEDED
Status: DISCONTINUED | OUTPATIENT
Start: 2021-06-21 | End: 2021-06-21 | Stop reason: HOSPADM

## 2021-06-21 RX ORDER — FENTANYL CITRATE 50 UG/ML
INJECTION, SOLUTION INTRAMUSCULAR; INTRAVENOUS AS NEEDED
Status: DISCONTINUED | OUTPATIENT
Start: 2021-06-21 | End: 2021-06-21 | Stop reason: HOSPADM

## 2021-06-21 RX ORDER — AMOXICILLIN 250 MG
1 CAPSULE ORAL DAILY
Status: DISCONTINUED | OUTPATIENT
Start: 2021-06-22 | End: 2021-06-22 | Stop reason: HOSPADM

## 2021-06-21 RX ORDER — FENTANYL CITRATE 50 UG/ML
INJECTION, SOLUTION INTRAMUSCULAR; INTRAVENOUS
Status: COMPLETED
Start: 2021-06-21 | End: 2021-06-21

## 2021-06-21 RX ORDER — ENOXAPARIN SODIUM 100 MG/ML
40 INJECTION SUBCUTANEOUS EVERY 24 HOURS
Status: DISCONTINUED | OUTPATIENT
Start: 2021-06-21 | End: 2021-06-22 | Stop reason: HOSPADM

## 2021-06-21 RX ORDER — NEOSTIGMINE METHYLSULFATE 1 MG/ML
INJECTION, SOLUTION INTRAVENOUS AS NEEDED
Status: DISCONTINUED | OUTPATIENT
Start: 2021-06-21 | End: 2021-06-21 | Stop reason: HOSPADM

## 2021-06-21 RX ORDER — HYDROMORPHONE HYDROCHLORIDE 1 MG/ML
0.5 INJECTION, SOLUTION INTRAMUSCULAR; INTRAVENOUS; SUBCUTANEOUS
Status: DISCONTINUED | OUTPATIENT
Start: 2021-06-21 | End: 2021-06-22 | Stop reason: HOSPADM

## 2021-06-21 RX ORDER — DEXTROSE 50 % IN WATER (D50W) INTRAVENOUS SYRINGE
25-50 AS NEEDED
Status: DISCONTINUED | OUTPATIENT
Start: 2021-06-21 | End: 2021-06-22 | Stop reason: HOSPADM

## 2021-06-21 RX ORDER — ONDANSETRON 2 MG/ML
4 INJECTION INTRAMUSCULAR; INTRAVENOUS
Status: DISCONTINUED | OUTPATIENT
Start: 2021-06-21 | End: 2021-06-22 | Stop reason: HOSPADM

## 2021-06-21 RX ORDER — PRAVASTATIN SODIUM 10 MG/1
20 TABLET ORAL
Status: DISCONTINUED | OUTPATIENT
Start: 2021-06-21 | End: 2021-06-22 | Stop reason: HOSPADM

## 2021-06-21 RX ORDER — OXYBUTYNIN CHLORIDE 5 MG/1
10 TABLET, EXTENDED RELEASE ORAL DAILY
Status: DISCONTINUED | OUTPATIENT
Start: 2021-06-22 | End: 2021-06-22 | Stop reason: HOSPADM

## 2021-06-21 RX ORDER — BUPROPION HYDROCHLORIDE 150 MG/1
300 TABLET ORAL
Status: DISCONTINUED | OUTPATIENT
Start: 2021-06-21 | End: 2021-06-22 | Stop reason: HOSPADM

## 2021-06-21 RX ORDER — SODIUM CHLORIDE 0.9 % (FLUSH) 0.9 %
5-40 SYRINGE (ML) INJECTION EVERY 8 HOURS
Status: DISCONTINUED | OUTPATIENT
Start: 2021-06-21 | End: 2021-06-21 | Stop reason: HOSPADM

## 2021-06-21 RX ORDER — SODIUM CHLORIDE, SODIUM LACTATE, POTASSIUM CHLORIDE, CALCIUM CHLORIDE 600; 310; 30; 20 MG/100ML; MG/100ML; MG/100ML; MG/100ML
25 INJECTION, SOLUTION INTRAVENOUS CONTINUOUS
Status: DISCONTINUED | OUTPATIENT
Start: 2021-06-21 | End: 2021-06-21

## 2021-06-21 RX ADMIN — SUCCINYLCHOLINE CHLORIDE 12 MG: 20 INJECTION, SOLUTION INTRAMUSCULAR; INTRAVENOUS at 10:58

## 2021-06-21 RX ADMIN — OXYCODONE HYDROCHLORIDE 5 MG: 5 TABLET ORAL at 18:33

## 2021-06-21 RX ADMIN — SODIUM CHLORIDE, POTASSIUM CHLORIDE, SODIUM LACTATE AND CALCIUM CHLORIDE 25 ML/HR: 600; 310; 30; 20 INJECTION, SOLUTION INTRAVENOUS at 10:22

## 2021-06-21 RX ADMIN — MIDAZOLAM HYDROCHLORIDE 2 MG: 1 INJECTION, SOLUTION INTRAMUSCULAR; INTRAVENOUS at 10:50

## 2021-06-21 RX ADMIN — LIDOCAINE HYDROCHLORIDE 100 MG: 20 INJECTION, SOLUTION INTRAVENOUS at 10:57

## 2021-06-21 RX ADMIN — INSULIN LISPRO 2 UNITS: 100 INJECTION, SOLUTION INTRAVENOUS; SUBCUTANEOUS at 18:20

## 2021-06-21 RX ADMIN — ROCURONIUM BROMIDE 40 MG: 10 INJECTION INTRAVENOUS at 11:08

## 2021-06-21 RX ADMIN — FENTANYL CITRATE 25 MCG: 50 INJECTION, SOLUTION INTRAMUSCULAR; INTRAVENOUS at 12:25

## 2021-06-21 RX ADMIN — GLYCOPYRROLATE 0.5 MG: 0.2 INJECTION, SOLUTION INTRAMUSCULAR; INTRAVENOUS at 11:39

## 2021-06-21 RX ADMIN — HYDROMORPHONE HYDROCHLORIDE 0.6 MG: 2 INJECTION, SOLUTION INTRAMUSCULAR; INTRAVENOUS; SUBCUTANEOUS at 12:00

## 2021-06-21 RX ADMIN — HYDROMORPHONE HYDROCHLORIDE 0.4 MG: 2 INJECTION, SOLUTION INTRAMUSCULAR; INTRAVENOUS; SUBCUTANEOUS at 11:51

## 2021-06-21 RX ADMIN — NEOSTIGMINE METHYLSULFATE 3 MG: 1 INJECTION, SOLUTION INTRAVENOUS at 11:40

## 2021-06-21 RX ADMIN — PROPOFOL 150 MG: 10 INJECTION, EMULSION INTRAVENOUS at 10:57

## 2021-06-21 RX ADMIN — DEXAMETHASONE SODIUM PHOSPHATE 8 MG: 4 INJECTION, SOLUTION INTRAMUSCULAR; INTRAVENOUS at 11:08

## 2021-06-21 RX ADMIN — ENOXAPARIN SODIUM 40 MG: 40 INJECTION SUBCUTANEOUS at 21:47

## 2021-06-21 RX ADMIN — ONDANSETRON 4 MG: 2 INJECTION INTRAMUSCULAR; INTRAVENOUS at 13:33

## 2021-06-21 RX ADMIN — FENTANYL CITRATE 25 MCG: 50 INJECTION, SOLUTION INTRAMUSCULAR; INTRAVENOUS at 11:10

## 2021-06-21 RX ADMIN — Medication 10 ML: at 21:48

## 2021-06-21 RX ADMIN — PRAVASTATIN SODIUM 20 MG: 10 TABLET ORAL at 21:47

## 2021-06-21 RX ADMIN — ONDANSETRON HYDROCHLORIDE 4 MG: 2 INJECTION, SOLUTION INTRAMUSCULAR; INTRAVENOUS at 10:50

## 2021-06-21 RX ADMIN — FENTANYL CITRATE 25 MCG: 50 INJECTION, SOLUTION INTRAMUSCULAR; INTRAVENOUS at 11:45

## 2021-06-21 RX ADMIN — Medication 3 AMPULE: at 10:23

## 2021-06-21 RX ADMIN — SODIUM CHLORIDE, POTASSIUM CHLORIDE, SODIUM LACTATE AND CALCIUM CHLORIDE 50 ML/HR: 600; 310; 30; 20 INJECTION, SOLUTION INTRAVENOUS at 18:33

## 2021-06-21 RX ADMIN — FENTANYL CITRATE 50 MCG: 50 INJECTION, SOLUTION INTRAMUSCULAR; INTRAVENOUS at 10:57

## 2021-06-21 RX ADMIN — Medication 10 ML: at 22:00

## 2021-06-21 RX ADMIN — WATER 2 G: 1 INJECTION INTRAMUSCULAR; INTRAVENOUS; SUBCUTANEOUS at 11:02

## 2021-06-21 NOTE — PROGRESS NOTES
Problem: Falls - Risk of  Goal: *Absence of Falls  Description: Document Dharmesh Perera Fall Risk and appropriate interventions in the flowsheet.   Outcome: Progressing Towards Goal  Note: Fall Risk Interventions:            Medication Interventions: Patient to call before getting OOB, Teach patient to arise slowly                   Problem: Patient Education: Go to Patient Education Activity  Goal: Patient/Family Education  Outcome: Progressing Towards Goal

## 2021-06-21 NOTE — ANESTHESIA PREPROCEDURE EVALUATION
Relevant Problems   No relevant active problems       Anesthetic History   No history of anesthetic complications            Review of Systems / Medical History  Patient summary reviewed and pertinent labs reviewed    Pulmonary        Sleep apnea           Neuro/Psych         Psychiatric history     Cardiovascular    Hypertension          Hyperlipidemia    Exercise tolerance: >4 METS     GI/Hepatic/Renal           PUD and liver disease     Endo/Other    Diabetes  Hyperthyroidism  Obesity and arthritis     Other Findings            Physical Exam    Airway  Mallampati: III  TM Distance: > 6 cm  Neck ROM: normal range of motion   Mouth opening: Normal     Cardiovascular  Regular rate and rhythm,  S1 and S2 normal,  no murmur, click, rub, or gallop  Rhythm: regular  Rate: normal         Dental  No notable dental hx       Pulmonary  Breath sounds clear to auscultation               Abdominal  GI exam deferred       Other Findings            Anesthetic Plan    ASA: 2  Anesthesia type: general    Monitoring Plan: BIS      Induction: Intravenous  Anesthetic plan and risks discussed with: Patient

## 2021-06-21 NOTE — PROGRESS NOTES
Pharmacy Progress Note - Patient Assistance Medication    PAP Application for Ms. Noam Jaimes 74 y.o. Rhona submitted to [facility]. Verdict pending. Thank you for the consult,  Tavia Beltrán, PharmD, BCACP, Sweetie [address] in place:  Yes   Intervention Detail: Patient Access Assistance/Sample Provided    Total # of Interventions Accepted: 1   Intervention Accepted By:   Sandra Fleming Time Spent (min): 10

## 2021-06-21 NOTE — PERIOP NOTES
Handoff Report from Operating Room to PACU    Report received from TANGELA Joel RN and DANIEL Fragoso CRNA regarding Bryant Cons. Surgeon(s):  Valentina Jones MD  And Procedure(s) (LRB):  LAPAROSCOPIC CHOLECYSTECTOMY WITH GRAMS (N/A)  confirmed   with allergies and dressings discussed. Anesthesia type, drugs, patient history, complications, estimated blood loss, vital signs, intake and output, and last pain medication, lines, reversal medications and temperature were reviewed.

## 2021-06-21 NOTE — H&P (VIEW-ONLY)
Surgery History and Physical 
 
Subjective: Dora Alvarez is a 74 y.o. female who presents for evaluation of epigastric pain. It started about two weeks ago. She always had upper GI problems. She has decreased oral intake over the past two weeks. No fevers. Having bowel function. Patient still reports chronic pain. Laying down makes the pain worse. RUQ US: 1. Cholelithiasis with positive ultrasound Mayberry's sign. Past Medical History:  
Diagnosis Date  Depression  Diabetes ([address]  Hypercholesteremia  Hypertension  Hyperthyroidism  IBS (irritable bowel syndrome)  Urinary incontinence Past Surgical History:  
Procedure Laterality Date  HX BACK SURGERY    
 x2  
 HX BLEPHAROPLASTY Right  HX BUNIONECTOMY  HX HYSTERECTOMY  HX OTHER SURGICAL Collapsed lung  HX WISDOM TEETH EXTRACTION Family History Problem Relation Age of Onset  Diabetes Mother  Heart Disease Mother  Cancer Father   
     pancreatic  Diabetes Sister  Anxiety Sister  Diabetes Brother  Anxiety Brother  Diabetes Brother  Anxiety Brother  Diabetes Sister  Anxiety Sister  Diabetes Sister  Anxiety Sister  Cancer Sister   
     lung and brain  Anxiety Sister  Anxiety Sister  Breast Cancer Paternal Aunt   
     under 48 Social History Tobacco Use  Smoking status: Never Smoker  Smokeless tobacco: Never Used Substance Use Topics  Alcohol use: Yes Comment: occasionally Prior to Admission medications Medication Sig Start Date End Date Taking? Authorizing Provider Cristy Alvarez FlexTouch U-100 100 unit/mL (3 mL) inpn 6 Units by SubCUTAneous route daily. Indications: type 2 diabetes mellitus 6/16/21   Kacie Muhammadort B III, DO  
trospium (SANCTURA XL) 60 mg capsule Take 1 Capsule by mouth Daily (before breakfast).  6/2/21   Soham Cameron III, DO  
terbinafine HCL (LAMISIL) 250 mg tablet TAKE 1 TABLET BY MOUTH  DAILY 5/2/21   Claribel FU III, DO  
meloxicam (MOBIC) 15 mg tablet Take 1 Tab by mouth daily. 4/8/21   Patric Barnes, DO  
lisinopriL (PRINIVIL, ZESTRIL) 5 mg tablet TAKE 1 TABLET DAILY 2/25/21   Claribel FU III, DO  
nystatin (MYCOSTATIN) powder Apply  to affected area four (4) times daily. 12/28/20   Oumou Palacios MD  
HumaLOG KwikPen Insulin 100 unit/mL kwikpen 3-4 Units by SubCUTAneous route Before breakfast, lunch, and dinner. Give 3 units for blood sugar between 150-200; give 4 units for blood sugar 200-250. Indications: type 2 diabetes mellitus 12/16/20   Claribel FU III, DO  
Jardiance 25 mg tablet Take 1 Tab by mouth daily. 12/16/20   Claribel FU III, DO  
metFORMIN ER (GLUCOPHAGE XR) 500 mg tablet Take 2 Tabs by mouth daily (with breakfast). 12/16/20   Thang Cameron III, DO  
ALPRAZolam (XANAX) 0.25 mg tablet Take 1 Tab by mouth daily as needed for Anxiety. Indications: anxious 12/16/20   Claribel FU III, DO  
buPROPion XL (WELLBUTRIN XL) 300 mg XL tablet Take 1 Tab by mouth every morning. 11/17/20   Claribel Jade III, DO FreeStyle Julien 2 Sensor kit 1 Each by Other route See Salvatore Shaw. Use to scan sensor three times daily 10/16/20   Provider, Historical  
pravastatin (PRAVACHOL) 20 mg tablet Take 20 mg by mouth nightly. Provider, Historical  
levothyroxine (SYNTHROID) 100 mcg tablet Take 100 mcg by mouth See Admin Instructions. Take together with 300 mcg dose to total 250 mcg daily    Provider, Historical  
furosemide (LASIX) 20 mg tablet TAKE 1 TABLET DAILY AS NEEDED FOR EDEMA Patient taking differently: Take 20 mg by mouth daily as needed. 9/13/20   Claribel FU III, DO  
NOVOFINE PLUS 32 gauge x 1/6\" ndle Check sugars 4x daily. 12/20/19   Claribel FU III, DO  
calcium polycarbophil (FIBER LAXATIVE, CA POLYCARBO,) 625 mg tablet Take 625 mg by mouth daily.     Provider, Historical  
multivitamin (ONE A DAY) tablet Take 1 Tab by mouth daily. Provider, Historical  
linaclotide (LINZESS) 290 mcg cap capsule Take 290 mcg by mouth Daily (before breakfast). Provider, Historical  
acetaminophen (TYLENOL) 500 mg tablet Take 1 Tab by mouth every six (6) hours as needed for Pain. 1/3/19   Sulema Maciel NP  
cholecalciferol (VITAMIN D3) 1,000 unit tablet Take 4,000 Units by mouth daily. Provider, Historical  
B.infantis-B.ani-B.long-B.bifi (PROBIOTIC 4X) 10-15 mg TbEC Take 2 Gum by mouth daily. Other, MD Eryn  
  
Allergies Allergen Reactions  Celebrex [Celecoxib] Other (comments) Hallucinations  Codeine Nausea and Vomiting  Erythromycin Nausea and Vomiting Review of Systems: A comprehensive review of systems was negative except for that written in the History of Present Illness. Objective:  
 
Visit Vitals BP (!) 108/59 (BP 1 Location: Left upper arm, BP Patient Position: Sitting, BP Cuff Size: Adult) Pulse 85 Temp 97.6 °F (36.4 °C) (Temporal) Resp 16 Ht 5' 5\" (1.651 m) Wt 84.4 kg (186 lb) SpO2 100% BMI 30.95 kg/m² Physical Exam: 
Physical Exam: 
General:  Alert, cooperative, no distress, appears stated age. Eyes:  Conjunctivae/corneas clear. Ears:  Normal external ear canals both ears. Nose: Nares normal. Septum midline. Mouth/Throat: Lips, mucosa, and tongue normal. Teeth and gums normal.  
Neck: Supple, symmetrical, trachea midline Back:   Symmetric, no curvature. ROM normal.   
Lungs:   Clear to auscultation bilaterally. Heart:  Regular rate and rhythm Abdomen:   Soft, mild RUQ tenderness, no rebound/guarding. Bowel sounds normal. No masses,  No organomegaly. Extremities: Extremities normal, atraumatic, no cyanosis or edema. Skin: Skin color, texture, turgor normal. No rashes or lesions Assessment:  
 
74year old female with symptomatic cholelithiasis Plan: I Recommend we proceed with Laparoscopic Cholecystectomy with Intraoperative Cholangiogram.  Risks, Benefits, and Alternatives of the procedure were discussed with the patient and family including:  the risk of the anesthesia, bleeding, infection, injury to the intestines, injury to the ducts, and the lack of symptomatic improvement. The patient is agreeable to proceed. -has received her covid vaccine Dominique Marroquin MD

## 2021-06-21 NOTE — PROGRESS NOTES
End of Shift Note    Bedside shift change report given to Blanco (oncoming nurse) by Telma Clinton RN (offgoing nurse). Report included the following information SBAR, Kardex, ED Summary, Procedure Summary, Intake/Output, MAR and Recent Results    Shift worked:  7a-7p     Shift summary and any significant changes:     Patient came up to the unit around 4. She was very nauseas from the anesthesia so I gave her some zofran. She vomiting a few times but after the zofran kicked in she felt better. Concerns for physician to address:  none     Zone phone for oncoming shift:   [phone]       Activity:  Activity Level: Up with Assistance  Number times ambulated in hallways past shift: 0  Number of times OOB to chair past shift: 0    Cardiac:   Cardiac Monitoring: No      Cardiac Rhythm: Sinus Rhythm    Access:   Current line(s): PIV     Genitourinary:   Urinary status: voiding    Respiratory:   O2 Device: Nasal cannula  Chronic home O2 use?: NO  Incentive spirometer at bedside: NO     GI:  Last Bowel Movement Date: 06/21/21  Current diet:  ADULT DIET Regular; 5 carb choices (75 gm/meal)  DIET ONE TIME MESSAGE  Passing flatus: YES  Tolerating current diet: YES       Pain Management:   Patient states pain is manageable on current regimen: YES    Skin:  Abner Score: 22  Interventions: increase time out of bed, limit briefs and nutritional support     Patient Safety:  Fall Score:  Total Score: 1  Interventions: gripper socks and pt to call before getting OOB  High Fall Risk: Yes    Length of Stay:  Expected LOS: - - -  Actual LOS: 0      Telma Clinton RN

## 2021-06-21 NOTE — OP NOTES
CHOLECYSTECTOMY OPERATIVE REPORT    6/21/2021        Pre-operative Diagnosis: CHOLELITHIASIS    Post-operative Diagnosis: cholelithiasis      OPERATIVE PROCEDURE:  1. Laparoscopic cholecystectomy. 2.  Intraoperative cholangiogram with intraoperative interpritation. Surgeon: Amanda Johnson MD    Assistant: Circ-1: Jef Luz RN  Radiology Technician: Lucrecia Vang  Scrub RN-1: Mark Burris RN  Scrub RN-Relief: Sukhdev Mccarthy RN  Surg Asst-1: Sue Cisneros RN  Surg Asst-Relief: Rosie Copcristina    Anesthesia: General plus Local    Estimated Blood Loss: Minimal    FINDINGS:   The patient was noted to have a gallbladder with stones. Intraoperative cholangiogram was obtained. Radiologist was not present during the case. Intraoperative interpretation revealed filling of a normal length cystic duct, a common bile duct, and all proximal and distal structures with free flow of contrast into the duodenum without any filling defects noted. yes, There was limited filling of the proximal ducts due to rapid flow of contrast into the duodenum. and  The liver appeared normal, and the remaining abdominal cavity appeared normal.    SPECIMEN:    ID Type Source Tests Collected by Time Destination   1 : Gallbladdder and contents  Preservative Gallbladder  Jony Parson MD 6/21/2021 1133 Pathology        DRAINS:  None. COMPLICATIONS:  None. DESCRIPTION OF PROCEDURE:   Patient was taken to the operating room and placed on the table in supine position. Time-outs were performed using both preinduction and pre-incision safety checklist to verify correct patient, procedure, site, and additional critical information prior to beginning the procedure. General anesthesia was initiated and patient intubated. Patient was then prepped and draped in a sterile fashion. A periumbilical incision was then made. A 5-mm 0-degree laparoscope was inserted into a 5-mm Optiview Trocar.  The peritoneum was entered under direct visualization. The abdomen was then insufflated with carbon dioxide to a pressure of 15 mmHg. The patient tolerated insufflation well. The laparoscope was inserted and the abdomen inspected to ensure no injuries occurred with initial port placement. The table was placed in reverse Trendelenburg with right side up. Additional ports were then placed as follows: a 5-mm subxiphoid port and two percutaneous ports along the right subcostal margin. Inspection of the abdomen showed adhesions to the gallbladder. The gallbladder fundus was grasped and retracted cephalad over the liver. Adhesions between the gallbladder and omentum were taken down carefully. The infundibulum was then retracted inferior-laterally to expose Calots triangle. Peritoneum overlying the infundibulum was incised and stripped inferiorly. The cystic duct and artery were identified and dissected circumferentially until the critical view of safety was obtained. A clip was placed on the cystic duct close to the neck of the gallbladder. A nick was made in the cystic duct and a cholangiogram catheter inserted. A cholangiogram was obtained under dynamic fluoroscopy showing good flow of bile into the duodenum, an intact biliary tree, and absence of any filling defects. The cystic duct and artery were then doubly clipped and divided. The gallbladder was then dissected off the liver bed using electrocautery. Hemostasis was achieved. The gallbladder was placed in an endoscopic retrieval bag and removed through the umbilical incision. Irrigation was performed and any spilled stones/bile was suctioned. A Jaden-Jenelle needle was used to pass an 0-Vicryl under direct vision to close the fascia at the umbilical port site. The pneumoperitoneum was evacuated and the ports were removed. The incisions were injected with local anesthetic.  The skin of all incisions was re-approximated with subcuticular 4-0 monocryl suture and Dermabond was applied. A debriefing checklist was completed to share information critical to postoperative care of the patient. The patient was extubated in the OR and transferred to PACU in stable condition. Counts: Instrument, sponge, and needle counts were correct prior to closure and at the conclusion of the case.      Signed By:  Ella Jewell MD     June 21, 2021

## 2021-06-21 NOTE — DISCHARGE INSTRUCTIONS
Dr. Inocencia Gonzalez Discharge Instructions after  Laparoscopic Cholecystectomy      Jennell Phoenix   [id number] : 1955    Admitted 2021 Discharged: 2021       What to do at Home    Recommended diet: Low Fat Diet for 1 month    Recommended activity: as tolerated, do not drive for 3-5 days while on pain medicine. Follow-up with Dr. Adry Pringle  in 2 weeks. Call [phone] for an appointment. Cholecystectomy: What to Expect at [facility]    After your surgery, it is normal to feel weak and tired for several days after you return home. Your belly may be swollen. If you had laparoscopic surgery, you may also have pain in your shoulder for about 24 hours. You may have gas or need to burp a lot at first, and a few people get diarrhea. The diarrhea usually goes away in 2 to 4 weeks, but it may last longer. How quickly you recover depends on whether you had a laparoscopic or open surgery.  For a laparoscopic surgery, most people can go back to work or their normal routine in 1 to 2 weeks, but it may take longer, depending on the type of work you do.  For an open surgery, it will probably take 4 to 6 weeks before you get back to your normal routine. This care sheet gives you a general idea about how long it will take for you to recover. However, each person recovers at a different pace. Follow the steps below to get better as quickly as possible. How can you care for yourself at home? Activity   Rest when you feel tired. Getting enough sleep will help you recover.  Try to walk each day. Start out by walking a little more than you did the day before. Gradually increase the amount you walk. Walking boosts blood flow and helps prevent pneumonia and constipation.  Avoid strenuous activities, such as biking, jogging, weightlifting, and aerobic exercise, for 1-2 weeks.  You may shower 24 hours after surgery. Pat the cut (incision) dry.  Do not take a bath for the first 2 weeks, or until your doctor tells you it is okay.  You may drive when you are no longer taking pain medicine and can quickly move your foot from the gas pedal to the brake. You must also be able to sit comfortably for a long period of time, even if you do not plan to go far.  For a laparoscopic surgery, most people can go back to work or their normal routine in 1 to 2 weeks, but it may take longer. For an open surgery, it will probably take 4 to 6 weeks before you get back to your normal routine. Diet   Eat smaller meals more often instead of fewer larger meals. You can eat a normal diet, but avoid eating fatty foods for about 1 month. Fatty foods include hamburger, whole milk, cheese, and many snack foods. If your stomach is upset, try bland, low-fat foods like plain rice, broiled chicken, toast, and yogurt.  Drink plenty of fluids (unless your doctor tells you not to).  If you have diarrhea, try avoiding spicy foods, dairy products, fatty foods, and alcohol. You can also watch to see if specific foods cause it, and stop eating them. If the diarrhea continues for more than 2 weeks, talk to your doctor.  You may notice that your bowel movements are not regular right after your surgery. This is common. Try to avoid constipation and straining with bowel movements. You may want to take a fiber supplement every day. If you have not had a bowel movement after a couple of days, ask your doctor about taking a mild laxative. Medicines   Take pain medicines exactly as directed.  If the doctor gave you a prescription medicine for pain, take it as prescribed.  If you are not taking a prescription pain medicine, take an over-the-counter medicine such as acetaminophen (Tylenol), ibuprofen (Advil, Motrin), or naproxen (Aleve). Read and follow all instructions on the label.  Do not take two or more pain medicines at the same time unless the doctor told you to.  Many pain medicines contain acetaminophen, which is Tylenol. Too much Tylenol can be harmful.  If you think your pain medicine is making you sick to your stomach:   Take your medicine after meals (unless your doctor tells you not to).  Ask your doctor for a different pain medicine.  If your doctor prescribed antibiotics, take them as directed. Do not stop taking them just because you feel better. You need to take the full course of antibiotics. Incision care   After 24 to 48 hours, wash the area daily with warm, soapy water, and pat it dry.  You may have staples to hold the cut together. Keep them dry until your doctor takes them out. This is usually in 7 to 10 days.  Keep the area clean and dry. You may cover it with a gauze bandage if it weeps or rubs against clothing. Change the bandage every day. Ice   To reduce swelling and pain, put ice or a cold pack on your belly for 10 to 15 minutes at a time. Do this every 1 to 2 hours. Put a thin cloth between the ice and your skin. Follow-up care is a key part of your treatment and safety. Be sure to make and go to all appointments, and call your doctor if you are having problems. Its also a good idea to know your test results and keep a list of the medicines you take. When should you call for help? Call 911 anytime you think you may need emergency care. For example, call if:   You pass out (lose consciousness).  You have severe trouble breathing.  You have sudden chest pain and shortness of breath, or you cough up blood. Call your doctor now or seek immediate medical care if:   You are sick to your stomach and cannot drink fluids.  You have pain that does not get better when you take your pain medicine.  You have signs of infection, such as:   Increased pain, warmth, or excessive (>1inch) redness.  Red streaks leading from the incision.  Pus draining from the incision.  Swollen lymph nodes in your neck, armpits, or groin.  A fever.    Your urine turns dark [place] or your stool is light-colored or south-colored.  Your skin turns yellow.  Bright red blood has soaked through a large bandage over your incision.  You have signs of a blood clot, such as:   Pain in your calf, back of knee, thigh, or groin.  Redness and swelling in your leg or groin.  You have trouble passing urine or stool, especially if you have mild pain or swelling in your lower belly. Watch closely for any changes in your health, and be sure to contact your doctor if:   You do not have a bowel movement after taking a laxative.

## 2021-06-21 NOTE — PERIOP NOTES
46 - PT FULLY VACCINATED FOR COVID - DENIES S/S OF COVID - NO FEVER, COLD, COUGH, SOB, N/V, DIARRHEA. .. PRE-OP TCHING DONE - PT VERBALIZES UNDERSTANDING. STRETCHER IN LOWEST POSITION, CB IN PLACE AND SR UP X2.

## 2021-06-21 NOTE — PERIOP NOTES
TRANSFER - OUT REPORT:    Verbal report given to SHANTHI Mason on Maggie Unger  being transferred to [facility], [facility] for routine post - op       Report consisted of patients Situation, Background, Assessment and   Recommendations(SBAR). Information from the following report(s) SBAR, Kardex, OR Summary, Intake/Output, MAR and Cardiac Rhythm NSR was reviewed with the receiving nurse. Lines:   Peripheral IV 06/21/21 Anterior; Left Forearm (Active)   Site Assessment Clean, dry, & intact 06/21/21 1300   Phlebitis Assessment 0 06/21/21 1300   Infiltration Assessment 0 06/21/21 1300   Dressing Status Clean, dry, & intact 06/21/21 1300   Dressing Type Tape;Transparent 06/21/21 1300   Hub Color/Line Status Pink; Infusing 06/21/21 1300        Opportunity for questions and clarification was provided. Patient transported with:   O2 @ 2 liters  Tech     Updated patient's family at time of transfer. Patient discharged from PACU with 1 belonging bag.

## 2021-06-21 NOTE — ANESTHESIA POSTPROCEDURE EVALUATION
Procedure(s):  LAPAROSCOPIC CHOLECYSTECTOMY WITH GRAMS. general    Anesthesia Post Evaluation      Multimodal analgesia: multimodal analgesia used between 6 hours prior to anesthesia start to PACU discharge  Patient location during evaluation: bedside  Patient participation: complete - patient participated  Level of consciousness: awake  Pain management: adequate  Airway patency: patent  Anesthetic complications: no  Cardiovascular status: acceptable  Respiratory status: acceptable  Hydration status: acceptable  Post anesthesia nausea and vomiting:  controlled  Final Post Anesthesia Temperature Assessment:  Normothermia (36.0-37.5 degrees C)      INITIAL Post-op Vital signs:   Vitals Value Taken Time   /53 06/21/21 1300   Temp 37.1 °C (98.7 °F) 06/21/21 1204   Pulse 71 06/21/21 1302   Resp 12 06/21/21 1302   SpO2 97 % 06/21/21 1302   Vitals shown include unvalidated device data.

## 2021-06-21 NOTE — INTERVAL H&P NOTE
Update History & Physical 
 
The Patient's History and Physical was reviewed with the patient and I examined the patient. There was no change. The surgical site was confirmed by the patient and me. Plan:  The risk, benefits, expected outcome, and alternative to the recommended procedure have been discussed with the patient. Patient understands and wants to proceed with the procedure.  
 
Electronically signed by Ella Jewell MD on 6/21/2021 at 10:20 AM

## 2021-06-22 VITALS
HEART RATE: 88 BPM | TEMPERATURE: 98.4 F | RESPIRATION RATE: 16 BRPM | HEIGHT: 66 IN | OXYGEN SATURATION: 97 % | DIASTOLIC BLOOD PRESSURE: 72 MMHG | SYSTOLIC BLOOD PRESSURE: 118 MMHG | WEIGHT: 182.54 LBS | BODY MASS INDEX: 29.34 KG/M2

## 2021-06-22 LAB
GLUCOSE BLD STRIP.AUTO-MCNC: 139 MG/DL (ref 65–117)
SERVICE CMNT-IMP: ABNORMAL

## 2021-06-22 PROCEDURE — 99356 PR PROLONGED SVC I/P OR OBS SETTING 1ST HOUR: CPT | Performed by: CLINICAL NURSE SPECIALIST

## 2021-06-22 PROCEDURE — 77010033678 HC OXYGEN DAILY

## 2021-06-22 PROCEDURE — 94760 N-INVAS EAR/PLS OXIMETRY 1: CPT

## 2021-06-22 PROCEDURE — 99233 SBSQ HOSP IP/OBS HIGH 50: CPT | Performed by: CLINICAL NURSE SPECIALIST

## 2021-06-22 PROCEDURE — 82962 GLUCOSE BLOOD TEST: CPT

## 2021-06-22 PROCEDURE — 74011250636 HC RX REV CODE- 250/636: Performed by: SURGERY

## 2021-06-22 PROCEDURE — 74011250637 HC RX REV CODE- 250/637: Performed by: SURGERY

## 2021-06-22 RX ORDER — POLYETHYLENE GLYCOL 3350 17 G/17G
17 POWDER, FOR SOLUTION ORAL DAILY
Qty: 15 PACKET | Refills: 0 | Status: SHIPPED | OUTPATIENT
Start: 2021-06-22 | End: 2021-07-07

## 2021-06-22 RX ORDER — OXYCODONE HYDROCHLORIDE 5 MG/1
5 TABLET ORAL
Qty: 10 TABLET | Refills: 0 | Status: SHIPPED | OUTPATIENT
Start: 2021-06-22 | End: 2021-06-25

## 2021-06-22 RX ADMIN — Medication 10 ML: at 06:15

## 2021-06-22 RX ADMIN — LEVOTHYROXINE SODIUM 300 MCG: 0.15 TABLET ORAL at 06:12

## 2021-06-22 RX ADMIN — SODIUM CHLORIDE, POTASSIUM CHLORIDE, SODIUM LACTATE AND CALCIUM CHLORIDE 50 ML/HR: 600; 310; 30; 20 INJECTION, SOLUTION INTRAVENOUS at 06:19

## 2021-06-22 RX ADMIN — BUPROPION HYDROCHLORIDE 300 MG: 150 TABLET, EXTENDED RELEASE ORAL at 06:12

## 2021-06-22 RX ADMIN — OXYCODONE HYDROCHLORIDE 5 MG: 5 TABLET ORAL at 02:05

## 2021-06-22 RX ADMIN — LINACLOTIDE 290 MCG: 290 CAPSULE, GELATIN COATED ORAL at 06:11

## 2021-06-22 RX ADMIN — BUPROPION HYDROCHLORIDE 300 MG: 150 TABLET, EXTENDED RELEASE ORAL at 06:30

## 2021-06-22 NOTE — PROGRESS NOTES
I have reviewed discharge instructions with the patient , Cedric Gar. The patient  verbalized understanding. She has prescriptions called in to her pharmacy. PIV removed.

## 2021-06-22 NOTE — DISCHARGE SUMMARY
Physician Discharge Summary       Patient: Ruchi Fleming MRN: [id number]  SSN: [id number]    YOB: 1955  Age: 74 y.o. Sex: female    PCP: Marybeth Macario DO    Allergies: Celebrex [celecoxib], Codeine, and Erythromycin    Admit date: 6/21/2021  Admitting Provider: Ashley Benavides MD    Discharge date: 6/22/2021  Discharging Provider: Ashley Benavides MD    * Admission Diagnoses: Cholelithiasis [K80.20]    * Discharge Diagnoses:    Hospital Problems as of 6/22/2021 Date Reviewed: 6/17/2021        Codes Class Noted - Resolved POA    Cholelithiasis ICD-10-CM: K80.20  ICD-9-CM: 574.20  6/17/2021 - Present Unknown              * Hospital Course: Patient is POD 1 s/p laparoscopic cholecystectomy. Patient was discharged home once tolerating a diet and pain was controlled. * Procedures:   Procedure(s):  LAPAROSCOPIC CHOLECYSTECTOMY WITH GRAMS      Consults: None      * Discharge Condition: good  * Disposition: Home    Discharge Medications:  Current Discharge Medication List      START taking these medications    Details   oxyCODONE IR (ROXICODONE) 5 mg immediate release tablet Take 1 Tablet by mouth every four (4) hours as needed for Pain for up to 3 days. Max Daily Amount: 30 mg.  Qty: 10 Tablet, Refills: 0  Start date: 6/22/2021, End date: 6/25/2021    Associated Diagnoses: Calculus of gallbladder without cholecystitis without obstruction      polyethylene glycol (MIRALAX) 17 gram packet Take 1 Packet by mouth daily for 15 days. Qty: 15 Packet, Refills: 0  Start date: 6/22/2021, End date: 7/7/2021         CONTINUE these medications which have NOT CHANGED    Details   levothyroxine (Synthroid) 300 mcg tablet Take 300 mcg by mouth Daily (before breakfast). 6 days 300 mcg, 1 day 150 mcg      Tresiba FlexTouch U-100 100 unit/mL (3 mL) inpn 6 Units by SubCUTAneous route daily.  Indications: type 2 diabetes mellitus  Qty: 15 mL, Refills: 1      trospium (SANCTURA XL) 60 mg capsule Take 1 Capsule by mouth Daily (before breakfast). Qty: 90 Capsule, Refills: 3      terbinafine HCL (LAMISIL) 250 mg tablet TAKE 1 TABLET BY MOUTH  DAILY  Qty: 30 Tab, Refills: 1      meloxicam (MOBIC) 15 mg tablet Take 1 Tab by mouth daily. Qty: 60 Tab, Refills: 1    Associated Diagnoses: Trochanteric bursitis, right hip      lisinopriL (PRINIVIL, ZESTRIL) 5 mg tablet TAKE 1 TABLET DAILY  Qty: 90 Tab, Refills: 3      HumaLOG KwikPen Insulin 100 unit/mL kwikpen 3-4 Units by SubCUTAneous route Before breakfast, lunch, and dinner. Give 3 units for blood sugar between 150-200; give 4 units for blood sugar 200-250. Indications: type 2 diabetes mellitus  Qty: 15 mL, Refills: 0      Jardiance 25 mg tablet Take 1 Tab by mouth daily. Qty: 30 Tab, Refills: 2      metFORMIN ER (GLUCOPHAGE XR) 500 mg tablet Take 2 Tabs by mouth daily (with breakfast). Qty: 180 Tab, Refills: 3      buPROPion XL (WELLBUTRIN XL) 300 mg XL tablet Take 1 Tab by mouth every morning. Qty: 90 Tab, Refills: 3      FreeStyle Julien 2 Sensor kit 1 Each by Other route See Salvatore Shaw. Use to scan sensor three times daily      pravastatin (PRAVACHOL) 20 mg tablet Take 20 mg by mouth nightly. furosemide (LASIX) 20 mg tablet TAKE 1 TABLET DAILY AS NEEDED FOR EDEMA  Qty: 90 Tab, Refills: 3      NOVOFINE PLUS 32 gauge x 1/6\" ndle Check sugars 4x daily. Qty: 300 Pen Needle, Refills: 3      calcium polycarbophil (FIBER LAXATIVE, CA POLYCARBO,) 625 mg tablet Take 625 mg by mouth daily. multivitamin (ONE A DAY) tablet Take 1 Tab by mouth daily. linaclotide (LINZESS) 290 mcg cap capsule Take 290 mcg by mouth Daily (before breakfast). acetaminophen (TYLENOL) 500 mg tablet Take 1 Tab by mouth every six (6) hours as needed for Pain. Qty: 30 Tab, Refills: 0    Associated Diagnoses: Rib contusion, left, initial encounter      cholecalciferol (VITAMIN D3) 1,000 unit tablet Take 5,000 Units by mouth daily. B.infantis-B.ani-B.long-B.bifi (PROBIOTIC 4X) 10-15 mg TbEC Take 2 Gum by mouth daily. nystatin (MYCOSTATIN) powder Apply  to affected area four (4) times daily. Qty: 30 g, Refills: 2      ALPRAZolam (XANAX) 0.25 mg tablet Take 1 Tab by mouth daily as needed for Anxiety.  Indications: anxious  Qty: 30 Tab, Refills: 0    Associated Diagnoses: Anxiety             * Follow-up Care/Patient Instructions:  See discharge instructions    Follow-up Information     Follow up With Specialties Details Why Contact Info    Marybeth Dawson MD General Surgery On 7/16/2021  [address]  [facility] [address] [place] [facility]  Internal Medicine   [address] [place] [address] [address]  [address] [phone]            Signed:  Bobo Perkins MD  6/22/2021  11:46 AM

## 2021-06-22 NOTE — PROGRESS NOTES
End of Shift Note    Bedside shift change report given to May EM Patton(oncoming nurse) by Jimmie Fields RN (offgoing nurse). Report included the following information SBAR, Kardex, Intake/Output, MAR and Recent Results    Shift worked:  [phone]     Shift summary and any significant changes:    No significant changes during shift, PRN pain medication given and effective. Patient able to ambulate to bathroom with 1 person assist. Tolerated dinner. Concerns for physician to address: None   Zone phone for oncoming shift:  [phone]     Activity:  Activity Level: Up with Assistance  Number times ambulated in hallways past shift: 0  Number of times OOB to chair past shift: 0    Cardiac:   Cardiac Monitoring: No      Cardiac Rhythm: Sinus Rhythm    Access:   Current line(s): PIV     Genitourinary:   Urinary status: voiding    Respiratory:   O2 Device: Nasal cannula  Chronic home O2 use?: NO       GI:  Last Bowel Movement Date: 06/21/21  Current diet:  ADULT DIET Regular; 5 carb choices (75 gm/meal)  DIET ONE TIME MESSAGE  Passing flatus: YES  Tolerating current diet: YES       Pain Management:   Patient states pain is manageable on current regimen: YES    Skin:  Abner Score: 22  Interventions: float heels and increase time out of bed    Patient Safety:  Fall Score:  Total Score: 1  Interventions: gripper socks and pt to call before getting OOB  High Fall Risk: Yes    Length of Stay:  Expected LOS: - - -  Actual LOS: 0      Jimmie Fields RN

## 2021-06-22 NOTE — DIABETES MGMT
[facility] SPECIALIST CONSULT     Initial Presentation   Manny Marx is a 74 y.o. female admitted 6/19/21 with epigastric pain of 2 weeks duration. No fevers or change in bowel habits noted. There is a long history of IBS. Afebrile. Normal but low BP. ER ABd exam revealed soft, mild RUQ tenderness, no rebound/guarding. Bowel sounds normal. No masses,  No organomegaly. Ulus Reusing HX:   Past Medical History:   Diagnosis Date    Arthritis     Depression     Diabetes ([address]     Hypercholesteremia     Hypertension     Hyperthyroidism     IBS (irritable bowel syndrome)     Liver disease     hepatitis b    PUD (peptic ulcer disease)     bleeding ulcer    Sleep apnea     CPAP    Urinary incontinence    Steroid injections in past for knee and back pain     DX: Symptomatic cholelithiasis    Treatment plan     TX: 6/21/21 Laparoscopic Cholecystectomy with Intraoperative Cholangiogram.      Hospital course   Clinical progress has been uncomplicated. Diabetes    Patient has known Type 2 diabetes X19 years, treated with multiple agents listed below PTA. Family history positive for diabetes in mother, (2) brothers and (1) sisters. Admission  and A1c 6.3% indicate acceptable diabetes control. Ambulatory blood glucose management provided by primary care provider and endocrinologist (Dr. Fidelina Pressley). Consulted by Provider for advanced diabetes nursing assessment and care, specifically related to    [x] Inpatient management strategy    Diabetes-related medical history  None shared.   Had cataract surgery bilaterally    Diabetes medication history  Drug class Currently in use Discontinued Never used   Biguanide Metformin ER (2) 500mg QAM     DDP-4 inhibitor       Sulfonylurea      Thiazolidinedione      GLP-1 RA  Ozempic  Victoza    SGLT-2 inhibitors Jardiance 25mg D     Basal insulin Tresiba 6 units QAM and 4 units QPM     Bolus insulin Humalog insulin 2units with additional correction beginning with BGs over 200     Fixed Dose  Combinations        Subjective   I have good control.     Patient reports the following home diabetes self-care practices:  Eating pattern  [x] Breakfast (2) toast with/without fruit. Decaf tea  [x] Lunch  D[place]on with chips  [x] Dinner  Chicken or hamburger with potato and vegetables (broccoli, squash, asparagus and green beans)  [x] Beverages Water  Physical activity pattern - Nothing regular at this time due to hip discomfort. She just completed physical therapy. Has access to a pool in the yard and would like to use this summer when she gets clearance from surgeon  Monitoring pattern - Uses Julien CGM system; most recent A1c confirms BG control  Taking medications pattern  [x] Consistent administration  [x] Affordable (only with patient assistance)     Objective   Physical exam  General Well-kempt  female in no acute distress  Neuro  Alert, oriented. Conversant and cooperative  Vital Signs Afebrile. Normotensive. Visit Vitals  /72   Pulse 88   Temp 98.4 °F (36.9 °C)   Resp 16   Ht 5' 5.5\" (1.664 m)   Wt 82.8 kg (182 lb 8.7 oz)   SpO2 97%   BMI 29.91 kg/m²     Extremities No foot wounds    Diabetic foot exam:    Left Foot     Visual Exam: normal    Pulse DP: 2+ (normal)    Right Foot   Visual Exam: normal    Pulse DP: 2+ (normal)    Laboratory  Tests Today 6/16/21   A1c  6.3%   BG     Anion gap     Serum triglycerides  119   WBC     Serum creatinine     GFR     AST     ALT     Lipase  89     Factors impacting BG management  Factor Dose Comments   Nutrition:  Carb-controlled meals   75 grams/meal   Eating 50% this AM   Drugs:  Steroids   Dexamethasone 8mg preop   Increases insulin resistance   Pain Schedule Mobic  Oxycodone PRN Rated @4   Infection Ancef X1 dose Afebrile.  WBC 8 (6/16/21)     Blood glucose pattern        Assessment and Plan   Nursing Diagnosis Risk for unstable blood glucose pattern   Nursing Intervention Domain 7626 Decision-making Support   Nursing Interventions Examined current inpatient diabetes control   Explored factors facilitating and impeding inpatient management  Identified self-management practices impeding diabetes control  Explored corrective strategies with patient and responsible inpatient provider   Instructed patient in need for caution is use of Jardiance if she can not stay hydrated; encouraged her to speak with her provider about a contingency plan if she can't drink  Instructed patient not to give Humalog unless she is eating meals that include CHO  Informed patient that she need only take the [place] once daily      Evaluation   This  female, with Type 2 diabetes, did achieve diabetes control prior to admission, as evidenced by admission BG of 119 and A1c of 6.3%. During this hospitalization, the patient has achieved inpatient blood glucose target of 100-180mg/dl. Patient reports multiple providers involved in diabetes management. She has switched to Dr. Brenda Rodriguez (a  endocrinologist) as Dr. Bo Rodríguez is retiring. Her PCP is Dr. Jonathon Zapien, who has involved the in-practice PharmD in acquiring patient assistance so she can afford Jardiance (otherwise she would not). This woman was experiencing hypoglycemia per her report as a result of decreased appetite since starting Ozempic. She noted intermittent BGs in the 60s via her Julien CGM. She took this medication for 2-3 weeks before stopping (one week before this admission). She believes the Ozempic prompted the current issue of abdominal pain resulting in surgical intervention. In light of her excellent BG control and history of IBS, would eliminate use of GI irritants in her BG management. She is eating a CHO controlled diet. When she is cleared to regular physical activity, she has access to a pool that could support BG control. She has the support of a boyfriend and girlfriends.     Discharge Recommendations     [x] Cautious use of multiple agents to promote BG control:   A. Restart metformin ER (2) 500mg tablets D (in light of IBS history)   B. Restart Tresiba insulin but dose once daily (10 units)   C. Do not use Humalog until eating CHO meals consistently   D. Do not use Jardiance if unable to stay hydrated   E. Do not use Ozempic (in light of IBS)   [x] Use of Julien CGM to validate continued BG control on prescribed regimen    Billing Code(s)   [x] 88696 IP subsequent hospital care - 35 minutes [x] 14883 Prolonged Services - 65 minutes    Before making these care recommendations, I personally reviewed the hospitalization record, including notes, laboratory & diagnostic data and current medications, and examined the patient at the bedside (circumstances permitting) before making care recommendations.      Total minutes: 121 KARSTEN Ruiz  Diabetes Clinical Nurse Specialist  [facility]  Access via [address] [place] [address]

## 2021-07-06 ENCOUNTER — OFFICE VISIT (OUTPATIENT)
Dept: SURGERY | Age: 66
End: 2021-07-06
Payer: MEDICARE

## 2021-07-06 VITALS
OXYGEN SATURATION: 97 % | WEIGHT: 178.4 LBS | DIASTOLIC BLOOD PRESSURE: 70 MMHG | SYSTOLIC BLOOD PRESSURE: 106 MMHG | BODY MASS INDEX: 29.24 KG/M2 | HEART RATE: 58 BPM | TEMPERATURE: 96.5 F

## 2021-07-06 DIAGNOSIS — K80.10 CALCULUS OF GALLBLADDER WITH CHRONIC CHOLECYSTITIS WITHOUT OBSTRUCTION: Primary | ICD-10-CM

## 2021-07-06 PROCEDURE — 99024 POSTOP FOLLOW-UP VISIT: CPT | Performed by: SURGERY

## 2021-07-06 NOTE — PROGRESS NOTES
Subjective: Rossi Mendiola is a 74 y.o. female presents for postop care s/p lap melony with ioc. Pathology reviewed. Tolerating a diet. Pain controlled. Having bowel function. She reports intermittent dysphagia which she had preoperatively. Swallow study in 2019 was negative for any acute abnormality     Objective: There were no vitals taken for this visit. General:  alert, cooperative, no distress, appears stated age   Abdomen: soft, bowel sounds active, non-tender   Incision:   healing well, no drainage, no erythema, no hernia, no seroma, no swelling, no dehiscence, incision well approximated     Assessment:     Doing well postoperatively. Plan:     1. Continue any current medications. 2. Wound care discussed. 3. Instructed patient to eat small meals throughout the day and protein shakes  4. Follow up with Dr. Bernard Garg if swallowing gets worse  5.  Follow up with me PRN

## 2021-07-06 NOTE — LETTER
7/6/2021    Patient: Quyen Law   YOB: 1955   Date of Visit: 7/6/2021     Chinmay Sommers III, DO  [address]  [facility] [address]    Dear Chinmay Sommers III, DO,      Thank you for referring Ms. José Miguel Campbell to [facility] for evaluation. My notes for this consultation are attached. If you have questions, please do not hesitate to call me. I look forward to following your patient along with you.       Sincerely,    Randy Valdez MD

## 2021-07-06 NOTE — PROGRESS NOTES
Identified pt with two pt identifiers(name and ). Reviewed record in preparation for visit and have obtained necessary documentation. All patient medications has been reviewed. Chief Complaint   Patient presents with    Surgical Follow-up     2 week s/p judith ty       There are no preventive care reminders to display for this patient. Vitals:    21 0903   Weight: 80.9 kg (178 lb 6.4 oz)   PainSc:   3   PainLoc: Abdomen       4. Have you been to the ER, urgent care clinic since your last visit? Hospitalized since your last visit? No    5. Have you seen or consulted any other health care providers outside of the [address] [place] [address] since your last visit? Include any pap smears or colon screening. No      Patient is accompanied by self I have received verbal consent from Rochelle Pino to discuss any/all medical information while they are present in the room.

## 2021-07-15 ENCOUNTER — TELEPHONE (OUTPATIENT)
Dept: INTERNAL MEDICINE CLINIC | Age: 66
End: 2021-07-15

## 2021-07-15 NOTE — TELEPHONE ENCOUNTER
Pharmacy Progress Note - Medication Access    Contacted [facility] regarding Ms. Licha Boyd 74 y.o. 's [employer] PAP application. She's approved until Dec 30,2021. [facility] will need to verify the recent shipment of this medication. Called & shared updates w/ patient. PHI verified. All questions answered at this time. Thank you for the consult,  Tavia Yo, PharmD, BCACP, [address] [place] [address] in place: Yes   Recommendation Provided To:  Other: 1   Intervention Detail: Patient Access Assistance/Sample Provided   Time Spent (min): 30

## 2021-07-26 ENCOUNTER — TELEPHONE (OUTPATIENT)
Dept: INTERNAL MEDICINE CLINIC | Age: 66
End: 2021-07-26

## 2021-07-26 NOTE — TELEPHONE ENCOUNTER
Pharmacy Progress Note - Telephone Encounter    S/O: Ms. Thomas Area 74 y.o. female, referred by Dr. Cece Lr, was contacted via an outbound telephone call to discuss her Linzess PAP supply today. Verified patients identifiers (name & ) per HIPAA policy. A/P:  - Shared with patient - Linzess 290 mcg - 90 ds has arrived & ready for . - Patient endorses understanding to the provided information. All questions answered at this time. Thank you,  Tavia Wylie, PharmD, BCACP, [address] in [address]:  Yes   Recommendation Provided To: Patient/Caregiver: 1 via Telephone   Intervention Detail: Patient Access Assistance/Sample Provided

## 2021-08-19 RX ORDER — METFORMIN HYDROCHLORIDE 500 MG/1
1000 TABLET, EXTENDED RELEASE ORAL
Qty: 180 TABLET | Refills: 3 | Status: SHIPPED | OUTPATIENT
Start: 2021-08-19 | End: 2021-10-25 | Stop reason: SDUPTHER

## 2021-08-19 NOTE — TELEPHONE ENCOUNTER
PCP: Linnette Blum DO    Last appt: 6/16/2021  Future Appointments   Date Time Provider Cj Love   8/24/2021 11:00 AM [facility] BS AMB   9/28/2021  3:30 PM Linnette Blum DO [facility] BS AMB       Requested Prescriptions     Pending Prescriptions Disp Refills    metFORMIN ER (GLUCOPHAGE XR) 500 mg tablet 180 Tablet 3     Sig: Take 2 Tablets by mouth daily (with breakfast).        Prior labs and Blood pressures:  BP Readings from Last 3 Encounters:   07/06/21 106/70   06/22/21 118/72   06/17/21 (!) 108/59     Lab Results   Component Value Date/Time    Sodium 141 01/06/2021 11:02 AM    Potassium 4.2 01/06/2021 11:02 AM    Chloride 104 01/06/2021 11:02 AM    CO2 24 01/06/2021 11:02 AM    Anion gap 6 01/10/2019 08:37 AM    Glucose 204 (H) 01/06/2021 11:02 AM    BUN 19 01/06/2021 11:02 AM    Creatinine 1.20 (H) 01/06/2021 11:02 AM    BUN/Creatinine ratio 16 01/06/2021 11:02 AM    GFR est AA 54 (L) 01/06/2021 11:02 AM    GFR est non-AA 47 (L) 01/06/2021 11:02 AM    Calcium 9.5 01/06/2021 11:02 AM     Lab Results   Component Value Date/Time    Hemoglobin A1c 6.3 (H) 06/16/2021 12:48 PM    Hemoglobin A1c (POC) 7.2 12/20/2019 08:22 AM     Lab Results   Component Value Date/Time    Cholesterol, total 175 06/16/2021 12:48 PM    HDL Cholesterol 71 06/16/2021 12:48 PM    LDL, calculated 80.2 06/16/2021 12:48 PM    VLDL, calculated 23.8 06/16/2021 12:48 PM    Triglyceride 119 06/16/2021 12:48 PM    CHOL/HDL Ratio 2.5 06/16/2021 12:48 PM     No results found for: Lorrie Duane, [id number]    Lab Results   Component Value Date/Time    TSH 0.53 06/16/2021 12:48 PM

## 2021-08-21 NOTE — PROGRESS NOTES
Pharmacy Progress Note - Diabetes Management    Assessment / Plan:   Diabetes Management:  - Per ADA guidelines, Pt's A1c is at goal of < 7%.   - Hold off on Humalog  - Increase Tresiba to 10 units daily. Need to keep timing of this consistent. - Continue Jardiance 25 mg daily, metformin 1 gm daily   - A1c check at next visit     S/O: Ms. Vee Rousseau is a 66 y. o. female , referred by Dr. Kobi Lam DO, with a PMH of T2DM, HTN, HLD, Hypothyroidism, OAB, IBS, Osteoporosis, was seen today for diabetes management follow up.   Last A1c was 6.3% (June 2021), 8% (Oct 2020), a decrease from 8.4% (June 2020), an increase from 7.2% (Dec 2019).      Interim update:   S/p cholescystectomy 6/21/21. BM improving. Abdominal pain resolved. Reports appetite has greatly improved now. Eating mainly at night. Christian Benson NP left practice. Increased social stressors at home - concerned about boyfriend's mauricio  Not working at this time. Still concern about Humalog dose - reports BG drops significant even with 1-2 units of Humalog    Current anti-hyperglycemic regimen include(s):    - Metformin 1 gm daily   - Tresiba U-100 - taking 6 units daily  - sometimes in the AM and sometimes in the PM  - Humalog -  Reports to taking 1-2 units max when BG > 150. Giving 3-4x per week  - Jardiance 25 mg daily     Ozempic on hold    ROS:  Today, Pt endorses:  - Symptoms of Hyperglycemia: none  - Symptoms of Hypoglycemia: none    Self Monitoring Blood Glucose (SMBG) or CGM:  Julien 2 CGM  Scanning 7x/day  Fasting today: 109   Midnight - 6 AM 6 AM - Noon Noon - 6 PM 6 PM - Midnight Average % Time in Target Range   7 Day Average 214 147 161 194 180 50%   14 Day Average 205 143 174 206 184 49%   30 Day Average 193 145 172 193 176 57%     No low blood sugars <100    Nutrition/Lifestyle Modifications:  See above     The 10-year ASCVD risk score (Katiana Wood, et al., 2013) is: 11.8%       Vitals:   Wt Readings from Last 3 Encounters:   08/24/21 179 lb (81.2 kg)   07/06/21 178 lb 6.4 oz (80.9 kg)   06/21/21 182 lb 8.7 oz (82.8 kg)     BP Readings from Last 3 Encounters:   08/24/21 123/67   07/06/21 106/70   06/22/21 118/72     Pulse Readings from Last 3 Encounters:   08/24/21 89   07/06/21 (!) 58   06/22/21 88       Past Medical History:   Diagnosis Date    Arthritis     Depression     Diabetes ([facility] [address]     Hypercholesteremia     Hypertension     Hyperthyroidism     IBS (irritable bowel syndrome)     Liver disease     hepatitis b    PUD (peptic ulcer disease)     bleeding ulcer    Sleep apnea     CPAP    Urinary incontinence      Allergies   Allergen Reactions    Celebrex [Celecoxib] Other (comments)     Hallucinations    Codeine Nausea and Vomiting    Erythromycin Nausea and Vomiting       Current Outpatient Medications   Medication Sig    metFORMIN ER (GLUCOPHAGE XR) 500 mg tablet Take 2 Tablets by mouth daily (with breakfast).  levothyroxine (Synthroid) 300 mcg tablet Take 300 mcg by mouth Daily (before breakfast). 6 days 300 mcg, 1 day 150 mcg    Tresiba FlexTouch U-100 100 unit/mL (3 mL) inpn 6 Units by SubCUTAneous route daily. Indications: type 2 diabetes mellitus    trospium (SANCTURA XL) 60 mg capsule Take 1 Capsule by mouth Daily (before breakfast).  terbinafine HCL (LAMISIL) 250 mg tablet TAKE 1 TABLET BY MOUTH  DAILY    meloxicam (MOBIC) 15 mg tablet Take 1 Tab by mouth daily. (Patient not taking: Reported on 7/6/2021)    lisinopriL (PRINIVIL, ZESTRIL) 5 mg tablet TAKE 1 TABLET DAILY    nystatin (MYCOSTATIN) powder Apply  to affected area four (4) times daily.  HumaLOG KwikPen Insulin 100 unit/mL kwikpen 3-4 Units by SubCUTAneous route Before breakfast, lunch, and dinner. Give 3 units for blood sugar between 150-200; give 4 units for blood sugar 200-250. Indications: type 2 diabetes mellitus (Patient taking differently: 2 Units by SubCUTAneous route Before breakfast, lunch, and dinner.  Give 3 units for blood sugar between 150-200; give 4 units for blood sugar 200-250. Indications: type 2 diabetes mellitus)    Jardiance 25 mg tablet Take 1 Tab by mouth daily.  ALPRAZolam (XANAX) 0.25 mg tablet Take 1 Tab by mouth daily as needed for Anxiety. Indications: anxious (Patient not taking: Reported on 7/6/2021)    buPROPion XL (WELLBUTRIN XL) 300 mg XL tablet Take 1 Tab by mouth every morning.  FreeStyle Julien 2 Sensor kit 1 Each by Other route See Salvatore Shaw. Use to scan sensor three times daily    pravastatin (PRAVACHOL) 20 mg tablet Take 20 mg by mouth nightly.  furosemide (LASIX) 20 mg tablet TAKE 1 TABLET DAILY AS NEEDED FOR EDEMA (Patient taking differently: Take 20 mg by mouth daily as needed.)    NOVOFINE PLUS 32 gauge x 1/6\" ndle Check sugars 4x daily.  calcium polycarbophil (FIBER LAXATIVE, CA POLYCARBO,) 625 mg tablet Take 625 mg by mouth daily.  multivitamin (ONE A DAY) tablet Take 1 Tab by mouth daily.  linaclotide (LINZESS) 290 mcg cap capsule Take 290 mcg by mouth Daily (before breakfast).  acetaminophen (TYLENOL) 500 mg tablet Take 1 Tab by mouth every six (6) hours as needed for Pain.  cholecalciferol (VITAMIN D3) 1,000 unit tablet Take 5,000 Units by mouth daily.  B.infantis-B.ani-B.long-B.bifi (PROBIOTIC 4X) 10-15 mg TbEC Take 2 Gum by mouth daily. No current facility-administered medications for this visit. Lab Results   Component Value Date/Time    Sodium 141 01/06/2021 11:02 AM    Potassium 4.2 01/06/2021 11:02 AM    Chloride 104 01/06/2021 11:02 AM    CO2 24 01/06/2021 11:02 AM    Anion gap 6 01/10/2019 08:37 AM    Glucose 204 (H) 01/06/2021 11:02 AM    BUN 19 01/06/2021 11:02 AM    Creatinine 1.20 (H) 01/06/2021 11:02 AM    BUN/Creatinine ratio 16 01/06/2021 11:02 AM    GFR est AA 54 (L) 01/06/2021 11:02 AM    GFR est non-AA 47 (L) 01/06/2021 11:02 AM    Calcium 9.5 01/06/2021 11:02 AM    Bilirubin, total 0.4 01/06/2021 11:02 AM    Alk. phosphatase 75 01/06/2021 11:02 AM    Protein, total 6.8 01/06/2021 11:02 AM    Albumin 4.4 01/06/2021 11:02 AM    Globulin 3.8 01/10/2019 08:37 AM    A-G Ratio 1.8 01/06/2021 11:02 AM    ALT (SGPT) 15 01/06/2021 11:02 AM       Lab Results   Component Value Date/Time    Cholesterol, total 175 06/16/2021 12:48 PM    HDL Cholesterol 71 06/16/2021 12:48 PM    LDL, calculated 80.2 06/16/2021 12:48 PM    VLDL, calculated 23.8 06/16/2021 12:48 PM    Triglyceride 119 06/16/2021 12:48 PM    CHOL/HDL Ratio 2.5 06/16/2021 12:48 PM       Lab Results   Component Value Date/Time    WBC 8.0 06/16/2021 12:48 PM    HGB 12.9 06/16/2021 12:48 PM    HCT 38.8 06/16/2021 12:48 PM    PLATELET 509 01/34/5220 12:48 PM    MCV 92.2 06/16/2021 12:48 PM       Lab Results   Component Value Date/Time    Microalb/Creat ratio (ug/mg creat.) 3 06/23/2020 01:20 PM       HbA1c:  Lab Results   Component Value Date/Time    Hemoglobin A1c 6.3 (H) 06/16/2021 12:48 PM    Hemoglobin A1c (POC) 7.2 12/20/2019 08:22 AM     No components found for: 2     Last Point of Care HGB A1C  Hemoglobin A1c (POC)   Date Value Ref Range Status   12/20/2019 7.2 % Final        CrCl cannot be calculated (Patient's most recent lab result is older than the maximum 180 days allowed. ). Medication reconciliation was completed during the visit. Medications Discontinued During This Encounter   Medication Reason   Citlaly Shoulder FlexTouch U-100 100 unit/mL (3 mL) inpn      Orders Placed This Encounter   Citlaly Shoulder FlexTouch U-100 100 unit/mL (3 mL) inpn     Sig: 10 Units by SubCUTAneous route daily. Indications: type 2 diabetes mellitus     Dispense:  15 mL     Refill:  1       Patient verbalized understanding of the information presented and all of the patients questions were answered. AVS was handed to the patient. Patient advised to call the office with any additional questions or concerns.     Notifications of recommendations will be sent to Dr. Arnulfo Calderón DO for review. Patient will return to clinic in 6 week(s) for follow up. Thank you for the consult,  Tavia Hearn, PharmD, BCACP, [address] [place] [address] in place:  Yes   Recommendation Provided To: Patient/Caregiver: 1 via In person   Intervention Detail: Adherence Monitorin, Discontinued Rx: 1, reason: Duplicate Therapy and Dose Adjustment: 1, reason: Therapy Optimization   Time Spent (min): 35

## 2021-08-24 ENCOUNTER — OFFICE VISIT (OUTPATIENT)
Dept: INTERNAL MEDICINE CLINIC | Age: 66
End: 2021-08-24

## 2021-08-24 VITALS
OXYGEN SATURATION: 95 % | HEART RATE: 89 BPM | SYSTOLIC BLOOD PRESSURE: 123 MMHG | BODY MASS INDEX: 28.77 KG/M2 | HEIGHT: 66 IN | WEIGHT: 179 LBS | DIASTOLIC BLOOD PRESSURE: 67 MMHG

## 2021-08-24 DIAGNOSIS — Z79.4 UNCONTROLLED TYPE 2 DIABETES MELLITUS WITH HYPERGLYCEMIA, WITH LONG-TERM CURRENT USE OF INSULIN (HCC): Primary | ICD-10-CM

## 2021-08-24 DIAGNOSIS — E11.65 UNCONTROLLED TYPE 2 DIABETES MELLITUS WITH HYPERGLYCEMIA, WITH LONG-TERM CURRENT USE OF INSULIN (HCC): Primary | ICD-10-CM

## 2021-08-24 RX ORDER — INSULIN DEGLUDEC INJECTION 100 U/ML
10 INJECTION, SOLUTION SUBCUTANEOUS DAILY
Qty: 15 ML | Refills: 1
Start: 2021-08-24 | End: 2021-12-21

## 2021-08-24 NOTE — PATIENT INSTRUCTIONS
· Increase Tresiba to 10 units one a day. Keep the timing of this injection consistent.   · Continue metformin 1000 mg daily and Jardiance 25 mg daily  · Continue to hold off on Humalog  · Bring in meter to the next visit

## 2021-09-26 NOTE — PROGRESS NOTES
Pharmacy Progress Note - Diabetes Management    Assessment / Plan:   Diabetes Management:  - Per ADA guidelines, Pt's POC A1c (6.9%) today is at goal of < 7%. BP at goal <130/80.   - Continue with [place] 10 units daily  - Continue metformin 1 gm daily and Jardiance 25 mg daily   - Fluad was administered today. VIS provided to patient. Patient tolerated the given vaccine(s) and all questions were answered by the end of visit. Check: Vitals, Weight and HbA1c at the next visit. S/O: Ms. Vee Rousseau is a [id number]. o. female , referred by Dr. Serjio Arciniega DO, with a PMH of T2DM, HTN, HLD, Hypothyroidism, OAB, IBS, Osteoporosis, was seen today for diabetes management follow up.   Last A1c was 6.3% (June 2021), 8% (Oct 2020), a decrease from 8.4% (June 2020), an increase from 7.2% (Dec 2019).      Interim update:   Eye exam     Current anti-hyperglycemic regimen include(s):    - Metformin 1 gm daily  - Tresiba U-100 -- 10 units daily  - Jardiance 25 mg daily     Holding off on Humalog. Ozempic on hold. ROS:  Today, Pt endorses:  - Symptoms of Hyperglycemia: none  - Symptoms of Hypoglycemia: none    Self Monitoring Blood Glucose (SMBG) or CGM:  Julien 2 CGM   Midnight - 6 AM 6 AM - Noon Noon - 6 PM 6 PM - Midnight Average % Time in Target Range   7 Day Average 173 133 160 160 155 73%   14 Day Average 182 135 166 182 164 69%   30 Day Average 179 132 163 179 163 67%       Nutrition/Lifestyle Modifications:  Increased stress as she is caring for her significant other. Staying up later - potentially snacking at night b/c of this. Reports her appetite is much better now than prior to cholecystectomy   Wt up 3 lbs since Aug    The 10-year ASCVD risk score (Luzma Shipman., et al., 2013) is: 16.3%       Vitals:   Wt Readings from Last 3 Encounters:   09/28/21 182 lb (82.6 kg)   09/28/21 182 lb (82.6 kg)   08/24/21 179 lb (81.2 kg)     BP Readings from Last 3 Encounters:   09/28/21 126/67   09/28/21 126/67 08/24/21 123/67     Pulse Readings from Last 3 Encounters:   09/28/21 89   09/28/21 89   08/24/21 89       Past Medical History:   Diagnosis Date    Arthritis     Depression     [facility] ([facility] [address]     Hypercholesteremia     Hypertension     Hyperthyroidism     IBS (irritable bowel syndrome)     Liver disease     hepatitis b    PUD (peptic ulcer disease)     bleeding ulcer    Sleep apnea     CPAP    Urinary incontinence      Allergies   Allergen Reactions    Celebrex [Celecoxib] Other (comments)     Hallucinations    Codeine Nausea and Vomiting    Erythromycin Nausea and Vomiting       Current Outpatient Medications   Medication Sig    metFORMIN ER (GLUCOPHAGE XR) 500 mg tablet Take 2 Tablets by mouth daily (with breakfast). Jack Reyes FlexTouch U-100 100 unit/mL (3 mL) inpn 10 Units by SubCUTAneous route daily. Indications: type 2 diabetes mellitus    levothyroxine (Synthroid) 300 mcg tablet Take 300 mcg by mouth Daily (before breakfast). 6 days 300 mcg, 1 day 150 mcg    trospium (SANCTURA XL) 60 mg capsule Take 1 Capsule by mouth Daily (before breakfast).  terbinafine HCL (LAMISIL) 250 mg tablet TAKE 1 TABLET BY MOUTH  DAILY    meloxicam (MOBIC) 15 mg tablet Take 1 Tab by mouth daily. (Patient not taking: Reported on 7/6/2021)    lisinopriL (PRINIVIL, ZESTRIL) 5 mg tablet TAKE 1 TABLET DAILY    nystatin (MYCOSTATIN) powder Apply  to affected area four (4) times daily.  HumaLOG KwikPen Insulin 100 unit/mL kwikpen 3-4 Units by SubCUTAneous route Before breakfast, lunch, and dinner. Give 3 units for blood sugar between 150-200; give 4 units for blood sugar 200-250. Indications: type 2 diabetes mellitus (Patient taking differently: 2 Units by SubCUTAneous route Before breakfast, lunch, and dinner. Give 3 units for blood sugar between 150-200; give 4 units for blood sugar 200-250. Indications: type 2 diabetes mellitus)    Jardiance 25 mg tablet Take 1 Tab by mouth daily.     ALPRAZolam Jennet Gram) 0.25 mg tablet Take 1 Tab by mouth daily as needed for Anxiety. Indications: anxious (Patient not taking: Reported on 7/6/2021)    buPROPion XL (WELLBUTRIN XL) 300 mg XL tablet Take 1 Tab by mouth every morning.  FreeStyle Julien 2 Sensor kit 1 Each by Other route See Salvatore Shaw. Use to scan sensor three times daily    pravastatin (PRAVACHOL) 20 mg tablet Take 20 mg by mouth nightly.  furosemide (LASIX) 20 mg tablet TAKE 1 TABLET DAILY AS NEEDED FOR EDEMA (Patient taking differently: Take 20 mg by mouth daily as needed.)    NOVOFINE PLUS 32 gauge x 1/6\" ndle Check sugars 4x daily.  calcium polycarbophil (FIBER LAXATIVE, CA POLYCARBO,) 625 mg tablet Take 625 mg by mouth daily.  multivitamin (ONE A DAY) tablet Take 1 Tab by mouth daily.  linaclotide (LINZESS) 290 mcg cap capsule Take 290 mcg by mouth Daily (before breakfast).  acetaminophen (TYLENOL) 500 mg tablet Take 1 Tab by mouth every six (6) hours as needed for Pain.  cholecalciferol (VITAMIN D3) 1,000 unit tablet Take 5,000 Units by mouth daily.  B.infantis-B.ani-B.long-B.bifi (PROBIOTIC 4X) 10-15 mg TbEC Take 2 Gum by mouth daily. No current facility-administered medications for this visit. Lab Results   Component Value Date/Time    Sodium 141 01/06/2021 11:02 AM    Potassium 4.2 01/06/2021 11:02 AM    Chloride 104 01/06/2021 11:02 AM    CO2 24 01/06/2021 11:02 AM    Anion gap 6 01/10/2019 08:37 AM    Glucose 204 (H) 01/06/2021 11:02 AM    BUN 19 01/06/2021 11:02 AM    Creatinine 1.20 (H) 01/06/2021 11:02 AM    BUN/Creatinine ratio 16 01/06/2021 11:02 AM    GFR est AA 54 (L) 01/06/2021 11:02 AM    GFR est non-AA 47 (L) 01/06/2021 11:02 AM    Calcium 9.5 01/06/2021 11:02 AM    Bilirubin, total 0.4 01/06/2021 11:02 AM    Alk.  phosphatase 75 01/06/2021 11:02 AM    Protein, total 6.8 01/06/2021 11:02 AM    Albumin 4.4 01/06/2021 11:02 AM    Globulin 3.8 01/10/2019 08:37 AM    A-G Ratio 1.8 01/06/2021 11:02 AM ALT (SGPT) 15 2021 11:02 AM       Lab Results   Component Value Date/Time    Cholesterol, total 175 2021 12:48 PM    HDL Cholesterol 71 2021 12:48 PM    LDL, calculated 80.2 2021 12:48 PM    VLDL, calculated 23.8 2021 12:48 PM    Triglyceride 119 2021 12:48 PM    CHOL/HDL Ratio 2.5 2021 12:48 PM       Lab Results   Component Value Date/Time    WBC 8.0 2021 12:48 PM    HGB 12.9 2021 12:48 PM    HCT 38.8 2021 12:48 PM    PLATELET 576  12:48 PM    MCV 92.2 2021 12:48 PM       Lab Results   Component Value Date/Time    Microalb/Creat ratio (ug/mg creat.) 3 2020 01:20 PM       HbA1c:  Lab Results   Component Value Date/Time    Hemoglobin A1c 6.3 (H) 2021 12:48 PM    Hemoglobin A1c (POC) 6.9 (A) 2021 03:36 PM     Last Point of Care HGB A1C  Hemoglobin A1c (POC)   Date Value Ref Range Status   2021 6.9 (A) 4.8 - 5.6 % Final        CrCl cannot be calculated (Patient's most recent lab result is older than the maximum 180 days allowed. ). Medication reconciliation was completed during the visit. There are no discontinued medications. Patient verbalized understanding of the information presented and all of the patients questions were answered. AVS was handed to the patient. Patient advised to call the office with any additional questions or concerns. Notifications of recommendations will be sent to Dr. Karen Vera DO for review. Patient will return to clinic in 12 week(s) for follow up. Thank you for the consult,  Tavia Geller, PharmD, BCACP, [address] [place] [address] in place:  Yes   Recommendation Provided To: Patient/Caregiver: 1 via In person   Intervention Detail: Adherence Monitorin, Lab(s) Ordered and Vaccine Recommended/Administered   Time Spent (min): 30

## 2021-09-28 ENCOUNTER — OFFICE VISIT (OUTPATIENT)
Dept: INTERNAL MEDICINE CLINIC | Age: 66
End: 2021-09-28

## 2021-09-28 ENCOUNTER — OFFICE VISIT (OUTPATIENT)
Dept: INTERNAL MEDICINE CLINIC | Age: 66
End: 2021-09-28
Payer: MEDICARE

## 2021-09-28 VITALS
WEIGHT: 182 LBS | TEMPERATURE: 98.4 F | BODY MASS INDEX: 30.32 KG/M2 | SYSTOLIC BLOOD PRESSURE: 126 MMHG | DIASTOLIC BLOOD PRESSURE: 67 MMHG | OXYGEN SATURATION: 97 % | HEART RATE: 89 BPM | HEIGHT: 65 IN

## 2021-09-28 VITALS
BODY MASS INDEX: 30.32 KG/M2 | DIASTOLIC BLOOD PRESSURE: 67 MMHG | HEIGHT: 65 IN | SYSTOLIC BLOOD PRESSURE: 126 MMHG | HEART RATE: 89 BPM | WEIGHT: 182 LBS | OXYGEN SATURATION: 97 %

## 2021-09-28 DIAGNOSIS — E11.69 HYPERLIPIDEMIA ASSOCIATED WITH TYPE 2 DIABETES MELLITUS (HCC): ICD-10-CM

## 2021-09-28 DIAGNOSIS — Z23 NEEDS FLU SHOT: ICD-10-CM

## 2021-09-28 DIAGNOSIS — G47.33 OSA ON CPAP: ICD-10-CM

## 2021-09-28 DIAGNOSIS — Z99.89 OSA ON CPAP: ICD-10-CM

## 2021-09-28 DIAGNOSIS — Z79.4 UNCONTROLLED TYPE 2 DIABETES MELLITUS WITH HYPERGLYCEMIA, WITH LONG-TERM CURRENT USE OF INSULIN (HCC): Primary | ICD-10-CM

## 2021-09-28 DIAGNOSIS — E11.65 UNCONTROLLED TYPE 2 DIABETES MELLITUS WITH HYPERGLYCEMIA, WITH LONG-TERM CURRENT USE OF INSULIN (HCC): Primary | ICD-10-CM

## 2021-09-28 DIAGNOSIS — I10 ESSENTIAL HYPERTENSION: ICD-10-CM

## 2021-09-28 DIAGNOSIS — E03.9 ACQUIRED HYPOTHYROIDISM: ICD-10-CM

## 2021-09-28 DIAGNOSIS — F41.1 GAD (GENERALIZED ANXIETY DISORDER): ICD-10-CM

## 2021-09-28 DIAGNOSIS — E78.5 HYPERLIPIDEMIA ASSOCIATED WITH TYPE 2 DIABETES MELLITUS (HCC): ICD-10-CM

## 2021-09-28 LAB — HBA1C MFR BLD HPLC: 6.9 % (ref 4.8–5.6)

## 2021-09-28 PROCEDURE — G8427 DOCREV CUR MEDS BY ELIG CLIN: HCPCS | Performed by: INTERNAL MEDICINE

## 2021-09-28 PROCEDURE — 90694 VACC AIIV4 NO PRSRV 0.5ML IM: CPT

## 2021-09-28 PROCEDURE — 1090F PRES/ABSN URINE INCON ASSESS: CPT | Performed by: INTERNAL MEDICINE

## 2021-09-28 PROCEDURE — G0008 ADMIN INFLUENZA VIRUS VAC: HCPCS

## 2021-09-28 PROCEDURE — 99214 OFFICE O/P EST MOD 30 MIN: CPT | Performed by: INTERNAL MEDICINE

## 2021-09-28 PROCEDURE — 83036 HEMOGLOBIN GLYCOSYLATED A1C: CPT | Performed by: PHARMACIST

## 2021-09-28 PROCEDURE — 2022F DILAT RTA XM EVC RTNOPTHY: CPT | Performed by: INTERNAL MEDICINE

## 2021-09-28 PROCEDURE — G8417 CALC BMI ABV UP PARAM F/U: HCPCS | Performed by: INTERNAL MEDICINE

## 2021-09-28 PROCEDURE — G9899 SCRN MAM PERF RSLTS DOC: HCPCS | Performed by: INTERNAL MEDICINE

## 2021-09-28 PROCEDURE — G8536 NO DOC ELDER MAL SCRN: HCPCS | Performed by: INTERNAL MEDICINE

## 2021-09-28 PROCEDURE — 3044F HG A1C LEVEL LT 7.0%: CPT | Performed by: INTERNAL MEDICINE

## 2021-09-28 PROCEDURE — G9717 DOC PT DX DEP/BP F/U NT REQ: HCPCS | Performed by: INTERNAL MEDICINE

## 2021-09-28 PROCEDURE — 3017F COLORECTAL CA SCREEN DOC REV: CPT | Performed by: INTERNAL MEDICINE

## 2021-09-28 PROCEDURE — G8754 DIAS BP LESS 90: HCPCS | Performed by: INTERNAL MEDICINE

## 2021-09-28 PROCEDURE — G8752 SYS BP LESS 140: HCPCS | Performed by: INTERNAL MEDICINE

## 2021-09-28 PROCEDURE — 1101F PT FALLS ASSESS-DOCD LE1/YR: CPT | Performed by: INTERNAL MEDICINE

## 2021-09-28 RX ORDER — LEVOTHYROXINE SODIUM 300 UG/1
300 TABLET ORAL
Qty: 90 TABLET | Refills: 3 | Status: SHIPPED | OUTPATIENT
Start: 2021-09-28 | End: 2021-11-04 | Stop reason: SDUPTHER

## 2021-09-28 RX ORDER — VILAZODONE HYDROCHLORIDE 20 MG/1
20 TABLET ORAL DAILY
Qty: 90 TABLET | Refills: 3 | Status: SHIPPED | OUTPATIENT
Start: 2021-09-28 | End: 2022-02-28 | Stop reason: SDUPTHER

## 2021-09-28 NOTE — PATIENT INSTRUCTIONS
Generalized Anxiety Disorder: Care Instructions  Overview     We all worry. It's a normal part of life. But when you have generalized anxiety disorder, you worry about lots of things. You have a hard time not worrying. This worry or anxiety interferes with your relationships, work or school, and other areas of your life. You may worry most days about things like money, health, work, or friends. That may make you feel tired, tense, or cranky. It can make it hard to think. It may get in the way of healthy sleep. Counseling and medicine can both work to treat anxiety. They are often used together with lifestyle changes, such as getting enough sleep. Treatment can include a type of counseling called cognitive-behavioral therapy, or CBT. It helps you notice and replace thoughts that make you worry. You also might have counseling along with those closest to you so that they can help. Follow-up care is a key part of your treatment and safety. Be sure to make and go to all appointments, and call your doctor if you are having problems. It's also a good idea to know your test results and keep a list of the medicines you take. How can you care for yourself at home? · Get at least 30 minutes of exercise on most days of the week. Walking is a good choice. You also may want to do other activities, such as running, swimming, cycling, or playing tennis or team sports. · Learn and do relaxation exercises, such as deep breathing. · Go to bed at the same time every night. Try for 8 to 10 hours of sleep a night. · Avoid alcohol, marijuana, and illegal drugs. · Find a counselor who uses cognitive-behavioral therapy (CBT). · Don't isolate yourself. Let those closest to you help you. Find someone you can trust and confide in. Talk to that person. · Be safe with medicines. Take your medicines exactly as prescribed. Call your doctor if you think you are having a problem with your medicine. · Practice healthy thinking.  How you think can affect how you feel and act. Ask yourself if your thoughts are helpful or unhelpful. If they are unhelpful, you can learn how to change them. · Recognize and accept your anxiety. When you feel anxious, say to yourself, \"This is not an emergency. I feel uncomfortable, but I am not in danger. I can keep going even if I feel anxious. \"  When should you call for help? Call 911  anytime you think you may need emergency care. For example, call if:    · You feel you can't stop from hurting yourself or someone else. Keep the numbers for these national suicide hotlines: 9-125-725-TALK (2-179.795.2233) and 3-456-LLGCVJZ (8-840.704.4965). If you or someone you know talks about suicide or feeling hopeless, get help right away. Call your doctor or counselor now or seek immediate medical care if:    · You have new anxiety, or your anxiety gets worse.     · You have been feeling sad, depressed, or hopeless or have lost interest in things that you usually enjoy.     · You do not get better as expected. Where can you learn more? Go to http://www.gray.com/  Enter G123 in the search box to learn more about \"Generalized Anxiety Disorder: Care Instructions. \"  Current as of: June 16, 2021               Content Version: 13.0  © 9407-8189 [employer]. Care instructions adapted under license by [employer] (which disclaims liability or warranty for this information). If you have questions about a medical condition or this instruction, always ask your healthcare professional. Kanu De La Rosa any warranty or liability for your use of this information. Once you start viibryd, then take 300 mg wellbutrin daily x 7 days, then take 1/2 a pill for 7 days, then stop.

## 2021-09-28 NOTE — PATIENT INSTRUCTIONS
Your A1c today was 6.9%. Your goal is to be below 7%  Continue Tresiba 10 units daily  Continue metformin 1000 mg daily and Jardiance 25 mg daily       Vaccine Information Statement    Influenza (Flu) Vaccine (Inactivated or Recombinant): What You Need to Know    Many vaccine information statements are available in [place] and other languages. See www.immunize.org/vis. Hojas de información sobre vacunas están disponibles en español y en muchos otros idiomas. Visite www.immunize.org/vis. 1. Why get vaccinated? Influenza vaccine can prevent influenza (flu). Flu is a contagious disease that spreads around the [place] every year, usually between October and May. Anyone can get the flu, but it is more dangerous for some people. Infants and young children, people 72 years and older, pregnant people, and people with certain health conditions or a weakened immune system are at greatest risk of flu complications. Pneumonia, bronchitis, sinus infections, and ear infections are examples of flu-related complications. If you have a medical condition, such as heart disease, cancer, or diabetes, flu can make it worse. Flu can cause fever and chills, sore throat, muscle aches, fatigue, cough, headache, and runny or stuffy nose. Some people may have vomiting and diarrhea, though this is more common in children than adults. In an average year, thousands of people in the [facility] die from flu, and many more are hospitalized. Flu vaccine prevents millions of illnesses and flu-related visits to the doctor each year. 2. Influenza vaccines     CDC recommends everyone 6 months and older get vaccinated every flu season. Children 6 months through 6years of age may need 2 doses during a single flu season. Everyone else needs only 1 dose each flu season. It takes about 2 weeks for protection to develop after vaccination. There are many flu viruses, and they are always changing.  Each year a new flu vaccine is made to protect against the influenza viruses believed to be likely to cause disease in the upcoming flu season. Even when the vaccine doesnt exactly match these viruses, it may still provide some protection. Influenza vaccine does not cause flu. Influenza vaccine may be given at the same time as other vaccines. 3. Talk with your health care provider    Tell your vaccination provider if the person getting the vaccine:   Has had an allergic reaction after a previous dose of influenza vaccine, or has any severe, life-threatening allergies    Has ever had Guillain-Barré Syndrome (also called GBS)    In some cases, your health care provider may decide to postpone influenza vaccination until a future visit. Influenza vaccine can be administered at any time during pregnancy. People who are or will be pregnant during influenza season should receive inactivated influenza vaccine. People with minor illnesses, such as a cold, may be vaccinated. People who are moderately or severely ill should usually wait until they recover before getting influenza vaccine. Your health care provider can give you more information. 4. Risks of a vaccine reaction     Soreness, redness, and swelling where the shot is given, fever, muscle aches, and headache can happen after influenza vaccination.  There may be a very small increased risk of Guillain-Barré Syndrome (GBS) after inactivated influenza vaccine (the flu shot). [facility] who get the flu shot along with pneumococcal vaccine (PCV13) and/or DTaP vaccine at the same time might be slightly more likely to have a seizure caused by fever. Tell your health care provider if a child who is getting flu vaccine has ever had a seizure. People sometimes faint after medical procedures, including vaccination. Tell your provider if you feel dizzy or have vision changes or ringing in the ears.     As with any medicine, there is a very remote chance of a vaccine causing a severe allergic reaction, other serious injury, or death. 5. What if there is a serious problem? An allergic reaction could occur after the vaccinated person leaves the clinic. If you see signs of a severe allergic reaction (hives, swelling of the face and throat, difficulty breathing, a fast heartbeat, dizziness, or weakness), call 9-1-1 and get the person to the nearest hospital.    For other signs that concern you, call your health care provider. Adverse reactions should be reported to the Vaccine Adverse Event Reporting System (VAERS). Your health care provider will usually file this report, or you can do it yourself. Visit the VAERS website at www.vaers. [facility].gov or call 9-255.795.7230. VAERS is only for reporting reactions, and VAERS staff members do not give medical advice. 6. The National Vaccine Injury Compensation Program    The [facility] Vaccine Injury Compensation Program (VICP) is a federal program that was created to compensate people who may have been injured by certain vaccines. Claims regarding alleged injury or death due to vaccination have a time limit for filing, which may be as short as two years. Visit the VICP website at www.hrsa.gov/vaccinecompensation or call 2-215.197.7200 to learn about the program and about filing a claim. 7. How can I learn more?  Ask your health care provider.  Call your local or state health department.  Visit the website of the Food and Drug Administration (FDA) for vaccine package inserts and additional information at www.fda.gov/vaccines-blood-biologics/vaccines.  Contact the Centers for Disease Control and Prevention (CDC):  - Call 7-146.782.4899 (1-864-WCQ-INFO) or  - Visit CDCs influenza website at www.cdc.gov/flu. Vaccine Information Statement   Inactivated Influenza Vaccine   8/6/2021  42 [facility] 300TT-68   Department of Health and Human Services  Centers for Disease Control and Prevention    Office Use Only

## 2021-09-28 NOTE — PROGRESS NOTES
Pharmacy Progress Note - Diabetes Management    Assessment / Plan:   Diabetes Management:  - Per ADA guidelines, Pt's A1c is at goal of < 7%. POC A1c today was 6.9%.   - CGM trends are close to goal TIR >70%. No sx hypoglycemia.    - Continue to hold Humalog.  - Continue Tresiba 10 units daily. - Continue Jardiance 25 mg daily. - Continue metformin 500 mg ER - 2 tabs daily. Check: Vitals and Weight at the next visit. S/O: Ms. Colette Ortega is a 74 y.o. female, referred by Dr. Che Green DO, with a PMH of T2DM, HTN, HLD, Hypothyroidism, OAB, IBS, Osteoporosis, was seen today for diabetes management follow up. Patient's last A1c was 6.9% (Sept 2021), 6.3% (June 2021), 8% (Oct 2020), a decrease from 8.4% (June 2020), an increase from 7.2% (Dec 2019).      Interim update:   - Appt with Dr. Delano Marroquin today  - Had recent eye exam - no issues  - Reports stopping Lamisil 250 mg two weeks ago - skin surrounding toe nails resolved, no symptoms  - Started taking Zinc gummies  - Requests refill of Jardiance, has 1 month supply left    Current anti-hyperglycemic regimen include(s):    - Tresiba 10 units daily -- gives at 12 pm  - Jardiance 25 mg daily  - Metformin 500 mg ER - 2 tabs daily    - Ozempic on hold.  Humalog on hold -- gave 2 units of Humalog once when BG >300    Lisinopril 5 mg daily, Lasix 20 mg daily, pravastatin 20 mg nightly    ROS:  Today, Pt endorses:  - Symptoms of Hyperglycemia: none  - Symptoms of Hypoglycemia: none    Self Monitoring Blood Glucose (SMBG) or CGM:  - Brought in [facility] 2 CGM  - Conducts/scans: 5x/day   - Values in past week range from 93 - 278  - Highest BG value 328 (9/16) -- used 2 units Humalog  - FBG today was 61, no symptoms of hypoglycemia; reports no symptoms at BG 97 (9/19), 93 (9/21)    Average 12 am - 6 am 6 am - 12 pm  12 pm - 6 pm 6 pm - 12 am TIR TAR   7-day 173 133 160 160 73% 27%   14-day 182 135 166 182 69% 31%   30-day 179 132 163 179 67% 33% Nutrition/Lifestyle Modifications:  - Eats 3 meals/day ; reports appetite has improved  - Lunch: hamburger steak, mashed potatoes, green beans, 2 slices toast  - Dinner: chicken salad with crackers, potato chips; frozen dinners  - Beverage(s): water, sugar-free fruit flavored drinks  - Endorses eating more carbs recently    - Physical Activity:   - Plans to increase physical activity.   - Reports gardening, dancing. The 10-year ASCVD risk score (Ashanti Gaffney et al., 2013) is: 16.3%    Values used to calculate the score:      Age: 74 years      Sex: Female      Is Non- : No      Diabetic: Yes      Tobacco smoker: No      Systolic Blood Pressure: 023 mmHg      Is BP treated: Yes      HDL Cholesterol: 71 MG/DL      Total Cholesterol: 175 MG/DL     Vitals: Wt Readings from Last 3 Encounters:   09/28/21 182 lb (82.6 kg)   09/28/21 182 lb (82.6 kg)   08/24/21 179 lb (81.2 kg)     BP Readings from Last 3 Encounters:   09/28/21 126/67   09/28/21 126/67   08/24/21 123/67     Pulse Readings from Last 3 Encounters:   09/28/21 89   09/28/21 89   08/24/21 89       Past Medical History:   Diagnosis Date    Arthritis     Depression     [facility]     Hypercholesteremia     Hypertension     Hyperthyroidism     IBS (irritable bowel syndrome)     Liver disease     hepatitis b    PUD (peptic ulcer disease)     bleeding ulcer    Sleep apnea     CPAP    Urinary incontinence      Allergies   Allergen Reactions    Celebrex [Celecoxib] Other (comments)     Hallucinations    Codeine Nausea and Vomiting    Erythromycin Nausea and Vomiting       Current Outpatient Medications   Medication Sig    Tresiba FlexTouch U-100 100 unit/mL (3 mL) inpn 10 Units by SubCUTAneous route daily. Indications: type 2 diabetes mellitus    metFORMIN ER (GLUCOPHAGE XR) 500 mg tablet Take 2 Tablets by mouth daily (with breakfast).     levothyroxine (Synthroid) 300 mcg tablet Take 300 mcg by mouth Daily (before breakfast). 6 days 300 mcg, 1 day 150 mcg    trospium (SANCTURA XL) 60 mg capsule Take 1 Capsule by mouth Daily (before breakfast).  meloxicam (MOBIC) 15 mg tablet Take 1 Tab by mouth daily.  lisinopriL (PRINIVIL, ZESTRIL) 5 mg tablet TAKE 1 TABLET DAILY    nystatin (MYCOSTATIN) powder Apply  to affected area four (4) times daily.  HumaLOG KwikPen Insulin 100 unit/mL kwikpen 3-4 Units by SubCUTAneous route Before breakfast, lunch, and dinner. Give 3 units for blood sugar between 150-200; give 4 units for blood sugar 200-250. Indications: type 2 diabetes mellitus (Patient taking differently: 2 Units by SubCUTAneous route Before breakfast, lunch, and dinner. Give 3 units for blood sugar between 150-200; give 4 units for blood sugar 200-250. Indications: type 2 diabetes mellitus)    Jardiance 25 mg tablet Take 1 Tab by mouth daily.  ALPRAZolam (XANAX) 0.25 mg tablet Take 1 Tab by mouth daily as needed for Anxiety. Indications: anxious    buPROPion XL (WELLBUTRIN XL) 300 mg XL tablet Take 1 Tab by mouth every morning.  FreeStyle Julien 2 Sensor kit 1 Each by Other route See Salvatore Shaw. Use to scan sensor three times daily    pravastatin (PRAVACHOL) 20 mg tablet Take 20 mg by mouth nightly.  furosemide (LASIX) 20 mg tablet TAKE 1 TABLET DAILY AS NEEDED FOR EDEMA (Patient taking differently: Take 20 mg by mouth daily as needed.)    NOVOFINE PLUS 32 gauge x 1/6\" ndle Check sugars 4x daily.  calcium polycarbophil (FIBER LAXATIVE, CA POLYCARBO,) 625 mg tablet Take 625 mg by mouth daily.  multivitamin (ONE A DAY) tablet Take 1 Tab by mouth daily.  linaclotide (LINZESS) 290 mcg cap capsule Take 290 mcg by mouth Daily (before breakfast).  acetaminophen (TYLENOL) 500 mg tablet Take 1 Tab by mouth every six (6) hours as needed for Pain.  cholecalciferol (VITAMIN D3) 1,000 unit tablet Take 5,000 Units by mouth daily.     B.infantis-B.ani-B.long-B.bifi (PROBIOTIC 4X) 10-15 mg TbEC Take 2 Gum by mouth daily. No current facility-administered medications for this visit. Lab Results   Component Value Date/Time    Sodium 141 01/06/2021 11:02 AM    Potassium 4.2 01/06/2021 11:02 AM    Chloride 104 01/06/2021 11:02 AM    CO2 24 01/06/2021 11:02 AM    Anion gap 6 01/10/2019 08:37 AM    Glucose 204 (H) 01/06/2021 11:02 AM    BUN 19 01/06/2021 11:02 AM    Creatinine 1.20 (H) 01/06/2021 11:02 AM    BUN/Creatinine ratio 16 01/06/2021 11:02 AM    GFR est AA 54 (L) 01/06/2021 11:02 AM    GFR est non-AA 47 (L) 01/06/2021 11:02 AM    Calcium 9.5 01/06/2021 11:02 AM    Bilirubin, total 0.4 01/06/2021 11:02 AM    Alk. phosphatase 75 01/06/2021 11:02 AM    Protein, total 6.8 01/06/2021 11:02 AM    Albumin 4.4 01/06/2021 11:02 AM    Globulin 3.8 01/10/2019 08:37 AM    A-G Ratio 1.8 01/06/2021 11:02 AM    ALT (SGPT) 15 01/06/2021 11:02 AM       Lab Results   Component Value Date/Time    Cholesterol, total 175 06/16/2021 12:48 PM    HDL Cholesterol 71 06/16/2021 12:48 PM    LDL, calculated 80.2 06/16/2021 12:48 PM    VLDL, calculated 23.8 06/16/2021 12:48 PM    Triglyceride 119 06/16/2021 12:48 PM    CHOL/HDL Ratio 2.5 06/16/2021 12:48 PM       Lab Results   Component Value Date/Time    WBC 8.0 06/16/2021 12:48 PM    HGB 12.9 06/16/2021 12:48 PM    HCT 38.8 06/16/2021 12:48 PM    PLATELET 324 70/60/8548 12:48 PM    MCV 92.2 06/16/2021 12:48 PM       Lab Results   Component Value Date/Time    Microalb/Creat ratio (ug/mg creat.) 3 06/23/2020 01:20 PM       HbA1c:  Lab Results   Component Value Date/Time    Hemoglobin A1c 6.3 (H) 06/16/2021 12:48 PM    Hemoglobin A1c (POC) 6.9 (A) 09/28/2021 03:36 PM     No components found for: 2     Last Point of Care HGB A1C  Hemoglobin A1c (POC)   Date Value Ref Range Status   09/28/2021 6.9 (A) 4.8 - 5.6 % Final        CrCl cannot be calculated (Patient's most recent lab result is older than the maximum 180 days allowed. ).       Medication reconciliation was completed during the visit. There are no discontinued medications. Patient verbalized understanding of the information presented and all of the patients questions were answered. AVS was handed to the patient. Patient advised to call the office with any additional questions or concerns. Notifications of recommendations will be sent to Dr. Joey Mayo DO for review. Patient will return to clinic in 12 week(s) for follow up.      Thank you for the consult,  Jayleen PerezD  PGY-1 Pharmacy Resident

## 2021-09-28 NOTE — PROGRESS NOTES
Yolis Christensen is a 74 y.o. female who presents for evaluation of routine follow up for dm and EFFIE. Last seen by me Arianna Buckner in aw. Was having cholelithiasis then. Had lap melony June 21. Has done well since then. Appetite is back to normal.   Biggest issue is her anxiety and depression. Having tough time takign care of her BF, also my patient. His vision and hearing is worse, and he seems to be struggling with dementia. He has an appt in oct with neurology.       ROS:  Constitutional: negative for fevers, chills, anorexia and weight loss  Eyes:   negative for visual disturbance and irritation  ENT:   negative for tinnitus,sore throat,nasal congestion,ear pain,hoarseness  Respiratory:  negative for cough, hemoptysis, dyspnea,wheezing  CV:   negative for chest pain, palpitations, lower extremity edema  GI:   negative for nausea, vomiting, diarrhea, abdominal pain,melena  Genitourinary: negative for frequency, dysuria and hematuria  Musculoskel: negative for myalgias, arthralgias, back pain, muscle weakness, joint pain  Neurological:  negative for headaches, dizziness, focal weakness, numbness  Psychiatric:     ++ for depression or anxiety      Past Medical History:   Diagnosis Date    Arthritis     Depression     Diabetes ([facility]     Hypercholesteremia     Hypertension     Hyperthyroidism     IBS (irritable bowel syndrome)     Liver disease     hepatitis b    PUD (peptic ulcer disease)     bleeding ulcer    Sleep apnea     CPAP    Urinary incontinence        Past Surgical History:   Procedure Laterality Date    HX BACK SURGERY      x3    HX BLEPHAROPLASTY Right     HX BUNIONECTOMY      HX CHOLECYSTECTOMY  06/21/2021    HX HYSTERECTOMY      HX OTHER SURGICAL      Collapsed lung    HX WISDOM TEETH EXTRACTION         Family History   Problem Relation Age of Onset    Diabetes Mother     Heart Disease Mother     Cancer Father         pancreatic    Diabetes Sister     Anxiety Sister  Diabetes Brother     Anxiety Brother     Diabetes Brother     Anxiety Brother     Diabetes Sister     Anxiety Sister     Diabetes Sister     Anxiety Sister     Cancer Sister         lung and brain    Anxiety Sister     Anxiety Sister     Breast Cancer Paternal Aunt         under 48       Social History     Socioeconomic History    Marital status: SINGLE     Spouse name: Not on file    Number of children: Not on file    Years of education: Not on file    Highest education level: Not on file   Occupational History    Not on file   Tobacco Use    Smoking status: Never Smoker    Smokeless tobacco: Never Used   Substance and Sexual Activity    Alcohol use: Yes     Comment: occasionally    Drug use: No    Sexual activity: Not Currently   Other Topics Concern    Not on file   Social History Narrative    Not on file     Social Determinants of Health     Financial Resource Strain:     Difficulty of Paying Living Expenses:    Food Insecurity:     Worried About Running Out of Food in the Last Year:     [address] in the Last Year:    Transportation Needs:     Lack of Transportation (Medical):      Lack of Transportation (Non-Medical):    Physical Activity:     Days of Exercise per Week:     Minutes of Exercise per Session:    Stress:     Feeling of Stress :    Social Connections:     Frequency of Communication with Friends and Family:     Frequency of Social Gatherings with Friends and Family:     Attends Mandaeism Services:     Active Member of Clubs or Organizations:     Attends Club or Organization Meetings:     Marital Status:    Intimate Partner Violence:     Fear of Current or Ex-Partner:     Emotionally Abused:     Physically Abused:     Sexually Abused:             Visit Vitals  /67 (BP 1 Location: Right arm, BP Patient Position: Sitting)   Pulse 89   Temp 98.4 °F (36.9 °C) (Temporal)   Ht 5' 5\" (1.651 m)   Wt 182 lb (82.6 kg)   SpO2 97%   BMI 30.29 kg/m² Physical Examination:   General - Well appearing female  HEENT - PERRL, TM no erythema/opacification, normal nasal turbinates, no oropharyngeal erythema or exudate, MMM  Neck - supple, no bruits, no thyroidomegaly, no lymphadenopathy  Pulm - clear to auscultation bilaterally  Cardio - RRR, normal S1 S2, no murmur  Abd - soft, nontender, no masses, no HSM  Extrem - no edema, +2 distal pulses  Neuro-  No focal deficits, CN intact     Assessment/Plan:    1. EFFIE--rx to start viibryd. Wean off wellbutrin, take full dose in first week overlap with viibryd, then half dose for a week, then stop. 2.  Dm, type 2--a1c today is 6.9. Continue jardiance, tresiba, glucophage. ozempic on hold  3.  hyperlipids--on pravachol  4.  ibs-constipation--continue linzess  5. Hypothyroid--rx sent in for synthroid  6.  oab--on sanctura    High dose flu shot given today.   rtc 3 months        Dino Fuller III, DO

## 2021-10-22 ENCOUNTER — TELEPHONE (OUTPATIENT)
Dept: INTERNAL MEDICINE CLINIC | Age: 66
End: 2021-10-22

## 2021-10-22 NOTE — TELEPHONE ENCOUNTER
CLINICAL PHARMACY: ADHERENCE REVIEW  Identified care gap per Juan Jose; fills at [facility]: ACE/ARB adherence    Last Visit: 09/28/2021    Patient also appears to be prescribed:     Metformin ER 500mg Tab  Jardiance 25mg  Pravastatin 20mg      Patient not found in Outcomes MTM    ASSESSMENT  ACE/ARB ADHERENCE    Per [facility]arm[facility]:   Lisinopril 5mg last picked up on 06/18/2021 for 90 day supply. 2 refills remaining. Billed through [employer] has supply    BP Readings from Last 3 Encounters:   09/28/21 126/67   09/28/21 126/67   08/24/21 123/67     CrCl cannot be calculated (Patient's most recent lab result is older than the maximum 180 days allowed. ). DIABETES ADHERENCE    Per [facility]:   Metformin 500mg XR last picked up on 8/19/2021for 90 day supply. 0 refills remaining. Billed through [employer] 25mg [place] [facility]. Last filled 10/1/2021    Lab Results   Component Value Date/Time    Hemoglobin A1c 6.3 (H) 06/16/2021 12:48 PM    Hemoglobin A1c 8.4 (H) 06/23/2020 01:20 PM    Hemoglobin A1c 7.4 (H) 09/25/2019 10:56 AM    Hemoglobin A1c (POC) 6.9 (A) 09/28/2021 03:36 PM    Hemoglobin A1c (POC) 7.2 12/20/2019 08:22 AM     NOTE A1c <9%    STATIN ADHERENCE    Per [facility]:   Pravastatin 20mg last picked up on 06/18/2021 for 90 day supply. 0 refills remaining.  Billed through [facility] refill     Lab Results   Component Value Date/Time    Cholesterol, total 175 06/16/2021 12:48 PM    HDL Cholesterol 71 06/16/2021 12:48 PM    LDL, calculated 80.2 06/16/2021 12:48 PM    VLDL, calculated 23.8 06/16/2021 12:48 PM    Triglyceride 119 06/16/2021 12:48 PM    CHOL/HDL Ratio 2.5 06/16/2021 12:48 PM     ALT (SGPT)   Date Value Ref Range Status   01/06/2021 15 0 - 32 IU/L Final     AST (SGOT)   Date Value Ref Range Status   01/06/2021 13 0 - 40 IU/L Final     The 10-year ASCVD risk score ([place] Mulch., et al., 2013) is: 16.3%    Values used to calculate the score:      Age: 74 years      Sex: Female      Is Non- : No      Diabetic: Yes      Tobacco smoker: No      Systolic Blood Pressure: 049 mmHg      Is BP treated: Yes      HDL Cholesterol: 71 MG/DL      Total Cholesterol: 175 MG/DL     PLAN  The following are interventions that have been identified:  - Patient needs refills for Pravastatin and levothyroxine    Reached patient to review. Called patient to determine where she would like to get prescriptions filled. According to record she  Has scripts at [place] as well as [facility]. She stated that she would prefer to get them from mail order. The following meds marylou new prescriptions sent over to mail order:     Metformin 500mg XR  Pravastatin 20mg    Please reach out to MD and have new scripts sent over for process and filling. Per patient she gets [place] directly from manufacture. Asked if she has supply on hand for all meds and she stated yes. She is running low on Pravastatin, levothyroxine and lisinopril.  Will call and request refills once new scripts have been approved by MD.        Future Appointments   Date Time Provider Cj Love   12/21/2021 10:15 AM Mariaelena Pelayo III, DO [facility] THU VELEZ   12/21/2021 10:30 AM Sanchez Tovar PHARMD [facility] THU [address]  Direct: [phone]  Department, toll free:[phone], Option 2

## 2021-10-25 RX ORDER — METFORMIN HYDROCHLORIDE 500 MG/1
1000 TABLET, EXTENDED RELEASE ORAL
Qty: 180 TABLET | Refills: 3 | Status: SHIPPED | OUTPATIENT
Start: 2021-10-25 | End: 2022-05-25 | Stop reason: ALTCHOICE

## 2021-10-25 RX ORDER — PRAVASTATIN SODIUM 20 MG/1
20 TABLET ORAL
Qty: 90 TABLET | Refills: 3 | Status: SHIPPED | OUTPATIENT
Start: 2021-10-25 | End: 2022-09-16 | Stop reason: SDUPTHER

## 2021-10-25 NOTE — TELEPHONE ENCOUNTER
Called patient and inquired which MD she was seeing for statin. She stated that she sees Dr. Blane Espinoza for everything and he writes for all of her medications. Please reach out to MD for new script for Pravastatin to for mail order.

## 2021-10-25 NOTE — TELEPHONE ENCOUNTER
Dr. Jonathan Mckeon,    Patient would like to change having his metformin refills from [place] to [facility]. I have pended prescription refill for your convenience if you agree. LOV: 9/28/21    Thank you,  Alex Long, PharmD, [facility], [place] [address] Pharmacist  Department, toll free: [phone], option 2  ====================================================     Per chart review, patient has refills for levothyroxine and lisinopril at [facility] pharmacy. Will request metformin ER refill to [facility]. Per [place] report, pravastatin last prescribed by endocrinology Laura JOHNSON. Filled only one time this year in February. Does patient still follow with this provider for refills?

## 2021-10-25 NOTE — TELEPHONE ENCOUNTER
Dr. Amando Villafana,    Patient's pravastatin last written by Adenike JOHNSON and she is out of refills. Per , patient states you write for all her medications now. I have pended pravastatin refill for your convenience if you agree.     LOV: 9/28/21    Thank you,  Kang Monteiro, PharmD, [facility], [address] Pharmacist  Department, toll free: [phone], option 2      Lab Results   Component Value Date/Time    Cholesterol, total 175 06/16/2021 12:48 PM    HDL Cholesterol 71 06/16/2021 12:48 PM    LDL, calculated 80.2 06/16/2021 12:48 PM    VLDL, calculated 23.8 06/16/2021 12:48 PM    Triglyceride 119 06/16/2021 12:48 PM    CHOL/HDL Ratio 2.5 06/16/2021 12:48 PM     ALT (SGPT)   Date Value Ref Range Status   01/06/2021 15 0 - 32 IU/L Final     AST (SGOT)   Date Value Ref Range Status   01/06/2021 13 0 - 40 IU/L Final     The 10-year ASCVD risk score (Parker Healy, et al., 2013) is: 16.3%    Values used to calculate the score:      Age: 74 years      Sex: Female      Is Non- : No      Diabetic: Yes      Tobacco smoker: No      Systolic Blood Pressure: 061 mmHg      Is BP treated: Yes      HDL Cholesterol: 71 MG/DL      Total Cholesterol: 175 MG/DL     For Pharmacy Admin Tracking Only     CPA in place: No   Recommendation Provided To: Provider: 1 via Note to Provider    Intervention Detail: Refill(s) Provided   Gap Closed?: Yes   Intervention Accepted By: Provider: 1   Time Spent (min): 10

## 2021-10-26 NOTE — TELEPHONE ENCOUNTER
Writer spoke with pharmacist from [address] [place] [address] at this time. Pharmacy wanting to verify Levothyroxine dosage at this time. Informed pharmacy that dosage is 300 mcg at this time. Pharmacy understood the information received. Provided order # [id number] at this time. No further actions required at this time.

## 2021-10-26 NOTE — TELEPHONE ENCOUNTER
Dr. Mil Saez,    Per , [facility] has a question regarding levothyroxine prescription and have not sent out refill yet until clarify with office. Is somebody from office able to reach out to [facility] pharmacy to clarify levothyroxine prescription for patient? Thank you!   Jorje Olguin, PharmD, [facility], [facility]  Department, toll free: [phone], option 2

## 2021-10-26 NOTE — TELEPHONE ENCOUNTER
Called pharmacy and requested refills for levothyroxine and lisinopril. Pharmacy stated that clarification was needed for the instructions for the levothyroxine and would not send it until they spoke with [facility]. Script is on hold for now as they have been unsuccessful in speaking with them. Please reach out to MD for clarification of sig and have new script sent over to mail order for processing.

## 2021-10-28 ENCOUNTER — PATIENT MESSAGE (OUTPATIENT)
Dept: INTERNAL MEDICINE CLINIC | Age: 66
End: 2021-10-28

## 2021-10-28 DIAGNOSIS — Z46.1 HEARING AID FITTING OR ADJUSTMENT: Primary | ICD-10-CM

## 2021-10-29 ENCOUNTER — TELEPHONE (OUTPATIENT)
Dept: INTERNAL MEDICINE CLINIC | Age: 66
End: 2021-10-29

## 2021-10-29 RX ORDER — LEVOTHYROXINE SODIUM 300 UG/1
300 TABLET ORAL
Qty: 90 TABLET | Refills: 3 | Status: CANCELLED | OUTPATIENT
Start: 2021-10-29

## 2021-10-29 NOTE — TELEPHONE ENCOUNTER
----- Message from Jan Gorman sent at 10/29/2021  1:37 PM EDT -----  Subject: Message to Provider    QUESTIONS  Information for Provider? Had a call this morning and would like a call   back   ---------------------------------------------------------------------------  --------------  CALL BACK INFO  What is the best way for the office to contact you? OK to leave message on   voicemail  Preferred Call Back Phone Number? [phone]  ---------------------------------------------------------------------------  --------------  SCRIPT ANSWERS  Relationship to Patient?  Self

## 2021-10-29 NOTE — TELEPHONE ENCOUNTER
I have attempted without success to contact this patient by phone. Unable to reach patient at this time. Left generic message for patient to return call to office at earliest convenience. Awaiting call back at this time.

## 2021-10-29 NOTE — TELEPHONE ENCOUNTER
Pt states she is returning your call, but doesn't know what you need. She is sorry she is missing you, but please try her again.

## 2021-11-01 NOTE — TELEPHONE ENCOUNTER
For Pharmacy [address] in place: No   Recommendation Provided To: Provider: 4 via Note to Provider    Intervention Detail: Adherence Monitorin   Gap Closed?: Yes   Intervention Accepted By: Provider: 4   Time Spent (min): 30

## 2021-11-05 RX ORDER — LEVOTHYROXINE SODIUM 300 UG/1
300 TABLET ORAL
Qty: 90 TABLET | Refills: 3 | Status: SHIPPED | OUTPATIENT
Start: 2021-11-05 | End: 2021-12-03 | Stop reason: SDUPTHER

## 2021-11-05 NOTE — TELEPHONE ENCOUNTER
Future Appointments:  Future Appointments   Date Time Provider Cj Kate   12/21/2021 10:15 AM Mami Maldonado DO [facility] BS AMB   12/21/2021 10:30 AM Sanchez Tovar PHARMD [facility] BS AMB        Last Appointment With Me:  9/28/2021     Requested Prescriptions     Pending Prescriptions Disp Refills    levothyroxine (Synthroid) 300 mcg tablet 90 Tablet 3     Sig: Take 1 Tablet by mouth Daily (before breakfast).  6 days 300 mcg, 1 day 150 mcg

## 2021-11-17 ENCOUNTER — DOCUMENTATION ONLY (OUTPATIENT)
Dept: INTERNAL MEDICINE CLINIC | Age: 66
End: 2021-11-17

## 2021-11-17 NOTE — PROGRESS NOTES
Received release of information request from [facility] on 11/15/21. Faxed request to [employer] on 11/17/21.

## 2021-11-26 ENCOUNTER — HOSPITAL ENCOUNTER (EMERGENCY)
Age: 66
Discharge: HOME | End: 2021-11-26
Attending: STUDENT IN AN ORGANIZED HEALTH CARE EDUCATION/TRAINING PROGRAM
Payer: MEDICARE

## 2021-11-26 VITALS
DIASTOLIC BLOOD PRESSURE: 57 MMHG | HEIGHT: 65 IN | WEIGHT: 180 LBS | SYSTOLIC BLOOD PRESSURE: 110 MMHG | BODY MASS INDEX: 29.99 KG/M2 | HEART RATE: 76 BPM | OXYGEN SATURATION: 94 % | TEMPERATURE: 97.6 F | RESPIRATION RATE: 18 BRPM

## 2021-11-26 DIAGNOSIS — N30.00 ACUTE CYSTITIS WITHOUT HEMATURIA: Primary | ICD-10-CM

## 2021-11-26 DIAGNOSIS — M54.50 ACUTE RIGHT-SIDED LOW BACK PAIN WITHOUT SCIATICA: ICD-10-CM

## 2021-11-26 LAB
ALBUMIN SERPL-MCNC: 3.4 G/DL (ref 3.5–5)
ALBUMIN/GLOB SERPL: 1.2 {RATIO} (ref 1.1–2.2)
ALP SERPL-CCNC: 50 U/L (ref 45–117)
ALT SERPL-CCNC: 18 U/L (ref 12–78)
ANION GAP SERPL CALC-SCNC: 5 MMOL/L (ref 5–15)
APPEARANCE UR: ABNORMAL
AST SERPL-CCNC: 7 U/L (ref 15–37)
BACTERIA URNS QL MICRO: ABNORMAL /HPF
BILIRUB SERPL-MCNC: 0.4 MG/DL (ref 0.2–1)
BILIRUB UR QL: NEGATIVE
BUN SERPL-MCNC: 23 MG/DL (ref 6–20)
BUN/CREAT SERPL: 21 (ref 12–20)
CALCIUM SERPL-MCNC: 9.2 MG/DL (ref 8.5–10.1)
CHLORIDE SERPL-SCNC: 110 MMOL/L (ref 97–108)
CO2 SERPL-SCNC: 27 MMOL/L (ref 21–32)
COLOR UR: ABNORMAL
CREAT SERPL-MCNC: 1.09 MG/DL (ref 0.55–1.02)
EPITH CASTS URNS QL MICRO: ABNORMAL /LPF
GLOBULIN SER CALC-MCNC: 2.9 G/DL (ref 2–4)
GLUCOSE SERPL-MCNC: 139 MG/DL (ref 65–100)
GLUCOSE UR STRIP.AUTO-MCNC: >1000 MG/DL
HGB UR QL STRIP: ABNORMAL
HYALINE CASTS URNS QL MICRO: ABNORMAL /LPF (ref 0–5)
KETONES UR QL STRIP.AUTO: NEGATIVE MG/DL
LEUKOCYTE ESTERASE UR QL STRIP.AUTO: ABNORMAL
NITRITE UR QL STRIP.AUTO: POSITIVE
PH UR STRIP: 5.5 [PH] (ref 5–8)
POTASSIUM SERPL-SCNC: 4.3 MMOL/L (ref 3.5–5.1)
PROT SERPL-MCNC: 6.3 G/DL (ref 6.4–8.2)
PROT UR STRIP-MCNC: NEGATIVE MG/DL
RBC #/AREA URNS HPF: ABNORMAL /HPF (ref 0–5)
SODIUM SERPL-SCNC: 142 MMOL/L (ref 136–145)
SP GR UR REFRACTOMETRY: 1.02 (ref 1–1.03)
UA: UC IF INDICATED,UAUC: ABNORMAL
UROBILINOGEN UR QL STRIP.AUTO: 0.2 EU/DL (ref 0.2–1)
WBC URNS QL MICRO: >100 /HPF (ref 0–4)

## 2021-11-26 PROCEDURE — 81001 URINALYSIS AUTO W/SCOPE: CPT

## 2021-11-26 PROCEDURE — 74011000250 HC RX REV CODE- 250: Performed by: STUDENT IN AN ORGANIZED HEALTH CARE EDUCATION/TRAINING PROGRAM

## 2021-11-26 PROCEDURE — 87077 CULTURE AEROBIC IDENTIFY: CPT

## 2021-11-26 PROCEDURE — 96372 THER/PROPH/DIAG INJ SC/IM: CPT

## 2021-11-26 PROCEDURE — 36415 COLL VENOUS BLD VENIPUNCTURE: CPT

## 2021-11-26 PROCEDURE — 87086 URINE CULTURE/COLONY COUNT: CPT

## 2021-11-26 PROCEDURE — 99283 EMERGENCY DEPT VISIT LOW MDM: CPT

## 2021-11-26 PROCEDURE — 96374 THER/PROPH/DIAG INJ IV PUSH: CPT

## 2021-11-26 PROCEDURE — 74011250636 HC RX REV CODE- 250/636: Performed by: STUDENT IN AN ORGANIZED HEALTH CARE EDUCATION/TRAINING PROGRAM

## 2021-11-26 PROCEDURE — 80053 COMPREHEN METABOLIC PANEL: CPT

## 2021-11-26 PROCEDURE — 74011250637 HC RX REV CODE- 250/637: Performed by: STUDENT IN AN ORGANIZED HEALTH CARE EDUCATION/TRAINING PROGRAM

## 2021-11-26 PROCEDURE — 87186 SC STD MICRODIL/AGAR DIL: CPT

## 2021-11-26 RX ORDER — LIDOCAINE 4 G/100G
1 PATCH TOPICAL EVERY 24 HOURS
Status: DISCONTINUED | OUTPATIENT
Start: 2021-11-26 | End: 2021-11-26 | Stop reason: HOSPADM

## 2021-11-26 RX ORDER — DIAZEPAM 5 MG/1
2.5 TABLET ORAL
Qty: 20 TABLET | Refills: 0 | Status: SHIPPED | OUTPATIENT
Start: 2021-11-26 | End: 2021-12-03

## 2021-11-26 RX ORDER — CEFDINIR 300 MG/1
300 CAPSULE ORAL 2 TIMES DAILY
Qty: 14 CAPSULE | Refills: 0 | Status: SHIPPED | OUTPATIENT
Start: 2021-11-26 | End: 2021-11-26 | Stop reason: SDUPTHER

## 2021-11-26 RX ORDER — LIDOCAINE 4 G/100G
1 PATCH TOPICAL EVERY 12 HOURS
Qty: 5 PATCH | Refills: 2 | Status: SHIPPED | OUTPATIENT
Start: 2021-11-26 | End: 2021-11-26 | Stop reason: SDUPTHER

## 2021-11-26 RX ORDER — DIAZEPAM 10 MG/2ML
5 INJECTION INTRAMUSCULAR
Status: COMPLETED | OUTPATIENT
Start: 2021-11-26 | End: 2021-11-26

## 2021-11-26 RX ORDER — KETOROLAC TROMETHAMINE 10 MG/1
10 TABLET, FILM COATED ORAL
Qty: 15 TABLET | Refills: 0 | Status: SHIPPED | OUTPATIENT
Start: 2021-11-26 | End: 2021-11-26 | Stop reason: SDUPTHER

## 2021-11-26 RX ORDER — KETOROLAC TROMETHAMINE 10 MG/1
10 TABLET, FILM COATED ORAL
Qty: 15 TABLET | Refills: 0 | Status: SHIPPED | OUTPATIENT
Start: 2021-11-26 | End: 2022-01-21 | Stop reason: ALTCHOICE

## 2021-11-26 RX ORDER — CEFDINIR 300 MG/1
300 CAPSULE ORAL
Status: COMPLETED | OUTPATIENT
Start: 2021-11-26 | End: 2021-11-26

## 2021-11-26 RX ORDER — CEFDINIR 300 MG/1
300 CAPSULE ORAL 2 TIMES DAILY
Qty: 14 CAPSULE | Refills: 0 | Status: SHIPPED | OUTPATIENT
Start: 2021-11-26 | End: 2021-12-03

## 2021-11-26 RX ORDER — KETOROLAC TROMETHAMINE 30 MG/ML
30 INJECTION, SOLUTION INTRAMUSCULAR; INTRAVENOUS
Status: COMPLETED | OUTPATIENT
Start: 2021-11-26 | End: 2021-11-26

## 2021-11-26 RX ORDER — LIDOCAINE 4 G/100G
1 PATCH TOPICAL EVERY 12 HOURS
Qty: 5 PATCH | Refills: 2 | Status: SHIPPED | OUTPATIENT
Start: 2021-11-26 | End: 2022-04-12

## 2021-11-26 RX ADMIN — DIAZEPAM 5 MG: 5 INJECTION, SOLUTION INTRAMUSCULAR; INTRAVENOUS at 13:45

## 2021-11-26 RX ADMIN — CEFDINIR 300 MG: 300 CAPSULE ORAL at 13:44

## 2021-11-26 RX ADMIN — KETOROLAC TROMETHAMINE 30 MG: 30 INJECTION, SOLUTION INTRAMUSCULAR at 13:44

## 2021-11-26 NOTE — DISCHARGE INSTRUCTIONS
It was a pleasure taking care of you at [facility] Emergency Department today. We know that when you come to [facility], you are entrusting us with your health, comfort, and safety. Our physicians and nurses honor that trust, and we truly appreciate the opportunity to care for you and your loved ones. We also value your feedback. If you receive a survey about your Emergency Department experience today, please fill it out. We care about our patients' feedback, and we listen to what you have to say. Thank you!     Michi Bolivar MD

## 2021-11-26 NOTE — ED PROVIDER NOTES
EMERGENCY DEPARTMENT HISTORY AND PHYSICAL EXAM      Date: 11/26/2021  Patient Name: Alf Younger    History of Presenting Illness     Chief Complaint   Patient presents with    Back Pain     Pt arrives ambulatory to triage with CC of lower back pain x 2 weeks. Patient reports this pain has now limited her avaliablity to walk. Patient denies injury but reports hx of back surgery. Pt reports increased urination in the past two weeks. History Provided By: Patient    HPI: Alf Younger, 74 y.o. female with past medical history of hypertension, type 2 diabetes, hyperlipidemia, ROD on CPAP, generalized anxiety disorder, IBS, arthritis, obesity, depression, osteoporosis, reported back surgery for osteoarthritis/DJD of the spine x3 in remote past, presents to the ED with cc of right-sided low back pain that has been progressive for the past 2 to 3 weeks. She reports that this pain has become so severe with minor movement that she is concerned with her ability to ambulate. She denies actual weakness or numbness in her bilateral lower extremities, and was actually ambulatory independently into the ED today. She denies any direct trauma to the back or any exacerbating activities prior to onset of current symptoms. Separately, she is also concerned for the possibility of a UTI as she has been experiencing urinary frequency over the same period of time. Denies any dysuria or hematuria. No increased urgency. Patient denies abdominal pain, nausea, vomiting, fevers or chills. No previous history of pyelonephritis. No previous history of kidney stones. PCP: Abhi Howe, DO    No current facility-administered medications on file prior to encounter. Current Outpatient Medications on File Prior to Encounter   Medication Sig Dispense Refill    levothyroxine (Synthroid) 300 mcg tablet Take 1 Tablet by mouth Daily (before breakfast).  6 days 300 mcg, 1 day 150 mcg 90 Tablet 3    metFORMIN ER (GLUCOPHAGE XR) 500 mg tablet Take 2 Tablets by mouth daily (with breakfast). 180 Tablet 3    pravastatin (PRAVACHOL) 20 mg tablet Take 1 Tablet by mouth nightly. 90 Tablet 3    vilazodone (VIIBRYD) 20 mg tab tablet Take 1 Tablet by mouth daily. 90 Tablet 3    Tresiba FlexTouch U-100 100 unit/mL (3 mL) inpn 10 Units by SubCUTAneous route daily. Indications: type 2 diabetes mellitus 15 mL 1    trospium (SANCTURA XL) 60 mg capsule Take 1 Capsule by mouth Daily (before breakfast). 90 Capsule 3    meloxicam (MOBIC) 15 mg tablet Take 1 Tab by mouth daily. 60 Tab 1    lisinopriL (PRINIVIL, ZESTRIL) 5 mg tablet TAKE 1 TABLET DAILY 90 Tab 3    nystatin (MYCOSTATIN) powder Apply  to affected area four (4) times daily. 30 g 2    HumaLOG KwikPen Insulin 100 unit/mL kwikpen 3-4 Units by SubCUTAneous route Before breakfast, lunch, and dinner. Give 3 units for blood sugar between 150-200; give 4 units for blood sugar 200-250. Indications: type 2 diabetes mellitus (Patient taking differently: 2 Units by SubCUTAneous route Before breakfast, lunch, and dinner. Give 3 units for blood sugar between 150-200; give 4 units for blood sugar 200-250. Indications: type 2 diabetes mellitus) 15 mL 0    Jardiance 25 mg tablet Take 1 Tab by mouth daily. 30 Tab 2    ALPRAZolam (XANAX) 0.25 mg tablet Take 1 Tab by mouth daily as needed for Anxiety. Indications: anxious 30 Tab 0    buPROPion XL (WELLBUTRIN XL) 300 mg XL tablet Take 1 Tab by mouth every morning. 90 Tab 3    FreeStyle Julien 2 Sensor kit 1 Each by Other route See Salvatore Shaw. Use to scan sensor three times daily      furosemide (LASIX) 20 mg tablet TAKE 1 TABLET DAILY AS NEEDED FOR EDEMA (Patient taking differently: Take 20 mg by mouth daily as needed.) 90 Tab 3    NOVOFINE PLUS 32 gauge x 1/6\" ndle Check sugars 4x daily. 300 Pen Needle 3    calcium polycarbophil (FIBER LAXATIVE, CA POLYCARBO,) 625 mg tablet Take 625 mg by mouth daily.       multivitamin (ONE A DAY) tablet Take 1 Tab by mouth daily.  linaclotide (LINZESS) 290 mcg cap capsule Take 290 mcg by mouth Daily (before breakfast).  acetaminophen (TYLENOL) 500 mg tablet Take 1 Tab by mouth every six (6) hours as needed for Pain. 30 Tab 0    cholecalciferol (VITAMIN D3) 1,000 unit tablet Take 5,000 Units by mouth daily.  B.infantis-B.ani-B.long-B.bifi (PROBIOTIC 4X) 10-15 mg TbEC Take 2 Gum by mouth daily. Past History     Past Medical History:  Past Medical History:   Diagnosis Date    Arthritis     Depression     Diabetes ([facility] [address]     Hypercholesteremia     Hypertension     Hyperthyroidism     IBS (irritable bowel syndrome)     Liver disease     hepatitis b    PUD (peptic ulcer disease)     bleeding ulcer    Sleep apnea     CPAP    Urinary incontinence        Past Surgical History:  Past Surgical History:   Procedure Laterality Date    HX BACK SURGERY      x3    HX BLEPHAROPLASTY Right     HX BUNIONECTOMY      HX CHOLECYSTECTOMY  06/21/2021    HX HYSTERECTOMY      HX OTHER SURGICAL      Collapsed lung    HX WISDOM TEETH EXTRACTION         Family History:  Family History   Problem Relation Age of Onset    Diabetes Mother     Heart Disease Mother     Cancer Father         pancreatic    Diabetes Sister     Anxiety Sister     Diabetes Brother     Anxiety Brother     Diabetes Brother     Anxiety Brother     Diabetes Sister     Anxiety Sister     Diabetes Sister     Anxiety Sister     Cancer Sister         lung and brain    Anxiety Sister     Anxiety Sister     Breast Cancer Paternal Aunt         under 48       Social History:  Social History     Tobacco Use    Smoking status: Never Smoker    Smokeless tobacco: Never Used   Substance Use Topics    Alcohol use: Yes     Comment: occasionally    Drug use: No       Allergies:   Allergies   Allergen Reactions    Celebrex [Celecoxib] Other (comments)     Hallucinations    Codeine Nausea and Vomiting    Erythromycin Nausea and Vomiting         Review of Systems   Review of Systems   Constitutional: Negative for chills and fever. HENT: Negative for congestion and rhinorrhea. Eyes: Negative for visual disturbance. Respiratory: Negative for chest tightness and shortness of breath. Cardiovascular: Negative for chest pain and palpitations. Gastrointestinal: Negative for abdominal pain, constipation, diarrhea, nausea and vomiting. Genitourinary: Positive for frequency. Negative for dysuria, flank pain and hematuria. Musculoskeletal: Positive for back pain and myalgias. Negative for neck pain. Skin: Negative for rash. Allergic/Immunologic: Negative for immunocompromised state. Neurological: Negative for dizziness, speech difficulty, weakness and headaches. Hematological: Negative for adenopathy. Psychiatric/Behavioral: Negative for dysphoric mood and suicidal ideas. Physical Exam   Physical Exam  Vitals and nursing note reviewed. Constitutional:       General: She is not in acute distress. Appearance: She is not ill-appearing or toxic-appearing. HENT:      Head: Normocephalic and atraumatic. Nose: Nose normal.      Mouth/Throat:      Mouth: Mucous membranes are moist.   Eyes:      Extraocular Movements: Extraocular movements intact. Pupils: Pupils are equal, round, and reactive to light. Cardiovascular:      Rate and Rhythm: Normal rate and regular rhythm. Pulses: Normal pulses. Pulmonary:      Effort: Pulmonary effort is normal.      Breath sounds: No stridor. No wheezing or rhonchi. Abdominal:      General: Abdomen is flat. There is no distension. Tenderness: There is no abdominal tenderness. There is no right CVA tenderness or left CVA tenderness. Musculoskeletal:      Cervical back: Normal range of motion and neck supple. Lumbar back: Spasms and tenderness present.  Positive right straight leg raise test. Negative left straight leg raise test. Back:    Skin:     General: Skin is warm and dry. Neurological:      General: No focal deficit present. Mental Status: She is alert and oriented to person, place, and time. Psychiatric:         Judgment: Judgment normal.         Diagnostic Study Results     Labs -     Recent Results (from the past 24 hour(s))   URINALYSIS W/ REFLEX CULTURE    Collection Time: 11/26/21 12:07 PM    Specimen: Urine   Result Value Ref Range    Color YELLOW/STRAW      Appearance CLOUDY (A) CLEAR      Specific gravity 1.025 1.003 - 1.030      pH (UA) 5.5 5.0 - 8.0      Protein Negative NEG mg/dL    Glucose >1,000 (A) NEG mg/dL    Ketone Negative NEG mg/dL    Bilirubin Negative NEG      Blood TRACE (A) NEG      Urobilinogen 0.2 0.2 - 1.0 EU/dL    Nitrites Positive (A) NEG      Leukocyte Esterase MODERATE (A) NEG      UA:UC IF INDICATED URINE CULTURE ORDERED (A) CNI      WBC >100 (H) 0 - 4 /hpf    RBC 5-10 0 - 5 /hpf    Epithelial cells MODERATE (A) FEW /lpf    Bacteria 4+ (A) NEG /hpf    Hyaline cast 0-2 0 - 5 /lpf       Radiologic Studies -   No orders to display     CT Results  (Last 48 hours)    None        CXR Results  (Last 48 hours)    None          Medical Decision Making   ISkip MD am the first provider for this patient, and I am the attending of record for this patient encounter. I reviewed the vital signs, available nursing notes, past medical history, past surgical history, family history and social history. Vital Signs-Reviewed the patient's vital signs. Patient Vitals for the past 24 hrs:   Temp Pulse Resp BP SpO2   11/26/21 1152 97.6 °F (36.4 °C) 76 18 138/69 99 %       Records Reviewed: Nursing Notes and Old Medical Records    Provider Notes (Medical Decision Making):   Differential diagnosis considered: Pyelonephritis, UTI, musculoskeletal back pain, kidney stone, DJD, sciatica, radiculopathy, etc.    We will check UA, CMP to evaluate for kidney function.   No indication for imaging as no preceding trauma or exacerbating activities prior to onset of current back pain symptoms. Do not suspect conus medullaris or cauda equina as neurovascular intact of bilateral lower extremities. We will treat patient's pain with Toradol and Valium, will also apply lidocaine patch to right lower back for additional pain control. Will reassess for improvement. UA is consistent with infection. Renal function is actually improved compared to prior. Based on my exam, patient's right-sided back pain is below the level of the CVA. Back pain is also worsened by movement and alleviated by rest.  Completely reproducible. Suspect back pain is more likely secondary to musculoskeletal etiology rather than pyelonephritis. She additionally has no systemic symptoms expected in pyelonephritis including no fevers, chills, abdominal pain, nausea, vomiting, etc. She had complete resolution of back pain symptoms with treatment in the ED. She is given the first dose of Omnicef in the ED. Will send Rx for 7-day course of Omnicef to patient's pharmacy on file, this should cover for complicated UTI along with pyelonephritis despite my clinical suspicion for this diagnosis remaining low. Will provide patient with a few tabs of Toradol for pain control at home, along with Valium and lidocaine patch as this combination helped her tremendously in the ED. She is advised to follow-up with her PCP closely for repeat evaluation. Reasons to return to the ED were discussed. Patient left the ED in stable and improved condition at this time. ED Course:   Initial assessment performed. The patient's presenting problems have been discussed, and they are in agreement with the care plan formulated and outlined with them. I have encouraged them to ask questions as they arise throughout their visit. Ramya Graham MD      Disposition:  Discharge      DISCHARGE PLAN:  1.    Discharge Medication List as of 11/26/2021  4:53 PM      START taking these medications    Details   diazePAM (Valium) 5 mg tablet Take 0.5 Tablets by mouth three (3) times daily as needed for Muscle Spasm(s) (spasm). Max Daily Amount: 7.5 mg., Normal, Disp-20 Tablet, R-0         CONTINUE these medications which have CHANGED    Details   cefdinir (OMNICEF) 300 mg capsule Take 1 Capsule by mouth two (2) times a day for 7 days. , Normal, Disp-14 Capsule, R-0      ketorolac (TORADOL) 10 mg tablet Take 1 Tablet by mouth every six (6) hours as needed for Pain., Normal, Disp-15 Tablet, R-0      lidocaine 4 % patch 1 Patch by TransDERmal route every twelve (12) hours every twelve (12) hours. , Normal, Disp-5 Patch, R-2         CONTINUE these medications which have NOT CHANGED    Details   levothyroxine (Synthroid) 300 mcg tablet Take 1 Tablet by mouth Daily (before breakfast). 6 days 300 mcg, 1 day 150 mcg, Normal, Disp-90 Tablet, R-3      metFORMIN ER (GLUCOPHAGE XR) 500 mg tablet Take 2 Tablets by mouth daily (with breakfast). , Normal, Disp-180 Tablet, R-3      pravastatin (PRAVACHOL) 20 mg tablet Take 1 Tablet by mouth nightly., Normal, Disp-90 Tablet, R-3      vilazodone (VIIBRYD) 20 mg tab tablet Take 1 Tablet by mouth daily. , Normal, Disp-90 Tablet, R-3      Tresiba FlexTouch U-100 100 unit/mL (3 mL) inpn 10 Units by SubCUTAneous route daily. Indications: type 2 diabetes mellitus, No Print, Disp-15 mL, R-1, CIRILO      trospium (SANCTURA XL) 60 mg capsule Take 1 Capsule by mouth Daily (before breakfast). , Normal, Disp-90 Capsule, R-3      lisinopriL (PRINIVIL, ZESTRIL) 5 mg tablet TAKE 1 TABLET DAILY, Normal, Disp-90 Tab, R-3      nystatin (MYCOSTATIN) powder Apply  to affected area four (4) times daily. , Normal, Disp-30 g, R-2      HumaLOG KwikPen Insulin 100 unit/mL kwikpen 3-4 Units by SubCUTAneous route Before breakfast, lunch, and dinner. Give 3 units for blood sugar between 150-200; give 4 units for blood sugar 200-250.   Indications: type 2 diabetes mellitus, No Print, Disp-15 mL, R-0, CIRILO      Jardiance 25 mg tablet Take 1 Tab by mouth daily. , No Print, Disp-30 Tab, R-2, CIRILO      ALPRAZolam (XANAX) 0.25 mg tablet Take 1 Tab by mouth daily as needed for Anxiety. Indications: anxious, Normal, Disp-30 Tab, R-0      buPROPion XL (WELLBUTRIN XL) 300 mg XL tablet Take 1 Tab by mouth every morning., Normal, Disp-90 Tab, R-3      FreeStyle Julien 2 Sensor kit 1 Each by Other route See Salvatore Shaw. Use to scan sensor three times daily, Historical Med, CIRILO      furosemide (LASIX) 20 mg tablet TAKE 1 TABLET DAILY AS NEEDED FOR EDEMA, Normal, Disp-90 Tab,R-3      NOVOFINE PLUS 32 gauge x \" ndle Check sugars 4x daily. , Normal, Disp-300 Pen Needle, R-3, CIRILO      calcium polycarbophil (FIBER LAXATIVE, CA POLYCARBO,) 625 mg tablet Take 625 mg by mouth daily. , Historical Med      multivitamin (ONE A DAY) tablet Take 1 Tab by mouth daily. , Historical Med      linaclotide (LINZESS) 290 mcg cap capsule Take 290 mcg by mouth Daily (before breakfast). , Historical Med      acetaminophen (TYLENOL) 500 mg tablet Take 1 Tab by mouth every six (6) hours as needed for Pain., Normal, Disp-30 Tab, R-0      cholecalciferol (VITAMIN D3) 1,000 unit tablet Take 5,000 Units by mouth daily. , Historical Med      B.infantis-B.ani-B.long-B.bifi (PROBIOTIC 4X) 10-15 mg TbEC Take 2 Gum by mouth daily. , Historical Med         STOP taking these medications       meloxicam (MOBIC) 15 mg tablet Comments:   Reason for Stoppin.   Follow-up Information     Follow up With Specialties Details Why Contact Felisa Smith, DO Internal Medicine In 3 days  [address]  [phone]          3. Return to ED if worse     Diagnosis     Clinical Impression:   1. Acute cystitis without hematuria    2.  Acute right-sided low back pain without sciatica        Attestations:    Yady Casey MD    Please note that this dictation was completed with the[employer] computer voice recognition software. Quite often unanticipated grammatical, syntax, homophones, and other interpretive errors are inadvertently transcribed by the computer software. Please disregard these errors. Please excuse any errors that have escaped final proofreading. Thank you.

## 2021-11-26 NOTE — ED NOTES
Isabella MAKI reviewed discharge instructions with the patient. The patient verbalized understanding. All questions and concerns were addressed. The patient is discharged via wheelchair in the care of family members with instructions and prescriptions in hand. Pt is alert and oriented x 4. Respirations are clear and unlabored.

## 2021-11-28 LAB
BACTERIA SPEC CULT: ABNORMAL
CC UR VC: ABNORMAL
SERVICE CMNT-IMP: ABNORMAL

## 2021-11-29 ENCOUNTER — TELEPHONE (OUTPATIENT)
Dept: INTERNAL MEDICINE CLINIC | Age: 66
End: 2021-11-29

## 2021-11-29 NOTE — TELEPHONE ENCOUNTER
----- Message from [place] sent at 11/26/2021 10:57 AM EST -----  Subject: Message to Provider    QUESTIONS  Information for Provider? Patient Charlyn Severe calling to schedule an   appointment with Dr. Vince Lazo for today. The patient stated she will be   seen in the ER today. Ms. Sarika Patel would like a call back as soon as   possible regarding this message. ---------------------------------------------------------------------------  --------------  Purnima ERNANDEZ  What is the best way for the office to contact you? OK to leave message on   voicemail  Preferred Call Back Phone Number? [phone]  ---------------------------------------------------------------------------  --------------  SCRIPT ANSWERS  Relationship to Patient?  Self

## 2021-12-01 ENCOUNTER — TELEPHONE (OUTPATIENT)
Dept: INTERNAL MEDICINE CLINIC | Age: 66
End: 2021-12-01

## 2021-12-01 NOTE — TELEPHONE ENCOUNTER
Pharmacy Progress Note - Telephone Encounter    S/O: Ms. Vinh Solis 74 y.o. female contacted office/me via an inbound telephone call  today. Verified patients identifiers (name & ) per HIPAA policy.     - Had ED visit on 21 for back pain and UTI. Taking cefdinir 300 mg BID x 7 days for UTI, diazepam 5 mg PRN muscle spasm, and ketorolac 10 mg Q6H PRN pain. - Still taking Tresiba 10 units daily, metformin 1 gm daily, and Jardiance 25 mg daily  - Resumed Humalog ~ 2-3 weeks ago. Reports BG were in the 300s at night. Taking 3-4 units when BG > 200. Denies any low blood sugars     - At this time: 139 ; HS last night: 130  - Yesterday AM was 220, 255 mid day     Midnight - 6 AM 6 AM - Noon Noon - 6 PM 6 PM - Midnight Average % Time in Target Range   7 Day Average 239 180 164 223 203 36%   14 Day Average 249 164 186 224 207 39%   30 Day Average 239 164 191 237 207 38%     Started last bottle of Linzess this month   Taking hemp gummies at night to help her sleep    Estimated Creatinine Clearance: 52.1 mL/min (A) (by C-G formula based on SCr of 1.09 mg/dL (H)). A/P:  - Appreciate patient's update.   - Increase Tresiba 12 units daily  - Continue with Humalog sliding scale   - Continue Jardiance 25 mg daily,  Metformin 1 gm BID   - Will need to start on Linzess PAP renewal at the next visit 21.   - Patient endorses understanding to the provided information. All questions answered at this time. Thank you,  Tavia Connelly, PharmD, BCACP, [address] in place:  Yes   Recommendation Provided To: Patient/Caregiver: 2 via Telephone   Intervention Detail: Adherence Monitorin and Dose Adjustment: 1, reason: Therapy Optimization   Gap Closed?:    Intervention Accepted By: Patient/Caregiver: 2   Time Spent (min): 15

## 2021-12-03 ENCOUNTER — OFFICE VISIT (OUTPATIENT)
Dept: INTERNAL MEDICINE CLINIC | Age: 66
End: 2021-12-03
Payer: MEDICARE

## 2021-12-03 VITALS
WEIGHT: 188.8 LBS | OXYGEN SATURATION: 98 % | RESPIRATION RATE: 16 BRPM | DIASTOLIC BLOOD PRESSURE: 70 MMHG | BODY MASS INDEX: 31.46 KG/M2 | HEIGHT: 65 IN | TEMPERATURE: 97.3 F | SYSTOLIC BLOOD PRESSURE: 112 MMHG | HEART RATE: 75 BPM

## 2021-12-03 DIAGNOSIS — E78.5 HYPERLIPIDEMIA ASSOCIATED WITH TYPE 2 DIABETES MELLITUS (HCC): ICD-10-CM

## 2021-12-03 DIAGNOSIS — B96.20 E. COLI UTI: Primary | ICD-10-CM

## 2021-12-03 DIAGNOSIS — I10 ESSENTIAL HYPERTENSION: ICD-10-CM

## 2021-12-03 DIAGNOSIS — E03.9 ACQUIRED HYPOTHYROIDISM: ICD-10-CM

## 2021-12-03 DIAGNOSIS — S39.012D STRAIN OF LUMBAR REGION, SUBSEQUENT ENCOUNTER: ICD-10-CM

## 2021-12-03 DIAGNOSIS — E11.69 HYPERLIPIDEMIA ASSOCIATED WITH TYPE 2 DIABETES MELLITUS (HCC): ICD-10-CM

## 2021-12-03 DIAGNOSIS — N39.0 E. COLI UTI: Primary | ICD-10-CM

## 2021-12-03 DIAGNOSIS — N32.81 OAB (OVERACTIVE BLADDER): ICD-10-CM

## 2021-12-03 DIAGNOSIS — M54.50 ACUTE RIGHT-SIDED LOW BACK PAIN WITHOUT SCIATICA: ICD-10-CM

## 2021-12-03 DIAGNOSIS — E11.65 UNCONTROLLED TYPE 2 DIABETES MELLITUS WITH HYPERGLYCEMIA, WITH LONG-TERM CURRENT USE OF INSULIN (HCC): ICD-10-CM

## 2021-12-03 DIAGNOSIS — Z79.4 UNCONTROLLED TYPE 2 DIABETES MELLITUS WITH HYPERGLYCEMIA, WITH LONG-TERM CURRENT USE OF INSULIN (HCC): ICD-10-CM

## 2021-12-03 PROCEDURE — G8752 SYS BP LESS 140: HCPCS | Performed by: INTERNAL MEDICINE

## 2021-12-03 PROCEDURE — G8754 DIAS BP LESS 90: HCPCS | Performed by: INTERNAL MEDICINE

## 2021-12-03 PROCEDURE — G8536 NO DOC ELDER MAL SCRN: HCPCS | Performed by: INTERNAL MEDICINE

## 2021-12-03 PROCEDURE — 99214 OFFICE O/P EST MOD 30 MIN: CPT | Performed by: INTERNAL MEDICINE

## 2021-12-03 PROCEDURE — G9717 DOC PT DX DEP/BP F/U NT REQ: HCPCS | Performed by: INTERNAL MEDICINE

## 2021-12-03 PROCEDURE — G8417 CALC BMI ABV UP PARAM F/U: HCPCS | Performed by: INTERNAL MEDICINE

## 2021-12-03 PROCEDURE — G9899 SCRN MAM PERF RSLTS DOC: HCPCS | Performed by: INTERNAL MEDICINE

## 2021-12-03 PROCEDURE — 3017F COLORECTAL CA SCREEN DOC REV: CPT | Performed by: INTERNAL MEDICINE

## 2021-12-03 PROCEDURE — 2022F DILAT RTA XM EVC RTNOPTHY: CPT | Performed by: INTERNAL MEDICINE

## 2021-12-03 PROCEDURE — 3044F HG A1C LEVEL LT 7.0%: CPT | Performed by: INTERNAL MEDICINE

## 2021-12-03 PROCEDURE — 1090F PRES/ABSN URINE INCON ASSESS: CPT | Performed by: INTERNAL MEDICINE

## 2021-12-03 PROCEDURE — G8427 DOCREV CUR MEDS BY ELIG CLIN: HCPCS | Performed by: INTERNAL MEDICINE

## 2021-12-03 PROCEDURE — 1101F PT FALLS ASSESS-DOCD LE1/YR: CPT | Performed by: INTERNAL MEDICINE

## 2021-12-03 RX ORDER — DIAZEPAM 5 MG/1
2.5 TABLET ORAL
Qty: 20 TABLET | Refills: 0
Start: 2021-12-03 | End: 2021-12-21

## 2021-12-03 RX ORDER — LEVOTHYROXINE SODIUM 300 UG/1
300 TABLET ORAL
Qty: 30 TABLET | Refills: 0 | Status: SHIPPED | OUTPATIENT
Start: 2021-12-03 | End: 2021-12-03

## 2021-12-03 NOTE — PATIENT INSTRUCTIONS
Back Strain: Care Instructions  Overview     A back strain happens when you overstretch, or pull, a muscle in your back. You may hurt your back in an accident or when you exercise or lift something. Sometimes you may not know how you hurt your back. Most back pain will get better with rest and time. You can take care of yourself at home to help your back heal.  Follow-up care is a key part of your treatment and safety. Be sure to make and go to all appointments, and call your doctor if you are having problems. It's also a good idea to know your test results and keep a list of the medicines you take. How can you care for yourself at home? · Try to stay as active as you can, but stop or reduce any activity that causes pain. · Put ice or a cold pack on the sore muscle for 10 to 20 minutes at a time to stop swelling. Do this twice daily for the next 5 days. · Take pain medicines exactly as directed. ? If the doctor gave you a prescription medicine for pain, take it as prescribed. ? If you are not taking a prescription pain medicine, ask your doctor if you can take an over-the-counter medicine. · Try sleeping on your side with a pillow between your legs. Or put a pillow under your knees when you lie on your back. These measures can ease pain in your lower back. · Return to your usual level of activity slowly. When should you call for help? Call 911 anytime you think you may need emergency care. For example, call if:    · You are unable to move a leg at all. Call your doctor now or seek immediate medical care if:    · You have new or worse symptoms in your legs, belly, or buttocks. Symptoms may include:  ? Numbness or tingling. ? Weakness. ? Pain.     · You lose bladder or bowel control. Watch closely for changes in your health, and be sure to contact your doctor if:    · You have a fever, lose weight, or don't feel well.     · You are not getting better as expected. Where can you learn more?   Go to http://www.gray.com/  Enter H951 in the search box to learn more about \"Back Strain: Care Instructions. \"  Current as of: July 1, 2021               Content Version: 13.0  © 5295-7276 Healthwise, [facility]. Care instructions adapted under license by [facility] (which disclaims liability or warranty for this information). If you have questions about a medical condition or this instruction, always ask your healthcare professional. [place] [address] any warranty or liability for your use of this information.

## 2021-12-03 NOTE — PROGRESS NOTES
Madelene Riedel is a 74 y.o. female who presents for evaluation of ED follow up. Last seen by me sept 28, 2021. Went to ed on nov 26 for low back pain, was found to have e coli uti. D/c to home with omnicef, valium, and ultram.   Back pain has lessened, but she is very lethargic and states she spends most of the time sleeping. She has been taking valium every 8 hours for past few days. ROS:  Constitutional: negative for fevers, chills, anorexia and weight loss  Eyes:   negative for visual disturbance and irritation  ENT:   negative for tinnitus,sore throat,nasal congestion,ear pain,hoarseness  Respiratory:  negative for cough, hemoptysis, dyspnea,wheezing  CV:   negative for chest pain, palpitations, lower extremity edema  GI:   negative for nausea, vomiting, diarrhea, abdominal pain,melena  Genitourinary: negative for frequency, dysuria and hematuria  Musculoskel: negative for myalgias, arthralgias, muscle weakness, joint pain.   ++lbp  Neurological:  negative for headaches, dizziness, focal weakness, numbness  Psychiatric:     Negative for depression or anxiety      Past Medical History:   Diagnosis Date    Arthritis     Depression     Diabetes ([facility] [address]     Hypercholesteremia     Hypertension     Hyperthyroidism     IBS (irritable bowel syndrome)     Liver disease     hepatitis b    PUD (peptic ulcer disease)     bleeding ulcer    Sleep apnea     CPAP    Urinary incontinence        Past Surgical History:   Procedure Laterality Date    HX BACK SURGERY      x3    HX BLEPHAROPLASTY Right     HX BUNIONECTOMY      HX CHOLECYSTECTOMY  06/21/2021    HX HYSTERECTOMY      HX OTHER SURGICAL      Collapsed lung    HX WISDOM TEETH EXTRACTION         Family History   Problem Relation Age of Onset    Diabetes Mother     Heart Disease Mother     Cancer Father         pancreatic    Diabetes Sister     Anxiety Sister     Diabetes Brother     Anxiety Brother     Diabetes Brother     Anxiety Brother     Diabetes Sister     Anxiety Sister     Diabetes Sister     Anxiety Sister     Cancer Sister         lung and brain    Anxiety Sister     Anxiety Sister     Breast Cancer Paternal Aunt         under 48       Social History     Socioeconomic History    Marital status: SINGLE     Spouse name: Not on file    Number of children: Not on file    Years of education: Not on file    Highest education level: Not on file   Occupational History    Not on file   Tobacco Use    Smoking status: Never Smoker    Smokeless tobacco: Never Used   Substance and Sexual Activity    Alcohol use: Yes     Comment: occasionally    Drug use: No    Sexual activity: Not Currently   Other Topics Concern    Not on file   Social History Narrative    Not on file     Social Determinants of Health     Financial Resource Strain:     Difficulty of Paying Living Expenses: Not on file   Food Insecurity:     Worried About [address] [employer] in the Last Year: Not on file    [address] [employer] in the Last Year: Not on file   Transportation Needs:     Lack of Transportation (Medical): Not on file    Lack of Transportation (Non-Medical):  Not on file   Physical Activity:     Days of Exercise per Week: Not on file    Minutes of Exercise per Session: Not on file   Stress:     Feeling of Stress : Not on file   Social Connections:     Frequency of Communication with Friends and Family: Not on file    Frequency of Social Gatherings with Friends and Family: Not on file    Attends Temple Services: Not on file    Active Member of Clubs or Organizations: Not on file    Attends Club or Organization Meetings: Not on file    Marital Status: Not on file   Intimate Partner Violence:     Fear of Current or Ex-Partner: Not on file    Emotionally Abused: Not on file    Physically Abused: Not on file    Sexually Abused: Not on file   Housing Stability:     Unable to Pay for Housing in the Last Year: Not on file    Number of Places Lived in the Last Year: Not on file    Unstable Housing in the Last Year: Not on file            Visit Vitals  /70 (BP 1 Location: Left upper arm, BP Patient Position: Sitting)   Pulse 75   Temp 97.3 °F (36.3 °C) (Temporal)   Resp 16   Ht 5' 5\" (1.651 m)   Wt 188 lb 12.8 oz (85.6 kg)   SpO2 98%   BMI 31.42 kg/m²       Physical Examination:   General - Well appearing female  HEENT - PERRL, TM no erythema/opacification, normal nasal turbinates, no oropharyngeal erythema or exudate, MMM  Neck - supple, no bruits, no thyroidomegaly, no lymphadenopathy  Pulm - clear to auscultation bilaterally  Cardio - RRR, normal S1 S2, no murmur  Abd - soft, nontender, no masses, no HSM  Extrem - no edema, +2 distal pulses  Neuro-  No focal deficits, CN intact     Assessment/Plan:    1.  E coli uti--sn to omnicef, continue same  2. Lumbar strain--use ice 15 minutes bid for next 5 days. Use valium only at bedtime  3. Hypothyroid--ran out of synthroid, rx sent in local to [facility]  4. Uncontrolled dm, type 2--back on tresiba (she had been able to stop it, but with uti, sugars are up again). Also on glucophage, jardiance,  and humalog with meals  5.  htn--continue lisinopril  6.   EFFIE--continue wellbutrin  7.  ibs-constipation--on linzess    rtc for regular appt        Wynema Galeazzi III, DO

## 2021-12-07 ENCOUNTER — DOCUMENTATION ONLY (OUTPATIENT)
Dept: INTERNAL MEDICINE CLINIC | Age: 66
End: 2021-12-07

## 2021-12-07 NOTE — PROGRESS NOTES
Received medical records request from [address] on 11/13/21. Faxed to [facility] on 12/7/21.

## 2021-12-19 NOTE — PROGRESS NOTES
Pharmacy Progress Note - Diabetes Management    Assessment / Plan:   Diabetes Management:  - Per ADA guidelines, Pt's A1c is at goal of < 7%. BP <130/80  - Based on current CGM readings, increase Tresiba to 16 units daily  - Adjust Humalog to: if pre-meal blood sugar  · If less between 100-150: skip  · If 151-200: give 2 units  · If 201-250: give 4 units  · If above 250: give 6 units   - Continue Jardiance 25 mg daily and Metformin 500 mg ER - two tabs daily.   - Need to scan CGM sensor more often. - [employer] (Jardiance) and [employer] (Linzess) PAP renewal applications started today. Waiting for patient's updated proof of income.   - Recommend COVID 19 booster vaccination. S/O: Ms. Vee Rousseau is a [id number]. o. female , referred by Dr. Bryant Raza DO, with a PMH of T2DM, HTN, HLD, Hypothyroidism, OAB, IBS, Osteoporosis, was seen today for diabetes management follow up.   Last A1c was 6.9% (Sept 2021), 6.3% (June 2021), 8% (Oct 2020), 8.4% (June 2020), 7.2% (Dec 2019).      Patient was seen prior to her PCP appt today. Interim update:   Saw Dr Dawit Alejandra on 12/3/21. Had ED visit on 11/26/21 for lower back pain relating to UTI. Discharged with omnicef. Back to using Tresiba d/t hyperglycemia from UTI infection.      Current anti-hyperglycemic regimen include(s):    - Metformin 500 mg ER - 1 gm daily  - Jardiance 25 mg daily  - Tresiba 12 units daily  - Humalog --- reports to giving between 2-4 units before meals  ; gives 2 units when BG >200; 4 units if > 300    ROS:  Today, Pt endorses:  - Symptoms of Hyperglycemia: fatigue  - Symptoms of Hypoglycemia: jitteriness    Self Monitoring Blood Glucose (SMBG) or CGM:  Julien CGM  Scanning 2-3x/day   Midnight - 6 AM 6 AM - Noon Noon - 6 PM 6 PM - Midnight Average   7 Day Average 196 142 215 235 193   14 Day Average 218 144 189 220 192   30 Day Average 224 156 174 214 193     No low BG < 80    Recall from 12/1/21:      Midnight - 6 AM 6 AM - Noon Noon - 6 PM 6 PM - Midnight Average % Time in Target Range   7 Day Average 239 180 164 223 203 36%   14 Day Average 249 164 186 224 207 39%   30 Day Average 239 164 191 237 207 38%       Nutrition/Lifestyle Modifications:  Eating 2-3 meals/day  Snacking more at night     The 10-year ASCVD risk score (Dasia Aceves, et al., 2013) is: 14.9%    Values used to calculate the score:      Age: 74 years      Sex: Female      Is Non- : No      Diabetic: Yes      Tobacco smoker: No      Systolic Blood Pressure: 096 mmHg      Is BP treated: Yes      HDL Cholesterol: 71 MG/DL      Total Cholesterol: 175 MG/DL     Vitals: Wt Readings from Last 3 Encounters:   12/21/21 182 lb (82.6 kg)   12/03/21 188 lb 12.8 oz (85.6 kg)   11/26/21 180 lb (81.6 kg)     BP Readings from Last 3 Encounters:   12/21/21 120/80   12/03/21 112/70   11/26/21 (!) 110/57     Pulse Readings from Last 3 Encounters:   12/21/21 79   12/03/21 75   11/26/21 76       Past Medical History:   Diagnosis Date    Arthritis     Depression     Diabetes ([facility] [address]     Hypercholesteremia     Hypertension     Hyperthyroidism     IBS (irritable bowel syndrome)     Liver disease     hepatitis b    PUD (peptic ulcer disease)     bleeding ulcer    Sleep apnea     CPAP    Urinary incontinence      Allergies   Allergen Reactions    Celebrex [Celecoxib] Other (comments)     Hallucinations    Codeine Nausea and Vomiting    Erythromycin Nausea and Vomiting       Current Outpatient Medications   Medication Sig    levothyroxine (SYNTHROID) 300 mcg tablet TAKE 1 TABLET BY MOUTH ONCE DAILY BEFORE BREAKFAST 6 DAYS A WEEK AND TAKE 150 MCG ONE DAY A WEEK    diazePAM (Valium) 5 mg tablet Take 0.5 Tablets by mouth nightly as needed for Muscle Spasm(s) (spasm). Max Daily Amount: 2.5 mg.    ketorolac (TORADOL) 10 mg tablet Take 1 Tablet by mouth every six (6) hours as needed for Pain.     lidocaine 4 % patch 1 Patch by TransDERmal route every twelve (12) hours every twelve (12) hours.  metFORMIN ER (GLUCOPHAGE XR) 500 mg tablet Take 2 Tablets by mouth daily (with breakfast).  pravastatin (PRAVACHOL) 20 mg tablet Take 1 Tablet by mouth nightly.  vilazodone (VIIBRYD) 20 mg tab tablet Take 1 Tablet by mouth daily. [facility] Viktoria Reyes FlexTouch U-100 100 unit/mL (3 mL) inpn 10 Units by SubCUTAneous route daily. Indications: type 2 diabetes mellitus    trospium (SANCTURA XL) 60 mg capsule Take 1 Capsule by mouth Daily (before breakfast).  lisinopriL (PRINIVIL, ZESTRIL) 5 mg tablet TAKE 1 TABLET DAILY    nystatin (MYCOSTATIN) powder Apply  to affected area four (4) times daily.  HumaLOG KwikPen Insulin 100 unit/mL kwikpen 3-4 Units by SubCUTAneous route Before breakfast, lunch, and dinner. Give 3 units for blood sugar between 150-200; give 4 units for blood sugar 200-250. Indications: type 2 diabetes mellitus (Patient taking differently: 2 Units by SubCUTAneous route Before breakfast, lunch, and dinner. Give 3 units for blood sugar between 150-200; give 4 units for blood sugar 200-250. Indications: type 2 diabetes mellitus)    Jardiance 25 mg tablet Take 1 Tab by mouth daily.  ALPRAZolam (XANAX) 0.25 mg tablet Take 1 Tab by mouth daily as needed for Anxiety. Indications: anxious    buPROPion XL (WELLBUTRIN XL) 300 mg XL tablet Take 1 Tab by mouth every morning.  FreeStyle Julien 2 Sensor kit 1 Each by Other route See Salvatore Shaw. Use to scan sensor three times daily    furosemide (LASIX) 20 mg tablet TAKE 1 TABLET DAILY AS NEEDED FOR EDEMA (Patient taking differently: Take 20 mg by mouth daily as needed.)    NOVOFINE PLUS 32 gauge x 1/6\" ndle Check sugars 4x daily.  calcium polycarbophil (FIBER LAXATIVE, CA POLYCARBO,) 625 mg tablet Take 625 mg by mouth daily.  multivitamin (ONE A DAY) tablet Take 1 Tab by mouth daily.  linaclotide (LINZESS) 290 mcg cap capsule Take 290 mcg by mouth Daily (before breakfast).     acetaminophen (TYLENOL) 500 mg tablet Take 1 Tab by mouth every six (6) hours as needed for Pain.  cholecalciferol (VITAMIN D3) 1,000 unit tablet Take 5,000 Units by mouth daily.  B.infantis-B.ani-B.long-B.bifi (PROBIOTIC 4X) 10-15 mg TbEC Take 2 Gum by mouth daily. No current facility-administered medications for this visit. Lab Results   Component Value Date/Time    Sodium 142 11/26/2021 01:38 PM    Potassium 4.3 11/26/2021 01:38 PM    Chloride 110 (H) 11/26/2021 01:38 PM    CO2 27 11/26/2021 01:38 PM    Anion gap 5 11/26/2021 01:38 PM    Glucose 139 (H) 11/26/2021 01:38 PM    BUN 23 (H) 11/26/2021 01:38 PM    Creatinine 1.09 (H) 11/26/2021 01:38 PM    BUN/Creatinine ratio 21 (H) 11/26/2021 01:38 PM    GFR est AA >60 11/26/2021 01:38 PM    GFR est non-AA 50 (L) 11/26/2021 01:38 PM    Calcium 9.2 11/26/2021 01:38 PM    Bilirubin, total 0.4 11/26/2021 01:38 PM    Alk.  phosphatase 50 11/26/2021 01:38 PM    Protein, total 6.3 (L) 11/26/2021 01:38 PM    Albumin 3.4 (L) 11/26/2021 01:38 PM    Globulin 2.9 11/26/2021 01:38 PM    A-G Ratio 1.2 11/26/2021 01:38 PM    ALT (SGPT) 18 11/26/2021 01:38 PM       Lab Results   Component Value Date/Time    Cholesterol, total 175 06/16/2021 12:48 PM    HDL Cholesterol 71 06/16/2021 12:48 PM    LDL, calculated 80.2 06/16/2021 12:48 PM    VLDL, calculated 23.8 06/16/2021 12:48 PM    Triglyceride 119 06/16/2021 12:48 PM    CHOL/HDL Ratio 2.5 06/16/2021 12:48 PM       Lab Results   Component Value Date/Time    WBC 8.0 06/16/2021 12:48 PM    HGB 12.9 06/16/2021 12:48 PM    HCT 38.8 06/16/2021 12:48 PM    PLATELET 256 18/06/7229 12:48 PM    MCV 92.2 06/16/2021 12:48 PM       Lab Results   Component Value Date/Time    Microalb/Creat ratio (ug/mg creat.) 3 06/23/2020 01:20 PM     HbA1c:  Lab Results   Component Value Date/Time    Hemoglobin A1c 6.3 (H) 06/16/2021 12:48 PM    Hemoglobin A1c (POC) 6.9 (A) 09/28/2021 03:36 PM     Last Point of Care HGB A1C  Hemoglobin A1c (POC)   Date Value Ref Range Status 2021 6.9 (A) 4.8 - 5.6 % Final        Estimated Creatinine Clearance: 52.4 mL/min (A) (by C-G formula based on SCr of 1.09 mg/dL (H)). Medication reconciliation was completed during the visit. Medications Discontinued During This Encounter   Medication Reason   Jaelyn Borders FlexTouch U-100 100 unit/mL (3 mL) inpn      Orders Placed This Encounter   Jaelyn Borders FlexTouch U-100 100 unit/mL (3 mL) inpn     Si Units by SubCUTAneous route daily. Indications: type 2 diabetes mellitus     Dispense:  15 mL     Refill:  1     Patient verbalized understanding of the information presented and all of the patients questions were answered. AVS was handed to the patient. Patient advised to call the office with any additional questions or concerns. Notifications of recommendations will be sent to Dr. Joey Mayo DO for review. Patient will return to clinic in 6 week(s) for follow up. Thank you for the consult,  Tavia Berry, JayleenD, BCACP, [address] in place:  Yes   Recommendation Provided To: Patient/Caregiver: 7 via In person   Intervention Detail: Adherence Monitorin, Dose Adjustment: 2, reason: Therapy Optimization, Patient Access Assistance/Sample Provided, Scheduled Appointment and Vaccine Recommended/Administered   Intervention Accepted By: Patient/Caregiver: 7   Time Spent (min): 30

## 2021-12-21 ENCOUNTER — OFFICE VISIT (OUTPATIENT)
Dept: INTERNAL MEDICINE CLINIC | Age: 66
End: 2021-12-21

## 2021-12-21 ENCOUNTER — OFFICE VISIT (OUTPATIENT)
Dept: INTERNAL MEDICINE CLINIC | Age: 66
End: 2021-12-21
Payer: MEDICARE

## 2021-12-21 VITALS
HEIGHT: 65 IN | WEIGHT: 182 LBS | SYSTOLIC BLOOD PRESSURE: 120 MMHG | BODY MASS INDEX: 30.32 KG/M2 | TEMPERATURE: 97.3 F | HEART RATE: 79 BPM | DIASTOLIC BLOOD PRESSURE: 80 MMHG | OXYGEN SATURATION: 95 %

## 2021-12-21 VITALS
BODY MASS INDEX: 30.32 KG/M2 | TEMPERATURE: 97.3 F | OXYGEN SATURATION: 95 % | HEIGHT: 65 IN | SYSTOLIC BLOOD PRESSURE: 120 MMHG | HEART RATE: 79 BPM | DIASTOLIC BLOOD PRESSURE: 80 MMHG | WEIGHT: 182 LBS

## 2021-12-21 DIAGNOSIS — E11.65 UNCONTROLLED TYPE 2 DIABETES MELLITUS WITH HYPERGLYCEMIA, WITH LONG-TERM CURRENT USE OF INSULIN (HCC): Primary | ICD-10-CM

## 2021-12-21 DIAGNOSIS — E11.65 UNCONTROLLED TYPE 2 DIABETES MELLITUS WITH HYPERGLYCEMIA, WITH LONG-TERM CURRENT USE OF INSULIN (HCC): ICD-10-CM

## 2021-12-21 DIAGNOSIS — R35.0 URINE FREQUENCY: Primary | ICD-10-CM

## 2021-12-21 DIAGNOSIS — F41.1 GAD (GENERALIZED ANXIETY DISORDER): ICD-10-CM

## 2021-12-21 DIAGNOSIS — I10 ESSENTIAL HYPERTENSION: ICD-10-CM

## 2021-12-21 DIAGNOSIS — Z12.31 VISIT FOR SCREENING MAMMOGRAM: ICD-10-CM

## 2021-12-21 DIAGNOSIS — E78.5 HYPERLIPIDEMIA ASSOCIATED WITH TYPE 2 DIABETES MELLITUS (HCC): ICD-10-CM

## 2021-12-21 DIAGNOSIS — Z79.4 UNCONTROLLED TYPE 2 DIABETES MELLITUS WITH HYPERGLYCEMIA, WITH LONG-TERM CURRENT USE OF INSULIN (HCC): Primary | ICD-10-CM

## 2021-12-21 DIAGNOSIS — Z79.4 UNCONTROLLED TYPE 2 DIABETES MELLITUS WITH HYPERGLYCEMIA, WITH LONG-TERM CURRENT USE OF INSULIN (HCC): ICD-10-CM

## 2021-12-21 DIAGNOSIS — E11.69 HYPERLIPIDEMIA ASSOCIATED WITH TYPE 2 DIABETES MELLITUS (HCC): ICD-10-CM

## 2021-12-21 DIAGNOSIS — E03.9 ACQUIRED HYPOTHYROIDISM: ICD-10-CM

## 2021-12-21 LAB
BILIRUB UR QL STRIP: NEGATIVE
GLUCOSE UR-MCNC: NORMAL MG/DL
KETONES P FAST UR STRIP-MCNC: NEGATIVE MG/DL
PH UR STRIP: 6 [PH] (ref 4.6–8)
PROT UR QL STRIP: NEGATIVE
SP GR UR STRIP: 1.03 (ref 1–1.03)
UA UROBILINOGEN AMB POC: NORMAL (ref 0.2–1)
URINALYSIS CLARITY POC: NORMAL
URINALYSIS COLOR POC: YELLOW
URINE BLOOD POC: NORMAL
URINE LEUKOCYTES POC: NEGATIVE
URINE NITRITES POC: NEGATIVE

## 2021-12-21 PROCEDURE — G8427 DOCREV CUR MEDS BY ELIG CLIN: HCPCS | Performed by: INTERNAL MEDICINE

## 2021-12-21 PROCEDURE — 3017F COLORECTAL CA SCREEN DOC REV: CPT | Performed by: INTERNAL MEDICINE

## 2021-12-21 PROCEDURE — 3044F HG A1C LEVEL LT 7.0%: CPT | Performed by: INTERNAL MEDICINE

## 2021-12-21 PROCEDURE — 1101F PT FALLS ASSESS-DOCD LE1/YR: CPT | Performed by: INTERNAL MEDICINE

## 2021-12-21 PROCEDURE — 1090F PRES/ABSN URINE INCON ASSESS: CPT | Performed by: INTERNAL MEDICINE

## 2021-12-21 PROCEDURE — G9717 DOC PT DX DEP/BP F/U NT REQ: HCPCS | Performed by: INTERNAL MEDICINE

## 2021-12-21 PROCEDURE — G9899 SCRN MAM PERF RSLTS DOC: HCPCS | Performed by: INTERNAL MEDICINE

## 2021-12-21 PROCEDURE — G8417 CALC BMI ABV UP PARAM F/U: HCPCS | Performed by: INTERNAL MEDICINE

## 2021-12-21 PROCEDURE — G8752 SYS BP LESS 140: HCPCS | Performed by: INTERNAL MEDICINE

## 2021-12-21 PROCEDURE — 81003 URINALYSIS AUTO W/O SCOPE: CPT | Performed by: INTERNAL MEDICINE

## 2021-12-21 PROCEDURE — 2022F DILAT RTA XM EVC RTNOPTHY: CPT | Performed by: INTERNAL MEDICINE

## 2021-12-21 PROCEDURE — 99214 OFFICE O/P EST MOD 30 MIN: CPT | Performed by: INTERNAL MEDICINE

## 2021-12-21 PROCEDURE — G8754 DIAS BP LESS 90: HCPCS | Performed by: INTERNAL MEDICINE

## 2021-12-21 PROCEDURE — G8536 NO DOC ELDER MAL SCRN: HCPCS | Performed by: INTERNAL MEDICINE

## 2021-12-21 RX ORDER — FUROSEMIDE 20 MG/1
TABLET ORAL
Qty: 90 TABLET | Refills: 3 | Status: SHIPPED | OUTPATIENT
Start: 2021-12-21 | End: 2022-05-17

## 2021-12-21 RX ORDER — INSULIN DEGLUDEC INJECTION 100 U/ML
16 INJECTION, SOLUTION SUBCUTANEOUS DAILY
Qty: 15 ML | Refills: 1
Start: 2021-12-21 | End: 2022-08-18

## 2021-12-21 NOTE — PATIENT INSTRUCTIONS
· Scan your sensor more often  · Bring in your updated proof of income for your applications. · Increase your [place] to 16 units daily. Check on your current supply.    · For your Humalog: if pre-meal blood sugar  · If less between 100-150: skip  · If 151-200: give 2 units  · If 201-250: give 4 units  · If above 250: give 6 units   · Continue Jardiance 25 mg daily and metformin 500 mg ER - two tablets daily  · Recommend for you to complete your COVID booster - either Pfizer or Daja Williams

## 2021-12-21 NOTE — PATIENT INSTRUCTIONS
Learning About Tests When You Have Diabetes  Why do you need regular tests? Diabetes can lead to other health problems if it's not well controlled. You'll need tests to monitor how well your diabetes is controlled and to check for other things like high cholesterol or kidney problems. Having tests on a regular schedule can help your doctor find problems early, when it's easier to manage them. What tests do you need? These are the tests you may need and how often you should have them. A1c blood test.   This test shows the average level of blood sugar over the past 2 to 3 months. It helps your doctor see whether blood sugar levels have been staying within your target range. · How often: Every 3 to 6 months  · Goal: A blood sugar level in your target range  Blood pressure test.   This test measures the pressure of blood flow in the arteries. Controlling blood pressure can help prevent damage to nerves and blood vessels. · How often: Every 3 to 6 months  · Goal: A blood pressure level in your target range  Cholesterol test.   This test measures the amount of a type of fat in the blood. It is common for people with diabetes to also have high cholesterol. Too much cholesterol in the blood can build up inside the blood vessels and raise the risk for heart attack and stroke. · How often: At the time of your diabetes diagnosis, and as often as your doctor recommends after that  · Goal: A cholesterol level in your target range  Albumin-creatinine ratio test.   This test checks for kidney damage by looking for the protein albumin (say \"al-BYOO-amandeep\") in the urine. Albumin is normally found in the blood. Kidney damage can let small amounts of it (microalbumin) leak into the urine. · How often: Once a year  · Goal: No protein in the urine  Blood creatinine test/estimated glomerular filtration (eGFR). The blood creatinine (say \"ktgv-FK-kz-neen\") level shows how well your kidneys are working.  Creatinine is a waste product that muscles release into the blood. Blood creatinine is used to estimate the glomerular filtration rate. A high level of creatinine and/or a low eGFR may mean your kidneys are not working as well as they should. · How often: Once a year  · Goal: Normal level of creatinine in the blood. The eGFR goal is greater than 60 mL/min/1.73 m². Complete foot exam.   The doctor checks for foot sores and whether any sensation has been lost.  · How often: Once a year  · Goal: Healthy feet with no foot ulcers or loss of feeling  Dental exam and cleaning. The dentist checks for gum disease and tooth decay. People with high blood sugar are more likely to have these problems. · How often: Every 6 months  · Goal: Healthy teeth and gums  Complete eye exam.   High blood sugar levels can damage the eyes. This exam is done by an ophthalmologist or optometrist. It includes a dilated eye exam. The exam shows whether there's damage to the back of the eye (diabetic retinopathy). · How often: Once a year. If you don't have any signs of diabetic retinopathy, your doctor may recommend an exam every 2 years. · Goal: No damage to the back of the eye  Thyroid-stimulating hormone (TSH) blood test.   This test checks for thyroid disease. Too little thyroid hormone can cause some medicines (like insulin) to stay in the body longer. This can cause low blood sugar. You may be tested if you have high cholesterol or are a woman over 48years old. · How often: As part of your diabetes diagnosis, and as often as your doctor recommends after that  · Goal: Normal level of TSH in the blood  Follow-up care is a key part of your treatment and safety. Be sure to make and go to all appointments, and call your doctor if you are having problems. It's also a good idea to know your test results and keep a list of the medicines you take. Where can you learn more?   Go to http://www.Pregg[employer].com/  Enter A243 in the search box to learn more about \"Learning About Tests When You Have Diabetes. \"  Current as of: August 31, 2020               Content Version: 13.0  © 3235-0232 Healthwise, [employer]. Care instructions adapted under license by [employer] (which disclaims liability or warranty for this information). If you have questions about a medical condition or this instruction, always ask your healthcare professional. [address] any warranty or liability for your use of this information.

## 2021-12-21 NOTE — PROGRESS NOTES
Ron Perez is a 74 y.o. female who presents for evaluation of routine follow up. Last seen by me dec 3, 2021 in ED follow up for uti. She complains today of some urine frequency and increased odor. States sugars have been elevated again. Met with our pharmd earlier today, increasing dose of tresiba.         ROS:  Constitutional: negative for fevers, chills, anorexia and weight loss  Eyes:   negative for visual disturbance and irritation  ENT:   negative for tinnitus,sore throat,nasal congestion,ear pain,hoarseness  Respiratory:  negative for cough, hemoptysis, dyspnea,wheezing  CV:   negative for chest pain, palpitations, lower extremity edema  GI:   negative for nausea, vomiting, diarrhea, abdominal pain,melena  Genitourinary: negative for frequency, dysuria and hematuria  Musculoskel: negative for myalgias, arthralgias, back pain, muscle weakness, joint pain  Neurological:  negative for headaches, dizziness, focal weakness, numbness  Psychiatric:     ++ for depression or anxiety      Past Medical History:   Diagnosis Date    Arthritis     Depression     Diabetes ([facility] [address]     Hypercholesteremia     Hypertension     Hyperthyroidism     IBS (irritable bowel syndrome)     Liver disease     hepatitis b    PUD (peptic ulcer disease)     bleeding ulcer    Sleep apnea     CPAP    Urinary incontinence        Past Surgical History:   Procedure Laterality Date    HX BACK SURGERY      x3    HX BLEPHAROPLASTY Right     HX BUNIONECTOMY      HX CHOLECYSTECTOMY  06/21/2021    HX HYSTERECTOMY      HX OTHER SURGICAL      Collapsed lung    HX WISDOM TEETH EXTRACTION         Family History   Problem Relation Age of Onset    Diabetes Mother     Heart Disease Mother     Cancer Father         pancreatic    Diabetes Sister     Anxiety Sister     Diabetes Brother     Anxiety Brother     Diabetes Brother     Anxiety Brother     Diabetes Sister     Anxiety Sister     Diabetes Sister     Anxiety Sister  Cancer Sister         lung and brain    Anxiety Sister     Anxiety Sister     Breast Cancer Paternal Aunt         under 48       Social History     Socioeconomic History    Marital status: SINGLE     Spouse name: Not on file    Number of children: Not on file    Years of education: Not on file    Highest education level: Not on file   Occupational History    Not on file   Tobacco Use    Smoking status: Never Smoker    Smokeless tobacco: Never Used   Substance and Sexual Activity    Alcohol use: Yes     Comment: occasionally    Drug use: No    Sexual activity: Not Currently   Other Topics Concern    Not on file   Social History Narrative    Not on file     Social Determinants of Health     Financial Resource Strain:     Difficulty of Paying Living Expenses: Not on file   Food Insecurity:     Worried About [address] [employer] in the Last Year: Not on file    [employer] in the Last Year: Not on file   Transportation Needs:     Lack of Transportation (Medical): Not on file    Lack of Transportation (Non-Medical):  Not on file   Physical Activity:     Days of Exercise per Week: Not on file    Minutes of Exercise per Session: Not on file   Stress:     Feeling of Stress : Not on file   Social Connections:     Frequency of Communication with Friends and Family: Not on file    Frequency of Social Gatherings with Friends and Family: Not on file    Attends Restorationism Services: Not on file    Active Member of [address] [place] [address] [employer] or Organizations: Not on file    Attends Club or Organization Meetings: Not on file    Marital Status: Not on file   Intimate Partner Violence:     Fear of Current or Ex-Partner: Not on file    Emotionally Abused: Not on file    Physically Abused: Not on file    Sexually Abused: Not on file   Housing Stability:     Unable to Pay for Housing in the Last Year: Not on file    Number of [place] in the Last Year: Not on file    Unstable Housing in the Last Year: Not on file Visit Vitals  /80 (BP 1 Location: Left upper arm, BP Patient Position: Sitting)   Pulse 79   Temp 97.3 °F (36.3 °C) (Temporal)   Ht 5' 5\" (1.651 m)   Wt 182 lb (82.6 kg)   SpO2 95%   BMI 30.29 kg/m²       Physical Examination:   General - Well appearing female  HEENT - PERRL, TM no erythema/opacification, normal nasal turbinates, no oropharyngeal erythema or exudate, MMM  Neck - supple, no bruits, no thyroidomegaly, no lymphadenopathy  Pulm - clear to auscultation bilaterally  Cardio - RRR, normal S1 S2, no murmur  Abd - soft, nontender, no masses, no HSM  Extrem - no edema, +2 distal pulses  Neuro-  No focal deficits, CN intact     Assessment/Plan:    1. Urine frequency--check ua--no signs of infection, but 2+ glucose  2.  uncotnrolled dm, type 2--last a1c 6.9. Increased dose of tresiba to 16 units. Continue jardiance and glucophage. Increased meal time humalog as well. 3.  Hypothyroid--on synthroid  4.  ibs-constipation--linzess helps  5. Depression--continue viibryd  6.  htn--controlled with lisinopril  7.  oab--on sanctura  8.   Screen breast cancer--mamm ordered    rtc 6 months for greta Rogel III, DO

## 2021-12-29 ENCOUNTER — DOCUMENTATION ONLY (OUTPATIENT)
Dept: INTERNAL MEDICINE CLINIC | Age: 66
End: 2021-12-29

## 2021-12-29 NOTE — PROGRESS NOTES
Pharmacy Progress Note     Renewal applications for Ms. Raine Maya 74 y.o. 's Constantine Rasheed and Garlan Epley submitted today. Pt also needs to complete Beth Nordisk PAP      Thank you for the consult,  Tavia Ricardo, PharmD, BCACP, [address] in place: Yes   Recommendation Provided To:  Other: 1   Intervention Detail: Patient Access Assistance/Sample Provided   Intervention Accepted By: Other: 1   Time Spent (min): 30

## 2022-01-04 ENCOUNTER — TELEPHONE (OUTPATIENT)
Dept: INTERNAL MEDICINE CLINIC | Age: 67
End: 2022-01-04

## 2022-01-04 DIAGNOSIS — R21 RASH AND OTHER NONSPECIFIC SKIN ERUPTION: Primary | ICD-10-CM

## 2022-01-04 RX ORDER — NYSTATIN 100000 [USP'U]/G
POWDER TOPICAL 4 TIMES DAILY
Qty: 60 G | Refills: 1 | Status: SHIPPED | OUTPATIENT
Start: 2022-01-04

## 2022-01-11 ENCOUNTER — TELEPHONE (OUTPATIENT)
Dept: INTERNAL MEDICINE CLINIC | Age: 67
End: 2022-01-11

## 2022-01-11 NOTE — TELEPHONE ENCOUNTER
Future Appointments:  Future Appointments   Date Time Provider Cj Love   1/24/2022  8:20 AM [facility] RLMAMMO JITENDRA IM   2/15/2022 10:00 AM Porsche Sarmiento, PHARMALONZO [facility] BS AMB   6/22/2022  9:00 AM Esperanza Foster DO [facility] BS AMB        Last Appointment With Me:  12/21/2021     Requested Prescriptions     Pending Prescriptions Disp Refills    linaCLOtide (Linzess) 290 mcg cap capsule       Sig: Take 1 Capsule by mouth Daily (before breakfast).

## 2022-01-11 NOTE — TELEPHONE ENCOUNTER
Pharmacy Progress Note - Telephone Encounter    S/O: Ms. Lancaster Matters 74 y.o. female contacted office/me via an inbound telephone call to discuss her [address] today. Verified patients identifiers (name & ) per HIPAA policy. - Reports she is out of [place]. Has not heard from [address] [place] [address] regarding her PAP status  - Request for rx to be sent to [facility] on [address]. A/P:  - I have not received any updates from [address] [place] [address] regarding patient's renewal application.  - Patient endorses understanding to the provided information. All questions answered at this time. Thank you,  Thu Vassie Holstein, PharmD, BCACP, [address] in place:  Yes   Recommendation Provided To: Patient/Caregiver: 1 via Telephone   Intervention Detail: Refill(s) Provided   Intervention Accepted By: Patient/Caregiver: 1   Time Spent (min): 5

## 2022-01-21 ENCOUNTER — OFFICE VISIT (OUTPATIENT)
Dept: ORTHOPEDIC SURGERY | Age: 67
End: 2022-01-21
Payer: MEDICARE

## 2022-01-21 VITALS
WEIGHT: 182 LBS | OXYGEN SATURATION: 98 % | HEIGHT: 65 IN | DIASTOLIC BLOOD PRESSURE: 64 MMHG | BODY MASS INDEX: 30.32 KG/M2 | HEART RATE: 83 BPM | TEMPERATURE: 98.7 F | SYSTOLIC BLOOD PRESSURE: 112 MMHG

## 2022-01-21 DIAGNOSIS — M17.0 BILATERAL PRIMARY OSTEOARTHRITIS OF KNEE: Primary | ICD-10-CM

## 2022-01-21 PROCEDURE — 99213 OFFICE O/P EST LOW 20 MIN: CPT | Performed by: ORTHOPAEDIC SURGERY

## 2022-01-21 PROCEDURE — G8417 CALC BMI ABV UP PARAM F/U: HCPCS | Performed by: ORTHOPAEDIC SURGERY

## 2022-01-21 PROCEDURE — G8427 DOCREV CUR MEDS BY ELIG CLIN: HCPCS | Performed by: ORTHOPAEDIC SURGERY

## 2022-01-21 PROCEDURE — G8752 SYS BP LESS 140: HCPCS | Performed by: ORTHOPAEDIC SURGERY

## 2022-01-21 PROCEDURE — 1090F PRES/ABSN URINE INCON ASSESS: CPT | Performed by: ORTHOPAEDIC SURGERY

## 2022-01-21 PROCEDURE — 3017F COLORECTAL CA SCREEN DOC REV: CPT | Performed by: ORTHOPAEDIC SURGERY

## 2022-01-21 PROCEDURE — 1101F PT FALLS ASSESS-DOCD LE1/YR: CPT | Performed by: ORTHOPAEDIC SURGERY

## 2022-01-21 PROCEDURE — G8754 DIAS BP LESS 90: HCPCS | Performed by: ORTHOPAEDIC SURGERY

## 2022-01-21 PROCEDURE — G9717 DOC PT DX DEP/BP F/U NT REQ: HCPCS | Performed by: ORTHOPAEDIC SURGERY

## 2022-01-21 PROCEDURE — G9899 SCRN MAM PERF RSLTS DOC: HCPCS | Performed by: ORTHOPAEDIC SURGERY

## 2022-01-21 PROCEDURE — G8536 NO DOC ELDER MAL SCRN: HCPCS | Performed by: ORTHOPAEDIC SURGERY

## 2022-01-21 RX ORDER — DICLOFENAC SODIUM 75 MG/1
75 TABLET, DELAYED RELEASE ORAL 2 TIMES DAILY
Qty: 60 TABLET | Refills: 0 | Status: SHIPPED | OUTPATIENT
Start: 2022-01-21 | End: 2022-04-12 | Stop reason: ALTCHOICE

## 2022-01-21 NOTE — LETTER
1/23/2022    Patient: Zenia Caldwell   YOB: 1955   Date of Visit: 1/21/2022     Stephen Shukla III, DO  [address]  [address]  [facility]  [address] [facility] [address]    Dear Marva Almeida,      Thank you for referring Ms. Luis Mooney to [facility] for evaluation. My notes for this consultation are attached. If you have questions, please do not hesitate to call me. I look forward to following your patient along with you.       Sincerely,    Rosemary Morgan, DO

## 2022-01-24 ENCOUNTER — HOSPITAL ENCOUNTER (OUTPATIENT)
Dept: MAMMOGRAPHY | Age: 67
Discharge: HOME | End: 2022-01-24
Payer: MEDICARE

## 2022-01-24 ENCOUNTER — TELEPHONE (OUTPATIENT)
Dept: ORTHOPEDIC SURGERY | Age: 67
End: 2022-01-24

## 2022-01-24 DIAGNOSIS — Z12.31 VISIT FOR SCREENING MAMMOGRAM: ICD-10-CM

## 2022-01-24 PROCEDURE — 77067 SCR MAMMO BI INCL CAD: CPT

## 2022-01-24 NOTE — PROGRESS NOTES
1/23/2022      CC: bilateral knee pain    HPI:      This is a 69y.o. year old female who presents for a follow up visit. The patient was last seen and diagnosed with bilateral knee osteoarthritis. The patient's treatments since the most recent visit have comprised of viscosupplementation. The patient has had good but temporary relief of the chief complaint. PMH:  Past Medical History:   Diagnosis Date    Arthritis     Depression     Diabetes ([address]     Hypercholesteremia     Hypertension     Hyperthyroidism     IBS (irritable bowel syndrome)     Liver disease     hepatitis b    PUD (peptic ulcer disease)     bleeding ulcer    Sleep apnea     CPAP    Urinary incontinence        PSxHx:  Past Surgical History:   Procedure Laterality Date    HX BACK SURGERY      x3    HX BLEPHAROPLASTY Right     HX BUNIONECTOMY      HX CHOLECYSTECTOMY  06/21/2021    HX HYSTERECTOMY      HX OTHER SURGICAL      Collapsed lung    HX WISDOM TEETH EXTRACTION         Meds:    Current Outpatient Medications:     diclofenac EC (VOLTAREN) 75 mg EC tablet, Take 1 Tablet by mouth two (2) times a day., Disp: 60 Tablet, Rfl: 0    linaCLOtide (Linzess) 290 mcg cap capsule, Take 1 Capsule by mouth Daily (before breakfast). , Disp: 90 Capsule, Rfl: 3    nystatin (MYCOSTATIN) powder, Apply  to affected area four (4) times daily. , Disp: 60 g, Rfl: 1    Tresiba FlexTouch U-100 100 unit/mL (3 mL) inpn, 16 Units by SubCUTAneous route daily. Indications: type 2 diabetes mellitus, Disp: 15 mL, Rfl: 1    furosemide (LASIX) 20 mg tablet, TAKE 1 TABLET DAILY AS NEEDED FOR EDEMA, Disp: 90 Tablet, Rfl: 3    levothyroxine (SYNTHROID) 300 mcg tablet, TAKE 1 TABLET BY MOUTH ONCE DAILY BEFORE BREAKFAST 6 DAYS A WEEK AND TAKE 150 MCG ONE DAY A WEEK, Disp: 90 Tablet, Rfl: 3    lidocaine 4 % patch, 1 Patch by TransDERmal route every twelve (12) hours every twelve (12) hours. , Disp: 5 Patch, Rfl: 2    metFORMIN ER (GLUCOPHAGE XR) 500 mg tablet, Take 2 Tablets by mouth daily (with breakfast). , Disp: 180 Tablet, Rfl: 3    pravastatin (PRAVACHOL) 20 mg tablet, Take 1 Tablet by mouth nightly., Disp: 90 Tablet, Rfl: 3    vilazodone (VIIBRYD) 20 mg tab tablet, Take 1 Tablet by mouth daily. , Disp: 90 Tablet, Rfl: 3    trospium (SANCTURA XL) 60 mg capsule, Take 1 Capsule by mouth Daily (before breakfast). , Disp: 90 Capsule, Rfl: 3    lisinopriL (PRINIVIL, ZESTRIL) 5 mg tablet, TAKE 1 TABLET DAILY, Disp: 90 Tab, Rfl: 3    HumaLOG KwikPen Insulin 100 unit/mL kwikpen, 3-4 Units by SubCUTAneous route Before breakfast, lunch, and dinner. Give 3 units for blood sugar between 150-200; give 4 units for blood sugar 200-250. Indications: type 2 diabetes mellitus (Patient taking differently: 2 Units by SubCUTAneous route Before breakfast, lunch, and dinner. Give 3 units for blood sugar between 150-200; give 4 units for blood sugar 200-250. Indications: type 2 diabetes mellitus), Disp: 15 mL, Rfl: 0    Jardiance 25 mg tablet, Take 1 Tab by mouth daily. , Disp: 30 Tab, Rfl: 2    ALPRAZolam (XANAX) 0.25 mg tablet, Take 1 Tab by mouth daily as needed for Anxiety. Indications: anxious, Disp: 30 Tab, Rfl: 0    FreeStyle Julien 2 Sensor kit, 1 Each by Other route See Salvatore Shaw. Use to scan sensor three times daily, Disp: , Rfl:     NOVOFINE PLUS 32 gauge x 1/6\" ndle, Check sugars 4x daily. , Disp: 300 Pen Needle, Rfl: 3    acetaminophen (TYLENOL) 500 mg tablet, Take 1 Tab by mouth every six (6) hours as needed for Pain., Disp: 30 Tab, Rfl: 0    All:   Allergies   Allergen Reactions    Celebrex [Celecoxib] Other (comments)     Hallucinations    Codeine Nausea and Vomiting    Erythromycin Nausea and Vomiting       Social Hx:  Social History     Socioeconomic History    Marital status: SINGLE   Tobacco Use    Smoking status: Never Smoker    Smokeless tobacco: Never Used   Substance and Sexual Activity    Alcohol use: Yes     Comment: occasionally    Drug use: No    Sexual activity: Not Currently       Family Hx:  Family History   Problem Relation Age of Onset    Diabetes Mother     Heart Disease Mother     Cancer Father         pancreatic    Diabetes Sister     Anxiety Sister     Diabetes Brother     Anxiety Brother     Diabetes Brother     Anxiety Brother     Diabetes Sister     Anxiety Sister     Diabetes Sister     Anxiety Sister     Cancer Sister         lung and brain    Anxiety Sister     Anxiety Sister     Breast Cancer Paternal Aunt         under 48         Review of Systems:       General: Denies headache, lethargy, fever, weight loss  Ears/Nose/Throat: Denies ear discharge, drainage, nosebleeds, hoarse voice, dental problems  Cardiovascular: Denies chest pain, shortness of breath  Lungs: Denies chest pain, breathing problems, wheezing, pneumonia  Stomach: Denies stomach pain, heartburn, constipation, irritable bowel  Skin: Denies rash, sores, open wounds  Musculoskeletal: bilateral knee pain  Genitourinary: Denies dysuria, hematuria, polyuria  Gastrointestinal: Denies constipation, obstipation, diarrhea  Neurological: Denies changes in sight, smell, hearing, taste, seizures. Denies loss of consciousness.   Psychiatric: Denies depression, sleep pattern changes, anxiety, change in personality  Endocrine: Denies mood swings, heat or cold intolerance  Hematologic/Lymphatic: Denies anemia, purpura, petechia  Allergic/Immunologic: Denies swelling of throat, pain or swelling at lymph nodes      Physical Examination:    Visit Vitals  /64 (BP 1 Location: Right arm, BP Patient Position: Sitting, BP Cuff Size: Large adult)   Pulse 83   Temp 98.7 °F (37.1 °C) (Tympanic)   Ht 5' 5\" (1.651 m)   Wt 182 lb (82.6 kg)   SpO2 98%   BMI 30.29 kg/m²        General: AOX3, no apparent distress  Psychiatric: mood and affect appropriate  Lungs: breathing is symmetric and unlabored bilaterally  Heart: regular rate and rhythm  Abdomen: no guarding  Head: normocephalic, atraumatic  Skin: No significant abnormalities, good turgor  Sensation intact to light touch: C5-T1 dermatomes  Muscular exam: 5/5 strength in all major muscle groups unless noted in specialty exam.    Extremities        Right lower extremity: Knee is noted to have a mild effusion. Medial joint line tenderness to palpation with a negative deformity. Patellar crepitus with range of motion is noted. Range of motion is 0-120. Sensation is intact to light touch L1-S1 dermatomes. Knee flexion and extension strength is 5/5 with Tibialis anterior, EHL, FHL being 5/5 as well. No other gross deformity or deficit is noted. Left lower extremity:  Knee is noted to have a mild effusion. Medial joint line tenderness to palpation with a negative deformity. Patellar crepitus with range of motion is noted. Range of motion is 0-120. Sensation is intact to light touch L1-S1 dermatomes. Knee flexion and extension strength is 5/5 with Tibialis anterior, EHL, FHL being 5/5 as well. No other gross deformity or deficit is noted. Diagnostics:    Pertinent Diagnostics: none today    Assessment: bilateral primary knee osteoarthritis  Plan: This patient has the above mentioned issue, she has done well in the past with viscosupplementation, she would like those again, but also would need something for pain now. She would like to proceed with further injections and diclofenac for now. Follow up for viscosupplementation. Ms. Arnav Edwards has a reminder for a \"due or due soon\" health maintenance. I have asked that she contact her primary care provider for follow-up on this health maintenance.

## 2022-02-02 ENCOUNTER — OFFICE VISIT (OUTPATIENT)
Dept: ORTHOPEDIC SURGERY | Age: 67
End: 2022-02-02
Payer: MEDICARE

## 2022-02-02 VITALS
OXYGEN SATURATION: 98 % | HEART RATE: 79 BPM | BODY MASS INDEX: 30.32 KG/M2 | TEMPERATURE: 98.8 F | HEIGHT: 65 IN | DIASTOLIC BLOOD PRESSURE: 66 MMHG | SYSTOLIC BLOOD PRESSURE: 110 MMHG | WEIGHT: 182 LBS

## 2022-02-02 DIAGNOSIS — M17.0 BILATERAL PRIMARY OSTEOARTHRITIS OF KNEE: Primary | ICD-10-CM

## 2022-02-02 PROCEDURE — 20610 DRAIN/INJ JOINT/BURSA W/O US: CPT | Performed by: ORTHOPAEDIC SURGERY

## 2022-02-02 NOTE — PROGRESS NOTES
Date of Procedure: 2/2/2022  PROCEDURE NOTE: Bilateral knee injection of Synvisc     Consent was obtained from the patient. The correct site was identified after confirmation with the patient. Following identification and confirmation of the correct site with the patient, the superolateral right knee was prepped in the normal sterile fashion. A local anesthetic of 1% lidocaine without epinephrine was then administered to the local tissues. Following, an injection of prefilled Synvisc 16 mg supplementation syringe was injected. The needle was then withdrawn and the injection site dressed with a sterile bandage at the conclusion. The procedure was well tolerated by the patient. No immediate adverse reactions were noted. Attention was then turned to the left knee. Following identification and confirmation of the correct site with the patient, the superolateral left knee was prepped in the normal sterile fashion. A local anesthetic of 1% lidocaine without epinephrine was then administered to the local tissues. Following, an injection of prefilled Synvisc 16 mg supplementation syringe was injected. The needle was then withdrawn and the injection site dressed with a sterile bandage at the conclusion. The procedure was well tolerated by the patient. No immediate adverse reactions were noted.   Post injection instructions were given

## 2022-02-04 ENCOUNTER — TELEPHONE (OUTPATIENT)
Dept: INTERNAL MEDICINE CLINIC | Age: 67
End: 2022-02-04

## 2022-02-04 DIAGNOSIS — N32.81 OAB (OVERACTIVE BLADDER): Primary | ICD-10-CM

## 2022-02-04 RX ORDER — TROSPIUM CHLORIDE ER 60 MG/1
60 CAPSULE ORAL
Qty: 90 CAPSULE | Refills: 3 | Status: SHIPPED | OUTPATIENT
Start: 2022-02-04 | End: 2022-08-18 | Stop reason: ALTCHOICE

## 2022-02-04 NOTE — TELEPHONE ENCOUNTER
Request per PharmD,    Please pend a rx for trospium to Dr Pamela Engle for his review. Marnie Shaffer you

## 2022-02-04 NOTE — TELEPHONE ENCOUNTER
Pharmacy Progress Note - Telephone Encounter    S/O: Ms. Javier Herndon 69 y.o. female contacted office/me via an inbound telephone call to discuss her blood sugars today. Verified patients identifiers (name & ) per HIPAA policy. - Reports to giving Tresiba 16 units daily ~noon  - Giving Humalog 2-4 units most days before meal  - Reports AM readings are low but PM after dinner 200-300s. A/P:  - Increase Tresiba to 18 units daily  - Continue current Humalog scale   - Beth Nordisk PAP at the next visit 2/15/22  - Patient endorses understanding to the provided information. All questions answered at this time. Thank you,  Tavia Garvin, PharmD, BCACP, [address] in place:  Yes   Recommendation Provided To: Patient/Caregiver: 1 via Telephone   Intervention Detail: Dose Adjustment: 1, reason: Therapy Optimization   Intervention Accepted By: Patient/Caregiver: 1   Time Spent (min): 15

## 2022-02-04 NOTE — TELEPHONE ENCOUNTER
Pharmacy Progress Note - Telephone Encounter    S/O: Ms. Shi Charity 69 y.o. female contacted office/me via an inbound telephone call to discuss her trospium today. Verified patients identifiers (name & ) per HIPAA policy.     - Request for refill    A/P:  - Will send message to provider  - Patient endorses understanding to the provided information. All questions answered at this time. Thank you,  Tavia Martinez, PharmD, BCACP, [address] [place] [address] in place:  Yes   Time Spent (min): 5

## 2022-02-09 ENCOUNTER — OFFICE VISIT (OUTPATIENT)
Dept: ORTHOPEDIC SURGERY | Age: 67
End: 2022-02-09
Payer: MEDICARE

## 2022-02-09 VITALS
WEIGHT: 182 LBS | HEART RATE: 81 BPM | BODY MASS INDEX: 30.32 KG/M2 | OXYGEN SATURATION: 98 % | TEMPERATURE: 98.3 F | HEIGHT: 65 IN | SYSTOLIC BLOOD PRESSURE: 98 MMHG | DIASTOLIC BLOOD PRESSURE: 60 MMHG

## 2022-02-09 DIAGNOSIS — M17.0 BILATERAL PRIMARY OSTEOARTHRITIS OF KNEE: Primary | ICD-10-CM

## 2022-02-09 PROCEDURE — 20610 DRAIN/INJ JOINT/BURSA W/O US: CPT | Performed by: ORTHOPAEDIC SURGERY

## 2022-02-09 NOTE — PROGRESS NOTES
Identified pt with two pt identifiers (name and ). Reviewed chart in preparation for visit and have obtained necessary documentation. Alex Roland is a 69 y.o. female  Chief Complaint   Patient presents with    Joint Or Tendon Injection     Bilateral knee 2nd Synvisc      Visit Vitals  BP 98/60 (BP 1 Location: Right arm, BP Patient Position: Sitting, BP Cuff Size: Large adult)   Pulse 81   Temp 98.3 °F (36.8 °C) (Tympanic)   Ht 5' 5\" (1.651 m)   Wt 182 lb (82.6 kg)   SpO2 98%   BMI 30.29 kg/m²     1. Have you been to the ER, urgent care clinic since your last visit? Hospitalized since your last visit? No    2. Have you seen or consulted any other health care providers outside of the [address] [place] [address] since your last visit? Include any pap smears or colon screening.  No

## 2022-02-09 NOTE — PROGRESS NOTES
Date of Procedure: 2/9/2022  PROCEDURE NOTE: Bilateral knee injection of Synvisc     Consent was obtained from the patient. The correct site was identified after confirmation with the patient. Following identification and confirmation of the correct site with the patient, the superolateral right knee was prepped in the normal sterile fashion. A local anesthetic of 1% lidocaine without epinephrine was then administered to the local tissues. Following, an injection of prefilled Synvisc 16 mg supplementation syringe was injected. The needle was then withdrawn and the injection site dressed with a sterile bandage at the conclusion. The procedure was well tolerated by the patient. No immediate adverse reactions were noted. Attention was then turned to the left knee. Following identification and confirmation of the correct site with the patient, the superolateral left knee was prepped in the normal sterile fashion. A local anesthetic of 1% lidocaine without epinephrine was then administered to the local tissues. Following, an injection of prefilled Synvisc 16 mg supplementation syringe was injected. The needle was then withdrawn and the injection site dressed with a sterile bandage at the conclusion. The procedure was well tolerated by the patient. No immediate adverse reactions were noted.   Post injection instructions were given

## 2022-02-12 NOTE — PROGRESS NOTES
Pharmacy Progress Note - Diabetes Management    Assessment / Plan:   Diabetes Management:  - Per ADA guidelines, Pt's POC A1c today (7.3%) is not at goal of < 7%. - Given recent CGM trends, ok to continue [place] at 18 units daily  - Reinforce Novolog dose --- given her sensitivity to this insulin the past, reinforce hold parameter.   - Novolog Flex -  if pre-meal blood sugar  · If less between 100-150: skip  · If 151-200: give 2 units  · If 201-250: give 4 units  · If above 250: give 6 units  -  Started on [employer] renewal PAP application.   - Consider Synjardy to help decrease pill burden. Pt has at ~90ds of Jardiance 25 mg daily at this time. S/O: Ms. Vee Rousseau is a 67 y.o. female , referred by Dr. Liz Sims DO, with a PMH of T2DM, HTN, HLD, Hypothyroidism, OAB, IBS, Osteoporosis, was seen today for diabetes management follow up.   Last A1c was 6.9% (Sept 2021), 6.3% (June 2021), 8% (Oct 2020), 8.4% (June 2020), 7.2% (Dec 2019).      Interim update:   Saw Dr Nicike Leonardo 2/9/22. S/p bilateral knee injections - synvisc 16 mg. Has another session later this week. Has EGD coming up 2/17    Presents today with low BG < 70. Corrected for episode in office w/ juice & food. Reports she did not eat much last night. Gave Novolog 2 units.      Current anti-hyperglycemic regimen include(s):    - Tresiba U-100 -- give 18 units daily - noon  - Novolog Flex -  if pre-meal blood sugar  · If less between 100-150: skip  · If 151-200: give 2 units  · If 201-250: give 4 units  · If above 250: give 6 units   - Jardiance 25 mg daily  - Metformin 500 mg ER - two tabs daily    ROS:  Today, Pt endorses:  - Symptoms of Hyperglycemia: none  - Symptoms of Hypoglycemia: dizziness and weakness    Self Monitoring Blood Glucose (SMBG) or CGM:  Julien CGM  Scanning 5-6x/day   Midnight - 6 AM 6 AM - Noon Noon - 6 PM 6 PM - Midnight Average % Time in Target Range   7 Day Average 145 117 145 159 141 77%   14 Day Average 167 140 162 167 158 65%   30 Day Average 212 146 174 190 181 50%        Hypoglycemia (BG <70) Midnight - 6 AM 6 AM - Noon Noon - 6 PM 6 PM - Midnight Total Lows   7 Days 0 1 1 0 2   14 Days 0 1 1 0 2   30 days 0 4 1 0 5     Nutrition/Lifestyle Modifications:  Bedtime: midnight-3AM  Otherwise, no significant changes to nutrition. Wt up 4 lbs today. The 10-year ASCVD risk score (Pippa Hassan, et al., 2013) is: 18.6%       Vitals: Wt Readings from Last 3 Encounters:   02/15/22 186 lb (84.4 kg)   02/09/22 182 lb (82.6 kg)   02/02/22 182 lb (82.6 kg)     BP Readings from Last 3 Encounters:   02/15/22 127/81   02/09/22 98/60   02/02/22 110/66     Pulse Readings from Last 3 Encounters:   02/15/22 79   02/09/22 81   02/02/22 79       Past Medical History:   Diagnosis Date    Arthritis     Depression     Diabetes ([facility] [address]     Hypercholesteremia     Hypertension     Hyperthyroidism     IBS (irritable bowel syndrome)     Liver disease     hepatitis b    PUD (peptic ulcer disease)     bleeding ulcer    Sleep apnea     CPAP    Urinary incontinence      Allergies   Allergen Reactions    Celebrex [Celecoxib] Other (comments)     Hallucinations    Codeine Nausea and Vomiting    Erythromycin Nausea and Vomiting       Current Outpatient Medications   Medication Sig    trospium (SANCTURA XL) 60 mg capsule Take 1 Capsule by mouth Daily (before breakfast).  diclofenac EC (VOLTAREN) 75 mg EC tablet Take 1 Tablet by mouth two (2) times a day.  linaCLOtide (Linzess) 290 mcg cap capsule Take 1 Capsule by mouth Daily (before breakfast).  nystatin (MYCOSTATIN) powder Apply  to affected area four (4) times daily. Courtenay Spurling Annabelle Lipschutz FlexTouch U-100 100 unit/mL (3 mL) inpn 16 Units by SubCUTAneous route daily.  Indications: type 2 diabetes mellitus    furosemide (LASIX) 20 mg tablet TAKE 1 TABLET DAILY AS NEEDED FOR EDEMA    levothyroxine (SYNTHROID) 300 mcg tablet TAKE 1 TABLET BY MOUTH ONCE DAILY BEFORE BREAKFAST 6 DAYS A WEEK AND TAKE 150 MCG ONE DAY A WEEK    lidocaine 4 % patch 1 Patch by TransDERmal route every twelve (12) hours every twelve (12) hours.  metFORMIN ER (GLUCOPHAGE XR) 500 mg tablet Take 2 Tablets by mouth daily (with breakfast).  pravastatin (PRAVACHOL) 20 mg tablet Take 1 Tablet by mouth nightly.  vilazodone (VIIBRYD) 20 mg tab tablet Take 1 Tablet by mouth daily.  lisinopriL (PRINIVIL, ZESTRIL) 5 mg tablet TAKE 1 TABLET DAILY    HumaLOG KwikPen Insulin 100 unit/mL kwikpen 3-4 Units by SubCUTAneous route Before breakfast, lunch, and dinner. Give 3 units for blood sugar between 150-200; give 4 units for blood sugar 200-250. Indications: type 2 diabetes mellitus (Patient taking differently: 2 Units by SubCUTAneous route Before breakfast, lunch, and dinner. Give 3 units for blood sugar between 150-200; give 4 units for blood sugar 200-250. Indications: type 2 diabetes mellitus)    Jardiance 25 mg tablet Take 1 Tab by mouth daily.  ALPRAZolam (XANAX) 0.25 mg tablet Take 1 Tab by mouth daily as needed for Anxiety. Indications: anxious    FreeStyle Julien 2 Sensor kit 1 Each by Other route See Salvatore Shaw. Use to scan sensor three times daily    NOVOFINE PLUS 32 gauge x 1/6\" ndle Check sugars 4x daily.  acetaminophen (TYLENOL) 500 mg tablet Take 1 Tab by mouth every six (6) hours as needed for Pain. No current facility-administered medications for this visit.        Lab Results   Component Value Date/Time    Sodium 142 11/26/2021 01:38 PM    Potassium 4.3 11/26/2021 01:38 PM    Chloride 110 (H) 11/26/2021 01:38 PM    CO2 27 11/26/2021 01:38 PM    Anion gap 5 11/26/2021 01:38 PM    Glucose 139 (H) 11/26/2021 01:38 PM    BUN 23 (H) 11/26/2021 01:38 PM    Creatinine 1.09 (H) 11/26/2021 01:38 PM    BUN/Creatinine ratio 21 (H) 11/26/2021 01:38 PM    GFR est AA >60 11/26/2021 01:38 PM    GFR est non-AA 50 (L) 11/26/2021 01:38 PM    Calcium 9.2 11/26/2021 01:38 PM    Bilirubin, total 0.4 11/26/2021 01:38 PM    Alk. phosphatase 50 11/26/2021 01:38 PM    Protein, total 6.3 (L) 11/26/2021 01:38 PM    Albumin 3.4 (L) 11/26/2021 01:38 PM    Globulin 2.9 11/26/2021 01:38 PM    A-G Ratio 1.2 11/26/2021 01:38 PM    ALT (SGPT) 18 11/26/2021 01:38 PM       Lab Results   Component Value Date/Time    Cholesterol, total 175 06/16/2021 12:48 PM    HDL Cholesterol 71 06/16/2021 12:48 PM    LDL, calculated 80.2 06/16/2021 12:48 PM    VLDL, calculated 23.8 06/16/2021 12:48 PM    Triglyceride 119 06/16/2021 12:48 PM    CHOL/HDL Ratio 2.5 06/16/2021 12:48 PM       Lab Results   Component Value Date/Time    WBC 8.0 06/16/2021 12:48 PM    HGB 12.9 06/16/2021 12:48 PM    HCT 38.8 06/16/2021 12:48 PM    PLATELET 744 76/14/2439 12:48 PM    MCV 92.2 06/16/2021 12:48 PM       Lab Results   Component Value Date/Time    Microalb/Creat ratio (ug/mg creat.) 3 06/23/2020 01:20 PM       HbA1c:  Lab Results   Component Value Date/Time    Hemoglobin A1c 6.3 (H) 06/16/2021 12:48 PM    Hemoglobin A1c (POC) 7.3 (A) 02/15/2022 02:06 PM       Last Point of Care HGB A1C  Hemoglobin A1c (POC)   Date Value Ref Range Status   02/15/2022 7.3 (A) 4.8 - 5.6 % Final        Estimated Creatinine Clearance: 51.7 mL/min (A) (by C-G formula based on SCr of 1.09 mg/dL (H)). Medication reconciliation was completed during the visit. There are no discontinued medications. Orders Placed This Encounter    AMB POC HEMOGLOBIN A1C     Patient verbalized understanding of the information presented and all of the patients questions were answered. AVS was handed to the patient. Patient advised to call the office with any additional questions or concerns. Notifications of recommendations will be sent to Dr. Jose Mera DO for review. Patient will return to clinic in 8 week(s) for follow up.      Thank you for the consult,  Tavia Blas, JayleenD, BCACP, [address] in place: Yes   Recommendation Provided To: Patient/Caregiver: 3 via In person   Intervention Detail: Adherence Monitorin, Lab(s) Ordered and Scheduled Appointment   Intervention Accepted By: Patient/Caregiver: 3   Time Spent (min): 60

## 2022-02-14 ENCOUNTER — HOSPITAL ENCOUNTER (OUTPATIENT)
Dept: PREADMISSION TESTING | Age: 67
Discharge: HOME | End: 2022-02-14
Payer: MEDICARE

## 2022-02-14 LAB
SARS-COV-2, XPLCVT: NOT DETECTED
SOURCE, COVRS: NORMAL

## 2022-02-14 PROCEDURE — U0005 INFEC AGEN DETEC AMPLI PROBE: HCPCS

## 2022-02-15 ENCOUNTER — OFFICE VISIT (OUTPATIENT)
Dept: INTERNAL MEDICINE CLINIC | Age: 67
End: 2022-02-15
Payer: MEDICARE

## 2022-02-15 VITALS
DIASTOLIC BLOOD PRESSURE: 81 MMHG | HEIGHT: 65 IN | TEMPERATURE: 97.7 F | WEIGHT: 186 LBS | BODY MASS INDEX: 30.99 KG/M2 | OXYGEN SATURATION: 94 % | SYSTOLIC BLOOD PRESSURE: 127 MMHG | HEART RATE: 79 BPM

## 2022-02-15 DIAGNOSIS — Z79.4 UNCONTROLLED TYPE 2 DIABETES MELLITUS WITH HYPERGLYCEMIA, WITH LONG-TERM CURRENT USE OF INSULIN (HCC): Primary | ICD-10-CM

## 2022-02-15 DIAGNOSIS — E11.65 UNCONTROLLED TYPE 2 DIABETES MELLITUS WITH HYPERGLYCEMIA, WITH LONG-TERM CURRENT USE OF INSULIN (HCC): Primary | ICD-10-CM

## 2022-02-15 LAB — HBA1C MFR BLD HPLC: 7.3 % (ref 4.8–5.6)

## 2022-02-15 PROCEDURE — 83036 HEMOGLOBIN GLYCOSYLATED A1C: CPT | Performed by: PHARMACIST

## 2022-02-15 RX ORDER — FAMOTIDINE 40 MG/1
40 TABLET, FILM COATED ORAL 2 TIMES DAILY
COMMUNITY

## 2022-02-15 NOTE — PROGRESS NOTES
Pharmacy Progress Note - Telephone Encounter    S/O: Ms. Tessa Flannery 69 y.o. female contacted office/me via an inbound telephone call today. Verified patients identifiers (name & ) per HIPAA policy. - Reports famotidine 40 mg BID started by Dr Ar Schultz (GI). This was the name of the med she forgot during today's visit. A/P:  - Appreciate patient's update.   - Patient endorses understanding to the provided information. All questions answered at this time. There are no discontinued medications. Orders Placed This Encounter    AMB POC HEMOGLOBIN A1C    famotidine (PEPCID) 40 mg tablet     Sig: Take 40 mg by mouth two (2) times a day. Thank you,  Tavia Hart, PharmD, BCACP, [address] in place:  Yes   Recommendation Provided To: Patient/Caregiver: 1 via Telephone   Intervention Detail: New Rx: 1, reason: Patient Preference   Intervention Accepted By: Patient/Caregiver: 1   Time Spent (min): 5

## 2022-02-15 NOTE — PATIENT INSTRUCTIONS
· Your A1c today was 7.3%. Your goal is to be below 7%.    · Continue Tresiba 18 units daily at noon  · Continue Humalog - if pre-meal blood sugar  · If less between 100-150: skip  · If 151-200: give 2 units  · If 201-250: give 4 units  · If above 250: give 6 units   · Continue Jardiance 25 mg daily --- let me know how much you have left of this medication  · Continue metformin 500 mg ER - two tablets twice daily   · Let me know the name of the medication from Dr Rahul Link

## 2022-02-16 ENCOUNTER — OFFICE VISIT (OUTPATIENT)
Dept: ORTHOPEDIC SURGERY | Age: 67
End: 2022-02-16
Payer: MEDICARE

## 2022-02-16 VITALS
OXYGEN SATURATION: 99 % | DIASTOLIC BLOOD PRESSURE: 71 MMHG | HEART RATE: 79 BPM | TEMPERATURE: 97.8 F | BODY MASS INDEX: 30.99 KG/M2 | HEIGHT: 65 IN | WEIGHT: 186 LBS | SYSTOLIC BLOOD PRESSURE: 109 MMHG

## 2022-02-16 DIAGNOSIS — M17.0 BILATERAL PRIMARY OSTEOARTHRITIS OF KNEE: Primary | ICD-10-CM

## 2022-02-16 PROCEDURE — 20610 DRAIN/INJ JOINT/BURSA W/O US: CPT | Performed by: ORTHOPAEDIC SURGERY

## 2022-02-16 NOTE — PROGRESS NOTES
Identified pt with two pt identifiers (name and ). Reviewed chart in preparation for visit and have obtained necessary documentation. Mindy Miranda is a 69 y.o. female  Chief Complaint   Patient presents with    Joint Or Tendon Injection     Bilateral knee 3rd Synvisc      Visit Vitals  /71 (BP 1 Location: Right arm, BP Patient Position: Sitting, BP Cuff Size: Large adult)   Pulse 79   Temp 97.8 °F (36.6 °C) (Tympanic)   Ht 5' 5\" (1.651 m)   Wt 186 lb (84.4 kg)   SpO2 99%   BMI 30.95 kg/m²     1. Have you been to the ER, urgent care clinic since your last visit? Hospitalized since your last visit? No    2. Have you seen or consulted any other health care providers outside of the [address] [place] [address] since your last visit? Include any pap smears or colon screening.  No

## 2022-02-16 NOTE — PROGRESS NOTES
Date of Procedure: 2/16/2022  PROCEDURE NOTE: Bilateral knee injection of Synvisc     Consent was obtained from the patient. The correct site was identified after confirmation with the patient. Following identification and confirmation of the correct site with the patient, the superolateral right knee was prepped in the normal sterile fashion. A local anesthetic of 1% lidocaine without epinephrine was then administered to the local tissues. Following, an injection of prefilled Synvisc 16 mg supplementation syringe was injected. The needle was then withdrawn and the injection site dressed with a sterile bandage at the conclusion. The procedure was well tolerated by the patient. No immediate adverse reactions were noted. Attention was then turned to the left knee. Following identification and confirmation of the correct site with the patient, the superolateral left knee was prepped in the normal sterile fashion. A local anesthetic of 1% lidocaine without epinephrine was then administered to the local tissues. Following, an injection of prefilled Synvisc 16 mg supplementation syringe was injected. The needle was then withdrawn and the injection site dressed with a sterile bandage at the conclusion. The procedure was well tolerated by the patient. No immediate adverse reactions were noted.   Post injection instructions were given

## 2022-02-17 ENCOUNTER — ANESTHESIA EVENT (OUTPATIENT)
Dept: ENDOSCOPY | Age: 67
End: 2022-02-17
Payer: MEDICARE

## 2022-02-17 ENCOUNTER — HOSPITAL ENCOUNTER (OUTPATIENT)
Age: 67
Setting detail: OUTPATIENT SURGERY
Discharge: HOME | End: 2022-02-17
Attending: INTERNAL MEDICINE | Admitting: INTERNAL MEDICINE
Payer: MEDICARE

## 2022-02-17 ENCOUNTER — ANESTHESIA (OUTPATIENT)
Dept: ENDOSCOPY | Age: 67
End: 2022-02-17
Payer: MEDICARE

## 2022-02-17 VITALS
RESPIRATION RATE: 26 BRPM | BODY MASS INDEX: 30.82 KG/M2 | TEMPERATURE: 97.3 F | HEIGHT: 65 IN | DIASTOLIC BLOOD PRESSURE: 67 MMHG | OXYGEN SATURATION: 94 % | WEIGHT: 185 LBS | SYSTOLIC BLOOD PRESSURE: 130 MMHG | HEART RATE: 68 BPM

## 2022-02-17 PROCEDURE — 74011250636 HC RX REV CODE- 250/636: Performed by: NURSE ANESTHETIST, CERTIFIED REGISTERED

## 2022-02-17 PROCEDURE — 74011000250 HC RX REV CODE- 250: Performed by: NURSE ANESTHETIST, CERTIFIED REGISTERED

## 2022-02-17 PROCEDURE — 76040000019: Performed by: INTERNAL MEDICINE

## 2022-02-17 PROCEDURE — 77030039825 HC MSK NSL PAP SUPERNO2VA VYRM -B: Performed by: ANESTHESIOLOGY

## 2022-02-17 PROCEDURE — 88305 TISSUE EXAM BY PATHOLOGIST: CPT

## 2022-02-17 PROCEDURE — 2709999900 HC NON-CHARGEABLE SUPPLY: Performed by: INTERNAL MEDICINE

## 2022-02-17 PROCEDURE — 74011250636 HC RX REV CODE- 250/636: Performed by: INTERNAL MEDICINE

## 2022-02-17 PROCEDURE — 76060000031 HC ANESTHESIA FIRST 0.5 HR: Performed by: INTERNAL MEDICINE

## 2022-02-17 PROCEDURE — 77030019988 HC FCPS ENDOSC DISP BSC -B: Performed by: INTERNAL MEDICINE

## 2022-02-17 RX ORDER — DEXMEDETOMIDINE HYDROCHLORIDE 100 UG/ML
INJECTION, SOLUTION INTRAVENOUS AS NEEDED
Status: DISCONTINUED | OUTPATIENT
Start: 2022-02-17 | End: 2022-02-17 | Stop reason: HOSPADM

## 2022-02-17 RX ORDER — DEXTROMETHORPHAN/PSEUDOEPHED 2.5-7.5/.8
1.2 DROPS ORAL
Status: CANCELLED | OUTPATIENT
Start: 2022-02-17

## 2022-02-17 RX ORDER — PROPOFOL 10 MG/ML
INJECTION, EMULSION INTRAVENOUS AS NEEDED
Status: DISCONTINUED | OUTPATIENT
Start: 2022-02-17 | End: 2022-02-17 | Stop reason: HOSPADM

## 2022-02-17 RX ORDER — FLUMAZENIL 0.1 MG/ML
0.2 INJECTION INTRAVENOUS
Status: CANCELLED | OUTPATIENT
Start: 2022-02-17 | End: 2022-02-17

## 2022-02-17 RX ORDER — NALOXONE HYDROCHLORIDE 0.4 MG/ML
0.4 INJECTION, SOLUTION INTRAMUSCULAR; INTRAVENOUS; SUBCUTANEOUS
Status: CANCELLED | OUTPATIENT
Start: 2022-02-17 | End: 2022-02-17

## 2022-02-17 RX ORDER — LIDOCAINE HYDROCHLORIDE 20 MG/ML
INJECTION, SOLUTION EPIDURAL; INFILTRATION; INTRACAUDAL; PERINEURAL AS NEEDED
Status: DISCONTINUED | OUTPATIENT
Start: 2022-02-17 | End: 2022-02-17 | Stop reason: HOSPADM

## 2022-02-17 RX ORDER — DIPHENHYDRAMINE HYDROCHLORIDE 50 MG/ML
50 INJECTION, SOLUTION INTRAMUSCULAR; INTRAVENOUS ONCE
Status: CANCELLED | OUTPATIENT
Start: 2022-02-17 | End: 2022-02-17

## 2022-02-17 RX ORDER — EPINEPHRINE 0.1 MG/ML
1 INJECTION INTRACARDIAC; INTRAVENOUS
Status: CANCELLED | OUTPATIENT
Start: 2022-02-17 | End: 2022-02-18

## 2022-02-17 RX ORDER — SODIUM CHLORIDE 9 MG/ML
25 INJECTION, SOLUTION INTRAVENOUS CONTINUOUS
Status: DISCONTINUED | OUTPATIENT
Start: 2022-02-17 | End: 2022-02-17 | Stop reason: HOSPADM

## 2022-02-17 RX ORDER — ATROPINE SULFATE 0.1 MG/ML
0.5 INJECTION INTRAVENOUS
Status: CANCELLED | OUTPATIENT
Start: 2022-02-17 | End: 2022-02-18

## 2022-02-17 RX ORDER — SODIUM CHLORIDE 9 MG/ML
125 INJECTION, SOLUTION INTRAVENOUS CONTINUOUS
Status: DISCONTINUED | OUTPATIENT
Start: 2022-02-17 | End: 2022-02-17 | Stop reason: HOSPADM

## 2022-02-17 RX ORDER — MIDAZOLAM HYDROCHLORIDE 1 MG/ML
.25-5 INJECTION, SOLUTION INTRAMUSCULAR; INTRAVENOUS
Status: CANCELLED | OUTPATIENT
Start: 2022-02-17 | End: 2022-02-17

## 2022-02-17 RX ADMIN — PROPOFOL 70 MG: 10 INJECTION, EMULSION INTRAVENOUS at 13:52

## 2022-02-17 RX ADMIN — PROPOFOL 30 MG: 10 INJECTION, EMULSION INTRAVENOUS at 13:54

## 2022-02-17 RX ADMIN — PROPOFOL 30 MG: 10 INJECTION, EMULSION INTRAVENOUS at 13:56

## 2022-02-17 RX ADMIN — PROPOFOL 20 MG: 10 INJECTION, EMULSION INTRAVENOUS at 14:00

## 2022-02-17 RX ADMIN — PROPOFOL 30 MG: 10 INJECTION, EMULSION INTRAVENOUS at 13:58

## 2022-02-17 RX ADMIN — LIDOCAINE HYDROCHLORIDE 60 MG: 20 INJECTION, SOLUTION EPIDURAL; INFILTRATION; INTRACAUDAL; PERINEURAL at 13:52

## 2022-02-17 RX ADMIN — DEXMEDETOMIDINE HYDROCHLORIDE 6 MCG: 100 INJECTION, SOLUTION, CONCENTRATE INTRAVENOUS at 13:52

## 2022-02-17 RX ADMIN — PROPOFOL 20 MG: 10 INJECTION, EMULSION INTRAVENOUS at 14:02

## 2022-02-17 RX ADMIN — SODIUM CHLORIDE 25 ML/HR: 9 INJECTION, SOLUTION INTRAVENOUS at 12:52

## 2022-02-17 NOTE — ROUTINE PROCESS
Rogelio Duggan  1955  [id number]    Situation:  Verbal report received from: Venkat Campos  Procedure: Procedure(s):  ESOPHAGOGASTRODUODENOSCOPY (EGD)  ESOPHAGOGASTRODUODENAL (EGD) BIOPSY    Background:    Preoperative diagnosis: DIARRHEA, ESOPHAGEAL DYSPHAGIA  Postoperative diagnosis: Mild duodentitis, Gastritis, Spastic esophagus    :  Dr. Nehemiah Ramos  Assistant(s): Endoscopy Technician-1: Darrell Freeman  Endoscopy RN-1: Nam Maravilla    Specimens:   ID Type Source Tests Collected by Time Destination   1 : Duodenum bx Preservative Duodenum  Perry Kramer MD 2/17/2022 1335 Pathology   2 : Gastric bx Preservative Gastric  Perry Kramer MD 2/17/2022 1335 Pathology   3 : Distal esophagus bx Preservative Esophagus, Distal  Perry Kramer MD 2/17/2022 1335 Pathology   4 : Proximal esophagus bx Preservative Esophagus, Proximal  Tian Case MD 2/17/2022 1401 Pathology     H. Pylori  no    Assessment:  Intra-procedure medications       Anesthesia gave intra-procedure sedation and medications, see anesthesia flow sheet yes    Intravenous fluids: NS@ KVO     Vital signs stable yes    Abdominal assessment: round and soft yes    Recommendation:

## 2022-02-17 NOTE — PROCEDURES
NAME:  Gino Soria   :   1955   MRN:   [id number]     Date/Time:  2022 2:05 PM    Esophagogastroduodenoscopy (EGD) Procedure Note    : Cathy Pennington MD    Staff: Endoscopy Technician-1: Estrellita Jackson  Endoscopy RN-1: Adrianne Zuniga     Referring Provider:  Jose Mera DO    Anethesia/Sedation:  MAC anesthesia Propofol    Preoperative diagnosis: DIARRHEA, ESOPHAGEAL DYSPHAGIA    Postoperative diagnosis: Mild duodentitis, Gastritis, Spastic esophagus    Procedure Details     After infom consent was obtained for the procedure, with all risks and benefits of procedure explained the patient was taken to the endoscopy suite and placed in the left lateral decubitus position. Following sequential administration of sedation as per above, the [id number] gastroscope was inserted into the mouth and advanced under direct vision to second portion of the duodenum. A careful inspection was made as the gastroscope was withdrawn, including a retroflexed view of the proximal stomach; findings and interventions are described below. Findings:  Esophagus: Normal proximal & mid esophagus mucosa BXd cold forceps minimal bleeding to r/o eoe. 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 No narrowing or obstruction. Esophageal motility appeared to be spastic. Gastric retroflexion with intact GEFL. Stomach: mild antral erythema and congestion and decreased vascular pattern, cold forceps minimal bleeding. Duodenum/jejunum:mild duodenitis in bulb and D2 without erosions or bleeding, biopsied cold forceps minimal bleeding. EBL: minimal    Complications:   None; patient tolerated the procedure well. Impression:    See Postoperative diagnosis above    Recommendations:  -Continue acid suppression. , -Await pathology.   - Consider esophageal manometry - I discussed this with Wen Palacios prior to sedation as a likely possibility.  - Attempted to call Nedra Reich [phone] no answer.     Discharge disposition:  Home in the company of  when able to ambulate    Sherrod Kocher, MD

## 2022-02-17 NOTE — DISCHARGE INSTRUCTIONS
Rosa [facility]  [id number]  1955    It was my pleasure seeing you for your procedure. You will also receive a summary report with the findings from this procedure and any further recommendations. If you had polyps removed or biopsies taken during your procedure, you will receive a separate letter from me within the next 2 weeks. If you don't receive this letter or if you have any questions, please call my office [phone]. Please take note of the post procedure instructions listed below. Best Wishes,    Dr. Kierra Fonscea    These instructions give you information on caring for yourself after your procedure. Call your doctor if you have any problems or questions after your procedure. HOME CARE  · Walk if you have belly cramping or gas. Walking will help get rid of the air and reduce the bloated feeling in your belly (abdomen). · Your IV site (where you received drugs) may be tender to touch. Place warm towels on the site; keep your arm up on two pillows if you have any swelling or soreness in the area. · You may shower. ACTIVITY:  · Take frequent rest periods and move at a slower pace for the next 24 hours. .  · You may resume your regular activity tomorrow if you are feeling back to normal.  · Do not drive or ride a bicycle for at least 24 hours (because of the medicine (anesthesia) used during the test). · Do not sign any important legal documents or use or operate any machinery for 24 hours  · Do not take sleeping medicines/nerve drugs for 24 hours unless the doctor tells you. · You can return to work/school tomorrow unless otherwise instructed. NUTRITION:  · Drink plenty of fluids to keep your pee (urine) clear or pale yellow  · Begin with a light meal and progress to your normal diet. Heavy or fried foods are harder to digest and may make you feel sick to your stomach (nauseated).   · Once you are feeling back to normal, you may resume your normal diet as instructed by your doctor. · Avoid alcoholic beverages for 24 hours or as instructed. IF YOU HAD BIOPSIES TAKEN OR POLYPS REMOVED DURING THE PROCEDURE:  · For the next 7 days, avoid all non-steroidal antiinflammatory medications such as Ibuprofen, Motrin, Advil, Alleve, So-seltzer, Goody's powder, BC powder. · If you do not have an heart condition that requires you to take a daily aspirin, you should avoid taking aspirin for 7 days. · Eat a soft diet for 24 hours. · Monitor your stools for any blood or dark black, tar-like, stools as this may be a sign of bleeding and if you see any blood, notify your doctor immediately. GET HELP RIGHT AWAY AND SEEK IMMEDIATE MEDICAL CARE IF:  · You have more than a spotting of blood in your stool. · You pass clumps of tissue (blood clots) or fill the toilet with blood. · Your belly is painfully swollen or puffy (abdominal distention). · You throw up (vomit). · You have a fever. · You have redness, pain or swelling at the IV site that last greater than two days. · You have abdominal pain or discomfort that is severe or gets worse throughout the day. Post-procedure diagnosis:  Mild duodentitis, Gastritis, Spastic esophagus    Post-procedure recommendations:          Learning About Coronavirus (COVID-19)  Coronavirus (COVID-19): Overview  What is coronavirus (COVID-19)? The coronavirus disease (COVID-19) is caused by a virus. It is an illness that was first found in [place], [place], in December 2019. It has since spread worldwide. The virus can cause fever, cough, and trouble breathing. In severe cases, it can cause pneumonia and make it hard to breathe without help. It can cause death. Coronaviruses are a large group of viruses. They cause the common cold. They also cause more serious illnesses like Middle East respiratory syndrome (MERS) and severe acute respiratory syndrome (SARS). COVID-19 is caused by a novel coronavirus.  That means it's a new type that has not been seen in people before. This virus spreads person-to-person through droplets from coughing and sneezing. It can also spread when you are close to someone who is infected. And it can spread when you touch something that has the virus on it, such as a doorknob or a tabletop. What can you do to protect yourself from coronavirus (COVID-19)? The best way to protect yourself from getting sick is to:  · Avoid areas where there is an outbreak. · Avoid contact with people who may be infected. · Wash your hands often with soap or alcohol-based hand sanitizers. · Avoid crowds and try to stay at least 6 feet away from other people. · Wash your hands often, especially after you cough or sneeze. Use soap and water, and scrub for at least 20 seconds. If soap and water aren't available, use an alcohol-based hand . · Avoid touching your mouth, nose, and eyes. What can you do to avoid spreading the virus to others? To help avoid spreading the virus to others:  · Cover your mouth with a tissue when you cough or sneeze. Then throw the tissue in the trash. · Use a disinfectant to clean things that you touch often. · Stay home if you are sick or have been exposed to the virus. Don't go to school, work, or public areas. And don't use public transportation. · If you are sick:  ? Leave your home only if you need to get medical care. But call the doctor's office first so they know you're coming. And wear a face mask, if you have one.  ? If you have a face mask, wear it whenever you're around other people. It can help stop the spread of the virus when you cough or sneeze. ? Clean and disinfect your home every day. Use household  and disinfectant wipes or sprays. Take special care to clean things that you grab with your hands. These include doorknobs, remote controls, phones, and handles on your refrigerator and microwave.  And don't forget countertops, tabletops, bathrooms, and computer keyboards. When to call for help  Call 911 anytime you think you may need emergency care. For example, call if:  · You have severe trouble breathing. (You can't talk at all.)  · You have constant chest pain or pressure. · You are severely dizzy or lightheaded. · You are confused or can't think clearly. · Your face and lips have a blue color. · You pass out (lose consciousness) or are very hard to wake up. Call your doctor now if you develop symptoms such as:  · Shortness of breath. · Fever. · Cough. If you need to get care, call ahead to the doctor's office for instructions before you go. Make sure you wear a face mask, if you have one, to prevent exposing other people to the virus. Where can you get the latest information? The following health organizations are tracking and studying this virus. Their websites contain the most up-to-date information. Mehrdad Lo also learn what to do if you think you may have been exposed to the virus. · U.S. Centers for Disease Control and Prevention (CDC): The CDC provides updated news about the disease and travel advice. The website also tells you how to prevent the spread of infection. www.cdc.gov  · World Health Organization [facility]): WHO offers information about the virus outbreaks. WHO also has travel advice. www.who.int  Current as of: April 1, 2020               Content Version: 12.4  © 3193-0458 [employer]. Care instructions adapted under license by your healthcare professional. If you have questions about a medical condition or this instruction, always ask your healthcare professional. [address] any warranty or liability for your use of this information.

## 2022-02-17 NOTE — H&P
[facility]  Outpatient History and Physical    Patient: Alex Roland    Physician: Gino Gomez MD    Vital Signs: Blood pressure (!) 148/59, pulse 71, temperature 97.8 °F (36.6 °C), resp. rate 16, height 5' 5\" (1.651 m), weight 83.9 kg (185 lb), SpO2 96 %, not currently breastfeeding. Allergies: Allergies   Allergen Reactions    Celebrex [Celecoxib] Other (comments)     Hallucinations    Codeine Nausea and Vomiting    Erythromycin Nausea and Vomiting       Chief Complaint: dysphagia; changes in bowel movement. Mrs. Milan Mtz is a 69yo lady with dysphagia hx/o esophagitis and changes in bowel movements with alternating constipation & diarrhea, bloating, cramping.     History:  Past Medical History:   Diagnosis Date    Arthritis     Depression     Diabetes ([address]     Hypercholesteremia     Hypertension     Hyperthyroidism     IBS (irritable bowel syndrome)     Liver disease     hepatitis b    Nausea & vomiting     PUD (peptic ulcer disease)     bleeding ulcer    Sleep apnea     CPAP    Urinary incontinence       Past Surgical History:   Procedure Laterality Date    HX BACK SURGERY      x3    HX BLEPHAROPLASTY Right     HX BUNIONECTOMY      HX CHOLECYSTECTOMY  06/21/2021    HX HYSTERECTOMY      HX OTHER SURGICAL      Collapsed lung    HX WISDOM TEETH EXTRACTION        Social History     Socioeconomic History    Marital status: SINGLE   Tobacco Use    Smoking status: Never Smoker    Smokeless tobacco: Never Used   Vaping Use    Vaping Use: Never used   Substance and Sexual Activity    Alcohol use: Yes     Comment: occasionally    Drug use: No    Sexual activity: Not Currently      Family History   Problem Relation Age of Onset    Diabetes Mother     Heart Disease Mother     Cancer Father         pancreatic    Diabetes Sister     Anxiety Sister     Diabetes Brother     Anxiety Brother     Diabetes Brother     Anxiety Brother     Diabetes Sister     Anxiety Sister     Diabetes Sister     Anxiety Sister     Cancer Sister         lung and brain    Anxiety Sister     Anxiety Sister     Breast Cancer Paternal Aunt         under 48       Medications:   Prior to Admission medications    Medication Sig Start Date End Date Taking? Authorizing Provider   famotidine (PEPCID) 40 mg tablet Take 40 mg by mouth two (2) times a day. Yes Paul Pickett MD   trospium (SANCTURA XL) 60 mg capsule Take 1 Capsule by mouth Daily (before breakfast). 2/4/22  Yes Soham Cameron III, DO   diclofenac EC (VOLTAREN) 75 mg EC tablet Take 1 Tablet by mouth two (2) times a day. 1/21/22  Yes Jaspreet Burgos, DO   linaCLOtide (Linzess) 290 mcg cap capsule Take 1 Capsule by mouth Daily (before breakfast). 1/11/22  Yes Soham Cameron III, DO   nystatin (MYCOSTATIN) powder Apply  to affected area four (4) times daily. 1/4/22  Yes Jez Cameron, DO   Elisabeth Norberto FlexTouch U-100 100 unit/mL (3 mL) inpn 16 Units by SubCUTAneous route daily. Indications: type 2 diabetes mellitus 12/21/21  Yes Soham Cameron III, DO   furosemide (LASIX) 20 mg tablet TAKE 1 TABLET DAILY AS NEEDED FOR EDEMA 12/21/21  Yes Soham Cameron III, DO   levothyroxine (SYNTHROID) 300 mcg tablet TAKE 1 TABLET BY MOUTH ONCE DAILY BEFORE BREAKFAST 6 DAYS A WEEK AND TAKE 150 MCG ONE DAY A WEEK 12/9/21  Yes Soham Cameron III, DO   metFORMIN ER (GLUCOPHAGE XR) 500 mg tablet Take 2 Tablets by mouth daily (with breakfast). 10/25/21  Yes Soham Cameron III, DO   pravastatin (PRAVACHOL) 20 mg tablet Take 1 Tablet by mouth nightly. 10/25/21  Yes Soham Cameron III, DO   vilazodone (VIIBRYD) 20 mg tab tablet Take 1 Tablet by mouth daily.  9/28/21  Yes Soham Cameron III, DO   lisinopriL (PRINIVIL, ZESTRIL) 5 mg tablet TAKE 1 TABLET DAILY 2/25/21  Yes Czajkowski, Soham B III, DO   HumaLOG KwikPen Insulin 100 unit/mL kwikpen 3-4 Units by SubCUTAneous route Before breakfast, lunch, and dinner. Give 3 units for blood sugar between 150-200; give 4 units for blood sugar 200-250. Indications: type 2 diabetes mellitus  Patient taking differently: 2 Units by SubCUTAneous route Before breakfast, lunch, and dinner. Give 3 units for blood sugar between 150-200; give 4 units for blood sugar 200-250. Indications: type 2 diabetes mellitus 12/16/20  Yes Soham Cameron III, DO   Jardiance 25 mg tablet Take 1 Tab by mouth daily. 12/16/20  Yes Soham Cameron III, DO   ALPRAZolam (XANAX) 0.25 mg tablet Take 1 Tab by mouth daily as needed for Anxiety. Indications: anxious 12/16/20  Yes Kristin Moses, DO   FreeStyle Julien 2 Sensor kit 1 Each by Other route See Salvatore Shaw. Use to scan sensor three times daily 10/16/20  Yes Provider, Historical   NOVOFINE PLUS 32 gauge x 1/6\" ndle Check sugars 4x daily. 12/20/19  Yes Soham Cameron III, DO   acetaminophen (TYLENOL) 500 mg tablet Take 1 Tab by mouth every six (6) hours as needed for Pain. 1/3/19  Yes Umesh Patterson NP   lidocaine 4 % patch 1 Patch by TransDERmal route every twelve (12) hours every twelve (12) hours. 11/26/21   Tova Walsh MD       Physical Exam:   General: alert, no distress   HEENT: Head: Normocephalic, no lesions, without obvious abnormality. Heart: regular rate and rhythm, S1, S2 normal, no murmur, click, rub or gallop   Lungs: chest clear, no wheezing, rales, normal symmetric air entry   Abdominal: Bowel sounds are normal, liver is not enlarged, spleen is not enlarged   Neurological: Grossly normal   Extremities: extremities normal, atraumatic, no cyanosis or edema     Plan of Care/Planned Procedure: egd    The heart, lungs and mental status were satisfactory for the administration of MAC sedation and for the procedure. Informed consent was obtained for the procedure, including sedation.   Risks of perforation, hemorrhage, adverse drug reaction, and aspiration were discussed. The risks, benefits and alternatives were again reiterated to the patient to include the risk of infection, bleeding, medication reaction, aspiration, perforation which could require immediate surgery, cardiopulmonary complication, issues with anesthesia and death. The patient was counseled at length about the risks of kary Covid-19 in the jazlyn-operative and post-operative states including the recovery window of their procedure. The patient was made aware that kary Covid-19 after a surgical procedure may worsen their prognosis for recovering from the virus and lend to a higher morbidity and or mortality risk. The patient was given the options of postponing their procedure. All of the risks, benefits, and alternatives were discussed. The patient does  wish to proceed with the procedure.

## 2022-02-17 NOTE — ANESTHESIA PREPROCEDURE EVALUATION
Relevant Problems   NEUROLOGY   (+) Depression      CARDIOVASCULAR   (+) Essential hypertension      ENDOCRINE   (+) Acquired hypothyroidism   (+) Severe obesity (HCC)   (+) Type 2 diabetes with nephropathy (HCC)       Anesthetic History   No history of anesthetic complications            Review of Systems / Medical History  Patient summary reviewed, nursing notes reviewed and pertinent labs reviewed    Pulmonary        Sleep apnea: CPAP           Neuro/Psych         Psychiatric history     Cardiovascular    Hypertension          Hyperlipidemia    Exercise tolerance: >4 METS     GI/Hepatic/Renal       Hepatitis: type B    PUD and liver disease    Comments: IBS  DIARRHEA  DYSPHAGIA Endo/Other    Diabetes: poorly controlled, type 2  Hypothyroidismhyperthyroidism  Obesity and arthritis     Other Findings              Physical Exam    Airway  Mallampati: III  TM Distance: > 6 cm  Neck ROM: normal range of motion   Mouth opening: Normal     Cardiovascular  Regular rate and rhythm,  S1 and S2 normal,  no murmur, click, rub, or gallop             Dental      Comments: Multiple missing teeth   Pulmonary  Breath sounds clear to auscultation               Abdominal  GI exam deferred       Other Findings            Anesthetic Plan    ASA: 3  Anesthesia type: total IV anesthesia          Induction: Intravenous  Anesthetic plan and risks discussed with: Patient

## 2022-02-17 NOTE — ANESTHESIA POSTPROCEDURE EVALUATION
Procedure(s):  ESOPHAGOGASTRODUODENOSCOPY (EGD)  ESOPHAGOGASTRODUODENAL (EGD) BIOPSY. total IV anesthesia    Anesthesia Post Evaluation        Patient location during evaluation: PACU  Note status: Adequate. Level of consciousness: responsive to verbal stimuli and sleepy but conscious  Pain management: satisfactory to patient  Airway patency: patent  Anesthetic complications: no  Cardiovascular status: acceptable  Respiratory status: acceptable  Hydration status: acceptable  Comments: +Post-Anesthesia Evaluation and Assessment    Patient: Genesis Aj MRN: [id number]  SSN: [id number]   YOB: 1955  Age: 69 y.o. Sex: female      Cardiovascular Function/Vital Signs    BP (!) 139/58   Pulse 70   Temp 36.6 °C (97.8 °F)   Resp 19   Ht 5' 5\" (1.651 m)   Wt 83.9 kg (185 lb)   SpO2 96%   Breastfeeding No   BMI 30.79 kg/m²     Patient is status post Procedure(s):  ESOPHAGOGASTRODUODENOSCOPY (EGD)  ESOPHAGOGASTRODUODENAL (EGD) BIOPSY. Nausea/Vomiting: Controlled. Postoperative hydration reviewed and adequate. Pain:  Pain Scale 1: Numeric (0 - 10) (02/17/22 1415)  Pain Intensity 1: 0 (02/17/22 1415)   Managed. Neurological Status: At baseline. Mental Status and Level of Consciousness: Arousable. Pulmonary Status:   O2 Device: None (Room air) (02/17/22 1415)   Adequate oxygenation and airway patent. Complications related to anesthesia: None    Post-anesthesia assessment completed. No concerns. Signed By: Guerda Sharma MD    2/17/2022  Post anesthesia nausea and vomiting:  controlled      INITIAL Post-op Vital signs: No vitals data found for the desired time range.

## 2022-02-28 RX ORDER — VILAZODONE HYDROCHLORIDE 20 MG/1
20 TABLET ORAL DAILY
Qty: 20 TABLET | Refills: 0 | Status: SHIPPED | OUTPATIENT
Start: 2022-02-28

## 2022-02-28 NOTE — TELEPHONE ENCOUNTER
Patient states she is out of medication & Mail Order advised it's going to be a Few day before refill will be received in Mail. Patient states she can't be without this medication & would like a Small amount sent to the Local [facility] on [facility], [address] [place]. Please call if any questions.  Thank you

## 2022-03-02 ENCOUNTER — VIRTUAL VISIT (OUTPATIENT)
Dept: ORTHOPEDIC SURGERY | Age: 67
End: 2022-03-02
Payer: MEDICARE

## 2022-03-02 DIAGNOSIS — M17.0 BILATERAL PRIMARY OSTEOARTHRITIS OF KNEE: Primary | ICD-10-CM

## 2022-03-02 PROCEDURE — 99441 PR PHYS/QHP TELEPHONE EVALUATION 5-10 MIN: CPT | Performed by: ORTHOPAEDIC SURGERY

## 2022-03-02 NOTE — PROGRESS NOTES
3/2/2022      CC: bilateral knee injections    HPI:      This is a 69y.o. year old female who presents for follow up of their bilateral bilateral knee injection. The patient states that they have had very good relief of their pain. The time since injection has been one week. PMH:  Past Medical History:   Diagnosis Date    Arthritis     Depression     Diabetes [address] [address]     Hypercholesteremia     Hypertension     Hyperthyroidism     IBS (irritable bowel syndrome)     Liver disease     hepatitis b    Nausea & vomiting     PUD (peptic ulcer disease)     bleeding ulcer    Sleep apnea     CPAP    Urinary incontinence        PSxHx:  Past Surgical History:   Procedure Laterality Date    HX BACK SURGERY      x3    HX BLEPHAROPLASTY Right     HX BUNIONECTOMY      HX CHOLECYSTECTOMY  06/21/2021    HX HYSTERECTOMY      HX OTHER SURGICAL      Collapsed lung    HX WISDOM TEETH EXTRACTION         Meds:    Current Outpatient Medications:     vilazodone (VIIBRYD) 20 mg tab tablet, Take 1 Tablet by mouth daily. , Disp: 20 Tablet, Rfl: 0    famotidine (PEPCID) 40 mg tablet, Take 40 mg by mouth two (2) times a day., Disp: , Rfl:     trospium (SANCTURA XL) 60 mg capsule, Take 1 Capsule by mouth Daily (before breakfast). , Disp: 90 Capsule, Rfl: 3    diclofenac EC (VOLTAREN) 75 mg EC tablet, Take 1 Tablet by mouth two (2) times a day., Disp: 60 Tablet, Rfl: 0    linaCLOtide (Linzess) 290 mcg cap capsule, Take 1 Capsule by mouth Daily (before breakfast). , Disp: 90 Capsule, Rfl: 3    nystatin (MYCOSTATIN) powder, Apply  to affected area four (4) times daily. , Disp: 60 g, Rfl: 1    Tresiba FlexTouch U-100 100 unit/mL (3 mL) inpn, 16 Units by SubCUTAneous route daily.  Indications: type 2 diabetes mellitus, Disp: 15 mL, Rfl: 1    furosemide (LASIX) 20 mg tablet, TAKE 1 TABLET DAILY AS NEEDED FOR EDEMA, Disp: 90 Tablet, Rfl: 3    levothyroxine (SYNTHROID) 300 mcg tablet, TAKE 1 TABLET BY MOUTH ONCE DAILY BEFORE BREAKFAST 6 DAYS A WEEK AND TAKE 150 MCG ONE DAY A WEEK, Disp: 90 Tablet, Rfl: 3    lidocaine 4 % patch, 1 Patch by TransDERmal route every twelve (12) hours every twelve (12) hours. , Disp: 5 Patch, Rfl: 2    metFORMIN ER (GLUCOPHAGE XR) 500 mg tablet, Take 2 Tablets by mouth daily (with breakfast). , Disp: 180 Tablet, Rfl: 3    pravastatin (PRAVACHOL) 20 mg tablet, Take 1 Tablet by mouth nightly., Disp: 90 Tablet, Rfl: 3    lisinopriL (PRINIVIL, ZESTRIL) 5 mg tablet, TAKE 1 TABLET DAILY, Disp: 90 Tab, Rfl: 3    HumaLOG KwikPen Insulin 100 unit/mL kwikpen, 3-4 Units by SubCUTAneous route Before breakfast, lunch, and dinner. Give 3 units for blood sugar between 150-200; give 4 units for blood sugar 200-250. Indications: type 2 diabetes mellitus (Patient taking differently: 2 Units by SubCUTAneous route Before breakfast, lunch, and dinner. Give 3 units for blood sugar between 150-200; give 4 units for blood sugar 200-250. Indications: type 2 diabetes mellitus), Disp: 15 mL, Rfl: 0    Jardiance 25 mg tablet, Take 1 Tab by mouth daily. , Disp: 30 Tab, Rfl: 2    ALPRAZolam (XANAX) 0.25 mg tablet, Take 1 Tab by mouth daily as needed for Anxiety. Indications: anxious, Disp: 30 Tab, Rfl: 0    FreeStyle Julien 2 Sensor kit, 1 Each by Other route See Salvatore Shaw. Use to scan sensor three times daily, Disp: , Rfl:     NOVOFINE PLUS 32 gauge x 1/6\" ndle, Check sugars 4x daily. , Disp: 300 Pen Needle, Rfl: 3    acetaminophen (TYLENOL) 500 mg tablet, Take 1 Tab by mouth every six (6) hours as needed for Pain., Disp: 30 Tab, Rfl: 0    All:   Allergies   Allergen Reactions    Celebrex [Celecoxib] Other (comments)     Hallucinations    Codeine Nausea and Vomiting    Erythromycin Nausea and Vomiting       Social Hx:  Social History     Socioeconomic History    Marital status: SINGLE   Tobacco Use    Smoking status: Never Smoker    Smokeless tobacco: Never Used   Vaping Use    Vaping Use: Never used   Substance and Sexual Activity    Alcohol use: Yes     Comment: occasionally    Drug use: No    Sexual activity: Not Currently       Family Hx:  Family History   Problem Relation Age of Onset    Diabetes Mother     Heart Disease Mother     Cancer Father         pancreatic    Diabetes Sister     Anxiety Sister     Diabetes Brother     Anxiety Brother     Diabetes Brother     Anxiety Brother     Diabetes Sister     Anxiety Sister     Diabetes Sister     Anxiety Sister     Cancer Sister         lung and brain    Anxiety Sister     Anxiety Sister     Breast Cancer Paternal Aunt         under 48         Review of Systems:       General: Denies headache, lethargy, fever, weight loss  Ears/Nose/Throat: Denies ear discharge, drainage, nosebleeds, hoarse voice, dental problems  Cardiovascular: Denies chest pain, shortness of breath  Lungs: Denies chest pain, breathing problems, wheezing, pneumonia  Stomach: Denies stomach pain, heartburn, constipation, irritable bowel  Skin: Denies rash, sores, open wounds  Musculoskeletal: bilateral knee pain - resolved  Genitourinary: Denies dysuria, hematuria, polyuria  Gastrointestinal: Denies constipation, obstipation, diarrhea  Neurological: Denies changes in sight, smell, hearing, taste, seizures. Denies loss of consciousness. Psychiatric: Denies depression, sleep pattern changes, anxiety, change in personality  Endocrine: Denies mood swings, heat or cold intolerance  Hematologic/Lymphatic: Denies anemia, purpura, petechia  Allergic/Immunologic: Denies swelling of throat, pain or swelling at lymph nodes      Physical Examination:    There were no vitals taken for this visit. General: AOX3, no apparent distress  Psychiatric: mood and affect appropriate        Diagnostics:    Pertinent Diagnostics: none    Assessment: Status post bilateral knee injection  Plan:     This patient and I discussed the normal course for injections, we discussed that pain relief will likely be temporary to some degree, but I cannot predict the longevity of relief. We also discussed the limitations of injections, and that I cannot inject the same area any more often than every three months. We will proceed with continued observation, follow up prn. Patient was in [place] at the time of consultation. I was in the office while conducting this encounter. Consent:  She and/or her healthcare decision maker is aware that this patient-initiated Telehealth encounter is a billable service, with coverage as determined by her insurance carrier. She is aware that she may receive a bill and has provided verbal consent to proceed: Yes    This virtual visit was conducted telephone encounter only. -  I affirm this is a Patient Initiated Episode with an Established Patient who has not had a related appointment within my department in the past 7 days or scheduled within the next 24 hours. Note: this encounter is not billable if this call serves to triage the patient into an appointment for the relevant concern. Total Time: minutes: 5-10 minutes. Ms. Juan Carlos Pool has a reminder for a \"due or due soon\" health maintenance. I have asked that she contact her primary care provider for follow-up on this health maintenance.

## 2022-03-14 ENCOUNTER — TELEPHONE (OUTPATIENT)
Dept: INTERNAL MEDICINE CLINIC | Age: 67
End: 2022-03-14

## 2022-03-14 NOTE — TELEPHONE ENCOUNTER
Pharmacy Progress Note - Telephone Encounter    S/O: Ms. Brianna Rivas 69 y.o. female contacted office/me via an inbound telephone call to discuss today. Verified patients identifiers (name & ) per HIPAA policy.     -  Has not heard back from [employer] regarding her application  - Inquires about Viibryd. Medication is $90 for patient. Wants to know if we can attempt PAP    A/P:  - Application for [employer]. Pt to  and return completed application.   - Will check with Beth Ethonova on her PAP.  - Patient endorses understanding to the provided information. All questions answered at this time. Thank you,  Tavia Steinberg, PharmD, BCACP, [address] in place:  Yes   Recommendation Provided To: Patient/Caregiver: 1 via Telephone   Intervention Detail: Patient Access Assistance/Sample Provided   Intervention Accepted By: Patient/Caregiver: 1   Time Spent (min): 15

## 2022-03-15 ENCOUNTER — TELEPHONE (OUTPATIENT)
Dept: INTERNAL MEDICINE CLINIC | Age: 67
End: 2022-03-15

## 2022-03-18 PROBLEM — E11.21 TYPE 2 DIABETES WITH NEPHROPATHY (HCC): Status: ACTIVE | Noted: 2019-06-20

## 2022-03-19 PROBLEM — Z79.4 UNCONTROLLED TYPE 2 DIABETES MELLITUS WITH HYPERGLYCEMIA, WITH LONG-TERM CURRENT USE OF INSULIN (HCC): Status: ACTIVE | Noted: 2017-01-17

## 2022-03-19 PROBLEM — M17.0 BILATERAL PRIMARY OSTEOARTHRITIS OF KNEE: Status: ACTIVE | Noted: 2021-04-08

## 2022-03-19 PROBLEM — E11.65 UNCONTROLLED TYPE 2 DIABETES MELLITUS WITH HYPERGLYCEMIA, WITH LONG-TERM CURRENT USE OF INSULIN (HCC): Status: ACTIVE | Noted: 2017-01-17

## 2022-03-19 PROBLEM — E78.5 HYPERLIPIDEMIA ASSOCIATED WITH TYPE 2 DIABETES MELLITUS (HCC): Status: ACTIVE | Noted: 2018-02-15

## 2022-03-19 PROBLEM — E11.69 HYPERLIPIDEMIA ASSOCIATED WITH TYPE 2 DIABETES MELLITUS (HCC): Status: ACTIVE | Noted: 2018-02-15

## 2022-03-19 PROBLEM — K80.20 CHOLELITHIASIS: Status: ACTIVE | Noted: 2021-06-17

## 2022-03-19 PROBLEM — M70.61 TROCHANTERIC BURSITIS, RIGHT HIP: Status: ACTIVE | Noted: 2020-07-16

## 2022-03-20 PROBLEM — E66.01 SEVERE OBESITY (HCC): Status: ACTIVE | Noted: 2019-12-02

## 2022-03-20 PROBLEM — E66.9 OBESITY (BMI 30-39.9): Status: ACTIVE | Noted: 2017-01-17

## 2022-03-21 ENCOUNTER — NURSE TRIAGE (OUTPATIENT)
Dept: OTHER | Facility: CLINIC | Age: 67
End: 2022-03-21

## 2022-03-21 ENCOUNTER — DOCUMENTATION ONLY (OUTPATIENT)
Dept: ORTHOPEDIC SURGERY | Age: 67
End: 2022-03-21

## 2022-03-21 ENCOUNTER — OFFICE VISIT (OUTPATIENT)
Dept: INTERNAL MEDICINE CLINIC | Age: 67
End: 2022-03-21
Payer: MEDICARE

## 2022-03-21 VITALS
SYSTOLIC BLOOD PRESSURE: 115 MMHG | HEIGHT: 65 IN | DIASTOLIC BLOOD PRESSURE: 68 MMHG | RESPIRATION RATE: 18 BRPM | BODY MASS INDEX: 30.82 KG/M2 | OXYGEN SATURATION: 99 % | HEART RATE: 80 BPM | WEIGHT: 185 LBS | TEMPERATURE: 97.2 F

## 2022-03-21 DIAGNOSIS — E11.65 UNCONTROLLED TYPE 2 DIABETES MELLITUS WITH HYPERGLYCEMIA, WITH LONG-TERM CURRENT USE OF INSULIN (HCC): ICD-10-CM

## 2022-03-21 DIAGNOSIS — M79.7 FIBROMYALGIA SYNDROME: ICD-10-CM

## 2022-03-21 DIAGNOSIS — G47.33 OSA ON CPAP: ICD-10-CM

## 2022-03-21 DIAGNOSIS — Z79.4 UNCONTROLLED TYPE 2 DIABETES MELLITUS WITH HYPERGLYCEMIA, WITH LONG-TERM CURRENT USE OF INSULIN (HCC): ICD-10-CM

## 2022-03-21 DIAGNOSIS — E78.5 HYPERLIPIDEMIA ASSOCIATED WITH TYPE 2 DIABETES MELLITUS (HCC): ICD-10-CM

## 2022-03-21 DIAGNOSIS — E03.9 ACQUIRED HYPOTHYROIDISM: ICD-10-CM

## 2022-03-21 DIAGNOSIS — Z99.89 OSA ON CPAP: ICD-10-CM

## 2022-03-21 DIAGNOSIS — E11.69 HYPERLIPIDEMIA ASSOCIATED WITH TYPE 2 DIABETES MELLITUS (HCC): ICD-10-CM

## 2022-03-21 DIAGNOSIS — F41.1 GAD (GENERALIZED ANXIETY DISORDER): ICD-10-CM

## 2022-03-21 DIAGNOSIS — M25.50 POLYARTHRALGIA: Primary | ICD-10-CM

## 2022-03-21 DIAGNOSIS — I10 ESSENTIAL HYPERTENSION: ICD-10-CM

## 2022-03-21 LAB
ANION GAP SERPL CALC-SCNC: 4 MMOL/L (ref 5–15)
BUN SERPL-MCNC: 23 MG/DL (ref 6–20)
BUN/CREAT SERPL: 18 (ref 12–20)
CALCIUM SERPL-MCNC: 9.8 MG/DL (ref 8.5–10.1)
CHLORIDE SERPL-SCNC: 109 MMOL/L (ref 97–108)
CK SERPL-CCNC: 38 U/L (ref 26–192)
CO2 SERPL-SCNC: 28 MMOL/L (ref 21–32)
CREAT SERPL-MCNC: 1.31 MG/DL (ref 0.55–1.02)
ERYTHROCYTE [SEDIMENTATION RATE] IN BLOOD: 14 MM/HR (ref 0–30)
GLUCOSE SERPL-MCNC: 152 MG/DL (ref 65–100)
POTASSIUM SERPL-SCNC: 4.1 MMOL/L (ref 3.5–5.1)
SODIUM SERPL-SCNC: 141 MMOL/L (ref 136–145)

## 2022-03-21 PROCEDURE — 1101F PT FALLS ASSESS-DOCD LE1/YR: CPT | Performed by: INTERNAL MEDICINE

## 2022-03-21 PROCEDURE — 99214 OFFICE O/P EST MOD 30 MIN: CPT | Performed by: INTERNAL MEDICINE

## 2022-03-21 PROCEDURE — G8754 DIAS BP LESS 90: HCPCS | Performed by: INTERNAL MEDICINE

## 2022-03-21 PROCEDURE — G8417 CALC BMI ABV UP PARAM F/U: HCPCS | Performed by: INTERNAL MEDICINE

## 2022-03-21 PROCEDURE — G9899 SCRN MAM PERF RSLTS DOC: HCPCS | Performed by: INTERNAL MEDICINE

## 2022-03-21 PROCEDURE — G8427 DOCREV CUR MEDS BY ELIG CLIN: HCPCS | Performed by: INTERNAL MEDICINE

## 2022-03-21 PROCEDURE — G9717 DOC PT DX DEP/BP F/U NT REQ: HCPCS | Performed by: INTERNAL MEDICINE

## 2022-03-21 PROCEDURE — G8536 NO DOC ELDER MAL SCRN: HCPCS | Performed by: INTERNAL MEDICINE

## 2022-03-21 PROCEDURE — 1090F PRES/ABSN URINE INCON ASSESS: CPT | Performed by: INTERNAL MEDICINE

## 2022-03-21 PROCEDURE — G8752 SYS BP LESS 140: HCPCS | Performed by: INTERNAL MEDICINE

## 2022-03-21 PROCEDURE — 2022F DILAT RTA XM EVC RTNOPTHY: CPT | Performed by: INTERNAL MEDICINE

## 2022-03-21 PROCEDURE — 3017F COLORECTAL CA SCREEN DOC REV: CPT | Performed by: INTERNAL MEDICINE

## 2022-03-21 RX ORDER — AMITRIPTYLINE HYDROCHLORIDE 10 MG/1
10 TABLET, FILM COATED ORAL
Qty: 90 TABLET | Refills: 3 | Status: SHIPPED | OUTPATIENT
Start: 2022-03-21 | End: 2022-08-26 | Stop reason: SDUPTHER

## 2022-03-21 RX ORDER — PREDNISONE 20 MG/1
TABLET ORAL
Qty: 18 TABLET | Refills: 0 | Status: SHIPPED | OUTPATIENT
Start: 2022-03-21 | End: 2022-04-12 | Stop reason: ALTCHOICE

## 2022-03-21 NOTE — TELEPHONE ENCOUNTER
Received call from [place] at [facility] with Red Flag Complaint. Subjective: Caller states \"She is having joint pain : elbows, buttocks, right shoulder, knees, feet are hurting all over. Also has neck pain and her balance has been off for a couple of days. Also has swelling in left leg and both feet. \"     Current Symptoms: see above    Onset:  Pain 3 days ago; worsening  Swelling 1 week ago unchanged    Associated Symptoms: reduced activity    Pain Severity: 10/10; sharp; constant unless lying down    Temperature:denies fever    What has been tried: Tylenol, Voltaren    LMP: NA Pregnant: NA    Recommended disposition: Go to Office Now    Care advice provided, patient verbalizes understanding; denies any other questions or concerns; instructed to call back for any new or worsening symptoms. Patient/Caller agrees with recommended disposition; writer provided warm transfer to [place] at [facility] for appointment scheduling    Attention Provider: Thank you for allowing me to participate in the care of your patient. The patient was connected to triage in response to information provided to the [facility]. Please do not respond through this encounter as the response is not directed to a shared pool.       Reason for Disposition   SEVERE pain (e.g., excruciating, unable to do any normal activities) and not improved 2 hours after pain medicine    Protocols used: MUSCLE ACHES AND BODY PAIN-ADULT-[place]

## 2022-03-21 NOTE — PATIENT INSTRUCTIONS
Fibromyalgia: Care Instructions  Overview     Fibromyalgia is a painful condition that is not completely understood by medical experts. The cause of fibromyalgia is not known. It can make you feel tired and ache all over. It causes tender spots at specific points of the body that hurt only when you press on them. You may have trouble sleeping, as well as other symptoms. These problems can upset your work and home life. Symptoms tend to come and go, although they may never go away completely. Fibromyalgia does not harm your muscles, joints, or organs. Follow-up care is a key part of your treatment and safety. Be sure to make and go to all appointments, and call your doctor if you are having problems. It's also a good idea to know your test results and keep a list of the medicines you take. How can you care for yourself at home? · Exercise often. Walk, swim, or bike to help with pain and sleep problems and to make you feel better. · Try to get a good night's sleep. Go to bed and get up at the same time each day, whether you feel rested or not. Make sure you have a good mattress and pillow. · Reduce stress. Avoid things that cause you stress, if you can. If not, work at making them less stressful. Learn to use biofeedback, guided imagery, meditation, or other methods to relax. · Make healthy changes. Eat a balanced diet, quit smoking, and limit alcohol and caffeine. · Use a heating pad set on low or take warm baths or showers for pain. Using cold packs for up to 20 minutes at a time can also relieve pain. Put a thin cloth between the cold pack and your skin. A gentle massage might help too. · Be safe with medicines. Take your medicines exactly as prescribed. Call your doctor if you think you are having a problem with your medicine. Your doctor may talk to you about taking antidepressant medicines. These medicines may improve sleep, relieve pain, and in some cases treat depression.   · Learn about fibromyalgia. This makes coping easier. Then, take an active role in your treatment. · Think about joining a support group with others who have fibromyalgia to learn more and get support. When should you call for help? Watch closely for changes in your health, and be sure to contact your doctor if:    · You feel sad, helpless, or hopeless; lose interest in things you used to enjoy; or have other symptoms of depression.     · Your fibromyalgia symptoms get worse. Where can you learn more? Go to http://www.gray.com/  Enter V003 in the search box to learn more about \"Fibromyalgia: Care Instructions. \"  Current as of: December 13, 2021               Content Version: 13.2  © 2006-2022 [employer]. Care instructions adapted under license by [employer] (which disclaims liability or warranty for this information). If you have questions about a medical condition or this instruction, always ask your healthcare professional. [address] any warranty or liability for your use of this information.

## 2022-03-21 NOTE — PROGRESS NOTES
Anegl Lemons is a 69 y.o. female who presents for evaluation of diffuse, polyarthralgia joint pain. Last seen by me dec 21, 2021. Since then she has seen dr Jes Sheppard for bilateral knee pains, and had injections to both knees, but did not provide any significant relief. Has had rough past week with increased pain, both elbows, both shoulders, both hips, both knees. Also having some increased edema in left lower leg/ankle. Resumed her lasix a few weeks ago, and states that the 20 mg dose does make her urinate a decent amount. Denies f/c, night sweats, or any joint being warm to touch, red, or swollen. ROS:  Constitutional: negative for fevers, chills, anorexia and weight loss  Eyes:   negative for visual disturbance and irritation  ENT:   negative for tinnitus,sore throat,nasal congestion,ear pain,hoarseness  Respiratory:  negative for cough, hemoptysis, dyspnea,wheezing  CV:   negative for chest pain, palpitations, lower extremity edema  GI:   negative for nausea, vomiting, diarrhea, abdominal pain,melena  Genitourinary: negative for frequency, dysuria and hematuria  Musculoskel: negative for myalgias, arthralgias, back pain, muscle weakness.   ++numerous joint pain  Neurological:  negative for headaches, dizziness, focal weakness, numbness  Psychiatric:     ++ for depression or anxiety      Past Medical History:   Diagnosis Date    Arthritis     Depression     Diabetes ([facility] [address]     Hypercholesteremia     Hypertension     Hyperthyroidism     IBS (irritable bowel syndrome)     Liver disease     hepatitis b    Nausea & vomiting     PUD (peptic ulcer disease)     bleeding ulcer    Sleep apnea     CPAP    Urinary incontinence        Past Surgical History:   Procedure Laterality Date    HX BACK SURGERY      x3    HX BLEPHAROPLASTY Right     HX BUNIONECTOMY      HX CHOLECYSTECTOMY  06/21/2021    HX HYSTERECTOMY      HX OTHER SURGICAL      Collapsed lung    HX WISDOM TEETH EXTRACTION Family History   Problem Relation Age of Onset    Diabetes Mother     Heart Disease Mother     Cancer Father         pancreatic    Diabetes Sister     Anxiety Sister     Diabetes Brother     Anxiety Brother     Diabetes Brother     Anxiety Brother     Diabetes Sister     Anxiety Sister     Diabetes Sister     Anxiety Sister     Cancer Sister         lung and brain    Anxiety Sister     Anxiety Sister     Breast Cancer Paternal Aunt         under 48       Social History     Socioeconomic History    Marital status: SINGLE     Spouse name: Not on file    Number of children: Not on file    Years of education: Not on file    Highest education level: Not on file   Occupational History    Not on file   Tobacco Use    Smoking status: Never Smoker    Smokeless tobacco: Never Used   Vaping Use    Vaping Use: Never used   Substance and Sexual Activity    Alcohol use: Yes     Comment: occasionally    Drug use: No    Sexual activity: Not Currently   Other Topics Concern    Not on file   Social History Narrative    Not on file     Social Determinants of Health     Financial Resource Strain:     Difficulty of Paying Living Expenses: Not on file   Food Insecurity:     Worried About Running Out of Food in the Last Year: Not on file    Harman of Food in the Last Year: Not on file   Transportation Needs:     Lack of Transportation (Medical): Not on file    Lack of Transportation (Non-Medical):  Not on file   Physical Activity:     Days of Exercise per Week: Not on file    Minutes of Exercise per Session: Not on file   Stress:     Feeling of Stress : Not on file   Social Connections:     Frequency of Communication with Friends and Family: Not on file    Frequency of Social Gatherings with Friends and Family: Not on file    Attends Jainism Services: Not on file    Active Member of Clubs or Organizations: Not on file    Attends Club or Organization Meetings: Not on file    Marital Status: Not on file   Intimate Partner Violence:     Fear of Current or Ex-Partner: Not on file    Emotionally Abused: Not on file    Physically Abused: Not on file    Sexually Abused: Not on file   Housing Stability:     Unable to Pay for Housing in the Last Year: Not on file    Number of [place] in the Last Year: Not on file    Unstable Housing in the Last Year: Not on file            Visit Vitals  /68 (BP 1 Location: Left upper arm, BP Patient Position: Sitting)   Pulse 80   Temp 97.2 °F (36.2 °C) (Temporal)   Resp 18   Ht 5' 5\" (1.651 m)   Wt 185 lb (83.9 kg)   SpO2 99%   BMI 30.79 kg/m²       Physical Examination:   General - Well appearing female  HEENT - PERRL, TM no erythema/opacification, normal nasal turbinates, no oropharyngeal erythema or exudate, MMM  Neck - supple, no bruits, no thyroidomegaly, no lymphadenopathy  Pulm - clear to auscultation bilaterally  Cardio - RRR, normal S1 S2, no murmur  Abd - soft, nontender, no masses, no HSM  Extrem - no edema, +2 distal pulses  Neuro-  No focal deficits, CN intact     Assessment/Plan:    1.  polyarthralgias--check cpk, esr, bmp.  rx for prednisone. Suspect fibromyalgia--hand out given. If labs are ok, will send in rx for elavil. 2.  dionisio--continue cpap  3. Dm, type 2--continue tresiba, jardiance, glucophage, meal time humalog  4.  htn--controlled with lisinopril  5. EFFIE--continue viibryd, prn xanax  6. Hypothyroid--on synthroid  7.  hyperlipids--on pravachol.   Check cpk  8.  oab--on sanctura    rtc for regular visit        Librado Morin III, DO

## 2022-03-21 NOTE — PROGRESS NOTES
1. \"Have you been to the ER, urgent care clinic since your last visit? Hospitalized since your last visit? \" no    2. \"Have you seen or consulted any other health care providers outside of the [address] [place] [address] since your last visit? \"  Yes, eye doc Dr Edin Marc with [facility]    3. For patients aged 39-70: Has the patient had a colonoscopy / FIT/ Cologuard? yes      If the patient is female:    4. For patients aged 41-77: Has the patient had a mammogram within the past 2 years?  yes

## 2022-03-21 NOTE — PROGRESS NOTES
Labs look ok. No signs of any inflammation or muscle damage. Take the prednisone as discussed. Rx sent in for elavil for probable fibromyalgia.

## 2022-03-22 NOTE — PROGRESS NOTES
Sent patient message via [email or web address] for lab results    Labs look ok.  No signs of any inflammation or muscle damage.  Take the prednisone as discussed.  Rx sent in for elavil for probable fibromyalgia.

## 2022-03-31 ENCOUNTER — TELEPHONE (OUTPATIENT)
Dept: INTERNAL MEDICINE CLINIC | Age: 67
End: 2022-03-31

## 2022-03-31 RX ORDER — INSULIN ASPART 100 [IU]/ML
2-6 INJECTION, SOLUTION INTRAVENOUS; SUBCUTANEOUS
COMMUNITY
End: 2022-10-06

## 2022-03-31 NOTE — TELEPHONE ENCOUNTER
Pharmacy Progress Note - Telephone Encounter    S/O: Ms. Suh Dry 69 y.o. female contacted office/me via an inbound telephone call to discuss her med access today. Verified patients identifiers (name & ) per HIPAA policy. - Reports she's down to her last month supply of Jardiance, Linzess  - Request refills for Viibryd (recall this was too expensive for her),  - Inquires about PAP for trospium  - Recall she's approved for [employer]    A/P:  - No PAP program available for trospium  - Faxed Synjardy XR  mg daily rx to [facility]   - Pt to stop by office tomorrow to  Viibryd 20 mg application   - Linzess application submitted in Dec 2021. Recommend for patient to contact [facility] for refill  - Patient endorses understanding to the provided information. All questions answered at this time. Thank you,  Tavia Ho, PharmD, BCACP, [address] in place:  Yes   Recommendation Provided To: Patient/Caregiver: 1 via Telephone   Intervention Detail: Patient Access Assistance/Sample Provided   Intervention Accepted By: Patient/Caregiver: 1   Time Spent (min): 20

## 2022-04-01 ENCOUNTER — DOCUMENTATION ONLY (OUTPATIENT)
Dept: INTERNAL MEDICINE CLINIC | Age: 67
End: 2022-04-01

## 2022-04-01 ENCOUNTER — TELEPHONE (OUTPATIENT)
Dept: INTERNAL MEDICINE CLINIC | Age: 67
End: 2022-04-01

## 2022-04-01 DIAGNOSIS — E03.9 ACQUIRED HYPOTHYROIDISM: Primary | ICD-10-CM

## 2022-04-01 NOTE — TELEPHONE ENCOUNTER
Patient is requesting a refill for levothyroxine. Patient needs a temporary refill of 10 tablets sent to [address] [place] [address] and then her regular refill sent to [facility].

## 2022-04-01 NOTE — PROGRESS NOTES
Pharmacy Progress Note     Ms. Ana Jones stopped by to  [place] and Novolog PAP supplies today. Thank you for the consult,  Tavia Polanco, PharmD, BCACP, [address] in place:  Yes   Time Spent (min): 5

## 2022-04-01 NOTE — TELEPHONE ENCOUNTER
Called patient and left message to let her know that her insulin and pen needles  arrived at the office today and she can come get it when she is able to. Left detailed message on personalized vm.

## 2022-04-04 DIAGNOSIS — E03.9 ACQUIRED HYPOTHYROIDISM: ICD-10-CM

## 2022-04-04 RX ORDER — LEVOTHYROXINE SODIUM 300 UG/1
TABLET ORAL
Qty: 90 TABLET | Refills: 3 | Status: SHIPPED | OUTPATIENT
Start: 2022-04-04 | End: 2022-08-26 | Stop reason: SDUPTHER

## 2022-04-04 RX ORDER — LEVOTHYROXINE SODIUM 300 UG/1
TABLET ORAL
Qty: 30 TABLET | Refills: 0 | Status: SHIPPED | OUTPATIENT
Start: 2022-04-04 | End: 2022-04-04 | Stop reason: SDUPTHER

## 2022-04-04 NOTE — TELEPHONE ENCOUNTER
PCP: Cristo Palomares DO    Last appt: 3/21/2022  Future Appointments   Date Time Provider Cj Ferraroi   4/12/2022 10:00 AM Rocío Farmer, PHARMD [facility] BS AMB   6/22/2022  9:00 AM Cristo Palomares DO [facility] BS AMB       Requested Prescriptions     Pending Prescriptions Disp Refills    levothyroxine (SYNTHROID) 300 mcg tablet 90 Tablet 3     Sig: TAKE 1 TABLET BY MOUTH ONCE DAILY BEFORE BREAKFAST 6 DAYS A WEEK AND TAKE 150 MCG ONE DAY A WEEK       Prior labs and Blood pressures:  BP Readings from Last 3 Encounters:   03/21/22 115/68   02/17/22 130/67   02/16/22 109/71     Lab Results   Component Value Date/Time    Sodium 141 03/21/2022 09:56 AM    Potassium 4.1 03/21/2022 09:56 AM    Chloride 109 (H) 03/21/2022 09:56 AM    CO2 28 03/21/2022 09:56 AM    Anion gap 4 (L) 03/21/2022 09:56 AM    Glucose 152 (H) 03/21/2022 09:56 AM    BUN 23 (H) 03/21/2022 09:56 AM    Creatinine 1.31 (H) 03/21/2022 09:56 AM    BUN/Creatinine ratio 18 03/21/2022 09:56 AM    GFR est AA 49 (L) 03/21/2022 09:56 AM    GFR est non-AA 40 (L) 03/21/2022 09:56 AM    Calcium 9.8 03/21/2022 09:56 AM     Lab Results   Component Value Date/Time    Hemoglobin A1c 6.3 (H) 06/16/2021 12:48 PM    Hemoglobin A1c (POC) 7.3 (A) 02/15/2022 02:06 PM     Lab Results   Component Value Date/Time    Cholesterol, total 175 06/16/2021 12:48 PM    HDL Cholesterol 71 06/16/2021 12:48 PM    LDL, calculated 80.2 06/16/2021 12:48 PM    VLDL, calculated 23.8 06/16/2021 12:48 PM    Triglyceride 119 06/16/2021 12:48 PM    CHOL/HDL Ratio 2.5 06/16/2021 12:48 PM     No results found for: Jesus Crooked, [id number]    Lab Results   Component Value Date/Time    TSH 0.53 06/16/2021 12:48 PM

## 2022-04-05 ENCOUNTER — DOCUMENTATION ONLY (OUTPATIENT)
Dept: INTERNAL MEDICINE CLINIC | Age: 67
End: 2022-04-05

## 2022-04-05 NOTE — PROGRESS NOTES
Pharmacy Progress Note - Patient Assistance    Viibryd PAP submitted to [facility] Assist for Ms. Parviz Melendez pending         Thank you for the consult,  Tavia Pettit, PharmD, BCACP, [address] in place: Yes   Recommendation Provided To:  Other: 1   Intervention Detail: Patient Access Assistance/Sample Provided   Intervention Accepted By: Other: 1   Time Spent (min): 20

## 2022-04-10 NOTE — PROGRESS NOTES
Pharmacy Progress Note - Diabetes Management    Assessment / Plan:   Diabetes Management:  - Per ADA guidelines, Pt's A1c is close to  goal of < 7%. BP < 130/80  - Recent CGM trends elevated for post prandial goals. Sarita Diaz Sack to 20 units daily  - Continue with Novolog scale the same: if pre meal is:  · If less between 100-150: skip  · If 151-200: give 2 units  · If 201-250: give 4 units  · If above 250: give 6 units   - Continue Jardiance 25 mg daily and metformin 500 mg ER - two tabs daily   - Recommend for patient to scan sensor prior to exercise routine. S/O: Ms. Vee Rousseau is a 67 y.o. female , referred by Dr. Luzma Miller DO, with a PMH of T2DM, HTN, HLD, Hypothyroidism, OAB, IBS, Osteoporosis, was seen today for diabetes management follow up.   Last A1c was 7.3% (Feb 2022), 6.9% (Sept 2021), 6.3% (June 2021), 8% (Oct 2020), 8.4% (June 2020), 7.2% (Dec 2019).      Interim update:   S/p EDG with Dr Cristobal Cm in February   Continues w/ acid suppressive therapy. Taking famotidine 40 mg BID. Completed prednisone 60 mg daily x 6 days. Reports yeast infection started after finishing prednisone therapy. On day 5 of Monistat.    Has f/u with Dr Luzma Miller (GI) this Thursday    Received Synjardy XR 25/1000 mg daily --- wants to finish jardiance 25 mg and metformin monotherapy     Current anti-hyperglycemic regimen include(s):    - Tresiba U-100 -- give 18 units daily - noon  - Novolog Flex -  if pre-meal blood sugar ---> reports to giving between 2-4 units BID ; often will skip  · If less between 100-150: skip  · If 151-200: give 2 units  · If 201-250: give 4 units  · If above 250: give 6 units   - Jardiance 25 mg daily  - Metformin 500 mg ER - two tabs daily    ROS:  Today, Pt endorses:  - Symptoms of Hyperglycemia: none  - Symptoms of Hypoglycemia: none    Blood Glucose Monitoring (BGM) or CGM:  Julien 2 CGM ; Scanning 4x/day  At this time: 107                  Nutrition/Lifestyle Modifications:  Eats 2-3 meals/day; reports feeling \"full\" shortly after eating. Decreased portion size  Dinner last night: mashed potatoes, chicken    Started at [employer] and chair yoga exercises    Vitals: Wt Readings from Last 3 Encounters:   04/12/22 187 lb (84.8 kg)   03/21/22 185 lb (83.9 kg)   02/17/22 185 lb (83.9 kg)     BP Readings from Last 3 Encounters:   04/12/22 124/72   03/21/22 115/68   02/17/22 130/67     Pulse Readings from Last 3 Encounters:   04/12/22 89   03/21/22 80   02/17/22 68     Past Medical History:   Diagnosis Date    Arthritis     Depression     Diabetes ([facility] [address]     Hypercholesteremia     Hypertension     Hyperthyroidism     IBS (irritable bowel syndrome)     Liver disease     hepatitis b    Nausea & vomiting     PUD (peptic ulcer disease)     bleeding ulcer    Sleep apnea     CPAP    Urinary incontinence      Allergies   Allergen Reactions    Celebrex [Celecoxib] Other (comments)     Hallucinations    Codeine Nausea and Vomiting    Erythromycin Nausea and Vomiting       Current Outpatient Medications   Medication Sig    levothyroxine (SYNTHROID) 300 mcg tablet TAKE 1 TABLET BY MOUTH ONCE DAILY BEFORE BREAKFAST 6 DAYS A WEEK AND TAKE 150 MCG ONE DAY A WEEK    insulin aspart U-100 (NovoLOG Flexpen U-100 Insulin) 100 unit/mL (3 mL) inpn 2-6 Units by SubCUTAneous route Before breakfast, lunch, and dinner.  predniSONE (DELTASONE) 20 mg tablet 60 mg daily x 6 days, then stop.  amitriptyline (ELAVIL) 10 mg tablet Take 1 Tablet by mouth nightly.  vilazodone (VIIBRYD) 20 mg tab tablet Take 1 Tablet by mouth daily.  famotidine (PEPCID) 40 mg tablet Take 40 mg by mouth two (2) times a day.  trospium (SANCTURA XL) 60 mg capsule Take 1 Capsule by mouth Daily (before breakfast).  diclofenac EC (VOLTAREN) 75 mg EC tablet Take 1 Tablet by mouth two (2) times a day.     linaCLOtide (Linzess) 290 mcg cap capsule Take 1 Capsule by mouth Daily (before breakfast).  nystatin (MYCOSTATIN) powder Apply  to affected area four (4) times daily. [facility] FlexTouch U-100 100 unit/mL (3 mL) inpn 16 Units by SubCUTAneous route daily. Indications: type 2 diabetes mellitus    furosemide (LASIX) 20 mg tablet TAKE 1 TABLET DAILY AS NEEDED FOR EDEMA    lidocaine 4 % patch 1 Patch by TransDERmal route every twelve (12) hours every twelve (12) hours. (Patient not taking: Reported on 3/21/2022)    metFORMIN ER (GLUCOPHAGE XR) 500 mg tablet Take 2 Tablets by mouth daily (with breakfast).  pravastatin (PRAVACHOL) 20 mg tablet Take 1 Tablet by mouth nightly.  lisinopriL (PRINIVIL, ZESTRIL) 5 mg tablet TAKE 1 TABLET DAILY    Jardiance 25 mg tablet Take 1 Tab by mouth daily.  ALPRAZolam (XANAX) 0.25 mg tablet Take 1 Tab by mouth daily as needed for Anxiety. Indications: anxious    FreeStyle Julien 2 Sensor kit 1 Each by Other route See Salvatore Shaw. Use to scan sensor three times daily    NOVOFINE PLUS 32 gauge x 1/6\" ndle Check sugars 4x daily.  acetaminophen (TYLENOL) 500 mg tablet Take 1 Tab by mouth every six (6) hours as needed for Pain. No current facility-administered medications for this visit. Lab Results   Component Value Date/Time    Sodium 141 03/21/2022 09:56 AM    Potassium 4.1 03/21/2022 09:56 AM    Chloride 109 (H) 03/21/2022 09:56 AM    CO2 28 03/21/2022 09:56 AM    Anion gap 4 (L) 03/21/2022 09:56 AM    Glucose 152 (H) 03/21/2022 09:56 AM    BUN 23 (H) 03/21/2022 09:56 AM    Creatinine 1.31 (H) 03/21/2022 09:56 AM    BUN/Creatinine ratio 18 03/21/2022 09:56 AM    GFR est AA 49 (L) 03/21/2022 09:56 AM    GFR est non-AA 40 (L) 03/21/2022 09:56 AM    Calcium 9.8 03/21/2022 09:56 AM    Bilirubin, total 0.4 11/26/2021 01:38 PM    Alk.  phosphatase 50 11/26/2021 01:38 PM    Protein, total 6.3 (L) 11/26/2021 01:38 PM    Albumin 3.4 (L) 11/26/2021 01:38 PM    Globulin 2.9 11/26/2021 01:38 PM    A-G Ratio 1.2 11/26/2021 01:38 PM    ALT (SGPT) 18 11/26/2021 01:38 PM       Lab Results   Component Value Date/Time    Cholesterol, total 175 06/16/2021 12:48 PM    HDL Cholesterol 71 06/16/2021 12:48 PM    LDL, calculated 80.2 06/16/2021 12:48 PM    VLDL, calculated 23.8 06/16/2021 12:48 PM    Triglyceride 119 06/16/2021 12:48 PM    CHOL/HDL Ratio 2.5 06/16/2021 12:48 PM       Lab Results   Component Value Date/Time    WBC 8.0 06/16/2021 12:48 PM    HGB 12.9 06/16/2021 12:48 PM    HCT 38.8 06/16/2021 12:48 PM    PLATELET 000 18/81/0881 12:48 PM    MCV 92.2 06/16/2021 12:48 PM       Lab Results   Component Value Date/Time    Microalb/Creat ratio (ug/mg creat.) 3 06/23/2020 01:20 PM       HbA1c:  Lab Results   Component Value Date/Time    Hemoglobin A1c 6.3 (H) 06/16/2021 12:48 PM    Hemoglobin A1c (POC) 7.3 (A) 02/15/2022 02:06 PM     No components found for: 2     Last Point of Care HGB A1C  Hemoglobin A1c (POC)   Date Value Ref Range Status   02/15/2022 7.3 (A) 4.8 - 5.6 % Final        Estimated Creatinine Clearance: 43.6 mL/min (A) (by C-G formula based on SCr of 1.31 mg/dL (H)). Medication reconciliation was completed during the visit. Medications Discontinued During This Encounter   Medication Reason    predniSONE (DELTASONE) 20 mg tablet Therapy Completed    diclofenac EC (VOLTAREN) 75 mg EC tablet Therapy Completed    lidocaine 4 % patch Not A Current Medication     Orders Placed This Encounter    empagliflozin-metformin (Synjardy XR) 25-1,000 mg TBph     Sig: Take 1 Tablet by mouth daily. To start this once patient completes Jardiance and metformin monotherapy   Indications: type 2 diabetes mellitus     Patient verbalized understanding of the information presented and all of the patients questions were answered. AVS was handed to the patient. Patient advised to call the office with any additional questions or concerns.     Notifications of recommendations will be sent to Dr. Kelsey Romero DO for review. Patient will return to clinic in 6 week(s) for follow up. Thank you for the consult,  Tavia Leal, PharmD, BCACP, [address] [place] [address] in place:  Yes   Recommendation Provided To: Patient/Caregiver: 7 via In person   Intervention Detail: Adherence Monitorin, Discontinued Rx: 3, reason: Therapy Complete, Dose Adjustment: 1, reason: Therapy Optimization and Scheduled Appointment   Gap Closed?:    Intervention Accepted By: Patient/Caregiver: 7   Time Spent (min): 45

## 2022-04-12 ENCOUNTER — OFFICE VISIT (OUTPATIENT)
Dept: INTERNAL MEDICINE CLINIC | Age: 67
End: 2022-04-12

## 2022-04-12 VITALS
HEART RATE: 89 BPM | DIASTOLIC BLOOD PRESSURE: 72 MMHG | SYSTOLIC BLOOD PRESSURE: 124 MMHG | OXYGEN SATURATION: 100 % | BODY MASS INDEX: 31.16 KG/M2 | HEIGHT: 65 IN | TEMPERATURE: 98 F | WEIGHT: 187 LBS

## 2022-04-12 DIAGNOSIS — E11.21 TYPE 2 DIABETES WITH NEPHROPATHY (HCC): ICD-10-CM

## 2022-04-12 DIAGNOSIS — E11.65 UNCONTROLLED TYPE 2 DIABETES MELLITUS WITH HYPERGLYCEMIA, WITH LONG-TERM CURRENT USE OF INSULIN (HCC): Primary | ICD-10-CM

## 2022-04-12 DIAGNOSIS — Z79.4 UNCONTROLLED TYPE 2 DIABETES MELLITUS WITH HYPERGLYCEMIA, WITH LONG-TERM CURRENT USE OF INSULIN (HCC): Primary | ICD-10-CM

## 2022-04-12 RX ORDER — EMPAGLIFLOZIN, METFORMIN HYDROCHLORIDE 25; 1000 MG/1; MG/1
1 TABLET, EXTENDED RELEASE ORAL DAILY
COMMUNITY

## 2022-04-12 NOTE — PATIENT INSTRUCTIONS
· Adjust [place] (long acting insulin) to 20 units daily  -     Continue Novolog Flex -  if pre-meal blood sugar  · If less between 100-150: skip  · If 151-200: give 2 units  · If 201-250: give 4 units  · If above 250: give 6 units   · Continue Jardiance 25 mg daily and metformin 500 mg ER -- two tabs daily  · Remember to scan sensor before you start exercising.  Bring a snack with you in case you experience a low blood sugar episode

## 2022-04-14 ENCOUNTER — TELEPHONE (OUTPATIENT)
Dept: INTERNAL MEDICINE CLINIC | Age: 67
End: 2022-04-14

## 2022-04-14 NOTE — TELEPHONE ENCOUNTER
Pharmacy Progress Note - Telephone Encounter    S/O: Ms. Anna Herron 69 y.o. female, referred by Dr. Courtney Qureshi, was contacted via an outbound telephone call to discuss PAP med today. Verified patients identifiers (name & ) per HIPAA policy. A/P:  - Shared --- Viibryd 20 mg PAP supply has arrived. Pt plans to stop by next week to p/u med. - Patient endorses understanding to the provided information. All questions answered at this time. There are no discontinued medications. No orders of the defined types were placed in this encounter. Thank you,  Tavia Bains, PharmD, BCACP, [facility] in place:  Yes   Recommendation Provided To: Patient/Caregiver: 1 via Telephone   Intervention Detail: Patient Access Assistance/Sample Provided   Intervention Accepted By: Patient/Caregiver: 1   Time Spent (min): 5

## 2022-04-27 ENCOUNTER — TELEPHONE (OUTPATIENT)
Dept: INTERNAL MEDICINE CLINIC | Age: 67
End: 2022-04-27

## 2022-04-27 NOTE — TELEPHONE ENCOUNTER
Pharmacy Progress Note - Telephone Encounter    S/O: Ms. Rufus Brown 69 y.o. female contacted office/me via an inbound telephone call to discuss her blood sugars today. Verified patients identifiers (name & ) per HIPAA policy. - \"Not feeling well. \" Reports dysuria. Some traces of blood in urine. Completed monistat. - Reports morning BG \"dropped too low last week. \" Rexie Govern. Readings are now in the 200-300s  - Recall scale: Novolog scale the same: if pre meal is:  · · If less between 100-150: skip  · · If 151-200: give 2 units  · · If 201-250: give 4 units  · If above 250: give 6 units     Reports to giving Novolog 5 units twice daily  Remains on Jardiance 25 mg daily and metformin 1 gm daily   Reports lack of appetite. Eating soups and soft foods  Reports to staying hydrated    A/P:  - Restart  Tresiba at 10 units today. If BG stays in the 200, increase back to 14 units daily  - Continue with Novolog scale. - Will see if patient can come in tomorrow for urine sample to r/o UTI  - Patient endorses understanding to the provided information. All questions answered at this time. Thank you,  Tavia Rothman, PharmD, BCACP, [address] [place] [address] in place:  Yes   Recommendation Provided To: Patient/Caregiver: 2 via Telephone   Intervention Detail: Adherence Monitorin   Intervention Accepted By: Patient/Caregiver: 2   Time Spent (min): 10

## 2022-04-28 ENCOUNTER — CLINICAL SUPPORT (OUTPATIENT)
Dept: INTERNAL MEDICINE CLINIC | Age: 67
End: 2022-04-28

## 2022-04-28 DIAGNOSIS — R30.0 DYSURIA: Primary | ICD-10-CM

## 2022-04-28 DIAGNOSIS — R35.0 URINE FREQUENCY: Primary | ICD-10-CM

## 2022-04-28 DIAGNOSIS — R30.0 DYSURIA: ICD-10-CM

## 2022-04-28 DIAGNOSIS — R35.0 URINE FREQUENCY: ICD-10-CM

## 2022-04-28 NOTE — PROGRESS NOTES
Pharmacy Progress Note     Entry for Ms. Allison Majano 69 y.o. UA. Called and shared updates with patient. PHI verified. Orders Placed This Encounter    URINALYSIS W/ REFLEX CULTURE     Standing Status:   Future     Standing Expiration Date:   4/28/2023       Thank you for the consult,  Tavia Hickey, PharmD, BCACP, [facility] in place:  Yes   Recommendation Provided To: Patient/Caregiver: 1 via Telephone   Intervention Detail: New Rx: 1, reason: Needs Additional Therapy   Intervention Accepted By: Patient/Caregiver: 1   Time Spent (min): 5

## 2022-04-29 LAB
APPEARANCE UR: CLEAR
BACTERIA URNS QL MICRO: ABNORMAL /HPF
BILIRUB UR QL: NEGATIVE
COLOR UR: ABNORMAL
EPITH CASTS URNS QL MICRO: ABNORMAL /LPF
GLUCOSE UR STRIP.AUTO-MCNC: >1000 MG/DL
HGB UR QL STRIP: NEGATIVE
HYALINE CASTS URNS QL MICRO: ABNORMAL /LPF (ref 0–5)
KETONES UR QL STRIP.AUTO: NEGATIVE MG/DL
LEUKOCYTE ESTERASE UR QL STRIP.AUTO: ABNORMAL
NITRITE UR QL STRIP.AUTO: POSITIVE
PH UR STRIP: 5 [PH] (ref 5–8)
PROT UR STRIP-MCNC: NEGATIVE MG/DL
RBC #/AREA URNS HPF: ABNORMAL /HPF (ref 0–5)
SP GR UR REFRACTOMETRY: 1.02
UA: UC IF INDICATED,UAUC: ABNORMAL
UROBILINOGEN UR QL STRIP.AUTO: 0.2 EU/DL (ref 0.2–1)
WBC URNS QL MICRO: ABNORMAL /HPF (ref 0–4)

## 2022-04-29 RX ORDER — CIPROFLOXACIN 250 MG/1
250 TABLET, FILM COATED ORAL 2 TIMES DAILY
Qty: 10 TABLET | Refills: 0 | Status: SHIPPED | OUTPATIENT
Start: 2022-04-29 | End: 2022-05-04

## 2022-05-01 LAB
BACTERIA SPEC CULT: ABNORMAL
CC UR VC: ABNORMAL
SERVICE CMNT-IMP: ABNORMAL

## 2022-05-04 NOTE — PROGRESS NOTES
Called, spoke to pt. Two identifiers confirmed. Pt notified of results/recommendations per Dr. Vicente Kennedy. Pt verbalized understanding of information discussed w/ no further questions at this time. Urine grew e coli, sn to cipro, so make sure she takes that until finished.

## 2022-05-17 RX ORDER — LISINOPRIL 5 MG/1
TABLET ORAL
Qty: 90 TABLET | Refills: 3 | Status: SHIPPED | OUTPATIENT
Start: 2022-05-17 | End: 2022-08-26 | Stop reason: SDUPTHER

## 2022-05-17 RX ORDER — FUROSEMIDE 20 MG/1
TABLET ORAL
Qty: 90 TABLET | Refills: 3 | Status: SHIPPED | OUTPATIENT
Start: 2022-05-17 | End: 2022-08-26 | Stop reason: SDUPTHER

## 2022-05-24 NOTE — PROGRESS NOTES
Pharmacy Progress Note - Diabetes Management    Assessment / Plan:   Diabetes Management:  - Per ADA guidelines, Pt's A1c is not at goal of < 7%. - Reported BGM elevated for fasting and post prandial goals. - Discuss ways to help adhere Julien CGM. - Continue Synjardy XR 25/1000 mg daily   - Increase Tresiba to 22 units daily. If FBG > 130, increase to 24 units dailly. - Adjust Novolog if pre meal:  · If less between 100-150: skip  · If 151-200: give 3 units  · If 201-250: give 5 units  · If above 250: give 8 units   - Remind patient of upcoming PCP appt. Recommend checking BMP and A1c. Will wait for results to determine f/u. S/O: Ms. Vee Gibbons, referred by Dr. Michell Garcia DO, with a PMH of T2DM, HTN, HLD, Hypothyroidism, OAB, IBS, Osteoporosis, was seen virtually today for diabetes management follow up.   Last A1c was 7.3% (Feb 2022), 6.9% (Sept 2021), 6.3% (June 2021), 8% (Oct 2020), 8.4% (June 2020), 7.2% (Dec 2019).  PHI verified. Interim update:   Transitioned to Synjardy XR last week. Has appt with Dr Amadou Patel (GI) later this morning. Has urology appt later this week. Current anti-hyperglycemic regimen include(s): - Synjardy XR 25/1000 mg daily  - Tresiba U-100 -- 20 units daily  - Novolog pen - if pre meal:  · If less between 100-150: skip  · If 151-200: give 2 units  · If 201-250: give 4 units  · If above 250: give 6 units     ROS:  Today, Pt endorses:  - Symptoms of Hyperglycemia: none  - Symptoms of Hypoglycemia: none    Blood Glucose Monitoring (BGM) or CGM:  Julien CGM  Having trouble with sensor falling off in recent weeks. Back to checking BG with glucometer. States readings range from .       Midnight - 6 AM 6 AM - Noon Noon - 6 PM 6 PM - Midnight Average   14 Day Average 256 174 212 224 214   30 Day Average 252 169 191 241 213     Nutrition/Lifestyle Modifications:  Denies changes to nutrition  Avoiding all diet beverages d/t aspartame content     Vitals: Wt Readings from Last 3 Encounters:   04/12/22 187 lb (84.8 kg)   03/21/22 185 lb (83.9 kg)   02/17/22 185 lb (83.9 kg)     BP Readings from Last 3 Encounters:   04/12/22 124/72   03/21/22 115/68   02/17/22 130/67     Pulse Readings from Last 3 Encounters:   04/12/22 89   03/21/22 80   02/17/22 68       Past Medical History:   Diagnosis Date    Arthritis     Depression     Diabetes ([facility] [address]     Hypercholesteremia     Hypertension     Hyperthyroidism     IBS (irritable bowel syndrome)     Liver disease     hepatitis b    Nausea & vomiting     PUD (peptic ulcer disease)     bleeding ulcer    Sleep apnea     CPAP    Urinary incontinence      Allergies   Allergen Reactions    Celebrex [Celecoxib] Other (comments)     Hallucinations    Codeine Nausea and Vomiting    Erythromycin Nausea and Vomiting       Current Outpatient Medications   Medication Sig    lisinopriL (PRINIVIL, ZESTRIL) 5 mg tablet TAKE 1 TABLET BY MOUTH  DAILY    furosemide (LASIX) 20 mg tablet TAKE 1 TABLET BY MOUTH  DAILY AS NEEDED FOR EDEMA    empagliflozin-metformin (Synjardy XR) 25-1,000 mg TBph Take 1 Tablet by mouth daily. To start this once patient completes Jardiance and metformin monotherapy   Indications: type 2 diabetes mellitus (Patient not taking: Reported on 4/12/2022)    levothyroxine (SYNTHROID) 300 mcg tablet TAKE 1 TABLET BY MOUTH ONCE DAILY BEFORE BREAKFAST 6 DAYS A WEEK AND TAKE 150 MCG ONE DAY A WEEK    insulin aspart U-100 (NovoLOG Flexpen U-100 Insulin) 100 unit/mL (3 mL) inpn 2-6 Units by SubCUTAneous route Before breakfast, lunch, and dinner.  amitriptyline (ELAVIL) 10 mg tablet Take 1 Tablet by mouth nightly.  vilazodone (VIIBRYD) 20 mg tab tablet Take 1 Tablet by mouth daily.  famotidine (PEPCID) 40 mg tablet Take 40 mg by mouth two (2) times a day.  trospium (SANCTURA XL) 60 mg capsule Take 1 Capsule by mouth Daily (before breakfast).     linaCLOtide (Linzess) 290 mcg cap capsule Take 1 Capsule by mouth Daily (before breakfast).  nystatin (MYCOSTATIN) powder Apply  to affected area four (4) times daily. [place] FlexTouch U-100 100 unit/mL (3 mL) inpn 16 Units by SubCUTAneous route daily. Indications: type 2 diabetes mellitus    metFORMIN ER (GLUCOPHAGE XR) 500 mg tablet Take 2 Tablets by mouth daily (with breakfast).  pravastatin (PRAVACHOL) 20 mg tablet Take 1 Tablet by mouth nightly.  Jardiance 25 mg tablet Take 1 Tab by mouth daily.  ALPRAZolam (XANAX) 0.25 mg tablet Take 1 Tab by mouth daily as needed for Anxiety. Indications: anxious    FreeStyle Julien 2 Sensor kit 1 Each by Other route See Salvatore Shaw. Use to scan sensor three times daily    NOVOFINE PLUS 32 gauge x 1/6\" ndle Check sugars 4x daily.  acetaminophen (TYLENOL) 500 mg tablet Take 1 Tab by mouth every six (6) hours as needed for Pain. No current facility-administered medications for this visit. Lab Results   Component Value Date/Time    Sodium 141 03/21/2022 09:56 AM    Potassium 4.1 03/21/2022 09:56 AM    Chloride 109 (H) 03/21/2022 09:56 AM    CO2 28 03/21/2022 09:56 AM    Anion gap 4 (L) 03/21/2022 09:56 AM    Glucose 152 (H) 03/21/2022 09:56 AM    BUN 23 (H) 03/21/2022 09:56 AM    Creatinine 1.31 (H) 03/21/2022 09:56 AM    BUN/Creatinine ratio 18 03/21/2022 09:56 AM    GFR est AA 49 (L) 03/21/2022 09:56 AM    GFR est non-AA 40 (L) 03/21/2022 09:56 AM    Calcium 9.8 03/21/2022 09:56 AM    Bilirubin, total 0.4 11/26/2021 01:38 PM    Alk.  phosphatase 50 11/26/2021 01:38 PM    Protein, total 6.3 (L) 11/26/2021 01:38 PM    Albumin 3.4 (L) 11/26/2021 01:38 PM    Globulin 2.9 11/26/2021 01:38 PM    A-G Ratio 1.2 11/26/2021 01:38 PM    ALT (SGPT) 18 11/26/2021 01:38 PM       Lab Results   Component Value Date/Time    Cholesterol, total 175 06/16/2021 12:48 PM    HDL Cholesterol 71 06/16/2021 12:48 PM    LDL, calculated 80.2 06/16/2021 12:48 PM    VLDL, calculated 23.8 06/16/2021 12:48 PM    Triglyceride 119 2021 12:48 PM    CHOL/HDL Ratio 2.5 2021 12:48 PM       Lab Results   Component Value Date/Time    WBC 8.0 2021 12:48 PM    HGB 12.9 2021 12:48 PM    HCT 38.8 2021 12:48 PM    PLATELET 548  12:48 PM    MCV 92.2 2021 12:48 PM       Lab Results   Component Value Date/Time    Microalb/Creat ratio (ug/mg creat.) 3 2020 01:20 PM       HbA1c:  Lab Results   Component Value Date/Time    Hemoglobin A1c 6.3 (H) 2021 12:48 PM    Hemoglobin A1c (POC) 7.3 (A) 02/15/2022 02:06 PM     No components found for: 2     Last Point of Care HGB A1C  Hemoglobin A1c (POC)   Date Value Ref Range Status   02/15/2022 7.3 (A) 4.8 - 5.6 % Final        CrCl cannot be calculated (Unknown ideal weight. ). Medication reconciliation was completed during the visit. Medications Discontinued During This Encounter   Medication Reason    Jardiance 25 mg tablet Alternate Therapy    metFORMIN ER (GLUCOPHAGE XR) 500 mg tablet Alternate Therapy     Patient verbalized understanding of the information presented and all of the patients questions were answered. Patient advised to call the office with any additional questions or concerns. Notifications of recommendations will be sent to Dr. Luzma Miller DO for review. Thank you for the consult,  Tavia Bautista, PharmD, BCACP, [address] in place:  Yes   Recommendation Provided To: Patient/Caregiver: 6 via Virtual Visit   Intervention Detail: Adherence Monitorin, Discontinued Rx: 2, reason: Therapy Complete and Dose Adjustment: 2, reason: Therapy Optimization   Gap Closed?:    Intervention Accepted By: Patient/Caregiver: 6   Time Spent (min): 30

## 2022-05-25 ENCOUNTER — VIRTUAL VISIT (OUTPATIENT)
Dept: INTERNAL MEDICINE CLINIC | Age: 67
End: 2022-05-25

## 2022-05-25 DIAGNOSIS — Z79.4 UNCONTROLLED TYPE 2 DIABETES MELLITUS WITH HYPERGLYCEMIA, WITH LONG-TERM CURRENT USE OF INSULIN (HCC): Primary | ICD-10-CM

## 2022-05-25 DIAGNOSIS — E11.65 UNCONTROLLED TYPE 2 DIABETES MELLITUS WITH HYPERGLYCEMIA, WITH LONG-TERM CURRENT USE OF INSULIN (HCC): Primary | ICD-10-CM

## 2022-06-02 ENCOUNTER — TRANSCRIBE ORDER (OUTPATIENT)
Dept: SCHEDULING | Age: 67
End: 2022-06-02

## 2022-06-02 DIAGNOSIS — R13.19 ESOPHAGEAL DYSPHAGIA: ICD-10-CM

## 2022-06-02 DIAGNOSIS — K58.1 IRRITABLE BOWEL SYNDROME WITH CONSTIPATION: Primary | ICD-10-CM

## 2022-06-02 DIAGNOSIS — K59.02 OUTLET DYSFUNCTION CONSTIPATION: ICD-10-CM

## 2022-06-02 DIAGNOSIS — M99.05 SOMATIC DYSFUNCTION OF PELVIS REGION: ICD-10-CM

## 2022-06-05 ENCOUNTER — OFFICE VISIT (OUTPATIENT)
Dept: URGENT CARE | Age: 67
End: 2022-06-05
Payer: MEDICARE

## 2022-06-05 VITALS
OXYGEN SATURATION: 98 % | HEART RATE: 90 BPM | HEIGHT: 66 IN | TEMPERATURE: 98.3 F | WEIGHT: 180 LBS | RESPIRATION RATE: 20 BRPM | SYSTOLIC BLOOD PRESSURE: 133 MMHG | DIASTOLIC BLOOD PRESSURE: 71 MMHG | BODY MASS INDEX: 28.93 KG/M2

## 2022-06-05 DIAGNOSIS — Z11.59 SCREENING FOR VIRAL DISEASE: ICD-10-CM

## 2022-06-05 DIAGNOSIS — R05.9 COUGH: ICD-10-CM

## 2022-06-05 DIAGNOSIS — J40 BRONCHITIS: Primary | ICD-10-CM

## 2022-06-05 DIAGNOSIS — J02.9 SORE THROAT: ICD-10-CM

## 2022-06-05 DIAGNOSIS — S20.211A CONTUSION OF RIB ON RIGHT SIDE, INITIAL ENCOUNTER: ICD-10-CM

## 2022-06-05 DIAGNOSIS — U07.1 COVID-19: ICD-10-CM

## 2022-06-05 LAB
FLUAV+FLUBV AG NOSE QL IA.RAPID: NEGATIVE
FLUAV+FLUBV AG NOSE QL IA.RAPID: NEGATIVE
S PYO AG THROAT QL: NEGATIVE
SARS-COV-2 PCR, POC: POSITIVE
VALID INTERNAL CONTROL?: YES
VALID INTERNAL CONTROL?: YES

## 2022-06-05 PROCEDURE — 87635 SARS-COV-2 COVID-19 AMP PRB: CPT | Performed by: FAMILY MEDICINE

## 2022-06-05 PROCEDURE — 1123F ACP DISCUSS/DSCN MKR DOCD: CPT | Performed by: FAMILY MEDICINE

## 2022-06-05 PROCEDURE — 87804 INFLUENZA ASSAY W/OPTIC: CPT | Performed by: FAMILY MEDICINE

## 2022-06-05 PROCEDURE — G9899 SCRN MAM PERF RSLTS DOC: HCPCS | Performed by: FAMILY MEDICINE

## 2022-06-05 PROCEDURE — G8754 DIAS BP LESS 90: HCPCS | Performed by: FAMILY MEDICINE

## 2022-06-05 PROCEDURE — 1090F PRES/ABSN URINE INCON ASSESS: CPT | Performed by: FAMILY MEDICINE

## 2022-06-05 PROCEDURE — G8427 DOCREV CUR MEDS BY ELIG CLIN: HCPCS | Performed by: FAMILY MEDICINE

## 2022-06-05 PROCEDURE — 1101F PT FALLS ASSESS-DOCD LE1/YR: CPT | Performed by: FAMILY MEDICINE

## 2022-06-05 PROCEDURE — G8752 SYS BP LESS 140: HCPCS | Performed by: FAMILY MEDICINE

## 2022-06-05 PROCEDURE — 99204 OFFICE O/P NEW MOD 45 MIN: CPT | Performed by: FAMILY MEDICINE

## 2022-06-05 PROCEDURE — G8417 CALC BMI ABV UP PARAM F/U: HCPCS | Performed by: FAMILY MEDICINE

## 2022-06-05 PROCEDURE — 87880 STREP A ASSAY W/OPTIC: CPT | Performed by: FAMILY MEDICINE

## 2022-06-05 PROCEDURE — G8536 NO DOC ELDER MAL SCRN: HCPCS | Performed by: FAMILY MEDICINE

## 2022-06-05 PROCEDURE — 3017F COLORECTAL CA SCREEN DOC REV: CPT | Performed by: FAMILY MEDICINE

## 2022-06-05 PROCEDURE — G9717 DOC PT DX DEP/BP F/U NT REQ: HCPCS | Performed by: FAMILY MEDICINE

## 2022-06-05 RX ORDER — DOXYCYCLINE 100 MG/1
100 TABLET ORAL 2 TIMES DAILY
Qty: 20 TABLET | Refills: 0 | Status: SHIPPED | OUTPATIENT
Start: 2022-06-05 | End: 2022-06-15

## 2022-06-05 RX ORDER — PHENOL/SODIUM PHENOLATE
20 AEROSOL, SPRAY (ML) MUCOUS MEMBRANE DAILY
COMMUNITY

## 2022-06-05 NOTE — PROGRESS NOTES
Licha Boyd is a 69 y.o. female who presents with with right rib pain s/p fall 2 weeks ago; reports tripped on hose outside and fell on concrete; pain worse with deep breathing and coughing. Also reports for the past 3 days has had ST, worsening cough, runny nose, slight SOB. Denies fever. The history is provided by the patient.         Past Medical History:   Diagnosis Date    Arthritis     Depression     Diabetes ([address]     Hypercholesteremia     Hypertension     Hyperthyroidism     IBS (irritable bowel syndrome)     Liver disease     hepatitis b    Nausea & vomiting     PUD (peptic ulcer disease)     bleeding ulcer    Sleep apnea     CPAP    Urinary incontinence         Past Surgical History:   Procedure Laterality Date    HX BACK SURGERY      x3    HX BLEPHAROPLASTY Right     HX BUNIONECTOMY      HX CHOLECYSTECTOMY  06/21/2021    HX HYSTERECTOMY      HX OTHER SURGICAL      Collapsed lung    HX WISDOM TEETH EXTRACTION           Family History   Problem Relation Age of Onset    Diabetes Mother     Heart Disease Mother     Cancer Father         pancreatic    Diabetes Sister     Anxiety Sister     Diabetes Brother     Anxiety Brother     Diabetes Brother     Anxiety Brother     Diabetes Sister     Anxiety Sister     Diabetes Sister     Anxiety Sister     Cancer Sister         lung and brain    Anxiety Sister     Anxiety Sister     Breast Cancer Paternal Aunt         under 48        Social History     Socioeconomic History    Marital status: SINGLE     Spouse name: Not on file    Number of children: Not on file    Years of education: Not on file    Highest education level: Not on file   Occupational History    Not on file   Tobacco Use    Smoking status: Never Smoker    Smokeless tobacco: Never Used   Vaping Use    Vaping Use: Never used   Substance and Sexual Activity    Alcohol use: Yes     Comment: occasionally    Drug use: No    Sexual activity: Not Currently Other Topics Concern    Not on file   Social History Narrative    Not on file     Social Determinants of Health     Financial Resource Strain:     Difficulty of Paying Living Expenses: Not on file   Food Insecurity:     Worried About Running Out of Food in the Last Year: Not on file    Harman of Food in the Last Year: Not on file   Transportation Needs:     Lack of Transportation (Medical): Not on file    Lack of Transportation (Non-Medical): Not on file   Physical Activity:     Days of Exercise per Week: Not on file    Minutes of Exercise per Session: Not on file   Stress:     Feeling of Stress : Not on file   Social Connections:     Frequency of Communication with Friends and Family: Not on file    Frequency of Social Gatherings with Friends and Family: Not on file    Attends Taoism Services: Not on file    Active Member of [address] [place] [address] or Organizations: Not on file    Attends Club or Organization Meetings: Not on file    Marital Status: Not on file   Intimate Partner Violence:     Fear of Current or Ex-Partner: Not on file    Emotionally Abused: Not on file    Physically Abused: Not on file    Sexually Abused: Not on file   Housing Stability:     Unable to Pay for Housing in the Last Year: Not on file    Number of [place] in the Last Year: Not on file    Unstable Housing in the Last Year: Not on file                ALLERGIES: Celebrex [celecoxib], Codeine, and Erythromycin    Review of Systems   Constitutional: Negative for activity change, appetite change and fever. HENT: Positive for rhinorrhea and sore throat. Respiratory: Positive for cough and shortness of breath. Gastrointestinal: Negative for diarrhea, nausea and vomiting. Vitals:    06/05/22 1030   BP: 133/71   Pulse: 90   Resp: 20   Temp: 98.3 °F (36.8 °C)   SpO2: 98%   Weight: 180 lb (81.6 kg)   Height: 5' 5.5\" (1.664 m)       Physical Exam  Vitals and nursing note reviewed.    Constitutional:       General: She is not in acute distress. Appearance: She is well-developed. She is not diaphoretic. HENT:      Right Ear: Tympanic membrane, ear canal and external ear normal.      Left Ear: Tympanic membrane, ear canal and external ear normal.      Nose: Nose normal.      Right Sinus: No maxillary sinus tenderness or frontal sinus tenderness. Left Sinus: No maxillary sinus tenderness or frontal sinus tenderness. Mouth/Throat:      Pharynx: Posterior oropharyngeal erythema present. No oropharyngeal exudate. Tonsils: No tonsillar abscesses. Cardiovascular:      Rate and Rhythm: Normal rate and regular rhythm. Heart sounds: Normal heart sounds. Pulmonary:      Effort: Pulmonary effort is normal. No respiratory distress. Breath sounds: Normal breath sounds. No stridor. No wheezing, rhonchi or rales. Chest:      Chest wall: Tenderness present. Lymphadenopathy:      Cervical: No cervical adenopathy. Neurological:      Mental Status: She is alert. Psychiatric:         Behavior: Behavior normal.         Thought Content: Thought content normal.         Judgment: Judgment normal.         MDM    ICD-10-CM ICD-9-CM   1. Bronchitis  J40 490   2. Contusion of rib on right side, initial encounter  S20.211A 922.1   3. Sore throat  J02.9 462   4. Cough  R05.9 786.2   5. Screening for viral disease  Z11.59 V73.99   6. COVID-19  U07.1 079.89       Orders Placed This Encounter    XR RIBS RT W PA CXR MIN 3 V     Standing Status:   Future     Number of Occurrences:   1     Standing Expiration Date:   7/5/2023     Order Specific Question:   Reason for Exam     Answer:   fell 2 weeks ago    AMB POC LEONILA INFLUENZA A/B TEST    AMB POC RAPID STREP A    POCT COVID-19, SARS-COV-2, PCR     Order Specific Question:   Is this test for diagnosis or screening? Answer:   Diagnosis of ill patient     Order Specific Question:   Symptomatic for COVID-19 as defined by CDC?      Answer:   Yes     Order Specific Question:   Date of Symptom Onset     Answer:   6/2/2022     Order Specific Question:   Hospitalized for COVID-19? Answer:   No     Order Specific Question:   Admitted to ICU for COVID-19? Answer:   No     Order Specific Question:   Employed in healthcare setting? Answer:   No     Order Specific Question:   Resident in a congregate (group) care setting? Answer:   No     Order Specific Question:   Pregnant? Answer:   No     Order Specific Question:   Previously tested for COVID-19? Answer:   Unknown    doxycycline (ADOXA) 100 mg tablet     Sig: Take 1 Tablet by mouth two (2) times a day for 10 days. Dispense:  20 Tablet     Refill:  0    nirmatrelvir-ritonavir (PAXLOVID) 150 mg x 2- 100 mg tablet     Sig: Nirmatrelvir 150mg tab and Ritonavir 100mg tab PO BID x 5 days; GFR = 40     Dispense:  1 Box     Refill:  0     Order Specific Question:   Does this patient qualify for COVID-19 antiviral treatment based on criteria for treatment? Answer:   Yes      Start Paxlovid and Doxycycline  Quarantine x 5 days from sx onset then wear well fitting mask for an additional 5 days thereafter  Increase fluids with electrolytes  Tylenol as needed  Deep breathing exercises and ambulation    If signs and symptoms become worse the pt is to go to the ER. Estimated Creatinine Clearance: 43.3 mL/min (A) (by C-G formula based on SCr of 1.31 mg/dL (H)).     Results for orders placed or performed in visit on 06/05/22   AMB POC LEONILA INFLUENZA A/B TEST   Result Value Ref Range    VALID INTERNAL CONTROL POC Yes     Influenza A Ag POC Negative Negative    Influenza B Ag POC Negative Negative   AMB POC RAPID STREP A   Result Value Ref Range    VALID INTERNAL CONTROL POC Yes     Group A Strep Ag Negative Negative   POCT COVID-19, SARS-COV-2, PCR   Result Value Ref Range    SARS-COV-2 PCR, POC Positive (A) Negative     XR Results (most recent):  Results from Appointment encounter on 06/05/22    XR RIBS RT W PA CXR MIN 3 V    Narrative  INDICATION:  Right rib contusion    COMPARISON: January 2019    FINDINGS:    PA view of the chest demonstrates a normal cardiomediastinal silhouette. The  lungs are adequately expanded. There is no edema, effusion, consolidation, or  pneumothorax. 3 views of the right ribs demonstrate no displaced fracture. Cholecystectomy clips are noted in the right upper quadrant of the abdomen. Impression  No acute process.       Procedures

## 2022-06-17 ENCOUNTER — HOSPITAL ENCOUNTER (OUTPATIENT)
Dept: GENERAL RADIOLOGY | Age: 67
Discharge: HOME | End: 2022-06-17
Attending: SPECIALIST
Payer: MEDICARE

## 2022-06-17 DIAGNOSIS — M99.05 SOMATIC DYSFUNCTION OF PELVIS REGION: ICD-10-CM

## 2022-06-17 DIAGNOSIS — K58.1 IRRITABLE BOWEL SYNDROME WITH CONSTIPATION: ICD-10-CM

## 2022-06-17 DIAGNOSIS — R13.19 ESOPHAGEAL DYSPHAGIA: ICD-10-CM

## 2022-06-17 DIAGNOSIS — K59.02 OUTLET DYSFUNCTION CONSTIPATION: ICD-10-CM

## 2022-06-17 PROCEDURE — 74220 X-RAY XM ESOPHAGUS 1CNTRST: CPT

## 2022-06-22 ENCOUNTER — OFFICE VISIT (OUTPATIENT)
Dept: INTERNAL MEDICINE CLINIC | Age: 67
End: 2022-06-22
Payer: MEDICARE

## 2022-06-22 VITALS
SYSTOLIC BLOOD PRESSURE: 118 MMHG | BODY MASS INDEX: 29.18 KG/M2 | TEMPERATURE: 98.2 F | WEIGHT: 181.6 LBS | HEART RATE: 82 BPM | HEIGHT: 66 IN | RESPIRATION RATE: 16 BRPM | OXYGEN SATURATION: 98 % | DIASTOLIC BLOOD PRESSURE: 65 MMHG

## 2022-06-22 DIAGNOSIS — F41.9 ANXIETY: ICD-10-CM

## 2022-06-22 DIAGNOSIS — E11.65 UNCONTROLLED TYPE 2 DIABETES MELLITUS WITH HYPERGLYCEMIA, WITH LONG-TERM CURRENT USE OF INSULIN (HCC): ICD-10-CM

## 2022-06-22 DIAGNOSIS — Z00.00 MEDICARE ANNUAL WELLNESS VISIT, SUBSEQUENT: Primary | ICD-10-CM

## 2022-06-22 DIAGNOSIS — I12.9 TYPE 2 DM WITH CKD STAGE 3 AND HYPERTENSION (HCC): ICD-10-CM

## 2022-06-22 DIAGNOSIS — E11.69 HYPERLIPIDEMIA ASSOCIATED WITH TYPE 2 DIABETES MELLITUS (HCC): ICD-10-CM

## 2022-06-22 DIAGNOSIS — M81.0 AGE-RELATED OSTEOPOROSIS WITHOUT CURRENT PATHOLOGICAL FRACTURE: ICD-10-CM

## 2022-06-22 DIAGNOSIS — M79.7 FIBROMYALGIA SYNDROME: ICD-10-CM

## 2022-06-22 DIAGNOSIS — Z79.4 UNCONTROLLED TYPE 2 DIABETES MELLITUS WITH HYPERGLYCEMIA, WITH LONG-TERM CURRENT USE OF INSULIN (HCC): ICD-10-CM

## 2022-06-22 DIAGNOSIS — G47.33 OSA ON CPAP: ICD-10-CM

## 2022-06-22 DIAGNOSIS — I10 ESSENTIAL HYPERTENSION: ICD-10-CM

## 2022-06-22 DIAGNOSIS — N18.30 TYPE 2 DM WITH CKD STAGE 3 AND HYPERTENSION (HCC): ICD-10-CM

## 2022-06-22 DIAGNOSIS — N32.81 OAB (OVERACTIVE BLADDER): ICD-10-CM

## 2022-06-22 DIAGNOSIS — Z99.89 OSA ON CPAP: ICD-10-CM

## 2022-06-22 DIAGNOSIS — E78.5 HYPERLIPIDEMIA ASSOCIATED WITH TYPE 2 DIABETES MELLITUS (HCC): ICD-10-CM

## 2022-06-22 DIAGNOSIS — E11.22 TYPE 2 DM WITH CKD STAGE 3 AND HYPERTENSION (HCC): ICD-10-CM

## 2022-06-22 LAB
ALBUMIN SERPL-MCNC: 3.9 G/DL (ref 3.5–5)
ALBUMIN/GLOB SERPL: 1.3 {RATIO} (ref 1.1–2.2)
ALP SERPL-CCNC: 68 U/L (ref 45–117)
ALT SERPL-CCNC: 21 U/L (ref 12–78)
ANION GAP SERPL CALC-SCNC: 5 MMOL/L (ref 5–15)
AST SERPL-CCNC: 14 U/L (ref 15–37)
BASOPHILS # BLD: 0 K/UL (ref 0–0.1)
BASOPHILS NFR BLD: 1 % (ref 0–1)
BILIRUB SERPL-MCNC: 0.3 MG/DL (ref 0.2–1)
BUN SERPL-MCNC: 19 MG/DL (ref 6–20)
BUN/CREAT SERPL: 18 (ref 12–20)
CALCIUM SERPL-MCNC: 9.6 MG/DL (ref 8.5–10.1)
CHLORIDE SERPL-SCNC: 107 MMOL/L (ref 97–108)
CHOLEST SERPL-MCNC: 163 MG/DL
CO2 SERPL-SCNC: 28 MMOL/L (ref 21–32)
CREAT SERPL-MCNC: 1.07 MG/DL (ref 0.55–1.02)
CREAT UR-MCNC: 50.7 MG/DL
DIFFERENTIAL METHOD BLD: ABNORMAL
EOSINOPHIL # BLD: 0.2 K/UL (ref 0–0.4)
EOSINOPHIL NFR BLD: 3 % (ref 0–7)
ERYTHROCYTE [DISTWIDTH] IN BLOOD BY AUTOMATED COUNT: 12.6 % (ref 11.5–14.5)
EST. AVERAGE GLUCOSE BLD GHB EST-MCNC: 160 MG/DL
GLOBULIN SER CALC-MCNC: 2.9 G/DL (ref 2–4)
GLUCOSE SERPL-MCNC: 162 MG/DL (ref 65–100)
HBA1C MFR BLD: 7.2 % (ref 4–5.6)
HCT VFR BLD AUTO: 38 % (ref 35–47)
HDLC SERPL-MCNC: 72 MG/DL
HDLC SERPL: 2.3 {RATIO} (ref 0–5)
HGB BLD-MCNC: 12.7 G/DL (ref 11.5–16)
IMM GRANULOCYTES # BLD AUTO: 0 K/UL (ref 0–0.04)
IMM GRANULOCYTES NFR BLD AUTO: 0 % (ref 0–0.5)
LDLC SERPL CALC-MCNC: 76.4 MG/DL (ref 0–100)
LYMPHOCYTES # BLD: 2 K/UL (ref 0.8–3.5)
LYMPHOCYTES NFR BLD: 35 % (ref 12–49)
MCH RBC QN AUTO: 29.5 PG (ref 26–34)
MCHC RBC AUTO-ENTMCNC: 33.4 G/DL (ref 30–36.5)
MCV RBC AUTO: 88.4 FL (ref 80–99)
MICROALBUMIN UR-MCNC: 0.88 MG/DL
MICROALBUMIN/CREAT UR-RTO: 17 MG/G (ref 0–30)
MONOCYTES # BLD: 0.5 K/UL (ref 0–1)
MONOCYTES NFR BLD: 9 % (ref 5–13)
NEUTS SEG # BLD: 2.9 K/UL (ref 1.8–8)
NEUTS SEG NFR BLD: 52 % (ref 32–75)
NRBC # BLD: 0 K/UL (ref 0–0.01)
NRBC BLD-RTO: 0 PER 100 WBC
PLATELET # BLD AUTO: 133 K/UL (ref 150–400)
PMV BLD AUTO: 12.2 FL (ref 8.9–12.9)
POTASSIUM SERPL-SCNC: 4.4 MMOL/L (ref 3.5–5.1)
PROT SERPL-MCNC: 6.8 G/DL (ref 6.4–8.2)
RBC # BLD AUTO: 4.3 M/UL (ref 3.8–5.2)
SODIUM SERPL-SCNC: 140 MMOL/L (ref 136–145)
TRIGL SERPL-MCNC: 73 MG/DL (ref ?–150)
TSH SERPL DL<=0.05 MIU/L-ACNC: 0.01 UIU/ML (ref 0.36–3.74)
VLDLC SERPL CALC-MCNC: 14.6 MG/DL
WBC # BLD AUTO: 5.7 K/UL (ref 3.6–11)

## 2022-06-22 PROCEDURE — G8427 DOCREV CUR MEDS BY ELIG CLIN: HCPCS | Performed by: INTERNAL MEDICINE

## 2022-06-22 PROCEDURE — 2022F DILAT RTA XM EVC RTNOPTHY: CPT | Performed by: INTERNAL MEDICINE

## 2022-06-22 PROCEDURE — 1090F PRES/ABSN URINE INCON ASSESS: CPT | Performed by: INTERNAL MEDICINE

## 2022-06-22 PROCEDURE — G0439 PPPS, SUBSEQ VISIT: HCPCS | Performed by: INTERNAL MEDICINE

## 2022-06-22 PROCEDURE — 1101F PT FALLS ASSESS-DOCD LE1/YR: CPT | Performed by: INTERNAL MEDICINE

## 2022-06-22 PROCEDURE — G8536 NO DOC ELDER MAL SCRN: HCPCS | Performed by: INTERNAL MEDICINE

## 2022-06-22 PROCEDURE — G9899 SCRN MAM PERF RSLTS DOC: HCPCS | Performed by: INTERNAL MEDICINE

## 2022-06-22 PROCEDURE — 3017F COLORECTAL CA SCREEN DOC REV: CPT | Performed by: INTERNAL MEDICINE

## 2022-06-22 PROCEDURE — G8417 CALC BMI ABV UP PARAM F/U: HCPCS | Performed by: INTERNAL MEDICINE

## 2022-06-22 PROCEDURE — 99213 OFFICE O/P EST LOW 20 MIN: CPT | Performed by: INTERNAL MEDICINE

## 2022-06-22 PROCEDURE — G8754 DIAS BP LESS 90: HCPCS | Performed by: INTERNAL MEDICINE

## 2022-06-22 PROCEDURE — G9717 DOC PT DX DEP/BP F/U NT REQ: HCPCS | Performed by: INTERNAL MEDICINE

## 2022-06-22 PROCEDURE — G8752 SYS BP LESS 140: HCPCS | Performed by: INTERNAL MEDICINE

## 2022-06-22 RX ORDER — NITROFURANTOIN 25; 75 MG/1; MG/1
100 CAPSULE ORAL 2 TIMES DAILY
COMMUNITY
Start: 2022-06-16 | End: 2022-07-27 | Stop reason: ALTCHOICE

## 2022-06-22 RX ORDER — ALPRAZOLAM 0.25 MG/1
0.25 TABLET ORAL
Qty: 30 TABLET | Refills: 0 | Status: SHIPPED | OUTPATIENT
Start: 2022-06-22 | End: 2022-08-26 | Stop reason: SDUPTHER

## 2022-06-22 RX ORDER — LINACLOTIDE 145 UG/1
145 CAPSULE, GELATIN COATED ORAL DAILY
COMMUNITY
Start: 2022-05-25

## 2022-06-22 NOTE — PATIENT INSTRUCTIONS
Medicare Wellness Visit, Female     The best way to live healthy is to have a lifestyle where you eat a well-balanced diet, exercise regularly, limit alcohol use, and quit all forms of tobacco/nicotine, if applicable. Regular preventive services are another way to keep healthy. Preventive services (vaccines, screening tests, monitoring & exams) can help personalize your care plan, which helps you manage your own care. Screening tests can find health problems at the earliest stages, when they are easiest to treat. [facility] follows the current, evidence-based guidelines published by the [facility] ([facility]) when recommending preventive services for our patients. Because we follow these guidelines, sometimes recommendations change over time as research supports it. (For example, mammograms used to be recommended annually. Even though Medicare will still pay for an annual mammogram, the newer guidelines recommend a mammogram every two years for women of average risk). Of course, you and your doctor may decide to screen more often for some diseases, based on your risk and your co-morbidities (chronic disease you are already diagnosed with). Preventive services for you include:  - Medicare offers their members a free annual wellness visit, which is time for you and your primary care provider to discuss and plan for your preventive service needs. Take advantage of this benefit every year!  -All adults over the age of 72 should receive the recommended pneumonia vaccines. Current USPSTF guidelines recommend a series of two vaccines for the best pneumonia protection.   -All adults should have a flu vaccine yearly and a tetanus vaccine every 10 years.   -All adults age 48 and older should receive the shingles vaccines (series of two vaccines).       -All adults age 38-68 who are overweight should have a diabetes screening test once every three years.   -All adults born between [birth year] and 1967 should be screened once for Hepatitis C.  -Other screening tests and preventive services for persons with diabetes include: an eye exam to screen for diabetic retinopathy, a kidney function test, a foot exam, and stricter control over your cholesterol.   -Cardiovascular screening for adults with routine risk involves an electrocardiogram (ECG) at intervals determined by your doctor.   -Colorectal cancer screenings should be done for adults age 54-65 with no increased risk factors for colorectal cancer. There are a number of acceptable methods of screening for this type of cancer. Each test has its own benefits and drawbacks. Discuss with your doctor what is most appropriate for you during your annual wellness visit. The different tests include: colonoscopy (considered the best screening method), a fecal occult blood test, a fecal DNA test, and sigmoidoscopy.    -A bone mass density test is recommended when a woman turns 65 to screen for osteoporosis. This test is only recommended one time, as a screening. Some providers will use this same test as a disease monitoring tool if you already have osteoporosis. -Breast cancer screenings are recommended every other year for women of normal risk, age 54-69.  -Cervical cancer screenings for women over age 72 are only recommended with certain risk factors.      Here is a list of your current Health Maintenance items (your personalized list of preventive services) with a due date:  Health Maintenance Due   Topic Date Due    Eye Exam  11/23/2021    Albumin Urine Test  06/15/2022    Diabetic Foot Care  06/17/2022    Cholesterol Test   06/16/2022

## 2022-06-22 NOTE — PROGRESS NOTES
1. \"Have you been to the ER, urgent care clinic since your last visit? Hospitalized since your last visit? \" yes, urgent care for covid    2. \"Have you seen or consulted any other health care providers outside of the [address] [place] [address] since your last visit? \"  Yes, Dr. Corrinne Ras    3. For patients aged 39-70: Has the patient had a colonoscopy / FIT/ Cologuard? yes      If the patient is female:    4. For patients aged 41-77: Has the patient had a mammogram within the past 2 years?  yes

## 2022-06-22 NOTE — PROGRESS NOTES
Pb Ma is a 69 y.o. female who presents for evaluation of AWV. Last seen by me march 21, 2022. Added elavil then to help with fibromyalgia pains. It is helping her sleep better, but not doing much for her pain issues. Had covid over memorial day. Did paxlovid. Still struggles with some fatigue, but otherwise doing well. Scheduled to get botox injection for her bladder later today, and she is anxious about that. Also scheduled for esophageal manometry study next week for hiatal hernia. She asks for refill of xanax. ROS:  Constitutional: negative for fevers, chills, anorexia and weight loss  Eyes:   negative for visual disturbance and irritation  ENT:   negative for tinnitus,sore throat,nasal congestion,ear pain,hoarseness  Respiratory:  negative for cough, hemoptysis, dyspnea,wheezing  CV:   negative for chest pain, palpitations, lower extremity edema  GI:   negative for nausea, vomiting, diarrhea, abdominal pain,melena  Genitourinary: negative for frequency, dysuria and hematuria  Musculoskel: negative for myalgias, arthralgias, back pain, muscle weakness, joint pain  Neurological:  negative for headaches, dizziness, focal weakness, numbness  Psychiatric:     Negative for depression.   ++ for anxiety      Past Medical History:   Diagnosis Date    Arthritis     Depression     Diabetes ([facility] [address]     GERD (gastroesophageal reflux disease) July 5,2019    Hypercholesteremia     Hypertension     Hyperthyroidism     IBS (irritable bowel syndrome)     Liver disease     hepatitis b    Nausea & vomiting     PUD (peptic ulcer disease)     bleeding ulcer    Sleep apnea     CPAP    Urinary incontinence        Past Surgical History:   Procedure Laterality Date    HX APPENDECTOMY  1999    HX BACK SURGERY      x3    HX BLEPHAROPLASTY Right     HX BUNIONECTOMY      HX CHOLECYSTECTOMY  06/21/2021    HX COLONOSCOPY  2018    HX ENDOSCOPY  July 5 2019    HX HYSTERECTOMY      HX OTHER SURGICAL Collapsed lung    HX UROLOGICAL  2019    HX WISDOM TEETH EXTRACTION      MO BREAST SURGERY PROCEDURE UNLISTED  09/18       Family History   Problem Relation Age of Onset    Diabetes Mother         passed    Heart Disease Mother         passed   Carina Wilkerson Hypertension Mother         passed   Carina Wilkerson Cancer Father         passed Colon    Alcohol abuse Father     Diabetes Sister     Anxiety Sister     Diabetes Brother     Anxiety Brother     Diabetes Brother     Anxiety Brother     Diabetes Sister     Anxiety Sister     Diabetes Sister     Anxiety Sister     Cancer Sister         lung and brain    Anxiety Sister     Anxiety Sister     Breast Cancer Paternal Aunt         under 48    Cancer Paternal Aunt         Breast    Cancer Sister         passed Brain. Lung and bone    Diabetes Sister         passed    Diabetes Brother         passed       Social History     Socioeconomic History    Marital status: SINGLE     Spouse name: Not on file    Number of children: Not on file    Years of education: Not on file    Highest education level: Not on file   Occupational History    Not on file   Tobacco Use    Smoking status: Never Smoker    Smokeless tobacco: Never Used   Vaping Use    Vaping Use: Never used   Substance and Sexual Activity    Alcohol use: Not Currently     Alcohol/week: 0.0 standard drinks     Comment: occasionally    Drug use: Never    Sexual activity: Not Currently   Other Topics Concern    Not on file   Social History Narrative    Not on file     Social Determinants of Health     Financial Resource Strain:     Difficulty of Paying Living Expenses: Not on file   Food Insecurity:     Worried About Running Out of Food in the Last Year: Not on file    Harman of Food in the Last Year: Not on file   Transportation Needs:     Lack of Transportation (Medical): Not on file    Lack of Transportation (Non-Medical):  Not on file   Physical Activity:     Days of Exercise per Week: Not on file    Minutes of Exercise per Session: Not on file   Stress:     Feeling of Stress : Not on file   Social Connections:     Frequency of Communication with Friends and Family: Not on file    Frequency of Social Gatherings with Friends and Family: Not on file    Attends Judaism Services: Not on file    Active Member of [address] [place] [address] or Organizations: Not on file    Attends Club or Organization Meetings: Not on file    Marital Status: Not on file   Intimate Partner Violence:     Fear of Current or Ex-Partner: Not on file    Emotionally Abused: Not on file    Physically Abused: Not on file    Sexually Abused: Not on file   Housing Stability:     Unable to Pay for Housing in the Last Year: Not on file    Number of [place] in the Last Year: Not on file    Unstable Housing in the Last Year: Not on file            Visit Vitals  /65 (BP 1 Location: Left upper arm, BP Patient Position: Sitting)   Pulse 82   Temp 98.2 °F (36.8 °C) (Temporal)   Resp 16   Ht 5' 5.5\" (1.664 m)   Wt 181 lb 9.6 oz (82.4 kg)   SpO2 98%   BMI 29.76 kg/m²       Physical Examination:   General - Well appearing female  HEENT - PERRL, TM no erythema/opacification, normal nasal turbinates, no oropharyngeal erythema or exudate, MMM  Neck - supple, no bruits, no thyroidomegaly, no lymphadenopathy  Pulm - clear to auscultation bilaterally  Cardio - RRR, normal S1 S2, no murmur  Abd - soft, nontender, no masses, no HSM  Extrem - no edema, +2 distal pulses  Neuro-  No focal deficits, CN intact         Diabetic foot exam performed by Alex Dust III, DO     Measurement  Response Nurse Comment Physician Comment   Monofilament  R - normal sensation with micro filament  L - normal sensation with micro filament     Pulse DP R - present  L - present     Pulse TP R - present  L - present     Structural deformity R - None  L - None     Skin Integrity / Deformity R - None  L - None        Reviewed by:              Assessment/Plan:    1. Anxiety about upcoming procedures--refill given for xanax  2.  oab--set to have botox injection later today, dr Angela Reno  3.  Fibromyalgia--bit improved with elavil  4. Dm, type 2--check a1c, urine micro. On synjardy, tresiba, meal time novolog  5.  dionisio--continue cpap  6.  htn--controlled with lisinpril  7.  hyperlipids--check flp, cmp. On pravachol  8. EFFIE, depression--continue viibryd  9. Hypothyroid--on synthroid, check tsh  10.  ibs-constipation--does well with linzess, dose recently decreased to 145 due to loose stools. 11.  Hiatal hernia--has manometry study upcoming  12. Osteoporosis--, check dexa scan. Not currently on any treatment. I don't think she ever tried prolia. rtc 6 months        Nimo Jackson III, DO            This is the Subsequent Medicare Annual Wellness Exam, performed 12 months or more after the Initial AWV or the last Subsequent AWV    I have reviewed the patient's medical history in detail and updated the computerized patient record. Assessment/Plan   Education and counseling provided:  Are appropriate based on today's review and evaluation  End-of-Life planning (with patient's consent)  Pneumococcal Vaccine  Influenza Vaccine  Screening Mammography  Colorectal cancer screening tests  Bone mass measurement (DEXA)    1.  Medicare annual wellness visit, subsequent       Depression Risk Factor Screening     3 most recent PHQ Screens 6/22/2022   Little interest or pleasure in doing things Not at all   Feeling down, depressed, irritable, or hopeless Several days   Total Score PHQ 2 1   Trouble falling or staying asleep, or sleeping too much Not at all   Feeling tired or having little energy Not at all   Poor appetite, weight loss, or overeating Not at all   Feeling bad about yourself - or that you are a failure or have let yourself or your family down Not at all   Trouble concentrating on things such as school, work, reading, or watching TV Not at all   Moving or speaking so slowly that other people could have noticed; or the opposite being so fidgety that others notice Not at all   Thoughts of being better off dead, or hurting yourself in some way Not at all   PHQ 9 Score 1   How difficult have these problems made it for you to do your work, take care of your home and get along with others Not difficult at all       Alcohol & Drug Abuse Risk Screen    Do you average more than 1 drink per night or more than 7 drinks a week:  No    On any one occasion in the past three months have you have had more than 3 drinks containing alcohol:  No          Functional Ability and Level of Safety    Hearing: The patient wears hearing aids. Activities of Daily Living: The home contains: handrails, grab bars and chair lift to go up stairs. Patient does total self care      Ambulation: with mild difficulty. Due to knee pains. Fall Risk:  Fall Risk Assessment, last 12 mths 6/22/2022   Able to walk? Yes   Fall in past 12 months? 1   Do you feel unsteady? 1   Are you worried about falling 1   Number of falls in past 12 months 1   Fall with injury? 0      Abuse Screen:  Patient is not abused. Lives with BF of 24 years.        Cognitive Screening    Has your family/caregiver stated any concerns about your memory: no     Cognitive Screening: Normal - MMSE (Mini Mental Status Exam)    Health Maintenance Due     Health Maintenance Due   Topic Date Due    Eye Exam Retinal or Dilated  11/23/2021    MICROALBUMIN Q1  06/15/2022    Foot Exam Q1  06/17/2022    Lipid Screen  06/16/2022       Patient Care Team   Patient Care Team:  Jason Johnson DO as PCP - General (Internal Medicine Physician)  Jaosn Johnson DO as PCP - [facility] Empaneled Provider  Sandy Toledo MD (Obstetrics & Gynecology)  Abimael Lobo MD (Orthopedic Surgery)  Naun De Leon MD (Ophthalmology)  Viridiana Amaya DO (Pain Management)  Fatmata Pan MD (Cardiovascular Disease Physician)  April Franco MD (Gastroenterology)  Olivia Corcoran MD (Orthopedic Surgery)  Marisabel Corbett MD (Endocrinology Physician)  Allie Ding as Pharmacist (Internal Medicine Physician)  Myranda Conley MD (Ophthalmology)  Jeronimo Yeh MD as Surgeon (General Surgery)    History     Patient Active Problem List   Diagnosis Code    Acquired hypothyroidism E03.9    Osteoporosis M81.0    OAB (overactive bladder) N32.81    Essential hypertension I10    Depression F32. A    Obesity (BMI 30-39. 9) E66.9    Uncontrolled type 2 diabetes mellitus with hyperglycemia, with long-term current use of insulin (HCC) E11.65, Z79.4    Hyperlipidemia associated with type 2 diabetes mellitus (HCC) E11.69, E78.5    Type 2 diabetes with nephropathy (HCC) E11.21    Severe obesity (HCC) E66.01    Trochanteric bursitis, right hip M70.61    Bilateral primary osteoarthritis of knee M17.0    Cholelithiasis K80.20     Past Medical History:   Diagnosis Date    Arthritis     Depression     Diabetes ([facility] [address]     GERD (gastroesophageal reflux disease) July 5,2019    Hypercholesteremia     Hypertension     Hyperthyroidism     IBS (irritable bowel syndrome)     Liver disease     hepatitis b    Nausea & vomiting     PUD (peptic ulcer disease)     bleeding ulcer    Sleep apnea     CPAP    Urinary incontinence       Past Surgical History:   Procedure Laterality Date    HX APPENDECTOMY  1999    HX BACK SURGERY      x3    HX BLEPHAROPLASTY Right     HX BUNIONECTOMY      HX CHOLECYSTECTOMY  06/21/2021    HX COLONOSCOPY  2018    HX ENDOSCOPY  July 5 2019    HX HYSTERECTOMY      HX OTHER SURGICAL      Collapsed lung    HX UROLOGICAL  2019    HX WISDOM TEETH EXTRACTION      [place] BREAST SURGERY PROCEDURE UNLISTED  09/18     Current Outpatient Medications   Medication Sig Dispense Refill    nitrofurantoin, macrocrystal-monohydrate, (MACROBID) 100 mg capsule Take 100 mg by mouth two (2) times a day.      Linzess 145 mcg cap capsule Take 145 mcg by mouth daily.  Omeprazole delayed release (PRILOSEC D/R) 20 mg tablet Take 20 mg by mouth daily.  lisinopriL (PRINIVIL, ZESTRIL) 5 mg tablet TAKE 1 TABLET BY MOUTH  DAILY 90 Tablet 3    furosemide (LASIX) 20 mg tablet TAKE 1 TABLET BY MOUTH  DAILY AS NEEDED FOR EDEMA 90 Tablet 3    empagliflozin-metformin (Synjardy XR) 25-1,000 mg TBph Take 1 Tablet by mouth daily. To start this once patient completes Jardiance and metformin monotherapy   Indications: type 2 diabetes mellitus      levothyroxine (SYNTHROID) 300 mcg tablet TAKE 1 TABLET BY MOUTH ONCE DAILY BEFORE BREAKFAST 6 DAYS A WEEK AND TAKE 150 MCG ONE DAY A WEEK 90 Tablet 3    insulin aspart U-100 (NovoLOG Flexpen U-100 Insulin) 100 unit/mL (3 mL) inpn 2-6 Units by SubCUTAneous route Before breakfast, lunch, and dinner.  amitriptyline (ELAVIL) 10 mg tablet Take 1 Tablet by mouth nightly. 90 Tablet 3    vilazodone (VIIBRYD) 20 mg tab tablet Take 1 Tablet by mouth daily. 20 Tablet 0    famotidine (PEPCID) 40 mg tablet Take 40 mg by mouth two (2) times a day.  trospium (SANCTURA XL) 60 mg capsule Take 1 Capsule by mouth Daily (before breakfast). 90 Capsule 3    nystatin (MYCOSTATIN) powder Apply  to affected area four (4) times daily. 60 g 1    Tresiba FlexTouch U-100 100 unit/mL (3 mL) inpn 16 Units by SubCUTAneous route daily. Indications: type 2 diabetes mellitus 15 mL 1    pravastatin (PRAVACHOL) 20 mg tablet Take 1 Tablet by mouth nightly. 90 Tablet 3    ALPRAZolam (XANAX) 0.25 mg tablet Take 1 Tab by mouth daily as needed for Anxiety. Indications: anxious 30 Tab 0    FreeStyle Julien 2 Sensor kit 1 Each by Other route See Salvatore Shaw. Use to scan sensor three times daily      NOVOFINE PLUS 32 gauge x 1/6\" ndle Check sugars 4x daily. 300 Pen Needle 3    acetaminophen (TYLENOL) 500 mg tablet Take 1 Tab by mouth every six (6) hours as needed for Pain.  30 Tab 0    nirmatrelvir-ritonavir (PAXLOVID) 150 mg x 2- 100 mg tablet Nirmatrelvir 150mg tab and Ritonavir 100mg tab PO BID x 5 days; GFR = 40 (Patient not taking: Reported on 6/22/2022) 1 Box 0     Allergies   Allergen Reactions    Celebrex [Celecoxib] Other (comments)     Hallucinations    Codeine Nausea and Vomiting    Erythromycin Nausea and Vomiting       Family History   Problem Relation Age of Onset    Diabetes Mother         passed   [address] Mother         passed   Santiago Hypertension Mother         passed   Santiago Cancer Father         passed Colon    Alcohol abuse Father     Diabetes Sister     Anxiety Sister     Diabetes Brother     Anxiety Brother     Diabetes Brother     Anxiety Brother     Diabetes Sister     Anxiety Sister     Diabetes Sister     Anxiety Sister     Cancer Sister         lung and brain    Anxiety Sister     Anxiety Sister     Breast Cancer Paternal Aunt         under 48    Cancer Paternal Aunt         Breast    Cancer Sister         passed Brain.   Lung and bone    Diabetes Sister         passed    Diabetes Brother         passed     Social History     Tobacco Use    Smoking status: Never Smoker    Smokeless tobacco: Never Used   Substance Use Topics    Alcohol use: Not Currently     Alcohol/week: 0.0 standard drinks     Comment: occasionally         Daisy Murray III, DO

## 2022-06-23 ENCOUNTER — HOSPITAL ENCOUNTER (OUTPATIENT)
Age: 67
Setting detail: OUTPATIENT SURGERY
Discharge: HOME | End: 2022-06-23
Attending: INTERNAL MEDICINE | Admitting: INTERNAL MEDICINE
Payer: MEDICARE

## 2022-06-23 VITALS
HEART RATE: 79 BPM | HEIGHT: 65 IN | DIASTOLIC BLOOD PRESSURE: 69 MMHG | BODY MASS INDEX: 30.22 KG/M2 | OXYGEN SATURATION: 99 % | RESPIRATION RATE: 20 BRPM | SYSTOLIC BLOOD PRESSURE: 157 MMHG

## 2022-06-23 PROCEDURE — 74011000250 HC RX REV CODE- 250: Performed by: INTERNAL MEDICINE

## 2022-06-23 PROCEDURE — 76040000007: Performed by: INTERNAL MEDICINE

## 2022-06-23 PROCEDURE — 2709999900 HC NON-CHARGEABLE SUPPLY: Performed by: INTERNAL MEDICINE

## 2022-06-23 RX ORDER — LIDOCAINE HYDROCHLORIDE 20 MG/ML
JELLY TOPICAL ONCE
Status: COMPLETED | OUTPATIENT
Start: 2022-06-23 | End: 2022-06-23

## 2022-06-23 RX ADMIN — LIDOCAINE HYDROCHLORIDE 5 ML: 20 JELLY TOPICAL at 10:15

## 2022-06-23 NOTE — DISCHARGE INSTRUCTIONS
[facility]  [id number]  1955      MANOMETRY DISCHARGE INSTRUCTION    You may resume your regular diet as tolerated. You may resume your normal daily activities. If you develop a sore throat- throat lozenges or warm salt water gargles will help. Call your Physician if you have any complications or questions. [employer] Activation    Thank you for requesting access to [employer]. Please follow the instructions below to securely access and download your online medical record. [employer] allows you to send messages to your doctor, view your test results, renew your prescriptions, schedule appointments, and more. How Do I Sign Up? 1. In your internet browser, go to [email or web address]  2. Click on the First Time User? Click Here link in the Sign In box. You will be redirect to the New Member Sign Up page. 3. Enter your [employer] Access Code exactly as it appears below. You will not need to use this code after youve completed the sign-up process. If you do not sign up before the expiration date, you must request a new code. [employer] Access Code: Activation code not generated  Current [employer] Status: Active (This is the date your [employer] access code will )    4. Enter the last four digits of your Social Security Number (xxxx) and Date of Birth (mm/dd/yyyy) as indicated and click Submit. You will be taken to the next sign-up page. 5. Create a [employer] ID. This will be your [employer] login ID and cannot be changed, so think of one that is secure and easy to remember. 6. Create a [employer] password. You can change your password at any time. 7. Enter your Password Reset Question and Answer. This can be used at a later time if you forget your password. 8. Enter your e-mail address. You will receive e-mail notification when new information is available in [address] 9. Click Sign Up. You can now view and download portions of your medical record.   10. Click the Download Summary menu link to download a portable copy of your medical information. Additional Information    If you have questions, please visit the Frequently Asked Questions section of the [employer] website at [email or web address] Remember, [employer] is NOT to be used for urgent needs. For medical emergencies, dial 911.

## 2022-06-30 ENCOUNTER — TELEPHONE (OUTPATIENT)
Dept: INTERNAL MEDICINE CLINIC | Age: 67
End: 2022-06-30

## 2022-06-30 NOTE — TELEPHONE ENCOUNTER
Called [facility] to follow up on request for add-on lab of [facility] and confirmed to have been faxed (see request scanned in media) and spoke with representative that stated that although she can see the request copied into media with the fax confirmation the add-on was never done and they \"never seemed to have received it. \"  I was given an apology for the lab not being completed. I then spoke with Dr. Ulises Turcios to inform him that the lab was never completed per the rep and the fax confirmation is in the media.

## 2022-07-06 NOTE — PROCEDURES
ESOPHAGEAL MANOMETRY REPORT    ORDERING PROVIDER: DR Parris Cárdenas    DATE OF PROCEDURE: 6/23/22    PRE PROCEDURE DIAGNOSIS:  1. Esophageal Dysphagia  SPECIMENS: None  ANESTHESIA: Topical  ESTIMATED BLOOD LOSS: None  COMPLICATIONS: None  IMPLANTS: None  ASSISTANT: Anuradha Reddy    PROCEDURE IN DETAIL: High resolution manometry was performed by the nursing staff with subsequent interpretation by Josefina Best MD.     LES: Mean basal LES pressure is 21.1 mmHg. Median IRP in supine position is 7.8 mmHg. Median IRP in seated position was not measured. There is evidence of a 2 cm hiatal hernia manometrically, though endoscopically there was not one identified on documentation from last EGD in February 2022. Esophageal Body: 10 wet swallows in the supine position were analyzed. Contraction pattern is fragmented in 40% of swallows and normal in the remainder without spasticity. Contraction vigor is normal in 90% of swallows and weak in 10% of swallows. Bolus clearance by impedance analysis was performed and showed complete clearance in all swallows except for occasional mild proximal escape. Multiple rapid swallow testing appears to have been attempted but patient did not swallow quickly enough to suppress esophageal body contractility as this maneuver showed peristalsis in the third swallow (not after the fifth). IMPRESSION: Based on [address], these findings are consistent with normal peristalsis and no evidence of esophagogastric junction outflow disorder. There is insufficient abnormal swallows to classify as ineffective esophageal motility. RECOMMENDATIONS:  1. Encourage thorough mastication of food, alternating solid food with drinks of water/liquids, reduce size of bites of food, and remain completely upright when swallowing.

## 2022-07-08 ENCOUNTER — HOSPITAL ENCOUNTER (OUTPATIENT)
Dept: BONE DENSITY | Age: 67
Discharge: HOME | End: 2022-07-08
Attending: INTERNAL MEDICINE
Payer: MEDICARE

## 2022-07-08 DIAGNOSIS — M81.0 AGE-RELATED OSTEOPOROSIS WITHOUT CURRENT PATHOLOGICAL FRACTURE: ICD-10-CM

## 2022-07-08 PROCEDURE — 77080 DXA BONE DENSITY AXIAL: CPT

## 2022-07-12 ENCOUNTER — TELEPHONE (OUTPATIENT)
Dept: INTERNAL MEDICINE CLINIC | Age: 67
End: 2022-07-12

## 2022-07-12 NOTE — PROGRESS NOTES
Dexa shows osteoporosis, would recommend getting started on prolia. Bones are weaker than when done 2 years ago. 10 year probability of major fracture is 14%.

## 2022-07-12 NOTE — TELEPHONE ENCOUNTER
Pharmacy Progress Note - Telephone Call    Ms. Reid Slight 69 y.o. was contacted via an outbound telephone call regarding her previous call today. A voicemail was left for patient to return my call. Reports linzess dose now reduced to 145 mcg. Need to update PAP application. Thank you,  Tavia Christian, PharmD, BCACP, [address] [facility] in place:  Yes   Time Spent (min): 5

## 2022-07-13 NOTE — PROGRESS NOTES
Called and lvm for patient, also sent patient [employer] message, verified active. Dexa shows osteoporosis, would recommend getting started on prolia.  Bones are weaker than when done 2 years ago.  10 year probability of major fracture is 14%.

## 2022-07-14 ENCOUNTER — TELEPHONE (OUTPATIENT)
Dept: INTERNAL MEDICINE CLINIC | Age: 67
End: 2022-07-14

## 2022-07-14 NOTE — TELEPHONE ENCOUNTER
Pharmacy Progress Note - Patient Assistance     Contacted Brandon CASILLAS regarding Ms. Ty Olsen 69 y.o. 's Linzess 145 mcg access    - Need POI, copy of insurance card, proof of copay cost for Linzess, and new application    Called and share updates with patient. PHI verified. Patient will stop by office next week to  patient's portion of the application. Thank you for the consult,  Tavia Arias, PharmD, BCACP, [address] in place:  Yes   Recommendation Provided To: Patient/Caregiver: 1 via Telephone and Other: 1   Intervention Detail: Patient Access Assistance/Sample Provided   Intervention Accepted By: Patient/Caregiver: 1 and Other: 1   Time Spent (min): 20

## 2022-07-23 NOTE — PROGRESS NOTES
Pharmacy Progress Note - Diabetes Management    Assessment / Plan:   Diabetes Management:  - Per ADA guidelines, Pt's A1c is not at goal of < 7%. - Current CGM trend reflective of increased in carb intake in the past month. Discuss concern about this and how it may worsen her GI symptoms.   - Emphasize importance of giving her insulin consistently. - Increase Tresiba to 24 units daily  -Need to cover for your late evening snacks with Novolog 2 units  If less between 100-150: skip  If 151-200: give 3 units  If 201-250: give 5 units  If 251-300: give 6 units? If above 300: give 8 units   - Continue Synjardxy XR 25/1000 mg daily     S/O: Ms. Karlene Ann is a 69 y.o. female , referred by Dr. Gordon Harrison DO, with a PMH of T2DM, HTN, HLD, Hypothyroidism, OAB, IBS, Osteoporosis, was seen virtually today for diabetes management follow up. Last A1c was 7.2% (Jun 2022), 7.3% (Feb 2022), 6.9% (Sept 2021), 6.3% (June 2021), 8% (Oct 2020), 8.4% (June 2020), 7.2% (Dec 2019). Interim update:   Recently returned from [facility]  Completed course of Macrobid for UTI  Reports urology started another course of Bactrim DS BID x 5 days. Has follow up with GI in October     Brought in application for Linzess PAP. Current anti-hyperglycemic regimen include(s): - Synjardy XR 25/1000 mg daily  - Tresiba 22 units daily. If FBG > 130, give 24 units  ---> giving 22 units daily   - Novolog:   --- giving 4 units if BG > 250 ; max 8 units if BG > 300   ? If less between 100-150: skip  ? If 151-200: give 3 units  ? If 201-250: give 5 units  ? If above 250: give 8 units     Lisinopril 5 mg daily, pravastatin 20 mg daily   LDL 76 (July 2022)    ROS:  Today, Pt endorses:  - Symptoms of Hyperglycemia: none  - Symptoms of Hypoglycemia: none    Blood Glucose Monitoring (BGM) or CGM:  Julien CGM  FBG today 108     14 days:      30 days:        Nutrition/Lifestyle Modifications:  May not eat as much during the day.  Most meals consumed in the later parts of the day. Reports increased late night snacking - often usually very high carbs. May or may not cover with Novolog insulin. Increased social stress w/ caring for her partner. Vitals: Wt Readings from Last 3 Encounters:   07/27/22 181 lb (82.1 kg)   06/22/22 181 lb 9.6 oz (82.4 kg)   06/05/22 180 lb (81.6 kg)     BP Readings from Last 3 Encounters:   07/27/22 139/74   06/23/22 (!) 157/69   06/22/22 118/65     Pulse Readings from Last 3 Encounters:   06/23/22 79   06/22/22 82   06/05/22 90     Past Medical History:   Diagnosis Date    Arthritis     Depression     Diabetes (HCC)     GERD (gastroesophageal reflux disease) July 5,2019    Hypercholesteremia     Hypertension     Hyperthyroidism     IBS (irritable bowel syndrome)     Liver disease     hepatitis b    Nausea & vomiting     PUD (peptic ulcer disease)     bleeding ulcer    Sleep apnea     CPAP    Urinary incontinence      Allergies   Allergen Reactions    Celebrex [Celecoxib] Other (comments)     Hallucinations    Codeine Nausea and Vomiting    Erythromycin Nausea and Vomiting       Current Outpatient Medications   Medication Sig    nitrofurantoin, macrocrystal-monohydrate, (MACROBID) 100 mg capsule Take 100 mg by mouth two (2) times a day. Linzess 145 mcg cap capsule Take 145 mcg by mouth daily. ALPRAZolam (XANAX) 0.25 mg tablet Take 1 Tablet by mouth two (2) times daily as needed for Anxiety. Max Daily Amount: 0.5 mg. Indications: anxious    Omeprazole delayed release (PRILOSEC D/R) 20 mg tablet Take 20 mg by mouth daily. lisinopriL (PRINIVIL, ZESTRIL) 5 mg tablet TAKE 1 TABLET BY MOUTH  DAILY    furosemide (LASIX) 20 mg tablet TAKE 1 TABLET BY MOUTH  DAILY AS NEEDED FOR EDEMA    empagliflozin-metformin (Synjardy XR) 25-1,000 mg TBph Take 1 Tablet by mouth daily.  To start this once patient completes Jardiance and metformin monotherapy   Indications: type 2 diabetes mellitus    levothyroxine (SYNTHROID) 300 mcg tablet TAKE 1 TABLET BY MOUTH ONCE DAILY BEFORE BREAKFAST 6 DAYS A WEEK AND TAKE 150 MCG ONE DAY A WEEK    insulin aspart U-100 (NovoLOG Flexpen U-100 Insulin) 100 unit/mL (3 mL) inpn 2-6 Units by SubCUTAneous route Before breakfast, lunch, and dinner. amitriptyline (ELAVIL) 10 mg tablet Take 1 Tablet by mouth nightly. vilazodone (VIIBRYD) 20 mg tab tablet Take 1 Tablet by mouth daily. famotidine (PEPCID) 40 mg tablet Take 40 mg by mouth two (2) times a day. trospium (SANCTURA XL) 60 mg capsule Take 1 Capsule by mouth Daily (before breakfast). nystatin (MYCOSTATIN) powder Apply  to affected area four (4) times daily. Porras Gupta FlexTouch U-100 100 unit/mL (3 mL) inpn 16 Units by SubCUTAneous route daily. Indications: type 2 diabetes mellitus    pravastatin (PRAVACHOL) 20 mg tablet Take 1 Tablet by mouth nightly. FreeStyle Julien 2 Sensor kit 1 Each by Other route See Salvatore Shaw. Use to scan sensor three times daily    NOVOFINE PLUS 32 gauge x 1/6\" ndle Check sugars 4x daily. acetaminophen (TYLENOL) 500 mg tablet Take 1 Tab by mouth every six (6) hours as needed for Pain. No current facility-administered medications for this visit. Lab Results   Component Value Date/Time    Sodium 140 06/22/2022 10:09 AM    Potassium 4.4 06/22/2022 10:09 AM    Chloride 107 06/22/2022 10:09 AM    CO2 28 06/22/2022 10:09 AM    Anion gap 5 06/22/2022 10:09 AM    Glucose 162 (H) 06/22/2022 10:09 AM    BUN 19 06/22/2022 10:09 AM    Creatinine 1.07 (H) 06/22/2022 10:09 AM    BUN/Creatinine ratio 18 06/22/2022 10:09 AM    GFR est AA >60 06/22/2022 10:09 AM    GFR est non-AA 51 (L) 06/22/2022 10:09 AM    Calcium 9.6 06/22/2022 10:09 AM    Bilirubin, total 0.3 06/22/2022 10:09 AM    Alk.  phosphatase 68 06/22/2022 10:09 AM    Protein, total 6.8 06/22/2022 10:09 AM    Albumin 3.9 06/22/2022 10:09 AM    Globulin 2.9 06/22/2022 10:09 AM    A-G Ratio 1.3 06/22/2022 10:09 AM    ALT (SGPT) 21 06/22/2022 10:09 AM       Lab Results   Component Value Date/Time    Cholesterol, total 163 06/22/2022 10:09 AM    HDL Cholesterol 72 06/22/2022 10:09 AM    LDL, calculated 76.4 06/22/2022 10:09 AM    VLDL, calculated 14.6 06/22/2022 10:09 AM    Triglyceride 73 06/22/2022 10:09 AM    CHOL/HDL Ratio 2.3 06/22/2022 10:09 AM       Lab Results   Component Value Date/Time    WBC 5.7 06/22/2022 10:09 AM    HGB 12.7 06/22/2022 10:09 AM    HCT 38.0 06/22/2022 10:09 AM    PLATELET 375 (L) 88/51/4915 10:09 AM    MCV 88.4 06/22/2022 10:09 AM       Lab Results   Component Value Date/Time    Microalbumin/Creat ratio (mg/g creat) 17 06/22/2022 10:09 AM    Microalbumin,urine random 0.88 06/22/2022 10:09 AM       HbA1c:  Lab Results   Component Value Date/Time    Hemoglobin A1c 7.2 (H) 06/22/2022 10:09 AM    Hemoglobin A1c (POC) 7.3 (A) 02/15/2022 02:06 PM     No components found for: 2     Last Point of Care HGB A1C  Hemoglobin A1c (POC)   Date Value Ref Range Status   02/15/2022 7.3 (A) 4.8 - 5.6 % Final        Estimated Creatinine Clearance: 52.5 mL/min (A) (by C-G formula based on SCr of 1.07 mg/dL (H)). Medication reconciliation was completed during the visit. Medications Discontinued During This Encounter   Medication Reason    nitrofurantoin, macrocrystal-monohydrate, (MACROBID) 100 mg capsule Therapy Completed     Orders Placed This Encounter    trimethoprim-sulfamethoxazole (BACTRIM DS, SEPTRA DS) 160-800 mg per tablet     Sig: Take 1 Tablet by mouth two (2) times a day. X 5 days     Patient verbalized understanding of the information presented and all of the patients questions were answered. AVS was handed to the patient. Patient advised to call the office with any additional questions or concerns. Notifications of recommendations will be sent to Dr. Joey Mayo DO for review.     VV in 4 weeks     Thank you for the consult,  Tavia Berry, PharmD, BCACP, [address] [facility]    CPA in place: Yes  Recommendation Provided To: Patient/Caregiver: 8 via In person  Intervention Detail: Adherence Monitorin, Discontinued Rx: 1, reason: Therapy Complete, Dose Adjustment: 2, reason: Therapy Optimization, New Rx: 1, reason: Patient Preference, Patient Access Assistance/Sample Provided, and Scheduled Appointment  Gap Closed?:   Intervention Accepted By: Patient/Caregiver: 8  Time Spent (min): 45

## 2022-07-27 ENCOUNTER — OFFICE VISIT (OUTPATIENT)
Dept: INTERNAL MEDICINE CLINIC | Age: 67
End: 2022-07-27

## 2022-07-27 VITALS
HEART RATE: 80 BPM | HEIGHT: 65 IN | WEIGHT: 181 LBS | OXYGEN SATURATION: 97 % | DIASTOLIC BLOOD PRESSURE: 74 MMHG | SYSTOLIC BLOOD PRESSURE: 139 MMHG | BODY MASS INDEX: 30.16 KG/M2

## 2022-07-27 DIAGNOSIS — Z79.4 UNCONTROLLED TYPE 2 DIABETES MELLITUS WITH HYPERGLYCEMIA, WITH LONG-TERM CURRENT USE OF INSULIN (HCC): Primary | ICD-10-CM

## 2022-07-27 DIAGNOSIS — E11.65 UNCONTROLLED TYPE 2 DIABETES MELLITUS WITH HYPERGLYCEMIA, WITH LONG-TERM CURRENT USE OF INSULIN (HCC): Primary | ICD-10-CM

## 2022-07-27 RX ORDER — SULFAMETHOXAZOLE AND TRIMETHOPRIM 800; 160 MG/1; MG/1
1 TABLET ORAL 2 TIMES DAILY
COMMUNITY
Start: 2022-07-25 | End: 2022-08-18 | Stop reason: ALTCHOICE

## 2022-07-27 NOTE — PATIENT INSTRUCTIONS
Increase Tresiba to 24 units daily  Need to cover for your late evening snacks with Novolog 2 units  If less between 100-150: skip  If 151-200: give 3 units  If 201-250: give 5 units  If 251-300: give 6 units?    If above 300: give 8 units   Continue Synjardxy XR 25/1000 mg daily  Scan your sensor more often

## 2022-08-15 DIAGNOSIS — M25.562 LEFT KNEE PAIN, UNSPECIFIED CHRONICITY: Primary | ICD-10-CM

## 2022-08-15 DIAGNOSIS — M17.0 BILATERAL PRIMARY OSTEOARTHRITIS OF KNEE: ICD-10-CM

## 2022-08-16 ENCOUNTER — OFFICE VISIT (OUTPATIENT)
Dept: ORTHOPEDIC SURGERY | Age: 67
End: 2022-08-16
Payer: MEDICARE

## 2022-08-16 ENCOUNTER — HOSPITAL ENCOUNTER (OUTPATIENT)
Dept: GENERAL RADIOLOGY | Age: 67
Discharge: HOME | End: 2022-08-16
Payer: MEDICARE

## 2022-08-16 VITALS
HEIGHT: 66 IN | OXYGEN SATURATION: 94 % | WEIGHT: 181 LBS | BODY MASS INDEX: 29.09 KG/M2 | SYSTOLIC BLOOD PRESSURE: 146 MMHG | TEMPERATURE: 97.4 F | HEART RATE: 82 BPM | RESPIRATION RATE: 16 BRPM | DIASTOLIC BLOOD PRESSURE: 69 MMHG

## 2022-08-16 DIAGNOSIS — M17.0 BILATERAL PRIMARY OSTEOARTHRITIS OF KNEE: Primary | ICD-10-CM

## 2022-08-16 DIAGNOSIS — M17.0 BILATERAL PRIMARY OSTEOARTHRITIS OF KNEE: ICD-10-CM

## 2022-08-16 PROCEDURE — G8536 NO DOC ELDER MAL SCRN: HCPCS | Performed by: ORTHOPAEDIC SURGERY

## 2022-08-16 PROCEDURE — 1101F PT FALLS ASSESS-DOCD LE1/YR: CPT | Performed by: ORTHOPAEDIC SURGERY

## 2022-08-16 PROCEDURE — G9717 DOC PT DX DEP/BP F/U NT REQ: HCPCS | Performed by: ORTHOPAEDIC SURGERY

## 2022-08-16 PROCEDURE — G8754 DIAS BP LESS 90: HCPCS | Performed by: ORTHOPAEDIC SURGERY

## 2022-08-16 PROCEDURE — G8753 SYS BP > OR = 140: HCPCS | Performed by: ORTHOPAEDIC SURGERY

## 2022-08-16 PROCEDURE — G8427 DOCREV CUR MEDS BY ELIG CLIN: HCPCS | Performed by: ORTHOPAEDIC SURGERY

## 2022-08-16 PROCEDURE — 3017F COLORECTAL CA SCREEN DOC REV: CPT | Performed by: ORTHOPAEDIC SURGERY

## 2022-08-16 PROCEDURE — 99213 OFFICE O/P EST LOW 20 MIN: CPT | Performed by: ORTHOPAEDIC SURGERY

## 2022-08-16 PROCEDURE — G8417 CALC BMI ABV UP PARAM F/U: HCPCS | Performed by: ORTHOPAEDIC SURGERY

## 2022-08-16 PROCEDURE — 1123F ACP DISCUSS/DSCN MKR DOCD: CPT | Performed by: ORTHOPAEDIC SURGERY

## 2022-08-16 PROCEDURE — 73560 X-RAY EXAM OF KNEE 1 OR 2: CPT

## 2022-08-16 PROCEDURE — 1090F PRES/ABSN URINE INCON ASSESS: CPT | Performed by: ORTHOPAEDIC SURGERY

## 2022-08-16 PROCEDURE — G9899 SCRN MAM PERF RSLTS DOC: HCPCS | Performed by: ORTHOPAEDIC SURGERY

## 2022-08-16 NOTE — PROGRESS NOTES
8/16/2022      CC: bilateral knee pain    HPI:      This is a 69y.o. year old female who presents for a follow up visit. The patient was last seen and diagnosed with bilateral knee osteoarthritis. The patient's treatments since the most recent visit have comprised of viscosupplementation 6 months ago. The patient has had good but temporary relief of the chief complaint. PMH:  Past Medical History:   Diagnosis Date    Arthritis     Depression     Diabetes ([address]     GERD (gastroesophageal reflux disease) July 5,2019    Hypercholesteremia     Hypertension     Hyperthyroidism     IBS (irritable bowel syndrome)     Liver disease     hepatitis b    Nausea & vomiting     PUD (peptic ulcer disease)     bleeding ulcer    Sleep apnea     CPAP    Urinary incontinence        PSxHx:  Past Surgical History:   Procedure Laterality Date    HX APPENDECTOMY  1999    HX BACK SURGERY      x3    HX BLEPHAROPLASTY Right     HX BUNIONECTOMY      HX CHOLECYSTECTOMY  06/21/2021    HX COLONOSCOPY  2018    HX ENDOSCOPY  July 5 2019    HX HYSTERECTOMY      HX OTHER SURGICAL      Collapsed lung    HX UROLOGICAL  2019    HX WISDOM TEETH EXTRACTION      WI BREAST SURGERY PROCEDURE UNLISTED  09/18       Meds:    Current Outpatient Medications:     trimethoprim-sulfamethoxazole (BACTRIM DS, SEPTRA DS) 160-800 mg per tablet, Take 1 Tablet by mouth two (2) times a day. X 5 days, Disp: , Rfl:     Linzess 145 mcg cap capsule, Take 145 mcg by mouth daily. , Disp: , Rfl:     ALPRAZolam (XANAX) 0.25 mg tablet, Take 1 Tablet by mouth two (2) times daily as needed for Anxiety. Max Daily Amount: 0.5 mg. Indications: anxious, Disp: 30 Tablet, Rfl: 0    Omeprazole delayed release (PRILOSEC D/R) 20 mg tablet, Take 20 mg by mouth daily. , Disp: , Rfl:     lisinopriL (PRINIVIL, ZESTRIL) 5 mg tablet, TAKE 1 TABLET BY MOUTH  DAILY, Disp: 90 Tablet, Rfl: 3    furosemide (LASIX) 20 mg tablet, TAKE 1 TABLET BY MOUTH  DAILY AS NEEDED FOR EDEMA, Disp: 90 Tablet, Rfl: 3    empagliflozin-metformin (Synjardy XR) 25-1,000 mg TBph, Take 1 Tablet by mouth daily. To start this once patient completes Jardiance and metformin monotherapy   Indications: type 2 diabetes mellitus, Disp: , Rfl:     levothyroxine (SYNTHROID) 300 mcg tablet, TAKE 1 TABLET BY MOUTH ONCE DAILY BEFORE BREAKFAST 6 DAYS A WEEK AND TAKE 150 MCG ONE DAY A WEEK, Disp: 90 Tablet, Rfl: 3    insulin aspart U-100 (NovoLOG Flexpen U-100 Insulin) 100 unit/mL (3 mL) inpn, 2-6 Units by SubCUTAneous route Before breakfast, lunch, and dinner., Disp: , Rfl:     amitriptyline (ELAVIL) 10 mg tablet, Take 1 Tablet by mouth nightly., Disp: 90 Tablet, Rfl: 3    vilazodone (VIIBRYD) 20 mg tab tablet, Take 1 Tablet by mouth daily. , Disp: 20 Tablet, Rfl: 0    famotidine (PEPCID) 40 mg tablet, Take 40 mg by mouth two (2) times a day., Disp: , Rfl:     trospium (SANCTURA XL) 60 mg capsule, Take 1 Capsule by mouth Daily (before breakfast). , Disp: 90 Capsule, Rfl: 3    nystatin (MYCOSTATIN) powder, Apply  to affected area four (4) times daily. , Disp: 60 g, Rfl: 1    Tresiba FlexTouch U-100 100 unit/mL (3 mL) inpn, 16 Units by SubCUTAneous route daily. Indications: type 2 diabetes mellitus, Disp: 15 mL, Rfl: 1    pravastatin (PRAVACHOL) 20 mg tablet, Take 1 Tablet by mouth nightly., Disp: 90 Tablet, Rfl: 3    FreeStyle Julien 2 Sensor kit, 1 Each by Other route See Salvatore Shaw. Use to scan sensor three times daily, Disp: , Rfl:     NOVOFINE PLUS 32 gauge x 1/6\" ndle, Check sugars 4x daily. , Disp: 300 Pen Needle, Rfl: 3    acetaminophen (TYLENOL) 500 mg tablet, Take 1 Tab by mouth every six (6) hours as needed for Pain., Disp: 30 Tab, Rfl: 0    All:   Allergies   Allergen Reactions    Celebrex [Celecoxib] Other (comments)     Hallucinations    Codeine Nausea and Vomiting    Erythromycin Nausea and Vomiting       Social Hx:  Social History     Socioeconomic History    Marital status: SINGLE   Tobacco Use    Smoking status: Never    Smokeless tobacco: Never   Vaping Use    Vaping Use: Never used   Substance and Sexual Activity    Alcohol use: Not Currently     Alcohol/week: 0.0 standard drinks     Comment: occasionally    Drug use: Never    Sexual activity: Not Currently       Family Hx:  Family History   Problem Relation Age of Onset    Diabetes Mother         passed    Heart Disease Mother         passed    Hypertension Mother         passed    Cancer Father         passed Colon    Alcohol abuse Father     Diabetes Sister     Anxiety Sister     Diabetes Brother     Anxiety Brother     Diabetes Brother     Anxiety Brother     Diabetes Sister     Anxiety Sister     Diabetes Sister     Anxiety Sister     Cancer Sister         lung and brain    Anxiety Sister     Anxiety Sister     Breast Cancer Paternal Aunt         under 48    Cancer Paternal Aunt         Breast    Cancer Sister         passed Brain. Lung and bone    Diabetes Sister         passed    Diabetes Brother         passed         Review of Systems:       General: Denies headache, lethargy, fever, weight loss  Ears/Nose/Throat: Denies ear discharge, drainage, nosebleeds, hoarse voice, dental problems  Cardiovascular: Denies chest pain, shortness of breath  Lungs: Denies chest pain, breathing problems, wheezing, pneumonia  Stomach: Denies stomach pain, heartburn, constipation, irritable bowel  Skin: Denies rash, sores, open wounds  Musculoskeletal: bilateral knee pain  Genitourinary: Denies dysuria, hematuria, polyuria  Gastrointestinal: Denies constipation, obstipation, diarrhea  Neurological: Denies changes in sight, smell, hearing, taste, seizures. Denies loss of consciousness.   Psychiatric: Denies depression, sleep pattern changes, anxiety, change in personality  Endocrine: Denies mood swings, heat or cold intolerance  Hematologic/Lymphatic: Denies anemia, purpura, petechia  Allergic/Immunologic: Denies swelling of throat, pain or swelling at lymph nodes      Physical Examination:    Visit Vitals  BP (!) 146/69 (BP 1 Location: Right arm, BP Patient Position: At rest, BP Cuff Size: Large adult)   Pulse 82   Temp 97.4 °F (36.3 °C) (Temporal)   Resp 16   Ht 5' 5.5\" (1.664 m)   Wt 181 lb (82.1 kg)   SpO2 94%   BMI 29.66 kg/m²        General: AOX3, no apparent distress  Psychiatric: mood and affect appropriate  Lungs: breathing is symmetric and unlabored bilaterally  Heart: regular rate and rhythm  Abdomen: no guarding  Head: normocephalic, atraumatic  Skin: No significant abnormalities, good turgor  Sensation intact to light touch: C5-T1 dermatomes  Muscular exam: 5/5 strength in all major muscle groups unless noted in specialty exam.    Extremities        Left lower extremity:  Knee is noted to have a mild effusion. Medial joint line tenderness to palpation with a negative deformity. Patellar crepitus with range of motion is noted. Range of motion is 0-120. Sensation is intact to light touch L1-S1 dermatomes. Knee flexion and extension strength is 5/5 with Tibialis anterior, EHL, FHL being 5/5 as well. No other gross deformity or deficit is noted. Right lower extremity: Knee is noted to have a mild effusion. Medial joint line tenderness to palpation with a negative deformity. Patellar crepitus with range of motion is noted. Range of motion is 0-120. Sensation is intact to light touch L1-S1 dermatomes. Knee flexion and extension strength is 5/5 with Tibialis anterior, EHL, FHL being 5/5 as well. No other gross deformity or deficit is noted. Diagnostics:    Pertinent Diagnostics: Xrays are ordered and reviewed by myself, available of the bilateral knee, they indicate moderate osteoarthritis of the knee joints, no significant other findings, no other osseus abnormalities, fractures, or dislocations. Assessment: Bilateral knee Osteoarthritis  Plan: This patient I had a long discussion regarding treatment options.   We went over the nonoperative options, continued medications, injections of multiple types, bracing, physical therapy, maintenance of ideal body weight. Patient has elected to proceed with viscosupplementation      Ms. Candy Sher has a reminder for a \"due or due soon\" health maintenance. I have asked that she contact her primary care provider for follow-up on this health maintenance.

## 2022-08-16 NOTE — LETTER
8/16/2022    Patient: Jess Vasquez   YOB: 1955   Date of Visit: 8/16/2022     DO Joyce Cisneros III. [address]  [facility]    Dear Jess Telles III, DO,      Thank you for referring Ms. Kris Cordero to [facility] for evaluation. My notes for this consultation are attached. If you have questions, please do not hesitate to call me. I look forward to following your patient along with you.       Sincerely,    Jose E Ang DO

## 2022-08-17 ENCOUNTER — DOCUMENTATION ONLY (OUTPATIENT)
Dept: INTERNAL MEDICINE CLINIC | Age: 67
End: 2022-08-17

## 2022-08-17 ENCOUNTER — TELEPHONE (OUTPATIENT)
Dept: ORTHOPEDIC SURGERY | Age: 67
End: 2022-08-17

## 2022-08-17 NOTE — TELEPHONE ENCOUNTER
Submitted approval for Bilateral Synvisc Injections to [facility] provider portal. Approved from 8/17/22-8/17/23. Authorization # [id number]. Sada- please call this patient today to schedule this series.  We can have the injections in by Friday 8/19

## 2022-08-17 NOTE — TELEPHONE ENCOUNTER
----- Message from Milena Rouse DO sent at 8/16/2022  2:49 PM EDT -----  Visco bilateral knee please, she did synvisc 6 months ago.

## 2022-08-18 ENCOUNTER — VIRTUAL VISIT (OUTPATIENT)
Dept: INTERNAL MEDICINE CLINIC | Age: 67
End: 2022-08-18

## 2022-08-18 DIAGNOSIS — Z79.4 UNCONTROLLED TYPE 2 DIABETES MELLITUS WITH HYPERGLYCEMIA, WITH LONG-TERM CURRENT USE OF INSULIN (HCC): Primary | ICD-10-CM

## 2022-08-18 DIAGNOSIS — E11.65 UNCONTROLLED TYPE 2 DIABETES MELLITUS WITH HYPERGLYCEMIA, WITH LONG-TERM CURRENT USE OF INSULIN (HCC): Primary | ICD-10-CM

## 2022-08-18 RX ORDER — INSULIN DEGLUDEC INJECTION 100 U/ML
24 INJECTION, SOLUTION SUBCUTANEOUS DAILY
Qty: 15 ML | Refills: 1
Start: 2022-08-18 | End: 2022-10-06

## 2022-08-18 NOTE — PROGRESS NOTES
Pharmacy Progress Note - Diabetes Management    Assessment / Plan:   Diabetes Management:  - Per ADA guidelines, Pt's A1c is not at goal of < 7%.   - CGM trending closer to goal.   - Continue Tresiba 24 units daily and Synjardy XR 25/1000 mg daily  - Continue Novolog                If less between 100-150: skip  ? If 151-200: give 3 units  ? If 201-250: give 5 units  ? If above 250: give 8 units      Give 2 units before bedtime snacks   - Recommend for patient to recheck COVID test   - Continue to limit pre-HS snacking. Stay hydrated. S/O: Ms. Carole Cruz is a 69 y.o. female, referred by Dr. Amisha Xie, DO, with a PMH of T2DM, HTN, HLD, Hypothyroidism, OAB, IBS, osteoporosis, was seen virtually today for diabetes management follow up. Last A1c was 7.2% (Jun 2022), 7.3% (Feb 2022), 6.9% (Sept 2021), 6.3% (June 2021), 8% (Oct 2020), 8.4% (June 2020), 7.2% (Dec 2019). PHI verified. Interim update:   - Saw Dr Bonnie Steinberg (Ortho) 8/16/22 for follow up bilateral knee OA. Has 3 appts scheduled for visco knee injection. No steroids. First injection tomorrow. - Endorses dry cough + nasal congestion symptoms x 1 week. Denies fever. Reports self COVID test last week was negative. Has not re-tested since then. Taking APAP + zyrtec daily with hydration + cold drops. No urgent care visit. - Has endoscopy w/ dilation scheduled with Dr Amisha Xie 10/11/22   - Reports she is off trospium since botox injection. Current anti-hyperglycemic regimen include(s): - Synjardy XR 25/1000 mg daily  - Tresiba 24 units daily  - Novolog:    ? If less between 100-150: skip  ? If 151-200: give 3 units  ? If 201-250: give 5 units  ?           If above 250: give 8 units      Give 2 units before bedtime snacks     Lisinopril 5 mg daily, pravastatin 20 mg daily ; LDL 76 (July 2022)    ROS:  Today, Pt endorses:  - Symptoms of Hyperglycemia: none  - Symptoms of Hypoglycemia: none    Blood Glucose Monitoring (BGM) or CGM:  Julien CGM; uses reader   Fasting today: 128  After dinner: 166 ; pre  - did snack before bedtime      Midnight - 6 AM 6 AM - Noon Noon - 6 PM 6 PM - Midnight Average % Time in Target Range   7 Day Average 232 179 174 214 203 42%   14 Day Average 213 157 157 204 184 48%   30 Day Average 215 158 161 214 190 45%     Hypoglycemia (BG <70) Midnight - 6 AM 6 AM - Noon Noon - 6 PM 6 PM - Midnight Total Lows   7 Days 0 2 1 0 3   14 Days 0 5 3 0 8     Vitals: Wt Readings from Last 3 Encounters:   08/16/22 181 lb (82.1 kg)   07/27/22 181 lb (82.1 kg)   06/22/22 181 lb 9.6 oz (82.4 kg)     BP Readings from Last 3 Encounters:   08/16/22 (!) 146/69   07/27/22 139/74   06/23/22 (!) 157/69     Pulse Readings from Last 3 Encounters:   08/16/22 82   07/27/22 80   06/23/22 79       Past Medical History:   Diagnosis Date    Arthritis     Depression     Diabetes (HCC)     GERD (gastroesophageal reflux disease) July 5,2019    Hypercholesteremia     Hypertension     Hyperthyroidism     IBS (irritable bowel syndrome)     Liver disease     hepatitis b    Nausea & vomiting     PUD (peptic ulcer disease)     bleeding ulcer    Sleep apnea     CPAP    Urinary incontinence      Allergies   Allergen Reactions    Celebrex [Celecoxib] Other (comments)     Hallucinations    Codeine Nausea and Vomiting    Erythromycin Nausea and Vomiting       Current Outpatient Medications   Medication Sig    trimethoprim-sulfamethoxazole (BACTRIM DS, SEPTRA DS) 160-800 mg per tablet Take 1 Tablet by mouth two (2) times a day. X 5 days    Linzess 145 mcg cap capsule Take 145 mcg by mouth daily. ALPRAZolam (XANAX) 0.25 mg tablet Take 1 Tablet by mouth two (2) times daily as needed for Anxiety. Max Daily Amount: 0.5 mg. Indications: anxious    Omeprazole delayed release (PRILOSEC D/R) 20 mg tablet Take 20 mg by mouth daily.     lisinopriL (PRINIVIL, ZESTRIL) 5 mg tablet TAKE 1 TABLET BY MOUTH DAILY    furosemide (LASIX) 20 mg tablet TAKE 1 TABLET BY MOUTH  DAILY AS NEEDED FOR EDEMA    empagliflozin-metformin (Synjardy XR) 25-1,000 mg TBph Take 1 Tablet by mouth daily. To start this once patient completes Jardiance and metformin monotherapy   Indications: type 2 diabetes mellitus    levothyroxine (SYNTHROID) 300 mcg tablet TAKE 1 TABLET BY MOUTH ONCE DAILY BEFORE BREAKFAST 6 DAYS A WEEK AND TAKE 150 MCG ONE DAY A WEEK    insulin aspart U-100 (NovoLOG Flexpen U-100 Insulin) 100 unit/mL (3 mL) inpn 2-6 Units by SubCUTAneous route Before breakfast, lunch, and dinner. amitriptyline (ELAVIL) 10 mg tablet Take 1 Tablet by mouth nightly. vilazodone (VIIBRYD) 20 mg tab tablet Take 1 Tablet by mouth daily. famotidine (PEPCID) 40 mg tablet Take 40 mg by mouth two (2) times a day. trospium (SANCTURA XL) 60 mg capsule Take 1 Capsule by mouth Daily (before breakfast). nystatin (MYCOSTATIN) powder Apply  to affected area four (4) times daily. [place] FlexTouch U-100 100 unit/mL (3 mL) inpn 16 Units by SubCUTAneous route daily. Indications: type 2 diabetes mellitus    pravastatin (PRAVACHOL) 20 mg tablet Take 1 Tablet by mouth nightly. FreeStyle Julien 2 Sensor kit 1 Each by Other route See Salvatore Shaw. Use to scan sensor three times daily    NOVOFINE PLUS 32 gauge x 1/6\" ndle Check sugars 4x daily. acetaminophen (TYLENOL) 500 mg tablet Take 1 Tab by mouth every six (6) hours as needed for Pain. No current facility-administered medications for this visit.        Lab Results   Component Value Date/Time    Sodium 140 06/22/2022 10:09 AM    Potassium 4.4 06/22/2022 10:09 AM    Chloride 107 06/22/2022 10:09 AM    CO2 28 06/22/2022 10:09 AM    Anion gap 5 06/22/2022 10:09 AM    Glucose 162 (H) 06/22/2022 10:09 AM    BUN 19 06/22/2022 10:09 AM    Creatinine 1.07 (H) 06/22/2022 10:09 AM    BUN/Creatinine ratio 18 06/22/2022 10:09 AM    GFR est AA >60 06/22/2022 10:09 AM    GFR est non-AA 51 (L) 2022 10:09 AM    Calcium 9.6 2022 10:09 AM    Bilirubin, total 0.3 2022 10:09 AM    Alk. phosphatase 68 2022 10:09 AM    Protein, total 6.8 2022 10:09 AM    Albumin 3.9 2022 10:09 AM    Globulin 2.9 2022 10:09 AM    A-G Ratio 1.3 2022 10:09 AM    ALT (SGPT) 21 2022 10:09 AM       Lab Results   Component Value Date/Time    Cholesterol, total 163 2022 10:09 AM    HDL Cholesterol 72 2022 10:09 AM    LDL, calculated 76.4 2022 10:09 AM    VLDL, calculated 14.6 2022 10:09 AM    Triglyceride 73 2022 10:09 AM    CHOL/HDL Ratio 2.3 2022 10:09 AM       Lab Results   Component Value Date/Time    WBC 5.7 2022 10:09 AM    HGB 12.7 2022 10:09 AM    HCT 38.0 2022 10:09 AM    PLATELET 785 (L)  10:09 AM    MCV 88.4 2022 10:09 AM       Lab Results   Component Value Date/Time    Microalbumin/Creat ratio (mg/g creat) 17 2022 10:09 AM    Microalbumin,urine random 0.88 2022 10:09 AM     HbA1c:  Lab Results   Component Value Date/Time    Hemoglobin A1c 7.2 (H) 2022 10:09 AM    Hemoglobin A1c (POC) 7.3 (A) 02/15/2022 02:06 PM     Last Point of Care HGB A1C  Hemoglobin A1c (POC)   Date Value Ref Range Status   02/15/2022 7.3 (A) 4.8 - 5.6 % Final        Estimated Creatinine Clearance: 53.1 mL/min (A) (by C-G formula based on SCr of 1.07 mg/dL (H)). Medication reconciliation was completed during the visit. Medications Discontinued During This Encounter   Medication Reason    trimethoprim-sulfamethoxazole (BACTRIM DS, SEPTRA DS) 160-800 mg per tablet Therapy Completed    trospium (SANCTURA XL) 60 mg capsule Therapy Completed    Tresiba FlexTouch U-100 100 unit/mL (3 mL) inpn      Orders Placed This Encounter    Shu Vargas FlexTouch U-100 100 unit/mL (3 mL) inpn     Si Units by SubCUTAneous route daily.  Indications: type 2 diabetes mellitus     Dispense:  15 mL     Refill:  1       Patient verbalized understanding of the information presented and all of the patients questions were answered. Patient advised to call the office with any additional questions or concerns. Notifications of recommendations will be sent to Dr. Alejo Sanches DO for review. Patient will return to clinic in 8 week(s) for follow up.      Thank you for the consult,  Tavia Blum, PharmD, BCACP, [address] in place: Yes  Recommendation Provided To: Patient/Caregiver: 6 via Virtual Visit  Intervention Detail: Adherence Monitorin, Discontinued Rx: 3, reason: Therapy Complete, Dose Adjustment: 1, reason: Therapy Optimization, and Scheduled Appointment  Intervention Accepted By: Patient/Caregiver: 6  Time Spent (min): 30

## 2022-08-19 ENCOUNTER — OFFICE VISIT (OUTPATIENT)
Dept: ORTHOPEDIC SURGERY | Age: 67
End: 2022-08-19
Payer: MEDICARE

## 2022-08-19 VITALS
DIASTOLIC BLOOD PRESSURE: 78 MMHG | WEIGHT: 179 LBS | BODY MASS INDEX: 28.77 KG/M2 | SYSTOLIC BLOOD PRESSURE: 131 MMHG | HEART RATE: 72 BPM | OXYGEN SATURATION: 98 % | TEMPERATURE: 97.7 F | HEIGHT: 66 IN

## 2022-08-19 DIAGNOSIS — M17.0 BILATERAL PRIMARY OSTEOARTHRITIS OF KNEE: Primary | ICD-10-CM

## 2022-08-19 PROCEDURE — 20610 DRAIN/INJ JOINT/BURSA W/O US: CPT | Performed by: ORTHOPAEDIC SURGERY

## 2022-08-19 NOTE — PROGRESS NOTES
Date of Procedure: 8/19/2022  PROCEDURE NOTE: Bilateral knee injection of Synvisc     Consent was obtained from the patient. The correct site was identified after confirmation with the patient. Following identification and confirmation of the correct site with the patient, the superolateral right knee was prepped in the normal sterile fashion. A local anesthetic of 1% lidocaine without epinephrine was then administered to the local tissues. Following, an injection of prefilled Synvisc 24 mg supplementation syringe was injected. The needle was then withdrawn and the injection site dressed with a sterile bandage at the conclusion. The procedure was well tolerated by the patient. No immediate adverse reactions were noted. Attention was then turned to the left knee. Following identification and confirmation of the correct site with the patient, the superolateral left knee was prepped in the normal sterile fashion. A local anesthetic of 1% lidocaine without epinephrine was then administered to the local tissues. Following, an injection of prefilled Synvisc 24 mg supplementation syringe was injected. The needle was then withdrawn and the injection site dressed with a sterile bandage at the conclusion. The procedure was well tolerated by the patient. No immediate adverse reactions were noted.   Post injection instructions were given

## 2022-08-19 NOTE — LETTER
8/19/2022    Patient: Madelene Riedel   YOB: 1955   Date of Visit: 8/19/2022     Harrison Mata III, DO  [address] [facility] [address]  [facility]  [address] [facility] [address]    Dear Harrison Mata III, DO,      Thank you for referring Ms. Cindy Diaz to [facility] for evaluation. My notes for this consultation are attached. If you have questions, please do not hesitate to call me. I look forward to following your patient along with you.       Sincerely,    Marcelo Lopez DO

## 2022-08-19 NOTE — PROGRESS NOTES
Identified pt with two pt identifiers (name and ). Reviewed chart in preparation for visit and have obtained necessary documentation. Inez Lama is a 69 y.o. female  Chief Complaint   Patient presents with    Joint Or Tendon Injection     Bilateral Knee 1st Synvisc      Visit Vitals  /78 (BP 1 Location: Right arm, BP Patient Position: Sitting, BP Cuff Size: Large adult)   Pulse 72   Temp 97.7 °F (36.5 °C) (Tympanic)   Ht 5' 5.5\" (1.664 m)   Wt 179 lb (81.2 kg)   SpO2 98%   BMI 29.33 kg/m²     1. Have you been to the ER, urgent care clinic since your last visit? Hospitalized since your last visit? No    2. Have you seen or consulted any other health care providers outside of the [address] [place] [address] since your last visit? Include any pap smears or colon screening.  No

## 2022-08-26 ENCOUNTER — OFFICE VISIT (OUTPATIENT)
Dept: ORTHOPEDIC SURGERY | Age: 67
End: 2022-08-26
Payer: MEDICARE

## 2022-08-26 VITALS
OXYGEN SATURATION: 93 % | BODY MASS INDEX: 28.62 KG/M2 | RESPIRATION RATE: 16 BRPM | SYSTOLIC BLOOD PRESSURE: 119 MMHG | HEIGHT: 66 IN | WEIGHT: 178.1 LBS | TEMPERATURE: 97.5 F | HEART RATE: 77 BPM | DIASTOLIC BLOOD PRESSURE: 72 MMHG

## 2022-08-26 DIAGNOSIS — F41.9 ANXIETY: ICD-10-CM

## 2022-08-26 DIAGNOSIS — E03.9 ACQUIRED HYPOTHYROIDISM: ICD-10-CM

## 2022-08-26 DIAGNOSIS — M17.0 BILATERAL PRIMARY OSTEOARTHRITIS OF KNEE: Primary | ICD-10-CM

## 2022-08-26 PROCEDURE — 20610 DRAIN/INJ JOINT/BURSA W/O US: CPT | Performed by: ORTHOPAEDIC SURGERY

## 2022-08-26 RX ORDER — AMITRIPTYLINE HYDROCHLORIDE 10 MG/1
10 TABLET, FILM COATED ORAL
Qty: 90 TABLET | Refills: 3 | Status: SHIPPED | OUTPATIENT
Start: 2022-08-26

## 2022-08-26 RX ORDER — FUROSEMIDE 20 MG/1
TABLET ORAL
Qty: 90 TABLET | Refills: 3 | Status: SHIPPED | OUTPATIENT
Start: 2022-08-26

## 2022-08-26 RX ORDER — ALPRAZOLAM 0.25 MG/1
0.25 TABLET ORAL
Qty: 30 TABLET | Refills: 0 | Status: SHIPPED | OUTPATIENT
Start: 2022-08-26 | End: 2022-09-23

## 2022-08-26 RX ORDER — DICLOFENAC SODIUM 75 MG/1
75 TABLET, DELAYED RELEASE ORAL 2 TIMES DAILY
Qty: 60 TABLET | Refills: 0 | Status: SHIPPED | OUTPATIENT
Start: 2022-08-26 | End: 2022-10-10

## 2022-08-26 RX ORDER — LISINOPRIL 5 MG/1
TABLET ORAL
Qty: 90 TABLET | Refills: 3 | Status: SHIPPED | OUTPATIENT
Start: 2022-08-26

## 2022-08-26 RX ORDER — LEVOTHYROXINE SODIUM 300 UG/1
TABLET ORAL
Qty: 90 TABLET | Refills: 3 | Status: SHIPPED | OUTPATIENT
Start: 2022-08-26

## 2022-08-26 NOTE — PROGRESS NOTES
Rm    Chief Complaint   Patient presents with    Injection     Bilateral knee Synvisc        Visit Vitals  /72 (BP 1 Location: Left upper arm, BP Patient Position: Sitting, BP Cuff Size: Adult)   Pulse 77   Temp 97.5 °F (36.4 °C) (Temporal)   Resp 16   Ht 5' 5.5\" (1.664 m)   Wt 178 lb 1.6 oz (80.8 kg)   SpO2 93%   BMI 29.19 kg/m²        1. Have you been to the ER, urgent care clinic since your last visit? Hospitalized since your last visit? No    2. Have you seen or consulted any other health care providers outside of the [address] [place] [address] since your last visit? Include any pap smears or colon screening. No     Health Maintenance Due   Topic Date Due    Eye Exam Retinal or Dilated  11/23/2021    COVID-19 Vaccine (3 - Booster for Daquan series) 05/27/2022        3 most recent PHQ Screens 8/26/2022   Little interest or pleasure in doing things Not at all   Feeling down, depressed, irritable, or hopeless Not at all   Total Score PHQ 2 0   Trouble falling or staying asleep, or sleeping too much -   Feeling tired or having little energy -   Poor appetite, weight loss, or overeating -   Feeling bad about yourself - or that you are a failure or have let yourself or your family down -   Trouble concentrating on things such as school, work, reading, or watching TV -   Moving or speaking so slowly that other people could have noticed; or the opposite being so fidgety that others notice -   Thoughts of being better off dead, or hurting yourself in some way -   PHQ 9 Score -   How difficult have these problems made it for you to do your work, take care of your home and get along with others -        Fall Risk Assessment, last 12 mths 8/26/2022   Able to walk? Yes   Fall in past 12 months? 0   Do you feel unsteady? 0   Are you worried about falling 0   Number of falls in past 12 months -   Fall with injury?  -       Learning Assessment 6/20/2019   PRIMARY LEARNER Patient   HIGHEST LEVEL OF EDUCATION - PRIMARY LEARNER  GRADUATED HIGH SCHOOL OR GED   BARRIERS PRIMARY LEARNER NONE   CO-LEARNER CAREGIVER No   PRIMARY LANGUAGE ENGLISH   LEARNER PREFERENCE PRIMARY READING   ANSWERED BY patient   RELATIONSHIP SELF

## 2022-08-26 NOTE — TELEPHONE ENCOUNTER
Future Appointments:  Future Appointments   Date Time Provider Cj Love   9/2/2022 10:45 AM Pawan Myles DO [facility] BS AMB   10/6/2022 11:00 AM Merary Castorena PHARMD [facility] BS AMB        Last Appointment With Me:  6/22/2022     Requested Prescriptions     Pending Prescriptions Disp Refills    amitriptyline (ELAVIL) 10 mg tablet 90 Tablet 3     Sig: Take 1 Tablet by mouth nightly. levothyroxine (SYNTHROID) 300 mcg tablet 90 Tablet 3     Sig: TAKE 1 TABLET BY MOUTH ONCE DAILY BEFORE BREAKFAST 6 DAYS A WEEK AND TAKE 150 MCG ONE DAY A WEEK    lisinopriL (PRINIVIL, ZESTRIL) 5 mg tablet 90 Tablet 3     Sig: TAKE 1 TABLET BY MOUTH  DAILY    furosemide (LASIX) 20 mg tablet 90 Tablet 3     Sig: TAKE 1 TABLET BY MOUTH  DAILY AS NEEDED FOR EDEMA    ALPRAZolam (XANAX) 0.25 mg tablet 30 Tablet 0     Sig: Take 1 Tablet by mouth two (2) times daily as needed for Anxiety. Max Daily Amount: 0.5 mg.  Indications: anxious

## 2022-08-26 NOTE — PROGRESS NOTES
Date of Procedure: 8/26/2022  PROCEDURE NOTE: Bilateral knee injection of Synvisc     Consent was obtained from the patient. The correct site was identified after confirmation with the patient. Following identification and confirmation of the correct site with the patient, the superolateral right knee was prepped in the normal sterile fashion. A local anesthetic of 1% lidocaine without epinephrine was then administered to the local tissues. Following, an injection of prefilled Synvisc 24 mg supplementation syringe was injected. The needle was then withdrawn and the injection site dressed with a sterile bandage at the conclusion. The procedure was well tolerated by the patient. No immediate adverse reactions were noted. Attention was then turned to the left knee. Following identification and confirmation of the correct site with the patient, the superolateral left knee was prepped in the normal sterile fashion. A local anesthetic of 1% lidocaine without epinephrine was then administered to the local tissues. Following, an injection of prefilled Synvisc 24 mg supplementation syringe was injected. The needle was then withdrawn and the injection site dressed with a sterile bandage at the conclusion. The procedure was well tolerated by the patient. No immediate adverse reactions were noted.   Post injection instructions were given

## 2022-09-02 ENCOUNTER — OFFICE VISIT (OUTPATIENT)
Dept: ORTHOPEDIC SURGERY | Age: 67
End: 2022-09-02
Payer: MEDICARE

## 2022-09-02 VITALS
DIASTOLIC BLOOD PRESSURE: 69 MMHG | WEIGHT: 178 LBS | HEART RATE: 79 BPM | BODY MASS INDEX: 28.61 KG/M2 | OXYGEN SATURATION: 97 % | HEIGHT: 66 IN | SYSTOLIC BLOOD PRESSURE: 122 MMHG | TEMPERATURE: 98.5 F

## 2022-09-02 DIAGNOSIS — M17.0 BILATERAL PRIMARY OSTEOARTHRITIS OF KNEE: Primary | ICD-10-CM

## 2022-09-02 PROCEDURE — 20610 DRAIN/INJ JOINT/BURSA W/O US: CPT | Performed by: ORTHOPAEDIC SURGERY

## 2022-09-02 NOTE — LETTER
9/2/2022    Patient: Alf Younger   YOB: 1955   Date of Visit: 9/2/2022     Pipo Ramirez III, DO  [address]  [address]  [facility]  [address] [place] [facility] [address]    Dear Pipo Ramirez III, DO,      Thank you for referring Ms. Susan Smith to [facility] for evaluation. My notes for this consultation are attached. If you have questions, please do not hesitate to call me. I look forward to following your patient along with you.       Sincerely,    Doug Kern, DO

## 2022-09-02 NOTE — PROGRESS NOTES
Identified pt with two pt identifiers (name and ). Reviewed chart in preparation for visit and have obtained necessary documentation. Madelene Riedel is a 69 y.o. female  Chief Complaint   Patient presents with    Joint Or Tendon Injection     Bilateral Knee 3rd Synvisc     Visit Vitals  /69 (BP 1 Location: Right arm, BP Patient Position: Sitting, BP Cuff Size: Large adult)   Pulse 79   Temp 98.5 °F (36.9 °C) (Tympanic)   Ht 5' 5.5\" (1.664 m)   Wt 178 lb (80.7 kg)   SpO2 97%   BMI 29.17 kg/m²     1. Have you been to the ER, urgent care clinic since your last visit? Hospitalized since your last visit? No    2. Have you seen or consulted any other health care providers outside of the [address] [place] [address] since your last visit? Include any pap smears or colon screening.  No

## 2022-09-02 NOTE — PROGRESS NOTES
Date of Procedure: 9/2/2022  PROCEDURE NOTE: Bilateral knee injection of Synvisc     Consent was obtained from the patient. The correct site was identified after confirmation with the patient. Following identification and confirmation of the correct site with the patient, the superolateral right knee was prepped in the normal sterile fashion. A local anesthetic of 1% lidocaine without epinephrine was then administered to the local tissues. Following, an injection of prefilled Synvisc 24 mg supplementation syringe was injected. The needle was then withdrawn and the injection site dressed with a sterile bandage at the conclusion. The procedure was well tolerated by the patient. No immediate adverse reactions were noted. Attention was then turned to the left knee. Following identification and confirmation of the correct site with the patient, the superolateral left knee was prepped in the normal sterile fashion. A local anesthetic of 1% lidocaine without epinephrine was then administered to the local tissues. Following, an injection of prefilled Synvisc 24 mg supplementation syringe was injected. The needle was then withdrawn and the injection site dressed with a sterile bandage at the conclusion. The procedure was well tolerated by the patient. No immediate adverse reactions were noted.   Post injection instructions were given

## 2022-09-16 ENCOUNTER — VIRTUAL VISIT (OUTPATIENT)
Dept: ORTHOPEDIC SURGERY | Age: 67
End: 2022-09-16
Payer: MEDICARE

## 2022-09-16 DIAGNOSIS — M17.0 BILATERAL PRIMARY OSTEOARTHRITIS OF KNEE: Primary | ICD-10-CM

## 2022-09-16 PROCEDURE — 99441 PR PHYS/QHP TELEPHONE EVALUATION 5-10 MIN: CPT | Performed by: ORTHOPAEDIC SURGERY

## 2022-09-16 RX ORDER — PRAVASTATIN SODIUM 20 MG/1
20 TABLET ORAL
Qty: 90 TABLET | Refills: 3 | Status: SHIPPED | OUTPATIENT
Start: 2022-09-16

## 2022-09-16 NOTE — PROGRESS NOTES
9/16/2022      CC: bilateral knee injections    HPI:      This is a 69y.o. year old female who presents for follow up of their bilateral bilateral knee injection. The patient states that they have had good relief of their pain. The time since injection has been two weeks, she also is taking diclofenac. PMH:  Past Medical History:   Diagnosis Date    Arthritis     Depression     Diabetes ([address]     GERD (gastroesophageal reflux disease) July 5,2019    Hypercholesteremia     Hypertension     Hyperthyroidism     IBS (irritable bowel syndrome)     Liver disease     hepatitis b    Nausea & vomiting     PUD (peptic ulcer disease)     bleeding ulcer    Sleep apnea     CPAP    Urinary incontinence        PSxHx:  Past Surgical History:   Procedure Laterality Date    HX APPENDECTOMY  1999    HX BACK SURGERY      x3    HX BLEPHAROPLASTY Right     HX BUNIONECTOMY      HX CHOLECYSTECTOMY  06/21/2021    HX COLONOSCOPY  2018    HX ENDOSCOPY  July 5 2019    HX HYSTERECTOMY      HX OTHER SURGICAL      Collapsed lung    HX UROLOGICAL  2019    HX WISDOM TEETH EXTRACTION      GA BREAST SURGERY PROCEDURE UNLISTED  09/18       Meds:    Current Outpatient Medications:     diclofenac EC (VOLTAREN) 75 mg EC tablet, Take 1 Tablet by mouth two (2) times a day., Disp: 60 Tablet, Rfl: 0    amitriptyline (ELAVIL) 10 mg tablet, Take 1 Tablet by mouth nightly., Disp: 90 Tablet, Rfl: 3    levothyroxine (SYNTHROID) 300 mcg tablet, TAKE 1 TABLET BY MOUTH ONCE DAILY BEFORE BREAKFAST 6 DAYS A WEEK AND TAKE 150 MCG ONE DAY A WEEK, Disp: 90 Tablet, Rfl: 3    lisinopriL (PRINIVIL, ZESTRIL) 5 mg tablet, TAKE 1 TABLET BY MOUTH  DAILY, Disp: 90 Tablet, Rfl: 3    furosemide (LASIX) 20 mg tablet, TAKE 1 TABLET BY MOUTH  DAILY AS NEEDED FOR EDEMA, Disp: 90 Tablet, Rfl: 3    ALPRAZolam (XANAX) 0.25 mg tablet, Take 1 Tablet by mouth two (2) times daily as needed for Anxiety. Max Daily Amount: 0.5 mg.  Indications: anxious, Disp: 30 Tablet, Rfl: 0 Shu Vargas FlexTouch U-100 100 unit/mL (3 mL) inpn, 24 Units by SubCUTAneous route daily. Indications: type 2 diabetes mellitus, Disp: 15 mL, Rfl: 1    Linzess 145 mcg cap capsule, Take 145 mcg by mouth daily. , Disp: , Rfl:     Omeprazole delayed release (PRILOSEC D/R) 20 mg tablet, Take 20 mg by mouth daily. , Disp: , Rfl:     empagliflozin-metformin (Synjardy XR) 25-1,000 mg TBph, Take 1 Tablet by mouth daily. To start this once patient completes Jardiance and metformin monotherapy   Indications: type 2 diabetes mellitus, Disp: , Rfl:     insulin aspart U-100 (NOVOLOG) 100 unit/mL (3 mL) inpn, 2-6 Units by SubCUTAneous route Before breakfast, lunch, and dinner., Disp: , Rfl:     vilazodone (VIIBRYD) 20 mg tab tablet, Take 1 Tablet by mouth daily. , Disp: 20 Tablet, Rfl: 0    famotidine (PEPCID) 40 mg tablet, Take 40 mg by mouth two (2) times a day., Disp: , Rfl:     nystatin (MYCOSTATIN) powder, Apply  to affected area four (4) times daily. , Disp: 60 g, Rfl: 1    pravastatin (PRAVACHOL) 20 mg tablet, Take 1 Tablet by mouth nightly., Disp: 90 Tablet, Rfl: 3    FreeStyle Julien 2 Sensor kit, 1 Each by Other route See Salvatore Shaw. Use to scan sensor three times daily, Disp: , Rfl:     NOVOFINE PLUS 32 gauge x 1/6\" ndle, Check sugars 4x daily. , Disp: 300 Pen Needle, Rfl: 3    acetaminophen (TYLENOL) 500 mg tablet, Take 1 Tab by mouth every six (6) hours as needed for Pain., Disp: 30 Tab, Rfl: 0    All:   Allergies   Allergen Reactions    Celebrex [Celecoxib] Other (comments)     Hallucinations    Codeine Nausea and Vomiting    Erythromycin Nausea and Vomiting       Social Hx:  Social History     Socioeconomic History    Marital status: SINGLE   Tobacco Use    Smoking status: Never    Smokeless tobacco: Never   Vaping Use    Vaping Use: Never used   Substance and Sexual Activity    Alcohol use: Not Currently     Alcohol/week: 0.0 standard drinks     Comment: occasionally    Drug use: Never    Sexual activity: Not Currently       Family Hx:  Family History   Problem Relation Age of Onset    Diabetes Mother         passed    Heart Disease Mother         passed    Hypertension Mother         passed    Cancer Father         passed Colon    Alcohol abuse Father     Diabetes Sister     Anxiety Sister     Diabetes Brother     Anxiety Brother     Diabetes Brother     Anxiety Brother     Diabetes Sister     Anxiety Sister     Diabetes Sister     Anxiety Sister     Cancer Sister         lung and brain    Anxiety Sister     Anxiety Sister     Breast Cancer Paternal Aunt         under 48    Cancer Paternal Aunt         Breast    Cancer Sister         passed Brain. Lung and bone    Diabetes Sister         passed    Diabetes Brother         passed         Review of Systems:       General: Denies headache, lethargy, fever, weight loss  Ears/Nose/Throat: Denies ear discharge, drainage, nosebleeds, hoarse voice, dental problems  Cardiovascular: Denies chest pain, shortness of breath  Lungs: Denies chest pain, breathing problems, wheezing, pneumonia  Stomach: Denies stomach pain, heartburn, constipation, irritable bowel  Skin: Denies rash, sores, open wounds  Musculoskeletal: bilateral knee pain - resolved with diclofenac  Genitourinary: Denies dysuria, hematuria, polyuria  Gastrointestinal: Denies constipation, obstipation, diarrhea  Neurological: Denies changes in sight, smell, hearing, taste, seizures. Denies loss of consciousness. Psychiatric: Denies depression, sleep pattern changes, anxiety, change in personality  Endocrine: Denies mood swings, heat or cold intolerance  Hematologic/Lymphatic: Denies anemia, purpura, petechia  Allergic/Immunologic: Denies swelling of throat, pain or swelling at lymph nodes      Physical Examination:    There were no vitals taken for this visit.      General: AOX3, no apparent distress  Psychiatric: mood and affect appropriate        Diagnostics:    Pertinent Diagnostics: none    Assessment: Status post bilateral knee injection  Plan: This patient and I discussed the normal course for injections, we discussed that pain relief will likely be temporary to some degree, but I cannot predict the longevity of relief. We also discussed the limitations of injections, and that I cannot inject the same area any more often than every three months. We will proceed with diclofenac. Patient was in [place] at the time of consultation. I was in the office while conducting this encounter. Consent:  She and/or her healthcare decision maker is aware that this patient-initiated Telehealth encounter is a billable service, with coverage as determined by her insurance carrier. She is aware that she may receive a bill and has provided verbal consent to proceed: Yes    This virtual visit was conducted telephone encounter only. -  I affirm this is a Patient Initiated Episode with an Established Patient who has not had a related appointment within my department in the past 7 days or scheduled within the next 24 hours. Note: this encounter is not billable if this call serves to triage the patient into an appointment for the relevant concern. Total Time: minutes: 5-10 minutes. Ms. David Pablo has a reminder for a \"due or due soon\" health maintenance. I have asked that she contact her primary care provider for follow-up on this health maintenance.

## 2022-09-16 NOTE — TELEPHONE ENCOUNTER
Daniela Miller DO, Margretta Nay has been flagged for adherence by Juan Jose. I have pended refills for your approval.    XJY:6/[id number]  NOV:NA    Thank you,  Wayne Taveras, PharmD, [address] [facility], toll free: [phone] Option 2    Requested Prescriptions     Pending Prescriptions Disp Refills    pravastatin (PRAVACHOL) 20 mg tablet 90 Tablet 3     Sig: Take 1 Tablet by mouth nightly.        ================================================================================    [facility]: ADHERENCE REVIEW  Identified care gap per [employer]: fills at [facility]: Statin adherence    Last Visit: 6/22/2022        ASSESSMENT  ACE/ARB ADHERENCE    Insurance Records claims through 9/16/2022 (2021 [place] = NA%; YTD [place] =  100%; Potential Fail Date: 12/28/2022 ):   Lisinopril last filled on 8/1/2022 for 90 day supply. Next refill due: 11/13/2022  4 refills remaining. Billed through [facility]    BP Readings from Last 3 Encounters:   09/02/22 122/69   08/26/22 119/72   08/19/22 131/78     Estimated Creatinine Clearance: 52.6 mL/min (A) (by C-G formula based on SCr of 1.07 mg/dL (H)). [id number] Records claims through 9/16/2022 (2021 [place] = NA%; YTD [place] = Filled only once; Potential Fail Date: 9/29/2022 ):   Pravastatin last filled on 5/17/2022 for 90 day supply. Next refill due: 8/15/2022-PAST DUE  0 refills remaining.  Billed through [facility] Rx Value Date/Time    Cholesterol, total 163 06/22/2022 10:09 AM    HDL Cholesterol 72 06/22/2022 10:09 AM    LDL, calculated 76.4 06/22/2022 10:09 AM    VLDL, calculated 14.6 06/22/2022 10:09 AM    Triglyceride 73 06/22/2022 10:09 AM    CHOL/HDL Ratio 2.3 06/22/2022 10:09 AM     ALT (SGPT)   Date Value Ref Range Status   06/22/2022 21 12 - 78 U/L Final     AST (SGOT)   Date Value Ref Range Status   06/22/2022 14 (L) 15 - 37 U/L Final     The 10-year ASCVD risk score (Ko Sehlton et al., 2013) is: 16.8%    Values used to calculate the score:      Age: 69 years      Sex: Female      Is Non- : No      Diabetic: Yes      Tobacco smoker: No      Systolic Blood Pressure: 438 mmHg      Is BP treated: Yes      HDL Cholesterol: 72 MG/DL      Total Cholesterol: 163 MG/DL     PLAN  The following are interventions that have been identified:  - Patient overdue refilling Pravastatin and active on home medication list  - Patient needs refills for Pravastatin      Future Appointments   Date Time Provider Cj Love   10/6/2022 11:00 AM Peri Sotelo, [address] [facility] 1330 BS AMB       Caty Hurley, PharmD, [address] [facility], toll free: [phone] Option 2  ================================================================================  For Pharmacy Admin Tracking Only    CPA in place: No  Recommendation Provided To: Provider: 1 via Note to Provider   Intervention Detail: Adherence Monitorin and New Rx: 1, reason: Improve Adherence  Gap Closed?: Yes  Intervention Accepted By: Provider: 1  Time Spent (min): 15

## 2022-09-23 DIAGNOSIS — F41.9 ANXIETY: ICD-10-CM

## 2022-09-23 RX ORDER — ALPRAZOLAM 0.25 MG/1
TABLET ORAL
Qty: 60 TABLET | Refills: 3 | Status: SHIPPED | OUTPATIENT
Start: 2022-09-23

## 2022-09-29 ENCOUNTER — TELEPHONE (OUTPATIENT)
Dept: INTERNAL MEDICINE CLINIC | Age: 67
End: 2022-09-29

## 2022-09-29 NOTE — TELEPHONE ENCOUNTER
Refaxed infusion form. Called patient and left voicemail for patient with the number to call the infusion center to get it set up.   [phone].

## 2022-10-06 ENCOUNTER — VIRTUAL VISIT (OUTPATIENT)
Dept: INTERNAL MEDICINE CLINIC | Age: 67
End: 2022-10-06

## 2022-10-06 ENCOUNTER — ANESTHESIA EVENT (OUTPATIENT)
Dept: ENDOSCOPY | Age: 67
End: 2022-10-06
Payer: MEDICARE

## 2022-10-06 DIAGNOSIS — E11.65 UNCONTROLLED TYPE 2 DIABETES MELLITUS WITH HYPERGLYCEMIA, WITH LONG-TERM CURRENT USE OF INSULIN (HCC): Primary | ICD-10-CM

## 2022-10-06 DIAGNOSIS — Z79.4 UNCONTROLLED TYPE 2 DIABETES MELLITUS WITH HYPERGLYCEMIA, WITH LONG-TERM CURRENT USE OF INSULIN (HCC): Primary | ICD-10-CM

## 2022-10-06 RX ORDER — INSULIN ASPART 100 [IU]/ML
6-8 INJECTION, SOLUTION INTRAVENOUS; SUBCUTANEOUS
Qty: 15 ML | Refills: 0
Start: 2022-10-06

## 2022-10-06 RX ORDER — INSULIN DEGLUDEC INJECTION 100 U/ML
32 INJECTION, SOLUTION SUBCUTANEOUS DAILY
Qty: 15 ML | Refills: 1
Start: 2022-10-06

## 2022-10-06 NOTE — PROGRESS NOTES
Pharmacy Progress Note - Diabetes Management    Assessment / Plan:   Diabetes Management:  - Per ADA guidelines, Pt's A1c is not at goal of < 7%.   - Discuss concern about elevated post prandial readings   - Increase Tresiba to 32 units daily  - Reinforce Novolog scale- -- need to give 8 units if BG > 250. - Will work on [place] request.   - Remind patient to schedule PCP follow up     S/O: Ms. Silvia Fisher is a 69 y.o. female, referred by Dr. Tricia Allen DO, with a PMH of T2DM, HTN, HLD, Hypothyroidism, OAB, IBS, osteoporosis, was seen virtually today for diabetes management follow up. Last A1c was 7.2% (Jun 2022), 7.3% (Feb 2022), 6.9% (Sept 2021), 6.3% (June 2021), 8% (Oct 2020), 8.4% (June 2020), 7.2% (Dec 2019). PHI verified. Interim update:   EGD w/ dilation scheduled for tomorrow. Dr Florinda sanchez for the past 2 weeks. Has been eating out more/more fast food  Reports recent completion of macrobid and bactrim for UTI - has follow up next week. Current anti-hyperglycemic regimen include(s): - Synjardy XR 25/1000 mg daily  - Tresiba 28 units daily  - Novolog:    ? If less between 100-150: skip  ? If 151-200: give 3 units  ? If 201-250: give 5 units  ? If above 250: give 8 units    Give 2 units before bedtime snacks     Request refills for insulin  Lisinopril 5 mg daily, pravastatin 20 mg daily ; LDL 76 (July 2022)    ROS:  Today, Pt endorses:  - Symptoms of Hyperglycemia: fatigue  - Symptoms of Hypoglycemia: none    Blood Glucose Monitoring (BGM) or CGM:  Julien CGM     Midnight - 6 AM 6 AM - Noon Noon - 6 PM 6 PM - Midnight Average   7 Day Average 289 222 199 282 263   14 Day Average 260 185 170 231 219   30 Day Average 235 175 176 235 208     No low BG  307, 247, 264,216, 171,   369, 134, 226     Vitals:   Wt Readings from Last 3 Encounters:   09/02/22 178 lb (80.7 kg)   08/26/22 178 lb 1.6 oz (80.8 kg)   08/19/22 179 lb (81.2 kg)     BP Readings from Last 3 Encounters:   09/02/22 122/69   08/26/22 119/72   08/19/22 131/78     Pulse Readings from Last 3 Encounters:   09/02/22 79   08/26/22 77   08/19/22 72       Past Medical History:   Diagnosis Date    Arthritis     Depression     Diabetes (HCC)     GERD (gastroesophageal reflux disease) July 5,2019    Hypercholesteremia     Hypertension     Hyperthyroidism     IBS (irritable bowel syndrome)     Liver disease     hepatitis b    Nausea & vomiting     PUD (peptic ulcer disease)     bleeding ulcer    Sleep apnea     CPAP    Urinary incontinence      Allergies   Allergen Reactions    Celebrex [Celecoxib] Other (comments)     Hallucinations    Codeine Nausea and Vomiting    Erythromycin Nausea and Vomiting       Current Outpatient Medications   Medication Sig    ALPRAZolam (XANAX) 0.25 mg tablet TAKE 1 TABLET BY MOUTH  TWICE DAILY AS NEEDED FOR  ANXIETY    pravastatin (PRAVACHOL) 20 mg tablet Take 1 Tablet by mouth nightly. diclofenac EC (VOLTAREN) 75 mg EC tablet Take 1 Tablet by mouth two (2) times a day. amitriptyline (ELAVIL) 10 mg tablet Take 1 Tablet by mouth nightly. levothyroxine (SYNTHROID) 300 mcg tablet TAKE 1 TABLET BY MOUTH ONCE DAILY BEFORE BREAKFAST 6 DAYS A WEEK AND TAKE 150 MCG ONE DAY A WEEK    lisinopriL (PRINIVIL, ZESTRIL) 5 mg tablet TAKE 1 TABLET BY MOUTH  DAILY    furosemide (LASIX) 20 mg tablet TAKE 1 TABLET BY MOUTH  DAILY AS NEEDED FOR EDEMA    [place] FlexTouch U-100 100 unit/mL (3 mL) inpn 24 Units by SubCUTAneous route daily. Indications: type 2 diabetes mellitus    Linzess 145 mcg cap capsule Take 145 mcg by mouth daily. Omeprazole delayed release (PRILOSEC D/R) 20 mg tablet Take 20 mg by mouth daily. empagliflozin-metformin (Synjardy XR) 25-1,000 mg TBph Take 1 Tablet by mouth daily.  To start this once patient completes Jardiance and metformin monotherapy   Indications: type 2 diabetes mellitus    insulin aspart U-100 (NOVOLOG) 100 unit/mL (3 mL) inpn 2-6 Units by SubCUTAneous route Before breakfast, lunch, and dinner. vilazodone (VIIBRYD) 20 mg tab tablet Take 1 Tablet by mouth daily. famotidine (PEPCID) 40 mg tablet Take 40 mg by mouth two (2) times a day. nystatin (MYCOSTATIN) powder Apply  to affected area four (4) times daily. FreeStyle Julien 2 Sensor kit 1 Each by Other route See Salvatore Shaw. Use to scan sensor three times daily    NOVOFINE PLUS 32 gauge x 1/6\" ndle Check sugars 4x daily. acetaminophen (TYLENOL) 500 mg tablet Take 1 Tab by mouth every six (6) hours as needed for Pain. No current facility-administered medications for this visit. Lab Results   Component Value Date/Time    Sodium 140 06/22/2022 10:09 AM    Potassium 4.4 06/22/2022 10:09 AM    Chloride 107 06/22/2022 10:09 AM    CO2 28 06/22/2022 10:09 AM    Anion gap 5 06/22/2022 10:09 AM    Glucose 162 (H) 06/22/2022 10:09 AM    BUN 19 06/22/2022 10:09 AM    Creatinine 1.07 (H) 06/22/2022 10:09 AM    BUN/Creatinine ratio 18 06/22/2022 10:09 AM    GFR est AA >60 06/22/2022 10:09 AM    GFR est non-AA 51 (L) 06/22/2022 10:09 AM    Calcium 9.6 06/22/2022 10:09 AM    Bilirubin, total 0.3 06/22/2022 10:09 AM    Alk.  phosphatase 68 06/22/2022 10:09 AM    Protein, total 6.8 06/22/2022 10:09 AM    Albumin 3.9 06/22/2022 10:09 AM    Globulin 2.9 06/22/2022 10:09 AM    A-G Ratio 1.3 06/22/2022 10:09 AM    ALT (SGPT) 21 06/22/2022 10:09 AM       Lab Results   Component Value Date/Time    Cholesterol, total 163 06/22/2022 10:09 AM    HDL Cholesterol 72 06/22/2022 10:09 AM    LDL, calculated 76.4 06/22/2022 10:09 AM    VLDL, calculated 14.6 06/22/2022 10:09 AM    Triglyceride 73 06/22/2022 10:09 AM    CHOL/HDL Ratio 2.3 06/22/2022 10:09 AM       Lab Results   Component Value Date/Time    WBC 5.7 06/22/2022 10:09 AM    HGB 12.7 06/22/2022 10:09 AM    HCT 38.0 06/22/2022 10:09 AM    PLATELET 055 (L) 02/07/3147 10:09 AM    MCV 88.4 06/22/2022 10:09 AM Lab Results   Component Value Date/Time    Microalbumin/Creat ratio (mg/g creat) 17 2022 10:09 AM    Microalbumin,urine random 0.88 2022 10:09 AM       HbA1c:  Lab Results   Component Value Date/Time    Hemoglobin A1c 7.2 (H) 2022 10:09 AM    Hemoglobin A1c (POC) 7.3 (A) 02/15/2022 02:06 PM     No components found for: 2     Last Point of Care HGB A1C  Hemoglobin A1c (POC)   Date Value Ref Range Status   02/15/2022 7.3 (A) 4.8 - 5.6 % Final        CrCl cannot be calculated (Unknown ideal weight. ). Medication reconciliation was completed during the visit. Medications Discontinued During This Encounter   Medication Reason    Roselee Ricardo FlexTouch U-100 100 unit/mL (3 mL) inpn     insulin aspart U-100 (NOVOLOG) 100 unit/mL (3 mL) inpn        Patient verbalized understanding of the information presented and all of the patients questions were answered. Patient advised to call the office with any additional questions or concerns. Notifications of recommendations will be sent to Dr. Amelia Corcoran DO for review.       Thank you for the consult,  Tavia Hou, PharmD, BCACP, [address] [facility] in place: Yes  Recommendation Provided To: Patient/Caregiver: 3 via Virtual Visit  Intervention Detail: Adherence Monitorin and Dose Adjustment: 1, reason: Therapy Optimization  Intervention Accepted By: Patient/Caregiver: 3  Time Spent (min): 30

## 2022-10-07 ENCOUNTER — ANESTHESIA (OUTPATIENT)
Dept: ENDOSCOPY | Age: 67
End: 2022-10-07
Payer: MEDICARE

## 2022-10-07 ENCOUNTER — HOSPITAL ENCOUNTER (OUTPATIENT)
Age: 67
Setting detail: OUTPATIENT SURGERY
Discharge: HOME | End: 2022-10-07
Attending: SPECIALIST | Admitting: SPECIALIST
Payer: MEDICARE

## 2022-10-07 VITALS
HEIGHT: 66 IN | SYSTOLIC BLOOD PRESSURE: 128 MMHG | HEART RATE: 61 BPM | BODY MASS INDEX: 29.17 KG/M2 | DIASTOLIC BLOOD PRESSURE: 50 MMHG | RESPIRATION RATE: 13 BRPM | WEIGHT: 181.5 LBS | TEMPERATURE: 97.2 F | OXYGEN SATURATION: 98 %

## 2022-10-07 LAB
GLUCOSE BLD STRIP.AUTO-MCNC: 85 MG/DL (ref 65–117)
SERVICE CMNT-IMP: NORMAL

## 2022-10-07 PROCEDURE — 77030019988 HC FCPS ENDOSC DISP BSC -B: Performed by: SPECIALIST

## 2022-10-07 PROCEDURE — 74011000250 HC RX REV CODE- 250: Performed by: ANESTHESIOLOGY

## 2022-10-07 PROCEDURE — 74011250636 HC RX REV CODE- 250/636: Performed by: SPECIALIST

## 2022-10-07 PROCEDURE — 76040000007: Performed by: SPECIALIST

## 2022-10-07 PROCEDURE — 82962 GLUCOSE BLOOD TEST: CPT

## 2022-10-07 PROCEDURE — 77030039825 HC MSK NSL PAP SUPERNO2VA VYRM -B: Performed by: ANESTHESIOLOGY

## 2022-10-07 PROCEDURE — 76060000032 HC ANESTHESIA 0.5 TO 1 HR: Performed by: SPECIALIST

## 2022-10-07 PROCEDURE — 2709999900 HC NON-CHARGEABLE SUPPLY: Performed by: SPECIALIST

## 2022-10-07 PROCEDURE — 77030018712 HC DEV BLLN INFL BSC -B: Performed by: SPECIALIST

## 2022-10-07 PROCEDURE — 74011250636 HC RX REV CODE- 250/636: Performed by: ANESTHESIOLOGY

## 2022-10-07 PROCEDURE — 88305 TISSUE EXAM BY PATHOLOGIST: CPT

## 2022-10-07 PROCEDURE — 88342 IMHCHEM/IMCYTCHM 1ST ANTB: CPT

## 2022-10-07 RX ORDER — SODIUM CHLORIDE 0.9 % (FLUSH) 0.9 %
5-40 SYRINGE (ML) INJECTION EVERY 8 HOURS
Status: DISCONTINUED | OUTPATIENT
Start: 2022-10-07 | End: 2022-10-07 | Stop reason: HOSPADM

## 2022-10-07 RX ORDER — SODIUM CHLORIDE 0.9 % (FLUSH) 0.9 %
5-40 SYRINGE (ML) INJECTION AS NEEDED
Status: DISCONTINUED | OUTPATIENT
Start: 2022-10-07 | End: 2022-10-07 | Stop reason: HOSPADM

## 2022-10-07 RX ORDER — DEXTROMETHORPHAN/PSEUDOEPHED 2.5-7.5/.8
1.2 DROPS ORAL
Status: DISCONTINUED | OUTPATIENT
Start: 2022-10-07 | End: 2022-10-07 | Stop reason: HOSPADM

## 2022-10-07 RX ORDER — PROPOFOL 10 MG/ML
INJECTION, EMULSION INTRAVENOUS AS NEEDED
Status: DISCONTINUED | OUTPATIENT
Start: 2022-10-07 | End: 2022-10-07 | Stop reason: HOSPADM

## 2022-10-07 RX ORDER — LIDOCAINE HYDROCHLORIDE 20 MG/ML
INJECTION, SOLUTION EPIDURAL; INFILTRATION; INTRACAUDAL; PERINEURAL AS NEEDED
Status: DISCONTINUED | OUTPATIENT
Start: 2022-10-07 | End: 2022-10-07 | Stop reason: HOSPADM

## 2022-10-07 RX ORDER — SODIUM CHLORIDE 9 MG/ML
50 INJECTION, SOLUTION INTRAVENOUS CONTINUOUS
Status: DISCONTINUED | OUTPATIENT
Start: 2022-10-07 | End: 2022-10-07 | Stop reason: HOSPADM

## 2022-10-07 RX ADMIN — SODIUM CHLORIDE 50 ML/HR: 9 INJECTION, SOLUTION INTRAVENOUS at 07:19

## 2022-10-07 RX ADMIN — PROPOFOL 250 MG: 10 INJECTION, EMULSION INTRAVENOUS at 07:52

## 2022-10-07 RX ADMIN — LIDOCAINE HYDROCHLORIDE 100 MG: 20 INJECTION, SOLUTION EPIDURAL; INFILTRATION; INTRACAUDAL; PERINEURAL at 07:38

## 2022-10-07 NOTE — ANESTHESIA PREPROCEDURE EVALUATION
Relevant Problems   NEUROLOGY   (+) Depression      CARDIOVASCULAR   (+) Essential hypertension      ENDOCRINE   (+) Acquired hypothyroidism   (+) Severe obesity (HCC)   (+) Type 2 diabetes with nephropathy (HCC)   (+) Uncontrolled type 2 diabetes mellitus with hyperglycemia, with long-term current use of insulin (HCC)       Anesthetic History   No history of anesthetic complications            Review of Systems / Medical History  Patient summary reviewed, nursing notes reviewed and pertinent labs reviewed    Pulmonary        Sleep apnea: CPAP           Neuro/Psych         Psychiatric history (depression)     Cardiovascular    Hypertension          Hyperlipidemia    Exercise tolerance: >4 METS  Comments: 2-2018 EKG: NSR      Cleared by PCP for these procedures   GI/Hepatic/Renal     GERD  Hepatitis: type B    PUD and liver disease    Comments: IBS  DIARRHEA  DYSPHAGIA Endo/Other    Diabetes: poorly controlled, type 2  Hypothyroidismhyperthyroidism  Obesity and arthritis     Other Findings            Physical Exam    Airway  Mallampati: III  TM Distance: > 6 cm  Neck ROM: normal range of motion   Mouth opening: Normal     Cardiovascular  Regular rate and rhythm,  S1 and S2 normal,  no murmur, click, rub, or gallop             Dental    Dentition: Caps/crowns and Bridges  Comments: Multiple missing teeth  Caps/crowns none in the front   Pulmonary  Breath sounds clear to auscultation               Abdominal  GI exam deferred       Other Findings            Anesthetic Plan    ASA: 3  Anesthesia type: total IV anesthesia          Induction: Intravenous  Anesthetic plan and risks discussed with: Patient

## 2022-10-07 NOTE — ANESTHESIA POSTPROCEDURE EVALUATION
Procedure(s):  ESOPHAGOGASTRODUODENOSCOPY (EGD)  ESOPHAGOGASTRODUODENAL (EGD) BIOPSY  ESOPHAGEAL DILATION. total IV anesthesia    Anesthesia Post Evaluation        Patient location during evaluation: PACU  Note status: Adequate. Level of consciousness: responsive to verbal stimuli and sleepy but conscious  Pain management: satisfactory to patient  Airway patency: patent  Anesthetic complications: no  Cardiovascular status: acceptable  Respiratory status: acceptable  Hydration status: acceptable  Comments: +Post-Anesthesia Evaluation and Assessment    Patient: Emilia Weathers MRN: [id number]  SSN: [id number]   YOB: 1955  Age: 69 y.o. Sex: female      Cardiovascular Function/Vital Signs    BP (!) 117/47   Pulse 61   Temp 36.4 °C (97.6 °F)   Resp 16   Ht 5' 5.5\" (1.664 m)   Wt 82.3 kg (181 lb 8 oz)   SpO2 99%   Breastfeeding No   BMI 29.74 kg/m²     Patient is status post Procedure(s):  ESOPHAGOGASTRODUODENOSCOPY (EGD)  ESOPHAGOGASTRODUODENAL (EGD) BIOPSY  ESOPHAGEAL DILATION. Nausea/Vomiting: Controlled. Postoperative hydration reviewed and adequate. Pain:  Pain Scale 1: Numeric (0 - 10) (10/07/22 0711)  Pain Intensity 1: 0 (10/07/22 0711)   Managed. Neurological Status: At baseline. Mental Status and Level of Consciousness: Arousable. Pulmonary Status:   O2 Device: CO2 nasal cannula (10/07/22 0755)   Adequate oxygenation and airway patent. Complications related to anesthesia: None    Post-anesthesia assessment completed. No concerns. Signed By: Darya Albright MD    10/7/2022  Post anesthesia nausea and vomiting:  controlled      INITIAL Post-op Vital signs:   Vitals Value Taken Time   /66 10/07/22 0806   Temp     Pulse 64 10/07/22 0809   Resp 12 10/07/22 0809   SpO2 98 % 10/07/22 0809   Vitals shown include unvalidated device data.

## 2022-10-07 NOTE — PROCEDURES
Esophagogastroduodenoscopy Procedure Note      Jefe Share  1955  [id number]    Indication:  Dysphagia      Endoscopist: Ronny Merchant MD    Referring Provider:  Daysi Reynolds DO    Sedation:  MAC anesthesia Propofol    Procedure Details:  After infomed consent was obtained for the procedure, with all risks and benefits of procedure explained the patient was taken to the endoscopy suite and placed in the left lateral decubitus position. Following sequential administration of sedation as per above, the endoscope was inserted into the mouth and advanced under direct vision to second portion of the duodenum. A careful inspection was made as the gastroscope was withdrawn, including a retroflexed view of the proximal stomach; findings and interventions are described below. Findings:     Esophagus:   + Subtle ridging in the mid esophagus without strictures s/p Bx to r/o EoE. S/P Empiric dilation with 54 FR Savary over wire.  + Irregular Z line located at 39 cm from incisors s/p Bx. Stomach:   - Minimal erythema in the antrum s/p Bx. Duodenum:   - The bulb and post bulbar mucosa is normal in appearance to the second portion. The duodenal folds appeared normal.  Cold forceps biopsies to r/o Celiac. Therapies:  see above    Specimen: Specimens were collected as described and send to the laboratory. Complications:   None were encountered during the procedure.     EBL: < 10 ml          Recommendations:   -F/U Path  -continue current medications    Ronny Merchant MD  10/7/2022  7:53 AM

## 2022-10-07 NOTE — PERIOP NOTES
Endoscopy Case End Note:    4135:  Procedure scope was pre-cleaned, per protocol, at bedside by LT READ.      0756:  Report received from anesthesia Randy Kilgore MD.  See anesthesia flowsheet for intra-procedure vital signs and events.

## 2022-10-07 NOTE — H&P
Gastroenterology Outpatient History and Physical    Patient: Gisella Rutherford    Physician: Bri Merino MD    Vital Signs: Blood pressure (!) 139/52, pulse 67, temperature 97.6 °F (36.4 °C), resp. rate 15, height 5' 5.5\" (1.664 m), weight 82.3 kg (181 lb 8 oz), SpO2 98 %, not currently breastfeeding. Allergies:    Allergies   Allergen Reactions    Celebrex [Celecoxib] Other (comments)     Hallucinations    Codeine Nausea and Vomiting    Erythromycin Nausea and Vomiting       Chief Complaint: Epigastric pain    History of Present Illness: above    Justification for Procedure: above    History:  Past Medical History:   Diagnosis Date    Arthritis     Depression     Diabetes ([address]     Dysphagia     GERD (gastroesophageal reflux disease) 07/05/2019    Hypercholesteremia     Hypertension     Hyperthyroidism     IBS (irritable bowel syndrome)     Liver disease     hepatitis b    Nausea & vomiting     PUD (peptic ulcer disease)     bleeding ulcer    Sleep apnea     CPAP    Urinary incontinence       Past Surgical History:   Procedure Laterality Date    HX APPENDECTOMY  1999    HX BACK SURGERY      x3    HX BLEPHAROPLASTY Right     HX BUNIONECTOMY      HX CHOLECYSTECTOMY  06/21/2021    HX COLONOSCOPY  2018    HX ENDOSCOPY  July 5 2019    HX HYSTERECTOMY      HX OTHER SURGICAL      Collapsed lung    HX UROLOGICAL  2019    HX WISDOM TEETH EXTRACTION      AK BREAST SURGERY PROCEDURE UNLISTED  09/18      Social History     Socioeconomic History    Marital status: SINGLE   Tobacco Use    Smoking status: Never    Smokeless tobacco: Never   Vaping Use    Vaping Use: Never used   Substance and Sexual Activity    Alcohol use: Not Currently     Alcohol/week: 0.0 standard drinks     Comment: occasionally    Drug use: Never    Sexual activity: Not Currently      Family History   Problem Relation Age of Onset    Diabetes Mother         passed    Heart Disease Mother         passed    Hypertension Mother         passed    Cancer Father         passed Colon    Alcohol abuse Father     Diabetes Sister     Anxiety Sister     Diabetes Brother     Anxiety Brother     Diabetes Brother     Anxiety Brother     Diabetes Sister     Anxiety Sister     Diabetes Sister     Anxiety Sister     Cancer Sister         lung and brain    Anxiety Sister     Anxiety Sister     Breast Cancer Paternal Aunt         under 48    Cancer Paternal Aunt         Breast    Cancer Sister         passed Brain. Lung and bone    Diabetes Sister         passed    Diabetes Brother         passed       Medications:   Prior to Admission medications    Medication Sig Start Date End Date Taking? Authorizing Provider   Miladys Sharma FlexTouch U-100 100 unit/mL (3 mL) inpn 32 Units by SubCUTAneous route daily. Indications: type 2 diabetes mellitus 10/6/22  Yes Soham Cameron III, DO   insulin aspart U-100 (NOVOLOG) 100 unit/mL (3 mL) inpn 6-8 Units by SubCUTAneous route Before breakfast, lunch, and dinner. 10/6/22  Yes Soham Cameron III, DO   ALPRAZolam (XANAX) 0.25 mg tablet TAKE 1 TABLET BY MOUTH  TWICE DAILY AS NEEDED FOR  ANXIETY 9/23/22  Yes Soham Cameron III, DO   pravastatin (PRAVACHOL) 20 mg tablet Take 1 Tablet by mouth nightly. 9/16/22  Yes Soham Cameron III, DO   amitriptyline (ELAVIL) 10 mg tablet Take 1 Tablet by mouth nightly. 8/26/22  Yes Soham Cameron III, DO   levothyroxine (SYNTHROID) 300 mcg tablet TAKE 1 TABLET BY MOUTH ONCE DAILY BEFORE BREAKFAST 6 DAYS A WEEK AND TAKE 150 MCG ONE DAY A WEEK 8/26/22  Yes Soham Cameron III, DO   lisinopriL (PRINIVIL, ZESTRIL) 5 mg tablet TAKE 1 TABLET BY MOUTH  DAILY 8/26/22  Yes Soham Cameron III, DO   furosemide (LASIX) 20 mg tablet TAKE 1 TABLET BY MOUTH  DAILY AS NEEDED FOR EDEMA 8/26/22  Yes Soham Cameron III, DO   Linzess 145 mcg cap capsule Take 145 mcg by mouth daily.  5/25/22  Yes Provider, Historical   Omeprazole delayed release (PRILOSEC D/R) 20 mg tablet Take 20 mg by mouth daily. Yes Provider, Historical   empagliflozin-metformin (Synjardy XR) 25-1,000 mg TBph Take 1 Tablet by mouth daily. To start this once patient completes Jardiance and metformin monotherapy   Indications: type 2 diabetes mellitus   Yes Provider, Historical   vilazodone (VIIBRYD) 20 mg tab tablet Take 1 Tablet by mouth daily. 2/28/22  Yes Soham Cameron III, DO   famotidine (PEPCID) 40 mg tablet Take 40 mg by mouth two (2) times a day. Yes Serina Jaimes MD   acetaminophen (TYLENOL) 500 mg tablet Take 1 Tab by mouth every six (6) hours as needed for Pain. 1/3/19  Yes Anuel Escobar NP   diclofenac EC (VOLTAREN) 75 mg EC tablet Take 1 Tablet by mouth two (2) times a day. 8/26/22   Anthony Moreno, DO   nystatin (MYCOSTATIN) powder Apply  to affected area four (4) times daily. 1/4/22   Sury Rivera, DO   FreeStyle Julien 2 Sensor kit 1 Each by Other route See Salvatore Shaw. Use to scan sensor three times daily 10/16/20   Provider, Historical   NOVOFINE PLUS 32 gauge x 1/6\" ndle Check sugars 4x daily. 12/20/19   Sury Rivera,        Physical Exam:   General: alert, no distress   HEENT: Head: Normocephalic, no lesions, without obvious abnormality.    Heart: regular rate and rhythm, S1, S2 normal, no murmur, click, rub or gallop   Lungs: chest clear, no wheezing, rales, normal symmetric air entry   Abdominal: soft, NT/ND+ BS   Neurological: Grossly normal   Extremities: extremities normal, atraumatic, no cyanosis or edema     Findings/Diagnosis: Dysphagia    Plan of Care/Planned Procedure: EGD

## 2022-10-07 NOTE — PERIOP NOTES
Patient stated, \"I was confused on Tuesday. \" Dr. Arya Espinosa, Dr. Marissa Coats RN Charge nurse aware.

## 2022-10-07 NOTE — ROUTINE PROCESS
Pritesh Heather  1955  [id number]    Situation:  Verbal report received from: Maria Eugenia  Procedure: Procedure(s):  ESOPHAGOGASTRODUODENOSCOPY (EGD)  ESOPHAGOGASTRODUODENAL (EGD) BIOPSY  ESOPHAGEAL DILATION    Background:    Preoperative diagnosis: Esophageal dysphagia [R13.19]  Irritable bowel syndrome with constipation [K58.1]  Pelvic floor dysfunction in female [M62.89]  Outlet dysfunction constipation [K59.02]  Postoperative diagnosis: Dysphagia, esophagitis    : Elie  Assistant(s): Endoscopy Technician-1: Linda Caal  Endoscopy RN-1: Verena Vang RN    Specimens:   ID Type Source Tests Collected by Time Destination   1 : Duodenal biopsy Preservative   David Rowan MD 10/7/2022 3594 Pathology   2 : Stomach biopsy Preservative Gastric  David Rowan MD 10/7/2022 9794 Pathology   3 : GE Junction biopsy Preservative   David Rowan MD 10/7/2022 1929 Pathology   4 : Mid esophagus biopsy Preservative Esophagus, Mid  David Rowan MD 10/7/2022 6052 Pathology     H. Pylori  no      Anesthesia gave intra-procedure sedation and medications, see anesthesia flow sheet yes    Intravenous fluids: NS@ KVO     Vital signs stable yes    Abdominal assessment: round and soft yes    Recommendation:

## 2022-10-07 NOTE — DISCHARGE INSTRUCTIONS
Deanna Cruz  [id number]  1955    EGD DISCHARGE INSTRUCTIONS  Discomfort:  Sore throat- throat lozenges or warm salt water gargle  redness at IV site- apply warm compress to area; if redness or soreness persist- contact your physician  Gaseous discomfort- walking, belching will help relieve any discomfort  You may not operate a vehicle for 12 hours  You may not engage in an occupation involving machinery or appliances for rest of today. You may not drink alcoholic beverages for at least 12 hours  Avoid making any critical decisions for at least 24 hour  DIET  You may resume your regular diet - however -  remember your colon is empty and a heavy meal will produce gas. Avoid these foods:  vegetables, fried / greasy foods, carbonated drinks  MEDICATIONS        Regarding Aspirin or Nonsteroidal medications specifically, please see below. ACTIVITY  You may resume your normal daily activities. Spend the remainder of the day resting -  avoid any strenuous activity. CALL M.D. ANY SIGN OF   Increasing pain, nausea, vomiting  Abdominal distension (swelling)  New increased bleeding (oral or rectal)  Fever (chills)  Pain in chest area  Bloody discharge from nose or mouth  Shortness of breath  Tylenol as needed for pain. Follow-up Instructions:   Call Dr. Dixie Barba for results of procedure / biopsy in 4-5 days at telephone #  [phone]              Make a follow up visit with Dr. Dixie Barba' office.     Patient Education on Sedation / Analgesia Administered for Procedure      For 24 hours after general anesthesia or intravenous analgesia / sedation:  Have someone responsible help you with your care  Limit your activities  Do not drive and operate hazardous machinery  Do not make important personal, legal or business decisions  Do not drink alcoholic beverages  If you have not urinated within 8 hours after discharge, please contact your physician  Resume your medications unless otherwise instructed    For 24 hours after general anesthesia or intravenous analgesia / sedation  you may experience:  Drowsiness, dizziness, sleepiness, or confusion  Difficulty remembering or delayed reaction times  Difficulty with your balance, especially while walking, move slowly and carefully, do not make sudden position changes  Difficulty focusing or blurred vision    You may not be aware of slight changes in your behavior and/or your reaction time because of the medication used during and after your procedure. Report the following to your physician:  Excessive pain, swelling, redness or odor of or around the surgical area  Temperature over 100.5  Nausea and vomiting lasting longer than 4 hours or if unable to take medications  Any signs of decreased circulation or nerve impairment to extremity: change in color, persistent numbness, tingling, coldness or increase pain  Any questions or concerns    IF YOU REPORT TO AN EMERGENCY ROOM, DOCTOR'S OFFICE OR HOSPITAL WITHIN 24 HOURS AFTER YOUR PROCEDURE, BRING THIS SHEET AND YOUR AFTER VISIT SUMMARY WITH YOU AND GIVE IT TO THE PHYSICIAN OR NURSE ATTENDING YOU. Esophagitis: Care Instructions  Your Care Instructions     Esophagitis (say \"ih-sof-uh-JY-tus\") is irritation of the esophagus, the tube that carries food from your throat to your stomach. Acid reflux is the most common cause of this condition. When you have reflux, stomach acid and juices flow upward. This can cause pain or a burning feeling in your chest. You may have a sore throat. It may be hard to swallow. Other causes of this condition include some medicines and supplements. Allergies or an infection can also cause it. Your doctor will ask about your symptoms and past health. He or she might do tests to find the cause of your symptoms. Treatment depends on what is causing the problem. Treatment might include changing your diet or taking medicine to relieve your symptoms.  It might also include changing a medicine that is causing your symptoms. If you have reflux, medicine that reduces the stomach acid helps your body heal. It might take 1 to 3 weeks to heal.  Follow-up care is a key part of your treatment and safety. Be sure to make and go to all appointments, and call your doctor if you are having problems. It's also a good idea to know your test results and keep a list of the medicines you take. How can you care for yourself at home? If you have acid reflux, your doctor may recommend that you:  Eat several small meals instead of two or three large meals. After you eat, wait 2 to 3 hours before you lie down. Avoid chocolate, mint, alcohol, and spicy foods. Don't smoke or use smokeless tobacco. Smoking can make this condition worse. If you need help quitting, talk to your doctor about stop-smoking programs and medicines. These can increase your chances of quitting for good. Raise the head of your bed 6 to 8 inches if you have symptoms at night. Lose weight if you are overweight. Take an over-the-counter antacid, such as Maalox, Mylanta, or Tums. Be careful when you take over-the-counter antacid medicines. Many of these medicines have aspirin in them. Read the label to make sure that you are not taking more than the recommended dose. Too much aspirin can be harmful. Take stronger acid reducers. Examples are famotidine (such as Pepcid) and omeprazole (such as Prilosec). If your condition is caused by infection, allergy, or other problems, use the medicine or treatments that your doctor recommends. Be safe with medicines. Take your medicines exactly as prescribed. Call your doctor if you think you are having a problem with your medicine. When should you call for help? Call your doctor now or seek immediate medical care if:    You have new or worse belly pain. You are vomiting. Watch closely for changes in your health, and be sure to contact your doctor if:    You have new or worse symptoms of reflux.      You have trouble or pain swallowing. You are losing weight. You do not get better as expected. Where can you learn more? Go to http://cira-corona.info/  Enter N977 in the search box to learn more about \"Esophagitis: Care Instructions. \"  Current as of: September 8, 2021               Content Version: 13.2  © 2399-8564 [employer]. Care instructions adapted under license by [employer] (which disclaims liability or warranty for this information). If you have questions about a medical condition or this instruction, always ask your healthcare professional. [place] [address] any warranty or liability for your use of this information.

## 2022-10-07 NOTE — PERIOP NOTES
0720: The risks and benefits of the bite block have been explained to patient. Patient verbalizes understanding.

## 2022-10-10 ENCOUNTER — OFFICE VISIT (OUTPATIENT)
Dept: INTERNAL MEDICINE CLINIC | Age: 67
End: 2022-10-10
Payer: MEDICARE

## 2022-10-10 VITALS
HEART RATE: 73 BPM | TEMPERATURE: 98 F | RESPIRATION RATE: 14 BRPM | BODY MASS INDEX: 30.05 KG/M2 | WEIGHT: 187 LBS | HEIGHT: 66 IN | OXYGEN SATURATION: 98 % | DIASTOLIC BLOOD PRESSURE: 65 MMHG | SYSTOLIC BLOOD PRESSURE: 132 MMHG

## 2022-10-10 DIAGNOSIS — F32.A DEPRESSION, UNSPECIFIED DEPRESSION TYPE: ICD-10-CM

## 2022-10-10 DIAGNOSIS — M81.0 AGE-RELATED OSTEOPOROSIS WITHOUT CURRENT PATHOLOGICAL FRACTURE: ICD-10-CM

## 2022-10-10 DIAGNOSIS — F41.1 GAD (GENERALIZED ANXIETY DISORDER): Primary | ICD-10-CM

## 2022-10-10 DIAGNOSIS — Z79.4 UNCONTROLLED TYPE 2 DIABETES MELLITUS WITH HYPERGLYCEMIA, WITH LONG-TERM CURRENT USE OF INSULIN (HCC): ICD-10-CM

## 2022-10-10 DIAGNOSIS — E11.65 UNCONTROLLED TYPE 2 DIABETES MELLITUS WITH HYPERGLYCEMIA, WITH LONG-TERM CURRENT USE OF INSULIN (HCC): ICD-10-CM

## 2022-10-10 DIAGNOSIS — M79.7 FIBROMYALGIA SYNDROME: ICD-10-CM

## 2022-10-10 DIAGNOSIS — N32.81 OAB (OVERACTIVE BLADDER): ICD-10-CM

## 2022-10-10 PROCEDURE — G8752 SYS BP LESS 140: HCPCS | Performed by: INTERNAL MEDICINE

## 2022-10-10 PROCEDURE — G8536 NO DOC ELDER MAL SCRN: HCPCS | Performed by: INTERNAL MEDICINE

## 2022-10-10 PROCEDURE — 99214 OFFICE O/P EST MOD 30 MIN: CPT | Performed by: INTERNAL MEDICINE

## 2022-10-10 PROCEDURE — G9717 DOC PT DX DEP/BP F/U NT REQ: HCPCS | Performed by: INTERNAL MEDICINE

## 2022-10-10 PROCEDURE — G8427 DOCREV CUR MEDS BY ELIG CLIN: HCPCS | Performed by: INTERNAL MEDICINE

## 2022-10-10 PROCEDURE — G8417 CALC BMI ABV UP PARAM F/U: HCPCS | Performed by: INTERNAL MEDICINE

## 2022-10-10 PROCEDURE — 3017F COLORECTAL CA SCREEN DOC REV: CPT | Performed by: INTERNAL MEDICINE

## 2022-10-10 PROCEDURE — 1101F PT FALLS ASSESS-DOCD LE1/YR: CPT | Performed by: INTERNAL MEDICINE

## 2022-10-10 PROCEDURE — G8754 DIAS BP LESS 90: HCPCS | Performed by: INTERNAL MEDICINE

## 2022-10-10 PROCEDURE — 1090F PRES/ABSN URINE INCON ASSESS: CPT | Performed by: INTERNAL MEDICINE

## 2022-10-10 PROCEDURE — G9899 SCRN MAM PERF RSLTS DOC: HCPCS | Performed by: INTERNAL MEDICINE

## 2022-10-10 PROCEDURE — 2022F DILAT RTA XM EVC RTNOPTHY: CPT | Performed by: INTERNAL MEDICINE

## 2022-10-10 RX ORDER — NYSTATIN AND TRIAMCINOLONE ACETONIDE 100000; 1 [USP'U]/G; MG/G
OINTMENT TOPICAL
COMMUNITY
Start: 2022-07-06

## 2022-10-10 RX ORDER — BUSPIRONE HYDROCHLORIDE 5 MG/1
5 TABLET ORAL 2 TIMES DAILY
Qty: 60 TABLET | Refills: 5 | Status: SHIPPED | OUTPATIENT
Start: 2022-10-10

## 2022-10-10 NOTE — PROGRESS NOTES
1. \"Have you been to the ER, urgent care clinic since your last visit? Hospitalized since your last visit? \" No    2. \"Have you seen or consulted any other health care providers outside of the [address] [place] [address] since your last visit? \" Yes endoscopy on Friday 10/07/2022      3. For patients aged 39-70: Has the patient had a colonoscopy / FIT/ Cologuard? Yes - no Care Gap present      If the patient is female:    4. For patients aged 41-77: Has the patient had a mammogram within the past 2 years? Yes - no Care Gap present      5. For patients aged 21-65: Has the patient had a pap smear?  NA - based on age or sex

## 2022-10-10 NOTE — PROGRESS NOTES
Eun Peres is a 69 y.o. female who presents for evaluation of major anxiety and worsening depression. Last seen by me June 22, 2022 in [facility]. Her mental health has struggled since then, due to numerous factors. Her oab is worse again. She had a botox injection about 4 months ago, and it worked for about 6 weeks, and then she lost control of her bladder again, and has had numerous utis again. Has appt with urology again later this week. She is also having increased anxiety with her long time BF, guzman hoffman, who is also my patient. His memory is worsening, and he already has very poor eye sight and is severely hard of hearing. They are constantly arguing with each other, and that has put her in a bad mood as well.       ROS:  Constitutional: negative for fevers, chills, anorexia and weight loss  Eyes:   negative for visual disturbance and irritation  ENT:   negative for tinnitus,sore throat,nasal congestion,ear pain,hoarseness  Respiratory:  negative for cough, hemoptysis, dyspnea,wheezing  CV:   negative for chest pain, palpitations, lower extremity edema  GI:   negative for nausea, vomiting, diarrhea, abdominal pain,melena  Genitourinary: negative for frequency, dysuria and hematuria  Musculoskel: negative for myalgias, arthralgias, back pain, muscle weakness, joint pain  Neurological:  negative for headaches, dizziness, focal weakness, numbness  Psychiatric:     ++ for depression or anxiety      Past Medical History:   Diagnosis Date    Arthritis     Depression     Diabetes ([facility] [address]     Dysphagia     GERD (gastroesophageal reflux disease) 07/05/2019    Hypercholesteremia     Hypertension     Hyperthyroidism     IBS (irritable bowel syndrome)     Liver disease     hepatitis b    Nausea & vomiting     PUD (peptic ulcer disease)     bleeding ulcer    Sleep apnea     CPAP    Urinary incontinence        Past Surgical History:   Procedure Laterality Date    HX APPENDECTOMY  1999    HX BACK SURGERY      x3    HX BLEPHAROPLASTY Right     HX BUNIONECTOMY      HX CHOLECYSTECTOMY  06/21/2021    HX COLONOSCOPY  2018    HX ENDOSCOPY  July 5 2019    HX HYSTERECTOMY      HX OTHER SURGICAL      Collapsed lung    HX UROLOGICAL  2019    HX WISDOM TEETH EXTRACTION      NC BREAST SURGERY PROCEDURE UNLISTED  09/18       Family History   Problem Relation Age of Onset    Diabetes Mother         passed    Heart Disease Mother         passed    Hypertension Mother         passed    Cancer Father         passed Colon    Alcohol abuse Father     Diabetes Sister     Anxiety Sister     Diabetes Brother     Anxiety Brother     Diabetes Brother     Anxiety Brother     Diabetes Sister     Anxiety Sister     Diabetes Sister     Anxiety Sister     Cancer Sister         lung and brain    Anxiety Sister     Anxiety Sister     Breast Cancer Paternal Aunt         under 48    Cancer Paternal Aunt         Breast    Cancer Sister         passed Brain.   Lung and bone    Diabetes Sister         passed    Diabetes Brother         passed       Social History     Socioeconomic History    Marital status: SINGLE     Spouse name: Not on file    Number of children: Not on file    Years of education: Not on file    Highest education level: Not on file   Occupational History    Not on file   Tobacco Use    Smoking status: Never    Smokeless tobacco: Never   Vaping Use    Vaping Use: Never used   Substance and Sexual Activity    Alcohol use: Not Currently     Alcohol/week: 0.0 standard drinks     Comment: occasionally    Drug use: Never    Sexual activity: Not Currently   Other Topics Concern    Not on file   Social History Narrative    Not on file     Social Determinants of Health     Financial Resource Strain: Low Risk     Difficulty of Paying Living Expenses: Not very hard   Food Insecurity: No Food Insecurity    Worried About Running Out of Food in the Last Year: Never true    Ran Out of Food in the Last Year: Never true   Transportation Needs: Not on file Physical Activity: Not on file   Stress: Not on file   Social Connections: Not on file   Intimate Partner Violence: Not on file   Housing Stability: Not on file          Visit Vitals  /65 (BP 1 Location: Left upper arm, BP Patient Position: Sitting)   Pulse 73   Temp 98 °F (36.7 °C) (Temporal)   Resp 14   Ht 5' 5.5\" (1.664 m)   Wt 187 lb (84.8 kg)   SpO2 98%   BMI 30.65 kg/m²       Physical Examination:   General - Well appearing female. Easily tearful  HEENT - PERRL, TM no erythema/opacification, normal nasal turbinates, no oropharyngeal erythema or exudate, MMM  Neck - supple, no bruits, no thyroidomegaly, no lymphadenopathy  Pulm - clear to auscultation bilaterally  Cardio - RRR, normal S1 S2, no murmur  Abd - soft, nontender, no masses, no HSM  Extrem - no edema, +2 distal pulses  Neuro-  No focal deficits, CN intact     Assessment/Plan:     Anxiety, near panic attacks--rx to start buspar. Start 5 mg qam.  May increase to bid, or to 10 mg qam in 2 weeks if needed. Continue xanax at bedtime as needed. Major depression--continue viibryd. Referral to counselor, [facility]  Recurrent uti, oab--has appt with urology again uncoming. Hopeful that repeat botox injection will last longer  Dm, tpye 2--continue synjardy, tresiba, meal time aspart.   Last a1c donw to 7.2  Fibromyalgia--continue elavil  Hypothyroid--on synthroid  Francisco--continue cpap  Ibs-constipation--continue linzess  Hyperlipids--on pravachol  Osteoporosis--set to start prolia on friday    Rtc 2 months        Justin Whyte III, DO

## 2022-10-10 NOTE — PATIENT INSTRUCTIONS
To start, take buspar just once daily,every morning. After 2 weeks, if you need to, you can either take a 2nd pill in the morning, or add a 2nd pill in the afternoon.

## 2022-10-14 ENCOUNTER — HOSPITAL ENCOUNTER (OUTPATIENT)
Dept: INFUSION THERAPY | Age: 67
Discharge: HOME | End: 2022-10-14
Payer: MEDICARE

## 2022-10-14 VITALS
RESPIRATION RATE: 18 BRPM | OXYGEN SATURATION: 91 % | WEIGHT: 184.6 LBS | SYSTOLIC BLOOD PRESSURE: 124 MMHG | DIASTOLIC BLOOD PRESSURE: 72 MMHG | HEART RATE: 82 BPM | BODY MASS INDEX: 29.67 KG/M2 | HEIGHT: 66 IN | TEMPERATURE: 97.9 F

## 2022-10-14 LAB
ANION GAP BLD CALC-SCNC: 10 MMOL/L (ref 10–20)
CA-I BLD-MCNC: 1.16 MMOL/L (ref 1.12–1.32)
CHLORIDE BLD-SCNC: 106 MMOL/L (ref 98–107)
CO2 BLD-SCNC: 26.4 MMOL/L (ref 21–32)
CREAT BLD-MCNC: 1.11 MG/DL (ref 0.6–1.3)
GLUCOSE BLD-MCNC: 215 MG/DL (ref 65–100)
MAGNESIUM SERPL-MCNC: 2.2 MG/DL (ref 1.6–2.4)
PHOSPHATE SERPL-MCNC: 3.3 MG/DL (ref 2.6–4.7)
POTASSIUM BLD-SCNC: 3.6 MMOL/L (ref 3.5–5.1)
SERVICE CMNT-IMP: ABNORMAL
SODIUM BLD-SCNC: 141 MMOL/L (ref 136–145)

## 2022-10-14 PROCEDURE — 84100 ASSAY OF PHOSPHORUS: CPT

## 2022-10-14 PROCEDURE — 80047 BASIC METABLC PNL IONIZED CA: CPT

## 2022-10-14 PROCEDURE — 96372 THER/PROPH/DIAG INJ SC/IM: CPT

## 2022-10-14 PROCEDURE — 74011250636 HC RX REV CODE- 250/636: Performed by: INTERNAL MEDICINE

## 2022-10-14 PROCEDURE — 83735 ASSAY OF MAGNESIUM: CPT

## 2022-10-14 PROCEDURE — 36415 COLL VENOUS BLD VENIPUNCTURE: CPT

## 2022-10-14 RX ADMIN — DENOSUMAB 60 MG: 60 INJECTION SUBCUTANEOUS at 11:03

## 2022-10-14 NOTE — PROGRESS NOTES
[facility] Visit Note:  Arrived - 1026  Labs obtained: Chem8, Mag, Phos     Visit Vitals  Visit Vitals  /72 (BP 1 Location: Left upper arm, BP Patient Position: Sitting)   Pulse 82   Temp 97.9 °F (36.6 °C)   Resp 18   Ht 5' 5.5\" (1.664 m)   Wt 83.7 kg (184 lb 9.6 oz)   SpO2 91%   Breastfeeding No   BMI 30.25 kg/m²     Labs:  Recent Results (from the past 12 hour(s))   POC CHEM8    Collection Time: 10/14/22 10:37 AM   Result Value Ref Range    Calcium, ionized (POC) 1.16 1.12 - 1.32 mmol/L    Sodium (POC) 141 136 - 145 mmol/L    Potassium (POC) 3.6 3.5 - 5.1 mmol/L    Chloride (POC) 106 98 - 107 mmol/L    CO2 (POC) 26.4 21 - 32 mmol/L    Anion gap (POC) 10 10 - 20 mmol/L    Glucose (POC) 215 (H) 65 - 100 mg/dL    Creatinine (POC) 1.11 0.6 - 1.3 mg/dL    eGFR (POC) 54 (L) >60 ml/min/1.73m2    Comment Comment Not Indicated. See [facility] for additional pending labs. Medications:  Medications Administered       denosumab (PROLIA) injection 60 mg       Admin Date  10/14/2022 Action  Given Dose  60 mg Route  SubCUTAneous Administered By  Shell ELAINE               Assessment completed. Reviewed Prolia info. Pt denies any recent or upcoming dental work or dental pain. Prolia 60mg SQ slowly in Left arm.     1105 - Tolerated well. No reaction noted. Reviewed Prolia D/C instructions. Verbalized understanding. Pt denies any acute problems/changes. Discharged from [facility] ambulatory. No distress.  Next appt: 4/14/23 at 1030

## 2022-10-28 ENCOUNTER — TELEPHONE (OUTPATIENT)
Dept: INTERNAL MEDICINE CLINIC | Age: 67
End: 2022-10-28

## 2022-10-28 ENCOUNTER — VIRTUAL VISIT (OUTPATIENT)
Dept: SOCIAL WORK | Age: 67
End: 2022-10-28
Payer: MEDICARE

## 2022-10-28 DIAGNOSIS — F43.23 ADJUSTMENT DISORDER WITH MIXED ANXIETY AND DEPRESSED MOOD: Primary | ICD-10-CM

## 2022-10-28 PROCEDURE — 90791 PSYCH DIAGNOSTIC EVALUATION: CPT | Performed by: SOCIAL WORKER

## 2022-10-28 PROCEDURE — G8427 DOCREV CUR MEDS BY ELIG CLIN: HCPCS | Performed by: SOCIAL WORKER

## 2022-10-28 PROCEDURE — G9717 DOC PT DX DEP/BP F/U NT REQ: HCPCS | Performed by: SOCIAL WORKER
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
## 2022-10-28 NOTE — TELEPHONE ENCOUNTER
Called, spoke to pt  Received two pt identifiers  Informed pt PAP of Jared Rodriguez is ready for .   Pt states will come into the office to pick it up from the office  Awaiting   Closing

## 2022-10-29 ENCOUNTER — PATIENT MESSAGE (OUTPATIENT)
Dept: INTERNAL MEDICINE CLINIC | Age: 67
End: 2022-10-29

## 2022-10-31 NOTE — TELEPHONE ENCOUNTER
Called, no answer left message to call office back. From: Evelyn Lowery DO   Sent: 10/31/2022   2:43 PM EDT   To: Joe Wheat  Subject: RE: new med                                       Plenty of confusion here. I have her still taking viibryd, iris was uncertain. Is she still taking that medicine?

## 2022-11-02 NOTE — TELEPHONE ENCOUNTER
Called, spoke with Pt. Two pt identifiers confirmed.     Patient states she is taking Viibryd and Buspar    Patient notified that Peter Erichsen is the antidepressant he wants her on and response from Dr. Mahsa Rogel understanding

## 2022-11-08 ENCOUNTER — OFFICE VISIT (OUTPATIENT)
Dept: URGENT CARE | Age: 67
End: 2022-11-08
Payer: MEDICARE

## 2022-11-08 VITALS
TEMPERATURE: 97.7 F | SYSTOLIC BLOOD PRESSURE: 118 MMHG | HEIGHT: 65 IN | DIASTOLIC BLOOD PRESSURE: 65 MMHG | RESPIRATION RATE: 20 BRPM | WEIGHT: 182 LBS | OXYGEN SATURATION: 98 % | HEART RATE: 82 BPM | BODY MASS INDEX: 30.32 KG/M2

## 2022-11-08 DIAGNOSIS — J02.9 SORE THROAT: ICD-10-CM

## 2022-11-08 DIAGNOSIS — R05.1 ACUTE COUGH: Primary | ICD-10-CM

## 2022-11-08 DIAGNOSIS — Z11.59 SCREENING FOR VIRAL DISEASE: ICD-10-CM

## 2022-11-08 LAB
FLUAV+FLUBV AG NOSE QL IA.RAPID: NEGATIVE
FLUAV+FLUBV AG NOSE QL IA.RAPID: NEGATIVE
S PYO AG THROAT QL: NEGATIVE
SARS-COV-2 PCR, POC: NEGATIVE
SARS-COV-2 POC: NEGATIVE
VALID INTERNAL CONTROL?: YES
VALID INTERNAL CONTROL?: YES

## 2022-11-08 PROCEDURE — 87804 INFLUENZA ASSAY W/OPTIC: CPT | Performed by: FAMILY MEDICINE

## 2022-11-08 PROCEDURE — G9717 DOC PT DX DEP/BP F/U NT REQ: HCPCS | Performed by: FAMILY MEDICINE

## 2022-11-08 PROCEDURE — 1090F PRES/ABSN URINE INCON ASSESS: CPT | Performed by: FAMILY MEDICINE

## 2022-11-08 PROCEDURE — 3017F COLORECTAL CA SCREEN DOC REV: CPT | Performed by: FAMILY MEDICINE

## 2022-11-08 PROCEDURE — G8536 NO DOC ELDER MAL SCRN: HCPCS | Performed by: FAMILY MEDICINE

## 2022-11-08 PROCEDURE — G9899 SCRN MAM PERF RSLTS DOC: HCPCS | Performed by: FAMILY MEDICINE

## 2022-11-08 PROCEDURE — G8417 CALC BMI ABV UP PARAM F/U: HCPCS | Performed by: FAMILY MEDICINE

## 2022-11-08 PROCEDURE — G8752 SYS BP LESS 140: HCPCS | Performed by: FAMILY MEDICINE

## 2022-11-08 PROCEDURE — G8427 DOCREV CUR MEDS BY ELIG CLIN: HCPCS | Performed by: FAMILY MEDICINE

## 2022-11-08 PROCEDURE — 87635 SARS-COV-2 COVID-19 AMP PRB: CPT | Performed by: FAMILY MEDICINE

## 2022-11-08 PROCEDURE — G8754 DIAS BP LESS 90: HCPCS | Performed by: FAMILY MEDICINE

## 2022-11-08 PROCEDURE — 3078F DIAST BP <80 MM HG: CPT | Performed by: FAMILY MEDICINE

## 2022-11-08 PROCEDURE — 1123F ACP DISCUSS/DSCN MKR DOCD: CPT | Performed by: FAMILY MEDICINE

## 2022-11-08 PROCEDURE — 99213 OFFICE O/P EST LOW 20 MIN: CPT | Performed by: FAMILY MEDICINE

## 2022-11-08 PROCEDURE — 87880 STREP A ASSAY W/OPTIC: CPT | Performed by: FAMILY MEDICINE

## 2022-11-08 PROCEDURE — 1101F PT FALLS ASSESS-DOCD LE1/YR: CPT | Performed by: FAMILY MEDICINE

## 2022-11-08 PROCEDURE — 3074F SYST BP LT 130 MM HG: CPT | Performed by: FAMILY MEDICINE

## 2022-11-08 NOTE — PROGRESS NOTES
Zenia Caldwell is a 69 y.o. female who presents with cough, ST, nasal congestion x 2 days. Denies fever, SOB, N/V/D. Has taken OTC tylenol without relief. The history is provided by the patient. Past Medical History:   Diagnosis Date    Arthritis     Depression     Diabetes (HCC)     Dysphagia     GERD (gastroesophageal reflux disease) 07/05/2019    Hypercholesteremia     Hypertension     Hyperthyroidism     IBS (irritable bowel syndrome)     Liver disease     hepatitis b    Nausea & vomiting     PUD (peptic ulcer disease)     bleeding ulcer    Sleep apnea     CPAP    Urinary incontinence         Past Surgical History:   Procedure Laterality Date    HX APPENDECTOMY  1999    HX BACK SURGERY      x3    HX BLEPHAROPLASTY Right     HX BUNIONECTOMY      HX CHOLECYSTECTOMY  06/21/2021    HX COLONOSCOPY  2018    HX ENDOSCOPY  July 5 2019    HX HYSTERECTOMY      HX OTHER SURGICAL      Collapsed lung    HX UROLOGICAL  2019    HX WISDOM TEETH EXTRACTION      DE BREAST SURGERY PROCEDURE UNLISTED  09/18         Family History   Problem Relation Age of Onset    Diabetes Mother         passed    Heart Disease Mother         passed    Hypertension Mother         passed    Cancer Father         passed Colon    Alcohol abuse Father     Diabetes Sister     Anxiety Sister     Diabetes Brother     Anxiety Brother     Diabetes Brother     Anxiety Brother     Diabetes Sister     Anxiety Sister     Diabetes Sister     Anxiety Sister     Cancer Sister         lung and brain    Anxiety Sister     Anxiety Sister     Breast Cancer Paternal Aunt         under 48    Cancer Paternal Aunt         Breast    Cancer Sister         passed Brain.   Lung and bone    Diabetes Sister         passed    Diabetes Brother         passed        Social History     Socioeconomic History    Marital status: SINGLE     Spouse name: Not on file    Number of children: Not on file    Years of education: Not on file    Highest education level: Not on file Occupational History    Not on file   Tobacco Use    Smoking status: Never    Smokeless tobacco: Never   Vaping Use    Vaping Use: Never used   Substance and Sexual Activity    Alcohol use: Not Currently     Alcohol/week: 0.0 standard drinks     Comment: occasionally    Drug use: Never    Sexual activity: Not Currently   Other Topics Concern    Not on file   Social History Narrative    Not on file     Social Determinants of Health     Financial Resource Strain: Low Risk     Difficulty of Paying Living Expenses: Not very hard   Food Insecurity: No Food Insecurity    Worried About Running Out of Food in the Last Year: Never true    Ran Out of Food in the Last Year: Never true   Transportation Needs: Not on file   Physical Activity: Not on file   Stress: Not on file   Social Connections: Not on file   Intimate Partner Violence: Not on file   Housing Stability: Not on file                ALLERGIES: Celebrex [celecoxib], Codeine, and Erythromycin    Review of Systems   Constitutional:  Negative for activity change, appetite change and fever. HENT:  Positive for congestion and sore throat. Respiratory:  Positive for cough. Negative for shortness of breath. Gastrointestinal:  Negative for diarrhea, nausea and vomiting. Vitals:    11/08/22 1137   BP: 118/65   Pulse: 82   Resp: 20   Temp: 97.7 °F (36.5 °C)   SpO2: 98%   Weight: 182 lb (82.6 kg)   Height: 5' 5\" (1.651 m)       Physical Exam  Vitals and nursing note reviewed. Constitutional:       General: She is not in acute distress. Appearance: She is well-developed. She is not diaphoretic. HENT:      Right Ear: Tympanic membrane, ear canal and external ear normal.      Left Ear: Tympanic membrane, ear canal and external ear normal.      Nose: Congestion present. Right Sinus: No maxillary sinus tenderness or frontal sinus tenderness. Left Sinus: No maxillary sinus tenderness or frontal sinus tenderness.       Mouth/Throat:      Pharynx: No oropharyngeal exudate or posterior oropharyngeal erythema. Tonsils: No tonsillar abscesses. Cardiovascular:      Rate and Rhythm: Normal rate and regular rhythm. Heart sounds: Normal heart sounds. Pulmonary:      Effort: Pulmonary effort is normal. No respiratory distress. Breath sounds: Normal breath sounds. No stridor. No wheezing, rhonchi or rales. Lymphadenopathy:      Cervical: No cervical adenopathy. Neurological:      Mental Status: She is alert. Psychiatric:         Behavior: Behavior normal.         Thought Content: Thought content normal.         Judgment: Judgment normal.       [facility]    ICD-10-CM ICD-9-CM   1. Acute cough  R05.1 786.2   2. Sore throat  J02.9 462   3. Screening for viral disease  Z11.59 V73.99       Orders Placed This Encounter    AMB POC SARS-COV-2 ANTIGEN     Order Specific Question:   Is this test for diagnosis or screening? Answer:   Diagnosis of ill patient     Order Specific Question:   Symptomatic for COVID-19 as defined by CDC? Answer:   Yes     Order Specific Question:   Date of Symptom Onset     Answer:   11/6/2022     Order Specific Question:   Hospitalized for COVID-19? Answer:   No     Order Specific Question:   Admitted to ICU for COVID-19? Answer:   No     Order Specific Question:   Employed in healthcare setting? Answer:   Unknown     Order Specific Question:   Resident in a congregate (group) care setting? Answer:   Unknown     Order Specific Question:   Pregnant? Answer:   No     Order Specific Question:   Previously tested for COVID-19? Answer:   Unknown    AMB POC LEONILA INFLUENZA A/B TEST    AMB POC RAPID STREP A    POCT COVID-19, SARS-COV-2, PCR     Order Specific Question:   Is this test for diagnosis or screening? Answer:   Diagnosis of ill patient     Order Specific Question:   Symptomatic for COVID-19 as defined by CDC?      Answer:   Yes     Order Specific Question:   Date of Symptom Onset     Answer: 11/6/2022     Order Specific Question:   Hospitalized for COVID-19? Answer:   No     Order Specific Question:   Admitted to ICU for COVID-19? Answer:   No     Order Specific Question:   Employed in healthcare setting? Answer:   No     Order Specific Question:   Resident in a congregate (group) care setting? Answer:   No     Order Specific Question:   Pregnant? Answer:   No     Order Specific Question:   Previously tested for COVID-19? Answer:   Unknown      OTC mucinex-DM as directed  The patient is to follow up with PCP [facility]. If signs and symptoms become worse the pt is to go to the ER.      Results for orders placed or performed in visit on 11/08/22   AMB POC SARS-COV-2   Result Value Ref Range    SARS-COV-2 POC Negative Negative   AMB POC LEONILA INFLUENZA A/B TEST   Result Value Ref Range    VALID INTERNAL CONTROL POC Yes     Influenza A Ag POC Negative Negative    Influenza B Ag POC Negative Negative   AMB POC RAPID STREP A   Result Value Ref Range    VALID INTERNAL CONTROL POC Yes     Group A Strep Ag Negative Negative   POCT COVID-19, SARS-COV-2, PCR   Result Value Ref Range    SARS-COV-2 PCR, POC Negative Negative         Procedures

## 2022-11-08 NOTE — PATIENT INSTRUCTIONS
OTC mucinex-DM as directed    You will see your COVID PCR result on MyChart within 1 hour; we will call if positive      Results for orders placed or performed in visit on 11/08/22   AMB POC SARS-COV-2   Result Value Ref Range    SARS-COV-2 POC Negative Negative   AMB POC LEONILA INFLUENZA A/B TEST   Result Value Ref Range    VALID INTERNAL CONTROL POC Yes     Influenza A Ag POC Negative Negative    Influenza B Ag POC Negative Negative   AMB POC RAPID STREP A   Result Value Ref Range    VALID INTERNAL CONTROL POC Yes     Group A Strep Ag Negative Negative

## 2022-11-11 ENCOUNTER — VIRTUAL VISIT (OUTPATIENT)
Dept: SOCIAL WORK | Age: 67
End: 2022-11-11
Payer: COMMERCIAL

## 2022-11-11 DIAGNOSIS — F43.23 ADJUSTMENT DISORDER WITH MIXED ANXIETY AND DEPRESSED MOOD: Primary | ICD-10-CM

## 2022-11-11 PROCEDURE — 90834 PSYTX W PT 45 MINUTES: CPT | Performed by: SOCIAL WORKER

## 2022-11-11 PROCEDURE — G9717 DOC PT DX DEP/BP F/U NT REQ: HCPCS | Performed by: SOCIAL WORKER

## 2022-11-11 PROCEDURE — G8427 DOCREV CUR MEDS BY ELIG CLIN: HCPCS | Performed by: SOCIAL WORKER

## 2022-11-11 NOTE — PROGRESS NOTES
PSYCHOTHERAPY NOTE      Mindy Miranda is a 69 y.o. female who presents with anxiety/depression. Client was seen for a virtual individual psychotherapy session that lasted for 50 minutes. Progress:  Client presents with an improved mood and affect. She states that she has restarted her Viibryd and now is feeling less depressed and less anxious. She recognizes benefit of medication and intends to comply. Client reports decrease in motivation. Discussed the concept of behavioral activation on mood. Discussed adding goals and daily structure to help with mood. Client continues to deal with issues helping to care for her elderly boyfriend who is having cognitive issues. Provided her with information on local resources to include a Caregiver's support group. Encouraged her to consider attending to build up resources/supports     Focus today was on self care and improving mood.       Mental Status exam:         Sensorium  oriented to time, place and person   Relations cooperative   Appearance:  casually dressed   Motor Behavior:  within normal limits   Speech:  normal pitch and normal volume   Thought Process: goal directed and logical   Thought Content free of delusions and free of hallucinations   Suicidal ideations none   Homicidal ideations none   Mood:  euthymic   Affect:  mood-congruent   Memory recent  adequate   Memory remote:  adequate   Concentration:  adequate   Abstraction:  abstract   Insight:  fair   Reliability fair   Judgment:  fair         DIAGNOSIS AND IMPRESSION:    Axis I: Adjustment Disorder with Mixed Emotional Features  Axis II: Deferred    Axis III:   Past Medical History:   Diagnosis Date    Arthritis     Depression     Diabetes ([facility] [address]     Dysphagia     GERD (gastroesophageal reflux disease) 07/05/2019    Hypercholesteremia     Hypertension     Hyperthyroidism     IBS (irritable bowel syndrome)     Liver disease     hepatitis b    Nausea & vomiting     PUD (peptic ulcer disease)     bleeding ulcer    Sleep apnea     CPAP    Urinary incontinence      Axis IV: Problems with primary support group, Problems related to social environment, and Other psychosocial or environmental problems  Axis V:  61-70 mild symptoms    Interventions/plans: Follow up in 3-4 weeks      Pursuant to the emergency declaration under the [address] [place] [address] waiver authority and the [employer] and [employer] Act, this Virtual Visit was conducted, with patient and parent and/or guardian's consent, to reduce the patient's risk of exposure to COVID-19 and provide continuity of care for an established patient. Due to the state of emergency related to the coronavirus, the [place] of Social Work and the [facility] Psychology is allowing practitioners holding my licensure and/or certification status the ability to provide telehealth services without the usual requirements. This individual was evaluated through a synchronous (real-time) audio-video encounter, and/or his/her healthcare decision maker, is aware that it is a billable service, which includes applicable co-pays, with coverage as determined by his/her insurance carrier. He/she provided verbal consent to proceed and patient identification was verified. This visit was conducted pursuant to the emergency declaration under the [address] [place] [address], [address] [place] [address] [facility] authority and the [employer] and [employer] Act. A caregiver was present when appropriate. Ability to conduct physical exam was limited. The patient was located at home in a state where the provider was licensed to provide care.

## 2022-11-23 ENCOUNTER — TELEPHONE (OUTPATIENT)
Dept: INTERNAL MEDICINE CLINIC | Age: 67
End: 2022-11-23

## 2022-11-23 NOTE — TELEPHONE ENCOUNTER
Pharmacy Progress Note - Telephone Encounter    S/O: Ms. Silvia Fisher 69 y.o. female, referred by Dr. Santiago Coburn, was contacted via an outbound telephone call to discuss her PAP applications today. Verified patients identifiers (name & ) per HIPAA policy. - Reports to receiving renewal notices for [employer] (Kaya Lloyd), Synjardy  - Requests for refills on her [employer] Deshaun     A/P:  - Due for [facility] PAP renewal   - Will provide patient with renewal applications. Patient to bring back completed applications at upcoming PCP appt. - Patient endorses understanding to the provided information. All questions answered at this time. There are no discontinued medications. No orders of the defined types were placed in this encounter.     Thank you,  Tavia Funk, PharmD, BCACP, [address] [place] [address] in place: Yes  Recommendation Provided To: Patient/Caregiver: 1 via Telephone  Intervention Detail: Refill(s) Provided  Intervention Accepted By: Patient/Caregiver: 1  Time Spent (min): 10

## 2022-11-29 ENCOUNTER — APPOINTMENT (OUTPATIENT)
Dept: GENERAL RADIOLOGY | Age: 67
End: 2022-11-29
Attending: STUDENT IN AN ORGANIZED HEALTH CARE EDUCATION/TRAINING PROGRAM
Payer: MEDICARE

## 2022-11-29 ENCOUNTER — HOSPITAL ENCOUNTER (EMERGENCY)
Age: 67
Discharge: HOME | End: 2022-11-29
Attending: EMERGENCY MEDICINE
Payer: MEDICARE

## 2022-11-29 VITALS
SYSTOLIC BLOOD PRESSURE: 149 MMHG | HEART RATE: 81 BPM | OXYGEN SATURATION: 99 % | DIASTOLIC BLOOD PRESSURE: 63 MMHG | RESPIRATION RATE: 17 BRPM | TEMPERATURE: 98.4 F

## 2022-11-29 DIAGNOSIS — S63.621A SPRAIN OF INTERPHALANGEAL JOINT OF RIGHT THUMB, INITIAL ENCOUNTER: Primary | ICD-10-CM
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
## 2022-11-29 PROCEDURE — 75810000053 HC SPLINT APPLICATION

## 2022-11-29 PROCEDURE — 99283 EMERGENCY DEPT VISIT LOW MDM: CPT

## 2022-11-29 PROCEDURE — 73140 X-RAY EXAM OF FINGER(S): CPT

## 2022-11-29 RX ORDER — METHOCARBAMOL 750 MG/1
750 TABLET, FILM COATED ORAL
Qty: 20 TABLET | Refills: 0 | Status: SHIPPED | OUTPATIENT
Start: 2022-11-29

## 2022-11-30 NOTE — ED NOTES
Abigail JOHNSON reviewed discharge instructions with the patient. The patient verbalized understanding. All questions and concerns were addressed. The patient is discharged ambulatory with instructions and prescriptions in hand. Pt is alert and oriented x 4. Respirations are clear and unlabored. Pt given wrist splint.
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
## 2022-12-05 ENCOUNTER — DOCUMENTATION ONLY (OUTPATIENT)
Dept: INTERNAL MEDICINE CLINIC | Age: 67
End: 2022-12-05

## 2022-12-05 ENCOUNTER — VIRTUAL VISIT (OUTPATIENT)
Dept: SOCIAL WORK | Age: 67
End: 2022-12-05
Payer: MEDICARE

## 2022-12-05 DIAGNOSIS — F43.23 ADJUSTMENT DISORDER WITH MIXED ANXIETY AND DEPRESSED MOOD: Primary | ICD-10-CM

## 2022-12-05 PROCEDURE — G9717 DOC PT DX DEP/BP F/U NT REQ: HCPCS | Performed by: SOCIAL WORKER

## 2022-12-05 PROCEDURE — 90834 PSYTX W PT 45 MINUTES: CPT | Performed by: SOCIAL WORKER

## 2022-12-05 PROCEDURE — G8427 DOCREV CUR MEDS BY ELIG CLIN: HCPCS | Performed by: SOCIAL WORKER

## 2022-12-05 NOTE — PROGRESS NOTES
Pharmacy Progress Note     Refill request for Ms. Gino Soria 69 y.o. 's Chuck Gabriel faxed to [facility] today.       Thank you for the consult,  Tavia Blas, PharmD, [facility], [address] [facility] in place: Yes  Recommendation Provided To: Patient/Caregiver: 1 via Telephone  Intervention Detail: Refill(s) Provided  Intervention Accepted By: Patient/Caregiver: 1  Time Spent (min): 10

## 2022-12-05 NOTE — PROGRESS NOTES
Pharmacy Progress Note - Patient Assistance    Renewal applications for Ms. Vega Medal 69 y.o. 's Whitney Azalia, Sean, and Linzess are ready for patient to  for completion.        Thank you for the consult,  Tavia Funk, PharmD, BCACP, [address] [facility] [address] in place: Yes  Recommendation Provided To: Patient/Caregiver: 4 via Telephone  Intervention Detail: Patient Access Assistance/Sample Provided  Intervention Accepted By: Patient/Caregiver: 4  Time Spent (min): 15
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
## 2022-12-06 ENCOUNTER — HOSPITAL ENCOUNTER (OUTPATIENT)
Dept: GENERAL RADIOLOGY | Age: 67
Discharge: HOME | End: 2022-12-06
Payer: MEDICARE

## 2022-12-06 ENCOUNTER — OFFICE VISIT (OUTPATIENT)
Dept: INTERNAL MEDICINE CLINIC | Age: 67
End: 2022-12-06
Payer: MEDICARE

## 2022-12-06 VITALS
BODY MASS INDEX: 29.82 KG/M2 | WEIGHT: 179 LBS | RESPIRATION RATE: 14 BRPM | SYSTOLIC BLOOD PRESSURE: 102 MMHG | OXYGEN SATURATION: 99 % | HEIGHT: 65 IN | TEMPERATURE: 98.3 F | HEART RATE: 91 BPM | DIASTOLIC BLOOD PRESSURE: 59 MMHG

## 2022-12-06 DIAGNOSIS — Z79.4 UNCONTROLLED TYPE 2 DIABETES MELLITUS WITH HYPERGLYCEMIA, WITH LONG-TERM CURRENT USE OF INSULIN (HCC): ICD-10-CM

## 2022-12-06 DIAGNOSIS — Z23 NEEDS FLU SHOT: ICD-10-CM

## 2022-12-06 DIAGNOSIS — F41.1 GAD (GENERALIZED ANXIETY DISORDER): ICD-10-CM

## 2022-12-06 DIAGNOSIS — R07.81 RIB PAIN ON LEFT SIDE: ICD-10-CM
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
## 2022-12-06 DIAGNOSIS — I10 ESSENTIAL HYPERTENSION: ICD-10-CM

## 2022-12-06 DIAGNOSIS — E11.65 UNCONTROLLED TYPE 2 DIABETES MELLITUS WITH HYPERGLYCEMIA, WITH LONG-TERM CURRENT USE OF INSULIN (HCC): ICD-10-CM

## 2022-12-06 PROCEDURE — 71100 X-RAY EXAM RIBS UNI 2 VIEWS: CPT

## 2022-12-06 PROCEDURE — 71046 X-RAY EXAM CHEST 2 VIEWS: CPT

## 2022-12-06 RX ORDER — TRAMADOL HYDROCHLORIDE 50 MG/1
50 TABLET ORAL
Qty: 20 TABLET | Refills: 0 | Status: SHIPPED | OUTPATIENT
Start: 2022-12-06 | End: 2022-12-13
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
## 2022-12-06 NOTE — PROGRESS NOTES
1. \"Have you been to the ER, urgent care clinic since your last visit? Hospitalized since your last visit? \" Yes see encounters    2. \"Have you seen or consulted any other health care providers outside of the [address] [place] [address] since your last visit? \" No     3. For patients aged 39-70: Has the patient had a colonoscopy / FIT/ Cologuard? Yes - no Care Gap present      If the patient is female:    4. For patients aged 41-77: Has the patient had a mammogram within the past 2 years? Yes - no Care Gap present      5. For patients aged 21-65: Has the patient had a pap smear?  NA - based on age or sex

## 2022-12-07 ENCOUNTER — TELEPHONE (OUTPATIENT)
Dept: INTERNAL MEDICINE CLINIC | Age: 67
End: 2022-12-07

## 2022-12-07 NOTE — TELEPHONE ENCOUNTER
Left message to return call regarding chest xray. Per Dr. Seun Monteiro .. ... \"Chest is clear\"  Per Dr. Seun Monteiro .. [place] \"Ribs look ok, no fractures\"

## 2022-12-08 ENCOUNTER — TELEPHONE (OUTPATIENT)
Dept: INTERNAL MEDICINE CLINIC | Age: 67
End: 2022-12-08

## 2022-12-08 NOTE — TELEPHONE ENCOUNTER
Patient advised of lab results. Verbalized understanding. Per Dr. Melva Pandey .... \"Chest is clear\" and \"Ribs look ok, no fractures. \"

## 2022-12-11 ENCOUNTER — HOSPITAL ENCOUNTER (EMERGENCY)
Age: 67
Discharge: HOME | End: 2022-12-12
Attending: EMERGENCY MEDICINE
Payer: MEDICARE

## 2022-12-11 DIAGNOSIS — T38.3X1A INSULIN OVERDOSE, ACCIDENTAL OR UNINTENTIONAL, INITIAL ENCOUNTER: Primary | ICD-10-CM

## 2022-12-11 LAB
ALBUMIN SERPL-MCNC: 3.3 G/DL (ref 3.5–5)
ALBUMIN/GLOB SERPL: 1.1 {RATIO} (ref 1.1–2.2)
ALP SERPL-CCNC: 59 U/L (ref 45–117)
ALT SERPL-CCNC: 19 U/L (ref 12–78)
ANION GAP SERPL CALC-SCNC: 5 MMOL/L (ref 5–15)
AST SERPL-CCNC: 12 U/L (ref 15–37)
BASOPHILS # BLD: 0 K/UL (ref 0–0.1)
BASOPHILS NFR BLD: 1 % (ref 0–1)
BILIRUB SERPL-MCNC: 0.7 MG/DL (ref 0.2–1)
BUN SERPL-MCNC: 16 MG/DL (ref 6–20)
BUN/CREAT SERPL: 14 (ref 12–20)
CALCIUM SERPL-MCNC: 7.9 MG/DL (ref 8.5–10.1)
CHLORIDE SERPL-SCNC: 113 MMOL/L (ref 97–108)
CO2 SERPL-SCNC: 24 MMOL/L (ref 21–32)
CREAT SERPL-MCNC: 1.11 MG/DL (ref 0.55–1.02)
DIFFERENTIAL METHOD BLD: ABNORMAL
EOSINOPHIL # BLD: 0.2 K/UL (ref 0–0.4)
EOSINOPHIL NFR BLD: 4 % (ref 0–7)
ERYTHROCYTE [DISTWIDTH] IN BLOOD BY AUTOMATED COUNT: 12.7 % (ref 11.5–14.5)
GLOBULIN SER CALC-MCNC: 3 G/DL (ref 2–4)
GLUCOSE BLD STRIP.AUTO-MCNC: 101 MG/DL (ref 65–117)
GLUCOSE BLD STRIP.AUTO-MCNC: 51 MG/DL (ref 65–117)
GLUCOSE BLD STRIP.AUTO-MCNC: 66 MG/DL (ref 65–117)
GLUCOSE BLD STRIP.AUTO-MCNC: 79 MG/DL (ref 65–117)
GLUCOSE BLD STRIP.AUTO-MCNC: 85 MG/DL (ref 65–117)
GLUCOSE BLD STRIP.AUTO-MCNC: 95 MG/DL (ref 65–117)
GLUCOSE BLD STRIP.AUTO-MCNC: 96 MG/DL (ref 65–117)
GLUCOSE BLD STRIP.AUTO-MCNC: 99 MG/DL (ref 65–117)
GLUCOSE SERPL-MCNC: 102 MG/DL (ref 65–100)
HCT VFR BLD AUTO: 33.8 % (ref 35–47)
HGB BLD-MCNC: 11.3 G/DL (ref 11.5–16)
IMM GRANULOCYTES # BLD AUTO: 0 K/UL (ref 0–0.04)
IMM GRANULOCYTES NFR BLD AUTO: 0 % (ref 0–0.5)
LYMPHOCYTES # BLD: 2.4 K/UL (ref 0.8–3.5)
LYMPHOCYTES NFR BLD: 41 % (ref 12–49)
MCH RBC QN AUTO: 29.7 PG (ref 26–34)
MCHC RBC AUTO-ENTMCNC: 33.4 G/DL (ref 30–36.5)
MCV RBC AUTO: 88.9 FL (ref 80–99)
MONOCYTES # BLD: 0.4 K/UL (ref 0–1)
MONOCYTES NFR BLD: 7 % (ref 5–13)
NEUTS SEG # BLD: 2.7 K/UL (ref 1.8–8)
NEUTS SEG NFR BLD: 47 % (ref 32–75)
NRBC # BLD: 0 K/UL (ref 0–0.01)
NRBC BLD-RTO: 0 PER 100 WBC
PLATELET # BLD AUTO: 113 K/UL (ref 150–400)
PMV BLD AUTO: 11.1 FL (ref 8.9–12.9)
POTASSIUM SERPL-SCNC: 3.4 MMOL/L (ref 3.5–5.1)
PROT SERPL-MCNC: 6.3 G/DL (ref 6.4–8.2)
RBC # BLD AUTO: 3.8 M/UL (ref 3.8–5.2)
SERVICE CMNT-IMP: ABNORMAL
SERVICE CMNT-IMP: NORMAL
SODIUM SERPL-SCNC: 142 MMOL/L (ref 136–145)
WBC # BLD AUTO: 5.8 K/UL (ref 3.6–11)

## 2022-12-11 PROCEDURE — 96376 TX/PRO/DX INJ SAME DRUG ADON: CPT

## 2022-12-11 PROCEDURE — 96365 THER/PROPH/DIAG IV INF INIT: CPT

## 2022-12-11 PROCEDURE — 36415 COLL VENOUS BLD VENIPUNCTURE: CPT

## 2022-12-11 PROCEDURE — 99285 EMERGENCY DEPT VISIT HI MDM: CPT

## 2022-12-11 PROCEDURE — 74011000250 HC RX REV CODE- 250: Performed by: EMERGENCY MEDICINE

## 2022-12-11 PROCEDURE — 80053 COMPREHEN METABOLIC PANEL: CPT

## 2022-12-11 PROCEDURE — 85025 COMPLETE CBC W/AUTO DIFF WBC: CPT

## 2022-12-11 PROCEDURE — 82962 GLUCOSE BLOOD TEST: CPT

## 2022-12-11 RX ORDER — DEXTROSE MONOHYDRATE 100 MG/ML
0-250 INJECTION, SOLUTION INTRAVENOUS AS NEEDED
Status: DISCONTINUED | OUTPATIENT
Start: 2022-12-11 | End: 2022-12-12 | Stop reason: HOSPADM

## 2022-12-11 RX ADMIN — DEXTROSE 250 ML: 10 SOLUTION INTRAVENOUS at 20:28

## 2022-12-11 RX ADMIN — DEXTROSE 125 ML: 10 SOLUTION INTRAVENOUS at 22:00

## 2022-12-12 VITALS
SYSTOLIC BLOOD PRESSURE: 135 MMHG | DIASTOLIC BLOOD PRESSURE: 70 MMHG | BODY MASS INDEX: 29.99 KG/M2 | WEIGHT: 180 LBS | RESPIRATION RATE: 16 BRPM | OXYGEN SATURATION: 99 % | TEMPERATURE: 97.5 F | HEART RATE: 74 BPM | HEIGHT: 65 IN

## 2022-12-12 LAB
GLUCOSE BLD STRIP.AUTO-MCNC: 124 MG/DL (ref 65–117)
SERVICE CMNT-IMP: ABNORMAL

## 2022-12-12 PROCEDURE — 82962 GLUCOSE BLOOD TEST: CPT

## 2022-12-12 NOTE — ED NOTES
MD reviewed discharge instructions with the patient. The patient verbalized understanding. All questions and concerns were addressed. The patient is discharged ambulatory with instructions and prescriptions in hand. Pt is alert and oriented x 4. Respirations are clear and unlabored.

## 2022-12-12 NOTE — ED PROVIDER NOTES
EMERGENCY DEPARTMENT HISTORY AND PHYSICAL EXAM      Date: 12/11/2022  Patient Name: Lisbeth Burkitt    History of Presenting Illness     Chief Complaint   Patient presents with    Blood sugar problem     Patient from home, administered instant release 32units of insulin but normally supposed to administer extended release 8units instead.  for EMS. Patient A+Ox4 and anxious in triage. History Provided By: Patient    HPI: Lisbeth Burkitt, 69 y.o. female with PMHx significant for hypertension, hyperlipidemia, diabetes, who presents after she unintentionally administered a large dose of her short acting insulin. Patient tells me she is supposed to take 32 units of her long-acting insulin at night but instead accidentally administered 32 units of her short acting insulin. She tells me she keeps the insulin pens in the same bag and simply got them confused. When she realized what she did she called EMS to come to the emergency department. Blood sugar for EMS was 333. Patient denies any systemic symptoms though does appear mildly anxious. PCP: Chuy Coronado,     There are no other associated signs or symptoms. No other exacerbating or alleviating factors. There are no other complaints, changes, or physical findings at this time. Current Facility-Administered Medications   Medication Dose Route Frequency Provider Last Rate Last Admin    dextrose 10% infusion 0-250 mL  0-250 mL IntraVENous PRN Salma Smith MD   IV Completed at 12/11/22 2210     Current Outpatient Medications   Medication Sig Dispense Refill    traMADoL (ULTRAM) 50 mg tablet Take 1 Tablet by mouth every eight (8) hours as needed for Pain for up to 7 days. Max Daily Amount: 150 mg. 20 Tablet 0    methocarbamoL (ROBAXIN) 750 mg tablet Take 1 Tablet by mouth four (4) times daily as needed for Muscle Spasm(s).  20 Tablet 0    nystatin-triamcinolone (MYCOLOG) 100,000-0.1 unit/gram-% ointment APPLY TOPICALLY TO THE AFFECTED AREA TWICE DAILY      busPIRone (BUSPAR) 5 mg tablet Take 1 Tablet by mouth two (2) times a day. 60 Tablet 5    Tresiba FlexTouch U-100 100 unit/mL (3 mL) inpn 32 Units by SubCUTAneous route daily. Indications: type 2 diabetes mellitus 15 mL 1    insulin aspart U-100 (NOVOLOG) 100 unit/mL (3 mL) inpn 6-8 Units by SubCUTAneous route Before breakfast, lunch, and dinner. 15 mL 0    ALPRAZolam (XANAX) 0.25 mg tablet TAKE 1 TABLET BY MOUTH  TWICE DAILY AS NEEDED FOR  ANXIETY 60 Tablet 3    pravastatin (PRAVACHOL) 20 mg tablet Take 1 Tablet by mouth nightly. 90 Tablet 3    amitriptyline (ELAVIL) 10 mg tablet Take 1 Tablet by mouth nightly. 90 Tablet 3    levothyroxine (SYNTHROID) 300 mcg tablet TAKE 1 TABLET BY MOUTH ONCE DAILY BEFORE BREAKFAST 6 DAYS A WEEK AND TAKE 150 MCG ONE DAY A WEEK 90 Tablet 3    lisinopriL (PRINIVIL, ZESTRIL) 5 mg tablet TAKE 1 TABLET BY MOUTH  DAILY 90 Tablet 3    furosemide (LASIX) 20 mg tablet TAKE 1 TABLET BY MOUTH  DAILY AS NEEDED FOR EDEMA 90 Tablet 3    Linzess 145 mcg cap capsule Take 145 mcg by mouth daily. Omeprazole delayed release (PRILOSEC D/R) 20 mg tablet Take 20 mg by mouth daily. empagliflozin-metformin (Synjardy XR) 25-1,000 mg TBph Take 1 Tablet by mouth daily. To start this once patient completes Jardiance and metformin monotherapy   Indications: type 2 diabetes mellitus      vilazodone (VIIBRYD) 20 mg tab tablet Take 1 Tablet by mouth daily. 20 Tablet 0    famotidine (PEPCID) 40 mg tablet Take 40 mg by mouth two (2) times a day. nystatin (MYCOSTATIN) powder Apply  to affected area four (4) times daily. 60 g 1    FreeStyle Julien 2 Sensor kit 1 Each by Other route See Salvatore Shaw. Use to scan sensor three times daily      NOVOFINE PLUS 32 gauge x 1/6\" ndle Check sugars 4x daily. 300 Pen Needle 3    acetaminophen (TYLENOL) 500 mg tablet Take 1 Tab by mouth every six (6) hours as needed for Pain.  30 Tab 0     Past History     Past Medical History:  Past Medical History:   Diagnosis Date    Arthritis     Depression     Diabetes (HCC)     Dysphagia     GERD (gastroesophageal reflux disease) 07/05/2019    Hypercholesteremia     Hypertension     Hyperthyroidism     IBS (irritable bowel syndrome)     Liver disease     hepatitis b    Nausea & vomiting     PUD (peptic ulcer disease)     bleeding ulcer    Sleep apnea     CPAP    Urinary incontinence      Past Surgical History:  Past Surgical History:   Procedure Laterality Date    HX APPENDECTOMY  1999    HX BACK SURGERY      x3    HX BLEPHAROPLASTY Right     HX BUNIONECTOMY      HX CHOLECYSTECTOMY  06/21/2021    HX COLONOSCOPY  2018    HX ENDOSCOPY  July 5 2019    HX HYSTERECTOMY      HX OTHER SURGICAL      Collapsed lung    HX UROLOGICAL  2019    HX WISDOM TEETH EXTRACTION      [place] BREAST SURGERY PROCEDURE UNLISTED  09/18     Family History:  Family History   Problem Relation Age of Onset    Diabetes Mother         passed    Heart Disease Mother         passed    Hypertension Mother         passed    Cancer Father         passed Colon    Alcohol abuse Father     Diabetes Sister     Anxiety Sister     Diabetes Brother     Anxiety Brother     Diabetes Brother     Anxiety Brother     Diabetes Sister     Anxiety Sister     Diabetes Sister     Anxiety Sister     Cancer Sister         lung and brain    Anxiety Sister     Anxiety Sister     Breast Cancer Paternal Aunt         under 48    Cancer Paternal Aunt         Breast    Cancer Sister         passed Brain. Lung and bone    Diabetes Sister         passed    Diabetes Brother         passed     Social History:  Social History     Tobacco Use    Smoking status: Never    Smokeless tobacco: Never   Vaping Use    Vaping Use: Never used   Substance Use Topics    Alcohol use: Not Currently     Alcohol/week: 0.0 standard drinks     Comment: occasionally    Drug use: Never     Allergies:   Allergies   Allergen Reactions    Celebrex [Celecoxib] Other (comments) Hallucinations    Codeine Nausea and Vomiting    Erythromycin Nausea and Vomiting     Review of Systems   Review of Systems   Constitutional:  Negative for chills and fever. HENT:  Negative for congestion, rhinorrhea and sore throat. Respiratory:  Negative for cough and shortness of breath. Cardiovascular:  Negative for chest pain. Gastrointestinal:  Negative for abdominal pain, nausea and vomiting. Genitourinary:  Negative for dysuria and urgency. Skin:  Negative for rash. Neurological:  Negative for dizziness, light-headedness and headaches. All other systems reviewed and are negative. Physical Exam   Physical Exam  Vitals and nursing note reviewed. Constitutional:       General: She is not in acute distress. Appearance: She is well-developed. HENT:      Head: Normocephalic and atraumatic. Eyes:      Conjunctiva/sclera: Conjunctivae normal.      Pupils: Pupils are equal, round, and reactive to light. Cardiovascular:      Rate and Rhythm: Normal rate and regular rhythm. Pulmonary:      Effort: Pulmonary effort is normal. No respiratory distress. Breath sounds: Normal breath sounds. No stridor. Abdominal:      General: There is no distension. Palpations: Abdomen is soft. Tenderness: There is no abdominal tenderness. Musculoskeletal:         General: Normal range of motion. Cervical back: Normal range of motion. Skin:     General: Skin is warm and dry. Neurological:      Mental Status: She is alert and oriented to person, place, and time. Psychiatric:         Mood and Affect: Mood normal.         Thought Content:  Thought content normal.     Diagnostic Study Results   Labs -     Recent Results (from the past 12 hour(s))   GLUCOSE, POC    Collection Time: 12/11/22  8:05 PM   Result Value Ref Range    Glucose (POC) 101 65 - 117 mg/dL    Performed by [address] [place] [address], POC    Collection Time: 12/11/22  8:22 PM   Result Value Ref Range    Glucose (POC) 51 (LL) 65 - 117 mg/dL    Performed by Gwen Sosa MEGAN    GLUCOSE, POC    Collection Time: 12/11/22  8:37 PM   Result Value Ref Range    Glucose (POC) 99 65 - 117 mg/dL    Performed by Gwen Sosa MEGAN    CBC WITH AUTOMATED DIFF    Collection Time: 12/11/22  9:07 PM   Result Value Ref Range    WBC 5.8 3.6 - 11.0 K/uL    RBC 3.80 3.80 - 5.20 M/uL    HGB 11.3 (L) 11.5 - 16.0 g/dL    HCT 33.8 (L) 35.0 - 47.0 %    MCV 88.9 80.0 - 99.0 FL    MCH 29.7 26.0 - 34.0 PG    MCHC 33.4 30.0 - 36.5 g/dL    RDW 12.7 11.5 - 14.5 %    PLATELET 823 (L) 679 - 400 K/uL    MPV 11.1 8.9 - 12.9 FL    NRBC 0.0 0  WBC    ABSOLUTE NRBC 0.00 0.00 - 0.01 K/uL    NEUTROPHILS 47 32 - 75 %    LYMPHOCYTES 41 12 - 49 %    MONOCYTES 7 5 - 13 %    EOSINOPHILS 4 0 - 7 %    BASOPHILS 1 0 - 1 %    IMMATURE GRANULOCYTES 0 0.0 - 0.5 %    ABS. NEUTROPHILS 2.7 1.8 - 8.0 K/UL    ABS. LYMPHOCYTES 2.4 0.8 - 3.5 K/UL    ABS. MONOCYTES 0.4 0.0 - 1.0 K/UL    ABS. EOSINOPHILS 0.2 0.0 - 0.4 K/UL    ABS. BASOPHILS 0.0 0.0 - 0.1 K/UL    ABS. IMM. GRANS. 0.0 0.00 - 0.04 K/UL    DF AUTOMATED     METABOLIC PANEL, COMPREHENSIVE    Collection Time: 12/11/22  9:07 PM   Result Value Ref Range    Sodium 142 136 - 145 mmol/L    Potassium 3.4 (L) 3.5 - 5.1 mmol/L    Chloride 113 (H) 97 - 108 mmol/L    CO2 24 21 - 32 mmol/L    Anion gap 5 5 - 15 mmol/L    Glucose 102 (H) 65 - 100 mg/dL    BUN 16 6 - 20 MG/DL    Creatinine 1.11 (H) 0.55 - 1.02 MG/DL    BUN/Creatinine ratio 14 12 - 20      eGFR 54 (L) >60 ml/min/1.73m2    Calcium 7.9 (L) 8.5 - 10.1 MG/DL    Bilirubin, total 0.7 0.2 - 1.0 MG/DL    ALT (SGPT) 19 12 - 78 U/L    AST (SGOT) 12 (L) 15 - 37 U/L    Alk.  phosphatase 59 45 - 117 U/L    Protein, total 6.3 (L) 6.4 - 8.2 g/dL    Albumin 3.3 (L) 3.5 - 5.0 g/dL    Globulin 3.0 2.0 - 4.0 g/dL    A-G Ratio 1.1 1.1 - 2.2     GLUCOSE, POC    Collection Time: 12/11/22  9:07 PM   Result Value Ref Range    Glucose (POC) 95 65 - 117 mg/dL    Performed by Myrna TORRES RN)    GLUCOSE, POC    Collection Time: 12/11/22  9:53 PM   Result Value Ref Range    Glucose (POC) 66 65 - 117 mg/dL    Performed by Rupesh LAGUNAS    GLUCOSE, POC    Collection Time: 12/11/22 10:33 PM   Result Value Ref Range    Glucose (POC) 79 65 - 117 mg/dL    Performed by Davide Guo    GLUCOSE, POC    Collection Time: 12/11/22 11:22 PM   Result Value Ref Range    Glucose (POC) 85 65 - 117 mg/dL    Performed by Deshaun Flood (BENNETT RN)    GLUCOSE, POC    Collection Time: 12/11/22 11:53 PM   Result Value Ref Range    Glucose (POC) 96 65 - 117 mg/dL    Performed by Deshaun Flood (BENNETT RN)    GLUCOSE, POC    Collection Time: 12/12/22 12:19 AM   Result Value Ref Range    Glucose (POC) 124 (H) 65 - 117 mg/dL    Performed by Deshaun Flood (BENNETT RN)        Radiologic Studies -   No orders to display     No results found. Medical Decision Making   I am the first provider for this patient. I reviewed the vital signs, available nursing notes, past medical history, past surgical history, family history and social history. Vital Signs-Reviewed the patient's vital signs. Patient Vitals for the past 12 hrs:   Temp Pulse Resp BP SpO2   12/12/22 0020 97.5 °F (36.4 °C) 74 16 135/70 99 %   12/11/22 2230 97.5 °F (36.4 °C) 77 16 (!) 142/76 99 %   12/11/22 2130 97.4 °F (36.3 °C) 75 18 (!) 145/76 98 %   12/11/22 2028 97.3 °F (36.3 °C) 92 16 (!) 155/71 100 %       Pulse Oximetry Analysis - 99% on ra        Records Reviewed: Nursing Notes and Old Medical Records    Provider Notes (Medical Decision Making):   Patient presents with a chief complaint of an unintentional insulin overdose. EMS blood sugar was 333. Already down to 100 on presentation the emergency department. RN to give patient something to eat and monitor blood sugar closely. If drops further will require IV dextrose.   Given that was her short acting insulin if she is able to maintain her blood sugars after period of observation may be able to be discharged home.    ED Course:   Initial assessment performed. The patients presenting problems have been discussed, and they are in agreement with the care plan formulated and outlined with them. I have encouraged them to ask questions as they arise throughout their visit. ED Course as of 12/12/22 0321   Sun Dec 11, 2022   2024 BG down to 51, dextrose ordered [JUAN]   2210 BG back down to 66, additional D10 given by Marilyn Copeland      ED Course User Index  Sharon Beltran MD     Patient observed to have stable blood sugars that were increasing over the course of 2 hours without any additional dextrose given and without any significant p.o. intake. Stable for discharge home with instructions to not take any of her long-acting insulin tonight and monitor blood sugar closely. Patient expresses understanding. Given strict ED return precautions. Procedures:  Critical Care  Performed by: Servando Bah MD  Authorized by: Servando Bah MD     Critical care provider statement:     Critical care time (minutes):  40    Critical care time was exclusive of:  Separately billable procedures and treating other patients    Critical care was necessary to treat or prevent imminent or life-threatening deterioration of the following conditions:  Endocrine crisis    Critical care was time spent personally by me on the following activities:  Ordering and performing treatments and interventions, ordering and review of laboratory studies, ordering and review of radiographic studies, pulse oximetry, re-evaluation of patient's condition, review of old charts, examination of patient and evaluation of patient's response to treatment    Medications Given in ED:  Medications   dextrose 10% infusion 0-250 mL (0 mL IntraVENous IV Completed 12/11/22 2210)         Critical Care:      Disposition:  Discharge Note:  The patient has been re-evaluated and is ready for discharge. Reviewed available results with patient.  Counseled patient on diagnosis and care plan. Patient has expressed understanding, and all questions have been answered. Patient agrees with plan and agrees to follow up as recommended, or to return to the ED if their symptoms worsen. Discharge instructions have been provided and explained to the patient, along with reasons to return to the ED. PLAN:  1. Discharge Medication List as of 12/12/2022 12:23 AM        2. Follow-up Information       Follow up With Specialties Details Why Bobby Whitten, John Houston DO Internal Medicine Physician Schedule an appointment as soon as possible for a visit   [address] [place] [address]  [phone]      [facility] [place] [facility] EM[facility] Emergency Medicine  As needed, If symptoms worsen [address] [facility]  [address] [facility]  [phone]          Return to ED if worse     Diagnosis     Clinical Impression:   1. Insulin overdose, accidental or unintentional, initial encounter            Please note that this dictation was completed with [employer], the computer voice recognition software. Quite often unanticipated grammatical, syntax, homophones, and other interpretive errors are inadvertently transcribed by the computer software. Please disregard these errors.   Please excuse any errors that have escaped final proofreading

## 2022-12-15 ENCOUNTER — DOCUMENTATION ONLY (OUTPATIENT)
Dept: INTERNAL MEDICINE CLINIC | Age: 67
End: 2022-12-15

## 2022-12-15 NOTE — PROGRESS NOTES
Pharmacy Progress Note - Medication Access Assistance    Renewal PAP applications for Ms. Rogelio Duggan 69 y.o. were submitted today.     - [address]  - [employer] - Godfreyamanda Paredes, Novolog    Verdict pending. Thank you for the consult,  Tavia Hedrick, PharmD, BCACP, 4 [facility] in place: Yes  Recommendation Provided To:  Other: 4  Intervention Detail: Patient Access Assistance/Sample Provided  Intervention Accepted By: Other: 4  Time Spent (min): 45

## 2023-01-06 ENCOUNTER — TRANSCRIBE ORDER (OUTPATIENT)
Dept: SCHEDULING | Age: 68
End: 2023-01-06

## 2023-01-06 DIAGNOSIS — Z12.31 VISIT FOR SCREENING MAMMOGRAM: Primary | ICD-10-CM

## 2023-01-09 ENCOUNTER — VIRTUAL VISIT (OUTPATIENT)
Dept: SOCIAL WORK | Age: 68
End: 2023-01-09
Payer: MEDICARE

## 2023-01-09 DIAGNOSIS — F43.23 ADJUSTMENT DISORDER WITH MIXED ANXIETY AND DEPRESSED MOOD: Primary | ICD-10-CM

## 2023-01-09 PROCEDURE — G9717 DOC PT DX DEP/BP F/U NT REQ: HCPCS | Performed by: SOCIAL WORKER

## 2023-01-09 PROCEDURE — 90832 PSYTX W PT 30 MINUTES: CPT | Performed by: SOCIAL WORKER

## 2023-01-09 PROCEDURE — G8427 DOCREV CUR MEDS BY ELIG CLIN: HCPCS | Performed by: SOCIAL WORKER

## 2023-01-09 NOTE — PROGRESS NOTES
PSYCHOTHERAPY NOTE      Madelene Riedel is a 69 y.o. female who presents with anxiety/depression. Client was seen for a virtual individual psychotherapy session that lasted for 30 minutes. Progress:  Client presents with an improved mood. She states that she is \"doing much better\" and is practicing her breathing exercises to self soothe when triggered. Client states things are going better with her boyfriend. He has an appointment with a neurologist this month and client is hopeful that it will be helpful. Shared with client information on local caregivers support group and feel that this could be beneficial.     Client is being more socially active. She plans to celebrate her 68th birthday soon with her girlfriends. Mood is better and she would like next appt to be in one month.      Mental Status exam:         Sensorium  oriented to time, place and person   Relations cooperative   Appearance:  casually dressed   Motor Behavior:  within normal limits   Speech:  normal pitch and normal volume   Thought Process: goal directed and logical   Thought Content free of delusions and free of hallucinations   Suicidal ideations none   Homicidal ideations none   Mood:  euthymic   Affect:  euthymic   Memory recent  adequate   Memory remote:  adequate   Concentration:  adequate   Abstraction:  abstract   Insight:  fair   Reliability fair   Judgment:  fair         DIAGNOSIS AND IMPRESSION:    Axis I: Adjustment Disorder with Mixed Emotional Features  Axis II: Deferred    Axis III:   Past Medical History:   Diagnosis Date    Arthritis     Depression     Diabetes ([facility] [address]     Dysphagia     GERD (gastroesophageal reflux disease) 07/05/2019    Hypercholesteremia     Hypertension     Hyperthyroidism     IBS (irritable bowel syndrome)     Liver disease     hepatitis b    Nausea & vomiting     PUD (peptic ulcer disease)     bleeding ulcer    Sleep apnea     CPAP    Urinary incontinence      Axis IV: Problems with primary support group, Problems related to social environment, and Other psychosocial or environmental problems  Axis V:  61-70 mild symptoms    Interventions/plans: Follow up in one month      Pursuant to the emergency declaration under the [address] [place] [address] waiver authority and the [employer] and [employer] Act, this Virtual Visit was conducted, with patient and parent and/or guardian's consent, to reduce the patient's risk of exposure to COVID-19 and provide continuity of care for an established patient. Due to the state of emergency related to the coronavirus, the [place] [employer] and the [facility] is allowing practitioners holding my licensure and/or certification status the ability to provide telehealth services without the usual requirements. This individual was evaluated through a synchronous (real-time) audio-video encounter, and/or his/her healthcare decision maker, is aware that it is a billable service, which includes applicable co-pays, with coverage as determined by his/her insurance carrier. He/she provided verbal consent to proceed and patient identification was verified. This visit was conducted pursuant to the emergency declaration under the [address] [place] [address], [address] [place] [address] authority and the [employer] and [employer] Act. A caregiver was present when appropriate. Ability to conduct physical exam was limited. The patient was located at home in a state where the provider was licensed to provide care.

## 2023-01-18 ENCOUNTER — TELEPHONE (OUTPATIENT)
Dept: INTERNAL MEDICINE CLINIC | Age: 68
End: 2023-01-18

## 2023-01-19 ENCOUNTER — OFFICE VISIT (OUTPATIENT)
Dept: INTERNAL MEDICINE CLINIC | Age: 68
End: 2023-01-19
Payer: MEDICARE

## 2023-01-19 ENCOUNTER — VIRTUAL VISIT (OUTPATIENT)
Dept: SOCIAL WORK | Age: 68
End: 2023-01-19

## 2023-01-19 VITALS
DIASTOLIC BLOOD PRESSURE: 63 MMHG | HEIGHT: 65 IN | SYSTOLIC BLOOD PRESSURE: 102 MMHG | TEMPERATURE: 98.4 F | RESPIRATION RATE: 16 BRPM | OXYGEN SATURATION: 99 % | WEIGHT: 180.2 LBS | BODY MASS INDEX: 30.02 KG/M2 | HEART RATE: 83 BPM

## 2023-01-19 DIAGNOSIS — E11.65 UNCONTROLLED TYPE 2 DIABETES MELLITUS WITH HYPERGLYCEMIA, WITH LONG-TERM CURRENT USE OF INSULIN (HCC): ICD-10-CM

## 2023-01-19 DIAGNOSIS — E11.22 TYPE 2 DM WITH CKD STAGE 3 AND HYPERTENSION (HCC): ICD-10-CM

## 2023-01-19 DIAGNOSIS — E03.9 ACQUIRED HYPOTHYROIDISM: ICD-10-CM

## 2023-01-19 DIAGNOSIS — F43.23 ADJUSTMENT DISORDER WITH MIXED ANXIETY AND DEPRESSED MOOD: Primary | ICD-10-CM

## 2023-01-19 DIAGNOSIS — F41.1 GAD (GENERALIZED ANXIETY DISORDER): ICD-10-CM

## 2023-01-19 DIAGNOSIS — F32.1 CURRENT MODERATE EPISODE OF MAJOR DEPRESSIVE DISORDER WITHOUT PRIOR EPISODE (HCC): Primary | ICD-10-CM

## 2023-01-19 DIAGNOSIS — I10 ESSENTIAL HYPERTENSION: ICD-10-CM

## 2023-01-19 DIAGNOSIS — N18.30 TYPE 2 DM WITH CKD STAGE 3 AND HYPERTENSION (HCC): ICD-10-CM

## 2023-01-19 DIAGNOSIS — I12.9 TYPE 2 DM WITH CKD STAGE 3 AND HYPERTENSION (HCC): ICD-10-CM

## 2023-01-19 DIAGNOSIS — Z79.4 UNCONTROLLED TYPE 2 DIABETES MELLITUS WITH HYPERGLYCEMIA, WITH LONG-TERM CURRENT USE OF INSULIN (HCC): ICD-10-CM

## 2023-01-19 DIAGNOSIS — N32.81 OAB (OVERACTIVE BLADDER): ICD-10-CM

## 2023-01-19 PROCEDURE — 90834 PSYTX W PT 45 MINUTES: CPT | Performed by: SOCIAL WORKER

## 2023-01-19 PROCEDURE — G9717 DOC PT DX DEP/BP F/U NT REQ: HCPCS | Performed by: SOCIAL WORKER

## 2023-01-19 PROCEDURE — G8427 DOCREV CUR MEDS BY ELIG CLIN: HCPCS | Performed by: SOCIAL WORKER

## 2023-01-19 RX ORDER — FLUCONAZOLE 200 MG/1
1 TABLET ORAL DAILY
COMMUNITY
Start: 2023-01-16
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
## 2023-01-19 NOTE — PROGRESS NOTES
PSYCHOTHERAPY NOTE      Duc Lizarraga is a 77 y.o. female who presents with anxiety/depression. Client was seen for a virtual individual psychotherapy session that lasted for 50 minutes. Progress: Met with client per PCP request as client was very distressed during her appt with him today. Client was distressed and tearful. Today is her birthday and she had a lot of plans with sister and friend, but over the past week a lot of stressors have converged at one time and has affected her mood. Main stressor is a car that friend gifted her that has had ongoing maintenance issues that have become unaffordable for client and has caused a rift with her friend. In addition, her ongoing caregiving responsibilities with boyfriend remain source of stress. Processed all of this in session today. Explored ways to help client self soothe and improve her coping strategies. Encouraged her to still celebrate birthday with friend and sister. Client's meds were also evaluated today to include rexulti that will hopefully also help her with mood.      Mental Status exam:         Sensorium  oriented to time, place and person   Relations cooperative   Appearance:  casually dressed   Motor Behavior:  within normal limits   Speech:  normal pitch and normal volume   Thought Process: goal directed and logical   Thought Content free of delusions and free of hallucinations   Suicidal ideations none   Homicidal ideations none   Mood:  anxious and depressed   Affect:  anxious and depressed   Memory recent  adequate   Memory remote:  adequate   Concentration:  adequate   Abstraction:  abstract   Insight:  fair   Reliability fair   Judgment:  fair         DIAGNOSIS AND IMPRESSION:    Axis I: Adjustment Disorder with Mixed Emotional Features  Axis II: Deferred    Axis III:   Past Medical History:   Diagnosis Date    Arthritis     Depression     Diabetes ([facility] [address]     Dysphagia     GERD (gastroesophageal reflux disease) 07/05/2019    Hypercholesteremia     Hypertension     Hyperthyroidism     IBS (irritable bowel syndrome)     Liver disease     hepatitis b    Nausea & vomiting     PUD (peptic ulcer disease)     bleeding ulcer    Sleep apnea     CPAP    Urinary incontinence      Axis IV: Problems with primary support group, Problems related to social environment, and Other psychosocial or environmental problems  Axis V:  51-60 moderate symptoms    Interventions/plans: Follow up in 2 weeks      Pursuant to the emergency declaration under the [address] [place] [address] [facility] authority and the [employer] and [employer] Act, this Virtual Visit was conducted, with patient and parent and/or guardian's consent, to reduce the patient's risk of exposure to COVID-19 and provide continuity of care for an established patient. Due to the state of emergency related to the coronavirus, the [place] [employer] and the [facility] is allowing practitioners holding my licensure and/or certification status the ability to provide telehealth services without the usual requirements. This individual was evaluated through a synchronous (real-time) audio-video encounter, and/or his/her healthcare decision maker, is aware that it is a billable service, which includes applicable co-pays, with coverage as determined by his/her insurance carrier. He/she provided verbal consent to proceed and patient identification was verified. This visit was conducted pursuant to the emergency declaration under the [address] [place] [address] [facility] authority and the [employer] Act. A caregiver was present when appropriate. Ability to conduct physical exam was limited. The patient was located at home in a state where the provider was licensed to provide care.
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
## 2023-01-19 NOTE — Clinical Note
Really struggling with depression. Adding rexulti today. Would benefit from talking with you soon. Thanks.

## 2023-01-19 NOTE — PATIENT INSTRUCTIONS
Take 1/2 a pill of rexulti daily for 6 days, then increase to 1 pill daily. Continue all other meds as before.

## 2023-01-19 NOTE — PROGRESS NOTES
1. \"Have you been to the ER, urgent care clinic since your last visit? Hospitalized since your last visit? \" Yes ED, insulin wrong dose    2. \"Have you seen or consulted any other health care providers outside of the [address] [place] [address] since your last visit? \" No     3. For patients aged 39-70: Has the patient had a colonoscopy / FIT/ Cologuard? Yes - no Care Gap present      If the patient is female:    4. For patients aged 41-77: Has the patient had a mammogram within the past 2 years? Yes - no Care Gap present      5. For patients aged 21-65: Has the patient had a pap smear?  NA - based on age or sex

## 2023-01-25 ENCOUNTER — OFFICE VISIT (OUTPATIENT)
Dept: INTERNAL MEDICINE CLINIC | Age: 68
End: 2023-01-25
Payer: MEDICARE

## 2023-01-25 VITALS — HEIGHT: 65 IN | WEIGHT: 181 LBS | BODY MASS INDEX: 30.16 KG/M2

## 2023-01-25 DIAGNOSIS — Z79.4 UNCONTROLLED TYPE 2 DIABETES MELLITUS WITH HYPERGLYCEMIA, WITH LONG-TERM CURRENT USE OF INSULIN (HCC): Primary | ICD-10-CM

## 2023-01-25 DIAGNOSIS — E11.65 UNCONTROLLED TYPE 2 DIABETES MELLITUS WITH HYPERGLYCEMIA, WITH LONG-TERM CURRENT USE OF INSULIN (HCC): Primary | ICD-10-CM

## 2023-01-25 LAB — HBA1C MFR BLD HPLC: 8 % (ref 4.8–5.6)

## 2023-01-25 PROCEDURE — 83036 HEMOGLOBIN GLYCOSYLATED A1C: CPT | Performed by: PHARMACIST

## 2023-01-25 RX ORDER — NITROFURANTOIN 25; 75 MG/1; MG/1
100 CAPSULE ORAL 2 TIMES DAILY
COMMUNITY

## 2023-01-25 RX ORDER — METFORMIN HYDROCHLORIDE 1000 MG/1
1000 TABLET ORAL DAILY
Qty: 30 TABLET | Refills: 2 | Status: SHIPPED | OUTPATIENT
Start: 2023-01-25

## 2023-01-25 NOTE — PATIENT INSTRUCTIONS
Your A1c today was 8%. Your goal is to be below 7%  Hold off on Synjardy  Resume metformin 1000 mg daily. Adjust Tresiba to 34 units daily. Give at consistent time. For your Novolog: if pre meal blood sugar     If less between 100-150: skip  ? If 151-200: give 3 units  ? If 201-250: give 5 units  ?           If above 250: give 8 units    Give 2 units before bedtime snacks    Keep a log of your insulin doses

## 2023-01-26 ENCOUNTER — OFFICE VISIT (OUTPATIENT)
Dept: ORTHOPEDIC SURGERY | Age: 68
End: 2023-01-26
Payer: MEDICARE

## 2023-01-26 VITALS
OXYGEN SATURATION: 98 % | HEIGHT: 65 IN | BODY MASS INDEX: 30.82 KG/M2 | WEIGHT: 185 LBS | DIASTOLIC BLOOD PRESSURE: 73 MMHG | RESPIRATION RATE: 18 BRPM | SYSTOLIC BLOOD PRESSURE: 128 MMHG | HEART RATE: 81 BPM | TEMPERATURE: 97.7 F

## 2023-01-26 DIAGNOSIS — M17.0 BILATERAL PRIMARY OSTEOARTHRITIS OF KNEE: Primary | ICD-10-CM

## 2023-01-26 RX ORDER — TRAMADOL HYDROCHLORIDE 50 MG/1
50 TABLET ORAL
Qty: 14 TABLET | Refills: 0 | Status: SHIPPED | OUTPATIENT
Start: 2023-01-26 | End: 2023-02-02

## 2023-01-26 NOTE — PROGRESS NOTES
Identified pt with two pt identifiers (name and ). Reviewed chart in preparation for visit and have obtained necessary documentation. Meredith Vickers is a 77 y.o. female  Chief Complaint   Patient presents with    Knee Pain     Bilateral Knee     Visit Vitals  /73 (BP 1 Location: Right arm, BP Patient Position: Sitting, BP Cuff Size: Large adult)   Pulse 81   Temp 97.7 °F (36.5 °C) (Tympanic)   Resp 18   Ht 5' 5\" (1.651 m)   Wt 185 lb (83.9 kg)   SpO2 98%   BMI 30.79 kg/m²     1. Have you been to the ER, urgent care clinic since your last visit? Hospitalized since your last visit? No    2. Have you seen or consulted any other health care providers outside of the [address] [place] [address] since your last visit? Include any pap smears or colon screening.  No

## 2023-01-26 NOTE — PROGRESS NOTES
1/26/2023      CC: bilateral knee pain    HPI:      This is a 77y.o. year old female who presents for a follow up visit. The patient was last seen and diagnosed with bilateral knee osteoarthritis. The patient's treatments since the most recent visit have comprised of visco, with three months relief of pain. The patient has had no sustained relief of the chief complaint. PMH:  Past Medical History:   Diagnosis Date    Arthritis     Depression     Diabetes ([address]     Dysphagia     GERD (gastroesophageal reflux disease) 07/05/2019    Hypercholesteremia     Hypertension     Hyperthyroidism     IBS (irritable bowel syndrome)     Liver disease     hepatitis b    Nausea & vomiting     PUD (peptic ulcer disease)     bleeding ulcer    Sleep apnea     CPAP    Urinary incontinence        PSxHx:  Past Surgical History:   Procedure Laterality Date    HX APPENDECTOMY  1999    HX BACK SURGERY      x3    HX BLEPHAROPLASTY Right     HX BUNIONECTOMY      HX CHOLECYSTECTOMY  06/21/2021    HX COLONOSCOPY  2018    HX ENDOSCOPY  July 5 2019    HX HYSTERECTOMY      HX OTHER SURGICAL      Collapsed lung    HX UROLOGICAL  2019    HX WISDOM TEETH EXTRACTION      [place] UNLISTED PROCEDURE BREAST  09/18       Meds:    Current Outpatient Medications:     nitrofurantoin, macrocrystal-monohydrate, (MACROBID) 100 mg capsule, Take 100 mg by mouth two (2) times a day. X 5 days, Disp: , Rfl:     BORIC ACID VAGINAL SUPPOSITORIES, Insert 600 mg into vagina daily. X 14 days, Disp: , Rfl:     metFORMIN (GLUCOPHAGE) 1,000 mg tablet, Take 1 Tablet by mouth daily. , Disp: 30 Tablet, Rfl: 2    fluconazole (DIFLUCAN) 200 mg tablet, Take 1 Tablet by mouth daily. , Disp: , Rfl:     brexpiprazole (Rexulti) 1 mg tab tablet, Take 1 Tablet by mouth daily. , Disp: 30 Tablet, Rfl: 5    nystatin-triamcinolone (MYCOLOG) 100,000-0.1 unit/gram-% ointment, APPLY TOPICALLY TO THE AFFECTED AREA TWICE DAILY, Disp: , Rfl:     busPIRone (BUSPAR) 5 mg tablet, Take 1 Tablet by mouth two (2) times a day., Disp: 60 Tablet, Rfl: 5    Tresiba FlexTouch U-100 100 unit/mL (3 mL) inpn, 32 Units by SubCUTAneous route daily. Indications: type 2 diabetes mellitus, Disp: 15 mL, Rfl: 1    insulin aspart U-100 (NOVOLOG) 100 unit/mL (3 mL) inpn, 6-8 Units by SubCUTAneous route Before breakfast, lunch, and dinner., Disp: 15 mL, Rfl: 0    ALPRAZolam (XANAX) 0.25 mg tablet, TAKE 1 TABLET BY MOUTH  TWICE DAILY AS NEEDED FOR  ANXIETY, Disp: 60 Tablet, Rfl: 3    pravastatin (PRAVACHOL) 20 mg tablet, Take 1 Tablet by mouth nightly., Disp: 90 Tablet, Rfl: 3    amitriptyline (ELAVIL) 10 mg tablet, Take 1 Tablet by mouth nightly., Disp: 90 Tablet, Rfl: 3    levothyroxine (SYNTHROID) 300 mcg tablet, TAKE 1 TABLET BY MOUTH ONCE DAILY BEFORE BREAKFAST 6 DAYS A WEEK AND TAKE 150 MCG ONE DAY A WEEK, Disp: 90 Tablet, Rfl: 3    lisinopriL (PRINIVIL, ZESTRIL) 5 mg tablet, TAKE 1 TABLET BY MOUTH  DAILY, Disp: 90 Tablet, Rfl: 3    furosemide (LASIX) 20 mg tablet, TAKE 1 TABLET BY MOUTH  DAILY AS NEEDED FOR EDEMA, Disp: 90 Tablet, Rfl: 3    Linzess 145 mcg cap capsule, Take 145 mcg by mouth daily. , Disp: , Rfl:     Omeprazole delayed release (PRILOSEC D/R) 20 mg tablet, Take 20 mg by mouth daily. , Disp: , Rfl:     vilazodone (VIIBRYD) 20 mg tab tablet, Take 1 Tablet by mouth daily. , Disp: 20 Tablet, Rfl: 0    famotidine (PEPCID) 40 mg tablet, Take 40 mg by mouth two (2) times a day., Disp: , Rfl:     nystatin (MYCOSTATIN) powder, Apply  to affected area four (4) times daily. , Disp: 60 g, Rfl: 1    FreeStyle Julien 2 Sensor kit, 1 Each by Other route See Salvatore Shaw. Use to scan sensor three times daily, Disp: , Rfl:     NOVOFINE PLUS 32 gauge x 1/6\" ndle, Check sugars 4x daily. , Disp: 300 Pen Needle, Rfl: 3    acetaminophen (TYLENOL) 500 mg tablet, Take 1 Tab by mouth every six (6) hours as needed for Pain., Disp: 30 Tab, Rfl: 0    methocarbamoL (ROBAXIN) 750 mg tablet, Take 1 Tablet by mouth four (4) times daily as needed for Muscle Spasm(s). (Patient not taking: No sig reported), Disp: 20 Tablet, Rfl: 0    All: Allergies   Allergen Reactions    Celebrex [Celecoxib] Other (comments)     Hallucinations    Codeine Nausea and Vomiting    Erythromycin Nausea and Vomiting       Social Hx:  Social History     Socioeconomic History    Marital status: SINGLE   Tobacco Use    Smoking status: Never    Smokeless tobacco: Never   Vaping Use    Vaping Use: Never used   Substance and Sexual Activity    Alcohol use: Not Currently     Alcohol/week: 0.0 standard drinks     Comment: occasionally    Drug use: Never    Sexual activity: Not Currently     Social Determinants of Health     Financial Resource Strain: Low Risk     Difficulty of Paying Living Expenses: Not very hard   Food Insecurity: No Food Insecurity    Worried About Running Out of Food in the Last Year: Never true    Ran Out of Food in the Last Year: Never true       Family Hx:  Family History   Problem Relation Age of Onset    Diabetes Mother         passed    Heart Disease Mother         passed    Hypertension Mother         passed    Cancer Father         passed Colon    Alcohol abuse Father     Diabetes Sister     Anxiety Sister     Diabetes Brother     Anxiety Brother     Diabetes Brother     Anxiety Brother     Diabetes Sister     Anxiety Sister     Diabetes Sister     Anxiety Sister     Cancer Sister         lung and brain    Anxiety Sister     Anxiety Sister     Breast Cancer Paternal Aunt         under 48    Cancer Paternal Aunt         Breast    Cancer Sister         passed Brain.   Lung and bone    Diabetes Sister         passed    Diabetes Brother         passed         Review of Systems:       General: Denies headache, lethargy, fever, weight loss  Ears/Nose/Throat: Denies ear discharge, drainage, nosebleeds, hoarse voice, dental problems  Cardiovascular: Denies chest pain, shortness of breath  Lungs: Denies chest pain, breathing problems, wheezing, pneumonia  Stomach: Denies stomach pain, heartburn, constipation, irritable bowel  Skin: Denies rash, sores, open wounds  Musculoskeletal: bilateral knee pain  Genitourinary: Denies dysuria, hematuria, polyuria  Gastrointestinal: Denies constipation, obstipation, diarrhea  Neurological: Denies changes in sight, smell, hearing, taste, seizures. Denies loss of consciousness. Psychiatric: Denies depression, sleep pattern changes, anxiety, change in personality  Endocrine: Denies mood swings, heat or cold intolerance  Hematologic/Lymphatic: Denies anemia, purpura, petechia  Allergic/Immunologic: Denies swelling of throat, pain or swelling at lymph nodes      Physical Examination:    Visit Vitals  /73 (BP 1 Location: Right arm, BP Patient Position: Sitting, BP Cuff Size: Large adult)   Pulse 81   Temp 97.7 °F (36.5 °C) (Tympanic)   Resp 18   Ht 5' 5\" (1.651 m)   Wt 185 lb (83.9 kg)   SpO2 98%   BMI 30.79 kg/m²        General: AOX3, no apparent distress  Psychiatric: mood and affect appropriate  Lungs: breathing is symmetric and unlabored bilaterally  Heart: regular rate and rhythm  Abdomen: no guarding  Head: normocephalic, atraumatic  Skin: No significant abnormalities, good turgor  Sensation intact to light touch: C5-T1 dermatomes  Muscular exam: 5/5 strength in all major muscle groups unless noted in specialty exam.    Extremities        Left lower extremity:  Knee is noted to have a mild effusion. Medial joint line tenderness to palpation with a negative deformity. Patellar crepitus with range of motion is noted. Range of motion is 0-120. Sensation is intact to light touch L1-S1 dermatomes. Knee flexion and extension strength is 5/5 with Tibialis anterior, EHL, FHL being 5/5 as well. No other gross deformity or deficit is noted. Right lower extremity: Knee is noted to have a mild effusion. Medial joint line tenderness to palpation with a negative deformity.   Patellar crepitus with range of motion is noted.  Range of motion is 0-120. Sensation is intact to light touch L1-S1 dermatomes. Knee flexion and extension strength is 5/5 with Tibialis anterior, EHL, FHL being 5/5 as well. No other gross deformity or deficit is noted. Diagnostics:    Pertinent Diagnostics: xrays ordered and reviewed by myself. Xrays are available of the bilateral knee, they indicate moderate osteoarthritis of the knee joints, no significant other findings, no other osseus abnormalities, fractures, or dislocations. Assessment: Bilateral knee Osteoarthritis  Plan: This patient I had a long discussion regarding treatment options. We went over the nonoperative options, continued medications, injections of multiple types, bracing, physical therapy, maintenance of ideal body weight. Patient has elected to proceed with weight bearing exercises, tight glucose control, possible repeat viscosupplementation when eligible      Ms. Sarah Don has a reminder for a \"due or due soon\" health maintenance. I have asked that she contact her primary care provider for follow-up on this health maintenance.

## 2023-01-31 ENCOUNTER — TELEPHONE (OUTPATIENT)
Dept: INTERNAL MEDICINE CLINIC | Age: 68
End: 2023-01-31

## 2023-01-31 NOTE — TELEPHONE ENCOUNTER
Pharmacy Progress Note - Telephone Call    Ms. Delma Kasper 77 y.o. was contacted via an outbound telephone call regarding [employer] PAP application today. A voicemail was left for patient to return my call. Patient ID: [id number]  Patient is now approved until Nov 2023.        Thank you,  Tavia Jimenez, PharmD, BCACP, [facility]    Program: Medical Group  CPA in place: Yes  Recommendation Provided To: Patient/Caregiver: 1 via Telephone  Intervention Detail: Patient Access Assistance/Sample Provided  Intervention Accepted By: Patient/Caregiver: 1  Time Spent (min): 5

## 2023-02-03 ENCOUNTER — TELEPHONE (OUTPATIENT)
Dept: INTERNAL MEDICINE CLINIC | Age: 68
End: 2023-02-03

## 2023-02-03 NOTE — TELEPHONE ENCOUNTER
Pt states that she needs to reschedule the appt with iris on 2/6    States psr offered next available to schedule on 3/2    Pt declined , states wants sooner    States \"iris told her to call and talk to iris if has a problem with scheduling\"    States wants call back to work in sooner please     Call [phone]

## 2023-02-06 ENCOUNTER — TELEPHONE (OUTPATIENT)
Dept: ORTHOPEDIC SURGERY | Age: 68
End: 2023-02-06

## 2023-02-06 NOTE — TELEPHONE ENCOUNTER
Patient came by the office asking about the braces for her knees mentioned during her last apt on on 1/26/23. She would like to proceed with getting these braces. Which braces would you like her to get and can you please amend her office note?

## 2023-02-11 NOTE — PROGRESS NOTES
Pharmacy Progress Note - Diabetes Management    Assessment / Plan:   Diabetes Management:  - Per ADA guidelines, Pt's A1c is not at goal of < 7%. - Current CGM trends remain elevated for goal - discussed importance covering meals - particularly evening snacks with Novolog  - Increase Tresiba to 38 units daily  - Continue metformin 1 gm daily  - Reinforce Novolog dosing schedule again today   If pre meal BG sugar:  If less between 100-150: skip  ? If 151-200: give 3 units  ? If 201-250: give 5 units  ? If above 250: give 8 units    - Checked with [employer] regarding insulin shipment. Order was processed in [address] currently 1 month behind. S/O: Ms. Ky Hanson is a 77 y.o. female, referred by Dr. Inocencia Walsh DO, with a PMH of T2DM, HTN, HLD, Hypothyroidism, OAB, IBS, osteoporosis, was seen  today for diabetes management follow up. Last A1c was 8% (Jan 2023),7.2% (Jun 2022), 7.3% (Feb 2022), 6.9% (Sept 2021), 6.3% (June 2021), 8% (Oct 2020), 8.4% (June 2020), 7.2% (Dec 2019). Interim update:   Synjardy stopped in January d/t recurrent UTIs  Remains on metformin 1 gm daily    Current anti-hyperglycemic regimen include(s):    - Metformin 1 gm daily  - Tresiba 34 units daily  - Novolog: if pre meal BG:  If less between 100-150: skip  ? If 151-200: give 3 units  ? If 201-250: give 5 units  ? If above 250: give 8 units  Giving between 4 or 6 units once to twice daily. Continues to eat late night snacks    - Lisinopril 5 mg daily, pravastatin 20 mg daily ; LDL 76 (July 2022)    ROS:  Today, Pt endorses:  - Symptoms of Hyperglycemia: fatigue  - Symptoms of Hypoglycemia: none    Blood Glucose Monitoring (BGM) or CGM:  Julien 2 CGM    14 days:                  No new abx  Wt up 5 lbs since January. The 10-year ASCVD risk score (Brayan CADENA, et al., 2019) is: 20.7%       Vitals:   Wt Readings from Last 3 Encounters:   02/14/23 186 lb 6.4 oz (84.6 kg)   01/26/23 185 lb (83.9 kg)   01/25/23 181 lb (82.1 kg)     BP Readings from Last 3 Encounters:   02/14/23 122/66   01/26/23 128/73   01/19/23 102/63     Pulse Readings from Last 3 Encounters:   02/14/23 69   01/26/23 81   01/19/23 83       Past Medical History:   Diagnosis Date    Arthritis     Depression     Diabetes (HCC)     Dysphagia     GERD (gastroesophageal reflux disease) 07/05/2019    Hypercholesteremia     Hypertension     Hyperthyroidism     IBS (irritable bowel syndrome)     Liver disease     hepatitis b    Nausea & vomiting     PUD (peptic ulcer disease)     bleeding ulcer    Sleep apnea     CPAP    Urinary incontinence      Allergies   Allergen Reactions    Celebrex [Celecoxib] Other (comments)     Hallucinations    Codeine Nausea and Vomiting    Erythromycin Nausea and Vomiting       Current Outpatient Medications   Medication Sig    nitrofurantoin, macrocrystal-monohydrate, (MACROBID) 100 mg capsule Take 100 mg by mouth two (2) times a day. X 5 days    BORIC ACID VAGINAL SUPPOSITORIES Insert 600 mg into vagina daily. X 14 days    metFORMIN (GLUCOPHAGE) 1,000 mg tablet Take 1 Tablet by mouth daily. fluconazole (DIFLUCAN) 200 mg tablet Take 1 Tablet by mouth daily. brexpiprazole (Rexulti) 1 mg tab tablet Take 1 Tablet by mouth daily. methocarbamoL (ROBAXIN) 750 mg tablet Take 1 Tablet by mouth four (4) times daily as needed for Muscle Spasm(s). (Patient not taking: No sig reported)    nystatin-triamcinolone (MYCOLOG) 100,000-0.1 unit/gram-% ointment APPLY TOPICALLY TO THE AFFECTED AREA TWICE DAILY    busPIRone (BUSPAR) 5 mg tablet Take 1 Tablet by mouth two (2) times a day. Ermalene Narayan FlexTouch U-100 100 unit/mL (3 mL) inpn 32 Units by SubCUTAneous route daily. Indications: type 2 diabetes mellitus    insulin aspart U-100 (NOVOLOG) 100 unit/mL (3 mL) inpn 6-8 Units by SubCUTAneous route Before breakfast, lunch, and dinner.     ALPRAZolam (XANAX) 0.25 mg tablet TAKE 1 TABLET BY MOUTH  TWICE DAILY AS NEEDED FOR  ANXIETY    pravastatin (PRAVACHOL) 20 mg tablet Take 1 Tablet by mouth nightly. amitriptyline (ELAVIL) 10 mg tablet Take 1 Tablet by mouth nightly. levothyroxine (SYNTHROID) 300 mcg tablet TAKE 1 TABLET BY MOUTH ONCE DAILY BEFORE BREAKFAST 6 DAYS A WEEK AND TAKE 150 MCG ONE DAY A WEEK    lisinopriL (PRINIVIL, ZESTRIL) 5 mg tablet TAKE 1 TABLET BY MOUTH  DAILY    furosemide (LASIX) 20 mg tablet TAKE 1 TABLET BY MOUTH  DAILY AS NEEDED FOR EDEMA    Linzess 145 mcg cap capsule Take 145 mcg by mouth daily. Omeprazole delayed release (PRILOSEC D/R) 20 mg tablet Take 20 mg by mouth daily. vilazodone (VIIBRYD) 20 mg tab tablet Take 1 Tablet by mouth daily. famotidine (PEPCID) 40 mg tablet Take 40 mg by mouth two (2) times a day. nystatin (MYCOSTATIN) powder Apply  to affected area four (4) times daily. FreeStyle Julien 2 Sensor kit 1 Each by Other route See Salvatore Shaw. Use to scan sensor three times daily    NOVOFINE PLUS 32 gauge x 1/6\" ndle Check sugars 4x daily. acetaminophen (TYLENOL) 500 mg tablet Take 1 Tab by mouth every six (6) hours as needed for Pain. No current facility-administered medications for this visit. Lab Results   Component Value Date/Time    Sodium 142 12/11/2022 09:07 PM    Potassium 3.4 (L) 12/11/2022 09:07 PM    Chloride 113 (H) 12/11/2022 09:07 PM    CO2 24 12/11/2022 09:07 PM    Anion gap 5 12/11/2022 09:07 PM    Glucose 102 (H) 12/11/2022 09:07 PM    BUN 16 12/11/2022 09:07 PM    Creatinine 1.11 (H) 12/11/2022 09:07 PM    BUN/Creatinine ratio 14 12/11/2022 09:07 PM    GFR est AA >60 06/22/2022 10:09 AM    GFR est non-AA 51 (L) 06/22/2022 10:09 AM    Calcium 7.9 (L) 12/11/2022 09:07 PM    Bilirubin, total 0.7 12/11/2022 09:07 PM    Alk.  phosphatase 59 12/11/2022 09:07 PM    Protein, total 6.3 (L) 12/11/2022 09:07 PM    Albumin 3.3 (L) 12/11/2022 09:07 PM    Globulin 3.0 12/11/2022 09:07 PM    A-G Ratio 1.1 12/11/2022 09:07 PM    ALT (SGPT) 19 2022 09:07 PM       Lab Results   Component Value Date/Time    Cholesterol, total 163 2022 10:09 AM    HDL Cholesterol 72 2022 10:09 AM    LDL, calculated 76.4 2022 10:09 AM    VLDL, calculated 14.6 2022 10:09 AM    Triglyceride 73 2022 10:09 AM    CHOL/HDL Ratio 2.3 2022 10:09 AM       Lab Results   Component Value Date/Time    WBC 5.8 2022 09:07 PM    HGB 11.3 (L) 2022 09:07 PM    HCT 33.8 (L) 2022 09:07 PM    PLATELET 594 (L)  09:07 PM    MCV 88.9 2022 09:07 PM       Lab Results   Component Value Date/Time    Microalbumin/Creat ratio (mg/g creat) 17 2022 10:09 AM    Microalbumin,urine random 0.88 2022 10:09 AM       HbA1c:  Lab Results   Component Value Date/Time    Hemoglobin A1c 7.2 (H) 2022 10:09 AM    Hemoglobin A1c (POC) 8.0 (A) 2023 11:29 AM     No components found for: 2     Last Point of Care HGB A1C  Hemoglobin A1c (POC)   Date Value Ref Range Status   2023 8.0 (A) 4.8 - 5.6 % Final        Estimated Creatinine Clearance: 50.6 mL/min (A) (by C-G formula based on SCr of 1.11 mg/dL (H)). Medication reconciliation was completed during the visit. Medications Discontinued During This Encounter   Medication Reason    nitrofurantoin, macrocrystal-monohydrate, (MACROBID) 100 mg capsule Therapy Completed    fluconazole (DIFLUCAN) 200 mg tablet Therapy Completed    methocarbamoL (ROBAXIN) 750 mg tablet Therapy Completed    Tresiba FlexTouch U-100 100 unit/mL (3 mL) inpn DOSE ADJUSTMENT     Orders Placed This Encounter    insulin degludec Kali Retort FlexTouch U-200) 200 unit/mL (3 mL) inpn pen     Si Units by SubCUTAneous route daily. Dispense:  1 Adjustable Dose Pre-filled Pen Syringe     Refill:  0         Patient verbalized understanding of the information presented and all of the patients questions were answered. AVS was handed to the patient.  Patient advised to call the office with any additional questions or concerns. Notifications of recommendations will be sent to Dr. Lin Lo DO for review. RTC 4 weeks.     Thank you for the consult,  Tavia Cuevas, PharmD, BCACP, [address] [facility]    Program: Medical Group  CPA in place: Yes  Recommendation Provided To: Patient/Caregiver: 9 via In person  Intervention Detail: Adherence Monitorin, Discontinued Rx: 4, reason: Therapy Complete, Dose Adjustment: 1, reason: Therapy Optimization, Patient Access Assistance/Sample Provided, and Scheduled Appointment  Intervention Accepted By: Patient/Caregiver: 9 and Other: 9  Gap Closed?:   Time Spent (min): 60

## 2023-02-14 ENCOUNTER — OFFICE VISIT (OUTPATIENT)
Dept: INTERNAL MEDICINE CLINIC | Age: 68
End: 2023-02-14

## 2023-02-14 VITALS
HEIGHT: 65 IN | DIASTOLIC BLOOD PRESSURE: 66 MMHG | WEIGHT: 186.4 LBS | BODY MASS INDEX: 31.06 KG/M2 | SYSTOLIC BLOOD PRESSURE: 122 MMHG | HEART RATE: 69 BPM

## 2023-02-14 DIAGNOSIS — Z79.4 UNCONTROLLED TYPE 2 DIABETES MELLITUS WITH HYPERGLYCEMIA, WITH LONG-TERM CURRENT USE OF INSULIN (HCC): Primary | ICD-10-CM

## 2023-02-14 DIAGNOSIS — E11.65 UNCONTROLLED TYPE 2 DIABETES MELLITUS WITH HYPERGLYCEMIA, WITH LONG-TERM CURRENT USE OF INSULIN (HCC): Primary | ICD-10-CM

## 2023-02-14 RX ORDER — BENZONATATE 100 MG/1
100 CAPSULE ORAL
Qty: 30 CAPSULE | Refills: 0 | Status: SHIPPED | OUTPATIENT
Start: 2023-02-14

## 2023-02-14 RX ORDER — INSULIN DEGLUDEC 200 U/ML
38 INJECTION, SOLUTION SUBCUTANEOUS DAILY
Qty: 1 ADJUSTABLE DOSE PRE-FILLED PEN SYRINGE | Refills: 0 | Status: SHIPPED | COMMUNITY
Start: 2023-02-14

## 2023-02-14 NOTE — PATIENT INSTRUCTIONS
Increase Tresiba to 38 units daily  Continue metformin 1000 mg daily  Continue - Novolog: if pre meal BG:  If less between 100-150: skip  If 151-200: give 3 units  If 201-250: give 5 units  If above 250: give 8 units

## 2023-02-14 NOTE — PROGRESS NOTES
Pharmacy Progress Note - Diabetes Management    Assessment / Plan:   Diabetes Management:  - Per ADA guidelines, Pt's A1c {IS/IS NOT:63224} at goal of < ***. Resources provided:  -           Check: {Plan-TT:05434} at the next visit. Give: {Plan-TT:95025} at the next visit. S/O: Ms. Octavio Newby is a 77 y.o. female, referred by Dr. Marcos Conde DO, with a PMH of T2DM, HTN, HLD, Hypothyroidism, OAB, IBS, osteoporosis, was seen  today for diabetes management follow up. Last A1c was 8% (Jan 2023),7.2% (Jun 2022), 7.3% (Feb 2022), 6.9% (Sept 2021), 6.3% (June 2021), 8% (Oct 2020), 8.4% (June 2020), 7.2% (Dec 2019). Interim update:   Synjardy stopped in January d/t recurrent UTIs  Remains on metformin 1 gm daily    Current anti-hyperglycemic regimen include(s):    - Metformin 1 gm daily  - Tresiba 34 units daily  - Novolog: if pre meal BG:  If less between 100-150: skip  ? If 151-200: give 3 units  ? If 201-250: give 5 units  ? If above 250: give 8 units    - Lisinopril 5 mg daily, pravastatin 20 mg daily ; LDL 76 (July 2022)    ROS:  Today, Pt endorses:  - {Symptoms of Hyperglycemia:73710:::1}  - {Symptoms of Hypoglycemia:06221:::1}    Blood Glucose Monitoring (BGM) or CGM:  CGM - uses reader      Nutrition/Lifestyle Modifications: The 10-year ASCVD risk score (Brayan CADENA, et al., 2019) is: 20.7%       Vitals:   Wt Readings from Last 3 Encounters:   01/26/23 185 lb (83.9 kg)   01/25/23 181 lb (82.1 kg)   01/19/23 180 lb 3.2 oz (81.7 kg)     BP Readings from Last 3 Encounters:   01/26/23 128/73   01/19/23 102/63   12/12/22 135/70     Pulse Readings from Last 3 Encounters:   01/26/23 81   01/19/23 83   12/12/22 74       Past Medical History:   Diagnosis Date    Arthritis     Depression     Diabetes (HCC)     Dysphagia     GERD (gastroesophageal reflux disease) 07/05/2019    Hypercholesteremia     Hypertension     Hyperthyroidism     IBS (irritable bowel syndrome)     Liver disease     hepatitis b    Nausea & vomiting     PUD (peptic ulcer disease)     bleeding ulcer    Sleep apnea     CPAP    Urinary incontinence      Allergies   Allergen Reactions    Celebrex [Celecoxib] Other (comments)     Hallucinations    Codeine Nausea and Vomiting    Erythromycin Nausea and Vomiting       Current Outpatient Medications   Medication Sig    nitrofurantoin, macrocrystal-monohydrate, (MACROBID) 100 mg capsule Take 100 mg by mouth two (2) times a day. X 5 days    BORIC ACID VAGINAL SUPPOSITORIES Insert 600 mg into vagina daily. X 14 days    metFORMIN (GLUCOPHAGE) 1,000 mg tablet Take 1 Tablet by mouth daily. fluconazole (DIFLUCAN) 200 mg tablet Take 1 Tablet by mouth daily. brexpiprazole (Rexulti) 1 mg tab tablet Take 1 Tablet by mouth daily. methocarbamoL (ROBAXIN) 750 mg tablet Take 1 Tablet by mouth four (4) times daily as needed for Muscle Spasm(s). (Patient not taking: No sig reported)    nystatin-triamcinolone (MYCOLOG) 100,000-0.1 unit/gram-% ointment APPLY TOPICALLY TO THE AFFECTED AREA TWICE DAILY    busPIRone (BUSPAR) 5 mg tablet Take 1 Tablet by mouth two (2) times a day. [facility] FlexTouch U-100 100 unit/mL (3 mL) inpn 32 Units by SubCUTAneous route daily. Indications: type 2 diabetes mellitus    insulin aspart U-100 (NOVOLOG) 100 unit/mL (3 mL) inpn 6-8 Units by SubCUTAneous route Before breakfast, lunch, and dinner. ALPRAZolam (XANAX) 0.25 mg tablet TAKE 1 TABLET BY MOUTH  TWICE DAILY AS NEEDED FOR  ANXIETY    pravastatin (PRAVACHOL) 20 mg tablet Take 1 Tablet by mouth nightly. amitriptyline (ELAVIL) 10 mg tablet Take 1 Tablet by mouth nightly.     levothyroxine (SYNTHROID) 300 mcg tablet TAKE 1 TABLET BY MOUTH ONCE DAILY BEFORE BREAKFAST 6 DAYS A WEEK AND TAKE 150 MCG ONE DAY A WEEK    lisinopriL (PRINIVIL, ZESTRIL) 5 mg tablet TAKE 1 TABLET BY MOUTH  DAILY    furosemide (LASIX) 20 mg tablet TAKE 1 TABLET BY MOUTH  DAILY AS NEEDED FOR EDEMA Linzess 145 mcg cap capsule Take 145 mcg by mouth daily. Omeprazole delayed release (PRILOSEC D/R) 20 mg tablet Take 20 mg by mouth daily. vilazodone (VIIBRYD) 20 mg tab tablet Take 1 Tablet by mouth daily. famotidine (PEPCID) 40 mg tablet Take 40 mg by mouth two (2) times a day. nystatin (MYCOSTATIN) powder Apply  to affected area four (4) times daily. FreeStyle Julien 2 Sensor kit 1 Each by Other route See Salvatore Shaw. Use to scan sensor three times daily    NOVOFINE PLUS 32 gauge x 1/6\" ndle Check sugars 4x daily. acetaminophen (TYLENOL) 500 mg tablet Take 1 Tab by mouth every six (6) hours as needed for Pain. No current facility-administered medications for this visit. Lab Results   Component Value Date/Time    Sodium 142 12/11/2022 09:07 PM    Potassium 3.4 (L) 12/11/2022 09:07 PM    Chloride 113 (H) 12/11/2022 09:07 PM    CO2 24 12/11/2022 09:07 PM    Anion gap 5 12/11/2022 09:07 PM    Glucose 102 (H) 12/11/2022 09:07 PM    BUN 16 12/11/2022 09:07 PM    Creatinine 1.11 (H) 12/11/2022 09:07 PM    BUN/Creatinine ratio 14 12/11/2022 09:07 PM    GFR est AA >60 06/22/2022 10:09 AM    GFR est non-AA 51 (L) 06/22/2022 10:09 AM    Calcium 7.9 (L) 12/11/2022 09:07 PM    Bilirubin, total 0.7 12/11/2022 09:07 PM    Alk.  phosphatase 59 12/11/2022 09:07 PM    Protein, total 6.3 (L) 12/11/2022 09:07 PM    Albumin 3.3 (L) 12/11/2022 09:07 PM    Globulin 3.0 12/11/2022 09:07 PM    A-G Ratio 1.1 12/11/2022 09:07 PM    ALT (SGPT) 19 12/11/2022 09:07 PM       Lab Results   Component Value Date/Time    Cholesterol, total 163 06/22/2022 10:09 AM    HDL Cholesterol 72 06/22/2022 10:09 AM    LDL, calculated 76.4 06/22/2022 10:09 AM    VLDL, calculated 14.6 06/22/2022 10:09 AM    Triglyceride 73 06/22/2022 10:09 AM    CHOL/HDL Ratio 2.3 06/22/2022 10:09 AM       Lab Results   Component Value Date/Time    WBC 5.8 12/11/2022 09:07 PM    HGB 11.3 (L) 12/11/2022 09:07 PM    HCT 33.8 (L) 12/11/2022 09:07 PM    PLATELET 754 (L) 14/25/5789 09:07 PM    MCV 88.9 12/11/2022 09:07 PM       Lab Results   Component Value Date/Time    Microalbumin/Creat ratio (mg/g creat) 17 06/22/2022 10:09 AM    Microalbumin,urine random 0.88 06/22/2022 10:09 AM       HbA1c:  Lab Results   Component Value Date/Time    Hemoglobin A1c 7.2 (H) 06/22/2022 10:09 AM    Hemoglobin A1c (POC) 8.0 (A) 01/25/2023 11:29 AM     No components found for: 2     Last Point of Care HGB A1C  Hemoglobin A1c (POC)   Date Value Ref Range Status   01/25/2023 8.0 (A) 4.8 - 5.6 % Final        CrCl cannot be calculated (Unknown ideal weight. ). Medication reconciliation was completed during the visit. There are no discontinued medications. Patient verbalized understanding of the information presented and all of the patients questions were answered. AVS was handed to the patient. Patient advised to call the office with any additional questions or concerns. Notifications of recommendations will be sent to Dr. Chelo Vargas DO for review. Patient will return to clinic in {numbers 1-12:10005} week(s) for follow up.      Thank you for the consult,  Tavia Gomez, PharmD, BCACP, CDCES          {[facility] [employer]

## 2023-02-20 ENCOUNTER — VIRTUAL VISIT (OUTPATIENT)
Dept: SOCIAL WORK | Age: 68
End: 2023-02-20
Payer: MEDICARE

## 2023-02-20 DIAGNOSIS — F43.23 ADJUSTMENT DISORDER WITH MIXED ANXIETY AND DEPRESSED MOOD: Primary | ICD-10-CM

## 2023-02-20 PROCEDURE — G8427 DOCREV CUR MEDS BY ELIG CLIN: HCPCS | Performed by: SOCIAL WORKER

## 2023-02-20 PROCEDURE — 90834 PSYTX W PT 45 MINUTES: CPT | Performed by: SOCIAL WORKER

## 2023-02-20 PROCEDURE — G9717 DOC PT DX DEP/BP F/U NT REQ: HCPCS | Performed by: SOCIAL WORKER

## 2023-02-20 RX ORDER — BUSPIRONE HYDROCHLORIDE 5 MG/1
5 TABLET ORAL 2 TIMES DAILY
Qty: 60 TABLET | Refills: 5 | Status: SHIPPED | OUTPATIENT
Start: 2023-02-20

## 2023-02-20 NOTE — TELEPHONE ENCOUNTER
Future Appointments:  Future Appointments   Date Time Provider Cj Ferraroi   2/20/2023  3:00 PM Joanne Rasmussen MMSW BS AMB   3/1/2023  8:20 AM Joyce Motley [facility] BS AMB   4/3/2023 10:00 [facility]    4/14/2023 10:30 AM BUCK FOSTER 2 [facility]        Last Appointment With Me:  1/19/2023     Requested Prescriptions     Pending Prescriptions Disp Refills    busPIRone (BUSPAR) 5 mg tablet 60 Tablet 5     Sig: Take 1 Tablet by mouth two (2) times a day.

## 2023-02-20 NOTE — PROGRESS NOTES
PSYCHOTHERAPY NOTE      Aleksandar Meyer is a 77 y.o. female who presents with anxiety/depression. Client was seen for a virtual individual psychotherapy session that lasted for 50 minutes. Progress:  Client presents with an bright mood and affect. States she is feeling better than last session. She has started to take the Rexulit and reports benefit and compliance. Client did resolve car issues and she also resolved issues with her friend. She is planning to go and visit friend for a long weekend and is looking forward to it. 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 She does have arrangements for his care when she is gone. Client has struggled with a cold and this has affected her mood a little but overall, she feels in a much better space. Client feels that she is being more mindful of boundaries and self care. Client is aware of short term nature of my role and that we have only a few remaining sessions. Mutually agreed to follow up in one month.      Mental Status exam:         Sensorium  oriented to time, place and person   Relations cooperative   Appearance:  casually dressed   Motor Behavior:  within normal limits   Speech:  normal pitch and normal volume   Thought Process: goal directed and logical   Thought Content free of delusions and free of hallucinations   Suicidal ideations none   Homicidal ideations none   Mood:  euthymic   Affect:  euthymic   Memory recent  adequate   Memory remote:  adequate   Concentration:  adequate   Abstraction:  abstract   Insight:  fair   Reliability fair   Judgment:  fair         DIAGNOSIS AND IMPRESSION:    Axis I: Adjustment Disorder with Mixed Emotional Features  Axis II: Deferred    Axis III:   Past Medical History:   Diagnosis Date    Arthritis     Depression     Diabetes ([facility] [address]     Dysphagia     GERD (gastroesophageal reflux disease) 07/05/2019    Hypercholesteremia     Hypertension Hyperthyroidism     IBS (irritable bowel syndrome)     Liver disease     hepatitis b    Nausea & vomiting     PUD (peptic ulcer disease)     bleeding ulcer    Sleep apnea     CPAP    Urinary incontinence      Axis IV: Problems with primary support group, Problems related to social environment, and Other psychosocial or environmental problems  Axis V:  61-70 mild symptoms    Interventions/plans: Follow up in one month      Pursuant to the emergency declaration under the [address] [place] [address] authority and the [place] and [employer] Act, this Virtual Visit was conducted, with patient and parent and/or guardian's consent, to reduce the patient's risk of exposure to COVID-19 and provide continuity of care for an established patient. Due to the state of emergency related to the coronavirus, the [place] of Social Work and the [facility] is allowing practitioners holding my licensure and/or certification status the ability to provide telehealth services without the usual requirements. This individual was evaluated through a synchronous (real-time) audio-video encounter, and/or his/her healthcare decision maker, is aware that it is a billable service, which includes applicable co-pays, with coverage as determined by his/her insurance carrier. He/she provided verbal consent to proceed and patient identification was verified. This visit was conducted pursuant to the emergency declaration under the [address] [place] [address], [address] [place] [address] authority and the [employer] Act. A caregiver was present when appropriate. Ability to conduct physical exam was limited. The patient was located at home in a state where the provider was licensed to provide care.

## 2023-03-01 ENCOUNTER — OFFICE VISIT (OUTPATIENT)
Dept: INTERNAL MEDICINE CLINIC | Age: 68
End: 2023-03-01

## 2023-03-01 ENCOUNTER — TELEPHONE (OUTPATIENT)
Dept: INTERNAL MEDICINE CLINIC | Age: 68
End: 2023-03-01

## 2023-03-01 VITALS
SYSTOLIC BLOOD PRESSURE: 112 MMHG | DIASTOLIC BLOOD PRESSURE: 62 MMHG | HEIGHT: 65 IN | HEART RATE: 85 BPM | TEMPERATURE: 98 F | WEIGHT: 187.6 LBS | BODY MASS INDEX: 31.25 KG/M2 | RESPIRATION RATE: 16 BRPM | OXYGEN SATURATION: 99 %

## 2023-03-01 DIAGNOSIS — F32.1 CURRENT MODERATE EPISODE OF MAJOR DEPRESSIVE DISORDER WITHOUT PRIOR EPISODE (HCC): ICD-10-CM

## 2023-03-01 DIAGNOSIS — N18.30 TYPE 2 DM WITH CKD STAGE 3 AND HYPERTENSION (HCC): ICD-10-CM

## 2023-03-01 DIAGNOSIS — M79.644 THUMB PAIN, RIGHT: ICD-10-CM

## 2023-03-01 DIAGNOSIS — F41.1 GAD (GENERALIZED ANXIETY DISORDER): ICD-10-CM

## 2023-03-01 DIAGNOSIS — E11.22 TYPE 2 DM WITH CKD STAGE 3 AND HYPERTENSION (HCC): ICD-10-CM

## 2023-03-01 DIAGNOSIS — Z79.4 UNCONTROLLED TYPE 2 DIABETES MELLITUS WITH HYPERGLYCEMIA, WITH LONG-TERM CURRENT USE OF INSULIN (HCC): Primary | ICD-10-CM

## 2023-03-01 DIAGNOSIS — E11.65 UNCONTROLLED TYPE 2 DIABETES MELLITUS WITH HYPERGLYCEMIA, WITH LONG-TERM CURRENT USE OF INSULIN (HCC): Primary | ICD-10-CM

## 2023-03-01 DIAGNOSIS — I10 ESSENTIAL HYPERTENSION: ICD-10-CM

## 2023-03-01 DIAGNOSIS — I12.9 TYPE 2 DM WITH CKD STAGE 3 AND HYPERTENSION (HCC): ICD-10-CM

## 2023-03-01 RX ORDER — ESCITALOPRAM OXALATE 10 MG/1
10 TABLET ORAL DAILY
Qty: 30 TABLET | Refills: 0 | Status: SHIPPED | OUTPATIENT
Start: 2023-03-01

## 2023-03-01 RX ORDER — ESCITALOPRAM OXALATE 10 MG/1
10 TABLET ORAL DAILY
Qty: 90 TABLET | Refills: 3 | Status: SHIPPED | OUTPATIENT
Start: 2023-03-01
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
## 2023-03-01 NOTE — PATIENT INSTRUCTIONS
Continue Tresiba 38 units daily  Continue metformin 1000 mg daily    Adjust Novolog: if pre meal BG:  If less between 100-150: skip  If 151-200: give 3 units  If 201-250: give 5 units  If above 250: give 6 units  If you have a late night snack, give 2 units.

## 2023-03-01 NOTE — PROGRESS NOTES
1. \"Have you been to the ER, urgent care clinic since your last visit? Hospitalized since your last visit? \" No    2. \"Have you seen or consulted any other health care providers outside of the [address] [place] [address] since your last visit? \" No     3. For patients aged 39-70: Has the patient had a colonoscopy / FIT/ Cologuard? Yes - no Care Gap present      If the patient is female:    4. For patients aged 41-77: Has the patient had a mammogram within the past 2 years? Yes - no Care Gap present      5. For patients aged 21-65: Has the patient had a pap smear?  NA - based on age or sex

## 2023-03-01 NOTE — TELEPHONE ENCOUNTER
Pharmacy Progress Note - Telephone Encounter    S/O: Ms. Mone Colorado 77 y.o. female, referred by Dr. Stacy Britton, was contacted via an outbound telephone call to discuss her [place] today. Verified patients identifiers (name & ) per HIPAA policy.     - Has about 400 units left on current pen. A/P:  - Has not received [facility] [employer] insulin shipment for patient. - Based on current supply. Patient has enough for 10 days.   - Patient endorses understanding to the provided information. All questions answered at this time.        Thank you,  Tavia Yuan, PharmD, BCACP, [facility] Only    Program: Medical Group  CPA in place: Yes  Time Spent (min): 5

## 2023-03-01 NOTE — TELEPHONE ENCOUNTER
Pt needs a call back pertaining to the [facility] as she is about out. She is asking for you. Thanks.

## 2023-03-16 ENCOUNTER — TELEPHONE (OUTPATIENT)
Dept: INTERNAL MEDICINE CLINIC | Age: 68
End: 2023-03-16

## 2023-03-16 NOTE — TELEPHONE ENCOUNTER
Called, spoke to pt  Received two pt identifiers  Informed pt PAP of insulin and needles are ready to be picked up  Pt states will be here later today  Pt also states go eye exam done. Advised pt will request records  Pt verbalizes understanding of the instructions and has no further questions at this time.

## 2023-03-16 NOTE — LETTER
3/16/2023 11:51 AM    Ms. June Tijerina  [address] [facility] [address]      Dear Care Provider    Please fax us the most recent notes and exam so that we may update the patient's records for continuity of care. Our fax number: [phone]. Patient:    June Tijerina  1955         Sincerely,      Dr. Johnathan Montiel

## 2023-03-20 ENCOUNTER — VIRTUAL VISIT (OUTPATIENT)
Dept: SOCIAL WORK | Age: 68
End: 2023-03-20

## 2023-03-20 DIAGNOSIS — F43.23 ADJUSTMENT DISORDER WITH MIXED ANXIETY AND DEPRESSED MOOD: Primary | ICD-10-CM

## 2023-03-20 NOTE — PROGRESS NOTES
PSYCHOTHERAPY NOTE      Negrito Garsia is a 77 y.o. female who presents with anxiety/depression. Client was seen for a virtual individual psychotherapy session that lasted for 30 minutes. Progress: While client reports experiencing some anxiety from time to time, overall, she indicates mood is improving. Client reports some difficulty with tolerating Lexapro. Indicates it makes her very sleepy. Encouraged her to reach out to PCP to address. Client states that boyfriend is no longer working and he is now home more. This is challenging in their relationship, so encouraged client to explore volunteer work or engaging in other activities outside of the home. She has been informed of local support groups and encouraged to consider. She also is going on a trip next month to [place] and considers this to be opportunity for respite. Reminded client of short term nature of my role and we only have one remaining session. Shared with her information on local long term providers. Follow up in one month    Focus of session was on self care and boundaries.      Mental Status exam:         Sensorium  oriented to time, place and person   Relations cooperative   Appearance:  casually dressed   Motor Behavior:  within normal limits   Speech:  normal pitch and normal volume   Thought Process: goal directed and logical   Thought Content free of delusions and free of hallucinations   Suicidal ideations none   Homicidal ideations none   Mood:  euthymic   Affect:  euthymic   Memory recent  adequate   Memory remote:  adequate   Concentration:  adequate   Abstraction:  abstract   Insight:  fair   Reliability fair   Judgment:  fair         DIAGNOSIS AND IMPRESSION:      Axis I: Adjustment Disorder with Mixed Emotional Features  Axis II: Deferred    Axis III:   Past Medical History:   Diagnosis Date    Arthritis     Depression     Diabetes ([facility] [address]     Dysphagia     GERD (gastroesophageal reflux disease) 07/05/2019 Hypercholesteremia     Hypertension     Hyperthyroidism     IBS (irritable bowel syndrome)     Liver disease     hepatitis b    Nausea & vomiting     PUD (peptic ulcer disease)     bleeding ulcer    Sleep apnea     CPAP    Urinary incontinence      Axis IV: Problems with primary support group, Problems related to social environment, and Other psychosocial or environmental problems  Axis V:  61-70 mild symptoms    Interventions/plans: Follow up in one month      Pursuant to the emergency declaration under the [address] [place] [address] waiver authority and the [employer] Act, this Virtual Visit was conducted, with patient and parent and/or guardian's consent, to reduce the patient's risk of exposure to COVID-19 and provide continuity of care for an established patient. Due to the state of emergency related to the coronavirus, the [place] [employer] and the [facility] is allowing practitioners holding my licensure and/or certification status the ability to provide telehealth services without the usual requirements. This individual was evaluated through a synchronous (real-time) audio-video encounter, and/or his/her healthcare decision maker, is aware that it is a billable service, which includes applicable co-pays, with coverage as determined by his/her insurance carrier. He/she provided verbal consent to proceed and patient identification was verified. This visit was conducted pursuant to the emergency declaration under the [address] [place] [address] waiver authority and the [facility] Act. A caregiver was present when appropriate. Ability to conduct physical exam was limited. The patient was located at home in a state where the provider was licensed to provide care.

## 2023-04-03 ENCOUNTER — HOSPITAL ENCOUNTER (OUTPATIENT)
Dept: MAMMOGRAPHY | Age: 68
End: 2023-04-03
Attending: INTERNAL MEDICINE
Payer: MEDICARE

## 2023-04-03 DIAGNOSIS — Z12.31 VISIT FOR SCREENING MAMMOGRAM: ICD-10-CM

## 2023-04-03 PROCEDURE — 77063 BREAST TOMOSYNTHESIS BI: CPT

## 2023-04-24 ENCOUNTER — OFFICE VISIT (OUTPATIENT)
Dept: INTERNAL MEDICINE CLINIC | Age: 68
End: 2023-04-24

## 2023-04-24 VITALS
HEART RATE: 71 BPM | SYSTOLIC BLOOD PRESSURE: 137 MMHG | HEIGHT: 65 IN | BODY MASS INDEX: 32.15 KG/M2 | WEIGHT: 193 LBS | DIASTOLIC BLOOD PRESSURE: 79 MMHG

## 2023-04-24 DIAGNOSIS — E11.65 UNCONTROLLED TYPE 2 DIABETES MELLITUS WITH HYPERGLYCEMIA, WITH LONG-TERM CURRENT USE OF INSULIN (HCC): Primary | ICD-10-CM

## 2023-04-24 DIAGNOSIS — Z79.4 UNCONTROLLED TYPE 2 DIABETES MELLITUS WITH HYPERGLYCEMIA, WITH LONG-TERM CURRENT USE OF INSULIN (HCC): Primary | ICD-10-CM

## 2023-04-24 NOTE — PATIENT INSTRUCTIONS
Will place order for your Rudolph reader to [facility]    Let's get labs  Continue metformin 1000 mg daily  Continue Tresiba 38 units daily   Continue Novolog - if pre meal BG:   If 151-200: give 3 units  If 201-250: give 5 units  If above 250: give 6 units  If you have a late night snack, give 2 units.

## 2023-04-25 LAB
ALBUMIN SERPL-MCNC: 3.7 G/DL (ref 3.5–5)
ALBUMIN/GLOB SERPL: 1.3 (ref 1.1–2.2)
ALP SERPL-CCNC: 62 U/L (ref 45–117)
ALT SERPL-CCNC: 24 U/L (ref 12–78)
ANION GAP SERPL CALC-SCNC: 3 MMOL/L (ref 5–15)
AST SERPL-CCNC: 11 U/L (ref 15–37)
BILIRUB SERPL-MCNC: 0.4 MG/DL (ref 0.2–1)
BUN SERPL-MCNC: 19 MG/DL (ref 6–20)
BUN/CREAT SERPL: 18 (ref 12–20)
CALCIUM SERPL-MCNC: 9.4 MG/DL (ref 8.5–10.1)
CHLORIDE SERPL-SCNC: 107 MMOL/L (ref 97–108)
CO2 SERPL-SCNC: 27 MMOL/L (ref 21–32)
CREAT SERPL-MCNC: 1.08 MG/DL (ref 0.55–1.02)
EST. AVERAGE GLUCOSE BLD GHB EST-MCNC: 171 MG/DL
GLOBULIN SER CALC-MCNC: 2.9 G/DL (ref 2–4)
GLUCOSE SERPL-MCNC: 251 MG/DL (ref 65–100)
HBA1C MFR BLD: 7.6 % (ref 4–5.6)
POTASSIUM SERPL-SCNC: 4.7 MMOL/L (ref 3.5–5.1)
PROT SERPL-MCNC: 6.6 G/DL (ref 6.4–8.2)
SODIUM SERPL-SCNC: 137 MMOL/L (ref 136–145)

## 2023-04-26 ENCOUNTER — OFFICE VISIT (OUTPATIENT)
Dept: ORTHOPEDIC SURGERY | Age: 68
End: 2023-04-26
Payer: MEDICARE

## 2023-04-26 ENCOUNTER — TELEPHONE (OUTPATIENT)
Dept: ORTHOPEDIC SURGERY | Age: 68
End: 2023-04-26

## 2023-04-26 VITALS
HEIGHT: 65 IN | RESPIRATION RATE: 18 BRPM | DIASTOLIC BLOOD PRESSURE: 57 MMHG | WEIGHT: 196.8 LBS | TEMPERATURE: 97.4 F | OXYGEN SATURATION: 97 % | SYSTOLIC BLOOD PRESSURE: 108 MMHG | BODY MASS INDEX: 32.79 KG/M2 | HEART RATE: 80 BPM

## 2023-04-26 DIAGNOSIS — M17.0 BILATERAL PRIMARY OSTEOARTHRITIS OF KNEE: Primary | ICD-10-CM

## 2023-04-26 PROCEDURE — G9717 DOC PT DX DEP/BP F/U NT REQ: HCPCS | Performed by: ORTHOPAEDIC SURGERY

## 2023-04-26 PROCEDURE — 1090F PRES/ABSN URINE INCON ASSESS: CPT | Performed by: ORTHOPAEDIC SURGERY

## 2023-04-26 PROCEDURE — 1123F ACP DISCUSS/DSCN MKR DOCD: CPT | Performed by: ORTHOPAEDIC SURGERY

## 2023-04-26 PROCEDURE — G8536 NO DOC ELDER MAL SCRN: HCPCS | Performed by: ORTHOPAEDIC SURGERY

## 2023-04-26 PROCEDURE — 3074F SYST BP LT 130 MM HG: CPT | Performed by: ORTHOPAEDIC SURGERY

## 2023-04-26 PROCEDURE — 3017F COLORECTAL CA SCREEN DOC REV: CPT | Performed by: ORTHOPAEDIC SURGERY

## 2023-04-26 PROCEDURE — G8427 DOCREV CUR MEDS BY ELIG CLIN: HCPCS | Performed by: ORTHOPAEDIC SURGERY

## 2023-04-26 PROCEDURE — G9899 SCRN MAM PERF RSLTS DOC: HCPCS | Performed by: ORTHOPAEDIC SURGERY

## 2023-04-26 PROCEDURE — 99213 OFFICE O/P EST LOW 20 MIN: CPT | Performed by: ORTHOPAEDIC SURGERY

## 2023-04-26 PROCEDURE — 1101F PT FALLS ASSESS-DOCD LE1/YR: CPT | Performed by: ORTHOPAEDIC SURGERY

## 2023-04-26 PROCEDURE — G8417 CALC BMI ABV UP PARAM F/U: HCPCS | Performed by: ORTHOPAEDIC SURGERY

## 2023-04-26 PROCEDURE — 3078F DIAST BP <80 MM HG: CPT | Performed by: ORTHOPAEDIC SURGERY

## 2023-04-26 RX ORDER — MELOXICAM 15 MG/1
15 TABLET ORAL DAILY
Qty: 60 TABLET | Refills: 1 | Status: SHIPPED | OUTPATIENT
Start: 2023-04-26

## 2023-04-26 NOTE — PROGRESS NOTES
Identified pt with two pt identifiers (name and ). Reviewed chart in preparation for visit and have obtained necessary documentation. Adwoa Braswell is a 77 y.o. female  Chief Complaint   Patient presents with    Knee Pain     Tereso Knee     Visit Vitals  BP (!) 108/57 (BP 1 Location: Right upper arm, BP Patient Position: Sitting, BP Cuff Size: Large adult)   Pulse 80   Temp 97.4 °F (36.3 °C) (Tympanic)   Resp 18   Ht 5' 5\" (1.651 m)   Wt 196 lb 12.8 oz (89.3 kg)   SpO2 97%   BMI 32.75 kg/m²     1. Have you been to the ER, urgent care clinic since your last visit? Hospitalized since your last visit? No    2. Have you seen or consulted any other health care providers outside of the [address] [place] [address] since your last visit? Include any pap smears or colon screening.  No

## 2023-04-26 NOTE — PROGRESS NOTES
4/26/2023      CC: bilateral knee pain    HPI:      This is a 77y.o. year old female who presents for a follow up visit. The patient was last seen and diagnosed with bilateral knee osteoarthritis. The patient's treatments since the most recent visit have comprised of viscosupplementation. The patient has had 3 months relief of the chief complaint. PMH:  Past Medical History:   Diagnosis Date    Arthritis     Depression     Diabetes ([address]     Dysphagia     GERD (gastroesophageal reflux disease) 07/05/2019    Hypercholesteremia     Hypertension     Hyperthyroidism     IBS (irritable bowel syndrome)     Liver disease     hepatitis b    Nausea & vomiting     PUD (peptic ulcer disease)     bleeding ulcer    Sleep apnea     CPAP    Urinary incontinence        PSxHx:  Past Surgical History:   Procedure Laterality Date    HX APPENDECTOMY  1999    HX BACK SURGERY      x3    HX BLEPHAROPLASTY Right     HX BUNIONECTOMY      HX CHOLECYSTECTOMY  06/21/2021    HX COLONOSCOPY  2018    HX ENDOSCOPY  July 5 2019    HX HYSTERECTOMY      HX OTHER SURGICAL      Collapsed lung    HX UROLOGICAL  2019    HX WISDOM TEETH EXTRACTION      [place] UNLISTED PROCEDURE BREAST  09/18       Meds:    Current Outpatient Medications:     meloxicam (MOBIC) 15 mg tablet, Take 1 Tablet by mouth daily. , Disp: 60 Tablet, Rfl: 1    escitalopram oxalate (LEXAPRO) 10 mg tablet, TAKE 1 TABLET BY MOUTH DAILY, Disp: 90 Tablet, Rfl: 3    escitalopram oxalate (LEXAPRO) 10 mg tablet, Take 1 Tablet by mouth daily. , Disp: 90 Tablet, Rfl: 3    busPIRone (BUSPAR) 5 mg tablet, Take 1 Tablet by mouth two (2) times a day., Disp: 60 Tablet, Rfl: 5    brexpiprazole (Rexulti) 1 mg tab tablet, Take 1 Tablet by mouth daily. , Disp: 90 Tablet, Rfl: 3    insulin degludec Eliisamar Bermeo FlexTouch U-200) 200 unit/mL (3 mL) inpn pen, 38 Units by SubCUTAneous route daily. , Disp: 1 Adjustable Dose Pre-filled Pen Syringe, Rfl: 0    benzonatate (TESSALON) 100 mg capsule, Take 1 Capsule by mouth three (3) times daily as needed for Cough. , Disp: 30 Capsule, Rfl: 0    BORIC ACID VAGINAL SUPPOSITORIES, Insert 600 mg into vagina daily. X 14 days, Disp: , Rfl:     metFORMIN (GLUCOPHAGE) 1,000 mg tablet, Take 1 Tablet by mouth daily. , Disp: 30 Tablet, Rfl: 2    nystatin-triamcinolone (MYCOLOG) 100,000-0.1 unit/gram-% ointment, APPLY TOPICALLY TO THE AFFECTED AREA TWICE DAILY, Disp: , Rfl:     insulin aspart U-100 (NOVOLOG) 100 unit/mL (3 mL) inpn, 6-8 Units by SubCUTAneous route Before breakfast, lunch, and dinner., Disp: 15 mL, Rfl: 0    ALPRAZolam (XANAX) 0.25 mg tablet, TAKE 1 TABLET BY MOUTH  TWICE DAILY AS NEEDED FOR  ANXIETY, Disp: 60 Tablet, Rfl: 3    pravastatin (PRAVACHOL) 20 mg tablet, Take 1 Tablet by mouth nightly., Disp: 90 Tablet, Rfl: 3    amitriptyline (ELAVIL) 10 mg tablet, Take 1 Tablet by mouth nightly., Disp: 90 Tablet, Rfl: 3    levothyroxine (SYNTHROID) 300 mcg tablet, TAKE 1 TABLET BY MOUTH ONCE DAILY BEFORE BREAKFAST 6 DAYS A WEEK AND TAKE 150 MCG ONE DAY A WEEK, Disp: 90 Tablet, Rfl: 3    lisinopriL (PRINIVIL, ZESTRIL) 5 mg tablet, TAKE 1 TABLET BY MOUTH  DAILY, Disp: 90 Tablet, Rfl: 3    furosemide (LASIX) 20 mg tablet, TAKE 1 TABLET BY MOUTH  DAILY AS NEEDED FOR EDEMA, Disp: 90 Tablet, Rfl: 3    Linzess 145 mcg cap capsule, Take 1 Capsule by mouth daily. , Disp: , Rfl:     Omeprazole delayed release (PRILOSEC D/R) 20 mg tablet, Take 1 Tablet by mouth daily. , Disp: , Rfl:     famotidine (PEPCID) 40 mg tablet, Take 1 Tablet by mouth two (2) times a day., Disp: , Rfl:     nystatin (MYCOSTATIN) powder, Apply  to affected area four (4) times daily. , Disp: 60 g, Rfl: 1    FreeStyle Julien 2 Sensor kit, 1 Each by Other route See Salvatore Shaw. Use to scan sensor three times daily, Disp: , Rfl:     NOVOFINE PLUS 32 gauge x 1/6\" ndle, Check sugars 4x daily. , Disp: 300 Pen Needle, Rfl: 3    acetaminophen (TYLENOL) 500 mg tablet, Take 1 Tab by mouth every six (6) hours as needed for Pain., Disp: 30 Tab, Rfl: 0    All: Allergies   Allergen Reactions    Prednisone Other (comments)     Affect Glucose level    Celebrex [Celecoxib] Other (comments)     Hallucinations    Codeine Nausea and Vomiting    Erythromycin Nausea and Vomiting       Social Hx:  Social History     Socioeconomic History    Marital status: SINGLE   Tobacco Use    Smoking status: Never    Smokeless tobacco: Never   Vaping Use    Vaping Use: Never used   Substance and Sexual Activity    Alcohol use: Not Currently     Alcohol/week: 0.0 standard drinks     Comment: occasionally    Drug use: Never    Sexual activity: Not Currently     Social Determinants of Health     Financial Resource Strain: Low Risk     Difficulty of Paying Living Expenses: Not very hard   Food Insecurity: No Food Insecurity    Worried About Running Out of Food in the Last Year: Never true    Ran Out of Food in the Last Year: Never true       Family Hx:  Family History   Problem Relation Age of Onset    Diabetes Mother         passed    Heart Disease Mother         passed    Hypertension Mother         passed    Cancer Father         passed Colon    Alcohol abuse Father     Diabetes Sister     Anxiety Sister     Diabetes Brother     Anxiety Brother     Diabetes Brother     Anxiety Brother     Diabetes Sister     Anxiety Sister     Diabetes Sister     Anxiety Sister     Cancer Sister         lung and brain    Anxiety Sister     Anxiety Sister     Breast Cancer Paternal Aunt         under 48    Cancer Paternal Aunt         Breast    Cancer Sister         passed Brain.   Lung and bone    Diabetes Sister         passed    Diabetes Brother         passed         Review of Systems:       General: Denies headache, lethargy, fever, weight loss  Ears/Nose/Throat: Denies ear discharge, drainage, nosebleeds, hoarse voice, dental problems  Cardiovascular: Denies chest pain, shortness of breath  Lungs: Denies chest pain, breathing problems, wheezing, pneumonia  Stomach: Denies stomach pain, heartburn, constipation, irritable bowel  Skin: Denies rash, sores, open wounds  Musculoskeletal: bilateral knee pain  Genitourinary: Denies dysuria, hematuria, polyuria  Gastrointestinal: Denies constipation, obstipation, diarrhea  Neurological: Denies changes in sight, smell, hearing, taste, seizures. Denies loss of consciousness. Psychiatric: Denies depression, sleep pattern changes, anxiety, change in personality  Endocrine: Denies mood swings, heat or cold intolerance  Hematologic/Lymphatic: Denies anemia, purpura, petechia  Allergic/Immunologic: Denies swelling of throat, pain or swelling at lymph nodes      Physical Examination:    Visit Vitals  BP (!) 108/57 (BP 1 Location: Right upper arm, BP Patient Position: Sitting, BP Cuff Size: Large adult)   Pulse 80   Temp 97.4 °F (36.3 °C) (Tympanic)   Resp 18   Ht 5' 5\" (1.651 m)   Wt 196 lb 12.8 oz (89.3 kg)   SpO2 97%   BMI 32.75 kg/m²        General: AOX3, no apparent distress  Psychiatric: mood and affect appropriate  Lungs: breathing is symmetric and unlabored bilaterally  Heart: regular rate and rhythm  Abdomen: no guarding  Head: normocephalic, atraumatic  Skin: No significant abnormalities, good turgor  Sensation intact to light touch: C5-T1 dermatomes  Muscular exam: 5/5 strength in all major muscle groups unless noted in specialty exam.    Extremities        Left lower extremity:  Knee is noted to have a mild effusion. Medial joint line tenderness to palpation with a negative deformity. Patellar crepitus with range of motion is noted. Range of motion is 0-1 10. Sensation is intact to light touch L1-S1 dermatomes. Knee flexion and extension strength is 5/5 with Tibialis anterior, EHL, FHL being 5/5 as well. No other gross deformity or deficit is noted. Right lower extremity: Knee is noted to have a mild effusion. Medial joint line tenderness to palpation with a negative deformity.   Patellar crepitus with range of motion is noted. Range of motion is 0-1 10. Sensation is intact to light touch L1-S1 dermatomes. Knee flexion and extension strength is 5/5 with Tibialis anterior, EHL, FHL being 5/5 as well. No other gross deformity or deficit is noted. Diagnostics:    Pertinent Diagnostics: none today    Assessment: Bilateral knee Osteoarthritis  Plan: This patient I had a long discussion regarding treatment options. We went over the nonoperative options, continued medications, injections of multiple types, bracing, physical therapy, maintenance of ideal body weight. Patient has elected to proceed with a new prescription, given by myself today of meloxicam, and viscosupplementation. Ms. Opal Newell has a reminder for a \"due or due soon\" health maintenance. I have asked that she contact her primary care provider for follow-up on this health maintenance.

## 2023-04-27 ENCOUNTER — VIRTUAL VISIT (OUTPATIENT)
Dept: INTERNAL MEDICINE CLINIC | Age: 68
End: 2023-04-27

## 2023-04-27 DIAGNOSIS — E11.65 UNCONTROLLED TYPE 2 DIABETES MELLITUS WITH HYPERGLYCEMIA, WITH LONG-TERM CURRENT USE OF INSULIN (HCC): Primary | ICD-10-CM

## 2023-04-27 DIAGNOSIS — Z79.4 UNCONTROLLED TYPE 2 DIABETES MELLITUS WITH HYPERGLYCEMIA, WITH LONG-TERM CURRENT USE OF INSULIN (HCC): Primary | ICD-10-CM

## 2023-04-27 NOTE — PROGRESS NOTES
Pharmacy Progress Note - Diabetes Management    Ms. Jesus Alberto Mathews 77 y.o. was contacted to discuss her lab results and CGM order request. PHI verified. Results for orders placed or performed in visit on 84/58/13   METABOLIC PANEL, COMPREHENSIVE   Result Value Ref Range    Sodium 137 136 - 145 mmol/L    Potassium 4.7 3.5 - 5.1 mmol/L    Chloride 107 97 - 108 mmol/L    CO2 27 21 - 32 mmol/L    Anion gap 3 (L) 5 - 15 mmol/L    Glucose 251 (H) 65 - 100 mg/dL    BUN 19 6 - 20 MG/DL    Creatinine 1.08 (H) 0.55 - 1.02 MG/DL    BUN/Creatinine ratio 18 12 - 20      eGFR 55 (L) >60 ml/min/1.73m2    Calcium 9.4 8.5 - 10.1 MG/DL    Bilirubin, total 0.4 0.2 - 1.0 MG/DL    ALT (SGPT) 24 12 - 78 U/L    AST (SGOT) 11 (L) 15 - 37 U/L    Alk. phosphatase 62 45 - 117 U/L    Protein, total 6.6 6.4 - 8.2 g/dL    Albumin 3.7 3.5 - 5.0 g/dL    Globulin 2.9 2.0 - 4.0 g/dL    A-G Ratio 1.3 1.1 - 2.2     HEMOGLOBIN A1C WITH EAG   Result Value Ref Range    Hemoglobin A1c 7.6 (H) 4.0 - 5.6 %    Est. average glucose 171 mg/dL     Lab Results   Component Value Date/Time    Hemoglobin A1c 7.6 (H) 04/24/2023 11:38 AM    Hemoglobin A1c 7.2 (H) 06/22/2022 10:09 AM    Hemoglobin A1c 6.3 (H) 06/16/2021 12:48 PM     Last Point of Care HGB A1C  Hemoglobin A1c (POC)   Date Value Ref Range Status   01/25/2023 8.0 (A) 4.8 - 5.6 % Final      Estimated Creatinine Clearance: 53.5 mL/min (A) (by C-G formula based on SCr of 1.08 mg/dL (H)). A/P  - Reviewed patient's lab results. Making some improvement with A1c progress  - Continue with same medication therapy for now  - Discuss Julien 2 reader order submitted to [facility]. Verdict pending  - Scheduled patient for DM follow up on 6/12/23. - All questions answered at this time.      Thank you for the consult,  Tavia Geller, PharmD, BCACP, [address]    Program: Medical Group  CPA in place: Yes  Recommendation Provided To: Patient/Caregiver: 2 via Virtual Visit and Other: 2  Intervention Detail: New Rx: 1, reason: Patient Preference and Scheduled Appointment  Intervention Accepted By: Patient/Caregiver: 2 and Other: 2  Gap Closed?:   Time Spent (min): 30

## 2023-04-28 DIAGNOSIS — M81.0 OSTEOPOROSIS, UNSPECIFIED OSTEOPOROSIS TYPE, UNSPECIFIED PATHOLOGICAL FRACTURE PRESENCE: Primary | ICD-10-CM

## 2023-05-01 ENCOUNTER — TELEPHONE (OUTPATIENT)
Dept: ORTHOPEDIC SURGERY | Age: 68
End: 2023-05-01

## 2023-05-01 RX ORDER — METFORMIN HYDROCHLORIDE 1000 MG/1
1000 TABLET ORAL DAILY
Qty: 90 TABLET | Refills: 3 | Status: SHIPPED | OUTPATIENT
Start: 2023-05-01

## 2023-05-01 NOTE — TELEPHONE ENCOUNTER
Submitted PA for [employer] to [facility] provider portal. Approved [id number]. Sada- please schedule.

## 2023-05-08 ENCOUNTER — TELEMEDICINE (OUTPATIENT)
Age: 68
End: 2023-05-08

## 2023-05-08 DIAGNOSIS — F43.23 ADJUSTMENT DISORDER WITH MIXED ANXIETY AND DEPRESSED MOOD: Primary | ICD-10-CM

## 2023-05-08 NOTE — PROGRESS NOTES
Rigoberto Casey (:  1955) is a Established patient, presenting virtually for evaluation of the following:     Anxiety/depression:      Progress:  Client presents with a bright mood and affect. She reports feeling less depressed and less anxious. Client is compliant with medications and recognizes benefit. She feels that the combination of the Lexapro and Rexulti has been very helpful and mood has improved. She is less depressed and less irritable since being on this combination. Client went to [place] with friend for 10 days. 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 Her boyfriend, with whom she lives with and cares for, missed her and recognized how much she does for him. This gave her a sense of validation. He is also doing better and they getting along well. They are planning to go to [facility] and are trying to do more things together. Client aware of boundaries and need for self care and encouraged her to do more for self with regularity. Also encouraged her to attend local caregivers support group. Due to short term nature of my role, today will be our last session. Client has been provided with information on local long term therapists in the community. Mental Status Evaluation:   Orientation: Alert, oriented, thought content appropriate   Mood:. Euthymic      Affect:  Mood Congruent      Appearance:  Casually Dressed   Activity:  Cooperative and Good Eye Contact   Gait/Posture: Normal   Speech:  Normal Pitch and Normal Volume   Thought Process:  Goal Directed   Thought Content: Within Normal Limits   Cognition:  WNL   Memory: Intact   Insight:  Fair   Judgment: Fair   Suicidal Ideations: Denies Suicidal Ideation   Homicidal Ideations: Negative for homicidal ideation   Medication Side Effects:         Attention Span       Diagnosis:     Axis I:  Adjustment Disorder with Mixed Anxiety and Depressed Mood    Axis II:  No

## 2023-05-09 ENCOUNTER — OFFICE VISIT (OUTPATIENT)
Age: 68
End: 2023-05-09

## 2023-05-09 VITALS
OXYGEN SATURATION: 95 % | TEMPERATURE: 97.7 F | BODY MASS INDEX: 32.65 KG/M2 | RESPIRATION RATE: 17 BRPM | HEART RATE: 74 BPM | SYSTOLIC BLOOD PRESSURE: 138 MMHG | WEIGHT: 196 LBS | DIASTOLIC BLOOD PRESSURE: 83 MMHG | HEIGHT: 65 IN

## 2023-05-09 DIAGNOSIS — M25.561 RIGHT KNEE PAIN, UNSPECIFIED CHRONICITY: ICD-10-CM

## 2023-05-09 DIAGNOSIS — M17.0 BILATERAL PRIMARY OSTEOARTHRITIS OF KNEE: ICD-10-CM

## 2023-05-09 DIAGNOSIS — M25.562 LEFT KNEE PAIN, UNSPECIFIED CHRONICITY: Primary | ICD-10-CM

## 2023-05-09 RX ORDER — HYALURONATE SODIUM 10 MG/ML
20 SYRINGE (ML) INTRAARTICULAR ONCE
Status: COMPLETED | OUTPATIENT
Start: 2023-05-09 | End: 2023-05-09

## 2023-05-09 RX ADMIN — Medication 20 MG: at 15:54

## 2023-05-09 RX ADMIN — Medication 20 MG: at 15:52

## 2023-05-09 NOTE — PROGRESS NOTES
Riri Beal is a 77 y.o. female  Chief Complaint   Patient presents with    Injections     Jeff Knee     Ht 5' 5\" (1.651 m)   Wt 196 lb (88.9 kg)   BMI 32.62 kg/m²   1. Have you been to the ER, urgent care clinic since your last visit? Hospitalized since your last visit? No    2. Have you seen or consulted any other health care providers outside of the [address] [place] [address] since your last visit? Include any pap smears or colon screening.  No

## 2023-05-09 NOTE — PROGRESS NOTES
Date of Procedure: 5/9/2023  PROCEDURE NOTE: Bilateral knee injection of Synvisc     Consent was obtained from the patient. The correct site was identified after confirmation with the patient. Following identification and confirmation of the correct site with the patient, the superolateral right knee was prepped in the normal sterile fashion. A local anesthetic of 1% lidocaine without epinephrine was then administered to the local tissues. Following, an injection of prefilled Synvisc 24 mg supplementation syringe was injected. The needle was then withdrawn and the injection site dressed with a sterile bandage at the conclusion. The procedure was well tolerated by the patient. No immediate adverse reactions were noted. Attention was then turned to the left knee. Following identification and confirmation of the correct site with the patient, the superolateral left knee was prepped in the normal sterile fashion. A local anesthetic of 1% lidocaine without epinephrine was then administered to the local tissues. Following, an injection of prefilled Synvisc 24 mg supplementation syringe was injected. The needle was then withdrawn and the injection site dressed with a sterile bandage at the conclusion. The procedure was well tolerated by the patient. No immediate adverse reactions were noted.   Post injection instructions were given      Diagnosis: Bilateral Knee Osteoarthritis

## 2023-05-15 ENCOUNTER — HOSPITAL ENCOUNTER (OUTPATIENT)
Facility: HOSPITAL | Age: 68
Setting detail: INFUSION SERIES
End: 2023-05-15
Payer: MEDICARE

## 2023-05-15 ENCOUNTER — TELEPHONE (OUTPATIENT)
Age: 68
End: 2023-05-15

## 2023-05-15 VITALS
BODY MASS INDEX: 33.46 KG/M2 | HEART RATE: 71 BPM | DIASTOLIC BLOOD PRESSURE: 78 MMHG | OXYGEN SATURATION: 93 % | RESPIRATION RATE: 18 BRPM | WEIGHT: 201.1 LBS | TEMPERATURE: 98.5 F | SYSTOLIC BLOOD PRESSURE: 132 MMHG

## 2023-05-15 DIAGNOSIS — M81.0 OSTEOPOROSIS, UNSPECIFIED OSTEOPOROSIS TYPE, UNSPECIFIED PATHOLOGICAL FRACTURE PRESENCE: Primary | ICD-10-CM

## 2023-05-15 PROCEDURE — 96372 THER/PROPH/DIAG INJ SC/IM: CPT

## 2023-05-15 PROCEDURE — 6360000002 HC RX W HCPCS: Performed by: INTERNAL MEDICINE

## 2023-05-15 RX ADMIN — DENOSUMAB 60 MG: 60 INJECTION SUBCUTANEOUS at 16:00

## 2023-05-15 NOTE — PROGRESS NOTES
[facility] Visit Note    9563 Pt arrived at [facility] ambulatory and in no distress for Prolia. Assessment completed, no new complaints voiced. Encouraged pt to seek regular dental care. Patient denied having any symptoms of COVID-19, i.e. SOB, coughing, fever, or generally not feeling well. /78   Pulse 71   Temp 98.5 °F (36.9 °C) (Temporal)   Resp 18   Wt 91.2 kg (201 lb 1.6 oz)   SpO2 93%   BMI 33.46 kg/m²     Ca 9.4 on 4/24    Medications received:  Medications Administered         denosumab (PROLIA) SC injection 60 mg Admin Date  05/15/2023 Action  Given Dose  60 mg Route  SubCUTAneous Administered By  Joseph Ames RN             1600 Tolerated treatment well, no adverse reaction noted. D/Cd from [facility] ambulatory and in no distress accompanied by self.   Next appt 11/23

## 2023-05-15 NOTE — TELEPHONE ENCOUNTER
Patient's sugar reading was 337 yesterday. She took 5 units and it went to 54. Please call patient. Thanks.

## 2023-05-16 ENCOUNTER — OFFICE VISIT (OUTPATIENT)
Age: 68
End: 2023-05-16

## 2023-05-16 VITALS
BODY MASS INDEX: 32.96 KG/M2 | HEART RATE: 83 BPM | RESPIRATION RATE: 17 BRPM | WEIGHT: 197.8 LBS | TEMPERATURE: 97.2 F | DIASTOLIC BLOOD PRESSURE: 69 MMHG | OXYGEN SATURATION: 94 % | SYSTOLIC BLOOD PRESSURE: 114 MMHG | HEIGHT: 65 IN

## 2023-05-16 DIAGNOSIS — M17.0 BILATERAL PRIMARY OSTEOARTHRITIS OF KNEE: Primary | ICD-10-CM

## 2023-05-16 ASSESSMENT — PATIENT HEALTH QUESTIONNAIRE - PHQ9
SUM OF ALL RESPONSES TO PHQ QUESTIONS 1-9: 0
2. FEELING DOWN, DEPRESSED OR HOPELESS: 0
SUM OF ALL RESPONSES TO PHQ QUESTIONS 1-9: 0
SUM OF ALL RESPONSES TO PHQ QUESTIONS 1-9: 0
SUM OF ALL RESPONSES TO PHQ9 QUESTIONS 1 & 2: 0
1. LITTLE INTEREST OR PLEASURE IN DOING THINGS: 0
SUM OF ALL RESPONSES TO PHQ QUESTIONS 1-9: 0

## 2023-05-16 NOTE — PROGRESS NOTES
Christine Mitchell is a 77 y.o. female  Chief Complaint   Patient presents with    Injections     Jeff Knee     Ht 5' 5\" (1.651 m)   Wt 197 lb 12.8 oz (89.7 kg)   BMI 32.92 kg/m²   1. Have you been to the ER, urgent care clinic sinNoce your last visit? Hospitalized since your last visit? No    2. Have you seen or consulted any other health care providers outside of the [address] [place] [address] since your last visit? Include any pap smears or colon screening.  No

## 2023-05-16 NOTE — PROGRESS NOTES
Date of Procedure: 5/16/2023  PROCEDURE NOTE: Bilateral knee injection of Synvisc     Consent was obtained from the patient. The correct site was identified after confirmation with the patient. Following identification and confirmation of the correct site with the patient, the superolateral right knee was prepped in the normal sterile fashion. A local anesthetic of 1% lidocaine without epinephrine was then administered to the local tissues. Following, an injection of prefilled Synvisc 24 mg supplementation syringe was injected. The needle was then withdrawn and the injection site dressed with a sterile bandage at the conclusion. The procedure was well tolerated by the patient. No immediate adverse reactions were noted. Attention was then turned to the left knee. Following identification and confirmation of the correct site with the patient, the superolateral left knee was prepped in the normal sterile fashion. A local anesthetic of 1% lidocaine without epinephrine was then administered to the local tissues. Following, an injection of prefilled Synvisc 24 mg supplementation syringe was injected. The needle was then withdrawn and the injection site dressed with a sterile bandage at the conclusion. The procedure was well tolerated by the patient. No immediate adverse reactions were noted.   Post injection instructions were given      Diagnosis: Bilateral Knee Osteoarthritis

## 2023-05-23 ENCOUNTER — OFFICE VISIT (OUTPATIENT)
Age: 68
End: 2023-05-23

## 2023-05-23 VITALS
SYSTOLIC BLOOD PRESSURE: 126 MMHG | WEIGHT: 197 LBS | HEART RATE: 74 BPM | DIASTOLIC BLOOD PRESSURE: 60 MMHG | HEIGHT: 65 IN | RESPIRATION RATE: 14 BRPM | BODY MASS INDEX: 32.82 KG/M2 | TEMPERATURE: 98 F | OXYGEN SATURATION: 97 %

## 2023-05-23 DIAGNOSIS — M25.562 LEFT KNEE PAIN, UNSPECIFIED CHRONICITY: Primary | ICD-10-CM

## 2023-05-23 DIAGNOSIS — M25.561 RIGHT KNEE PAIN, UNSPECIFIED CHRONICITY: ICD-10-CM

## 2023-05-23 DIAGNOSIS — M17.0 BILATERAL PRIMARY OSTEOARTHRITIS OF KNEE: ICD-10-CM

## 2023-05-23 RX ORDER — DENOSUMAB 60 MG/ML
60 INJECTION SUBCUTANEOUS ONCE
COMMUNITY

## 2023-05-23 ASSESSMENT — PATIENT HEALTH QUESTIONNAIRE - PHQ9
SUM OF ALL RESPONSES TO PHQ QUESTIONS 1-9: 0
2. FEELING DOWN, DEPRESSED OR HOPELESS: 0
SUM OF ALL RESPONSES TO PHQ QUESTIONS 1-9: 0
SUM OF ALL RESPONSES TO PHQ QUESTIONS 1-9: 0
1. LITTLE INTEREST OR PLEASURE IN DOING THINGS: 0
SUM OF ALL RESPONSES TO PHQ QUESTIONS 1-9: 0
SUM OF ALL RESPONSES TO PHQ9 QUESTIONS 1 & 2: 0

## 2023-05-23 NOTE — PROGRESS NOTES
Date of Procedure: 5/23/2023  PROCEDURE NOTE: Bilateral knee injection of Synvisc     Consent was obtained from the patient. The correct site was identified after confirmation with the patient. Following identification and confirmation of the correct site with the patient, the superolateral right knee was prepped in the normal sterile fashion. A local anesthetic of 1% lidocaine without epinephrine was then administered to the local tissues. Following, an injection of prefilled Synvisc 24 mg supplementation syringe was injected. The needle was then withdrawn and the injection site dressed with a sterile bandage at the conclusion. The procedure was well tolerated by the patient. No immediate adverse reactions were noted. Attention was then turned to the left knee. Following identification and confirmation of the correct site with the patient, the superolateral left knee was prepped in the normal sterile fashion. A local anesthetic of 1% lidocaine without epinephrine was then administered to the local tissues. Following, an injection of prefilled Synvisc 24 mg supplementation syringe was injected. The needle was then withdrawn and the injection site dressed with a sterile bandage at the conclusion. The procedure was well tolerated by the patient. No immediate adverse reactions were noted.   Post injection instructions were given      Diagnosis: Bilateral Knee Osteoarthritis

## 2023-05-23 NOTE — PROGRESS NOTES
Identified pt with two pt identifiers (name and ). Reviewed chart in preparation for visit and have obtained necessary documentation. Ganga Cooper is a 77 y.o. female  Chief Complaint   Patient presents with    Injections     3rd Bilateral Knee Synvisc     Ht 5' 5\" (1.651 m)   Wt 197 lb (89.4 kg)   BMI 32.78 kg/m²   1. Have you been to the ER, urgent care clinic since your last visit? Hospitalized since your last visit? No     2. Have you seen or consulted any other health care providers outside of the [address] [place] [address] since your last visit? Include any pap smears or colon screening.  No

## 2023-05-26 ENCOUNTER — TELEPHONE (OUTPATIENT)
Age: 68
End: 2023-05-26

## 2023-06-09 RX ORDER — LEVOTHYROXINE SODIUM 300 UG/1
TABLET ORAL
Qty: 30 TABLET | Refills: 0 | Status: SHIPPED | OUTPATIENT
Start: 2023-06-09

## 2023-06-09 NOTE — TELEPHONE ENCOUNTER
Patient states she needs refill done thru [facility] on file for her levothyroxine (SYNTHROID) 300 mcg tablet. Please call if any questions.  Thank you      Patient reminded of [address] around on refills

## 2023-06-28 ENCOUNTER — PHARMACY VISIT (OUTPATIENT)
Age: 68
End: 2023-06-28

## 2023-06-28 DIAGNOSIS — E11.65 TYPE 2 DIABETES MELLITUS WITH HYPERGLYCEMIA, WITH LONG-TERM CURRENT USE OF INSULIN (HCC): Primary | ICD-10-CM

## 2023-06-28 DIAGNOSIS — Z79.4 TYPE 2 DIABETES MELLITUS WITH HYPERGLYCEMIA, WITH LONG-TERM CURRENT USE OF INSULIN (HCC): Primary | ICD-10-CM

## 2023-06-28 RX ORDER — ATORVASTATIN CALCIUM 80 MG/1
80 TABLET, FILM COATED ORAL DAILY
COMMUNITY
Start: 2015-12-27

## 2023-07-11 ENCOUNTER — TELEPHONE (OUTPATIENT)
Age: 68
End: 2023-07-11

## 2023-07-11 NOTE — TELEPHONE ENCOUNTER
Pharmacy Progress Note - Telephone Call    Ms. Bill Holstein 77 y.o. was contacted via an outbound telephone call  today. A voicemail was left for patient to return my call.      Patient stopped by office expressing concerns about her CGM sensors      Thank you,  Flower Conway, PharmD, BCACP, [address] [place] [address] Only    Program: Medical Group  CPA in place:  Yes  Time Spent (min): 5

## 2023-07-26 ENCOUNTER — TELEPHONE (OUTPATIENT)
Dept: PHARMACY | Facility: CLINIC | Age: 68
End: 2023-07-26

## 2023-07-26 NOTE — TELEPHONE ENCOUNTER
04/24/2023 11 (L) 15 - 37 U/L Final     The 10-year ASCVD risk score (Michelle BLOOD, et al., 2019) is: 20.1%    Values used to calculate the score:      Age: 77 years      Sex: Female      Is Non- : No      Diabetic: Yes      Tobacco smoker: No      Systolic Blood Pressure: 091 mmHg      Is BP treated: Yes      HDL Cholesterol: 72 MG/DL      Total Cholesterol: 163 MG/DL      ADA 2023/ 2018 ACC Guidelines indicate the patient is a candidate for a moderate-intensity statin. 2018 ACC/AHA/ 2023 ADA Guidelines:  >/= 37-79 yo ; Patient has: Adults 36to 76years of age with diabetes mellitus, regardless of estimated 10-year ASCVD risk, moderate-intensity statin therapy is indicated if LDL >70 mg/dL. ([address] 2018). LDL Target goal: <70mg/dL- Aged 37-79 yo with Diabetes       STATIN GAP PLAN  The following are interventions that have been identified:  Statin Use in Person With Diabetes (SUPD) Gap Identified from [place] [facility]) Records dated: 7/26/2023.   A target goal for: <70mg/dL- Aged 37-79 yo with Diabetes  Patient has two Statins on Med List- Most recent Pravastatin 20 mg.    Jelani Wasserman  PharmD, [facility]  Phone: [phone], Option 2      For Pharmacy Admin Tracking Only    Program: Art in place:  No  Recommendation Provided To: Provider: 1 via Note to Provider  Intervention Detail: New Rx: 1, reason: Needs Additional Therapy  Intervention Accepted By: Provider: 1  Gap Closed?: Yes   Time Spent (min): 10

## 2023-07-26 NOTE — PROGRESS NOTES
aspart (NOVOLOG) 100 UNIT/ML injection pen Inject 6-8 Units into the skin 3 times daily (before meals)    Insulin Degludec 200 UNIT/ML SOPN Inject 38 Units into the skin daily    lisinopril (PRINIVIL;ZESTRIL) 5 MG tablet TAKE 1 TABLET BY MOUTH  DAILY    metFORMIN (GLUCOPHAGE) 1000 MG tablet Take 1 tablet by mouth daily    nystatin (MYCOSTATIN) 207915 UNIT/GM powder Apply topically 4 times daily    nystatin-triamcinolone (MYCOLOG) 948833-8.1 UNIT/GM-% ointment APPLY TOPICALLY TO THE AFFECTED AREA TWICE DAILY    omeprazole (PRILOSEC OTC) 20 MG tablet Take 1 tablet by mouth daily    pravastatin (PRAVACHOL) 20 MG tablet Take 1 tablet by mouth     No current facility-administered medications for this visit. Lab Results   Component Value Date/Time     04/24/2023 11:38 AM    K 4.7 04/24/2023 11:38 AM     04/24/2023 11:38 AM    CO2 27 04/24/2023 11:38 AM    BUN 19 04/24/2023 11:38 AM    GFRAA >60 06/22/2022 10:09 AM    GLOB 2.9 04/24/2023 11:38 AM    ALT 24 04/24/2023 11:38 AM       Lab Results   Component Value Date/Time    CHOL 163 06/22/2022 10:09 AM    HDL 72 06/22/2022 10:09 AM       Lab Results   Component Value Date/Time    WBC 5.8 12/11/2022 09:07 PM    HGB 11.3 12/11/2022 09:07 PM    HCT 33.8 12/11/2022 09:07 PM     12/11/2022 09:07 PM    MCV 88.9 12/11/2022 09:07 PM       Lab Results   Component Value Date/Time    MICROALBUR 0.88 06/22/2022 10:09 AM    LABCREA 50.70 06/22/2022 10:09 AM         HbA1c:  Lab Results   Component Value Date    LABA1C 7.6 (H) 04/24/2023    LABA1C 7.2 (H) 06/22/2022    LABA1C 6.3 (H) 06/16/2021         Last Point of Care HGB A1C  Hemoglobin A1C, POC   Date Value Ref Range Status   01/25/2023 8.0 (A) 4.8 - 5.6 % Final        Estimated Creatinine Clearance: 55 mL/min (A) (based on SCr of 1.08 mg/dL (H)). Medication reconciliation was completed during the visit.     Medications Discontinued During This Encounter   Medication Reason    atorvastatin (LIPITOR) 80 MG

## 2023-07-27 ENCOUNTER — PHARMACY VISIT (OUTPATIENT)
Age: 68
End: 2023-07-27

## 2023-07-27 VITALS
DIASTOLIC BLOOD PRESSURE: 74 MMHG | WEIGHT: 205 LBS | SYSTOLIC BLOOD PRESSURE: 120 MMHG | HEIGHT: 65 IN | BODY MASS INDEX: 34.16 KG/M2 | OXYGEN SATURATION: 99 % | HEART RATE: 74 BPM

## 2023-07-27 DIAGNOSIS — Z79.4 TYPE 2 DIABETES MELLITUS WITH HYPERGLYCEMIA, WITH LONG-TERM CURRENT USE OF INSULIN (HCC): Primary | ICD-10-CM

## 2023-07-27 DIAGNOSIS — E11.65 TYPE 2 DIABETES MELLITUS WITH HYPERGLYCEMIA, WITH LONG-TERM CURRENT USE OF INSULIN (HCC): Primary | ICD-10-CM

## 2023-07-27 RX ORDER — PLECANATIDE 3 MG/1
3 TABLET ORAL DAILY
COMMUNITY

## 2023-07-27 RX ORDER — PRAVASTATIN SODIUM 20 MG
20 TABLET ORAL DAILY
Qty: 90 TABLET | Refills: 1 | Status: SHIPPED | OUTPATIENT
Start: 2023-07-27

## 2023-07-27 NOTE — PATIENT INSTRUCTIONS
- Continue metformin 1000 mg daily  - Continue Tresiba 36 units daily    - Need to be more consistent with your Novolog. Scan sensor before every meal. If pre meal blood sugar is     If 151-200: give 3 units    If 201-250: give 5 units    If above 250: give 6 units    If you have a late night snack, give 2 units.

## 2023-08-09 ENCOUNTER — TELEPHONE (OUTPATIENT)
Age: 68
End: 2023-08-09

## 2023-08-09 NOTE — TELEPHONE ENCOUNTER
----- Message from Tyleralexandra Monrealfreddy sent at 8/8/2023  4:24 PM EDT -----  Subject: Message to Provider    QUESTIONS  Information for Provider? Teodoro Kowalski from [employer] needs to know when   patient last office date, and Office Notes. Cielobushra Kowalski can be reached @   [phone] ext [phone] send thru portal as well.   ---------------------------------------------------------------------------  --------------  Estrellita CEE  [phone]; OK to leave message on voicemail  ---------------------------------------------------------------------------  --------------  SCRIPT ANSWERS  Relationship to Patient? Covered Entity  Covered Entity Type? Other  Other Covered Entity Type?  Medical supplies   Representative Name? Teodoro Kowalski

## 2023-08-21 ENCOUNTER — HOSPITAL ENCOUNTER (EMERGENCY)
Facility: HOSPITAL | Age: 68
Discharge: LEFT BEFORE DISCHARGE | End: 2023-08-21

## 2023-08-21 VITALS
OXYGEN SATURATION: 98 % | DIASTOLIC BLOOD PRESSURE: 72 MMHG | WEIGHT: 205.03 LBS | RESPIRATION RATE: 18 BRPM | SYSTOLIC BLOOD PRESSURE: 159 MMHG | BODY MASS INDEX: 34.12 KG/M2 | HEART RATE: 79 BPM | TEMPERATURE: 98.8 F

## 2023-08-24 ENCOUNTER — PHARMACY VISIT (OUTPATIENT)
Age: 68
End: 2023-08-24

## 2023-08-24 DIAGNOSIS — E11.65 TYPE 2 DIABETES MELLITUS WITH HYPERGLYCEMIA, WITH LONG-TERM CURRENT USE OF INSULIN (HCC): Primary | ICD-10-CM

## 2023-08-24 DIAGNOSIS — Z79.4 TYPE 2 DIABETES MELLITUS WITH HYPERGLYCEMIA, WITH LONG-TERM CURRENT USE OF INSULIN (HCC): Primary | ICD-10-CM

## 2023-08-24 NOTE — PROGRESS NOTES
insurance for replacements    Has glucometer - able to check SMBG    Nutrition/Lifestyle Modifications:  See above     Vitals:   Wt Readings from Last 3 Encounters:   08/21/23 205 lb 0.4 oz (93 kg)   07/27/23 205 lb (93 kg)   06/12/23 197 lb (89.4 kg)     BP Readings from Last 3 Encounters:   08/21/23 (!) 159/72   07/27/23 120/74   05/23/23 126/60     Pulse Readings from Last 3 Encounters:   08/21/23 79   07/27/23 74   05/23/23 74       Past Medical History:   Diagnosis Date    Arthritis     Depression     Diabetes ([facility])     Dysphagia     GERD (gastroesophageal reflux disease) 07/05/2019    Hypercholesteremia     Hypertension     Hyperthyroidism     IBS (irritable bowel syndrome)     Liver disease     hepatitis b    Nausea & vomiting     PUD (peptic ulcer disease)     bleeding ulcer    Sleep apnea     CPAP    Urinary incontinence      Allergies   Allergen Reactions    Celecoxib Other (See Comments)     Hallucinations    Codeine Nausea And Vomiting    Erythromycin Nausea And Vomiting       Current Outpatient Medications   Medication Sig    Plecanatide (TRULANCE) 3 MG TABS Take 3 mg by mouth daily    pravastatin (PRAVACHOL) 20 MG tablet Take 1 tablet by mouth daily    vibegron (GEMTESA) 75 MG TABS tablet Take 1 tablet by mouth daily    levothyroxine (SYNTHROID) 300 MCG tablet TAKE 1 TABLET BY MOUTH ONCE DAILY BEFORE BREAKFAST 6 DAYS A WEEK AND TAKE 150 MCG ONE DAY A WEEK    denosumab (PROLIA) 60 MG/ML SOSY SC injection Inject 1 mL into the skin once    acetaminophen (TYLENOL) 500 MG tablet Take 1 tablet by mouth every 6 hours as needed    ALPRAZolam (XANAX) 0.25 MG tablet TAKE 1 TABLET BY MOUTH  TWICE DAILY AS NEEDED FOR  ANXIETY    amitriptyline (ELAVIL) 10 MG tablet Take 1 tablet by mouth    brexpiprazole (REXULTI) 1 MG TABS tablet Take 1 tablet by mouth daily    busPIRone (BUSPAR) 5 MG tablet Take 1 tablet by mouth 2 times daily    escitalopram (LEXAPRO) 10 MG tablet Take 1 tablet by mouth daily    famotidine

## 2023-08-28 ENCOUNTER — TELEPHONE (OUTPATIENT)
Age: 68
End: 2023-08-28

## 2023-08-28 NOTE — TELEPHONE ENCOUNTER
Pt states that she is not had a bm without enema in ten days. Pt has odor under her arms and has never had ESTRELLITA    Pt states that something is going on and she needs to know what to do. All of Dr. Robby Roldan suggestions have not  worked. Please call pt to advise.

## 2023-08-31 NOTE — TELEPHONE ENCOUNTER
Patient states she's returning your call. Please call. Thank you. Patient states to call back at [phone] as other phone is currently Broken.  Thank you

## 2023-09-01 RX ORDER — LACTULOSE 10 G/15ML
10 SOLUTION ORAL EVERY 12 HOURS PRN
Qty: 473 ML | Refills: 1 | Status: SHIPPED | OUTPATIENT
Start: 2023-09-01

## 2023-09-01 NOTE — TELEPHONE ENCOUNTER
Pt returned call. 2 identifiers confirmed. Per Dr. Ruperto Barnes:  Could try lactulose to help her have bm. If she has not tried that, let me know and I can order for her. Pt states she would like to try Lactulose. Pharmacy in chart confirmed with pt.

## 2023-09-05 ENCOUNTER — HOSPITAL ENCOUNTER (OUTPATIENT)
Facility: HOSPITAL | Age: 68
Discharge: HOME | End: 2023-09-08
Payer: MEDICARE

## 2023-09-05 DIAGNOSIS — R19.4 ALTERED BOWEL HABITS: ICD-10-CM

## 2023-09-05 DIAGNOSIS — K59.04 CHRONIC IDIOPATHIC CONSTIPATION: ICD-10-CM

## 2023-09-05 PROCEDURE — 74019 RADEX ABDOMEN 2 VIEWS: CPT

## 2023-09-05 NOTE — TELEPHONE ENCOUNTER
Pt returned called. 2 identifiers confirmed. Stated that she went to [facility] this am and was advised that she would not be seen. States her GI MD ordered an xray that was completed today. PT is passing gas and reported taking lactulose twice a day for 4 days with no BM. Advised PT that her GI MD would follow up with her based on her xray results. No further concerns voiced.

## 2023-09-05 NOTE — TELEPHONE ENCOUNTER
Pt states that she has not had a bm in two weeks. She went to ED and they would not see her. Pt needs a call back today. Thanks.

## 2023-09-13 ENCOUNTER — TELEPHONE (OUTPATIENT)
Age: 68
End: 2023-09-13

## 2023-09-13 NOTE — TELEPHONE ENCOUNTER
[facility] needs office note from 8-24-23 faxed to them.     Fax  #[phone]  Attn: [facility] [employer]

## 2023-09-19 ENCOUNTER — TELEPHONE (OUTPATIENT)
Age: 68
End: 2023-09-19

## 2023-09-19 NOTE — TELEPHONE ENCOUNTER
Pharmacy Progress Note - Telephone Call    Ms. Erin Garcia 77 y.o. was contacted via an outbound telephone call regarding her No Cares medication  today. A voicemail was left for patient to return my call.      Insulin PAP order arrived today and are ready for      Thank you,  Flower Calhoun, PharmD, BCACP, [address] [place] [address] Drive Only    Program: Medical Group  CPA in place:  Yes  Recommendation Provided To: Patient/Caregiver: 1 via Telephone  Intervention Detail: Patient Access Assistance/Sample Provided  Intervention Accepted By: Patient/Caregiver: 1  Gap Closed?: Yes   Time Spent (min): 10

## 2023-09-20 ENCOUNTER — TELEPHONE (OUTPATIENT)
Age: 68
End: 2023-09-20

## 2023-09-20 NOTE — TELEPHONE ENCOUNTER
Amisha Glaser needs clinical notes from 8-24-23. This was sent incomplete. Received page 1 & 2. They want the entire visit notes.      Fax #[phone]

## 2023-09-21 NOTE — TELEPHONE ENCOUNTER
Called patient to advise that Dr. Garrett Ramirez needs blood work to check liver before refilling lamisil, no answer, left message on vm. Will call back later.
Future Appointments:  Future Appointments   Date Time Provider Cj Love   6/9/2021 10:00 AM Juan Alberto Frost, PT [facility]   6/16/2021  8:45 AM Bell Dewitt DO [facility] AMB   6/16/2021  9:15 AM Kim White PHARMD [facility] BS AMB        Last Appointment With Me:  12/16/2020     Requested Prescriptions     Pending Prescriptions Disp Refills    terbinafine HCL (LAMISIL) 250 mg tablet 30 Tablet 1     Sig: TAKE 1 TABLET BY MOUTH  DAILY
We need to check her liver tests before any more refills.
(1) Female

## 2023-09-24 ENCOUNTER — HOSPITAL ENCOUNTER (EMERGENCY)
Facility: HOSPITAL | Age: 68
Discharge: HOME | End: 2023-09-24
Attending: EMERGENCY MEDICINE
Payer: MEDICARE

## 2023-09-24 ENCOUNTER — APPOINTMENT (OUTPATIENT)
Facility: HOSPITAL | Age: 68
End: 2023-09-24
Payer: MEDICARE

## 2023-09-24 VITALS
TEMPERATURE: 97.7 F | DIASTOLIC BLOOD PRESSURE: 61 MMHG | OXYGEN SATURATION: 97 % | SYSTOLIC BLOOD PRESSURE: 121 MMHG | HEART RATE: 76 BPM | RESPIRATION RATE: 15 BRPM

## 2023-09-24 DIAGNOSIS — R19.4 BOWEL HABIT CHANGES: Primary | ICD-10-CM

## 2023-09-24 DIAGNOSIS — K56.41 FECAL IMPACTION (HCC): ICD-10-CM

## 2023-09-24 LAB
ALBUMIN SERPL-MCNC: 3.5 G/DL (ref 3.5–5)
ALBUMIN/GLOB SERPL: 1.1 (ref 1.1–2.2)
ALP SERPL-CCNC: 52 U/L (ref 45–117)
ALT SERPL-CCNC: 20 U/L (ref 12–78)
ANION GAP SERPL CALC-SCNC: 2 MMOL/L (ref 5–15)
AST SERPL-CCNC: 8 U/L (ref 15–37)
BASOPHILS # BLD: 0.1 K/UL (ref 0–0.1)
BASOPHILS NFR BLD: 1 % (ref 0–1)
BILIRUB SERPL-MCNC: 0.4 MG/DL (ref 0.2–1)
BUN SERPL-MCNC: 18 MG/DL (ref 6–20)
BUN/CREAT SERPL: 14 (ref 12–20)
CALCIUM SERPL-MCNC: 8.9 MG/DL (ref 8.5–10.1)
CHLORIDE SERPL-SCNC: 106 MMOL/L (ref 97–108)
CO2 SERPL-SCNC: 31 MMOL/L (ref 21–32)
COMMENT:: NORMAL
CREAT SERPL-MCNC: 1.26 MG/DL (ref 0.55–1.02)
DIFFERENTIAL METHOD BLD: ABNORMAL
EOSINOPHIL # BLD: 0.1 K/UL (ref 0–0.4)
EOSINOPHIL NFR BLD: 1 % (ref 0–7)
ERYTHROCYTE [DISTWIDTH] IN BLOOD BY AUTOMATED COUNT: 13 % (ref 11.5–14.5)
GLOBULIN SER CALC-MCNC: 3.1 G/DL (ref 2–4)
GLUCOSE SERPL-MCNC: 289 MG/DL (ref 65–100)
HCT VFR BLD AUTO: 32.1 % (ref 35–47)
HGB BLD-MCNC: 11 G/DL (ref 11.5–16)
IMM GRANULOCYTES # BLD AUTO: 0 K/UL (ref 0–0.04)
IMM GRANULOCYTES NFR BLD AUTO: 0 % (ref 0–0.5)
LYMPHOCYTES # BLD: 1.8 K/UL (ref 0.8–3.5)
LYMPHOCYTES NFR BLD: 27 % (ref 12–49)
MCH RBC QN AUTO: 28.9 PG (ref 26–34)
MCHC RBC AUTO-ENTMCNC: 34.3 G/DL (ref 30–36.5)
MCV RBC AUTO: 84.5 FL (ref 80–99)
MONOCYTES # BLD: 0.6 K/UL (ref 0–1)
MONOCYTES NFR BLD: 8 % (ref 5–13)
NEUTS SEG # BLD: 4.3 K/UL (ref 1.8–8)
NEUTS SEG NFR BLD: 62 % (ref 32–75)
NRBC # BLD: 0 K/UL (ref 0–0.01)
NRBC BLD-RTO: 0 PER 100 WBC
PLATELET # BLD AUTO: 106 K/UL (ref 150–400)
PMV BLD AUTO: 11.5 FL (ref 8.9–12.9)
POTASSIUM SERPL-SCNC: 4.5 MMOL/L (ref 3.5–5.1)
PROT SERPL-MCNC: 6.6 G/DL (ref 6.4–8.2)
RBC # BLD AUTO: 3.8 M/UL (ref 3.8–5.2)
SODIUM SERPL-SCNC: 139 MMOL/L (ref 136–145)
SPECIMEN HOLD: NORMAL
WBC # BLD AUTO: 6.9 K/UL (ref 3.6–11)

## 2023-09-24 PROCEDURE — 80053 COMPREHEN METABOLIC PANEL: CPT

## 2023-09-24 PROCEDURE — 85025 COMPLETE CBC W/AUTO DIFF WBC: CPT

## 2023-09-24 PROCEDURE — 6370000000 HC RX 637 (ALT 250 FOR IP): Performed by: EMERGENCY MEDICINE

## 2023-09-24 PROCEDURE — 74177 CT ABD & PELVIS W/CONTRAST: CPT

## 2023-09-24 PROCEDURE — 74022 RADEX COMPL AQT ABD SERIES: CPT

## 2023-09-24 PROCEDURE — 36415 COLL VENOUS BLD VENIPUNCTURE: CPT

## 2023-09-24 PROCEDURE — 6360000004 HC RX CONTRAST MEDICATION: Performed by: RADIOLOGY

## 2023-09-24 PROCEDURE — 99285 EMERGENCY DEPT VISIT HI MDM: CPT

## 2023-09-24 RX ORDER — MAGNESIUM CITRATE
150 SOLUTION, ORAL ORAL ONCE
Qty: 300 ML | Refills: 0 | Status: SHIPPED | OUTPATIENT
Start: 2023-09-24 | End: 2023-09-24

## 2023-09-24 RX ORDER — MAG HYDROX/ALUMINUM HYD/SIMETH 400-400-40
1 SUSPENSION, ORAL (FINAL DOSE FORM) ORAL ONCE
Qty: 12 SUPPOSITORY | Refills: 0 | Status: SHIPPED | OUTPATIENT
Start: 2023-09-24 | End: 2023-09-24

## 2023-09-24 RX ORDER — LIDOCAINE HYDROCHLORIDE 20 MG/ML
30 SOLUTION OROPHARYNGEAL
Status: COMPLETED | OUTPATIENT
Start: 2023-09-24 | End: 2023-09-24

## 2023-09-24 RX ADMIN — IOPAMIDOL 100 ML: 755 INJECTION, SOLUTION INTRAVENOUS at 13:59

## 2023-09-24 RX ADMIN — Medication 30 ML: at 13:00

## 2023-09-24 ASSESSMENT — ENCOUNTER SYMPTOMS
NAUSEA: 0
CONSTIPATION: 1
ABDOMINAL PAIN: 1
ABDOMINAL DISTENTION: 1
ANAL BLEEDING: 0
DIARRHEA: 0
COUGH: 0
VOMITING: 0
SHORTNESS OF BREATH: 0
BLOOD IN STOOL: 0

## 2023-09-24 ASSESSMENT — PAIN SCALES - GENERAL: PAINLEVEL_OUTOF10: 8

## 2023-09-24 ASSESSMENT — PAIN DESCRIPTION - DESCRIPTORS: DESCRIPTORS: ACHING

## 2023-09-24 ASSESSMENT — PAIN - FUNCTIONAL ASSESSMENT: PAIN_FUNCTIONAL_ASSESSMENT: 0-10

## 2023-09-24 ASSESSMENT — PAIN DESCRIPTION - PAIN TYPE: TYPE: ACUTE PAIN

## 2023-09-24 ASSESSMENT — PAIN DESCRIPTION - LOCATION: LOCATION: ABDOMEN

## 2023-09-24 NOTE — ED NOTES
11:35 AM  I have evaluated the patient as the Provider in Rapid Medical Evaluation (RME). I have reviewed her vital signs and the triage nurse assessment. I have talked with the patient and any available family and advised that I am the provider in triage and have ordered the appropriate study to initiate their work up based on the clinical presentation during my assessment. I have advised that the patient will be accommodated in the Main ED as soon as possible. I have also requested to contact the triage nurse or myself immediately if the patient experiences any changes in their condition during this brief waiting period. Constipation x 4 weeks. Enema and multiple OTC medications with minimal relief. \"Ball of poop in my butt\". Abdominal pain, nausea. No vomiting. Pre-syncope secondary to pain. Mild constipation at baseline, nothing this severe. Seen at urgent care, said there is a fecal impaction on imaging. VSS with exception of BP which is elevated (didn't take her BP meds today). Orders placed for CBC, CMP, XR.       LIZ Chamberlain PA-C  09/24/23 1131

## 2023-09-24 NOTE — ED TRIAGE NOTES
Pt reports no \"decent\" bowel movement for 4 weeks. She states one small movement after an enema. She also c/o abdominal pain but denies vomiting.

## 2023-09-24 NOTE — ED PROVIDER NOTES
[facility] EMERGENCY [facility]      Pt Name: Akshat Miranda  MRN: [id number]  [address] [facility] [place] 1955  Date of evaluation: 9/24/2023  Provider: Prudencio Hashimoto, MD    CHIEF COMPLAINT       Chief Complaint   Patient presents with    Constipation         HISTORY OF PRESENT ILLNESS   (Location/Symptom, Timing/Onset, Context/Setting, Quality, Duration, Modifying Factors, Severity)  Note limiting factors. 68F w/ hx DM, HTN, obesity, OA p/w 4wks constipation. Pt reports 4wks of inability to pass any solid stool. She is usnure about gas. Last night tried to manually disimpact herself but only \"liquid came out. \" She also reports diffuse abd pain and fullness w/ nausea. No vomiting, urinary symptoms, F/C, dyspnea, cough. Has tried multiple different medications including miralax, enemas and other stool softeners. Denies any opioid use. Went to  who sent to ED. Previous pelvic surgeries including hysterectomy and cholecystectomy. Review of External Medical Records:     Nursing Notes were reviewed. REVIEW OF SYSTEMS    (2-9 systems for level 4, 10 or more for level 5)     Review of Systems   Constitutional:  Negative for diaphoresis and fever. HENT:  Negative for nosebleeds. Eyes:  Negative for visual disturbance. Respiratory:  Negative for cough and shortness of breath. Cardiovascular:  Negative for chest pain, palpitations and leg swelling. Gastrointestinal:  Positive for abdominal distention, abdominal pain and constipation. Negative for anal bleeding, blood in stool, diarrhea, nausea and vomiting. Endocrine: Negative for polyuria. Genitourinary:  Negative for difficulty urinating, dysuria, frequency and hematuria. Musculoskeletal:  Negative for joint swelling. Skin:  Negative for wound. Allergic/Immunologic: Negative for immunocompromised state. Neurological:  Negative for seizures and syncope. Hematological:  Does not bruise/bleed easily.

## 2023-10-03 ENCOUNTER — TELEPHONE (OUTPATIENT)
Age: 68
End: 2023-10-03

## 2023-10-03 ENCOUNTER — CLINICAL DOCUMENTATION (OUTPATIENT)
Age: 68
End: 2023-10-03

## 2023-10-03 NOTE — TELEPHONE ENCOUNTER
[facility] states she needs a call back in reference to Partial Office Notes/pages Received Via Fax. Please call to discuss & advise.  Thank you      Ph# is [phone]    Ext. [phone]

## 2023-10-04 DIAGNOSIS — F41.9 ANXIETY: Primary | ICD-10-CM

## 2023-10-04 NOTE — TELEPHONE ENCOUNTER
PCP: Cierra Baker DO    Last appt  8/24/2023   Future Appointments   Date Time Provider [address]   11/13/2023  3:00 PM [address] [facility] 4 [address] [facility] 151       Requested Prescriptions     Pending Prescriptions Disp Refills    ALPRAZolam (XANAX) 0.25 MG tablet [Pharmacy Med Name: ALPRAZolam 0.25 MG Oral Tablet] 60 tablet      Sig: TAKE 1 TABLET BY MOUTH  TWICE DAILY AS NEEDED FOR  ANXIETY    amitriptyline (ELAVIL) 10 MG tablet [Pharmacy Med Name: Amitriptyline HCl 10 MG Oral Tablet] 90 tablet      Sig: TAKE 1 TABLET BY MOUTH AT  NIGHT

## 2023-10-06 RX ORDER — ALPRAZOLAM 0.25 MG/1
TABLET ORAL
Qty: 60 TABLET | Refills: 0 | Status: SHIPPED | OUTPATIENT
Start: 2023-10-06 | End: 2024-04-03

## 2023-10-06 RX ORDER — AMITRIPTYLINE HYDROCHLORIDE 10 MG/1
10 TABLET, FILM COATED ORAL
Qty: 90 TABLET | Refills: 3 | Status: SHIPPED | OUTPATIENT
Start: 2023-10-06 | End: 2023-11-06

## 2023-10-18 NOTE — TELEPHONE ENCOUNTER
Doris Hutchison called again    States per medicare guidelines, they cannot accept office notes from a pharmacist    States they can only accept notes from: MD, DO, PA, or NP. State needs office note documenting diabetes.     Send to fax [phone]

## 2023-10-18 NOTE — TELEPHONE ENCOUNTER
Attempted to contact [facility]. Original encounter from 9/13 requested \"[facility] needs office note from 8-24-23 faxed to them. \" Pt did not see PCP on 8/24/23 only Monalisa the pharmacist.
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
## 2023-10-24 ENCOUNTER — TELEPHONE (OUTPATIENT)
Age: 68
End: 2023-10-24

## 2023-10-24 NOTE — TELEPHONE ENCOUNTER
Yes, pt is taking the Linzess. She gets this prescribed from Dr. Dante José at [facility].

## 2023-10-24 NOTE — TELEPHONE ENCOUNTER
Left voicemail for patient    Received prescription assistance application for Dreampods  Med not on current profile    Is patient still taking? If yes, who is prescribing this?

## 2023-10-24 NOTE — TELEPHONE ENCOUNTER
LVM for patient that Dr. Susan Pritchard office would need to complete this for and she needs to have Jose fax it to them

## 2023-11-03 ENCOUNTER — CLINICAL DOCUMENTATION (OUTPATIENT)
Age: 68
End: 2023-11-03

## 2023-11-03 ENCOUNTER — TELEPHONE (OUTPATIENT)
Age: 68
End: 2023-11-03

## 2023-11-03 NOTE — TELEPHONE ENCOUNTER
March to October records to be faxed to [facility] have been requested via fax. Have you received?   They have the 8-24-23 and now need all other up to October 2023    Fax #[phone]

## 2023-11-06 ENCOUNTER — OFFICE VISIT (OUTPATIENT)
Age: 68
End: 2023-11-06
Payer: MEDICARE

## 2023-11-06 VITALS
OXYGEN SATURATION: 98 % | HEIGHT: 65 IN | WEIGHT: 201 LBS | RESPIRATION RATE: 16 BRPM | TEMPERATURE: 98.1 F | DIASTOLIC BLOOD PRESSURE: 71 MMHG | BODY MASS INDEX: 33.49 KG/M2 | HEART RATE: 86 BPM | SYSTOLIC BLOOD PRESSURE: 124 MMHG

## 2023-11-06 DIAGNOSIS — E89.0 POSTOPERATIVE HYPOTHYROIDISM: ICD-10-CM

## 2023-11-06 DIAGNOSIS — E78.5 HYPERLIPIDEMIA ASSOCIATED WITH TYPE 2 DIABETES MELLITUS (HCC): ICD-10-CM

## 2023-11-06 DIAGNOSIS — E11.69 HYPERLIPIDEMIA ASSOCIATED WITH TYPE 2 DIABETES MELLITUS (HCC): ICD-10-CM

## 2023-11-06 DIAGNOSIS — K58.1 IRRITABLE BOWEL SYNDROME WITH CONSTIPATION: ICD-10-CM

## 2023-11-06 DIAGNOSIS — Z00.00 MEDICARE ANNUAL WELLNESS VISIT, SUBSEQUENT: Primary | ICD-10-CM

## 2023-11-06 DIAGNOSIS — M81.0 OSTEOPOROSIS, UNSPECIFIED OSTEOPOROSIS TYPE, UNSPECIFIED PATHOLOGICAL FRACTURE PRESENCE: Primary | ICD-10-CM

## 2023-11-06 DIAGNOSIS — F33.0 MAJOR DEPRESSIVE DISORDER, RECURRENT, MILD (HCC): ICD-10-CM

## 2023-11-06 DIAGNOSIS — G47.00 INSOMNIA, UNSPECIFIED TYPE: ICD-10-CM

## 2023-11-06 DIAGNOSIS — E11.65 TYPE 2 DIABETES MELLITUS WITH HYPERGLYCEMIA, WITH LONG-TERM CURRENT USE OF INSULIN (HCC): ICD-10-CM

## 2023-11-06 DIAGNOSIS — Z79.4 TYPE 2 DIABETES MELLITUS WITH HYPERGLYCEMIA, WITH LONG-TERM CURRENT USE OF INSULIN (HCC): ICD-10-CM

## 2023-11-06 PROCEDURE — G0439 PPPS, SUBSEQ VISIT: HCPCS | Performed by: INTERNAL MEDICINE

## 2023-11-06 PROCEDURE — 99214 OFFICE O/P EST MOD 30 MIN: CPT | Performed by: INTERNAL MEDICINE

## 2023-11-06 PROCEDURE — 3074F SYST BP LT 130 MM HG: CPT | Performed by: INTERNAL MEDICINE

## 2023-11-06 PROCEDURE — 3051F HG A1C>EQUAL 7.0%<8.0%: CPT | Performed by: INTERNAL MEDICINE

## 2023-11-06 PROCEDURE — 3078F DIAST BP <80 MM HG: CPT | Performed by: INTERNAL MEDICINE

## 2023-11-06 PROCEDURE — 1123F ACP DISCUSS/DSCN MKR DOCD: CPT | Performed by: INTERNAL MEDICINE

## 2023-11-06 RX ORDER — TRAZODONE HYDROCHLORIDE 50 MG/1
50 TABLET ORAL NIGHTLY
Qty: 90 TABLET | Refills: 1 | Status: SHIPPED | OUTPATIENT
Start: 2023-11-06

## 2023-11-06 RX ORDER — ONDANSETRON 2 MG/ML
8 INJECTION INTRAMUSCULAR; INTRAVENOUS
OUTPATIENT
Start: 2023-11-13

## 2023-11-06 RX ORDER — ACETAMINOPHEN 325 MG/1
650 TABLET ORAL
OUTPATIENT
Start: 2023-11-13

## 2023-11-06 RX ORDER — DIPHENHYDRAMINE HYDROCHLORIDE 50 MG/ML
50 INJECTION INTRAMUSCULAR; INTRAVENOUS
OUTPATIENT
Start: 2023-11-13

## 2023-11-06 RX ORDER — ALBUTEROL SULFATE 90 UG/1
4 AEROSOL, METERED RESPIRATORY (INHALATION) PRN
OUTPATIENT
Start: 2023-11-13

## 2023-11-06 RX ORDER — SODIUM CHLORIDE 9 MG/ML
INJECTION, SOLUTION INTRAVENOUS CONTINUOUS
OUTPATIENT
Start: 2023-11-13

## 2023-11-06 RX ORDER — EPINEPHRINE 1 MG/ML
0.3 INJECTION, SOLUTION, CONCENTRATE INTRAVENOUS PRN
OUTPATIENT
Start: 2023-11-13

## 2023-11-06 RX ORDER — FAMOTIDINE 10 MG/ML
20 INJECTION, SOLUTION INTRAVENOUS
OUTPATIENT
Start: 2023-11-13

## 2023-11-06 SDOH — ECONOMIC STABILITY: INCOME INSECURITY: HOW HARD IS IT FOR YOU TO PAY FOR THE VERY BASICS LIKE FOOD, HOUSING, MEDICAL CARE, AND HEATING?: NOT HARD AT ALL

## 2023-11-06 SDOH — ECONOMIC STABILITY: HOUSING INSECURITY
IN THE LAST 12 MONTHS, WAS THERE A TIME WHEN YOU DID NOT HAVE A STEADY PLACE TO SLEEP OR SLEPT IN A SHELTER (INCLUDING NOW)?: NO

## 2023-11-06 SDOH — ECONOMIC STABILITY: FOOD INSECURITY: WITHIN THE PAST 12 MONTHS, THE FOOD YOU BOUGHT JUST DIDN'T LAST AND YOU DIDN'T HAVE MONEY TO GET MORE.: NEVER TRUE

## 2023-11-06 SDOH — ECONOMIC STABILITY: FOOD INSECURITY: WITHIN THE PAST 12 MONTHS, YOU WORRIED THAT YOUR FOOD WOULD RUN OUT BEFORE YOU GOT MONEY TO BUY MORE.: NEVER TRUE

## 2023-11-06 ASSESSMENT — PATIENT HEALTH QUESTIONNAIRE - PHQ9
6. FEELING BAD ABOUT YOURSELF - OR THAT YOU ARE A FAILURE OR HAVE LET YOURSELF OR YOUR FAMILY DOWN: 0
SUM OF ALL RESPONSES TO PHQ QUESTIONS 1-9: 11
2. FEELING DOWN, DEPRESSED OR HOPELESS: 2
4. FEELING TIRED OR HAVING LITTLE ENERGY: 2
3. TROUBLE FALLING OR STAYING ASLEEP: 2
5. POOR APPETITE OR OVEREATING: 3
SUM OF ALL RESPONSES TO PHQ QUESTIONS 1-9: 11
8. MOVING OR SPEAKING SO SLOWLY THAT OTHER PEOPLE COULD HAVE NOTICED. OR THE OPPOSITE, BEING SO FIGETY OR RESTLESS THAT YOU HAVE BEEN MOVING AROUND A LOT MORE THAN USUAL: 0
SUM OF ALL RESPONSES TO PHQ9 QUESTIONS 1 & 2: 4
9. THOUGHTS THAT YOU WOULD BE BETTER OFF DEAD, OR OF HURTING YOURSELF: 0
7. TROUBLE CONCENTRATING ON THINGS, SUCH AS READING THE NEWSPAPER OR WATCHING TELEVISION: 0
SUM OF ALL RESPONSES TO PHQ QUESTIONS 1-9: 11
1. LITTLE INTEREST OR PLEASURE IN DOING THINGS: 2
SUM OF ALL RESPONSES TO PHQ QUESTIONS 1-9: 11

## 2023-11-06 ASSESSMENT — LIFESTYLE VARIABLES
HOW MANY STANDARD DRINKS CONTAINING ALCOHOL DO YOU HAVE ON A TYPICAL DAY: 1 OR 2
HOW OFTEN DO YOU HAVE A DRINK CONTAINING ALCOHOL: MONTHLY OR LESS

## 2023-11-06 ASSESSMENT — COLUMBIA-SUICIDE SEVERITY RATING SCALE - C-SSRS
3. HAVE YOU BEEN THINKING ABOUT HOW YOU MIGHT KILL YOURSELF?: NO
7. DID THIS OCCUR IN THE LAST THREE MONTHS: NO
5. HAVE YOU STARTED TO WORK OUT OR WORKED OUT THE DETAILS OF HOW TO KILL YOURSELF? DO YOU INTEND TO CARRY OUT THIS PLAN?: NO
4. HAVE YOU HAD THESE THOUGHTS AND HAD SOME INTENTION OF ACTING ON THEM?: NO

## 2023-11-09 LAB
ALBUMIN SERPL-MCNC: 4.5 G/DL (ref 3.9–4.9)
ALBUMIN/GLOB SERPL: 2 {RATIO} (ref 1.2–2.2)
ALP SERPL-CCNC: 67 IU/L (ref 44–121)
ALT SERPL-CCNC: 13 IU/L (ref 0–32)
AST SERPL-CCNC: 11 IU/L (ref 0–40)
BASOPHILS # BLD AUTO: 0.1 X10E3/UL (ref 0–0.2)
BASOPHILS NFR BLD AUTO: 1 %
BILIRUB SERPL-MCNC: 0.3 MG/DL (ref 0–1.2)
BUN SERPL-MCNC: 24 MG/DL (ref 8–27)
BUN/CREAT SERPL: 22 (ref 12–28)
CALCIUM SERPL-MCNC: 10.3 MG/DL (ref 8.7–10.3)
CHLORIDE SERPL-SCNC: 102 MMOL/L (ref 96–106)
CHOLEST SERPL-MCNC: 188 MG/DL (ref 100–199)
CO2 SERPL-SCNC: 25 MMOL/L (ref 20–29)
CREAT SERPL-MCNC: 1.11 MG/DL (ref 0.57–1)
EGFRCR SERPLBLD CKD-EPI 2021: 53 ML/MIN/1.73
EOSINOPHIL # BLD AUTO: 0.3 X10E3/UL (ref 0–0.4)
EOSINOPHIL NFR BLD AUTO: 3 %
ERYTHROCYTE [DISTWIDTH] IN BLOOD BY AUTOMATED COUNT: 13.1 % (ref 11.7–15.4)
GLOBULIN SER CALC-MCNC: 2.3 G/DL (ref 1.5–4.5)
GLUCOSE SERPL-MCNC: 328 MG/DL (ref 70–99)
HBA1C MFR BLD: 8.5 % (ref 4.8–5.6)
HCT VFR BLD AUTO: 36.7 % (ref 34–46.6)
HDLC SERPL-MCNC: 60 MG/DL
HGB BLD-MCNC: 12 G/DL (ref 11.1–15.9)
IMM GRANULOCYTES # BLD AUTO: 0 X10E3/UL (ref 0–0.1)
IMM GRANULOCYTES NFR BLD AUTO: 0 %
LDLC SERPL CALC-MCNC: 99 MG/DL (ref 0–99)
LYMPHOCYTES # BLD AUTO: 3 X10E3/UL (ref 0.7–3.1)
LYMPHOCYTES NFR BLD AUTO: 38 %
MCH RBC QN AUTO: 29.5 PG (ref 26.6–33)
MCHC RBC AUTO-ENTMCNC: 32.7 G/DL (ref 31.5–35.7)
MCV RBC AUTO: 90 FL (ref 79–97)
MONOCYTES # BLD AUTO: 0.6 X10E3/UL (ref 0.1–0.9)
MONOCYTES NFR BLD AUTO: 7 %
NEUTROPHILS # BLD AUTO: 4 X10E3/UL (ref 1.4–7)
NEUTROPHILS NFR BLD AUTO: 51 %
PLATELET # BLD AUTO: 146 X10E3/UL (ref 150–450)
POTASSIUM SERPL-SCNC: 4.4 MMOL/L (ref 3.5–5.2)
PROT SERPL-MCNC: 6.8 G/DL (ref 6–8.5)
RBC # BLD AUTO: 4.07 X10E6/UL (ref 3.77–5.28)
SODIUM SERPL-SCNC: 140 MMOL/L (ref 134–144)
T4 FREE SERPL-MCNC: 1.58 NG/DL (ref 0.82–1.77)
TRIGL SERPL-MCNC: 169 MG/DL (ref 0–149)
TSH SERPL DL<=0.005 MIU/L-ACNC: 1.02 UIU/ML (ref 0.45–4.5)
VLDLC SERPL CALC-MCNC: 29 MG/DL (ref 5–40)
WBC # BLD AUTO: 7.9 X10E3/UL (ref 3.4–10.8)

## 2023-11-10 ENCOUNTER — TELEPHONE (OUTPATIENT)
Dept: PHARMACY | Facility: CLINIC | Age: 68
End: 2023-11-10

## 2023-11-10 LAB
ALBUMIN/CREAT UR: 19 MG/G CREAT (ref 0–29)
CREAT UR-MCNC: 92.6 MG/DL
MICROALBUMIN UR-MCNC: 17.5 UG/ML

## 2023-11-10 NOTE — TELEPHONE ENCOUNTER
[facility] CLINICAL PHARMACY: ADHERENCE REVIEW  Identified care gap per [employer]: fills at [facility]: ACE/ARB adherence    Patient also appears to be prescribed: Diabetes and Statin    ASSESSMENT    ACE/ARB ADHERENCE    Insurance Records claims through  10.23.23  (Prior Year PDC = 100% - PASSED;  [address] = FIRST FILL; Potential Fail Date: 23):   LISINOPRIL TAB 5 MG last filled on 23 for 90 day supply. Next refill due: 10.15.23 -PAST DUE 26 DAYS     Prescribed si tablet/capsule daily    Per Insurer Portal: last filled on 23 for 90 day supply. Per [facility]: last picked up on 23 for 90 day supply. Billed through [place] 0 refills remaining. BP Readings from Last 3 Encounters:   23 124/71   23 121/61   23 (!) 159/72     Estimated Creatinine Clearance: 53 mL/min (A) (based on SCr of 1.11 mg/dL (H)). Lab Results   Component Value Date    CREATININE 1.11 (H) 2023     Lab Results   Component Value Date    K 4.4 2023     DIABETES ADHERENCE    Insurance Records claims through  10.23.23  (Prior [address] = not reported; YTD PDC = 97% - PASSED): METFORMIN TAB 1000 MG last filled on 10.04.23 for 90 day supply. Next refill due: 24    Prescribed si tablet/capsule daily    Per Insurer Portal: last filled on 10.04.23 for 90 day supply. Per [facility]: last picked up on 10.04.23 for 90 day supply. Billed through [place] 2 refills remaining. Lab Results   Component Value Date    LABA1C 8.5 (H) 2023    LABA1C 7.6 (H) 2023    LABA1C 7.2 (H) 2022       STATIN ADHERENCE    Insurance Records claims through  10.23.23  (Prior Year PDC = 86% - PASSED; YTD PDC = 100% - PASSED):   PRAVASTATIN TAB 20 MG last filled on 10.05.23 for 90 day supply. Next refill due: 24    Prescribed si tablet/capsule daily    Per Insurer Portal: last filled on 10.05.23 for 90 day supply.      Per [facility]: last picked up on 10.05.23 for 90

## 2023-11-13 RX ORDER — LISINOPRIL 5 MG/1
5 TABLET ORAL DAILY
Qty: 90 TABLET | Refills: 3 | Status: SHIPPED | OUTPATIENT
Start: 2023-11-13

## 2023-11-13 NOTE — TELEPHONE ENCOUNTER
PCP: Jono Morillo DO    Last appt  11/6/2023   Future Appointments   Date Time Provider [address]   11/13/2023  3:00 PM YINKA Carey [facility]   5/13/2024  3:00 PM [facility]A [facility] [address] [facility] [address]       Requested Prescriptions     Pending Prescriptions Disp Refills    lisinopril (PRINIVIL;ZESTRIL) 5 MG tablet 30 tablet      Sig: Take 1 tablet by mouth daily

## 2023-11-13 NOTE — TELEPHONE ENCOUNTER
Followed up on refill request for LISINOPRIL TAB 5 MG. I do not see that [facility] requested refill. Routing to pharmacist for refills.

## 2023-11-14 NOTE — TELEPHONE ENCOUNTER
Phuong Li DO sent a new prescription for LISINOPRIL TAB 5 MG #90/3 refills to [facility] on 23. Contacted [facility] and they are processing the prescription tomorrow, 11.15.23. Left voicemail, MyChart sent. Summary:  Patient overdue refilling LISINOPRIL TAB 5 MG and active on home medication list.   Patient needs refills for LISINOPRIL TAB 5 MG , [facility] sent refill request to provider. Phuong Li DO sent a new prescription for LISINOPRIL TAB 5 MG #90/3 refills to [facility] on 23. Contacted [facility] and they are processing the prescription tomorrow, 11.15.23.          For Pharmacy Admin Tracking Only    Program: Art in place:  No  Recommendation Provided To: Pharmacy: 1  Intervention Detail: Adherence Monitorin  Intervention Accepted By: Pharmacy: 1  Gap Closed?: Yes   Time Spent (min): 30

## 2023-11-30 RX ORDER — PRAVASTATIN SODIUM 20 MG
20 TABLET ORAL DAILY
Qty: 90 TABLET | Refills: 3 | Status: SHIPPED | OUTPATIENT
Start: 2023-11-30

## 2024-01-13 NOTE — PROGRESS NOTES
11/08/2023    CHOLHDLRATIO 2.3 06/22/2022       Lab Results   Component Value Date    WBC 7.9 11/08/2023    HGB 12.0 11/08/2023    HCT 36.7 11/08/2023    MCV 90 11/08/2023     (L) 11/08/2023       Lab Results   Component Value Date    MALBCR 19 11/08/2023       Last HbA1c(s):  Hemoglobin A1C   Date Value Ref Range Status   11/08/2023 8.5 (H) 4.8 - 5.6 % Final     Comment:              Prediabetes: 5.7 - 6.4           Diabetes: >6.4           Glycemic control for adults with diabetes: <7.0       Hemoglobin A1C, POC   Date Value Ref Range Status   01/25/2023 8.0 (A) 4.8 - 5.6 % Final       Hemoglobin A1C   Date Value Ref Range Status   11/08/2023 8.5 (H) 4.8 - 5.6 % Final     Comment:              Prediabetes: 5.7 - 6.4           Diabetes: >6.4           Glycemic control for adults with diabetes: <7.0     04/24/2023 7.6 (H) 4.0 - 5.6 % Final     Comment:     NEW METHOD  PLEASE NOTE NEW REFERENCE RANGE  (NOTE)  HbA1C Interpretive Ranges  <5.7              Normal  5.7 - 6.4         Consider Prediabetes  >6.5              Consider Diabetes     06/22/2022 7.2 (H) 4.0 - 5.6 % Final     Comment:     NEW METHOD  PLEASE NOTE NEW REFERENCE RANGE  (NOTE)  HbA1C Interpretive Ranges  <5.7              Normal  5.7 - 6.4         Consider Prediabetes  >6.5              Consider Diabetes         Estimated Creatinine Clearance: 53 mL/min (A) (based on SCr of 1.11 mg/dL (H)).    Medication reconciliation was completed during the visit.    Medications Discontinued During This Encounter   Medication Reason    lactulose (CHRONULAC) 10 GM/15ML solution Therapy completed    Plecanatide (TRULANCE) 3 MG TABS Therapy completed    vibegron (GEMTESA) 75 MG TABS tablet Therapy completed    lactulose (CHRONULAC) 10 GM/15ML solution Therapy completed     Orders Placed This Encounter    linaclotide (LINZESS) 145 MCG capsule     Sig: Take 1 capsule by mouth every morning (before breakfast)    timolol (TIMOPTIC) 0.5 % ophthalmic solution

## 2024-01-15 ENCOUNTER — PHARMACY VISIT (OUTPATIENT)
Age: 69
End: 2024-01-15

## 2024-01-15 VITALS — WEIGHT: 202 LBS | BODY MASS INDEX: 33.66 KG/M2 | HEIGHT: 65 IN

## 2024-01-15 DIAGNOSIS — E11.65 TYPE 2 DIABETES MELLITUS WITH HYPERGLYCEMIA, WITH LONG-TERM CURRENT USE OF INSULIN (HCC): Primary | ICD-10-CM

## 2024-01-15 DIAGNOSIS — Z79.4 TYPE 2 DIABETES MELLITUS WITH HYPERGLYCEMIA, WITH LONG-TERM CURRENT USE OF INSULIN (HCC): Primary | ICD-10-CM

## 2024-01-15 RX ORDER — TIMOLOL MALEATE 5 MG/ML
1 SOLUTION/ DROPS OPHTHALMIC DAILY
COMMUNITY
Start: 2023-12-15

## 2024-01-15 RX ORDER — FUROSEMIDE 20 MG/1
20 TABLET ORAL ONCE AS NEEDED
Qty: 90 TABLET | Refills: 3 | Status: SHIPPED | OUTPATIENT
Start: 2024-01-15

## 2024-01-15 RX ORDER — TRAZODONE HYDROCHLORIDE 50 MG/1
50 TABLET ORAL NIGHTLY
Qty: 90 TABLET | Refills: 3 | Status: SHIPPED | OUTPATIENT
Start: 2024-01-15

## 2024-01-16 ENCOUNTER — CLINICAL DOCUMENTATION (OUTPATIENT)
Age: 69
End: 2024-01-16

## 2024-01-16 NOTE — PROGRESS NOTES
Pharmacy Progress Note - Patient Assistance     Patient assistance applications for Linzess and insulins submitted to [employer] and [employer] today.   Verdict pending.     Thank you for the consult,  Flower Tolbert, DaveyD, BCACP, CDCKATHY                For Pharmacy Admin Tracking Only    Program: Medical Group  CPA in place:  Yes  Recommendation Provided To: Patient/Caregiver: 2 via In person  Intervention Detail: Patient Access Assistance/Sample Provided  Intervention Accepted By: Patient/Caregiver: 2  Gap Closed?: Yes   Time Spent (min): 20

## 2024-01-23 ENCOUNTER — TELEPHONE (OUTPATIENT)
Age: 69
End: 2024-01-23

## 2024-01-23 NOTE — TELEPHONE ENCOUNTER
Pharmacy Progress Note - Medication Access    Contacted Adrianne regarding Ms. Itzel Abdalla 69 y.o. 's [email or web address] application.     PAP program request for recent fax to be resubmitted.    Ms. Itzel Abdalla 69 y.o. was contacted via an outbound telephone call regarding her previous call today.  A voicemail was left for patient to return my call.       Thank you for the consult,  Davey MetzgerD, BCACP, CDCES          For Pharmacy Admin Tracking Only    Program: Medical Group  CPA in place:  Yes  Recommendation Provided To: Patient/Caregiver: 1 via Telephone and Other: 1  Intervention Detail: Patient Access Assistance/Sample Provided  Intervention Accepted By: Patient/Caregiver: 1 and Other: 1  Gap Closed?: Yes   Time Spent (min): 30

## 2024-01-23 NOTE — TELEPHONE ENCOUNTER
Calling to discuss renewing application for [facility] [employer]    Please call to discuss, requests call from keisha

## 2024-02-08 ENCOUNTER — TELEPHONE (OUTPATIENT)
Age: 69
End: 2024-02-08

## 2024-02-08 NOTE — TELEPHONE ENCOUNTER
Pharmacy Progress Note - Medication Access    Contacted Adrianne regarding Ms. Itzel Abdalla 69 y.o. 's Linzess PAP application.    Received communication from PAP regarding recent application submission. Form states order was missing Linzess 145 mcg quantity. Clarified entry on previously submitted application.      PAP is also requesting for patient's POI documents since they could not verify income based on information provided from her application.  Contacted patient to share updates. PHI verified. Patient will bring in documents to upcoming appt next week.       Thank you for the consult,  Flower Tolbert, PharmD, BCACP, CDCES          For Pharmacy Admin Tracking Only    Program: Medical Group  CPA in place:  Yes  Recommendation Provided To: Patient/Caregiver: 1 via Telephone and Other: 1  Intervention Detail: Benefit Assistance  Intervention Accepted By: Patient/Caregiver: 1 and Other: 1  Gap Closed?: Yes   Time Spent (min): 20

## 2024-02-10 NOTE — PROGRESS NOTES
Pharmacy Progress Note - Diabetes Management    Assessment / Plan:   Diabetes Management:  - Per ADA guidelines, Pt's A1c is not at goal of < 7%.   - Making improvement in overall CGM patterns.   - Continue metformin 1 gm daily  - Continue Tresiba 40 units daily  - Continue Novolog scale   If 151-200: give 3 units  If 201-250: give 5 units   If above 250: give 6 units  If you have a late night snack, give 2 units  Reinforce importance of covering Novolog only with meals   Recommend for patient to increase protein options at dinner meals and decrease current starches/carb intake     Other:  - POI document faxed to [facility] [employer] today for patient's Linzess      S/O: Ms. Itzel Abdalla is a 69 y.o. female, referred by Rm Cohn III, DO, with a PMH of T2DM,  HTN, HLD, Hypothyroidism, OAB, IBS, osteoporosis, was seen today for diabetes management follow up. Last A1c was 8.5% (Nov 2023), 7.6% (April 2023), 8% (Jan 2023), 7.2% (Jun 2022), 7.3% (Feb 2022), 6.9% (Sept 2021), 6.3%  (June 2021), 8% (Oct 2020), 8.4% (June 2020), 7.2% (Dec 2019).     Current anti-hyperglycemic regimen include(s):    - Metformin 1 gm daily  - Tresiba 40 units daily  - Novolog - pre meal  If 151-200: give 3 units  If 201-250: give 5 units   If above 250: give 6 units  If you have a late night snack, give 2 units    Reports Novolog 4 units once daily     Lisinopril 5 mg daily  Pravastatin 20 mg daily -  LDL 99 ;  (Nov 2023)     ROS:  Today, Pt endorses:  - Symptoms of Hyperglycemia: none  - Symptoms of Hypoglycemia: none    Blood Glucose Monitoring (BGM) or CGM:  Sarah 2 CGM ; uses reader   Inquires about Sarah 3 CGM; prefers a reader     At this time: 72 on CGM and 73 on POC glucometer   Had some donuts and tea this morning    14 days:      30 days:                  Nutrition/Lifestyle Modifications:  Reports to eating less at night compared to previous visits  Dinner meal is always her largest meal -- usually carb heavier

## 2024-02-13 ENCOUNTER — PHARMACY VISIT (OUTPATIENT)
Age: 69
End: 2024-02-13

## 2024-02-13 VITALS
SYSTOLIC BLOOD PRESSURE: 145 MMHG | OXYGEN SATURATION: 99 % | HEART RATE: 67 BPM | DIASTOLIC BLOOD PRESSURE: 79 MMHG | HEIGHT: 65 IN | BODY MASS INDEX: 34.66 KG/M2 | WEIGHT: 208 LBS

## 2024-02-13 DIAGNOSIS — E11.65 TYPE 2 DIABETES MELLITUS WITH HYPERGLYCEMIA, WITH LONG-TERM CURRENT USE OF INSULIN (HCC): Primary | ICD-10-CM

## 2024-02-13 DIAGNOSIS — F41.9 ANXIETY: ICD-10-CM

## 2024-02-13 DIAGNOSIS — Z79.4 TYPE 2 DIABETES MELLITUS WITH HYPERGLYCEMIA, WITH LONG-TERM CURRENT USE OF INSULIN (HCC): Primary | ICD-10-CM

## 2024-02-13 RX ORDER — ALPRAZOLAM 0.25 MG/1
TABLET ORAL
Qty: 60 TABLET | Refills: 0 | Status: SHIPPED | OUTPATIENT
Start: 2024-02-13 | End: 2024-04-13

## 2024-02-13 NOTE — PATIENT INSTRUCTIONS
Continue metformin 1000 mg daily  Continue Tresiba 40 units daily  Keep with current Novolog scale  If 151-200: give 3 units  If 201-250: give 5 units   If above 250: give 6 units  If you have a late night snack, give 2 units    Will work on your Sarah 3 order  Take your blood pressure medications

## 2024-02-15 RX ORDER — LEVOTHYROXINE SODIUM 300 UG/1
TABLET ORAL
Qty: 84 TABLET | Refills: 3 | Status: SHIPPED | OUTPATIENT
Start: 2024-02-15

## 2024-02-16 ENCOUNTER — TELEPHONE (OUTPATIENT)
Age: 69
End: 2024-02-16

## 2024-02-16 NOTE — TELEPHONE ENCOUNTER
[employer] is checking on status that was sent through portal for Free Style Sarah 2 sensor.     Did you get that?

## 2024-02-19 NOTE — TELEPHONE ENCOUNTER
Spoke with Corrina. Advised to have hard copy of form re faxed back to office.   States documentation is in [facility] who was in contact with Elliott.   Confirmed fax with Corrina

## 2024-02-22 ASSESSMENT — ENCOUNTER SYMPTOMS: SHORTNESS OF BREATH: 0

## 2024-02-23 ENCOUNTER — TELEPHONE (OUTPATIENT)
Age: 69
End: 2024-02-23

## 2024-02-23 ENCOUNTER — TELEMEDICINE (OUTPATIENT)
Age: 69
End: 2024-02-23
Payer: MEDICARE

## 2024-02-23 DIAGNOSIS — I10 ESSENTIAL HYPERTENSION: Primary | ICD-10-CM

## 2024-02-23 DIAGNOSIS — R60.0 LOCALIZED EDEMA: ICD-10-CM

## 2024-02-23 DIAGNOSIS — N18.30 TYPE 2 DM WITH CKD STAGE 3 AND HYPERTENSION (HCC): ICD-10-CM

## 2024-02-23 DIAGNOSIS — E11.22 TYPE 2 DM WITH CKD STAGE 3 AND HYPERTENSION (HCC): ICD-10-CM

## 2024-02-23 DIAGNOSIS — I12.9 TYPE 2 DM WITH CKD STAGE 3 AND HYPERTENSION (HCC): ICD-10-CM

## 2024-02-23 DIAGNOSIS — J01.00 ACUTE NON-RECURRENT MAXILLARY SINUSITIS: ICD-10-CM

## 2024-02-23 DIAGNOSIS — N18.31 STAGE 3A CHRONIC KIDNEY DISEASE (HCC): ICD-10-CM

## 2024-02-23 PROCEDURE — 99214 OFFICE O/P EST MOD 30 MIN: CPT | Performed by: INTERNAL MEDICINE

## 2024-02-23 PROCEDURE — 1123F ACP DISCUSS/DSCN MKR DOCD: CPT | Performed by: INTERNAL MEDICINE

## 2024-02-23 RX ORDER — BENZONATATE 100 MG/1
100 CAPSULE ORAL 3 TIMES DAILY PRN
Qty: 30 CAPSULE | Refills: 0 | Status: SHIPPED | OUTPATIENT
Start: 2024-02-23 | End: 2024-03-04

## 2024-02-23 RX ORDER — LISINOPRIL 10 MG/1
10 TABLET ORAL DAILY
Qty: 90 TABLET | Refills: 0 | Status: SHIPPED | OUTPATIENT
Start: 2024-02-23

## 2024-02-23 ASSESSMENT — PATIENT HEALTH QUESTIONNAIRE - PHQ9
SUM OF ALL RESPONSES TO PHQ QUESTIONS 1-9: 0
10. IF YOU CHECKED OFF ANY PROBLEMS, HOW DIFFICULT HAVE THESE PROBLEMS MADE IT FOR YOU TO DO YOUR WORK, TAKE CARE OF THINGS AT HOME, OR GET ALONG WITH OTHER PEOPLE: 0
6. FEELING BAD ABOUT YOURSELF - OR THAT YOU ARE A FAILURE OR HAVE LET YOURSELF OR YOUR FAMILY DOWN: 0
SUM OF ALL RESPONSES TO PHQ QUESTIONS 1-9: 0
8. MOVING OR SPEAKING SO SLOWLY THAT OTHER PEOPLE COULD HAVE NOTICED. OR THE OPPOSITE, BEING SO FIGETY OR RESTLESS THAT YOU HAVE BEEN MOVING AROUND A LOT MORE THAN USUAL: 0
7. TROUBLE CONCENTRATING ON THINGS, SUCH AS READING THE NEWSPAPER OR WATCHING TELEVISION: 0
3. TROUBLE FALLING OR STAYING ASLEEP: 0
SUM OF ALL RESPONSES TO PHQ QUESTIONS 1-9: 0
2. FEELING DOWN, DEPRESSED OR HOPELESS: 0
1. LITTLE INTEREST OR PLEASURE IN DOING THINGS: 0
SUM OF ALL RESPONSES TO PHQ9 QUESTIONS 1 & 2: 0
4. FEELING TIRED OR HAVING LITTLE ENERGY: 0
SUM OF ALL RESPONSES TO PHQ QUESTIONS 1-9: 0
9. THOUGHTS THAT YOU WOULD BE BETTER OFF DEAD, OR OF HURTING YOURSELF: 0
5. POOR APPETITE OR OVEREATING: 0

## 2024-02-23 NOTE — PROGRESS NOTES
\"Have you been to the ER, urgent care clinic since your last visit?  Hospitalized since your last visit?\"    NO    “Have you seen or consulted any other health care providers outside of  since your last visit?”    NO           
pen Inject 6-8 Units into the skin 3 times daily (before meals)      Insulin Degludec 200 UNIT/ML SOPN Inject 38 Units into the skin daily      metFORMIN (GLUCOPHAGE) 1000 MG tablet Take 1 tablet by mouth daily      nystatin (MYCOSTATIN) 546864 UNIT/GM powder Apply topically 4 times daily      nystatin-triamcinolone (MYCOLOG) 848296-5.1 UNIT/GM-% ointment APPLY TOPICALLY TO THE AFFECTED AREA TWICE DAILY      omeprazole (PRILOSEC OTC) 20 MG tablet Take 1 tablet by mouth daily       No current facility-administered medications for this visit.     Past Surgical History:   Procedure Laterality Date    APPENDECTOMY  1999    BACK SURGERY      x3    BLEPHAROPLASTY Right     BREAST SURGERY  09/18    BUNIONECTOMY      CHOLECYSTECTOMY  06/21/2021    COLONOSCOPY  2018    HYSTERECTOMY (CERVIX STATUS UNKNOWN)      OTHER SURGICAL HISTORY      Collapsed lung    UPPER GASTROINTESTINAL ENDOSCOPY  July 5 2019    UROLOGICAL SURGERY  2019    WISDOM TOOTH EXTRACTION           Lab Results   Component Value Date    WBC 7.9 11/08/2023    HGB 12.0 11/08/2023    HCT 36.7 11/08/2023    MCV 90 11/08/2023     (L) 11/08/2023     Lab Results   Component Value Date    CHOL 188 11/08/2023    TRIG 169 (H) 11/08/2023    HDL 60 11/08/2023    LDLCALC 99 11/08/2023     Lab Results   Component Value Date    CREATININE 1.11 (H) 11/08/2023    BUN 24 11/08/2023     11/08/2023    K 4.4 11/08/2023     11/08/2023    CO2 25 11/08/2023       Review of Systems   Respiratory:  Negative for shortness of breath.    Cardiovascular:  Negative for chest pain.         Physical Exam  Constitutional:       General: She is not in acute distress.     Appearance: Normal appearance.   HENT:      Head: Normocephalic and atraumatic.   Eyes:      General:         Right eye: No discharge.         Left eye: No discharge.   Pulmonary:      Effort: Pulmonary effort is normal. No respiratory distress.   Neurological:      Mental Status: She is alert and oriented to

## 2024-02-23 NOTE — TELEPHONE ENCOUNTER
Called pt, verified two pt identifiers.   Informed Dr. Collier pt covid negative, informed pt medication sent to pharmacy

## 2024-02-23 NOTE — TELEPHONE ENCOUNTER
Pt would like Dr. Collier to know that COVID is negative.      Was there medication to go to the pharm?    [facility] #[phone]

## 2024-02-28 ENCOUNTER — OFFICE VISIT (OUTPATIENT)
Age: 69
End: 2024-02-28
Payer: MEDICARE

## 2024-02-28 ENCOUNTER — TELEPHONE (OUTPATIENT)
Age: 69
End: 2024-02-28

## 2024-02-28 VITALS
SYSTOLIC BLOOD PRESSURE: 149 MMHG | TEMPERATURE: 97.6 F | HEIGHT: 65 IN | DIASTOLIC BLOOD PRESSURE: 82 MMHG | BODY MASS INDEX: 35.19 KG/M2 | OXYGEN SATURATION: 97 % | HEART RATE: 76 BPM | WEIGHT: 211.2 LBS | RESPIRATION RATE: 18 BRPM

## 2024-02-28 DIAGNOSIS — M17.0 BILATERAL PRIMARY OSTEOARTHRITIS OF KNEE: Primary | ICD-10-CM

## 2024-02-28 PROCEDURE — 3077F SYST BP >= 140 MM HG: CPT | Performed by: ORTHOPAEDIC SURGERY

## 2024-02-28 PROCEDURE — 1123F ACP DISCUSS/DSCN MKR DOCD: CPT | Performed by: ORTHOPAEDIC SURGERY

## 2024-02-28 PROCEDURE — 3078F DIAST BP <80 MM HG: CPT | Performed by: ORTHOPAEDIC SURGERY

## 2024-02-28 PROCEDURE — 99213 OFFICE O/P EST LOW 20 MIN: CPT | Performed by: ORTHOPAEDIC SURGERY

## 2024-02-28 ASSESSMENT — PATIENT HEALTH QUESTIONNAIRE - PHQ9
SUM OF ALL RESPONSES TO PHQ QUESTIONS 1-9: 0
SUM OF ALL RESPONSES TO PHQ QUESTIONS 1-9: 0
1. LITTLE INTEREST OR PLEASURE IN DOING THINGS: 0
2. FEELING DOWN, DEPRESSED OR HOPELESS: 0
SUM OF ALL RESPONSES TO PHQ9 QUESTIONS 1 & 2: 0
SUM OF ALL RESPONSES TO PHQ QUESTIONS 1-9: 0
SUM OF ALL RESPONSES TO PHQ QUESTIONS 1-9: 0

## 2024-02-28 NOTE — PROGRESS NOTES
Identified pt with two pt identifiers (name and ). Reviewed chart in preparation for visit and have obtained necessary documentation.    Itzel Abdalla is a 69 y.o. female Knee Pain (Follow up pain in bilateral knees)  .    Vitals:    24 0930 24 0952   BP: (!) 147/76 (!) 149/82   Site: Right Upper Arm Right Upper Arm   Position: Sitting Sitting   Cuff Size: Large Adult Large Adult   Pulse: 76    Resp: 18    Temp: 97.6 °F (36.4 °C)    TempSrc: Oral    SpO2: 97%    Weight: 95.8 kg (211 lb 3.2 oz)    Height: 1.651 m (5' 5\")           1. Have you been to the ER, urgent care clinic since your last visit?  Hospitalized since your last visit?  no     2. Have you seen or consulted any other health care providers outside of the [facility] since your last visit?  Include any pap smears or colon screening.  No  BP elevated. Patient advised to  follow up with PCP and check BP twice a day at home.   
continued medications, injections of multiple types, bracing, physical therapy, maintenance of ideal body weight.  Patient has elected to proceed with repeat viscosupplementation.  I will communicate with her PCP regarding her significant swelling.      Ms. Abdalla has a reminder for a \"due or due soon\" health maintenance. I have asked that she contact her primary care provider for follow-up on this health maintenance.

## 2024-02-28 NOTE — TELEPHONE ENCOUNTER
Patient is requesting a prescription of Meloxicam and to have it sent to her preferred pharmacy of [facility] [address] [place] [address] -  [phone] - F [phone] [[id number]]     She says she forgot to ask for it during her appt today 2/28/24

## 2024-02-29 ENCOUNTER — NURSE ONLY (OUTPATIENT)
Age: 69
End: 2024-02-29

## 2024-02-29 ENCOUNTER — TELEPHONE (OUTPATIENT)
Age: 69
End: 2024-02-29

## 2024-02-29 DIAGNOSIS — I10 ESSENTIAL HYPERTENSION: ICD-10-CM

## 2024-02-29 DIAGNOSIS — N18.31 STAGE 3A CHRONIC KIDNEY DISEASE (HCC): ICD-10-CM

## 2024-02-29 DIAGNOSIS — I12.9 TYPE 2 DM WITH CKD STAGE 3 AND HYPERTENSION (HCC): ICD-10-CM

## 2024-02-29 DIAGNOSIS — E11.22 TYPE 2 DM WITH CKD STAGE 3 AND HYPERTENSION (HCC): ICD-10-CM

## 2024-02-29 DIAGNOSIS — N18.30 TYPE 2 DM WITH CKD STAGE 3 AND HYPERTENSION (HCC): ICD-10-CM

## 2024-02-29 NOTE — TELEPHONE ENCOUNTER
Returned call to patient in reference to a medication that patient requested and also to give doctor's response. No answer at this time ([id number]). Left message for patient to give office a call back.

## 2024-02-29 NOTE — TELEPHONE ENCOUNTER
Pt in office for Lab work today, Stopped by desk to report an encounter with Dr. Goodrich yesterday. Pt stated Kp is concerned about swelling in both legs    Reviewed by PCP, He wants her to make an appointment to be seen.

## 2024-03-01 LAB
ANION GAP SERPL CALC-SCNC: 3 MMOL/L (ref 5–15)
BUN SERPL-MCNC: 34 MG/DL (ref 6–20)
BUN/CREAT SERPL: 22 (ref 12–20)
CALCIUM SERPL-MCNC: 8.9 MG/DL (ref 8.5–10.1)
CHLORIDE SERPL-SCNC: 112 MMOL/L (ref 97–108)
CO2 SERPL-SCNC: 27 MMOL/L (ref 21–32)
CREAT SERPL-MCNC: 1.57 MG/DL (ref 0.55–1.02)
EST. AVERAGE GLUCOSE BLD GHB EST-MCNC: 177 MG/DL
GLUCOSE SERPL-MCNC: 77 MG/DL (ref 65–100)
HBA1C MFR BLD: 7.8 % (ref 4–5.6)
POTASSIUM SERPL-SCNC: 4.6 MMOL/L (ref 3.5–5.1)
SODIUM SERPL-SCNC: 142 MMOL/L (ref 136–145)

## 2024-03-06 ENCOUNTER — OFFICE VISIT (OUTPATIENT)
Age: 69
End: 2024-03-06
Payer: MEDICARE

## 2024-03-06 VITALS
SYSTOLIC BLOOD PRESSURE: 106 MMHG | TEMPERATURE: 97.9 F | HEART RATE: 73 BPM | OXYGEN SATURATION: 99 % | HEIGHT: 65 IN | DIASTOLIC BLOOD PRESSURE: 61 MMHG | BODY MASS INDEX: 34.16 KG/M2 | RESPIRATION RATE: 18 BRPM | WEIGHT: 205 LBS

## 2024-03-06 DIAGNOSIS — R60.0 PEDAL EDEMA: Primary | ICD-10-CM

## 2024-03-06 DIAGNOSIS — E11.69 HYPERLIPIDEMIA ASSOCIATED WITH TYPE 2 DIABETES MELLITUS (HCC): ICD-10-CM

## 2024-03-06 DIAGNOSIS — E11.65 TYPE 2 DIABETES MELLITUS WITH HYPERGLYCEMIA, WITH LONG-TERM CURRENT USE OF INSULIN (HCC): ICD-10-CM

## 2024-03-06 DIAGNOSIS — I12.9 TYPE 2 DM WITH CKD STAGE 3 AND HYPERTENSION (HCC): ICD-10-CM

## 2024-03-06 DIAGNOSIS — N18.30 TYPE 2 DM WITH CKD STAGE 3 AND HYPERTENSION (HCC): ICD-10-CM

## 2024-03-06 DIAGNOSIS — Z79.4 TYPE 2 DIABETES MELLITUS WITH HYPERGLYCEMIA, WITH LONG-TERM CURRENT USE OF INSULIN (HCC): ICD-10-CM

## 2024-03-06 DIAGNOSIS — G56.01 CARPAL TUNNEL SYNDROME OF RIGHT WRIST: ICD-10-CM

## 2024-03-06 DIAGNOSIS — J01.40 ACUTE NON-RECURRENT PANSINUSITIS: ICD-10-CM

## 2024-03-06 DIAGNOSIS — E78.5 HYPERLIPIDEMIA ASSOCIATED WITH TYPE 2 DIABETES MELLITUS (HCC): ICD-10-CM

## 2024-03-06 DIAGNOSIS — F32.1 MAJOR DEPRESSIVE DISORDER, SINGLE EPISODE, MODERATE (HCC): ICD-10-CM

## 2024-03-06 DIAGNOSIS — N32.81 OAB (OVERACTIVE BLADDER): ICD-10-CM

## 2024-03-06 DIAGNOSIS — E11.22 TYPE 2 DM WITH CKD STAGE 3 AND HYPERTENSION (HCC): ICD-10-CM

## 2024-03-06 PROBLEM — F33.0 MAJOR DEPRESSIVE DISORDER, RECURRENT, MILD (HCC): Status: RESOLVED | Noted: 2023-11-06 | Resolved: 2024-03-06

## 2024-03-06 PROCEDURE — 1123F ACP DISCUSS/DSCN MKR DOCD: CPT | Performed by: INTERNAL MEDICINE

## 2024-03-06 PROCEDURE — 99214 OFFICE O/P EST MOD 30 MIN: CPT | Performed by: INTERNAL MEDICINE

## 2024-03-06 PROCEDURE — 3051F HG A1C>EQUAL 7.0%<8.0%: CPT | Performed by: INTERNAL MEDICINE

## 2024-03-06 PROCEDURE — 3078F DIAST BP <80 MM HG: CPT | Performed by: INTERNAL MEDICINE

## 2024-03-06 PROCEDURE — 3074F SYST BP LT 130 MM HG: CPT | Performed by: INTERNAL MEDICINE

## 2024-03-06 RX ORDER — AMOXICILLIN AND CLAVULANATE POTASSIUM 875; 125 MG/1; MG/1
1 TABLET, FILM COATED ORAL 2 TIMES DAILY
Qty: 20 TABLET | Refills: 0 | Status: SHIPPED | OUTPATIENT
Start: 2024-03-06 | End: 2024-03-16

## 2024-03-06 RX ORDER — FUROSEMIDE 20 MG/1
TABLET ORAL
Qty: 90 TABLET | Refills: 3
Start: 2024-03-06

## 2024-03-06 NOTE — PROGRESS NOTES
Itzel Abdalla is a 69 y.o. female who presents for evaluation of chronic sinus congestion, as well as increased fluid retention in both lower legs.  Last seen by me nov 6, 2023 in [facility].  Weight up 4 lbs since then.  She took 2 lasix 20 mg pills yesterday, and weight is down.  Saw dr davila last week, and swelling was worse.  Also did VV with dr magdiel banerjee here feb 23 for sinus congestion.  Was given tessalon pearles.  Still struggling with sinus congestion.    Also complains of right wrist pain.      ROS:  Constitutional: negative for fevers, chills, anorexia and weight loss  Eyes:   negative for visual disturbance and irritation  ENT:   negative for tinnitus,sore throat,nasal congestion,ear pain,hoarseness  Respiratory:  negative for cough, hemoptysis, dyspnea,wheezing  CV:   negative for chest pain, palpitations, lower extremity edema  GI:   negative for nausea, vomiting, diarrhea, abdominal pain,melena  Genitourinary: negative for frequency, dysuria and hematuria  Musculoskel: negative for myalgias, arthralgias, back pain, muscle weakness, joint pain  Neurological:  negative for headaches, dizziness, focal weakness, numbness  Psychiatric:     ++ for depression or anxiety      Past Medical History:   Diagnosis Date    Arthritis     Depression     Diabetes (HCC)     Dysphagia     GERD (gastroesophageal reflux disease) 07/05/2019    Hypercholesteremia     Hypertension     Hyperthyroidism     IBS (irritable bowel syndrome)     Liver disease     hepatitis b    Nausea & vomiting     PUD (peptic ulcer disease)     bleeding ulcer    Sleep apnea     CPAP    Urinary incontinence        Past Surgical History:   Procedure Laterality Date    APPENDECTOMY  1999    BACK SURGERY      x3    BLEPHAROPLASTY Right     BREAST SURGERY  09/18    BUNIONECTOMY      CHOLECYSTECTOMY  06/21/2021    COLONOSCOPY  2018    HYSTERECTOMY (CERVIX STATUS UNKNOWN)      OTHER SURGICAL HISTORY      Collapsed lung    UPPER GASTROINTESTINAL ENDOSCOPY   Yes

## 2024-03-06 NOTE — PATIENT INSTRUCTIONS
Make sure to weigh self every morning.  If weight up 5 lbs, or you notice significant increase in swelling, then take 2 of the lasix pills.  I'm anticipating that taking 2 lasix pills every Tuesday and Friday will keep the fluid in check, but you might need it every other day, or less often.  Time will tell.

## 2024-03-11 RX ORDER — BUSPIRONE HYDROCHLORIDE 5 MG/1
5 TABLET ORAL 2 TIMES DAILY
Qty: 180 TABLET | Refills: 0 | Status: SHIPPED | OUTPATIENT
Start: 2024-03-11

## 2024-03-11 NOTE — TELEPHONE ENCOUNTER
Received faxed refill request from pharmacy      PCP: Rm Yanes III,     Last appt: 3/6/2024  Future Appointments   Date Time Provider Department [place]   3/14/2024 12:00 PM [facility] [place] [facility]   3/26/2024 11:30 AM Flower Tolbert, [facility] BS AMB   5/13/2024  3:00 PM [facility] [address]   6/7/2024  1:40 PM Rm Yanes III, DO [facility] BS AMB       Requested Prescriptions     Pending Prescriptions Disp Refills    busPIRone (BUSPAR) 5 MG tablet 180 tablet 0     Sig: Take 1 tablet by mouth 2 times daily

## 2024-03-14 ENCOUNTER — TELEPHONE (OUTPATIENT)
Age: 69
End: 2024-03-14

## 2024-03-14 ENCOUNTER — HOSPITAL ENCOUNTER (OUTPATIENT)
Facility: HOSPITAL | Age: 69
Discharge: HOME | End: 2024-03-16
Attending: INTERNAL MEDICINE
Payer: MEDICARE

## 2024-03-14 VITALS — BODY MASS INDEX: 34.16 KG/M2 | WEIGHT: 205 LBS | HEIGHT: 65 IN

## 2024-03-14 DIAGNOSIS — R60.0 PEDAL EDEMA: ICD-10-CM

## 2024-03-14 PROCEDURE — 93306 TTE W/DOPPLER COMPLETE: CPT

## 2024-03-14 NOTE — TELEPHONE ENCOUNTER
LVM for the patient asking for a return phone call to get her setup for her bilateral gel injection series for gelsyn 3. No Auth require per benefits investigation.

## 2024-03-15 LAB
ECHO AO ROOT DIAM: 2.8 CM
ECHO AO ROOT INDEX: 1.4 CM/M2
ECHO AV AREA PEAK VELOCITY: 2.4 CM2
ECHO AV AREA/BSA PEAK VELOCITY: 1.2 CM2/M2
ECHO AV PEAK GRADIENT: 10 MMHG
ECHO AV PEAK VELOCITY: 1.6 M/S
ECHO AV VELOCITY RATIO: 0.75
ECHO BSA: 2.06 M2
ECHO LA DIAMETER INDEX: 1.75 CM/M2
ECHO LA DIAMETER: 3.5 CM
ECHO LA TO AORTIC ROOT RATIO: 1.25
ECHO LA VOL A-L A2C: 40 ML (ref 22–52)
ECHO LA VOL A-L A4C: 48 ML (ref 22–52)
ECHO LA VOL MOD A2C: 39 ML (ref 22–52)
ECHO LA VOL MOD A4C: 46 ML (ref 22–52)
ECHO LA VOLUME AREA LENGTH: 47 ML
ECHO LA VOLUME INDEX A-L A2C: 20 ML/M2 (ref 16–34)
ECHO LA VOLUME INDEX A-L A4C: 24 ML/M2 (ref 16–34)
ECHO LA VOLUME INDEX AREA LENGTH: 24 ML/M2 (ref 16–34)
ECHO LA VOLUME INDEX MOD A2C: 20 ML/M2 (ref 16–34)
ECHO LA VOLUME INDEX MOD A4C: 23 ML/M2 (ref 16–34)
ECHO LV E' LATERAL VELOCITY: 7 CM/S
ECHO LV E' SEPTAL VELOCITY: 5 CM/S
ECHO LV EDV A4C: 59 ML
ECHO LV EDV INDEX A4C: 30 ML/M2
ECHO LV EJECTION FRACTION A4C: 61 %
ECHO LV ESV A4C: 23 ML
ECHO LV ESV INDEX A4C: 12 ML/M2
ECHO LV FRACTIONAL SHORTENING: 32 % (ref 28–44)
ECHO LV INTERNAL DIMENSION DIASTOLE INDEX: 2.05 CM/M2
ECHO LV INTERNAL DIMENSION DIASTOLIC: 4.1 CM (ref 3.9–5.3)
ECHO LV INTERNAL DIMENSION SYSTOLIC INDEX: 1.4 CM/M2
ECHO LV INTERNAL DIMENSION SYSTOLIC: 2.8 CM
ECHO LV IVSD: 1 CM (ref 0.6–0.9)
ECHO LV MASS 2D: 132.1 G (ref 67–162)
ECHO LV MASS INDEX 2D: 66.1 G/M2 (ref 43–95)
ECHO LV POSTERIOR WALL DIASTOLIC: 1 CM (ref 0.6–0.9)
ECHO LV RELATIVE WALL THICKNESS RATIO: 0.49
ECHO LVOT AREA: 3.1 CM2
ECHO LVOT DIAM: 2 CM
ECHO LVOT PEAK GRADIENT: 5 MMHG
ECHO LVOT PEAK VELOCITY: 1.2 M/S
ECHO MV A VELOCITY: 1.06 M/S
ECHO MV E DECELERATION TIME (DT): 233.1 MS
ECHO MV E VELOCITY: 0.89 M/S
ECHO MV E/A RATIO: 0.84
ECHO MV E/E' LATERAL: 12.71
ECHO MV E/E' RATIO (AVERAGED): 15.26
ECHO RA VOLUME: 37 ML
ECHO RA VOLUME: 39 ML
ECHO RV TAPSE: 1.3 CM (ref 1.7–?)

## 2024-03-21 ENCOUNTER — OFFICE VISIT (OUTPATIENT)
Age: 69
End: 2024-03-21
Payer: MEDICARE

## 2024-03-21 VITALS
HEIGHT: 65 IN | SYSTOLIC BLOOD PRESSURE: 116 MMHG | RESPIRATION RATE: 18 BRPM | HEART RATE: 88 BPM | OXYGEN SATURATION: 97 % | WEIGHT: 205 LBS | DIASTOLIC BLOOD PRESSURE: 65 MMHG | BODY MASS INDEX: 34.16 KG/M2

## 2024-03-21 DIAGNOSIS — M17.0 BILATERAL PRIMARY OSTEOARTHRITIS OF KNEE: Primary | ICD-10-CM

## 2024-03-21 PROCEDURE — 20610 DRAIN/INJ JOINT/BURSA W/O US: CPT | Performed by: ORTHOPAEDIC SURGERY

## 2024-03-21 NOTE — PROGRESS NOTES
Identified pt with two pt identifiers (name and ). Reviewed chart in preparation for visit and have obtained necessary documentation.    Itzel Abdalla is a 69 y.o. female Knee Pain (B/l gel one injections)  .    Vitals:    24 1452   BP: 116/65   Site: Right Upper Arm   Position: Sitting   Cuff Size: Large Adult   Pulse: 88   Resp: 18   SpO2: 97%   Weight: 93 kg (205 lb)   Height: 1.651 m (5' 5\")          1. Have you been to the ER, urgent care clinic since your last visit?  Hospitalized since your last visit?  no     2. Have you seen or consulted any other health care providers outside of the [facility] since your last visit?  Include any pap smears or colon screening.  no

## 2024-03-21 NOTE — PROGRESS NOTES
Date of Procedure: 3/21/2024  PROCEDURE NOTE: Bilateral knee injection of gelsyn3    Consent was obtained from the patient. The correct site was identified after confirmation with the patient.  Following identification and confirmation of the correct site with the patient, the superolateral right knee was prepped in the normal sterile fashion.  A local anesthetic of 1% lidocaine without epinephrine was then administered to the local tissues.  Following, an injection of prefilled 16.8 mg gelsyn3 supplementation syringe was injected.  The needle was then withdrawn and the injection site dressed with a sterile bandage at the conclusion.  The procedure was well tolerated by the patient.  No immediate adverse reactions were noted.      Attention was then turned to the left knee.  Following identification and confirmation of the correct site with the patient, the superolateral left knee was prepped in the normal sterile fashion.  A local anesthetic of 1% lidocaine without epinephrine was then administered to the local tissues.  Following, an injection of prefilled 16.8mg gelsyn 3supplementation syringe was injected.   The needle was then withdrawn and the injection site dressed with a sterile bandage at the conclusion.  The procedure was well tolerated by the patient.  No immediate adverse reactions were noted.  Post injection instructions were given.      Diagnosis: Bilateral Primary Knee Osteoarthritis

## 2024-03-23 NOTE — PROGRESS NOTES
Pharmacy Progress Note - Diabetes Management    Assessment / Plan:   Diabetes Management:  - Per ADA guidelines, Pt's A1c is not at goal of < 7%.   - CGM patterns remains above TIR goal >70%. Mindful of recent technical trouble with reader and connectivity. Will look into replacement access. An order was submitted to [employer] [facility] back in February.   - Increase Tresiba to 42 units daily  - Continue Novolog at current pre meal scale  If 151-200: give 3 units  If 201-250: give 5 units   If above 250: give 6 units  If you have a late night snack, give 2 units    - Continue metformin 1 gm daily  - Continue to work on reducing overnight/late night snacking.      S/O: Ms. Itzel Abdalla is a 69 y.o. female, referred by Rm Cohn III, DO, with a PMH of T2DM,  HTN, HLD, Hypothyroidism, OAB, IBS, osteoporosis, was seen today for diabetes management follow up. Last A1c was 7.8% (March 2024), 8.5% (Nov 2023), 7.6% (April 2023), 8% (Jan 2023), 7.2% (Jun 2022), 7.3% (Feb 2022), 6.9% (Sept 2021), 6.3%  (June 2021), 8% (Oct 2020), 8.4% (June 2020), 7.2% (Dec 2019).      Interim update:   Had an acute visit with Dr Collier 2/23/24 sinus congestion + fluid retention. COVID negative.   Saw Dr Yanes for routine care 3/6/24. Augmentin prescribed for continued sinusitis  A1c was 7.8%. Lasix adjusted to 40 mg every Tuesday and Friday  Saw Dr Guzman (Ortho) for f/up 3/21/24 -- s/p bilateral knee injection ; two more injections - no steroids     Started trospium today.   Has appt with Dr Cade (Ortho) this Thursday - both hands  and R wrist pain    Current anti-hyperglycemic regimen include(s):    - Metformin 1 gm daily  - Tresiba 40 units daily  - Novolog - pre meal  If 151-200: give 3 units  If 201-250: give 5 units   If above 250: give 6 units  If you have a late night snack, give 2 units    Lisinopril 5 mg daily  Pravastatin 20 mg daily    ROS:  Today, Pt endorses:  - Symptoms of Hyperglycemia: none  - Symptoms of

## 2024-03-26 ENCOUNTER — PHARMACY VISIT (OUTPATIENT)
Age: 69
End: 2024-03-26

## 2024-03-26 VITALS
OXYGEN SATURATION: 93 % | BODY MASS INDEX: 34.66 KG/M2 | HEIGHT: 65 IN | WEIGHT: 208 LBS | SYSTOLIC BLOOD PRESSURE: 113 MMHG | HEART RATE: 79 BPM | DIASTOLIC BLOOD PRESSURE: 72 MMHG

## 2024-03-26 DIAGNOSIS — E11.65 TYPE 2 DIABETES MELLITUS WITH HYPERGLYCEMIA, WITH LONG-TERM CURRENT USE OF INSULIN (HCC): Primary | ICD-10-CM

## 2024-03-26 DIAGNOSIS — Z79.4 TYPE 2 DIABETES MELLITUS WITH HYPERGLYCEMIA, WITH LONG-TERM CURRENT USE OF INSULIN (HCC): Primary | ICD-10-CM

## 2024-03-26 NOTE — PATIENT INSTRUCTIONS
Check with [employer] (this is the medical supplier) about your reader replacement -- [phone]  Increase Tresiba to 42 units daily  Continue Novolog -- if pre meal blood sugar is:  If 151-200: give 3 units  If 201-250: give 5 units   If above 250: give 6 units  If you have a late night snack, give 2 units    - Continue metformin 1000 mg daily.

## 2024-03-28 ENCOUNTER — PHARMACY VISIT (OUTPATIENT)
Age: 69
End: 2024-03-28

## 2024-03-28 ENCOUNTER — OFFICE VISIT (OUTPATIENT)
Age: 69
End: 2024-03-28
Payer: MEDICARE

## 2024-03-28 VITALS — HEIGHT: 59 IN | BODY MASS INDEX: 41.38 KG/M2

## 2024-03-28 DIAGNOSIS — M17.0 BILATERAL PRIMARY OSTEOARTHRITIS OF KNEE: Primary | ICD-10-CM

## 2024-03-28 DIAGNOSIS — Z79.899 MEDICATION MANAGEMENT: Primary | ICD-10-CM

## 2024-03-28 PROCEDURE — 20610 DRAIN/INJ JOINT/BURSA W/O US: CPT | Performed by: ORTHOPAEDIC SURGERY

## 2024-03-28 ASSESSMENT — PATIENT HEALTH QUESTIONNAIRE - PHQ9
1. LITTLE INTEREST OR PLEASURE IN DOING THINGS: NOT AT ALL
2. FEELING DOWN, DEPRESSED OR HOPELESS: NOT AT ALL
SUM OF ALL RESPONSES TO PHQ QUESTIONS 1-9: 0
SUM OF ALL RESPONSES TO PHQ9 QUESTIONS 1 & 2: 0
SUM OF ALL RESPONSES TO PHQ QUESTIONS 1-9: 0

## 2024-03-28 NOTE — PROGRESS NOTES
Identified pt with two pt identifiers (name and ). Reviewed chart in preparation for visit and have obtained necessary documentation.    Itzel Abdalla is a 69 y.o. female Knee Pain ( Bilateral Knee )  .    Vitals:    24 1007   Height: 1.51 m (4' 11.45\")          1. Have you been to the ER, urgent care clinic since your last visit?  Hospitalized since your last visit?  no     2. Have you seen or consulted any other health care providers outside of the [facility] since your last visit?  Include any pap smears or colon screening.  no

## 2024-03-28 NOTE — PROGRESS NOTES
Pharmacy Progress Note - Medication Access    Contacted Dragan regarding Ms. Itzel Abdalla 69 y.o. 's Sarah 2 [place] replacement      Patient's prescription insurance: [facility] Medicare  Recall existing CGM order was submitted/faxed on 2/20/24.   DME processed this request as a sensor request rather than for the CGM reader. Clarified that patient is having trouble with CGM reader.     New order submitted today. Order #[id number]  Will also need to go through insurance approval.   DME will fax us a written request to finalize this request.     Patient informed.     Thank you for the consult,  Flower Tolbert, PharmD, BCACP, CDCES          For Pharmacy Admin Tracking Only    Program: Medical Group  CPA in place:  Yes  Recommendation Provided To: Patient/Caregiver: 2 via Virtual Visit and Other: 2  Intervention Detail: Benefit Assistance and New Rx: 1, reason: Needs Additional Therapy  Intervention Accepted By: Patient/Caregiver: 2 and Other: 2  Gap Closed?: Yes   Time Spent (min): 30

## 2024-03-28 NOTE — PROGRESS NOTES
Date of Procedure: 3/28/2024  PROCEDURE NOTE: Bilateral knee injection of gelsyn 3    Consent was obtained from the patient. The correct site was identified after confirmation with the patient.  Following identification and confirmation of the correct site with the patient, the superolateral right knee was prepped in the normal sterile fashion.  A local anesthetic of 1% lidocaine without epinephrine was then administered to the local tissues.  Following, an injection of prefilled gelsyn 3 16.8 mg supplementation syringe was injected.  The needle was then withdrawn and the injection site dressed with a sterile bandage at the conclusion.  The procedure was well tolerated by the patient.  No immediate adverse reactions were noted.      Attention was then turned to the left knee.  Following identification and confirmation of the correct site with the patient, the superolateral left knee was prepped in the normal sterile fashion.  A local anesthetic of 1% lidocaine without epinephrine was then administered to the local tissues.  Following, an injection of prefilled gelsyn 3 16.8 mg supplementation syringe was injected.   The needle was then withdrawn and the injection site dressed with a sterile bandage at the conclusion.  The procedure was well tolerated by the patient.  No immediate adverse reactions were noted.  Post injection instructions were given

## 2024-04-03 ENCOUNTER — TELEPHONE (OUTPATIENT)
Age: 69
End: 2024-04-03

## 2024-04-03 NOTE — TELEPHONE ENCOUNTER
Patient called in stating that [facility] Glaser notified her that they required clinical documentation of her readings prior to approving a new reader for her.

## 2024-04-04 ENCOUNTER — OFFICE VISIT (OUTPATIENT)
Age: 69
End: 2024-04-04
Payer: MEDICARE

## 2024-04-04 ENCOUNTER — TELEPHONE (OUTPATIENT)
Age: 69
End: 2024-04-04

## 2024-04-04 DIAGNOSIS — M17.0 BILATERAL PRIMARY OSTEOARTHRITIS OF KNEE: Primary | ICD-10-CM

## 2024-04-04 PROCEDURE — 20610 DRAIN/INJ JOINT/BURSA W/O US: CPT | Performed by: ORTHOPAEDIC SURGERY

## 2024-04-04 NOTE — PROGRESS NOTES
Date of Procedure: 4/4/2024  PROCEDURE NOTE: Bilateral knee injection of gelsyn 3    Consent was obtained from the patient. The correct site was identified after confirmation with the patient.  Following identification and confirmation of the correct site with the patient, the superolateral right knee was prepped in the normal sterile fashion.  A local anesthetic of 1% lidocaine without epinephrine was then administered to the local tissues.  Following, an injection of prefilled gelsyn 3 16.8 mg supplementation syringe was injected.  The needle was then withdrawn and the injection site dressed with a sterile bandage at the conclusion.  The procedure was well tolerated by the patient.  No immediate adverse reactions were noted.      Attention was then turned to the left knee.  Following identification and confirmation of the correct site with the patient, the superolateral left knee was prepped in the normal sterile fashion.  A local anesthetic of 1% lidocaine without epinephrine was then administered to the local tissues.  Following, an injection of prefilled 16.8 mg gelsyn 3 supplementation syringe was injected.   The needle was then withdrawn and the injection site dressed with a sterile bandage at the conclusion.  The procedure was well tolerated by the patient.  No immediate adverse reactions were noted.  Post injection instructions were given.      Diagnosis: Bilateral Primary Knee Osteoarthritis

## 2024-04-04 NOTE — TELEPHONE ENCOUNTER
Pharmacy Progress Note     Updated clinical documentation submitted to [facility] PALOMINO for Ms. Itzel Abdalla 's CGM reader access.     Thank you for the consult,  Flower Tolbert, PharmD, BCACP, CDCES                For Pharmacy Admin Tracking Only    Program: Medical Group  CPA in place:  Yes  Recommendation Provided To: Other: 1  Intervention Detail: Benefit Assistance  Intervention Accepted By: Other: 1  Gap Closed?: Yes   Time Spent (min): 10

## 2024-04-15 ENCOUNTER — TELEPHONE (OUTPATIENT)
Age: 69
End: 2024-04-15

## 2024-04-15 NOTE — TELEPHONE ENCOUNTER
Babita//[facility] [employer] states she needs to get Most Recent/Last Office Notes Faxed over from PCP from [facility] 3/6/24. Babita states received Office Notes From Pharmacist visit 3/26/24, Monalisa Tolbert thru [facility] but can not accept this as is Not Notes from PCP visit.     Please fax [facility] Notes from 3/6/24 w/Dr. Yanes to   Fax# [phone].

## 2024-04-20 DIAGNOSIS — F41.9 ANXIETY: ICD-10-CM

## 2024-04-22 ENCOUNTER — SCHEDULED TELEPHONE ENCOUNTER (OUTPATIENT)
Age: 69
End: 2024-04-22
Payer: MEDICARE

## 2024-04-22 DIAGNOSIS — M17.0 BILATERAL PRIMARY OSTEOARTHRITIS OF KNEE: Primary | ICD-10-CM

## 2024-04-22 PROCEDURE — 99441 PR PHYS/QHP TELEPHONE EVALUATION 5-10 MIN: CPT | Performed by: ORTHOPAEDIC SURGERY

## 2024-04-22 RX ORDER — ALPRAZOLAM 0.25 MG/1
TABLET ORAL
Qty: 60 TABLET | Refills: 2 | Status: SHIPPED | OUTPATIENT
Start: 2024-04-22 | End: 2024-10-19

## 2024-04-22 RX ORDER — BREXPIPRAZOLE 1 MG/1
1 TABLET ORAL DAILY
Qty: 90 TABLET | Refills: 3 | Status: SHIPPED | OUTPATIENT
Start: 2024-04-22

## 2024-04-22 RX ORDER — ESCITALOPRAM OXALATE 10 MG/1
10 TABLET ORAL DAILY
Qty: 90 TABLET | Refills: 3 | Status: SHIPPED | OUTPATIENT
Start: 2024-04-22

## 2024-04-23 NOTE — PROGRESS NOTES
Documentation:  I communicated with the patient and/or health care decision maker about her  bilateral injection.   Details of this discussion including any medical advice provided: This patient states that she has had very good relief of the chief complaint.    We discussed at length the treatment options going forward after injection.  We also discussed that injections can only be done every 3 months at most, and that the further length of time between injections is better from a medical standpoint.      Treatment plan will be: observation    Follow up: prn  Total Time: minutes: 5-10 minutes    Itzel Abdalla was evaluated through a synchronous (real-time) audio encounter. Patient identification was verified at the start of the visit. She (or guardian if applicable) is aware that this is a billable service, which includes applicable co-pays. This visit was conducted with the patient's (and/or legal guardian's) verbal consent. She has not had a related appointment within my department in the past 7 days or scheduled within the next 24 hours.   The patient was located at Home: [address] [place] [id number].  The provider was located at Facility (Appt Dept): [address] [facility]  [place] [address].    Note: not billable if this call serves to triage the patient into an appointment for the relevant concern  Yes, I confirm.   Iztel Abdalla is a 69 y.o. female evaluated via telephone on 4/22/2024 for No chief complaint on file.  .        Josué Guzman DO

## 2024-04-25 ENCOUNTER — TRANSCRIBE ORDERS (OUTPATIENT)
Facility: HOSPITAL | Age: 69
End: 2024-04-25

## 2024-04-25 DIAGNOSIS — Z12.31 SCREENING MAMMOGRAM FOR HIGH-RISK PATIENT: Primary | ICD-10-CM

## 2024-04-30 ENCOUNTER — OFFICE VISIT (OUTPATIENT)
Age: 69
End: 2024-04-30
Payer: MEDICARE

## 2024-04-30 ENCOUNTER — TELEPHONE (OUTPATIENT)
Age: 69
End: 2024-04-30

## 2024-04-30 VITALS
HEIGHT: 65 IN | BODY MASS INDEX: 34.99 KG/M2 | DIASTOLIC BLOOD PRESSURE: 64 MMHG | OXYGEN SATURATION: 96 % | WEIGHT: 210 LBS | HEART RATE: 67 BPM | SYSTOLIC BLOOD PRESSURE: 114 MMHG | RESPIRATION RATE: 16 BRPM | TEMPERATURE: 97.8 F

## 2024-04-30 DIAGNOSIS — B02.9 HERPES ZOSTER WITHOUT COMPLICATION: Primary | ICD-10-CM

## 2024-04-30 PROCEDURE — 99213 OFFICE O/P EST LOW 20 MIN: CPT | Performed by: INTERNAL MEDICINE

## 2024-04-30 PROCEDURE — 1123F ACP DISCUSS/DSCN MKR DOCD: CPT | Performed by: INTERNAL MEDICINE

## 2024-04-30 PROCEDURE — 3078F DIAST BP <80 MM HG: CPT | Performed by: INTERNAL MEDICINE

## 2024-04-30 PROCEDURE — 3074F SYST BP LT 130 MM HG: CPT | Performed by: INTERNAL MEDICINE

## 2024-04-30 RX ORDER — ACYCLOVIR 800 MG/1
800 TABLET ORAL
Qty: 35 TABLET | Refills: 0 | Status: SHIPPED | OUTPATIENT
Start: 2024-04-30 | End: 2024-05-07

## 2024-04-30 NOTE — TELEPHONE ENCOUNTER
Patient states she needs a call back Asap in reference to getting an Acute In Office Appt today Asap Preferably with Dr. Yanes but will take anyone for Rash from Front Stomach to back that is Itching Bad. No Appts in time frame requested. Please call. Thank you

## 2024-04-30 NOTE — PROGRESS NOTES
PROGRESS NOTE  Name: Itzel Abdalla   : 1955       ASSESSMENT/ PLAN:     Itzel was seen today for rash.    Diagnoses and all orders for this visit:    Herpes zoster without complication  -     acyclovir (ZOVIRAX) 800 MG tablet; Take 1 tablet by mouth 5 (five) times a day for 7 days    Return if symptoms worsen or fail to improve.     I have reviewed the patient's medications and risks/side effects/benefits were discussed. Diagnosis(-es) explained to patient and questions answered. Literature provided where appropriate.                       SUBJECTIVE  Ms. Itzel Abdalla is a pt of Rm Yanes III, DO and presents today acutely for     Chief Complaint   Patient presents with    Rash     Her rash started about 2-3 days ago. She has a blistering painful rash in a dermatomal distribution R mid abdomen radiating to back. She has burning and stinging.     OBJECTIVE  /64   Pulse 67   Temp 97.8 °F (36.6 °C) (Temporal)   Resp 16   Ht 1.651 m (5' 5\")   Wt 95.3 kg (210 lb)   SpO2 96%   BMI 34.95 kg/m²   Gen: Pleasant 69 y.o.  female in NAD.  SKIN: She has a dermatomal rash, blistering, herpetic appearing, on R side of abdomen radiating into back.

## 2024-05-06 ENCOUNTER — TELEPHONE (OUTPATIENT)
Age: 69
End: 2024-05-06

## 2024-05-06 NOTE — TELEPHONE ENCOUNTER
Pt wants to know how long the shingles will last/an idea?  They are not going any place she states and pt needs the surgery on her hand.    Please call pt to advise.

## 2024-05-06 NOTE — TELEPHONE ENCOUNTER
Returned patient's call in regards to shingles. Patient inquiring about how long virus should last. I informed patient that virus can last anywhere from 3-5 weeks. Patient verbalized understanding.   Patient stated that she is taking acyclovir, however, she is not taking completely as prescribed.   She stated that she is taking it maybe 3-4 times daily.   I advised patient to take the medication for a few more days and if it does not provide relief to give the office a call back.   Patient verbalized understanding.

## 2024-05-10 ENCOUNTER — TELEPHONE (OUTPATIENT)
Age: 69
End: 2024-05-10

## 2024-05-10 NOTE — TELEPHONE ENCOUNTER
Patient states she needs a call back to discuss Plan of Care for Shingles that patient reports is Driving her crazy & Not Able to sleep. Patient also reports making her feel sick/Nauseous. Please call to discuss & advise. Thank you

## 2024-05-10 NOTE — TELEPHONE ENCOUNTER
Placed call to patient to advise of Dr. Yanes's recommendations for painful shingles.   Per Dr. Yanes:     Not much else to do.  Use warm compress to area. Take tylenol or motrin as needed.  Nerve pain is tough to treat.     Patient verbalized understanding.

## 2024-05-10 NOTE — TELEPHONE ENCOUNTER
Returned call to patient in regards to shingles.   Patient stated that she is in severe pain and experiencing nausea. Patient stated that she has been taking Extra Strength Tylenol with no relief.  She stated that she has completed acyclovir.    Patient requesting something for pain and nausea.     Please advise?

## 2024-05-11 ENCOUNTER — APPOINTMENT (OUTPATIENT)
Facility: HOSPITAL | Age: 69
End: 2024-05-11
Payer: MEDICARE

## 2024-05-11 ENCOUNTER — HOSPITAL ENCOUNTER (EMERGENCY)
Facility: HOSPITAL | Age: 69
Discharge: HOME | End: 2024-05-11
Attending: EMERGENCY MEDICINE
Payer: MEDICARE

## 2024-05-11 VITALS
RESPIRATION RATE: 17 BRPM | HEART RATE: 94 BPM | BODY MASS INDEX: 34.89 KG/M2 | TEMPERATURE: 98.3 F | DIASTOLIC BLOOD PRESSURE: 69 MMHG | OXYGEN SATURATION: 100 % | SYSTOLIC BLOOD PRESSURE: 161 MMHG | WEIGHT: 209.44 LBS | HEIGHT: 65 IN

## 2024-05-11 DIAGNOSIS — N30.01 ACUTE CYSTITIS WITH HEMATURIA: ICD-10-CM

## 2024-05-11 DIAGNOSIS — R10.9 ACUTE ABDOMINAL PAIN: Primary | ICD-10-CM

## 2024-05-11 DIAGNOSIS — R11.2 NAUSEA AND VOMITING, UNSPECIFIED VOMITING TYPE: ICD-10-CM

## 2024-05-11 DIAGNOSIS — R19.7 DIARRHEA, UNSPECIFIED TYPE: ICD-10-CM

## 2024-05-11 LAB
ALBUMIN SERPL-MCNC: 3.9 G/DL (ref 3.5–5)
ALBUMIN/GLOB SERPL: 1.2 (ref 1.1–2.2)
ALP SERPL-CCNC: 72 U/L (ref 45–117)
ALT SERPL-CCNC: 19 U/L (ref 12–78)
ANION GAP SERPL CALC-SCNC: 9 MMOL/L (ref 5–15)
APPEARANCE UR: CLEAR
AST SERPL-CCNC: 12 U/L (ref 15–37)
BACTERIA URNS QL MICRO: ABNORMAL /HPF
BASOPHILS # BLD: 0 K/UL (ref 0–0.1)
BASOPHILS NFR BLD: 0 % (ref 0–1)
BILIRUB SERPL-MCNC: 0.8 MG/DL (ref 0.2–1)
BILIRUB UR QL: NEGATIVE
BUN SERPL-MCNC: 22 MG/DL (ref 6–20)
BUN/CREAT SERPL: 18 (ref 12–20)
CALCIUM SERPL-MCNC: 9.5 MG/DL (ref 8.5–10.1)
CHLORIDE SERPL-SCNC: 101 MMOL/L (ref 97–108)
CO2 SERPL-SCNC: 23 MMOL/L (ref 21–32)
COLOR UR: ABNORMAL
COMMENT:: NORMAL
CREAT SERPL-MCNC: 1.24 MG/DL (ref 0.55–1.02)
DIFFERENTIAL METHOD BLD: ABNORMAL
EOSINOPHIL # BLD: 0 K/UL (ref 0–0.4)
EOSINOPHIL NFR BLD: 0 % (ref 0–7)
EPITH CASTS URNS QL MICRO: ABNORMAL /LPF
ERYTHROCYTE [DISTWIDTH] IN BLOOD BY AUTOMATED COUNT: 12.4 % (ref 11.5–14.5)
GLOBULIN SER CALC-MCNC: 3.2 G/DL (ref 2–4)
GLUCOSE SERPL-MCNC: 383 MG/DL (ref 65–100)
GLUCOSE UR STRIP.AUTO-MCNC: >1000 MG/DL
HCT VFR BLD AUTO: 34.9 % (ref 35–47)
HGB BLD-MCNC: 12.7 G/DL (ref 11.5–16)
HGB UR QL STRIP: ABNORMAL
IMM GRANULOCYTES # BLD AUTO: 0.1 K/UL (ref 0–0.04)
IMM GRANULOCYTES NFR BLD AUTO: 1 % (ref 0–0.5)
KETONES UR QL STRIP.AUTO: 40 MG/DL
LEUKOCYTE ESTERASE UR QL STRIP.AUTO: NEGATIVE
LIPASE SERPL-CCNC: 20 U/L (ref 13–75)
LYMPHOCYTES # BLD: 2.2 K/UL (ref 0.8–3.5)
LYMPHOCYTES NFR BLD: 20 % (ref 12–49)
MAGNESIUM SERPL-MCNC: 1.6 MG/DL (ref 1.6–2.4)
MCH RBC QN AUTO: 29.8 PG (ref 26–34)
MCHC RBC AUTO-ENTMCNC: 36.4 G/DL (ref 30–36.5)
MCV RBC AUTO: 81.9 FL (ref 80–99)
MONOCYTES # BLD: 0.8 K/UL (ref 0–1)
MONOCYTES NFR BLD: 7 % (ref 5–13)
NEUTS SEG # BLD: 7.7 K/UL (ref 1.8–8)
NEUTS SEG NFR BLD: 72 % (ref 32–75)
NITRITE UR QL STRIP.AUTO: POSITIVE
NRBC # BLD: 0 K/UL (ref 0–0.01)
NRBC BLD-RTO: 0 PER 100 WBC
PH UR STRIP: 5.5 (ref 5–8)
PLATELET # BLD AUTO: 144 K/UL (ref 150–400)
PMV BLD AUTO: 11.1 FL (ref 8.9–12.9)
POTASSIUM SERPL-SCNC: 4.2 MMOL/L (ref 3.5–5.1)
PROT SERPL-MCNC: 7.1 G/DL (ref 6.4–8.2)
PROT UR STRIP-MCNC: ABNORMAL MG/DL
RBC # BLD AUTO: 4.26 M/UL (ref 3.8–5.2)
RBC #/AREA URNS HPF: ABNORMAL /HPF (ref 0–5)
SODIUM SERPL-SCNC: 133 MMOL/L (ref 136–145)
SP GR UR REFRACTOMETRY: 1.01 (ref 1–1.03)
SPECIMEN HOLD: NORMAL
URINE CULTURE IF INDICATED: ABNORMAL
UROBILINOGEN UR QL STRIP.AUTO: 0.2 EU/DL (ref 0.2–1)
WBC # BLD AUTO: 10.8 K/UL (ref 3.6–11)
WBC URNS QL MICRO: ABNORMAL /HPF (ref 0–4)

## 2024-05-11 PROCEDURE — 96375 TX/PRO/DX INJ NEW DRUG ADDON: CPT

## 2024-05-11 PROCEDURE — 80053 COMPREHEN METABOLIC PANEL: CPT

## 2024-05-11 PROCEDURE — 83735 ASSAY OF MAGNESIUM: CPT

## 2024-05-11 PROCEDURE — 74177 CT ABD & PELVIS W/CONTRAST: CPT

## 2024-05-11 PROCEDURE — 94761 N-INVAS EAR/PLS OXIMETRY MLT: CPT

## 2024-05-11 PROCEDURE — 87088 URINE BACTERIA CULTURE: CPT

## 2024-05-11 PROCEDURE — 99285 EMERGENCY DEPT VISIT HI MDM: CPT

## 2024-05-11 PROCEDURE — 87086 URINE CULTURE/COLONY COUNT: CPT

## 2024-05-11 PROCEDURE — 85025 COMPLETE CBC W/AUTO DIFF WBC: CPT

## 2024-05-11 PROCEDURE — 96376 TX/PRO/DX INJ SAME DRUG ADON: CPT

## 2024-05-11 PROCEDURE — 83690 ASSAY OF LIPASE: CPT

## 2024-05-11 PROCEDURE — 2580000003 HC RX 258: Performed by: EMERGENCY MEDICINE

## 2024-05-11 PROCEDURE — 36415 COLL VENOUS BLD VENIPUNCTURE: CPT

## 2024-05-11 PROCEDURE — 87186 SC STD MICRODIL/AGAR DIL: CPT

## 2024-05-11 PROCEDURE — 6360000004 HC RX CONTRAST MEDICATION: Performed by: EMERGENCY MEDICINE

## 2024-05-11 PROCEDURE — 6360000002 HC RX W HCPCS: Performed by: EMERGENCY MEDICINE

## 2024-05-11 PROCEDURE — 96365 THER/PROPH/DIAG IV INF INIT: CPT

## 2024-05-11 PROCEDURE — 81001 URINALYSIS AUTO W/SCOPE: CPT

## 2024-05-11 RX ORDER — ONDANSETRON 4 MG/1
4 TABLET, ORALLY DISINTEGRATING ORAL 3 TIMES DAILY PRN
Qty: 21 TABLET | Refills: 0 | Status: SHIPPED | OUTPATIENT
Start: 2024-05-11

## 2024-05-11 RX ORDER — DICYCLOMINE HCL 20 MG
20 TABLET ORAL 4 TIMES DAILY PRN
Qty: 20 TABLET | Refills: 0 | Status: SHIPPED | OUTPATIENT
Start: 2024-05-11

## 2024-05-11 RX ORDER — CEPHALEXIN 500 MG/1
500 CAPSULE ORAL 3 TIMES DAILY
Qty: 15 CAPSULE | Refills: 0 | Status: SHIPPED | OUTPATIENT
Start: 2024-05-11

## 2024-05-11 RX ORDER — ONDANSETRON 2 MG/ML
4 INJECTION INTRAMUSCULAR; INTRAVENOUS ONCE
Status: COMPLETED | OUTPATIENT
Start: 2024-05-11 | End: 2024-05-11

## 2024-05-11 RX ORDER — 0.9 % SODIUM CHLORIDE 0.9 %
1000 INTRAVENOUS SOLUTION INTRAVENOUS ONCE
Status: COMPLETED | OUTPATIENT
Start: 2024-05-11 | End: 2024-05-11

## 2024-05-11 RX ORDER — MORPHINE SULFATE 4 MG/ML
4 INJECTION, SOLUTION INTRAMUSCULAR; INTRAVENOUS
Status: COMPLETED | OUTPATIENT
Start: 2024-05-11 | End: 2024-05-11

## 2024-05-11 RX ADMIN — ONDANSETRON 4 MG: 2 INJECTION INTRAMUSCULAR; INTRAVENOUS at 12:28

## 2024-05-11 RX ADMIN — MORPHINE SULFATE 4 MG: 4 INJECTION INTRAVENOUS at 12:28

## 2024-05-11 RX ADMIN — SODIUM CHLORIDE 1000 ML: 9 INJECTION, SOLUTION INTRAVENOUS at 12:27

## 2024-05-11 RX ADMIN — IOPAMIDOL 100 ML: 755 INJECTION, SOLUTION INTRAVENOUS at 13:16

## 2024-05-11 RX ADMIN — ONDANSETRON 4 MG: 2 INJECTION INTRAMUSCULAR; INTRAVENOUS at 15:17

## 2024-05-11 RX ADMIN — SODIUM CHLORIDE 1000 MG: 900 INJECTION INTRAVENOUS at 15:17

## 2024-05-11 ASSESSMENT — PAIN SCALES - GENERAL
PAINLEVEL_OUTOF10: 5
PAINLEVEL_OUTOF10: 2

## 2024-05-11 ASSESSMENT — PAIN - FUNCTIONAL ASSESSMENT: PAIN_FUNCTIONAL_ASSESSMENT: 0-10

## 2024-05-11 ASSESSMENT — PAIN DESCRIPTION - ORIENTATION: ORIENTATION: RIGHT;LEFT

## 2024-05-11 ASSESSMENT — PAIN DESCRIPTION - LOCATION
LOCATION: ABDOMEN
LOCATION: ABDOMEN

## 2024-05-11 NOTE — ED PROVIDER NOTES
[facility] EMERGENCY DEPT  EMERGENCY DEPARTMENT ENCOUNTER       Pt Name: Itzel Abdalla  MRN: [id number]  Birthdate 1955  Date of evaluation: 5/11/2024  Provider: Juno Collier MD   PCP: Rm Yanes III DO  Note Started: 12:22 PM EDT 5/11/24     CHIEF COMPLAINT       Chief Complaint   Patient presents with    Nausea     Patient states she was diagnosed with shingles 2 weeks ago and was given aciclovir. Patient states the last three days she has been nauseous and has experienced diarrhea and vomiting throughout yesterday and today.        HISTORY OF PRESENT ILLNESS: 1 or more elements      History From: Patient, History limited by: none     Itzel Abdalla is a 69 y.o. female patient with history of diabetes, hypertension, IBS, here for nausea, vomiting and abdominal pain.  Patient was diagnosed with shingles 2 weeks ago.  She completed acyclovir, but has had nausea, decreased appetite and abdominal pain for the last 3 days.  Her pain is located in the lower quadrants, and last for minutes at a time.  It is currently 8 out of 10 in severity.  She denies back pain, dysuria or change in bowel habits.  She denies fever, chest pain or shortness of breath.  She has had nausea but no actual vomiting.  When she tries to eat, it makes her symptoms worse, so she has not eaten much in the last 3 days.       Please See MDM for Additional Details of the HPI/PMH  Nursing Notes were all reviewed and agreed with or any disagreements were addressed in the HPI.     REVIEW OF SYSTEMS        Positives and Pertinent negatives as per HPI.    PAST HISTORY     Past Medical History:  Past Medical History:   Diagnosis Date    Arthritis     Depression     Diabetes (HCC)     Dysphagia     GERD (gastroesophageal reflux disease) 07/05/2019    Hypercholesteremia     Hypertension     Hyperthyroidism     IBS (irritable bowel syndrome)     Liver disease     hepatitis b    Nausea & vomiting     PUD (peptic ulcer disease)     bleeding ulcer  section.     Interpretation per the Radiologist below, if available at the time of this note:     CT ABDOMEN PELVIS W IV CONTRAST Additional Contrast? None   Final Result   No acute intraperitoneal process is identified.          Incidental and/or nonemergent findings are as described above.              PROCEDURES   Unless otherwise noted below, none  Procedures     CRITICAL CARE TIME   0    EMERGENCY DEPARTMENT COURSE and DIFFERENTIAL DIAGNOSIS/MDM   Vitals:    Vitals:    05/11/24 1141   BP: (!) 153/88   Pulse: 73   Resp: 17   Temp: 98.3 °F (36.8 °C)   TempSrc: Oral   SpO2: 99%   Weight: 95 kg (209 lb 7 oz)   Height: 1.651 m (5' 5\")        Patient was given the following medications:  Medications   ondansetron (ZOFRAN) injection 4 mg (has no administration in time range)   sodium chloride 0.9 % bolus 1,000 mL (has no administration in time range)   morphine sulfate (PF) injection 4 mg (has no administration in time range)       Medical Decision Making  CT, blood work, medications, urine    Amount and/or Complexity of Data Reviewed  Labs: ordered. Decision-making details documented in ED Course.  Radiology: ordered. Decision-making details documented in ED Course.    Risk  Prescription drug management.            FINAL IMPRESSION     1. Acute abdominal pain    2. Nausea and vomiting, unspecified vomiting type    3. Diarrhea, unspecified type    4. Acute cystitis with hematuria          DISPOSITION/PLAN   Itzel Abdalla's  results have been reviewed with her.  She has been counseled regarding her diagnosis, treatment, and plan.  She verbally conveys understanding and agreement of the signs, symptoms, diagnosis, treatment and prognosis and additionally agrees to follow up as discussed.  She also agrees with the care-plan and conveys that all of her questions have been answered.  I have also provided discharge instructions for her that include: educational information regarding their diagnosis and treatment, and list

## 2024-05-11 NOTE — ED NOTES
After receiving 4mg of morphine patient began to fall asleep and started to desat down to 88%. Applied 2lpm of o2 at this time

## 2024-05-12 LAB
BACTERIA SPEC CULT: ABNORMAL
CC UR VC: ABNORMAL
SERVICE CMNT-IMP: ABNORMAL

## 2024-05-13 ENCOUNTER — TELEPHONE (OUTPATIENT)
Age: 69
End: 2024-05-13

## 2024-05-13 LAB
BACTERIA SPEC CULT: ABNORMAL
CC UR VC: ABNORMAL
SERVICE CMNT-IMP: ABNORMAL

## 2024-05-13 NOTE — TELEPHONE ENCOUNTER
Placed call to patient inform her of Dr. Yanes's recommendations.   Per Dr. Yanes:    Ok to take keflex.  Take with food.     Patient verbalized understanding.

## 2024-05-13 NOTE — TELEPHONE ENCOUNTER
Pt states was just in ED for UTI    States given medication    States last time she had to have an infusion to treat a UTI    Asks if pcp can double check and make sure she is on the correct med.    Please call to advise regarding medication.

## 2024-05-13 NOTE — TELEPHONE ENCOUNTER
Returned patient's call in regards to keflex,  prescribed for a UTI at ED.   Patient wants to know if she can take keflex because her stomach has been \"upset\".     Please advise?

## 2024-05-14 NOTE — PROGRESS NOTES
up 5 lbs.  Would use on tuesdays/Fridays for now.    lisinopril (PRINIVIL;ZESTRIL) 10 MG tablet Take 1 tablet by mouth daily    levothyroxine (SYNTHROID) 300 MCG tablet TAKE 1 TABLET BY MOUTH ONCE DAILY BEFORE BREAKFAST 6  DAYS PER WEEK AND 1/2  TABLET (150 MCG) 1 DAY PER  WEEK    timolol (TIMOPTIC) 0.5 % ophthalmic solution Place 1 drop into both eyes daily    traZODone (DESYREL) 50 MG tablet Take 1 tablet by mouth nightly    pravastatin (PRAVACHOL) 20 MG tablet TAKE 1 TABLET BY MOUTH DAILY    denosumab (PROLIA) 60 MG/ML SOSY SC injection Inject 1 mL into the skin once    acetaminophen (TYLENOL) 500 MG tablet Take 1 tablet by mouth every 6 hours as needed    famotidine (PEPCID) 40 MG tablet Take 1 tablet by mouth 2 times daily    insulin aspart (NOVOLOG) 100 UNIT/ML injection pen Inject 6-8 Units into the skin 3 times daily (before meals)    Insulin Degludec 200 UNIT/ML SOPN Inject 38 Units into the skin daily    metFORMIN (GLUCOPHAGE) 1000 MG tablet Take 1 tablet by mouth daily    omeprazole (PRILOSEC OTC) 20 MG tablet Take 1 tablet by mouth daily     No current facility-administered medications for this visit.       Lab Results   Component Value Date     (L) 05/11/2024    K 4.2 05/11/2024     05/11/2024    CO2 23 05/11/2024    BUN 22 (H) 05/11/2024    CREATININE 1.24 (H) 05/11/2024    GLUCOSE 383 (H) 05/11/2024    CALCIUM 9.5 05/11/2024    LABALBU 17.5 11/08/2023    BILITOT 0.8 05/11/2024    ALKPHOS 72 05/11/2024    AST 12 (L) 05/11/2024    ALT 19 05/11/2024    LABGLOM 47 (L) 05/11/2024    GFRAA >60 06/22/2022    AGRATIO 2.0 11/08/2023    GLOB 3.2 05/11/2024       Lab Results   Component Value Date    CHOL 188 11/08/2023    TRIG 169 (H) 11/08/2023    HDL 60 11/08/2023    VLDL 29 11/08/2023    CHOLHDLRATIO 2.3 06/22/2022       Lab Results   Component Value Date    WBC 10.8 05/11/2024    HGB 12.7 05/11/2024    HCT 34.9 (L) 05/11/2024    MCV 81.9 05/11/2024     (L) 05/11/2024       Lab Results

## 2024-05-15 ENCOUNTER — PHARMACY VISIT (OUTPATIENT)
Age: 69
End: 2024-05-15

## 2024-05-15 DIAGNOSIS — E11.22 TYPE 2 DM WITH CKD STAGE 3 AND HYPERTENSION (HCC): ICD-10-CM

## 2024-05-15 DIAGNOSIS — N18.30 TYPE 2 DM WITH CKD STAGE 3 AND HYPERTENSION (HCC): ICD-10-CM

## 2024-05-15 DIAGNOSIS — Z79.4 TYPE 2 DIABETES MELLITUS WITH HYPERGLYCEMIA, WITH LONG-TERM CURRENT USE OF INSULIN (HCC): Primary | ICD-10-CM

## 2024-05-15 DIAGNOSIS — I12.9 TYPE 2 DM WITH CKD STAGE 3 AND HYPERTENSION (HCC): ICD-10-CM

## 2024-05-15 DIAGNOSIS — E11.65 TYPE 2 DIABETES MELLITUS WITH HYPERGLYCEMIA, WITH LONG-TERM CURRENT USE OF INSULIN (HCC): Primary | ICD-10-CM

## 2024-06-07 ENCOUNTER — OFFICE VISIT (OUTPATIENT)
Age: 69
End: 2024-06-07
Payer: MEDICARE

## 2024-06-07 VITALS
RESPIRATION RATE: 14 BRPM | OXYGEN SATURATION: 98 % | TEMPERATURE: 98 F | DIASTOLIC BLOOD PRESSURE: 62 MMHG | HEART RATE: 85 BPM | SYSTOLIC BLOOD PRESSURE: 98 MMHG | HEIGHT: 65 IN | WEIGHT: 196.4 LBS | BODY MASS INDEX: 32.72 KG/M2

## 2024-06-07 DIAGNOSIS — R53.1 GENERAL WEAKNESS: Primary | ICD-10-CM

## 2024-06-07 DIAGNOSIS — F32.1 MAJOR DEPRESSIVE DISORDER, SINGLE EPISODE, MODERATE (HCC): ICD-10-CM

## 2024-06-07 DIAGNOSIS — E11.65 TYPE 2 DIABETES MELLITUS WITH HYPERGLYCEMIA, WITH LONG-TERM CURRENT USE OF INSULIN (HCC): ICD-10-CM

## 2024-06-07 DIAGNOSIS — Z79.4 TYPE 2 DIABETES MELLITUS WITH HYPERGLYCEMIA, WITH LONG-TERM CURRENT USE OF INSULIN (HCC): ICD-10-CM

## 2024-06-07 DIAGNOSIS — G56.01 CARPAL TUNNEL SYNDROME OF RIGHT WRIST: ICD-10-CM

## 2024-06-07 DIAGNOSIS — B02.9 HERPES ZOSTER WITHOUT COMPLICATION: ICD-10-CM

## 2024-06-07 PROCEDURE — 1123F ACP DISCUSS/DSCN MKR DOCD: CPT | Performed by: INTERNAL MEDICINE

## 2024-06-07 PROCEDURE — 3051F HG A1C>EQUAL 7.0%<8.0%: CPT | Performed by: INTERNAL MEDICINE

## 2024-06-07 PROCEDURE — 99214 OFFICE O/P EST MOD 30 MIN: CPT | Performed by: INTERNAL MEDICINE

## 2024-06-07 PROCEDURE — 3074F SYST BP LT 130 MM HG: CPT | Performed by: INTERNAL MEDICINE

## 2024-06-07 PROCEDURE — 3078F DIAST BP <80 MM HG: CPT | Performed by: INTERNAL MEDICINE

## 2024-06-07 NOTE — PATIENT INSTRUCTIONS
Once your overall strength is improved, then contact dr zurita and get your right carpal tunnel fixed.

## 2024-06-07 NOTE — PROGRESS NOTES
Chief Complaint   Patient presents with    Herpes Zoster     4 month follow up   Patient states that she is still feeling weak.        \"Have you been to the ER, urgent care clinic since your last visit?  Hospitalized since your last visit?\"    YES - When: approximately 3  weeks ago.  Where and Why: [facility] ED for dehydration and Shingles.    “Have you seen or consulted any other health care providers outside of [facility] since your last visit?”    NO        “Have you had a colorectal cancer screening such as a colonoscopy/FIT/Cologuard?    YES - Type: Colonoscopy - Where: [facility]  Nurse/MICHAEL to request most recent records if not in the chart     Date of last Colonoscopy: 7/18/2016  No cologuard on file  No FIT/FOBT on file   No flexible sigmoidoscopy on file         Click Here for Release of Records Request

## 2024-06-07 NOTE — PROGRESS NOTES
Itzel Adballa is a 69 y.o. female who presents for evaluation of routine follow up for dm, htn, hld, meagan, pedal edema.  Last seen by me march 6, 2024 for edema and sinus congestion.  Added lasix, check echo (ef 55-60%).  Her edema has resolved, but she had shingles on right flank about a month ago. Saw dr anderson, was rx valtrex.  Unfortunately she was in essence bed ridden for next 3 weeks, and is just starting now to get up and about more.  She was 40 minutes late for her appt today.      ROS:  Constitutional: negative for fevers, chills, anorexia and weight loss  Eyes:   negative for visual disturbance and irritation  ENT:   negative for tinnitus,sore throat,nasal congestion,ear pain,hoarseness  Respiratory:  negative for cough, hemoptysis, dyspnea,wheezing  CV:   negative for chest pain, palpitations, lower extremity edema  GI:   negative for nausea, vomiting, diarrhea, abdominal pain,melena  Genitourinary: negative for frequency, dysuria and hematuria  Musculoskel: negative for myalgias, arthralgias, back pain, muscle weakness, joint pain  Neurological:  negative for headaches, dizziness, focal weakness, numbness  Psychiatric:     Negative for depression or anxiety      Past Medical History:   Diagnosis Date    Arthritis     Depression     Diabetes (HCC)     Dysphagia     GERD (gastroesophageal reflux disease) 07/05/2019    Hypercholesteremia     Hypertension     Hyperthyroidism     IBS (irritable bowel syndrome)     Liver disease     hepatitis b    Nausea & vomiting     PUD (peptic ulcer disease)     bleeding ulcer    Sleep apnea     CPAP    Urinary incontinence        Past Surgical History:   Procedure Laterality Date    APPENDECTOMY  1999    BACK SURGERY      x3    BLEPHAROPLASTY Right     BREAST SURGERY  09/18    BUNIONECTOMY      CHOLECYSTECTOMY  06/21/2021    COLONOSCOPY  2018    HYSTERECTOMY (CERVIX STATUS UNKNOWN)      OTHER SURGICAL HISTORY      Collapsed lung    UPPER GASTROINTESTINAL ENDOSCOPY  July

## 2024-06-13 NOTE — THERAPY EVALUATION
[facility] Physical Therapy  [address]  [place] [address]  Phone: [phone]   Fax: [phone]          PHYSICAL THERAPY - MEDICARE EVALUATION/PLAN OF CARE NOTE (updated 3/23)      Date: 2024          Patient Name:  Itzel Abdalla :  1955   Medical   Diagnosis:  General weakness [R53.1]  Herpes zoster without complication [B02.9] Treatment Diagnosis:  M62.81  GENERAL MUSCLE WEAKNESS    Referral Source:  Rm Yanes* Provider #:  [id number]                Insurance: Payor: [facility] MEDICARE / Plan: [employer] MEDICARE ADVANTAGE / Product Type: *No Product type* /      Patient  verified yes     Visit #   Current  / Total 1 24   Time   In / Out 8:00 8:55   Total Treatment Time 55   Total Timed Codes 25   1:1 Treatment Time 55      [facility] Totals Reminder:  bill using total billable   min of TIMED therapeutic procedures and modalities.   8-22 min = 1 unit; 23-37 min = 2 units; 38-52 min = 3 units;  53-67 min = 4 units; 68-82 min = 5 units           SUBJECTIVE  Pain Level (0-10 scale): 0  []constant []intermittent []improving []worsening []no change since onset    Any medication changes, allergies to medications, adverse drug reactions, diagnosis change, or new procedure performed?: [x] No    [] Yes (see summary sheet for update)  Medications: Verified on Patient Summary List    Subjective functional status/changes:     Patient presents to therapy today with chief complaint of general weakness and decreased endurance after having shingles in April and being \"bed ridden\" for 3 weeks. She also reports history of LBP including an L4/5 fusion in . Prior to her episode of shingles, she was mainly limited by her back pain. She describes back pain at shooting pain localized to low back and denies any numbness and tingling. Primary aggravating factors are bending and walking. She is able to do activities for ~15 minutes

## 2024-06-14 ENCOUNTER — HOSPITAL ENCOUNTER (OUTPATIENT)
Facility: HOSPITAL | Age: 69
Setting detail: RECURRING SERIES
Discharge: HOME | End: 2024-06-17
Attending: INTERNAL MEDICINE
Payer: MEDICARE

## 2024-06-14 PROCEDURE — 97162 PT EVAL MOD COMPLEX 30 MIN: CPT

## 2024-06-14 PROCEDURE — 97110 THERAPEUTIC EXERCISES: CPT

## 2024-06-17 ENCOUNTER — HOSPITAL ENCOUNTER (OUTPATIENT)
Facility: HOSPITAL | Age: 69
Setting detail: RECURRING SERIES
Discharge: HOME | End: 2024-06-20
Attending: INTERNAL MEDICINE
Payer: MEDICARE

## 2024-06-17 ENCOUNTER — HOSPITAL ENCOUNTER (OUTPATIENT)
Facility: HOSPITAL | Age: 69
Setting detail: INFUSION SERIES
Discharge: HOME | End: 2024-06-17
Payer: MEDICARE

## 2024-06-17 VITALS
RESPIRATION RATE: 16 BRPM | OXYGEN SATURATION: 99 % | TEMPERATURE: 97.9 F | SYSTOLIC BLOOD PRESSURE: 125 MMHG | BODY MASS INDEX: 33.49 KG/M2 | HEART RATE: 69 BPM | WEIGHT: 201 LBS | DIASTOLIC BLOOD PRESSURE: 75 MMHG | HEIGHT: 65 IN

## 2024-06-17 DIAGNOSIS — M81.0 OSTEOPOROSIS, UNSPECIFIED OSTEOPOROSIS TYPE, UNSPECIFIED PATHOLOGICAL FRACTURE PRESENCE: Primary | ICD-10-CM

## 2024-06-17 PROCEDURE — 97110 THERAPEUTIC EXERCISES: CPT

## 2024-06-17 PROCEDURE — 6360000002 HC RX W HCPCS: Performed by: INTERNAL MEDICINE

## 2024-06-17 PROCEDURE — 96372 THER/PROPH/DIAG INJ SC/IM: CPT

## 2024-06-17 RX ORDER — ONDANSETRON 2 MG/ML
8 INJECTION INTRAMUSCULAR; INTRAVENOUS
OUTPATIENT
Start: 2024-12-15

## 2024-06-17 RX ORDER — EPINEPHRINE 1 MG/ML
0.3 INJECTION, SOLUTION INTRAMUSCULAR; SUBCUTANEOUS PRN
OUTPATIENT
Start: 2024-12-15

## 2024-06-17 RX ORDER — ALBUTEROL SULFATE 90 UG/1
4 AEROSOL, METERED RESPIRATORY (INHALATION) PRN
OUTPATIENT
Start: 2024-12-15

## 2024-06-17 RX ORDER — SODIUM CHLORIDE 9 MG/ML
INJECTION, SOLUTION INTRAVENOUS CONTINUOUS
OUTPATIENT
Start: 2024-12-15

## 2024-06-17 RX ORDER — ACETAMINOPHEN 325 MG/1
650 TABLET ORAL
OUTPATIENT
Start: 2024-12-15

## 2024-06-17 RX ORDER — DIPHENHYDRAMINE HYDROCHLORIDE 50 MG/ML
50 INJECTION INTRAMUSCULAR; INTRAVENOUS
OUTPATIENT
Start: 2024-12-15

## 2024-06-17 RX ADMIN — DENOSUMAB 60 MG: 60 INJECTION SUBCUTANEOUS at 14:27

## 2024-06-17 NOTE — DISCHARGE INSTRUCTIONS
Denosumab Prefilled Syringe (DENOSUMAB - INJECTION)  For bone strength.  Brand Name(s): Prolia  Generic Name: Denosumab  Instructions  This medicine is given as an injection.  This medicine should be given by a trained health care provider.  It is important that you keep taking each dose of this medicine on time even if you are feeling well.  If you miss a dose, contact your doctor for instructions.  Drug interactions can change how medicines work or increase risk for side effects. Tell your health care providers about all medicines taken. Include prescription and over-the-counter medicines, vitamins, and herbal medicines. Speak with your doctor or pharmacist before starting or stopping any medicine.  You may need vitamin and mineral supplements while on this medicine. Please speak with your doctor or pharmacist.  Visit your dentist regularly. Proper care of your teeth is very important while taking this medicine. Brush your teeth and floss regularly.  Keep all appointments for medical exams and tests while on this medicine.  Cautions  Tell your doctor and pharmacist if you ever had an allergic reaction to a medicine.  This medicine is associated with a rare but very serious medical condition. Please speak with your doctor about symptoms you should look out for while on this medicine. Notify your doctor immediately if you develop those symptoms.  Some patients on this medicine have developed severe, life-threatening infections. Please speak with your doctor about the risks and benefits of using this medicine.  Call the doctor if there are any signs of confusion or unusual changes in behavior.  It is unknown if this medicine passes into breast milk. Ask your doctor before breastfeeding.  This medicine can hurt a new baby in the womb. If you become pregnant while on this medicine, tell your doctor immediately. Your doctor may switch you to a different medicine.  Women of childbearing age should have a negative

## 2024-06-17 NOTE — PROGRESS NOTES
PHYSICAL THERAPY - MEDICARE DAILY TREATMENT NOTE (updated 3/23)      Date: 2024          Patient Name:  Itzel Abdalla :  1955   Medical   Diagnosis:  General weakness [R53.1]  Herpes zoster without complication [B02.9] Treatment Diagnosis:  M62.81  GENERAL MUSCLE WEAKNESS    Referral Source:  Rm Yanes* Insurance:   Payor: [facility] MEDICARE / Plan: [facility] MEDICARE ADVANTAGE / Product Type: *No Product type* /                     Patient  verified yes     Visit #   Current  / Total 2 24   Time   In / Out 935 1025   Total Treatment Time 50   Total Timed Codes 50   1:1 Treatment Time 42      [facility] Totals Reminder:  bill using total billable   min of TIMED therapeutic procedures and modalities.   8-22 min = 1 unit; 23-37 min = 2 units; 38-52 min = 3 units; 53-67 min = 4 units; 68-82 min = 5 units            SUBJECTIVE    Pain Level (0-10 scale): 0/10    Any medication changes, allergies to medications, adverse drug reactions, diagnosis change, or new procedure performed?: [x] No    [] Yes (see summary sheet for update)  Medications: Verified on Patient Summary List    Subjective functional status/changes:     Patient noted they did pretty well following previous treatment session, noting they were a bit sore but it cleared out pretty quickly after. Continues to note they need to work on their balance. Patient also noted they have not scheduled past this week since they have surgery on Friday and wanted to wait until after to see how they feel.     OBJECTIVE      Therapeutic Procedures:  Tx Min Billable or 1:1 Min (if diff from Tx Min) Procedure, Rationale, Specifics   50 [phone] [id number] Therapeutic Exercise (timed):  increase ROM, strength, coordination, balance, and proprioception to improve patient's ability to progress to PLOF and address remaining functional goals. (see flow sheet as applicable)     Details if applicable:                         50 42    Total Total             [x]  Patient

## 2024-06-18 ENCOUNTER — HOSPITAL ENCOUNTER (OUTPATIENT)
Facility: HOSPITAL | Age: 69
Discharge: HOME | End: 2024-06-21
Attending: INTERNAL MEDICINE
Payer: MEDICARE

## 2024-06-18 VITALS — HEIGHT: 65 IN | BODY MASS INDEX: 33.49 KG/M2 | WEIGHT: 201 LBS

## 2024-06-18 DIAGNOSIS — Z12.31 SCREENING MAMMOGRAM FOR HIGH-RISK PATIENT: ICD-10-CM

## 2024-06-18 PROCEDURE — 77063 BREAST TOMOSYNTHESIS BI: CPT

## 2024-06-18 NOTE — PROGRESS NOTES
PHYSICAL THERAPY - MEDICARE DAILY TREATMENT NOTE (updated 3/23)      Date: 2024          Patient Name:  Itzel Abdalla :  1955   Medical   Diagnosis:  General weakness [R53.1]  Herpes zoster without complication [B02.9] Treatment Diagnosis:  M62.81  GENERAL MUSCLE WEAKNESS    Referral Source:  Rm Yanes* Insurance:   Payor: [facility] MEDICARE / Plan: [facility] MEDICARE ADVANTAGE / Product Type: *No Product type* /                     Patient  verified yes     Visit #   Current  / Total 3 24   Time   In / Out 8:00 8:41   Total Treatment Time 41   Total Timed Codes 41   1:1 Treatment Time 41      [facility] Totals Reminder:  bill using total billable   min of TIMED therapeutic procedures and modalities.   8-22 min = 1 unit; 23-37 min = 2 units; 38-52 min = 3 units; 53-67 min = 4 units; 68-82 min = 5 units            SUBJECTIVE    Pain Level (0-10 scale): 0/10    Any medication changes, allergies to medications, adverse drug reactions, diagnosis change, or new procedure performed?: [x] No    [] Yes (see summary sheet for update)  Medications: Verified on Patient Summary List    Subjective functional status/changes:     Patient reports feeling good after last session. She did complete some exercises this morning before the session including bridges and SKTC. Has her carpal tunnel surgery Monday morning and will wait to see how she feels before scheduling again. Feels comfortable with continuing HEP as tolerated over the weekend.     OBJECTIVE      Therapeutic Procedures:  Tx Min Billable or 1:1 Min (if diff from Tx Min) Procedure, Rationale, Specifics   16  33082 Therapeutic Exercise (timed):  increase ROM, strength, coordination, balance, and proprioception to improve patient's ability to progress to PLOF and address remaining functional goals. (see flow sheet as applicable)     Details if applicable:     25  33197 Neuromuscular Re-Education (timed):  improve balance, coordination, kinesthetic

## 2024-06-19 ENCOUNTER — HOSPITAL ENCOUNTER (OUTPATIENT)
Facility: HOSPITAL | Age: 69
Setting detail: RECURRING SERIES
Discharge: HOME | End: 2024-06-22
Attending: INTERNAL MEDICINE
Payer: MEDICARE

## 2024-06-19 ENCOUNTER — APPOINTMENT (OUTPATIENT)
Facility: HOSPITAL | Age: 69
End: 2024-06-19
Attending: INTERNAL MEDICINE
Payer: MEDICARE

## 2024-06-19 PROCEDURE — 97110 THERAPEUTIC EXERCISES: CPT

## 2024-06-19 PROCEDURE — 97112 NEUROMUSCULAR REEDUCATION: CPT

## 2024-06-20 RX ORDER — PHENOL 1.4 %
1 AEROSOL, SPRAY (ML) MUCOUS MEMBRANE NIGHTLY
COMMUNITY

## 2024-06-20 RX ORDER — TROSPIUM CHLORIDE 20 MG/1
20 TABLET, FILM COATED ORAL 2 TIMES DAILY
COMMUNITY
Start: 2024-03-21

## 2024-06-20 RX ORDER — PLECANATIDE 3 MG/1
1 TABLET ORAL EVERY MORNING
COMMUNITY
Start: 2024-05-06

## 2024-06-20 NOTE — PERIOP NOTE
[facility]  Ambulatory Surgery Unit  [address] [place] [address]  [address]  Pre-operative Instructions    Surgery/Procedure Date  Monday 6/24            Tentative Arrival Time TBD      1. On the day of your surgery/procedure, please report to the Ambulatory Surgery Unit Registration Desk and sign in at your designated time. The Ambulatory Surgery Unit is located in [facility] on the [facility] side of the [facility] across from the [facility]. Please have all of your health insurance cards, co-payment, and a photo ID.    **TWO adults may accompany you the day of the procedure.  We have limited seating available.      2. You cannot be dropped off for surgery.  Please make arrangements for a responsible adult friend or family member to remain on the hospital campus during your procedure, and drive you home, as you should not drive for 24 hours following anesthesia. Make arrangements for a responsible adult to stay with you for at least the first 24 hours after your surgery.    3. Do not have anything to eat or drink (including water, gum, mints, coffee, juice) after 11:59 PM on Sunday 6/23. This may not apply to medications prescribed by your physician.  (Please note below the special instructions with medications to take the morning of surgery, if applicable.)    4. We recommend you do not drink any alcoholic beverages for 24 hours before and after your surgery.    5. Contact your surgeon’s office for instructions on the following medications: non-steroidal anti-inflammatory drugs (i.e. Advil, Aleve), vitamins, and supplements. (Some surgeon’s will want you to stop these medications prior to surgery and others may allow you to take them)   **If you are currently taking Plavix, Coumadin, Aspirin and/or other blood-thinning agents, contact your surgeon for instructions.** Your surgeon will partner with the physician prescribing these medications to determine if it is

## 2024-06-21 ENCOUNTER — ANESTHESIA EVENT (OUTPATIENT)
Facility: HOSPITAL | Age: 69
End: 2024-06-21
Payer: MEDICARE

## 2024-06-24 ENCOUNTER — HOSPITAL ENCOUNTER (OUTPATIENT)
Facility: HOSPITAL | Age: 69
Setting detail: OUTPATIENT SURGERY
Discharge: HOME | End: 2024-06-24
Attending: ORTHOPAEDIC SURGERY | Admitting: ORTHOPAEDIC SURGERY
Payer: MEDICARE

## 2024-06-24 ENCOUNTER — ANESTHESIA (OUTPATIENT)
Facility: HOSPITAL | Age: 69
End: 2024-06-24
Payer: MEDICARE

## 2024-06-24 VITALS
BODY MASS INDEX: 32.99 KG/M2 | RESPIRATION RATE: 17 BRPM | OXYGEN SATURATION: 98 % | DIASTOLIC BLOOD PRESSURE: 59 MMHG | TEMPERATURE: 97.5 F | SYSTOLIC BLOOD PRESSURE: 123 MMHG | HEIGHT: 65 IN | WEIGHT: 198 LBS | HEART RATE: 68 BPM

## 2024-06-24 DIAGNOSIS — G89.18 POST-OP PAIN: Primary | ICD-10-CM

## 2024-06-24 LAB
GLUCOSE BLD STRIP.AUTO-MCNC: 85 MG/DL (ref 65–117)
GLUCOSE BLD STRIP.AUTO-MCNC: 87 MG/DL (ref 65–117)
SERVICE CMNT-IMP: NORMAL
SERVICE CMNT-IMP: NORMAL

## 2024-06-24 PROCEDURE — 3600000012 HC SURGERY LEVEL 2 ADDTL 15MIN: Performed by: ORTHOPAEDIC SURGERY

## 2024-06-24 PROCEDURE — 7100000011 HC PHASE II RECOVERY - ADDTL 15 MIN: Performed by: ORTHOPAEDIC SURGERY

## 2024-06-24 PROCEDURE — 2709999900 HC NON-CHARGEABLE SUPPLY: Performed by: ORTHOPAEDIC SURGERY

## 2024-06-24 PROCEDURE — 82962 GLUCOSE BLOOD TEST: CPT

## 2024-06-24 PROCEDURE — 6360000002 HC RX W HCPCS: Performed by: ORTHOPAEDIC SURGERY

## 2024-06-24 PROCEDURE — 2500000003 HC RX 250 WO HCPCS: Performed by: STUDENT IN AN ORGANIZED HEALTH CARE EDUCATION/TRAINING PROGRAM

## 2024-06-24 PROCEDURE — 3700000001 HC ADD 15 MINUTES (ANESTHESIA): Performed by: ORTHOPAEDIC SURGERY

## 2024-06-24 PROCEDURE — 3600000002 HC SURGERY LEVEL 2 BASE: Performed by: ORTHOPAEDIC SURGERY

## 2024-06-24 PROCEDURE — 3700000000 HC ANESTHESIA ATTENDED CARE: Performed by: ORTHOPAEDIC SURGERY

## 2024-06-24 PROCEDURE — 6360000002 HC RX W HCPCS: Performed by: REGISTERED NURSE

## 2024-06-24 PROCEDURE — 2500000003 HC RX 250 WO HCPCS: Performed by: ORTHOPAEDIC SURGERY

## 2024-06-24 PROCEDURE — 2580000003 HC RX 258: Performed by: ORTHOPAEDIC SURGERY

## 2024-06-24 PROCEDURE — 2500000003 HC RX 250 WO HCPCS: Performed by: REGISTERED NURSE

## 2024-06-24 PROCEDURE — 7100000001 HC PACU RECOVERY - ADDTL 15 MIN: Performed by: ORTHOPAEDIC SURGERY

## 2024-06-24 PROCEDURE — 7100000000 HC PACU RECOVERY - FIRST 15 MIN: Performed by: ORTHOPAEDIC SURGERY

## 2024-06-24 PROCEDURE — 7100000010 HC PHASE II RECOVERY - FIRST 15 MIN: Performed by: ORTHOPAEDIC SURGERY

## 2024-06-24 PROCEDURE — 2580000003 HC RX 258: Performed by: STUDENT IN AN ORGANIZED HEALTH CARE EDUCATION/TRAINING PROGRAM

## 2024-06-24 RX ORDER — DEXTROSE MONOHYDRATE 100 MG/ML
INJECTION, SOLUTION INTRAVENOUS CONTINUOUS PRN
Status: DISCONTINUED | OUTPATIENT
Start: 2024-06-24 | End: 2024-06-24 | Stop reason: HOSPADM

## 2024-06-24 RX ORDER — SODIUM CHLORIDE, SODIUM LACTATE, POTASSIUM CHLORIDE, CALCIUM CHLORIDE 600; 310; 30; 20 MG/100ML; MG/100ML; MG/100ML; MG/100ML
INJECTION, SOLUTION INTRAVENOUS CONTINUOUS
Status: DISCONTINUED | OUTPATIENT
Start: 2024-06-24 | End: 2024-06-24 | Stop reason: HOSPADM

## 2024-06-24 RX ORDER — CEFAZOLIN SODIUM 1 G/3ML
INJECTION, POWDER, FOR SOLUTION INTRAMUSCULAR; INTRAVENOUS
Status: DISCONTINUED
Start: 2024-06-24 | End: 2024-06-24 | Stop reason: HOSPADM

## 2024-06-24 RX ORDER — SODIUM CHLORIDE 0.9 % (FLUSH) 0.9 %
5-40 SYRINGE (ML) INJECTION EVERY 12 HOURS SCHEDULED
Status: DISCONTINUED | OUTPATIENT
Start: 2024-06-24 | End: 2024-06-24 | Stop reason: HOSPADM

## 2024-06-24 RX ORDER — ONDANSETRON 2 MG/ML
4 INJECTION INTRAMUSCULAR; INTRAVENOUS
Status: DISCONTINUED | OUTPATIENT
Start: 2024-06-24 | End: 2024-06-24 | Stop reason: HOSPADM

## 2024-06-24 RX ORDER — NALOXONE HYDROCHLORIDE 0.4 MG/ML
INJECTION, SOLUTION INTRAMUSCULAR; INTRAVENOUS; SUBCUTANEOUS PRN
Status: DISCONTINUED | OUTPATIENT
Start: 2024-06-24 | End: 2024-06-24 | Stop reason: HOSPADM

## 2024-06-24 RX ORDER — SODIUM CHLORIDE 0.9 % (FLUSH) 0.9 %
5-40 SYRINGE (ML) INJECTION PRN
Status: DISCONTINUED | OUTPATIENT
Start: 2024-06-24 | End: 2024-06-24 | Stop reason: HOSPADM

## 2024-06-24 RX ORDER — PROCHLORPERAZINE EDISYLATE 5 MG/ML
5 INJECTION INTRAMUSCULAR; INTRAVENOUS
Status: DISCONTINUED | OUTPATIENT
Start: 2024-06-24 | End: 2024-06-24 | Stop reason: HOSPADM

## 2024-06-24 RX ORDER — LIDOCAINE HYDROCHLORIDE 10 MG/ML
1 INJECTION, SOLUTION EPIDURAL; INFILTRATION; INTRACAUDAL; PERINEURAL
Status: DISCONTINUED | OUTPATIENT
Start: 2024-06-24 | End: 2024-06-24 | Stop reason: HOSPADM

## 2024-06-24 RX ORDER — FENTANYL CITRATE 50 UG/ML
25 INJECTION, SOLUTION INTRAMUSCULAR; INTRAVENOUS EVERY 5 MIN PRN
Status: DISCONTINUED | OUTPATIENT
Start: 2024-06-24 | End: 2024-06-24 | Stop reason: HOSPADM

## 2024-06-24 RX ORDER — WATER 10 ML/10ML
INJECTION INTRAMUSCULAR; INTRAVENOUS; SUBCUTANEOUS
Status: DISCONTINUED
Start: 2024-06-24 | End: 2024-06-24 | Stop reason: HOSPADM

## 2024-06-24 RX ORDER — ACETAMINOPHEN 500 MG
1000 TABLET ORAL ONCE
Status: DISCONTINUED | OUTPATIENT
Start: 2024-06-24 | End: 2024-06-24 | Stop reason: HOSPADM

## 2024-06-24 RX ORDER — HYDROCODONE BITARTRATE AND ACETAMINOPHEN 5; 325 MG/1; MG/1
1 TABLET ORAL EVERY 6 HOURS PRN
Qty: 20 TABLET | Refills: 0 | Status: SHIPPED | OUTPATIENT
Start: 2024-06-24 | End: 2024-06-29

## 2024-06-24 RX ORDER — SODIUM CHLORIDE 9 MG/ML
INJECTION, SOLUTION INTRAVENOUS PRN
Status: DISCONTINUED | OUTPATIENT
Start: 2024-06-24 | End: 2024-06-24 | Stop reason: HOSPADM

## 2024-06-24 RX ORDER — HYDROMORPHONE HYDROCHLORIDE 1 MG/ML
0.5 INJECTION, SOLUTION INTRAMUSCULAR; INTRAVENOUS; SUBCUTANEOUS EVERY 5 MIN PRN
Status: DISCONTINUED | OUTPATIENT
Start: 2024-06-24 | End: 2024-06-24 | Stop reason: HOSPADM

## 2024-06-24 RX ORDER — IPRATROPIUM BROMIDE AND ALBUTEROL SULFATE 2.5; .5 MG/3ML; MG/3ML
1 SOLUTION RESPIRATORY (INHALATION)
Status: DISCONTINUED | OUTPATIENT
Start: 2024-06-24 | End: 2024-06-24 | Stop reason: HOSPADM

## 2024-06-24 RX ORDER — LIDOCAINE HYDROCHLORIDE 20 MG/ML
INJECTION, SOLUTION EPIDURAL; INFILTRATION; INTRACAUDAL; PERINEURAL PRN
Status: DISCONTINUED | OUTPATIENT
Start: 2024-06-24 | End: 2024-06-24 | Stop reason: SDUPTHER

## 2024-06-24 RX ORDER — GLUCAGON 1 MG/ML
1 KIT INJECTION PRN
Status: DISCONTINUED | OUTPATIENT
Start: 2024-06-24 | End: 2024-06-24 | Stop reason: HOSPADM

## 2024-06-24 RX ORDER — DEXTROSE MONOHYDRATE 25 G/50ML
12.5 INJECTION, SOLUTION INTRAVENOUS PRN
Status: DISCONTINUED | OUTPATIENT
Start: 2024-06-24 | End: 2024-06-24 | Stop reason: HOSPADM

## 2024-06-24 RX ADMIN — PROPOFOL 75 MCG/KG/MIN: 10 INJECTION, EMULSION INTRAVENOUS at 10:46

## 2024-06-24 RX ADMIN — SODIUM CHLORIDE, POTASSIUM CHLORIDE, SODIUM LACTATE AND CALCIUM CHLORIDE: 600; 310; 30; 20 INJECTION, SOLUTION INTRAVENOUS at 09:21

## 2024-06-24 RX ADMIN — PROPOFOL 50 MG: 10 INJECTION, EMULSION INTRAVENOUS at 10:45

## 2024-06-24 RX ADMIN — WATER 2000 MG: 1 INJECTION INTRAMUSCULAR; INTRAVENOUS; SUBCUTANEOUS at 10:39

## 2024-06-24 RX ADMIN — DEXTROSE MONOHYDRATE 12.5 G: 25 INJECTION, SOLUTION INTRAVENOUS at 09:46

## 2024-06-24 RX ADMIN — LIDOCAINE HYDROCHLORIDE 40 MG: 20 INJECTION, SOLUTION EPIDURAL; INFILTRATION; INTRACAUDAL; PERINEURAL at 10:45

## 2024-06-24 ASSESSMENT — PAIN - FUNCTIONAL ASSESSMENT: PAIN_FUNCTIONAL_ASSESSMENT: 0-10

## 2024-06-24 ASSESSMENT — PAIN DESCRIPTION - DESCRIPTORS: DESCRIPTORS: TIGHTNESS;NUMBNESS

## 2024-06-24 NOTE — PERIOP NOTE
Permission received to review discharge instructions and discuss private health information with sister and will have someone with them after discharge   Patient states that family/friend will be with them for at least 24 hours following today's procedure.   Air Warming blanket placed on pt; turned on for comfort    Purse  in pharmacy

## 2024-06-24 NOTE — ANESTHESIA PRE PROCEDURE
Department of Anesthesiology  Preprocedure Note       Name:  Itzel Abdalla   Age:  69 y.o.  :  1955                                          MRN:  [id number]         Date:  2024      Surgeon: Surgeon(s):  Teodoro Cade MD    Procedure: Procedure(s):  RIGHT OPEN CARPAL TUNNEL RELEASE (MAC WITH LOCAL)    Medications prior to admission:   Prior to Admission medications    Medication Sig Start Date End Date Taking? Authorizing Provider   TRULANCE 3 MG TABS Take 1 tablet by mouth every morning 24  Yes Maddie Smallwood MD   trospium (SANCTURA) 20 MG tablet Take 1 tablet by mouth 2 times daily 3/21/24  Yes ProviderMaddie MD   Omega-3 Fatty Acids-Hemp Extra (HEMP MONOPURE PO) Take 2 each by mouth every morning   Yes ProviderMaddie MD   Melatonin 10 MG TABS Take 1 tablet by mouth nightly   Yes Maddie Smallwood MD   ondansetron (ZOFRAN-ODT) 4 MG disintegrating tablet Take 1 tablet by mouth 3 times daily as needed for Nausea or Vomiting 24   Juno Collier MD   ALPRAZolam (XANAX) 0.25 MG tablet TAKE 1 TABLET BY MOUTH TWICE  DAILY AS NEEDED FOR ANXIETY 4/22/24 10/19/24  Rm Yanes III,    escitalopram (LEXAPRO) 10 MG tablet TAKE 1 TABLET BY MOUTH DAILY 24   Rm Yanes III, DO   brexpiprazole (REXULTI) 1 MG TABS tablet TAKE 1 TABLET BY MOUTH DAILY 24   Rm Yanes III,    busPIRone (BUSPAR) 5 MG tablet Take 1 tablet by mouth 2 times daily 3/11/24   Arianne Cole MD   furosemide (LASIX) 20 MG tablet Take 2 pills on mornings when fluid is worse, or if weight up 5 lbs.  Would use on / for now. 3/6/24   Rm Yanes III,    lisinopril (PRINIVIL;ZESTRIL) 10 MG tablet Take 1 tablet by mouth daily  Patient taking differently: Take 1 tablet by mouth every morning 24   Brie Collier MD   levothyroxine (SYNTHROID) 300 MCG tablet TAKE 1 TABLET BY MOUTH ONCE DAILY BEFORE BREAKFAST 6  DAYS PER WEEK

## 2024-06-24 NOTE — DISCHARGE INSTRUCTIONS
[facility]  Post-operative instructions  For: Itzel Abdalla    Your first postop appointment should be scheduled with Dr. Cade for 2-3 weeks post-op.    [facility]  [address], [address]  [place] [address]  Phone: [phone]  Fax: [phone]    Please follow these instructions for a safe and speedy recovery:    1. Surgical Bandage: Leave the bandage in place until 2 weeks after surgery. Please keep it clean and dry. To shower or bathe, apply a plastic bag or GLAD Press'n Seal® plastic wrap around the bandage or simply sponge bathe. After 2 weeks, you can remove the dressing and get incision wet but NO SOAKING.     2. Elevation: Hand swelling is best prevented by keeping your hand elevated above the level of your heart at all times, night and day. The opposite, dangling your hand below your waist, will cause additional pain, swelling, and later stiffness. You can elevate the hand in a sling or by propping it on a pillow at night. Ice compresses may help but do not replace elevation. Frequently, extreme pain is caused by a tight bandage, which should be loosened. If pain is severe and progressive, call us at [phone] during the day (ask for immediate connection to Dr. Cade's Team) or during the night [phone](ask for the on-call physician).    3. Medication: You will be provided with an appropriate pain medication (over-the-counter or prescription). Please fill this at a pharmacy promptly so you will have it available when all local anesthetic wears off. Take this to relieve pain as directed on the bottle. Please refrain from driving, drinking alcohol, and making important medical decisions while taking the medication. Please call us if you need something stronger. Medication changes or refills must be made before 5pm or through your pharmacy.    4. Weight bearing: Do NOT bear any weight on the operative

## 2024-06-24 NOTE — PERIOP NOTE
Pt. Alert. Denies pain or chill. Discharge instructions reviewed with caregiver and patient. Allowed and answered questions. Tolerating PO fluids. Both state ready for discharge. Escorted to BR and voided. 1215 Pt states feeling better after snack and checked own BS via own monitor and it was 134. 1218 discharged to car without incident with purse

## 2024-06-24 NOTE — PERIOP NOTE
Itzel Abdalla  1955  [id number]    Situation:  Verbal report given from: RN and CRNA  Procedure: Procedure(s):  RIGHT OPEN CARPAL TUNNEL RELEASE (MAC WITH LOCAL)    Background:    Preoperative diagnosis: Carpal tunnel syndrome, bilateral [G56.03]    Postoperative diagnosis: * No post-op diagnosis entered *    :  Dr. Cade    Assistant(s): Circulator: Jonnie Cook RN  Relief Scrub: Andrea Craft  Scrub Person First: Teodoro Burnette  Physician Assistant: Erin Chau PA-C    Specimens: * No specimens in log *    Assessment:  Intra-procedure medications         Anesthesia gave intra-procedure sedation and medications, see anesthesia flow sheet     Intravenous fluids: LR@ KVO     Vital signs stable. Pt denies pain or chill. Checked blood sugar-87-given 16oz Apple juice and 8 peanut butter crackers      Recommendation:    Permission to share finding with sister Vicenta :  yes

## 2024-06-26 NOTE — PROGRESS NOTES
Pharmacy Progress Note - Diabetes Management    Assessment / Plan:   Diabetes Management:  - Per ADA guidelines, Pt's A1c is not at goal of < 7%.   - CGM patterns remain elevated for target goals. Reinforce consistent prandial insulin coverage. Continue with same Novolog regimen. Check pre meal blood sugar  If 151-200: give 3 units  If 201-250: give 5 units   If above 250: give 6 units  If you have a late night snack, give 2 units  - Continue Tresiba 44 units daily  - Continue metformin 1 gm daily   - Next visit in office for labs     S/O: Ms. Itzel Abdalla is a 69 y.o. female, referred by Rm Cohn III, DO, with a PMH of T2DM,  HTN, HLD, Hypothyroidism, OAB, IBS, osteoporosis, was seen virtually today for diabetes management follow up. Last A1c was 7.8% (March 2024), 8.5% (Nov 2023), 7.6% (April 2023), 8% (Jan 2023), 7.2% (Jun 2022), 7.3% (Feb 2022), 6.9% (Sept 2021), 6.3%  (June 2021), 8% (Oct 2020), 8.4% (June 2020), 7.2% (Dec 2019).  PHI verified.     Interim update:   Had f/up with Dr Yanes 6/7/24  Now s/p R sided carpal tunnel release surgery. 6/24/24. Dr Cade.   Prescribed Norco 5/325 mg Q6h PRN pain. Also alternative with APAP PRN. Has f/up 7/11/24. Possibly may have the same procedure on her left hand    Current anti-hyperglycemic regimen include(s):    - Metformin 1 gm daily   - Tresiba 44 units daily   - Novolog - if pre meal blood sugar is between --- states she is only giving Novolog with AM meals not PM.   If 151-200: give 3 units  If 201-250: give 5 units   If above 250: give 6 units  If you have a late night snack, give 2 units    Lisinopril 5 mg daily  Pravastatin 20 mg daily    ROS:  Today, Pt endorses:  - Symptoms of Hyperglycemia: fatigue  - Symptoms of Hypoglycemia: none    Blood Glucose Monitoring (BGM) or CGM:  Sarah 2 CGM; uses reader  At this time: 121       Midnight - 6 AM 6 AM - Noon Noon - 6 PM 6 PM - Midnight Average % Time in Target Range % Time above target

## 2024-06-27 ENCOUNTER — TELEPHONE (OUTPATIENT)
Age: 69
End: 2024-06-27

## 2024-06-27 ENCOUNTER — PHARMACY VISIT (OUTPATIENT)
Age: 69
End: 2024-06-27

## 2024-06-27 DIAGNOSIS — E11.65 TYPE 2 DIABETES MELLITUS WITH HYPERGLYCEMIA, WITH LONG-TERM CURRENT USE OF INSULIN (HCC): Primary | ICD-10-CM

## 2024-06-27 DIAGNOSIS — Z79.4 TYPE 2 DIABETES MELLITUS WITH HYPERGLYCEMIA, WITH LONG-TERM CURRENT USE OF INSULIN (HCC): Primary | ICD-10-CM

## 2024-06-27 NOTE — TELEPHONE ENCOUNTER
Pharmacy Progress Note - Medication Access    Contacted [facility] regarding Ms. Itzel Abdalla 69 y.o. 's application.    Recall application submitted in January.     Patient ID: [id number].  Application currently on hold. PAP program could not verify patient's income information through their internal check. Requests for updated POI submission. Program could not get connected with patient.     Shared update with patient. PHI verified.  Patient will stop by office next week to share updated POI.   All questions answered at this time.       Thank you for the consult,  Flower Tolbert, PharmD, BCACP, CDCES          For Pharmacy Admin Tracking Only    Program: Medical Group  CPA in place:  Yes  Recommendation Provided To: Patient/Caregiver: 1 via Telephone  Intervention Detail: Benefit Assistance  Intervention Accepted By: Patient/Caregiver: 1 and Other: 1  Gap Closed?: Yes   Time Spent (min): 30

## 2024-07-12 ENCOUNTER — TELEPHONE (OUTPATIENT)
Age: 69
End: 2024-07-12

## 2024-07-12 NOTE — TELEPHONE ENCOUNTER
Patient presents to the office to drop off Tax Return forms for [employer]. Patient advised of potential 10-14 business day turnaround time for form completion & may incur a fee of $25.00. This has been fully explained to the patient, who indicates understanding.    Pt would like a call when forms are complete [phone]

## 2024-07-15 ENCOUNTER — TELEPHONE (OUTPATIENT)
Age: 69
End: 2024-07-15

## 2024-07-15 NOTE — TELEPHONE ENCOUNTER
Pharmacy Progress Note - Telephone Encounter    S/O: Ms. Itzel Abdalla 69 y.o. female, referred by Rm Cohn III, DO, was contacted via an outbound telephone call today. Verified patient’s identifiers (name & ) per HIPAA policy.     - Patient dropped off latest POI documents.  Still need to complete [employer] renewal application.     A/P:  - Shared update with patient.   - Will share application details with patient via Testt per patient's request. Need to complete pages 2 &3.   - Patient endorses understanding to the provided information. All questions answered at this time.       Thank you,  Flower Tolbert, PharmD, BCACP, CDCES      For Pharmacy Admin Tracking Only    Program: Medical Group  CPA in place:  Yes  Recommendation Provided To: Patient/Caregiver: 1 via Telephone and Sens[employer] Message  Intervention Detail: Benefit Assistance  Intervention Accepted By: Patient/Caregiver: 1  Gap Closed?: Yes   Time Spent (min): 10

## 2024-07-17 ENCOUNTER — TELEPHONE (OUTPATIENT)
Dept: PHARMACY | Facility: CLINIC | Age: 69
End: 2024-07-17

## 2024-07-17 NOTE — TELEPHONE ENCOUNTER
[facility] CLINICAL PHARMACY: ADHERENCE REVIEW  Identified care gap per [place] fills with [facility]: Statin adherence    Medicare Group Retiree - MRGR  MA-PCPi  Per insurer report, LIS-0 - co-pays are based on tiers and patient is subject to coverage gap.  Patient also appears to be prescribed: ACE/ARB    ASSESSMENT    ACE/ARB ADHERENCE    Insurance Records claims through  24  (Prior Year PDC = 81% - PASSED ; YTD PDC = 100%; Potential Fail Date: 10.02.24):   LISINOPRIL TAB 5 MG last filled on 24 for 90 day supply. Next refill due: 24    Prescribed si tablet/capsule daily    Per Reconcile Dispense History and Insurer Portal: last filled on 24 for 90 day supply.     Per [facility]: last picked up on 24 for 90 day supply. Billed through [facility]. 1 refills remaining.    Per PreCheck, a 90 day supply has a $4.53 co pay.    BP Readings from Last 3 Encounters:   24 (!) 123/59   24 125/75   24 98/62     Estimated Creatinine Clearance: 46 mL/min (A) (based on SCr of 1.24 mg/dL (H)).  Lab Results   Component Value Date    CREATININE 1.24 (H) 2024     Lab Results   Component Value Date    K 4.2 2024     STATIN ADHERENCE    Insurance Records claims through  24  (Prior Year PDC = 100% - PASSED ; YTD PDC = FIRST FILL; Potential Fail Date: 24):   PRAVASTATIN TAB 20 MG last filled on 24 for 90 day supply. Next refill due: 24    Prescribed si tablet/capsule daily    Per Reconcile Dispense History and Insurer Portal: last filled on 24 for 90 day supply.     Per [facility]: last picked up on 24 for 90 day supply. Billed through [facility]. 2 refills remaining.    Per PreCheck, a 90 day supply has a $20.00 co pay.    Lab Results   Component Value Date    CHOL 188 2023    TRIG 169 (H) 2023    HDL 60 2023     Lab Results   Component Value Date    LDL 99 2023      ALT   Date Value Ref Range

## 2024-08-07 ENCOUNTER — TELEPHONE (OUTPATIENT)
Age: 69
End: 2024-08-07

## 2024-08-07 RX ORDER — NYSTATIN AND TRIAMCINOLONE ACETONIDE 100000; 1 [USP'U]/G; MG/G
OINTMENT TOPICAL
Qty: 15 G | Refills: 1 | Status: SHIPPED | OUTPATIENT
Start: 2024-08-07

## 2024-08-07 NOTE — TELEPHONE ENCOUNTER
Spoke with patient. Advised. Verbalized understanding.     Per Dr. CINTRON  Nystatin ointment sent in.  Try this first, as it works better than the powder for initial use.

## 2024-08-07 NOTE — TELEPHONE ENCOUNTER
Pharmacy Progress Note - Telephone Call    Ms. Itezl Abdalla 69 y.o. was contacted via an outbound telephone call regarding her PAP form today.  A voicemail was left for patient to return my call.       Thank you,  Flower Tolbert, PharmD, BCACP, [facility] [place]      For Pharmacy Admin Tracking Only    Program: Medical Group  CPA in place:  Yes  Recommendation Provided To: Patient/Caregiver: 1 via Telephone  Time Spent (min): 5

## 2024-08-07 NOTE — TELEPHONE ENCOUNTER
Spoke with patient.     C/o No rash. No pain. Breast and under stomach is sweating often. Previously given a powder from PCP, would like a refill on that - unsure of name of medication.     Please advise.

## 2024-08-07 NOTE — TELEPHONE ENCOUNTER
Progress Note    Richi Reaves is 29 year old  s/p r-LTCS POD#3.    Received a call from nurse that pt is complaining of chest pain and back pain. Re-assessed pt. Pt complaining of left upper back pain and left shoulder pain that she states she woke up with this morning. She reports exacerbation of pain with movement. She denies chest pain, shortness of breath, nausea, vomiting, light-headedness, dizziness, headache, or visual changes. Her BP is 138/67. She states she is otherwise feeling well.     Physical Exam:  General: Comfortable in bed, in no acute distress  Heart: Regular rate and rhythm  Lungs: Clear to auscultation  Back: Reproducible left upper paraspinal tenderness that is exacerbated with movement of her left upper extremity  Abdomen: Binder in place  Extremities: no edema    Assessment and Plan:    Pt most likely having musculoskeletal back and shoulder plan given that the pain is reproducible on physical exam. Not further testing needed. Recommend that she take Ibuprofen and Tylenol that she is being discharged with. Also recommended a heating pad and over the counter Icy Hot or Bengay cream.     Disposition: Discharge home today as previously scheduled.    Beena Mccarthy,  PGY-1  Obstetrics & Gynecology  22     Pt came into office and would like to get powder to go under breasts area for sweating or a suggestion on what she should do for the sweating.    Please advise and call pt

## 2024-08-07 NOTE — TELEPHONE ENCOUNTER
Pharmacy Progress Note - Telephone Encounter    S/O: Ms. Itezl Abdalla 69 y.o. female, referred by Rm Cohn III, DO, was contacted via an outbound telephone call to discuss her PAP application today. Verified patient’s identifiers (name & ) per HIPAA policy.     Patient completed patient's portion of [employer] renewal application this morning.   Missing POI.     A/P:  - Patient will stop by office tomorrow or later this week provide copies of her proof of income.   - Patient endorses understanding to the provided information. All questions answered at this time.       Thank you,  Flower Tolbert, PharmD, BCACP, CDCES      For Pharmacy Admin Tracking Only    Program: Medical Group  CPA in place:  Yes  Recommendation Provided To: Patient/Caregiver: 1 via Telephone  Intervention Detail: Benefit Assistance  Intervention Accepted By: Patient/Caregiver: 1  Gap Closed?: Yes   Time Spent (min): 10

## 2024-08-13 ENCOUNTER — TELEPHONE (OUTPATIENT)
Age: 69
End: 2024-08-13

## 2024-08-13 NOTE — TELEPHONE ENCOUNTER
Pharmacy Progress Note - Telephone Call    Ms. Itzel Abdalla 69 y.o. was contacted via an outbound telephone call today.      Received POI documents patient dropped off at office today.   Documents and [employer] renewal application reviewed today. Submitted.   Verdict pending.    Thank you,  Flower Tolbert, PharmD, BCACP, CDCES      For Pharmacy Admin Tracking Only    Program: Medical Group  CPA in place:  Yes  Recommendation Provided To: Patient/Caregiver: 2 via Telephone and In person  Intervention Detail: Benefit Assistance and Patient Access Assistance/Sample Provided  Intervention Accepted By: Patient/Caregiver: 2  Gap Closed?: Yes   Time Spent (min): 20

## 2024-08-20 RX ORDER — BUSPIRONE HYDROCHLORIDE 5 MG/1
5 TABLET ORAL 2 TIMES DAILY
Qty: 180 TABLET | Refills: 0 | Status: SHIPPED | OUTPATIENT
Start: 2024-08-20 | End: 2024-08-23 | Stop reason: SDUPTHER

## 2024-08-21 ENCOUNTER — TELEPHONE (OUTPATIENT)
Age: 69
End: 2024-08-21

## 2024-08-21 NOTE — TELEPHONE ENCOUNTER
Pt states this came on all of a sudden.  She is aching all over and she has done nothing but sneezing and coughing for two days.  No COVID test.    Please call to advise as pt states she wants to be seen today.  I advised she can not come into the office like this.    Pt will get a COVID test, take that and call us back.

## 2024-08-21 NOTE — TELEPHONE ENCOUNTER
Pt calls back stating the test was negative for COVID.    Her nose will not stop running and she is just sneezing all the time along with body aches.     Pt would like an appt sooner than Friday.  What can we do?    Please call pt.  Thanks.

## 2024-08-22 ENCOUNTER — OFFICE VISIT (OUTPATIENT)
Age: 69
End: 2024-08-22
Payer: MEDICARE

## 2024-08-22 VITALS
BODY MASS INDEX: 32.79 KG/M2 | WEIGHT: 196.8 LBS | DIASTOLIC BLOOD PRESSURE: 70 MMHG | HEART RATE: 82 BPM | HEIGHT: 65 IN | RESPIRATION RATE: 14 BRPM | TEMPERATURE: 97.4 F | SYSTOLIC BLOOD PRESSURE: 121 MMHG | OXYGEN SATURATION: 95 %

## 2024-08-22 DIAGNOSIS — Z79.4 TYPE 2 DIABETES MELLITUS WITH HYPERGLYCEMIA, WITH LONG-TERM CURRENT USE OF INSULIN (HCC): ICD-10-CM

## 2024-08-22 DIAGNOSIS — J20.9 ACUTE BRONCHITIS, UNSPECIFIED ORGANISM: Primary | ICD-10-CM

## 2024-08-22 DIAGNOSIS — F41.1 GENERALIZED ANXIETY DISORDER: ICD-10-CM

## 2024-08-22 DIAGNOSIS — G47.33 OSA ON CPAP: ICD-10-CM

## 2024-08-22 DIAGNOSIS — E11.65 TYPE 2 DIABETES MELLITUS WITH HYPERGLYCEMIA, WITH LONG-TERM CURRENT USE OF INSULIN (HCC): ICD-10-CM

## 2024-08-22 PROCEDURE — 3078F DIAST BP <80 MM HG: CPT | Performed by: INTERNAL MEDICINE

## 2024-08-22 PROCEDURE — 1123F ACP DISCUSS/DSCN MKR DOCD: CPT | Performed by: INTERNAL MEDICINE

## 2024-08-22 PROCEDURE — 3074F SYST BP LT 130 MM HG: CPT | Performed by: INTERNAL MEDICINE

## 2024-08-22 PROCEDURE — 99213 OFFICE O/P EST LOW 20 MIN: CPT | Performed by: INTERNAL MEDICINE

## 2024-08-22 PROCEDURE — 3051F HG A1C>EQUAL 7.0%<8.0%: CPT | Performed by: INTERNAL MEDICINE

## 2024-08-22 RX ORDER — AZITHROMYCIN 250 MG/1
TABLET, FILM COATED ORAL
Qty: 6 TABLET | Refills: 0 | Status: SHIPPED | OUTPATIENT
Start: 2024-08-22 | End: 2024-08-23 | Stop reason: SDUPTHER

## 2024-08-22 NOTE — PROGRESS NOTES
\"Have you been to the ER, urgent care clinic since your last visit?  Hospitalized since your last visit?\"    NO    “Have you seen or consulted any other health care providers outside of [facility] since your last visit?”    NO        “Have you had a colorectal cancer screening such as a colonoscopy/FIT/Cologuard?    NO    Date of last Colonoscopy: 7/18/2016  No cologuard on file  No FIT/FOBT on file   No flexible sigmoidoscopy on file         Click Here for Release of Records Request

## 2024-08-22 NOTE — PROGRESS NOTES
Itzel Abdalla is a 69 y.o. female who presents for evaluation of sick visit.  Last seen by me June 7, 2024.  Has been feeling poorly since last weekend:  cough with congestion, headache, sore throat.  Symptoms were worse early in week, somewhat improved today.  Denies fevers.   Was taking some mucinex dm  no gi distress.      ROS:  Constitutional: negative for fevers, chills, anorexia and weight loss  Eyes:   negative for visual disturbance and irritation  ENT:   negative for tinnitus,sore throat,nasal congestion,ear pain,hoarseness  Respiratory:  negative for hemoptysis, dyspnea,wheezing.  ++cough  CV:   negative for chest pain, palpitations, lower extremity edema  GI:   negative for nausea, vomiting, diarrhea, abdominal pain,melena  Genitourinary: negative for frequency, dysuria and hematuria  Musculoskel: negative for myalgias, arthralgias, back pain, muscle weakness, joint pain  Neurological:  negative for headaches, dizziness, focal weakness, numbness  Psychiatric:     Negative for depression or anxiety      Past Medical History:   Diagnosis Date    Anxiety and depression     Arthritis     Depression     Diabetes (HCC)     Dysphagia     Ectopic pregnancy     x2    GERD (gastroesophageal reflux disease) 07/05/2019    History of blood transfusion     Hypercholesteremia     Hypertension     Hypothyroidism     IBS (irritable bowel syndrome)     Liver disease     hepatitis b    has been treated    Nausea & vomiting     PUD (peptic ulcer disease)     bleeding ulcer    Sleep apnea     CPAP    Urinary incontinence        Past Surgical History:   Procedure Laterality Date    APPENDECTOMY  1999    BACK SURGERY      x3    BLEPHAROPLASTY Right     BUNIONECTOMY      CARPAL TUNNEL RELEASE Right 6/24/2024    RIGHT OPEN CARPAL TUNNEL RELEASE (MAC WITH LOCAL) performed by Teodoro Cade MD at [facility] AMBULATORY OR    CHOLECYSTECTOMY  06/21/2021    COLONOSCOPY  2018    HYSTERECTOMY (CERVIX STATUS UNKNOWN)      OTHER SURGICAL

## 2024-08-23 RX ORDER — TRAZODONE HYDROCHLORIDE 50 MG/1
50 TABLET ORAL NIGHTLY
Qty: 90 TABLET | Refills: 3 | Status: SHIPPED | OUTPATIENT
Start: 2024-08-23

## 2024-08-23 RX ORDER — AZITHROMYCIN 250 MG/1
TABLET, FILM COATED ORAL
Qty: 6 TABLET | Refills: 0 | Status: SHIPPED | OUTPATIENT
Start: 2024-08-23

## 2024-08-23 RX ORDER — BUSPIRONE HYDROCHLORIDE 5 MG/1
5 TABLET ORAL 2 TIMES DAILY
Qty: 180 TABLET | Refills: 3 | Status: SHIPPED | OUTPATIENT
Start: 2024-08-23

## 2024-08-23 NOTE — TELEPHONE ENCOUNTER
Caller requests Refill of:  busPIRone (BUSPAR) 5 MG tablet   traZODone (DESYREL) 50 MG tablet       Please send to:     [facility] [address] [phone] - F [phone]     Visit / Appointment History:  Future Appointment at [facility]:  8/26/2024   Last Appointment With PCP:  8/22/2024       Caller confirmed instructions and dosages as correct.    Caller was advised that Meds will be refilled as soon as possible, however there can be a 48-72 business hour turn around on refill requests.

## 2024-08-23 NOTE — TELEPHONE ENCOUNTER
PCP: Rm Yanes III,     Last appt: 8/22/2024  Future Appointments   Date Time Provider Department Center   8/26/2024 10:00 AM Flower Tolbert [address]   12/10/2024 10:00 AM Rm Yanes III, DO [address]   12/16/2024  3:00 PM YINKA GORDON 2 [facility]       Requested Prescriptions     Pending Prescriptions Disp Refills    busPIRone (BUSPAR) 5 MG tablet 180 tablet 0     Sig: Take 1 tablet by mouth 2 times daily    traZODone (DESYREL) 50 MG tablet 90 tablet 3     Sig: Take 1 tablet by mouth nightly

## 2024-08-23 NOTE — PROGRESS NOTES
Pharmacy Progress Note - Diabetes Management    Assessment / Plan:   Diabetes Management:  - Per ADA guidelines, Pt's POC A1c today (8.9%) is not at goal of < 7%.   - Limited CGM available. Remains inconsistent with prandial coverage.   - Continue Metformin 1 gm daily  - Continue Tresiba 44 units daily   - Continue Novolog - if pre meal blood sugar.    If 151-200: give 3 units  If 201-250: give 5 units   If above 250: give 6 units  If you have a late night snack, give 2 units  - Remind patient to incorporate a protein choice with all snack and meals  - Finish current abx therapy      S/O: Ms. Itzel Abdalla is a 69 y.o. female, referred by Rm Cohn III, , with a PMH of T2DM,  HTN, HLD, Hypothyroidism, OAB, IBS, osteoporosis, was seen today for diabetes management follow up. Last A1c was 7.8% (March 2024), 8.5% (Nov 2023), 7.6% (April 2023), 8% (Jan 2023), 7.2% (Jun 2022), 7.3% (Feb 2022), 6.9% (Sept 2021), 6.3%  (June 2021), 8% (Oct 2020), 8.4% (June 2020), 7.2% (Dec 2019).     Interim update:   Had acute visit with Dr Yanes 8/22/24. Cough, chest congestion/sore throat.   Zpak prescribed for bronchitis.     Current anti-hyperglycemic regimen include(s):    - Tresiba 44 units daily  - Metformin 1 gm daily  - Novolog - if pre meal blood sugar:  If 151-200: give 3 units  If 201-250: give 5 units   If above 250: give 6 units  If you have a late night snack, give 2 units    Now re-enrolled into [employer]   Lisinopril 5 mg daily  Pravastatin 20 mg daily    ROS:  Today, Pt endorses:  Signs and symptoms of Hypoglycemia: none  Signs and symptoms of Hyperglycemia:     Blood Glucose Monitoring (BGM) or CGM:  Sarah 2 CGM; uses reader   Sensor falling off. Not using sensor patch   Restarted monitoring this past Saturday      At this time: 91  Breakfast earlier this morning - 1 banana   Reports to staying up since 3AM today.     Last night's dinner: Had chinese & popsicles.     The 10-year ASCVD risk     HDL 60 11/08/2023    VLDL 29 11/08/2023    CHOLHDLRATIO 2.3 06/22/2022       Lab Results   Component Value Date    WBC 10.8 05/11/2024    HGB 12.7 05/11/2024    HCT 34.9 (L) 05/11/2024    MCV 81.9 05/11/2024     (L) 05/11/2024       Lab Results   Component Value Date    MALBCR 19 11/08/2023       Last HbA1c(s):  Hemoglobin A1C   Date Value Ref Range Status   02/29/2024 7.8 (H) 4.0 - 5.6 % Final     Comment:     (NOTE)  HbA1C Interpretive Ranges  <5.7              Normal  5.7 - 6.4         Consider Prediabetes  >6.5              Consider Diabetes       Hemoglobin A1C, POC   Date Value Ref Range Status   08/26/2024 8.9 % Final       Hemoglobin A1C   Date Value Ref Range Status   02/29/2024 7.8 (H) 4.0 - 5.6 % Final     Comment:     (NOTE)  HbA1C Interpretive Ranges  <5.7              Normal  5.7 - 6.4         Consider Prediabetes  >6.5              Consider Diabetes     11/08/2023 8.5 (H) 4.8 - 5.6 % Final     Comment:              Prediabetes: 5.7 - 6.4           Diabetes: >6.4           Glycemic control for adults with diabetes: <7.0     04/24/2023 7.6 (H) 4.0 - 5.6 % Final     Comment:     NEW METHOD  PLEASE NOTE NEW REFERENCE RANGE  (NOTE)  HbA1C Interpretive Ranges  <5.7              Normal  5.7 - 6.4         Consider Prediabetes  >6.5              Consider Diabetes         Estimated Creatinine Clearance: 46 mL/min (A) (based on SCr of 1.24 mg/dL (H)).    Medication reconciliation was completed during the visit.    There are no discontinued medications.  Orders Placed This Encounter    AMB POC HEMOGLOBIN A1C      Patient verbalized understanding of the information presented and all of the patient’s questions were answered.  AVS was handed to the patient. Patient advised to call the office with any additional questions or concerns.    Notifications of recommendations will be sent to Rm Cohn III, DO for review.    Patient will return to clinic in 7 week(s) for follow up.     Thank you

## 2024-08-26 ENCOUNTER — PHARMACY VISIT (OUTPATIENT)
Age: 69
End: 2024-08-26
Payer: MEDICARE

## 2024-08-26 VITALS — HEIGHT: 65 IN | WEIGHT: 199 LBS | BODY MASS INDEX: 33.15 KG/M2

## 2024-08-26 DIAGNOSIS — Z79.4 TYPE 2 DIABETES MELLITUS WITH HYPERGLYCEMIA, WITH LONG-TERM CURRENT USE OF INSULIN (HCC): Primary | ICD-10-CM

## 2024-08-26 DIAGNOSIS — F41.9 ANXIETY: Primary | ICD-10-CM

## 2024-08-26 DIAGNOSIS — E11.65 TYPE 2 DIABETES MELLITUS WITH HYPERGLYCEMIA, WITH LONG-TERM CURRENT USE OF INSULIN (HCC): Primary | ICD-10-CM

## 2024-08-26 LAB — HBA1C MFR BLD: 8.9 %

## 2024-08-26 PROCEDURE — 83036 HEMOGLOBIN GLYCOSYLATED A1C: CPT | Performed by: PHARMACIST

## 2024-08-26 PROCEDURE — PBSHW AMB POC HEMOGLOBIN A1C: Performed by: PHARMACIST

## 2024-08-26 RX ORDER — ALPRAZOLAM 0.25 MG
TABLET ORAL
Qty: 60 TABLET | Refills: 0 | Status: SHIPPED | OUTPATIENT
Start: 2024-08-26 | End: 2024-11-26

## 2024-08-26 NOTE — TELEPHONE ENCOUNTER
PCP: Rm Yanes III,     Last appt: 8/22/2024  Future Appointments   Date Time Provider Department Center   10/29/2024 10:00 AM Flower Tolbert [address]   12/10/2024 10:00 AM Rm Yanes III,  [address]   12/16/2024  3:00 PM YINKA GORDON 2 [facility]       Requested Prescriptions     Pending Prescriptions Disp Refills    ALPRAZolam (XANAX) 0.25 MG tablet 60 tablet 0     Sig: Take 1 tablet by mouth TWICE daily as needed for anxiety.

## 2024-08-26 NOTE — PATIENT INSTRUCTIONS
Your A1c today was 8.9%. Your goal is to be below 7%  Complete current antibiotic  Wear your sensor patch   Continue metformin 1000 mg daily  Continue Novolog:

## 2024-09-04 ENCOUNTER — TELEPHONE (OUTPATIENT)
Age: 69
End: 2024-09-04

## 2024-09-04 NOTE — TELEPHONE ENCOUNTER
Pharmacy Progress Note - Telephone Encounter    S/O: Ms. Itzel Abdalla 69 y.o. female, referred by Rm Cohn III, DO, was contacted via an outbound telephone call to discuss her PAP supply today. Verified patient’s identifiers (name & ) per HIPAA policy.       A/P:  - Tresiba PAP supply arrived and ready for .  However, [employer] shipped Novolog cartridge instead of requested pens. Will check with mfr about this replacement.   - Patient endorses understanding to the provided information. All questions answered at this time.     Thank you,  Flower Tolbert, PharmD, BCACP, CDCES      For Pharmacy Admin Tracking Only    Program: Medical Group  CPA in place:  Yes  Recommendation Provided To: Patient/Caregiver: 1 via Telephone  Intervention Detail: Refill(s) Provided  Intervention Accepted By: Patient/Caregiver: 1  Gap Closed?: Yes   Time Spent (min): 15

## 2024-09-04 NOTE — TELEPHONE ENCOUNTER
Pharmacy Progress Note     Contacted [facility] regarding Ms. Itzel Abdalla 69 y.o. 's Novolog refill shipment.   Order # [id number]  PO # [id number]    Received Novolog cartridge rather than prefilled pen today  .   Return request submitted. [facility]'s pharmacy will issue a return label to us via fax. Will also process a new refill order for patient's insulin pen.  May take up to 7-10 business days to arrive.       Thank you for the consult,  Flower Tolbert PharmD, BCACP, [facility]                For Pharmacy Admin Tracking Only    Program: Medical Group  CPA in place:  Yes  Recommendation Provided To: Pharmacy: 1  Intervention Detail: New Rx: 1, reason: Needs Additional Therapy  Intervention Accepted By: Pharmacy: 1  Gap Closed?: Yes   Time Spent (min): 30

## 2024-09-24 DIAGNOSIS — I12.9 HYPERTENSIVE CHRONIC KIDNEY DISEASE WITH STAGE 1 THROUGH STAGE 4 CHRONIC KIDNEY DISEASE, OR UNSPECIFIED CHRONIC KIDNEY DISEASE: Primary | ICD-10-CM

## 2024-09-24 NOTE — TELEPHONE ENCOUNTER
Monalisa Tolbert Pharm D,    Itzel Abdalla has been flagged for Adherence by [place].   Patient's insurance will allow a 90 day supply - Less with Mail order    LISINOPRIL TAB 5 MG last filled on 04.20.24 for 90 day supply. Next refill due: 07.19.24   0 refills remain  Discrepancy with Office visit and Medication list. (Sig/Dose needs updating)  -Dr. Tolbert Pharm D verified with patient she is still taking Lisinopril 5 mg     Prescriber: Rm Yanes,    Pharmacy: [facility] - [address] [phone] - F [phone]    I have pended refills for your approval and changed to a 90 day supply. Please let me know if there is anything I can help with.    LOV:8/22/2024 PCP, 8/26/2024 Pharm D  NOV:10/29/2024 Pharm D, 12/10/2024 PCP     Thank you,  Corrina Arzate, PharmD, [facility]  Population Health Pharmacist  [facility]   Department, toll free: [phone] Option 1    Requested Prescriptions     Pending Prescriptions Disp Refills    lisinopril (PRINIVIL;ZESTRIL) 5 MG tablet 90 tablet 3     Sig: Take 1 tablet by mouth daily        ================================================================================  Per Dr. Tolbert  Patient states she does have a small surplus at this time- she was stopped for a short time period due to hypotension and fall risk.  A new rx has been sent to update Medication List and for Pharmacy to have correct medication on File.    For Pharmacy Admin Tracking Only    Program: my[employer]aa  CPA in place:  No    Gap Closed?: No   Time Spent (min): 30

## 2024-09-24 NOTE — TELEPHONE ENCOUNTER
[facility] CLINICAL PHARMACY: ADHERENCE REVIEW  Identified care gap per [place] fills with [facility]: ACE/ARB adherence    Medicare Group Retiree - MRGR  MA-PCPi  Per insurer report, LIS-0 - co-pays are based on tiers and patient is subject to coverage gap.  Patient also appears to be prescribed: Statin    ASSESSMENT    ACE/ARB ADHERENCE    Insurance Records claims through  24  (Prior Year PDC = 81% - PASSED ; YTD PDC = 75%; Potential Fail Date: 10.01.24):   LISINOPRIL TAB 5 MG last filled on 24 for 90 day supply. Next refill due: 24    Prescribed si tablet/capsule daily    Per Reconcile Dispense History and Insurer Portal: last filled on 24 for 90 day supply.     Per [facility]: last picked up on 24 for 90 day supply. Billed through [employer]. 0 refills remaining.    Per pharmacy visit on 24, Lisinopril 5 mg daily.   Per med list, Patient taking differently: 10 mg Oral EVERY MORNING, Reported on 2024     BP Readings from Last 3 Encounters:   24 121/70   24 (!) 123/59   24 125/75     Estimated Creatinine Clearance: 46 mL/min (A) (based on SCr of 1.24 mg/dL (H)).  Lab Results   Component Value Date    CREATININE 1.24 (H) 2024     Lab Results   Component Value Date    K 4.2 2024     STATIN ADHERENCE    Insurance Records claims through  24  (Prior Year PDC = 100% - PASSED ; YTD PDC =  FAILED):   PRAVASTATIN TAB 20 MG last filled on 24 for 90 day supply. Next refill due: 24    Prescribed si tablet/capsule daily    Per Reconcile Dispense History and Insurer Portal: last filled on 24 for 90 day supply.     Per [facility]: last picked up on 24 for 90 day supply. Billed through [facility]. 2 refills remaining.    Lab Results   Component Value Date    CHOL 188 2023    TRIG 169 (H) 2023    HDL 60 2023     Lab Results   Component Value Date    LDL 99 2023      ALT   Date Value Ref

## 2024-10-02 RX ORDER — LISINOPRIL 5 MG/1
5 TABLET ORAL DAILY
Qty: 90 TABLET | Refills: 3 | OUTPATIENT
Start: 2024-10-02

## 2024-10-03 ENCOUNTER — HOSPITAL ENCOUNTER (OUTPATIENT)
Facility: HOSPITAL | Age: 69
Discharge: HOME | End: 2024-10-06
Payer: MEDICARE

## 2024-10-03 DIAGNOSIS — K59.04 CHRONIC IDIOPATHIC CONSTIPATION: ICD-10-CM

## 2024-10-03 DIAGNOSIS — R07.9 CHEST PAIN, UNSPECIFIED TYPE: ICD-10-CM

## 2024-10-03 DIAGNOSIS — R13.10 DYSPHAGIA, UNSPECIFIED TYPE: ICD-10-CM

## 2024-10-03 DIAGNOSIS — K58.1 IRRITABLE BOWEL SYNDROME WITH CONSTIPATION: ICD-10-CM

## 2024-10-03 PROCEDURE — 74220 X-RAY XM ESOPHAGUS 1CNTRST: CPT

## 2024-10-25 NOTE — PROGRESS NOTES
Pharmacy Progress Note - Diabetes Management    Assessment / Plan:   Diabetes Management:  - Per ADA guidelines, Pt's A1c is not at goal of < 7%.   - Discuss high frequency of elevated post prandial hyperglycemia influenced by inconsistent prandial coverage.   - Reinforce importance of Novolog consistently.   If 151-200: give 3 units  If 201-250: give 5 units   If above 250: give 6 units  If you have a late night snack, give 2 units  - Continue Tresiba 44 units daily   - Continue metformin 1 gm daily    Other:  - [employer] PAP application initiated for patient's Linzess.     S/O: Ms. Itzel Abdalla is a 69 y.o. female, referred by Rm Cohn III, DO, with a PMH of T2DM,  HTN, HLD, Hypothyroidism, OAB, IBS, osteoporosis, was seen today for diabetes management follow up. Last A1c was 8.9% (Aug 2024), 7.8% (March 2024), 8.5% (Nov 2023), 7.6% (April 2023), 8% (Jan 2023), 7.2% (Jun 2022), 7.3% (Feb 2022), 6.9% (Sept 2021), 6.3%  (June 2021), 8% (Oct 2020), 8.4% (June 2020), 7.2% (Dec 2019).     Interim update:   No  longer on Trulance 3 mg. Dr. Elise started Linzess 290 mcg daily  Concerned about cost.     Current anti-hyperglycemic regimen include(s):    - Tresiba 44 units daily mid-day  - Metformin 1 gm daily  - Novolog - if pre meal blood sugar:  If 151-200: give 3 units  If 201-250: give 5 units   If above 250: give 6 units  If you have a late night snack, give 2 units    Now re-enrolled into No Cares   Lisinopril 5 mg daily  Pravastatin 20 mg daily    ROS:  Today, Pt endorses:  Signs and symptoms of Hypoglycemia: none  Signs and symptoms of Hyperglycemia: none    Blood Glucose Monitoring (BGM) or CGM:  Sarah 2 CGM; uses reader   14 days:    30 days:                Eating 2 meals/day  Still snacking on donuts, ice cream in the evening  Breakfast yesterday: large breakfast with eggs, toast -- did not give Novolog until later in the afternoon.   Dinner last night: chili       The 10-year ASCVD risk

## 2024-10-29 ENCOUNTER — PHARMACY VISIT (OUTPATIENT)
Age: 69
End: 2024-10-29

## 2024-10-29 VITALS
HEART RATE: 68 BPM | DIASTOLIC BLOOD PRESSURE: 82 MMHG | HEIGHT: 65 IN | SYSTOLIC BLOOD PRESSURE: 125 MMHG | OXYGEN SATURATION: 96 % | WEIGHT: 199 LBS | BODY MASS INDEX: 33.15 KG/M2

## 2024-10-29 DIAGNOSIS — E11.65 TYPE 2 DIABETES MELLITUS WITH HYPERGLYCEMIA, WITH LONG-TERM CURRENT USE OF INSULIN (HCC): Primary | ICD-10-CM

## 2024-10-29 DIAGNOSIS — Z79.4 TYPE 2 DIABETES MELLITUS WITH HYPERGLYCEMIA, WITH LONG-TERM CURRENT USE OF INSULIN (HCC): Primary | ICD-10-CM

## 2024-10-29 RX ORDER — LINACLOTIDE 290 UG/1
290 CAPSULE, GELATIN COATED ORAL
COMMUNITY
Start: 2024-09-22

## 2024-10-29 NOTE — PATIENT INSTRUCTIONS
Give Novolog before each meal.   Continue Tresiba 44 units daily  Continue metformin 1000 mg daily  Continue Novolog scale -- pre meal blood sugar  If 151-200: give 3 units  If 201-250: give 5 units   If above 250: give 6 units  If you have a late night snack, give 2 units    Will work on your Linzess application.

## 2024-10-30 ENCOUNTER — TELEPHONE (OUTPATIENT)
Age: 69
End: 2024-10-30

## 2024-10-30 NOTE — TELEPHONE ENCOUNTER
Pharmacy Progress Note - Telephone Call    Ms. Itzel Abdalla 69 y.o. was contacted via an outbound telephone call regarding her [employer] PAP application today.  A voicemail was left for patient to return my call.     PAP application for patient's Linzess reviewed and submitted today. Verdict pending.     Thank you,  Flower Tolbert, PharmD, BCACP, [facility]      For Pharmacy Admin Tracking Only    Program: Medical Group  CPA in place:  Yes  Recommendation Provided To: Patient/Caregiver: 2 via Telephone  Intervention Detail: Benefit Assistance and Patient Access Assistance/Sample Provided  Intervention Accepted By: Patient/Caregiver: 2  Gap Closed?: Yes   Time Spent (min): 30

## 2024-11-01 ENCOUNTER — OFFICE VISIT (OUTPATIENT)
Age: 69
End: 2024-11-01

## 2024-11-01 VITALS
OXYGEN SATURATION: 97 % | BODY MASS INDEX: 33.45 KG/M2 | SYSTOLIC BLOOD PRESSURE: 124 MMHG | RESPIRATION RATE: 16 BRPM | DIASTOLIC BLOOD PRESSURE: 72 MMHG | TEMPERATURE: 98.5 F | WEIGHT: 201 LBS | HEART RATE: 65 BPM

## 2024-11-01 DIAGNOSIS — U07.1 COVID-19: Primary | ICD-10-CM

## 2024-11-01 RX ORDER — SULFAMETHOXAZOLE AND TRIMETHOPRIM 800; 160 MG/1; MG/1
TABLET ORAL
COMMUNITY
Start: 2024-08-01

## 2024-11-01 NOTE — PROGRESS NOTES
Itzel Abdalla (:  1955) is a 69 y.o. female,Established patient, here for evaluation of the following chief complaint(s):  positive civd test (/Pt preset for Positive COVID testing //)      Assessment & Plan :  Visit Diagnoses and Associated Orders       COVID-19    -  Primary    nirmatrelvir/ritonavir (PAXLOVID) 10 x 150 MG & 10 x 100MG TBPK [[id number]]           ORDERS WITHOUT AN ASSOCIATED DIAGNOSIS    BACTRIM -160 MG per tablet [871]              You are positive for COVID 19 today  Vital signs are stable, physical exam is benign, low suspicion for bacterial illness at this time  Due to your risk factors you are a candidate for antiviral therapy  Paxlovid 2x daily for 5 days, please hold off on your pravastatin while taking the Paxlovid as some interaction does exist  Will dose renally based on recent kidney function studies, most recent GFR is at 47  Treat symptomatically: OTC tylenol for aches, pains, fevers, chills  Mucinex DM over-the-counter as needed for cough and congestion  Hot tea with honey, throat lozenges, saline sinus rinses  Lots of fluids, plenty of rest  Strict 5 day quarantine from onset of symptoms, continue to wear a well fitting mask for an additional 5 days beyond that  Go to the ED with any severe shortness of breath, chest pain, or if you are unable to keep down food and fluids       Subjective :  HPI     69 y.o. female presents with symptoms of malaise, fatigue, nasal congestion and cough for the past 1 to 2 days.  Partner is having similar symptoms currently.  Both began having symptoms around the same time, both tested positive for COVID on a home test this morning.  Denies fevers, chills or bodyaches.  Denies any significant ear pain or sore throat.  Coughing is nonproductive and she denies any shortness of breath or wheezing.  No chest or abdominal pain, no nausea, vomiting or diarrhea.  She does have a history of several chronic medical problems including diabetes and

## 2024-11-01 NOTE — PATIENT INSTRUCTIONS
You are positive for COVID 19 today  Due to your risk factors you are a candidate for antiviral therapy  Paxlovid 2x daily for 5 days,   Treat symptomatically: OTC tylenol/ibuprofen for aches, pains, fevers, chills  Hot tea with honey, throat lozenges, saline sinus rinses  Lots of fluids, plenty of rest  Strict 5 day quarantine from onset of symptoms, continue to wear a well fitting mask for an additional 5 days beyond that  Go to the ED with any severe shortness of breath, chest pain, or if you are unable to keep down food and fluids

## 2024-11-11 ENCOUNTER — APPOINTMENT (OUTPATIENT)
Facility: HOSPITAL | Age: 69
End: 2024-11-11
Payer: MEDICARE

## 2024-12-07 NOTE — PROGRESS NOTES
Group  CPA in place:  Yes  Recommendation Provided To: Patient/Caregiver: 4 via In person  Intervention Detail: Adherence Monitorin, Dose Adjustment: 1, reason: Therapy Optimization, and Scheduled Appointment  Intervention Accepted By: Patient/Caregiver: 4  Gap Closed?: Yes   Time Spent (min): 30

## 2024-12-09 DIAGNOSIS — M81.0 OSTEOPOROSIS, UNSPECIFIED OSTEOPOROSIS TYPE, UNSPECIFIED PATHOLOGICAL FRACTURE PRESENCE: Primary | ICD-10-CM

## 2024-12-10 ENCOUNTER — OFFICE VISIT (OUTPATIENT)
Age: 69
End: 2024-12-10
Payer: MEDICARE

## 2024-12-10 ENCOUNTER — PHARMACY VISIT (OUTPATIENT)
Age: 69
End: 2024-12-10
Payer: MEDICARE

## 2024-12-10 VITALS
BODY MASS INDEX: 32.92 KG/M2 | RESPIRATION RATE: 14 BRPM | SYSTOLIC BLOOD PRESSURE: 92 MMHG | WEIGHT: 197.6 LBS | HEART RATE: 73 BPM | TEMPERATURE: 97.7 F | OXYGEN SATURATION: 98 % | DIASTOLIC BLOOD PRESSURE: 50 MMHG | HEIGHT: 65 IN

## 2024-12-10 VITALS — BODY MASS INDEX: 32.82 KG/M2 | WEIGHT: 197 LBS | HEIGHT: 65 IN

## 2024-12-10 DIAGNOSIS — N18.30 TYPE 2 DM WITH CKD STAGE 3 AND HYPERTENSION (HCC): ICD-10-CM

## 2024-12-10 DIAGNOSIS — E11.69 HYPERLIPIDEMIA ASSOCIATED WITH TYPE 2 DIABETES MELLITUS (HCC): ICD-10-CM

## 2024-12-10 DIAGNOSIS — Z79.4 TYPE 2 DIABETES MELLITUS WITH HYPERGLYCEMIA, WITH LONG-TERM CURRENT USE OF INSULIN (HCC): ICD-10-CM

## 2024-12-10 DIAGNOSIS — I12.9 TYPE 2 DM WITH CKD STAGE 3 AND HYPERTENSION (HCC): ICD-10-CM

## 2024-12-10 DIAGNOSIS — E11.22 TYPE 2 DM WITH CKD STAGE 3 AND HYPERTENSION (HCC): ICD-10-CM

## 2024-12-10 DIAGNOSIS — Z00.00 MEDICARE ANNUAL WELLNESS VISIT, SUBSEQUENT: Primary | ICD-10-CM

## 2024-12-10 DIAGNOSIS — E78.5 HYPERLIPIDEMIA ASSOCIATED WITH TYPE 2 DIABETES MELLITUS (HCC): ICD-10-CM

## 2024-12-10 DIAGNOSIS — E89.0 POSTOPERATIVE HYPOTHYROIDISM: ICD-10-CM

## 2024-12-10 DIAGNOSIS — F41.1 GENERALIZED ANXIETY DISORDER: ICD-10-CM

## 2024-12-10 DIAGNOSIS — E11.65 TYPE 2 DIABETES MELLITUS WITH HYPERGLYCEMIA, WITH LONG-TERM CURRENT USE OF INSULIN (HCC): ICD-10-CM

## 2024-12-10 PROCEDURE — 99213 OFFICE O/P EST LOW 20 MIN: CPT | Performed by: INTERNAL MEDICINE

## 2024-12-10 RX ORDER — LISINOPRIL 5 MG/1
5 TABLET ORAL DAILY
Qty: 90 TABLET | Refills: 3
Start: 2024-12-10

## 2024-12-10 SDOH — ECONOMIC STABILITY: FOOD INSECURITY: WITHIN THE PAST 12 MONTHS, YOU WORRIED THAT YOUR FOOD WOULD RUN OUT BEFORE YOU GOT MONEY TO BUY MORE.: NEVER TRUE

## 2024-12-10 SDOH — ECONOMIC STABILITY: FOOD INSECURITY: WITHIN THE PAST 12 MONTHS, THE FOOD YOU BOUGHT JUST DIDN'T LAST AND YOU DIDN'T HAVE MONEY TO GET MORE.: NEVER TRUE

## 2024-12-10 SDOH — ECONOMIC STABILITY: INCOME INSECURITY: HOW HARD IS IT FOR YOU TO PAY FOR THE VERY BASICS LIKE FOOD, HOUSING, MEDICAL CARE, AND HEATING?: NOT HARD AT ALL

## 2024-12-10 ASSESSMENT — PATIENT HEALTH QUESTIONNAIRE - PHQ9
SUM OF ALL RESPONSES TO PHQ QUESTIONS 1-9: 2
2. FEELING DOWN, DEPRESSED OR HOPELESS: NOT AT ALL
5. POOR APPETITE OR OVEREATING: NOT AT ALL
SUM OF ALL RESPONSES TO PHQ QUESTIONS 1-9: 2
8. MOVING OR SPEAKING SO SLOWLY THAT OTHER PEOPLE COULD HAVE NOTICED. OR THE OPPOSITE, BEING SO FIGETY OR RESTLESS THAT YOU HAVE BEEN MOVING AROUND A LOT MORE THAN USUAL: NOT AT ALL
10. IF YOU CHECKED OFF ANY PROBLEMS, HOW DIFFICULT HAVE THESE PROBLEMS MADE IT FOR YOU TO DO YOUR WORK, TAKE CARE OF THINGS AT HOME, OR GET ALONG WITH OTHER PEOPLE: NOT DIFFICULT AT ALL
9. THOUGHTS THAT YOU WOULD BE BETTER OFF DEAD, OR OF HURTING YOURSELF: NOT AT ALL
3. TROUBLE FALLING OR STAYING ASLEEP: NOT AT ALL
SUM OF ALL RESPONSES TO PHQ9 QUESTIONS 1 & 2: 2
SUM OF ALL RESPONSES TO PHQ QUESTIONS 1-9: 2
SUM OF ALL RESPONSES TO PHQ QUESTIONS 1-9: 2
1. LITTLE INTEREST OR PLEASURE IN DOING THINGS: MORE THAN HALF THE DAYS
6. FEELING BAD ABOUT YOURSELF - OR THAT YOU ARE A FAILURE OR HAVE LET YOURSELF OR YOUR FAMILY DOWN: NOT AT ALL
7. TROUBLE CONCENTRATING ON THINGS, SUCH AS READING THE NEWSPAPER OR WATCHING TELEVISION: NOT AT ALL

## 2024-12-10 ASSESSMENT — LIFESTYLE VARIABLES
HOW MANY STANDARD DRINKS CONTAINING ALCOHOL DO YOU HAVE ON A TYPICAL DAY: PATIENT DOES NOT DRINK
HOW OFTEN DO YOU HAVE A DRINK CONTAINING ALCOHOL: NEVER

## 2024-12-10 NOTE — PATIENT INSTRUCTIONS
swim, bike, or do other activities. Bit by bit, increase the time you're active every day. Try for at least 30 minutes on most days of the week.     Try to quit or cut back on using tobacco and other nicotine products. This includes smoking and vaping. If you need help quitting, talk to your doctor about stop-smoking programs and medicines. These can increase your chances of quitting for good. Quitting is one of the most important things you can do to protect your heart. It is never too late to quit. Try to avoid secondhand smoke too.     Stay at a weight that's healthy for you. Talk to your doctor if you need help losing weight.     Try to get 7 to 9 hours of sleep each night.     Limit alcohol to 2 drinks a day for men and 1 drink a day for women. Too much alcohol can cause health problems.     Manage other health problems such as diabetes, high blood pressure, and high cholesterol. If you think you may have a problem with alcohol or drug use, talk to your doctor.   Medicines    Take your medicines exactly as prescribed. Call your doctor if you think you are having a problem with your medicine.     If your doctor recommends aspirin, take the amount directed each day. Make sure you take aspirin and not another kind of pain reliever, such as acetaminophen (Tylenol).   When should you call for help?   Call 911 if you have symptoms of a heart attack. These may include:    Chest pain or pressure, or a strange feeling in the chest.     Sweating.     Shortness of breath.     Pain, pressure, or a strange feeling in the back, neck, jaw, or upper belly or in one or both shoulders or arms.     Lightheadedness or sudden weakness.     A fast or irregular heartbeat.   After you call 911, the  may tell you to chew 1 adult-strength or 2 to 4 low-dose aspirin. Wait for an ambulance. Do not try to drive yourself.  Watch closely for changes in your health, and be sure to contact your doctor if you have any problems.  Where

## 2024-12-10 NOTE — PROGRESS NOTES
\"Have you been to the ER, urgent care clinic since your last visit?  Hospitalized since your last visit?\"    NO    “Have you seen or consulted any other health care providers outside our system since your last visit?”    NO      “Have you had a colorectal cancer screening such as a colonoscopy/FIT/Cologuard?    [facility]    Date of last Colonoscopy: 7/18/2016  No cologuard on file  No FIT/FOBT on file   No flexible sigmoidoscopy on file     “Have you had a diabetic eye exam?”    NO     Date of last diabetic eye exam: 11/23/2020

## 2024-12-10 NOTE — PROGRESS NOTES
Medicare Annual Wellness Visit    Itzel Abdalla is here for Medicare AWV    Assessment & Plan   Medicare annual wellness visit, subsequent  Recommendations for Preventive Services Due: see orders and patient instructions/AVS.  Recommended screening schedule for the next 5-10 years is provided to the patient in written form: see Patient Instructions/AVS.     No follow-ups on file.     Subjective       Patient's complete Health Risk Assessment and screening values have been reviewed and are found in Flowsheets. The following problems were reviewed today and where indicated follow up appointments were made and/or referrals ordered.    Positive Risk Factor Screenings with Interventions:              Inactivity:  On average, how many days per week do you engage in moderate to strenuous exercise (like a brisk walk)?: 0 days (!) Abnormal  On average, how many minutes do you engage in exercise at this level?: 0 min  Interventions:  Patient declined any further interventions or treatment    Poor Eating Habits/Diet:  Do you eat balanced/healthy meals regularly?: (!) No  Interventions:  Patient declines any further evaluation or treatment    Abnormal BMI (obese):  Body mass index is 32.88 kg/m². (!) Abnormal  Interventions:  Patient declines any further evaluation or treatment                           Objective   Vitals:    12/10/24 1014 12/10/24 1018 12/10/24 1019   BP: (!) 83/50 (!) 83/47 (!) 92/50   Site: Left Upper Arm Right Upper Arm Left Wrist   Position: Sitting Sitting Sitting   Cuff Size: Large Adult Large Adult Medium Adult   Pulse: 73     Resp: 14     Temp: 97.7 °F (36.5 °C)     TempSrc: Temporal     SpO2: 98%     Weight: 89.6 kg (197 lb 9.6 oz)     Height: 1.651 m (5' 5\")        Body mass index is 32.88 kg/m².                    Allergies   Allergen Reactions    Celecoxib Other (See Comments)     Hallucinations    Codeine Nausea And Vomiting    Erythromycin Nausea And Vomiting     Prior to Visit Medications

## 2024-12-10 NOTE — PATIENT INSTRUCTIONS
Let's get labs  Increase Tresiba to 46 units daily  Continue metformin 1000 mg daily  Continue Novolog - if pre meal blood sugar   If 151-200: give 3 units  If 201-250: give 5 units   If above 250: give 6 units  If you have a late night snack, give 2 units

## 2024-12-11 LAB
ALBUMIN SERPL-MCNC: 3.9 G/DL (ref 3.5–5)
ALBUMIN/GLOB SERPL: 1.3 (ref 1.1–2.2)
ALP SERPL-CCNC: 65 U/L (ref 45–117)
ALT SERPL-CCNC: 29 U/L (ref 12–78)
ANION GAP SERPL CALC-SCNC: 3 MMOL/L (ref 2–12)
AST SERPL-CCNC: 10 U/L (ref 15–37)
BASOPHILS # BLD: 0.1 K/UL (ref 0–0.1)
BASOPHILS NFR BLD: 1 % (ref 0–1)
BILIRUB SERPL-MCNC: 0.3 MG/DL (ref 0.2–1)
BUN SERPL-MCNC: 26 MG/DL (ref 6–20)
BUN/CREAT SERPL: 16 (ref 12–20)
CALCIUM SERPL-MCNC: 10 MG/DL (ref 8.5–10.1)
CHLORIDE SERPL-SCNC: 110 MMOL/L (ref 97–108)
CHOLEST SERPL-MCNC: 162 MG/DL
CO2 SERPL-SCNC: 28 MMOL/L (ref 21–32)
CREAT SERPL-MCNC: 1.65 MG/DL (ref 0.55–1.02)
CREAT UR-MCNC: 154 MG/DL
DIFFERENTIAL METHOD BLD: ABNORMAL
EOSINOPHIL # BLD: 0.1 K/UL (ref 0–0.4)
EOSINOPHIL NFR BLD: 1 % (ref 0–7)
ERYTHROCYTE [DISTWIDTH] IN BLOOD BY AUTOMATED COUNT: 12.3 % (ref 11.5–14.5)
EST. AVERAGE GLUCOSE BLD GHB EST-MCNC: 183 MG/DL
GLOBULIN SER CALC-MCNC: 2.9 G/DL (ref 2–4)
GLUCOSE SERPL-MCNC: 101 MG/DL (ref 65–100)
HBA1C MFR BLD: 8 % (ref 4–5.6)
HCT VFR BLD AUTO: 33.4 % (ref 35–47)
HDLC SERPL-MCNC: 64 MG/DL
HDLC SERPL: 2.5 (ref 0–5)
HGB BLD-MCNC: 11.1 G/DL (ref 11.5–16)
IMM GRANULOCYTES # BLD AUTO: 0 K/UL (ref 0–0.04)
IMM GRANULOCYTES NFR BLD AUTO: 0 % (ref 0–0.5)
LDLC SERPL CALC-MCNC: 81.2 MG/DL (ref 0–100)
LYMPHOCYTES # BLD: 2.3 K/UL (ref 0.8–3.5)
LYMPHOCYTES NFR BLD: 25 % (ref 12–49)
MCH RBC QN AUTO: 29.8 PG (ref 26–34)
MCHC RBC AUTO-ENTMCNC: 33.2 G/DL (ref 30–36.5)
MCV RBC AUTO: 89.5 FL (ref 80–99)
MICROALBUMIN UR-MCNC: 12.8 MG/DL
MICROALBUMIN/CREAT UR-RTO: 83 MG/G (ref 0–30)
MONOCYTES # BLD: 0.8 K/UL (ref 0–1)
MONOCYTES NFR BLD: 8 % (ref 5–13)
NEUTS SEG # BLD: 6 K/UL (ref 1.8–8)
NEUTS SEG NFR BLD: 65 % (ref 32–75)
NRBC # BLD: 0 K/UL (ref 0–0.01)
NRBC BLD-RTO: 0 PER 100 WBC
PLATELET # BLD AUTO: 136 K/UL (ref 150–400)
PMV BLD AUTO: 11.8 FL (ref 8.9–12.9)
POTASSIUM SERPL-SCNC: 4.6 MMOL/L (ref 3.5–5.1)
PROT SERPL-MCNC: 6.8 G/DL (ref 6.4–8.2)
RBC # BLD AUTO: 3.73 M/UL (ref 3.8–5.2)
SODIUM SERPL-SCNC: 141 MMOL/L (ref 136–145)
SPECIMEN HOLD: NORMAL
T4 FREE SERPL-MCNC: 1.7 NG/DL (ref 0.8–1.5)
TRIGL SERPL-MCNC: 84 MG/DL
TSH SERPL DL<=0.05 MIU/L-ACNC: 0.74 UIU/ML (ref 0.36–3.74)
VLDLC SERPL CALC-MCNC: 16.8 MG/DL
WBC # BLD AUTO: 9.3 K/UL (ref 3.6–11)

## 2024-12-16 ENCOUNTER — HOSPITAL ENCOUNTER (OUTPATIENT)
Facility: HOSPITAL | Age: 69
Setting detail: INFUSION SERIES
Discharge: HOME | End: 2024-12-16
Payer: MEDICARE

## 2024-12-16 VITALS
WEIGHT: 203.5 LBS | HEART RATE: 66 BPM | DIASTOLIC BLOOD PRESSURE: 81 MMHG | SYSTOLIC BLOOD PRESSURE: 153 MMHG | RESPIRATION RATE: 16 BRPM | TEMPERATURE: 98 F | OXYGEN SATURATION: 95 % | HEIGHT: 65 IN | BODY MASS INDEX: 33.91 KG/M2

## 2024-12-16 DIAGNOSIS — M81.0 OSTEOPOROSIS, UNSPECIFIED OSTEOPOROSIS TYPE, UNSPECIFIED PATHOLOGICAL FRACTURE PRESENCE: Primary | ICD-10-CM

## 2024-12-16 PROCEDURE — 96372 THER/PROPH/DIAG INJ SC/IM: CPT

## 2024-12-16 PROCEDURE — 6360000002 HC RX W HCPCS: Performed by: INTERNAL MEDICINE

## 2024-12-16 RX ADMIN — DENOSUMAB 60 MG: 60 INJECTION SUBCUTANEOUS at 15:18

## 2024-12-16 NOTE — PROGRESS NOTES
[facility] Visit Note:  Arrived - 1500 for Prolia    BP (!) 153/81   Pulse 66   Temp 98 °F (36.7 °C) (Temporal)   Resp 16   Ht 1.651 m (5' 5\")   Wt 92.3 kg (203 lb 8 oz)   SpO2 95%   BMI 33.86 kg/m²     Assessment - unchanged. Reviewed Prolia info. Pt denies any recent or upcoming dental work. Labs complete on 12/10/2024, Ca 10.0.    Medication given:  Medications Administered         denosumab (PROLIA) SC injection 60 mg Admin Date  12/16/2024 Action  Given Dose  60 mg Route  SubCUTAneous Documented By  Manda Christina, RN          1520 - Tolerated well. No reaction noted. Reviewed Prolia D/C instructions. Verbalized understanding. Pt denies any acute problems/changes. Discharged from [facility] ambulatory. No distress. Next appt: 06/16/2025

## 2024-12-17 ENCOUNTER — TELEPHONE (OUTPATIENT)
Age: 69
End: 2024-12-17

## 2024-12-17 NOTE — TELEPHONE ENCOUNTER
Pharmacy Progress Note - Telephone Call    Ms. Itzel Abdalla 69 y.o. was contacted via an outbound telephone call regarding her recent lab results & Tresiba supply today.  A voicemail was left for patient to return my call.     Tresiba PAP supply is ready for .     Thank you,  Flower Tolbert, PharmD, BCACP, [facility]      For Pharmacy Admin Tracking Only    Program: Medical Group  CPA in place:  Yes  Recommendation Provided To: Patient/Caregiver: 1 via Telephone  Intervention Detail: Refill(s) Provided  Intervention Accepted By: Patient/Caregiver: 1  Gap Closed?: Yes   Time Spent (min): 10

## 2024-12-23 ENCOUNTER — TELEPHONE (OUTPATIENT)
Age: 69
End: 2024-12-23

## 2024-12-23 NOTE — TELEPHONE ENCOUNTER
Labs faxed to [facility] cardio. Scanned fax confirmation to [facility]

## 2024-12-23 NOTE — TELEPHONE ENCOUNTER
[facility]  called in requesting lab work from last visit be faxed to [phone]    Call back number if needed  [phone] ext [phone]

## 2024-12-27 NOTE — TELEPHONE ENCOUNTER
[facility]  Phone: [phone]      States lab results not received     Please refax to alternate:  [phone]

## 2025-01-13 ENCOUNTER — TELEPHONE (OUTPATIENT)
Age: 70
End: 2025-01-13

## 2025-01-13 NOTE — TELEPHONE ENCOUNTER
Pharmacy Progress Note - Telephone Encounter    S/O: Ms. Itzel Abdalla 69 y.o. female, referred by Rm Cohn III, DO, was contacted via an outbound telephone call to discuss her previous call today. Verified patient’s identifiers (name & ) per HIPAA policy.     - Ran out of sensors. [facility] cannot refill sensor until February.   - Inquires about Linzess PAP    A/P:  - Recommend for patient to contact her insurance to request for early refill request on sensor.   - Recall patient still needs to complete insurance access form for Linzess PAP  - Patient endorses understanding to the provided information. All questions answered at this time.        Thank you,  Flower Tolbert, PharmD, BCACP, [facility]      For Pharmacy Admin Tracking Only    Program: Medical Group  CPA in place:  Yes  Recommendation Provided To: Patient/Caregiver: 2 via Telephone  Intervention Detail: Benefit Assistance and Patient Access Assistance/Sample Provided  Intervention Accepted By: Patient/Caregiver: 2  Gap Closed?: Yes   Time Spent (min): 10

## 2025-01-13 NOTE — TELEPHONE ENCOUNTER
Pt called in states would like veronica 2 sensor ordered states all out.     Pt states would also like to speak with [facility] in regards to some medications and other questions.       Please call to discuss.

## 2025-01-20 NOTE — PROGRESS NOTES
Pharmacy Progress Note - Diabetes Management    Assessment / Plan:   Diabetes Management:  - Per ADA guidelines, Pt's A1c is not at goal of < 7%. BP <130/80  - Resume CGM monitoring when able. Random PPBG today >180.  Scan sensor at least 4 times daily   - Reduce late evening snacking.   - Change Tresiba to 48 units daily  - Continue metformin 1 gm daily  - Continue with current Novolog coverage - if pre meal BG :  If 151-200: give 3 units  If 201-250: give 5 units   If above 250: give 6 units  If you have a late night snack, give 2 units     S/O: Ms. Itzel Abdalla is a 70 y.o. female, referred by Rm Cohn III, DO, with a PMH of T2DM,  HTN, HLD, Hypothyroidism, OAB, IBS, osteoporosis, was seen today for diabetes management follow up. Last A1c was 8% (Dec 2024), 8.9% (Aug 2024), 7.8% (2024), 8.5% (2023), 7.6% (2023), 8% (2023), 7.2% (2022), 7.3% (2022), 6.9% (2021), 6.3%  (2021), 8% (Oct 2020), 8.4% (2020), 7.2% (Dec 2019).     Current anti-hyperglycemic regimen include(s):    -Tresiba 46 units daily mid-day  - Metformin 1 gm daily              - Novolog - if pre meal blood sugar:  If 151-200: give 3 units  If 201-250: give 5 units   If above 250: give 6 units  If you have a late night snack, give 2 units    - Giving Novolog BID-TID    Now re-enrolled into No Cares   Lisinopril 5 mg daily  Pravastatin 20 mg daily - LDL 81 ; TG 84 (Dec 2024)     ROS:  Today, Pt endorses:  Signs and symptoms of Hypoglycemia: none  Signs and symptoms of Hyperglycemia: none    Blood Glucose Monitoring (BGM) or CGM:  Sarah 2 CGM ; uses reader   Previously was using  vial of test strips.  Old strips registered >75 pts lower than in-date supply. She has a new vial available.  Waiting for insurance to pay CGM sensor refills. Eligible for a new shipment in February.     - In office POC B  - Prodigy glucometer POC B    14 days                  Has 1 roll this

## 2025-01-21 ENCOUNTER — PHARMACY VISIT (OUTPATIENT)
Age: 70
End: 2025-01-21

## 2025-01-21 ENCOUNTER — TELEPHONE (OUTPATIENT)
Age: 70
End: 2025-01-21

## 2025-01-21 VITALS
OXYGEN SATURATION: 96 % | BODY MASS INDEX: 34.32 KG/M2 | SYSTOLIC BLOOD PRESSURE: 126 MMHG | DIASTOLIC BLOOD PRESSURE: 77 MMHG | HEART RATE: 71 BPM | WEIGHT: 206 LBS | HEIGHT: 65 IN

## 2025-01-21 DIAGNOSIS — E11.65 TYPE 2 DIABETES MELLITUS WITH HYPERGLYCEMIA, WITH LONG-TERM CURRENT USE OF INSULIN (HCC): Primary | ICD-10-CM

## 2025-01-21 DIAGNOSIS — Z79.4 TYPE 2 DIABETES MELLITUS WITH HYPERGLYCEMIA, WITH LONG-TERM CURRENT USE OF INSULIN (HCC): Primary | ICD-10-CM

## 2025-01-21 RX ORDER — LISINOPRIL 5 MG/1
5 TABLET ORAL DAILY
Qty: 90 TABLET | Refills: 3 | Status: SHIPPED | OUTPATIENT
Start: 2025-01-21

## 2025-01-21 RX ORDER — PRAVASTATIN SODIUM 20 MG
20 TABLET ORAL DAILY
Qty: 90 TABLET | Refills: 3 | Status: SHIPPED | OUTPATIENT
Start: 2025-01-21

## 2025-01-21 RX ORDER — FUROSEMIDE 20 MG/1
TABLET ORAL
Qty: 90 TABLET | Refills: 0 | Status: SHIPPED | OUTPATIENT
Start: 2025-01-21

## 2025-01-21 RX ORDER — LEVOTHYROXINE SODIUM 300 UG/1
TABLET ORAL
Qty: 84 TABLET | Refills: 1 | Status: SHIPPED | OUTPATIENT
Start: 2025-01-21

## 2025-01-21 NOTE — TELEPHONE ENCOUNTER
Diet with [facility] member service team called in states would need new RX and updated clinical notes sent to dmitry trimble. [facility] needs this information to expedite the delivery of prescriptions.     Please call pt to discuss.       lisinopril (PRINIVIL;ZESTRIL) 5 MG tablet     metFORMIN (GLUCOPHAGE) 1000 MG tablet     pravastatin (PRAVACHOL) 20 MG tablet     levothyroxine (SYNTHROID) 300 MCG tablet     furosemide (LASIX) 20 MG tablet

## 2025-01-21 NOTE — PATIENT INSTRUCTIONS
Use current Prodigy test strip to monitor your blood sugar.   Resume Sarah 2 sensor when able. Scan sensor at least 4 times daily  Tresiba 48 units daily  Continue metformin 1000 mg daily  Continue Novolog  - if pre meal blood sugar is:  If 151-200: give 3 units  If 201-250: give 5 units   If above 250: give 6 units  If you have a late night snack, give 2 units

## 2025-02-25 ENCOUNTER — TELEPHONE (OUTPATIENT)
Age: 70
End: 2025-02-25

## 2025-02-25 NOTE — TELEPHONE ENCOUNTER
Pt is having leg and foot swelling that has been going on for about two weeks now.  Extra lasix did not touch it.  She has elevated.  They hurt she states.    She has a call into Dr. Neil as well.  Pt needs to know what to do at this point.

## 2025-02-27 ENCOUNTER — OFFICE VISIT (OUTPATIENT)
Age: 70
End: 2025-02-27
Payer: MEDICARE

## 2025-02-27 VITALS
WEIGHT: 210.2 LBS | TEMPERATURE: 97.6 F | HEART RATE: 75 BPM | OXYGEN SATURATION: 98 % | HEIGHT: 65 IN | DIASTOLIC BLOOD PRESSURE: 70 MMHG | RESPIRATION RATE: 14 BRPM | SYSTOLIC BLOOD PRESSURE: 138 MMHG | BODY MASS INDEX: 35.02 KG/M2

## 2025-02-27 DIAGNOSIS — N18.30 TYPE 2 DM WITH CKD STAGE 3 AND HYPERTENSION (HCC): ICD-10-CM

## 2025-02-27 DIAGNOSIS — R60.0 PEDAL EDEMA: Primary | ICD-10-CM

## 2025-02-27 DIAGNOSIS — E11.69 HYPERLIPIDEMIA ASSOCIATED WITH TYPE 2 DIABETES MELLITUS (HCC): ICD-10-CM

## 2025-02-27 DIAGNOSIS — E78.5 HYPERLIPIDEMIA ASSOCIATED WITH TYPE 2 DIABETES MELLITUS (HCC): ICD-10-CM

## 2025-02-27 DIAGNOSIS — F32.1 MAJOR DEPRESSIVE DISORDER, SINGLE EPISODE, MODERATE (HCC): ICD-10-CM

## 2025-02-27 DIAGNOSIS — G47.33 OSA ON CPAP: ICD-10-CM

## 2025-02-27 DIAGNOSIS — F41.9 ANXIETY: ICD-10-CM

## 2025-02-27 DIAGNOSIS — F41.1 GENERALIZED ANXIETY DISORDER: ICD-10-CM

## 2025-02-27 DIAGNOSIS — I12.9 TYPE 2 DM WITH CKD STAGE 3 AND HYPERTENSION (HCC): ICD-10-CM

## 2025-02-27 DIAGNOSIS — E11.22 TYPE 2 DM WITH CKD STAGE 3 AND HYPERTENSION (HCC): ICD-10-CM

## 2025-02-27 DIAGNOSIS — E89.0 POSTOPERATIVE HYPOTHYROIDISM: ICD-10-CM

## 2025-02-27 PROCEDURE — 3017F COLORECTAL CA SCREEN DOC REV: CPT | Performed by: INTERNAL MEDICINE

## 2025-02-27 PROCEDURE — 99214 OFFICE O/P EST MOD 30 MIN: CPT | Performed by: INTERNAL MEDICINE

## 2025-02-27 PROCEDURE — 3046F HEMOGLOBIN A1C LEVEL >9.0%: CPT | Performed by: INTERNAL MEDICINE

## 2025-02-27 PROCEDURE — G8417 CALC BMI ABV UP PARAM F/U: HCPCS | Performed by: INTERNAL MEDICINE

## 2025-02-27 PROCEDURE — 3078F DIAST BP <80 MM HG: CPT | Performed by: INTERNAL MEDICINE

## 2025-02-27 PROCEDURE — 2022F DILAT RTA XM EVC RTNOPTHY: CPT | Performed by: INTERNAL MEDICINE

## 2025-02-27 PROCEDURE — 1123F ACP DISCUSS/DSCN MKR DOCD: CPT | Performed by: INTERNAL MEDICINE

## 2025-02-27 PROCEDURE — 1090F PRES/ABSN URINE INCON ASSESS: CPT | Performed by: INTERNAL MEDICINE

## 2025-02-27 PROCEDURE — 1160F RVW MEDS BY RX/DR IN RCRD: CPT | Performed by: INTERNAL MEDICINE

## 2025-02-27 PROCEDURE — 1159F MED LIST DOCD IN RCRD: CPT | Performed by: INTERNAL MEDICINE

## 2025-02-27 PROCEDURE — 3075F SYST BP GE 130 - 139MM HG: CPT | Performed by: INTERNAL MEDICINE

## 2025-02-27 PROCEDURE — 1036F TOBACCO NON-USER: CPT | Performed by: INTERNAL MEDICINE

## 2025-02-27 PROCEDURE — G8399 PT W/DXA RESULTS DOCUMENT: HCPCS | Performed by: INTERNAL MEDICINE

## 2025-02-27 PROCEDURE — G8427 DOCREV CUR MEDS BY ELIG CLIN: HCPCS | Performed by: INTERNAL MEDICINE

## 2025-02-27 RX ORDER — FUROSEMIDE 40 MG/1
40 TABLET ORAL EVERY MORNING
Qty: 90 TABLET | Refills: 0 | Status: SHIPPED | OUTPATIENT
Start: 2025-02-27

## 2025-02-27 RX ORDER — HYDROXYZINE HYDROCHLORIDE 25 MG/1
25 TABLET, FILM COATED ORAL
Qty: 90 TABLET | Refills: 0 | Status: SHIPPED | OUTPATIENT
Start: 2025-02-27

## 2025-02-27 RX ORDER — ALPRAZOLAM 0.25 MG
0.25 TABLET ORAL 2 TIMES DAILY PRN
Qty: 60 TABLET | Refills: 5 | Status: SHIPPED | OUTPATIENT
Start: 2025-02-27 | End: 2025-08-26

## 2025-02-27 RX ORDER — FUROSEMIDE 40 MG/1
40 TABLET ORAL EVERY MORNING
Qty: 90 TABLET | Refills: 3 | Status: SHIPPED | OUTPATIENT
Start: 2025-02-27 | End: 2025-02-27 | Stop reason: SDUPTHER

## 2025-02-27 RX ORDER — FUROSEMIDE 40 MG/1
40 TABLET ORAL EVERY MORNING
Qty: 30 TABLET | Refills: 0 | Status: SHIPPED | OUTPATIENT
Start: 2025-02-27 | End: 2025-02-27

## 2025-02-27 SDOH — ECONOMIC STABILITY: FOOD INSECURITY: WITHIN THE PAST 12 MONTHS, YOU WORRIED THAT YOUR FOOD WOULD RUN OUT BEFORE YOU GOT MONEY TO BUY MORE.: NEVER TRUE

## 2025-02-27 SDOH — ECONOMIC STABILITY: FOOD INSECURITY: WITHIN THE PAST 12 MONTHS, THE FOOD YOU BOUGHT JUST DIDN'T LAST AND YOU DIDN'T HAVE MONEY TO GET MORE.: NEVER TRUE

## 2025-02-27 ASSESSMENT — PATIENT HEALTH QUESTIONNAIRE - PHQ9
3. TROUBLE FALLING OR STAYING ASLEEP: NOT AT ALL
7. TROUBLE CONCENTRATING ON THINGS, SUCH AS READING THE NEWSPAPER OR WATCHING TELEVISION: NOT AT ALL
6. FEELING BAD ABOUT YOURSELF - OR THAT YOU ARE A FAILURE OR HAVE LET YOURSELF OR YOUR FAMILY DOWN: NOT AT ALL
10. IF YOU CHECKED OFF ANY PROBLEMS, HOW DIFFICULT HAVE THESE PROBLEMS MADE IT FOR YOU TO DO YOUR WORK, TAKE CARE OF THINGS AT HOME, OR GET ALONG WITH OTHER PEOPLE: NOT DIFFICULT AT ALL
1. LITTLE INTEREST OR PLEASURE IN DOING THINGS: NOT AT ALL
4. FEELING TIRED OR HAVING LITTLE ENERGY: NOT AT ALL
8. MOVING OR SPEAKING SO SLOWLY THAT OTHER PEOPLE COULD HAVE NOTICED. OR THE OPPOSITE, BEING SO FIGETY OR RESTLESS THAT YOU HAVE BEEN MOVING AROUND A LOT MORE THAN USUAL: NOT AT ALL
SUM OF ALL RESPONSES TO PHQ QUESTIONS 1-9: 0
2. FEELING DOWN, DEPRESSED OR HOPELESS: NOT AT ALL
SUM OF ALL RESPONSES TO PHQ9 QUESTIONS 1 & 2: 0
9. THOUGHTS THAT YOU WOULD BE BETTER OFF DEAD, OR OF HURTING YOURSELF: NOT AT ALL
SUM OF ALL RESPONSES TO PHQ QUESTIONS 1-9: 0
5. POOR APPETITE OR OVEREATING: NOT AT ALL
SUM OF ALL RESPONSES TO PHQ QUESTIONS 1-9: 0
SUM OF ALL RESPONSES TO PHQ QUESTIONS 1-9: 0

## 2025-02-27 NOTE — PROGRESS NOTES
Itzel Abdalla is a 70 y.o. female who presents for evaluation of worsening swelling in feet.  Last seen by me dec 10, 2024 in [facility].  Weight is up 13 lbs since then, though it does not appear to be primarily in her lower legs and feet, though she does have some swelling there.   Has been taking lasix 40 mg in the morning, and she states she does urinate well after taking it, but typically only once in the first few hours afterwards.  Also complains of itching, which is preventing her from sleeping at night.  Her anxiety is also worse, as a sister is living with her now.   Sounds like she is also not using her cpap 100% of the time.      ROS:  Constitutional: negative for fevers, chills, anorexia and weight loss  Eyes:   negative for visual disturbance and irritation  ENT:   negative for tinnitus,sore throat,nasal congestion,ear pain,hoarseness  Respiratory:  negative for cough, hemoptysis, dyspnea,wheezing  CV:   negative for chest pain, palpitations, lower extremity edema  GI:   negative for nausea, vomiting, diarrhea, abdominal pain,melena  Genitourinary: negative for frequency, dysuria and hematuria  Musculoskel: negative for myalgias, arthralgias, back pain, muscle weakness, joint pain  Neurological:  negative for headaches, dizziness, focal weakness, numbness  Psychiatric:     ++ for depression or anxiety      Past Medical History:   Diagnosis Date    Anxiety and depression     Arthritis     Depression     Diabetes (HCC)     Dysphagia     Ectopic pregnancy     x2    GERD (gastroesophageal reflux disease) 07/05/2019    History of blood transfusion     Hypercholesteremia     Hypertension     Hypothyroidism     IBS (irritable bowel syndrome)     Liver disease     hepatitis b    has been treated    Nausea & vomiting     PUD (peptic ulcer disease)     bleeding ulcer    Sleep apnea     CPAP    Urinary incontinence        Past Surgical History:   Procedure Laterality Date    APPENDECTOMY  1999    BACK SURGERY      x3

## 2025-02-27 NOTE — PROGRESS NOTES
\"Have you been to the ER, urgent care clinic since your last visit?  Hospitalized since your last visit?\"    NO    “Have you seen or consulted any other health care providers outside our system since your last visit?”    NO      “Have you had a diabetic eye exam?”    [facility] Location    Date of last diabetic eye exam: 11/23/2020

## 2025-03-23 NOTE — PROGRESS NOTES
Pharmacy Progress Note - Diabetes Management    Assessment / Plan:   Diabetes Management:  - Per ADA guidelines, Pt's A1c is not at goal of < 7%. BP <130/80  - Overall CGM pattern remains above TIR goal >70%. Mindful of hypoglycemia frequency occurring >4%, contributed by patient's recent decrease in carb intake.  - Decrease Tresiba to 44 units daily  - For Novolog - if  pre meal blood sugar:  If 151-200: give 3 units  If 201-250: give 5 units   If above 250: give 6 units  For your late evening snack, check sugar -- if reading is above 200 or you have a high carb snack, give 2 units   - Continue metformin 1 gm daily  - Lab check at next visit      S/O: Ms. Itzel Abdalla is a 70 y.o. female, referred by Rm Cohn III, DO, with a PMH of T2DM,  HTN, HLD, Hypothyroidism, OAB, IBS, osteoporosis, was seen today for diabetes management follow up. Last A1c was 8% (Dec 2024), 8.9% (Aug 2024), 7.8% (March 2024), 8.5% (Nov 2023), 7.6% (April 2023), 8% (Jan 2023), 7.2% (Jun 2022), 7.3% (Feb 2022), 6.9% (Sept 2021), 6.3%  (June 2021), 8% (Oct 2020), 8.4% (June 2020), 7.2% (Dec 2019).     Patient was seen today together with Chayito Smith PharmD.  I have read and agree with Chayito's documentation.    Interim update:   Increased in stressors at home since last visit. Recent bed bugs at home. Extermination last week.   This has impacted her sleep quality.   Saw PCP February for LE edema. Now taking lasix 60 mg daily.     Current anti-hyperglycemic regimen include(s):    -Tresiba 48 units daily mid-day  - Metformin 1 gm daily              - Novolog - if pre meal blood sugar:  If 151-200: give 3 units  If 201-250: give 5 units   If above 250: give 6 units  If you have a late night snack, give 2 units --- reports to giving ~4x/week    Giving Novolog 4-5 units twice daily - ~10 AM and 6-7PM  Now re-enrolled into [facility]   Lisinopril 5 mg daily  Pravastatin 20 mg daily - LDL 81 ; TG 84 (Dec 2024)     ROS:  Today, Pt

## 2025-03-25 ENCOUNTER — PHARMACY VISIT (OUTPATIENT)
Age: 70
End: 2025-03-25

## 2025-03-25 VITALS
HEIGHT: 65 IN | SYSTOLIC BLOOD PRESSURE: 112 MMHG | DIASTOLIC BLOOD PRESSURE: 70 MMHG | OXYGEN SATURATION: 99 % | BODY MASS INDEX: 34.52 KG/M2 | WEIGHT: 207.2 LBS | HEART RATE: 84 BPM

## 2025-03-25 DIAGNOSIS — Z79.4 TYPE 2 DIABETES MELLITUS WITH HYPERGLYCEMIA, WITH LONG-TERM CURRENT USE OF INSULIN (HCC): Primary | ICD-10-CM

## 2025-03-25 DIAGNOSIS — E11.65 TYPE 2 DIABETES MELLITUS WITH HYPERGLYCEMIA, WITH LONG-TERM CURRENT USE OF INSULIN (HCC): Primary | ICD-10-CM

## 2025-03-25 NOTE — PATIENT INSTRUCTIONS
Decrease Tresiba to 44 units daily  For your Novolog - f pre meal blood sugar:  If 151-200: give 3 units  If 201-250: give 5 units   If above 250: give 6 units  For your late evening snack, check sugar -- if reading is above 200 or you have a high carb snack, give 2 units   Continue metformin 1000 mg daily  Will check labs at the next visit

## 2025-03-25 NOTE — PROGRESS NOTES
Pharmacy Progress Note - Diabetes Management    Assessment / Plan:   Diabetes Management:  - Per ADA guidelines, Pt's A1c is not at goal of < 7%. BP <130/80  - Current time in range is 46% which is below the goal of > 70%  - Current time in lows/very lows is 13%, which is above the goal of <4%. Due to a change in carbohydrate snack and giving late night novolog dosing  - Continue to bring CGM reader to all future visits  - Decrease Tresiba to 44 units daily  - For your Novolog - if pre meal blood sugar:  If 151-200: give 3 units  If 201-250: give 5 units   If above 250: give 6 units  For your late evening snack, check sugar -- if reading is above 200 or you have a high carb snack, give 2 units   - Continue metformin 1000 mg daily  - Will check labs at the next visit, reminded to fast       S/O: Ms. Itzel Abdalla is a 70 y.o. female, referred by Rm Cohn III, DO, with a PMH of T2DM,  HTN, HLD, Hypothyroidism, OAB, IBS, osteoporosis, was seen today for diabetes management follow up. Last A1c was 8.0% (Dec 2024), 8.9% (Aug 2024), 7.8% (March 2024), 8.5% (Nov 2023), 7.6% (April 2023), 8% (Jan 2023), 7.2% (Jun 2022), 7.3% (Feb 2022), 6.9% (Sept 2021), 6.3%  (June 2021), 8% (Oct 2020), 8.4% (June 2020), 7.2% (Dec 2019).     Interim update:   Feeling much more exhausted the past few weeks due to lack of sleep  Bed bugs 3-4 weeks ago that resulted in itchy/bitten legs, much better with hydroxyzine  Swollen legs and feet (~4 weeks) in February not improving with 40 mg of Lasix, much better now with 60 mg Lasix daily  Increase in urination frequency (~6 times/day), plan to schedule a follow-up with gynecologist    Current anti-hyperglycemic regimen include(s):    -Tresiba 48 units daily mid-day  - Metformin 1 gm daily              - Novolog - if pre meal blood sugar:  If 151-200: give 3 units  If 201-250: give 5 units   If above 250: give 6 units  If you have a late night snack, give 2 units    -Giving 4-5

## 2025-04-22 ENCOUNTER — TELEPHONE (OUTPATIENT)
Age: 70
End: 2025-04-22

## 2025-04-22 ENCOUNTER — OFFICE VISIT (OUTPATIENT)
Age: 70
End: 2025-04-22
Payer: MEDICARE

## 2025-04-22 DIAGNOSIS — M17.0 BILATERAL PRIMARY OSTEOARTHRITIS OF KNEE: Primary | ICD-10-CM

## 2025-04-22 PROCEDURE — G8427 DOCREV CUR MEDS BY ELIG CLIN: HCPCS | Performed by: ORTHOPAEDIC SURGERY

## 2025-04-22 PROCEDURE — 1159F MED LIST DOCD IN RCRD: CPT | Performed by: ORTHOPAEDIC SURGERY

## 2025-04-22 PROCEDURE — 99213 OFFICE O/P EST LOW 20 MIN: CPT | Performed by: ORTHOPAEDIC SURGERY

## 2025-04-22 PROCEDURE — G8417 CALC BMI ABV UP PARAM F/U: HCPCS | Performed by: ORTHOPAEDIC SURGERY

## 2025-04-22 PROCEDURE — 1036F TOBACCO NON-USER: CPT | Performed by: ORTHOPAEDIC SURGERY

## 2025-04-22 PROCEDURE — 1123F ACP DISCUSS/DSCN MKR DOCD: CPT | Performed by: ORTHOPAEDIC SURGERY

## 2025-04-22 PROCEDURE — 1090F PRES/ABSN URINE INCON ASSESS: CPT | Performed by: ORTHOPAEDIC SURGERY

## 2025-04-22 PROCEDURE — G8399 PT W/DXA RESULTS DOCUMENT: HCPCS | Performed by: ORTHOPAEDIC SURGERY

## 2025-04-22 PROCEDURE — 3017F COLORECTAL CA SCREEN DOC REV: CPT | Performed by: ORTHOPAEDIC SURGERY

## 2025-04-22 RX ORDER — MELOXICAM 15 MG/1
15 TABLET ORAL DAILY PRN
Qty: 90 TABLET | Refills: 0 | Status: SHIPPED | OUTPATIENT
Start: 2025-04-22

## 2025-04-22 ASSESSMENT — PATIENT HEALTH QUESTIONNAIRE - PHQ9
9. THOUGHTS THAT YOU WOULD BE BETTER OFF DEAD, OR OF HURTING YOURSELF: NOT AT ALL
10. IF YOU CHECKED OFF ANY PROBLEMS, HOW DIFFICULT HAVE THESE PROBLEMS MADE IT FOR YOU TO DO YOUR WORK, TAKE CARE OF THINGS AT HOME, OR GET ALONG WITH OTHER PEOPLE: NOT DIFFICULT AT ALL
5. POOR APPETITE OR OVEREATING: NOT AT ALL
2. FEELING DOWN, DEPRESSED OR HOPELESS: NOT AT ALL
4. FEELING TIRED OR HAVING LITTLE ENERGY: NOT AT ALL
1. LITTLE INTEREST OR PLEASURE IN DOING THINGS: NOT AT ALL
8. MOVING OR SPEAKING SO SLOWLY THAT OTHER PEOPLE COULD HAVE NOTICED. OR THE OPPOSITE, BEING SO FIGETY OR RESTLESS THAT YOU HAVE BEEN MOVING AROUND A LOT MORE THAN USUAL: NOT AT ALL
SUM OF ALL RESPONSES TO PHQ QUESTIONS 1-9: 0
6. FEELING BAD ABOUT YOURSELF - OR THAT YOU ARE A FAILURE OR HAVE LET YOURSELF OR YOUR FAMILY DOWN: NOT AT ALL
3. TROUBLE FALLING OR STAYING ASLEEP: NOT AT ALL
SUM OF ALL RESPONSES TO PHQ QUESTIONS 1-9: 0

## 2025-04-22 NOTE — PROGRESS NOTES
4/22/2025      CC: bilateral knee pain    HPI:      This is a 70 y.o. year old female who presents for a follow up visit.  The patient was last seen and diagnosed with bilateral knee osteoarthritis.   The patient's treatments since the most recent visit have comprised of viscosupplementation.   The patient has had several months relief of the chief complaint.        PMH:  Past Medical History:   Diagnosis Date    Anxiety and depression     Arthritis     Depression     Diabetes (HCC)     Dysphagia     Ectopic pregnancy     x2    GERD (gastroesophageal reflux disease) 07/05/2019    History of blood transfusion     Hypercholesteremia     Hypertension     Hypothyroidism     IBS (irritable bowel syndrome)     Liver disease     hepatitis b    has been treated    Nausea & vomiting     PUD (peptic ulcer disease)     bleeding ulcer    Sleep apnea     CPAP    Urinary incontinence        PSxHx:  Past Surgical History:   Procedure Laterality Date    APPENDECTOMY  1999    BACK SURGERY      x3    BLEPHAROPLASTY Right     BUNIONECTOMY      CARPAL TUNNEL RELEASE Right 6/24/2024    RIGHT OPEN CARPAL TUNNEL RELEASE (MAC WITH LOCAL) performed by Teodoro Cade MD at [facility] AM[facility]UL[facility] OR    CHOLECYSTECTOMY  06/21/2021    COLONOSCOPY  2018    HYSTERECTOMY (CERVIX STATUS UNKNOWN)      OTHER SURGICAL HISTORY      Collapsed lung x 2    UPPER GASTROINTESTINAL ENDOSCOPY  July 5 2019    UROLOGICAL SURGERY  2019    WISDOM TOOTH EXTRACTION         Meds:    Current Outpatient Medications:     meloxicam (MOBIC) 15 MG tablet, Take 1 tablet by mouth daily as needed for Pain, Disp: 90 tablet, Rfl: 0    Insulin Degludec 200 UNIT/ML SOPN, Inject 44 Units into the skin daily, Disp: 15 mL, Rfl: 0    ALPRAZolam (XANAX) 0.25 MG tablet, Take 1 tablet by mouth 2 times daily as needed for Anxiety for up to 180 days., Disp: 60 tablet, Rfl: 5    hydrOXYzine HCl (ATARAX) 25 MG tablet, Take 1 tablet by mouth nightly as needed for Itching, Disp: 90 tablet,

## 2025-04-22 NOTE — TELEPHONE ENCOUNTER
----- Message from KRYSTIAN CORDOVA MA sent at 4/22/2025  1:24 PM EDT -----    ----- Message -----  From: Josué Guzman DO  Sent: 4/22/2025   1:08 PM EDT  To: #    Visco both knees please

## 2025-04-24 NOTE — TELEPHONE ENCOUNTER
Patient called stating that he is scheduled to leave for vacation on May 27th. Patient is available to get her injection anytime before then. Patient call back number [phone]

## 2025-04-24 NOTE — TELEPHONE ENCOUNTER
Identified pt with two pt identifiers (name and ). Reviewed chart in preparation for visit and have obtained necessary documentation.      Informed patient that she was approved for Gelsyn-3 through her medical benefit and that Renato has ordered her injections     Informed her that once we receive the injections. We will be reaching out to get her scheduled. Patient stated alright that will work.

## 2025-05-01 ENCOUNTER — TELEPHONE (OUTPATIENT)
Age: 70
End: 2025-05-01

## 2025-05-01 NOTE — TELEPHONE ENCOUNTER
LVM for patient requesting a CB to schedule injections of MARY knee. Total of 3 visits, 7 days apart. Gelsyn-3.

## 2025-05-04 NOTE — PROGRESS NOTES
Pharmacy Progress Note - Diabetes Management    Assessment / Plan:   Diabetes Management:  - Per ADA guidelines, Pt's A1c is not at goal of < 7%.   - Hypoglycemia frequency has decreased compared to previous visits.  However, current CGM patterns remain labile, influenced significantly by patient's nutrition choices and activity level.   - Adjust Novolog scale: if pre meal blood sugar is:  Less than 100 - do not give  If 101-150: - 2 units   If 151-200: give 3 units  If 201-250: give 4 units   If above 250: give 5  units  If you have a late night snack, give 2 units     - Continue Tresiba 44 units daily  - Continue metformin 1 gm daily   - Checking labs today.      S/O: Ms. Itzel Abdalla is a 70 y.o. female, referred by Rm Cohn III, DO, with a PMH of T2DM,  HTN, HLD, Hypothyroidism, OAB, IBS, osteoporosis, was seen today for diabetes management follow up. Last A1c was 8% (Dec 2024), 8.9% (Aug 2024), 7.8% (March 2024), 8.5% (Nov 2023), 7.6% (April 2023), 8% (Jan 2023), 7.2% (Jun 2022), 7.3% (Feb 2022), 6.9% (Sept 2021), 6.3%  (June 2021), 8% (Oct 2020), 8.4% (June 2020), 7.2% (Dec 2019).     Interim update:   - Saw Dr Guzman 5/5/25 - Received another session of Gelsyn injection bilateral knee  - Had GI follow up on April 18th. Linzess reduced to 145 mcg daily. She's taking omeprazole 40 mg daily.     Today reports increased back pain.     Current anti-hyperglycemic regimen include(s):    - Tresiba 44 units daily mid-day  - Metformin 1 gm daily              - Novolog - if pre meal blood sugar:  If 151-200: give 3 units  If 201-250: give 5 units   If above 250: give 6 units  If you have a late night snack, give 2 units    Denies missing her insulin coverage.   Reports to giving between 4-6 units BID-TID.     ROS:  Today, Pt endorses:  Signs and symptoms of Hypoglycemia: none  Signs and symptoms of Hyperglycemia: fatigue    Blood Glucose Monitoring (BGM) or CGM:  Sarah 2 CGM; uses reader      14

## 2025-05-05 ENCOUNTER — OFFICE VISIT (OUTPATIENT)
Age: 70
End: 2025-05-05
Payer: MEDICARE

## 2025-05-05 DIAGNOSIS — M17.0 BILATERAL PRIMARY OSTEOARTHRITIS OF KNEE: Primary | ICD-10-CM

## 2025-05-05 PROCEDURE — 20610 DRAIN/INJ JOINT/BURSA W/O US: CPT | Performed by: ORTHOPAEDIC SURGERY

## 2025-05-05 ASSESSMENT — PATIENT HEALTH QUESTIONNAIRE - PHQ9
1. LITTLE INTEREST OR PLEASURE IN DOING THINGS: NOT AT ALL
SUM OF ALL RESPONSES TO PHQ QUESTIONS 1-9: 0
2. FEELING DOWN, DEPRESSED OR HOPELESS: NOT AT ALL
SUM OF ALL RESPONSES TO PHQ QUESTIONS 1-9: 0

## 2025-05-05 NOTE — PROGRESS NOTES
Identified pt with two pt identifiers (name and ). Reviewed chart in preparation for visit and have obtained necessary documentation.    Itzel Abdalla is a 70 y.o. female Follow-up (MARY Knee pain)  .    There were no vitals filed for this visit.       1. Have you been to the ER, urgent care clinic since your last visit?  Hospitalized since your last visit?  no     2. Have you seen or consulted any other health care providers outside of the [facility] since your last visit?  Include any pap smears or colon screening.  no

## 2025-05-06 NOTE — PROGRESS NOTES
Date of Procedure: 5/5/25  PROCEDURE NOTE: Bilateral knee injection of gelsyn 3    Consent was obtained from the patient. The correct site was identified after confirmation with the patient.  Following identification and confirmation of the correct site with the patient, the superolateral right knee was prepped in the normal sterile fashion.  A local anesthetic of 1% lidocaine without epinephrine was then administered to the local tissues.  Following, an injection of prefilled gelsyn 3 16.8 mg supplementation syringe was injected.  The needle was then withdrawn and the injection site dressed with a sterile bandage at the conclusion.  The procedure was well tolerated by the patient.  No immediate adverse reactions were noted.      Attention was then turned to the left knee.  Following identification and confirmation of the correct site with the patient, the superolateral left knee was prepped in the normal sterile fashion.  A local anesthetic of 1% lidocaine without epinephrine was then administered to the local tissues.  Following, an injection of prefilled 16.8 mg gelsyn 3 supplementation syringe was injected.   The needle was then withdrawn and the injection site dressed with a sterile bandage at the conclusion.  The procedure was well tolerated by the patient.  No immediate adverse reactions were noted.  Post injection instructions were given.      Diagnosis: Bilateral Primary Knee Osteoarthritis

## 2025-05-07 ENCOUNTER — PHARMACY VISIT (OUTPATIENT)
Age: 70
End: 2025-05-07

## 2025-05-07 VITALS
HEART RATE: 68 BPM | DIASTOLIC BLOOD PRESSURE: 54 MMHG | BODY MASS INDEX: 34.82 KG/M2 | WEIGHT: 209 LBS | SYSTOLIC BLOOD PRESSURE: 98 MMHG | HEIGHT: 65 IN | OXYGEN SATURATION: 95 %

## 2025-05-07 DIAGNOSIS — Z79.4 TYPE 2 DIABETES MELLITUS WITH HYPERGLYCEMIA, WITH LONG-TERM CURRENT USE OF INSULIN (HCC): Primary | ICD-10-CM

## 2025-05-07 DIAGNOSIS — E11.65 TYPE 2 DIABETES MELLITUS WITH HYPERGLYCEMIA, WITH LONG-TERM CURRENT USE OF INSULIN (HCC): Primary | ICD-10-CM

## 2025-05-07 RX ORDER — OMEPRAZOLE 40 MG/1
40 CAPSULE, DELAYED RELEASE ORAL
COMMUNITY
Start: 2025-03-20

## 2025-05-07 RX ORDER — LINACLOTIDE 145 UG/1
145 CAPSULE, GELATIN COATED ORAL DAILY
COMMUNITY
Start: 2025-04-18

## 2025-05-07 NOTE — PATIENT INSTRUCTIONS
Let's get labs  Your Tresiba dose is 44 units daily  For your Novolog - if pre meal blood sugar:  Less than 100 - do not give  If 101-150: - 2 units   If 151-200: give 3 units  If 201-250: give 4 units   If above 250: give 5  units  If you have a late night snack, give 2 units     - Continue metformin 1000 mg daily

## 2025-05-08 ENCOUNTER — RESULTS FOLLOW-UP (OUTPATIENT)
Age: 70
End: 2025-05-08

## 2025-05-08 DIAGNOSIS — N18.30 CKD STAGE 3 DUE TO TYPE 2 DIABETES MELLITUS (HCC): Primary | ICD-10-CM

## 2025-05-08 DIAGNOSIS — E11.22 CKD STAGE 3 DUE TO TYPE 2 DIABETES MELLITUS (HCC): Primary | ICD-10-CM

## 2025-05-08 LAB
ALBUMIN SERPL-MCNC: 3.3 G/DL (ref 3.5–5)
ALBUMIN/GLOB SERPL: 1.3 (ref 1.1–2.2)
ALP SERPL-CCNC: 50 U/L (ref 45–117)
ALT SERPL-CCNC: 19 U/L (ref 12–78)
ANION GAP SERPL CALC-SCNC: 5 MMOL/L (ref 2–12)
AST SERPL-CCNC: 8 U/L (ref 15–37)
BASOPHILS # BLD: 0.04 K/UL (ref 0–0.1)
BASOPHILS NFR BLD: 0.6 % (ref 0–1)
BILIRUB SERPL-MCNC: 0.2 MG/DL (ref 0.2–1)
BUN SERPL-MCNC: 38 MG/DL (ref 6–20)
BUN/CREAT SERPL: 17 (ref 12–20)
CALCIUM SERPL-MCNC: 9.6 MG/DL (ref 8.5–10.1)
CHLORIDE SERPL-SCNC: 108 MMOL/L (ref 97–108)
CO2 SERPL-SCNC: 30 MMOL/L (ref 21–32)
CREAT SERPL-MCNC: 2.27 MG/DL (ref 0.55–1.02)
DIFFERENTIAL METHOD BLD: ABNORMAL
EOSINOPHIL # BLD: 0.27 K/UL (ref 0–0.4)
EOSINOPHIL NFR BLD: 4 % (ref 0–7)
ERYTHROCYTE [DISTWIDTH] IN BLOOD BY AUTOMATED COUNT: 12.6 % (ref 11.5–14.5)
EST. AVERAGE GLUCOSE BLD GHB EST-MCNC: 183 MG/DL
GLOBULIN SER CALC-MCNC: 2.6 G/DL (ref 2–4)
GLUCOSE SERPL-MCNC: 97 MG/DL (ref 65–100)
HBA1C MFR BLD: 8 % (ref 4–5.6)
HCT VFR BLD AUTO: 28.2 % (ref 35–47)
HGB BLD-MCNC: 9.8 G/DL (ref 11.5–16)
IMM GRANULOCYTES # BLD AUTO: 0.02 K/UL (ref 0–0.04)
IMM GRANULOCYTES NFR BLD AUTO: 0.3 % (ref 0–0.5)
LYMPHOCYTES # BLD: 2.53 K/UL (ref 0.8–3.5)
LYMPHOCYTES NFR BLD: 37.6 % (ref 12–49)
MCH RBC QN AUTO: 29.4 PG (ref 26–34)
MCHC RBC AUTO-ENTMCNC: 34.8 G/DL (ref 30–36.5)
MCV RBC AUTO: 84.7 FL (ref 80–99)
MONOCYTES # BLD: 0.6 K/UL (ref 0–1)
MONOCYTES NFR BLD: 8.9 % (ref 5–13)
NEUTS SEG # BLD: 3.26 K/UL (ref 1.8–8)
NEUTS SEG NFR BLD: 48.6 % (ref 32–75)
NRBC # BLD: 0 K/UL (ref 0–0.01)
NRBC BLD-RTO: 0 PER 100 WBC
PLATELET # BLD AUTO: 121 K/UL (ref 150–400)
PMV BLD AUTO: 11.9 FL (ref 8.9–12.9)
POTASSIUM SERPL-SCNC: 4.5 MMOL/L (ref 3.5–5.1)
PROT SERPL-MCNC: 5.9 G/DL (ref 6.4–8.2)
RBC # BLD AUTO: 3.33 M/UL (ref 3.8–5.2)
SODIUM SERPL-SCNC: 143 MMOL/L (ref 136–145)
WBC # BLD AUTO: 6.7 K/UL (ref 3.6–11)

## 2025-05-08 RX ORDER — NYSTATIN 100000 [USP'U]/G
POWDER TOPICAL
Qty: 60 G | Refills: 1 | Status: SHIPPED | OUTPATIENT
Start: 2025-05-08

## 2025-05-08 NOTE — TELEPHONE ENCOUNTER
Pharmacy Progress Note - Telephone Encounter    S/O: Ms. Itzel Abdalla 70 y.o. female, referred by Rm Cohn III, DO, was contacted via an outbound telephone call to discuss her recent lab results today. Verified patient’s identifiers (name & ) per HIPAA policy.     Reports she has follow up appt with Dr Neil (cardio) later this week.   C/o swelling in her feet.     She is not taking famotidine.      Latest Reference Range & Units 25 10:03   Sodium 136 - 145 mmol/L 143   Potassium 3.5 - 5.1 mmol/L 4.5   Chloride 97 - 108 mmol/L 108   CARBON DIOXIDE 21 - 32 mmol/L 30   BUN,BUNPL 6 - 20 MG/DL 38 (H)   Creatinine 0.55 - 1.02 MG/DL 2.27 (H)   Bun/Cre 12 - 20   17   Anion Gap 2 - 12 mmol/L 5   Est, Glom Filt Rate >60 ml/min/1.73m2 22 (L)   Glucose 65 - 100 mg/dL 97   Calcium 8.5 - 10.1 MG/DL 9.6   Albumin/Globulin Ratio 1.1 - 2.2   1.3   Total Protein 6.4 - 8.2 g/dL 5.9 (L)   Albumin 3.5 - 5.0 g/dL 3.3 (L)   Globulin 2.0 - 4.0 g/dL 2.6   Alkaline Phosphatase 45 - 117 U/L 50   ALT 12 - 78 U/L 19   AST 15 - 37 U/L 8 (L)   Total Bilirubin 0.2 - 1.0 MG/DL 0.2   Hemoglobin A1C 4.0 - 5.6 % 8.0 (H)   eAG (mg/dL) mg/dL 183   WBC 3.6 - 11.0 K/uL 6.7   RBC 3.80 - 5.20 M/uL 3.33 (L)   Hemoglobin Quant 11.5 - 16.0 g/dL 9.8 (L)   Hematocrit 35.0 - 47.0 % 28.2 (L)   MCV 80.0 - 99.0 FL 84.7   MCH 26.0 - 34.0 PG 29.4   MCHC 30.0 - 36.5 g/dL 34.8   MPV 8.9 - 12.9 FL 11.9   RDW 11.5 - 14.5 % 12.6   Platelet Count 150 - 400 K/uL 121 (L)   Neutrophils % 32.0 - 75.0 % 48.6   Lymphocyte % 12.0 - 49.0 % 37.6   Monocytes % 5.0 - 13.0 % 8.9   Eosinophils % 0.0 - 7.0 % 4.0   Basophils % 0.0 - 1.0 % 0.6   Neutrophils Absolute 1.80 - 8.00 K/UL 3.26   Lymphocytes Absolute 0.80 - 3.50 K/UL 2.53   Monocytes Absolute 0.00 - 1.00 K/UL 0.60   Eosinophils Absolute 0.00 - 0.40 K/UL 0.27   Basophils Absolute 0.00 - 0.10 K/UL 0.04   Differential Type -   AUTOMATED   Immature Granulocytes % 0.0 - 0.5 % 0.3   Nucleated Red Blood

## 2025-05-08 NOTE — TELEPHONE ENCOUNTER
----- Message from Flower Tolbert RPH sent at 5/7/2025  9:36 AM EDT -----  Regarding: Refill Request  Hello,   Please pend a refill request for Ms. Abdalla's nystatin topical powder to Dr Yanes for his review. [facility].    Thank you!

## 2025-05-09 RX ORDER — BREXPIPRAZOLE 1 MG/1
1 TABLET ORAL DAILY
Qty: 90 TABLET | Refills: 3 | Status: SHIPPED | OUTPATIENT
Start: 2025-05-09

## 2025-05-12 ENCOUNTER — OFFICE VISIT (OUTPATIENT)
Age: 70
End: 2025-05-12
Payer: MEDICARE

## 2025-05-12 ENCOUNTER — TELEPHONE (OUTPATIENT)
Age: 70
End: 2025-05-12

## 2025-05-12 DIAGNOSIS — M17.0 BILATERAL PRIMARY OSTEOARTHRITIS OF KNEE: Primary | ICD-10-CM

## 2025-05-12 PROCEDURE — 20610 DRAIN/INJ JOINT/BURSA W/O US: CPT | Performed by: ORTHOPAEDIC SURGERY

## 2025-05-12 ASSESSMENT — PATIENT HEALTH QUESTIONNAIRE - PHQ9
SUM OF ALL RESPONSES TO PHQ QUESTIONS 1-9: 0
2. FEELING DOWN, DEPRESSED OR HOPELESS: NOT AT ALL
SUM OF ALL RESPONSES TO PHQ QUESTIONS 1-9: 0
1. LITTLE INTEREST OR PLEASURE IN DOING THINGS: NOT AT ALL
SUM OF ALL RESPONSES TO PHQ QUESTIONS 1-9: 0
SUM OF ALL RESPONSES TO PHQ QUESTIONS 1-9: 0

## 2025-05-12 NOTE — PROGRESS NOTES
Identified pt with two pt identifiers (name and ). Reviewed chart in preparation for visit and have obtained necessary documentation.    Itzel Abdalla is a 70 y.o. female Follow-up (FU // MARY Knee pain // Gelsyn 3 Auth Sav #2)  .    There were no vitals filed for this visit.       1. Have you been to the ER, urgent care clinic since your last visit?  Hospitalized since your last visit?  no     2. Have you seen or consulted any other health care providers outside of the [facility] since your last visit?  Include any pap smears or colon screening.  no

## 2025-05-12 NOTE — PROGRESS NOTES
Date of Procedure: 5/12/2025  PROCEDURE NOTE: Bilateral knee injection of gelsyn 3    Consent was obtained from the patient. The correct site was identified after confirmation with the patient.  Following identification and confirmation of the correct site with the patient, the superolateral right knee was prepped in the normal sterile fashion.  A local anesthetic of 1% lidocaine without epinephrine was then administered to the local tissues.  Following, an injection of prefilled gelsyn 3 16.8 mg supplementation syringe was injected.  The needle was then withdrawn and the injection site dressed with a sterile bandage at the conclusion.  The procedure was well tolerated by the patient.  No immediate adverse reactions were noted.      Attention was then turned to the left knee.  Following identification and confirmation of the correct site with the patient, the superolateral left knee was prepped in the normal sterile fashion.  A local anesthetic of 1% lidocaine without epinephrine was then administered to the local tissues.  Following, an injection of prefilled 16.8 mg gelsyn 3 supplementation syringe was injected.   The needle was then withdrawn and the injection site dressed with a sterile bandage at the conclusion.  The procedure was well tolerated by the patient.  No immediate adverse reactions were noted.  Post injection instructions were given.      Diagnosis: Bilateral Primary Knee Osteoarthritis

## 2025-05-12 NOTE — TELEPHONE ENCOUNTER
Patient called requesting the order for her rollator be faxed to [facility]. [facility] fax number is [phone]. Referral faxed and scanned to .

## 2025-05-19 ENCOUNTER — OFFICE VISIT (OUTPATIENT)
Age: 70
End: 2025-05-19
Payer: MEDICARE

## 2025-05-19 DIAGNOSIS — M17.0 BILATERAL PRIMARY OSTEOARTHRITIS OF KNEE: Primary | ICD-10-CM

## 2025-05-19 PROCEDURE — 20610 DRAIN/INJ JOINT/BURSA W/O US: CPT | Performed by: ORTHOPAEDIC SURGERY

## 2025-05-19 NOTE — PROGRESS NOTES
Date of Procedure: 5/19/2025  PROCEDURE NOTE: Bilateral knee injection of gelsyn 3    Consent was obtained from the patient. The correct site was identified after confirmation with the patient.  Following identification and confirmation of the correct site with the patient, the superolateral right knee was prepped in the normal sterile fashion.  A local anesthetic of 1% lidocaine without epinephrine was then administered to the local tissues.  Following, an injection of prefilled gelsyn 3 16.8 mg supplementation syringe was injected.  The needle was then withdrawn and the injection site dressed with a sterile bandage at the conclusion.  The procedure was well tolerated by the patient.  No immediate adverse reactions were noted.      Attention was then turned to the left knee.  Following identification and confirmation of the correct site with the patient, the superolateral left knee was prepped in the normal sterile fashion.  A local anesthetic of 1% lidocaine without epinephrine was then administered to the local tissues.  Following, an injection of prefilled 16.8 mg gelsyn 3 supplementation syringe was injected.   The needle was then withdrawn and the injection site dressed with a sterile bandage at the conclusion.  The procedure was well tolerated by the patient.  No immediate adverse reactions were noted.  Post injection instructions were given.      Diagnosis: Bilateral Primary Knee Osteoarthritis

## 2025-05-19 NOTE — PROGRESS NOTES
Identified pt with two pt identifiers (name and ). Reviewed chart in preparation for visit and have obtained necessary documentation.    Itzel Abdalla is a 70 y.o. female Follow-up (MARY Knee pain)  .    There were no vitals filed for this visit.       1. Have you been to the ER, urgent care clinic since your last visit?  Hospitalized since your last visit?  no     2. Have you seen or consulted any other health care providers outside of the [facility] since your last visit?  Include any pap smears or colon screening.  no

## 2025-05-22 RX ORDER — FUROSEMIDE 40 MG/1
60 TABLET ORAL EVERY MORNING
Qty: 45 TABLET | Refills: 5 | Status: SHIPPED | OUTPATIENT
Start: 2025-05-22

## 2025-06-10 ENCOUNTER — OFFICE VISIT (OUTPATIENT)
Age: 70
End: 2025-06-10
Payer: MEDICARE

## 2025-06-10 VITALS
HEIGHT: 65 IN | TEMPERATURE: 97.6 F | RESPIRATION RATE: 14 BRPM | HEART RATE: 71 BPM | BODY MASS INDEX: 33.82 KG/M2 | DIASTOLIC BLOOD PRESSURE: 75 MMHG | OXYGEN SATURATION: 95 % | WEIGHT: 203 LBS | SYSTOLIC BLOOD PRESSURE: 134 MMHG

## 2025-06-10 DIAGNOSIS — D50.8 OTHER IRON DEFICIENCY ANEMIA: ICD-10-CM

## 2025-06-10 DIAGNOSIS — E89.0 POSTOPERATIVE HYPOTHYROIDISM: ICD-10-CM

## 2025-06-10 DIAGNOSIS — E78.5 HYPERLIPIDEMIA ASSOCIATED WITH TYPE 2 DIABETES MELLITUS (HCC): ICD-10-CM

## 2025-06-10 DIAGNOSIS — Z79.4 TYPE 2 DIABETES MELLITUS WITH HYPERGLYCEMIA, WITH LONG-TERM CURRENT USE OF INSULIN (HCC): Primary | ICD-10-CM

## 2025-06-10 DIAGNOSIS — N18.31 STAGE 3A CHRONIC KIDNEY DISEASE (HCC): ICD-10-CM

## 2025-06-10 DIAGNOSIS — E11.65 TYPE 2 DIABETES MELLITUS WITH HYPERGLYCEMIA, WITH LONG-TERM CURRENT USE OF INSULIN (HCC): Primary | ICD-10-CM

## 2025-06-10 DIAGNOSIS — F41.9 ANXIETY: ICD-10-CM

## 2025-06-10 DIAGNOSIS — R68.89 FORGETFULNESS: ICD-10-CM

## 2025-06-10 DIAGNOSIS — F32.1 MAJOR DEPRESSIVE DISORDER, SINGLE EPISODE, MODERATE (HCC): ICD-10-CM

## 2025-06-10 DIAGNOSIS — E11.69 HYPERLIPIDEMIA ASSOCIATED WITH TYPE 2 DIABETES MELLITUS (HCC): ICD-10-CM

## 2025-06-10 PROCEDURE — G8427 DOCREV CUR MEDS BY ELIG CLIN: HCPCS | Performed by: INTERNAL MEDICINE

## 2025-06-10 PROCEDURE — 1160F RVW MEDS BY RX/DR IN RCRD: CPT | Performed by: INTERNAL MEDICINE

## 2025-06-10 PROCEDURE — G8417 CALC BMI ABV UP PARAM F/U: HCPCS | Performed by: INTERNAL MEDICINE

## 2025-06-10 PROCEDURE — 99214 OFFICE O/P EST MOD 30 MIN: CPT | Performed by: INTERNAL MEDICINE

## 2025-06-10 PROCEDURE — 1090F PRES/ABSN URINE INCON ASSESS: CPT | Performed by: INTERNAL MEDICINE

## 2025-06-10 PROCEDURE — 2022F DILAT RTA XM EVC RTNOPTHY: CPT | Performed by: INTERNAL MEDICINE

## 2025-06-10 PROCEDURE — 3078F DIAST BP <80 MM HG: CPT | Performed by: INTERNAL MEDICINE

## 2025-06-10 PROCEDURE — 1159F MED LIST DOCD IN RCRD: CPT | Performed by: INTERNAL MEDICINE

## 2025-06-10 PROCEDURE — 3017F COLORECTAL CA SCREEN DOC REV: CPT | Performed by: INTERNAL MEDICINE

## 2025-06-10 PROCEDURE — 1036F TOBACCO NON-USER: CPT | Performed by: INTERNAL MEDICINE

## 2025-06-10 PROCEDURE — 3075F SYST BP GE 130 - 139MM HG: CPT | Performed by: INTERNAL MEDICINE

## 2025-06-10 PROCEDURE — 3052F HG A1C>EQUAL 8.0%<EQUAL 9.0%: CPT | Performed by: INTERNAL MEDICINE

## 2025-06-10 PROCEDURE — G8399 PT W/DXA RESULTS DOCUMENT: HCPCS | Performed by: INTERNAL MEDICINE

## 2025-06-10 PROCEDURE — 1123F ACP DISCUSS/DSCN MKR DOCD: CPT | Performed by: INTERNAL MEDICINE

## 2025-06-10 RX ORDER — FUROSEMIDE 40 MG/1
80 TABLET ORAL EVERY MORNING
Qty: 60 TABLET | Refills: 5 | Status: SHIPPED | OUTPATIENT
Start: 2025-06-10

## 2025-06-10 NOTE — PROGRESS NOTES
Itzel Abdalla is a 70 y.o. female who presents for evaluation of routine follow up for dm, htn, ckd 3a, anx/dep.  Last seen by me feb 27, 2025.  She just returned from a vacation to [facility] with a GF.  Had a great time.  Weight is down 7 lbs from last visit.  This is the best she has felt and looked in long time.  She is concerned though that she might have some dementia, as she feels like she is becoming more forgetful.      ROS:  Constitutional: negative for fevers, chills, anorexia and weight loss  Eyes:   negative for visual disturbance and irritation  ENT:   negative for tinnitus,sore throat,nasal congestion,ear pain,hoarseness  Respiratory:  negative for cough, hemoptysis, dyspnea,wheezing  CV:   negative for chest pain, palpitations, lower extremity edema  GI:   negative for nausea, vomiting, diarrhea, abdominal pain,melena  Genitourinary: negative for frequency, dysuria and hematuria  Musculoskel: negative for myalgias, arthralgias, back pain, muscle weakness, joint pain  Neurological:  negative for headaches, dizziness, focal weakness, numbness  Psychiatric:     ++ for depression or anxiety      Past Medical History:   Diagnosis Date    Anxiety and depression     Arthritis     Chronic back pain     Depression     Diabetes (HCC)     Dysphagia     Ectopic pregnancy     x2    GERD (gastroesophageal reflux disease) 07/05/2019    Hearing loss     History of blood transfusion     Hypercholesteremia     Hypertension 1990's    Hyperthyroidism     Hypothyroidism     IBS (irritable bowel syndrome)     Liver disease     hepatitis b    has been treated    Nausea & vomiting     Obesity     Osteoarthritis     PUD (peptic ulcer disease)     bleeding ulcer    Sleep apnea     CPAP    Type 2 diabetes mellitus without complication (HCC)     Urinary incontinence        Past Surgical History:   Procedure Laterality Date    APPENDECTOMY  1999    BACK SURGERY      x3    BLEPHAROPLASTY Right     BUNIONECTOMY      CARPAL TUNNEL

## 2025-06-10 NOTE — PROGRESS NOTES
Have you been to the ER, urgent care clinic since your last visit?  Hospitalized since your last visit?   NO    Have you seen or consulted any other health care providers outside our system since your last visit?   NO      “Have you had a diabetic eye exam?”    Scheduled Sept 25 2025  Date of last diabetic eye exam: 11/23/2020

## 2025-06-10 NOTE — PATIENT INSTRUCTIONS
Double dose of rexulti to 2 mg.  Use up your 1 mg pills by taking 2 each day.  New rx sent in though for new, increased dose--once you start the new prescription, then go back to just 1 pill daily.

## 2025-06-11 ENCOUNTER — RESULTS FOLLOW-UP (OUTPATIENT)
Age: 70
End: 2025-06-11

## 2025-06-11 LAB
ANION GAP SERPL CALC-SCNC: 4 MMOL/L (ref 2–12)
BUN SERPL-MCNC: 21 MG/DL (ref 6–20)
BUN/CREAT SERPL: 14 (ref 12–20)
CALCIUM SERPL-MCNC: 9.3 MG/DL (ref 8.5–10.1)
CHLORIDE SERPL-SCNC: 106 MMOL/L (ref 97–108)
CO2 SERPL-SCNC: 29 MMOL/L (ref 21–32)
CREAT SERPL-MCNC: 1.5 MG/DL (ref 0.55–1.02)
FERRITIN SERPL-MCNC: 65 NG/ML (ref 26–388)
GLUCOSE SERPL-MCNC: 229 MG/DL (ref 65–100)
HCT VFR BLD AUTO: 30.9 % (ref 35–47)
HGB BLD-MCNC: 10.4 G/DL (ref 11.5–16)
IRON SATN MFR SERPL: 33 % (ref 20–50)
IRON SERPL-MCNC: 93 UG/DL (ref 35–150)
POTASSIUM SERPL-SCNC: 5.1 MMOL/L (ref 3.5–5.1)
SODIUM SERPL-SCNC: 139 MMOL/L (ref 136–145)
TIBC SERPL-MCNC: 283 UG/DL (ref 250–450)

## 2025-06-22 NOTE — PROGRESS NOTES
Pharmacy Progress Note - Diabetes Management    Assessment / Plan:   Diabetes Management:  - Per ADA guidelines, Pt's A1c is not at goal of < 7%.  BP <130/80.   - Review recent lab results.    - Based on recent renal function, glycemic pattern, restart metformin at 500 mg ER daily.  - Continue Tresiba 44 units daily  - Continue with current Novolog scale: if pre meal blood sugar:   Less than 100 - do not give  If 101-150: - 2 units   If 151-200: give 3 units  If 201-250: give 4 units   If above 250: give 5  units  Stop late evening coverage.      S/O: Ms. Itzel Abdalla is a 70 y.o. female, referred by Rm Cohn III, DO, with a PMH of T2DM,  HTN, HLD, Hypothyroidism, OAB, IBS, osteoporosis, was seen today for diabetes management follow up. Last A1c was 8% (May 2025, Dec 2024), 8.9% (Aug 2024), 7.8% (March 2024), 8.5% (Nov 2023), 7.6% (April 2023), 8% (Jan 2023), 7.2% (Jun 2022), 7.3% (Feb 2022), 6.9% (Sept 2021), 6.3%  (June 2021), 8% (Oct 2020), 8.4% (June 2020), 7.2% (Dec 2019).     Interim update:   Saw Dr Negar Collier yesterday for OAB - Gemtesa 75 mg daily and botox injection recommended.   Reports Linzess increased back to 290 mcg daily   Recently returned from [facility] for vacation.     Current anti-hyperglycemic regimen include(s):    - Tresiba 44 units daily mid-day  - Metformin 1 gm daily - not taking              - Novolog - if pre meal blood sugar:  Less than 100 - do not give  If 101-150: - 2 units   If 151-200: give 3 units  If 201-250: give 4 units   If above 250: give 5  units  If you have a late night snack, give 2 units     Giving 2-4 units BID     Lisinopril 5 mg daily - Scr 2.27 (May) --> 1.5 (Jun 2025)  Pravastatin 20 mg daily - LDL 81 ; TG 84 (Dec 2024)    ROS:  Today, Pt endorses:  Signs and symptoms of Hypoglycemia: none  Signs and symptoms of Hyperglycemia: none    Blood Glucose Monitoring (BGM) or CGM:  Sarah 2 CGM; uses reader   30 days    14

## 2025-06-24 ENCOUNTER — PHARMACY VISIT (OUTPATIENT)
Age: 70
End: 2025-06-24

## 2025-06-24 VITALS
SYSTOLIC BLOOD PRESSURE: 121 MMHG | OXYGEN SATURATION: 97 % | DIASTOLIC BLOOD PRESSURE: 74 MMHG | HEIGHT: 65 IN | BODY MASS INDEX: 34.16 KG/M2 | HEART RATE: 75 BPM | WEIGHT: 205 LBS

## 2025-06-24 DIAGNOSIS — Z79.4 TYPE 2 DIABETES MELLITUS WITH HYPERGLYCEMIA, WITH LONG-TERM CURRENT USE OF INSULIN (HCC): Primary | ICD-10-CM

## 2025-06-24 DIAGNOSIS — E11.65 TYPE 2 DIABETES MELLITUS WITH HYPERGLYCEMIA, WITH LONG-TERM CURRENT USE OF INSULIN (HCC): Primary | ICD-10-CM

## 2025-06-24 RX ORDER — LINACLOTIDE 290 UG/1
290 CAPSULE, GELATIN COATED ORAL
COMMUNITY

## 2025-06-24 RX ORDER — METFORMIN HYDROCHLORIDE 500 MG/1
500 TABLET, EXTENDED RELEASE ORAL DAILY
Qty: 90 TABLET | Refills: 2 | Status: SHIPPED | OUTPATIENT
Start: 2025-06-24

## 2025-06-24 RX ORDER — VIBEGRON 75 MG/1
75 TABLET, FILM COATED ORAL DAILY
COMMUNITY

## 2025-06-24 NOTE — PATIENT INSTRUCTIONS
Reschedule your prolia infusion appointment.  Restart metformin 500 mg daily with largest meal.   Continue Tresiba 44 units daily  Continue Novolog - if pre meal blood sugar:  Less than 100 - do not give  If 101-150: - 2 units   If 151-200: give 3 units  If 201-250: give 4 units   If above 250: give 5  units

## 2025-06-30 DIAGNOSIS — I10 ESSENTIAL HYPERTENSION: Primary | ICD-10-CM

## 2025-06-30 RX ORDER — HYDROXYZINE HYDROCHLORIDE 25 MG/1
25 TABLET, FILM COATED ORAL
Qty: 90 TABLET | Refills: 0 | Status: SHIPPED | OUTPATIENT
Start: 2025-06-30

## 2025-06-30 NOTE — TELEPHONE ENCOUNTER
PCP: Rm Yanes III, DO    Last appt: 6/10/2025  Future Appointments   Date Time Provider Department Center   8/27/2025 10:00 AM Flower Tolbert, [facility]   12/18/2025 11:00 AM Nba Naylor PSYD [facility]       Requested Prescriptions     Pending Prescriptions Disp Refills    hydrOXYzine HCl (ATARAX) 25 MG tablet 90 tablet 0     Sig: Take 1 tablet by mouth nightly as needed for Itching     Pharmacy Confirmed-

## 2025-07-06 RX ORDER — LEVOTHYROXINE SODIUM 300 UG/1
TABLET ORAL
Qty: 83 TABLET | Refills: 3 | Status: SHIPPED | OUTPATIENT
Start: 2025-07-06

## 2025-07-18 RX ORDER — MELOXICAM 15 MG/1
15 TABLET ORAL DAILY PRN
Qty: 90 TABLET | Refills: 0 | Status: SHIPPED | OUTPATIENT
Start: 2025-07-18

## 2025-07-22 RX ORDER — MELOXICAM 15 MG/1
15 TABLET ORAL DAILY PRN
Qty: 90 TABLET | Refills: 0 | Status: SHIPPED | OUTPATIENT
Start: 2025-07-22

## 2025-08-12 RX ORDER — ESCITALOPRAM OXALATE 10 MG/1
10 TABLET ORAL DAILY
Qty: 90 TABLET | Refills: 3 | Status: SHIPPED | OUTPATIENT
Start: 2025-08-12 | End: 2025-08-13 | Stop reason: SDUPTHER

## 2025-08-13 RX ORDER — ESCITALOPRAM OXALATE 10 MG/1
10 TABLET ORAL DAILY
Qty: 10 TABLET | Refills: 0 | Status: SHIPPED | OUTPATIENT
Start: 2025-08-13

## 2025-08-25 DIAGNOSIS — Z79.4 TYPE 2 DIABETES MELLITUS WITH HYPERGLYCEMIA, WITH LONG-TERM CURRENT USE OF INSULIN (HCC): Primary | ICD-10-CM

## 2025-08-25 DIAGNOSIS — E11.65 TYPE 2 DIABETES MELLITUS WITH HYPERGLYCEMIA, WITH LONG-TERM CURRENT USE OF INSULIN (HCC): Primary | ICD-10-CM

## 2025-08-25 DIAGNOSIS — K21.9 GASTROESOPHAGEAL REFLUX DISEASE WITHOUT ESOPHAGITIS: ICD-10-CM

## 2025-08-25 RX ORDER — METFORMIN HYDROCHLORIDE 500 MG/1
500 TABLET, EXTENDED RELEASE ORAL DAILY
Qty: 90 TABLET | Refills: 3 | Status: SHIPPED | OUTPATIENT
Start: 2025-08-25

## 2025-08-25 RX ORDER — OMEPRAZOLE 40 MG/1
40 CAPSULE, DELAYED RELEASE ORAL
Qty: 90 CAPSULE | Refills: 3 | Status: SHIPPED | OUTPATIENT
Start: 2025-08-25

## 2025-08-26 ENCOUNTER — TELEPHONE (OUTPATIENT)
Age: 70
End: 2025-08-26

## 2025-08-27 ENCOUNTER — PHARMACY VISIT (OUTPATIENT)
Age: 70
End: 2025-08-27

## 2025-08-27 VITALS
WEIGHT: 202 LBS | HEART RATE: 80 BPM | BODY MASS INDEX: 33.66 KG/M2 | HEIGHT: 65 IN | SYSTOLIC BLOOD PRESSURE: 106 MMHG | DIASTOLIC BLOOD PRESSURE: 64 MMHG

## 2025-08-27 DIAGNOSIS — Z79.4 TYPE 2 DIABETES MELLITUS WITH HYPERGLYCEMIA, WITH LONG-TERM CURRENT USE OF INSULIN (HCC): Primary | ICD-10-CM

## 2025-08-27 DIAGNOSIS — E11.65 TYPE 2 DIABETES MELLITUS WITH HYPERGLYCEMIA, WITH LONG-TERM CURRENT USE OF INSULIN (HCC): Primary | ICD-10-CM

## 2025-08-27 RX ORDER — LINACLOTIDE 145 UG/1
145 CAPSULE, GELATIN COATED ORAL
COMMUNITY
Start: 2025-08-12

## 2025-08-27 RX ORDER — VIBEGRON 75 MG/1
75 TABLET, FILM COATED ORAL DAILY
COMMUNITY

## 2025-08-28 ENCOUNTER — OFFICE VISIT (OUTPATIENT)
Age: 70
End: 2025-08-28
Payer: MEDICARE

## 2025-08-28 VITALS
OXYGEN SATURATION: 97 % | WEIGHT: 201.4 LBS | TEMPERATURE: 97.3 F | RESPIRATION RATE: 20 BRPM | DIASTOLIC BLOOD PRESSURE: 76 MMHG | HEIGHT: 65 IN | SYSTOLIC BLOOD PRESSURE: 120 MMHG | HEART RATE: 82 BPM | BODY MASS INDEX: 33.55 KG/M2

## 2025-08-28 DIAGNOSIS — F41.1 GENERALIZED ANXIETY DISORDER: ICD-10-CM

## 2025-08-28 DIAGNOSIS — E11.65 TYPE 2 DIABETES MELLITUS WITH HYPERGLYCEMIA, WITH LONG-TERM CURRENT USE OF INSULIN (HCC): ICD-10-CM

## 2025-08-28 DIAGNOSIS — E89.0 POSTOPERATIVE HYPOTHYROIDISM: ICD-10-CM

## 2025-08-28 DIAGNOSIS — Z79.4 TYPE 2 DIABETES MELLITUS WITH HYPERGLYCEMIA, WITH LONG-TERM CURRENT USE OF INSULIN (HCC): ICD-10-CM

## 2025-08-28 DIAGNOSIS — Z00.00 MEDICARE ANNUAL WELLNESS VISIT, SUBSEQUENT: Primary | ICD-10-CM

## 2025-08-28 DIAGNOSIS — I10 ESSENTIAL HYPERTENSION: ICD-10-CM

## 2025-08-28 DIAGNOSIS — E78.5 HYPERLIPIDEMIA ASSOCIATED WITH TYPE 2 DIABETES MELLITUS (HCC): ICD-10-CM

## 2025-08-28 DIAGNOSIS — R25.1 TREMOR: ICD-10-CM

## 2025-08-28 DIAGNOSIS — F32.1 MAJOR DEPRESSIVE DISORDER, SINGLE EPISODE, MODERATE (HCC): ICD-10-CM

## 2025-08-28 DIAGNOSIS — E11.69 HYPERLIPIDEMIA ASSOCIATED WITH TYPE 2 DIABETES MELLITUS (HCC): ICD-10-CM

## 2025-08-28 PROCEDURE — G0439 PPPS, SUBSEQ VISIT: HCPCS | Performed by: INTERNAL MEDICINE

## 2025-08-28 PROCEDURE — G8427 DOCREV CUR MEDS BY ELIG CLIN: HCPCS | Performed by: INTERNAL MEDICINE

## 2025-08-28 PROCEDURE — 99214 OFFICE O/P EST MOD 30 MIN: CPT | Performed by: INTERNAL MEDICINE

## 2025-08-28 PROCEDURE — 3017F COLORECTAL CA SCREEN DOC REV: CPT | Performed by: INTERNAL MEDICINE

## 2025-08-28 PROCEDURE — 2022F DILAT RTA XM EVC RTNOPTHY: CPT | Performed by: INTERNAL MEDICINE

## 2025-08-28 PROCEDURE — 3052F HG A1C>EQUAL 8.0%<EQUAL 9.0%: CPT | Performed by: INTERNAL MEDICINE

## 2025-08-28 PROCEDURE — G8417 CALC BMI ABV UP PARAM F/U: HCPCS | Performed by: INTERNAL MEDICINE

## 2025-08-28 PROCEDURE — 3074F SYST BP LT 130 MM HG: CPT | Performed by: INTERNAL MEDICINE

## 2025-08-28 PROCEDURE — 1090F PRES/ABSN URINE INCON ASSESS: CPT | Performed by: INTERNAL MEDICINE

## 2025-08-28 PROCEDURE — G8399 PT W/DXA RESULTS DOCUMENT: HCPCS | Performed by: INTERNAL MEDICINE

## 2025-08-28 PROCEDURE — 1036F TOBACCO NON-USER: CPT | Performed by: INTERNAL MEDICINE

## 2025-08-28 PROCEDURE — 1123F ACP DISCUSS/DSCN MKR DOCD: CPT | Performed by: INTERNAL MEDICINE

## 2025-08-28 PROCEDURE — 1160F RVW MEDS BY RX/DR IN RCRD: CPT | Performed by: INTERNAL MEDICINE

## 2025-08-28 PROCEDURE — 1159F MED LIST DOCD IN RCRD: CPT | Performed by: INTERNAL MEDICINE

## 2025-08-28 PROCEDURE — 3078F DIAST BP <80 MM HG: CPT | Performed by: INTERNAL MEDICINE

## 2025-08-28 RX ORDER — FUROSEMIDE 40 MG/1
80 TABLET ORAL EVERY MORNING
Qty: 60 TABLET | Refills: 5 | Status: SHIPPED | OUTPATIENT
Start: 2025-08-28

## 2025-08-28 SDOH — ECONOMIC STABILITY: FOOD INSECURITY: WITHIN THE PAST 12 MONTHS, YOU WORRIED THAT YOUR FOOD WOULD RUN OUT BEFORE YOU GOT MONEY TO BUY MORE.: NEVER TRUE

## 2025-08-28 SDOH — ECONOMIC STABILITY: FOOD INSECURITY: WITHIN THE PAST 12 MONTHS, THE FOOD YOU BOUGHT JUST DIDN'T LAST AND YOU DIDN'T HAVE MONEY TO GET MORE.: NEVER TRUE

## 2025-08-28 ASSESSMENT — PATIENT HEALTH QUESTIONNAIRE - PHQ9
8. MOVING OR SPEAKING SO SLOWLY THAT OTHER PEOPLE COULD HAVE NOTICED. OR THE OPPOSITE, BEING SO FIGETY OR RESTLESS THAT YOU HAVE BEEN MOVING AROUND A LOT MORE THAN USUAL: NOT AT ALL
7. TROUBLE CONCENTRATING ON THINGS, SUCH AS READING THE NEWSPAPER OR WATCHING TELEVISION: NOT AT ALL
1. LITTLE INTEREST OR PLEASURE IN DOING THINGS: NOT AT ALL
9. THOUGHTS THAT YOU WOULD BE BETTER OFF DEAD, OR OF HURTING YOURSELF: NOT AT ALL
SUM OF ALL RESPONSES TO PHQ QUESTIONS 1-9: 0
6. FEELING BAD ABOUT YOURSELF - OR THAT YOU ARE A FAILURE OR HAVE LET YOURSELF OR YOUR FAMILY DOWN: NOT AT ALL
SUM OF ALL RESPONSES TO PHQ QUESTIONS 1-9: 0
SUM OF ALL RESPONSES TO PHQ QUESTIONS 1-9: 0
4. FEELING TIRED OR HAVING LITTLE ENERGY: NOT AT ALL
SUM OF ALL RESPONSES TO PHQ QUESTIONS 1-9: 0
10. IF YOU CHECKED OFF ANY PROBLEMS, HOW DIFFICULT HAVE THESE PROBLEMS MADE IT FOR YOU TO DO YOUR WORK, TAKE CARE OF THINGS AT HOME, OR GET ALONG WITH OTHER PEOPLE: NOT DIFFICULT AT ALL
2. FEELING DOWN, DEPRESSED OR HOPELESS: NOT AT ALL
5. POOR APPETITE OR OVEREATING: NOT AT ALL
3. TROUBLE FALLING OR STAYING ASLEEP: NOT AT ALL

## 2025-08-29 LAB
ALBUMIN SERPL-MCNC: 3.8 G/DL (ref 3.5–5.2)
ALBUMIN/GLOB SERPL: 1.4 (ref 1.1–2.2)
ALP SERPL-CCNC: 48 U/L (ref 35–104)
ALT SERPL-CCNC: 17 U/L (ref 10–35)
ANION GAP SERPL CALC-SCNC: 10 MMOL/L (ref 2–14)
AST SERPL-CCNC: 16 U/L (ref 10–35)
BASOPHILS # BLD: 0.03 K/UL (ref 0–0.1)
BASOPHILS NFR BLD: 0.4 % (ref 0–1)
BILIRUB SERPL-MCNC: 0.3 MG/DL (ref 0–1.2)
BUN SERPL-MCNC: 31 MG/DL (ref 8–23)
BUN/CREAT SERPL: 19 (ref 12–20)
CALCIUM SERPL-MCNC: 9 MG/DL (ref 8.8–10.2)
CHLORIDE SERPL-SCNC: 104 MMOL/L (ref 98–107)
CHOLEST SERPL-MCNC: 156 MG/DL (ref 0–200)
CO2 SERPL-SCNC: 26 MMOL/L (ref 20–29)
CREAT SERPL-MCNC: 1.64 MG/DL (ref 0.6–1)
DIFFERENTIAL METHOD BLD: ABNORMAL
EOSINOPHIL # BLD: 0.08 K/UL (ref 0–0.4)
EOSINOPHIL NFR BLD: 1 % (ref 0–7)
ERYTHROCYTE [DISTWIDTH] IN BLOOD BY AUTOMATED COUNT: 12.7 % (ref 11.5–14.5)
EST. AVERAGE GLUCOSE BLD GHB EST-MCNC: 233 MG/DL
GLOBULIN SER CALC-MCNC: 2.6 G/DL (ref 2–4)
GLUCOSE SERPL-MCNC: 111 MG/DL (ref 65–100)
HBA1C MFR BLD: 9.7 % (ref 4–5.6)
HCT VFR BLD AUTO: 31.7 % (ref 35–47)
HDLC SERPL-MCNC: 61 MG/DL (ref 40–60)
HDLC SERPL: 2.6 (ref 0–5)
HGB BLD-MCNC: 10.7 G/DL (ref 11.5–16)
IMM GRANULOCYTES # BLD AUTO: 0.02 K/UL (ref 0–0.04)
IMM GRANULOCYTES NFR BLD AUTO: 0.2 % (ref 0–0.5)
LDLC SERPL CALC-MCNC: 80 MG/DL (ref 0–100)
LYMPHOCYTES # BLD: 1.83 K/UL (ref 0.8–3.5)
LYMPHOCYTES NFR BLD: 22.3 % (ref 12–49)
MCH RBC QN AUTO: 28.8 PG (ref 26–34)
MCHC RBC AUTO-ENTMCNC: 33.8 G/DL (ref 30–36.5)
MCV RBC AUTO: 85.4 FL (ref 80–99)
MONOCYTES # BLD: 0.53 K/UL (ref 0–1)
MONOCYTES NFR BLD: 6.4 % (ref 5–13)
NEUTS SEG # BLD: 5.73 K/UL (ref 1.8–8)
NEUTS SEG NFR BLD: 69.7 % (ref 32–75)
NRBC # BLD: 0 K/UL (ref 0–0.01)
NRBC BLD-RTO: 0 PER 100 WBC
PLATELET # BLD AUTO: 142 K/UL (ref 150–400)
PMV BLD AUTO: 11.4 FL (ref 8.9–12.9)
POTASSIUM SERPL-SCNC: 4.5 MMOL/L (ref 3.5–5.1)
PROT SERPL-MCNC: 6.4 G/DL (ref 6.4–8.3)
RBC # BLD AUTO: 3.71 M/UL (ref 3.8–5.2)
SODIUM SERPL-SCNC: 139 MMOL/L (ref 136–145)
T4 FREE SERPL-MCNC: 1.9 NG/DL (ref 0.9–1.6)
TRIGL SERPL-MCNC: 77 MG/DL (ref 0–150)
TSH, 3RD GENERATION: 0.11 UIU/ML (ref 0.27–4.2)
VLDLC SERPL CALC-MCNC: 15 MG/DL
WBC # BLD AUTO: 8.2 K/UL (ref 3.6–11)

## 2025-08-29 RX ORDER — LEVOTHYROXINE SODIUM 300 UG/1
TABLET ORAL
Qty: 83 TABLET | Refills: 3
Start: 2025-08-29

## (undated) DEVICE — SOLIDIFIER MEDC 1200ML -- CONVERT TO 356117

## (undated) DEVICE — SYR 10ML LUER LOK 1/5ML GRAD --

## (undated) DEVICE — SET ADMIN 16ML TBNG L100IN 2 Y INJ SITE IV PIGGY BK DISP

## (undated) DEVICE — KIT,1200CC CANISTER,3/16"X6' TUBING: Brand: MEDLINE INDUSTRIES, INC.

## (undated) DEVICE — GRASPER LAP SHFT DIA24MM WHT STL W MINIGRIP HNDL REUSE

## (undated) DEVICE — HYPODERMIC SAFETY NEEDLE: Brand: MAGELLAN

## (undated) DEVICE — HANDLE PRB DIA5MM HND CTRL PSTL GRP ENDOPATH PRB + II

## (undated) DEVICE — SYR 3ML LL TIP 1/10ML GRAD --

## (undated) DEVICE — SYRINGE 50ML E/T

## (undated) DEVICE — CATHETER CHOLGM 4.5FR L18IN W/ MTL SUPP TB

## (undated) DEVICE — SOLIDIFIER FLD 2OZ 1500CC N DISINF IN BTL DISP SAFESORB

## (undated) DEVICE — DERMABOND SKIN ADH 0.7ML -- DERMABOND ADVANCED 12/BX

## (undated) DEVICE — GLOVE ORANGE PI 7   MSG9070

## (undated) DEVICE — STERILE POLYISOPRENE POWDER-FREE SURGICAL GLOVES: Brand: PROTEXIS

## (undated) DEVICE — REM POLYHESIVE ADULT PATIENT RETURN ELECTRODE: Brand: VALLEYLAB

## (undated) DEVICE — SYR ASSEMB INFL BLLN 60ML --

## (undated) DEVICE — BASIN EMSIS 16OZ GRAPHITE PLAS KID SHP MOLD GRAD FOR ORAL

## (undated) DEVICE — ELECTRODE ES SHFT L29CM HK OD5MM ENDOPATH PRB + II

## (undated) DEVICE — BAG SPEC BIOHZRD 10 X 10 IN --

## (undated) DEVICE — 1200 GUARD II KIT W/5MM TUBE W/O VAC TUBE: Brand: GUARDIAN

## (undated) DEVICE — NEONATAL-ADULT SPO2 SENSOR: Brand: NELLCOR

## (undated) DEVICE — Z DISCONTINUED PER MEDLINE LINE GAS SAMPLING O2/CO2 LNG AD 13 FT NSL W/ TBNG FILTERLINE

## (undated) DEVICE — CATH IV AUTOGRD BC PNK 20GA 25 -- INSYTE

## (undated) DEVICE — CATH IV AUTOGRD BC BLU 22GA 25 -- INSYTE

## (undated) DEVICE — Device

## (undated) DEVICE — TUBING IRRIG L77IN DIA0.241IN L BOR FOR CYSTO W/ NVENT

## (undated) DEVICE — GLOVE SURG SZ 65 THK91MIL LTX FREE SYN POLYISOPRENE

## (undated) DEVICE — APPLIER CLP M/L SHFT DIA5MM 15 LIG LIGAMAX 5

## (undated) DEVICE — GARMENT,MEDLINE,DVT,INT,CALF,MED, GEN2: Brand: MEDLINE

## (undated) DEVICE — TUBING, SUCTION, 1/4" X 10', STRAIGHT: Brand: MEDLINE

## (undated) DEVICE — HAND-MRMCASU: Brand: MEDLINE INDUSTRIES, INC.

## (undated) DEVICE — GLOVE SURG SZ 7 L12IN FNGR THK79MIL GRN LTX FREE

## (undated) DEVICE — BLOCK BITE ENDOSCP AD 21 MM W/ DIL BLU LF DISP

## (undated) DEVICE — CORD ES L12FT BPLR FRCP

## (undated) DEVICE — SOLUTION IRRIG 1000ML 0.9% SOD CHL USP POUR PLAS BTL

## (undated) DEVICE — NEEDLE HYPO 18GA L1.5IN PNK S STL HUB POLYPR SHLD REG BVL

## (undated) DEVICE — KENDALL RADIOLUCENT FOAM MONITORING ELECTRODE RECTANGULAR SHAPE: Brand: KENDALL

## (undated) DEVICE — CONTAINER SPEC 20 ML LID NEUT BUFF FORMALIN 10 % POLYPR STS

## (undated) DEVICE — GOWN,SIRUS,NONRNF,SETINSLV,XL,20/CS: Brand: MEDLINE

## (undated) DEVICE — YANKAUER,TAPERED BULBOUS TIP,W/O VENT: Brand: MEDLINE

## (undated) DEVICE — CATHETER IV 14GA L2IN POLYUR STR ORNG HUB SFTY RADPQ DISP

## (undated) DEVICE — LAPAROSCOPIC TROCAR SLEEVE/SINGLE USE: Brand: KII® OPTICAL ACCESS SYSTEM

## (undated) DEVICE — INTEGRA® JARIT® BOYNTON NEEDLE HOLDER 4-7/8", DELICATE: Brand: INTEGRA® JARIT®

## (undated) DEVICE — MEDI-VAC YANK SUCT HNDL W/TPRD BULBOUS TIP: Brand: CARDINAL HEALTH

## (undated) DEVICE — BANDAGE COMPR W2INXL5YD WHT BGE POLY COT M E WRP WV HK AND

## (undated) DEVICE — NEEDLE HYPO 22GA L1.5IN BLK S STL HUB POLYPR SHLD REG BVL

## (undated) DEVICE — SYR LR LCK 1ML GRAD NSAF 30ML --

## (undated) DEVICE — FORCEPS BX L160CM DIA8MM GRSP DISECT CUP TIP NONLOCKING ROT

## (undated) DEVICE — STRIP,CLOSURE,WOUND,MEDI-STRIP,1/2X4: Brand: MEDLINE

## (undated) DEVICE — 1000ML,PRESSURE INFUSER W/STOPCOCK: Brand: MEDLINE

## (undated) DEVICE — TROCAR LAP L100MM DIA5MM BLDELSS W/ STBL SL ENDOPATH XCEL

## (undated) DEVICE — SUTURE ETHILON SZ 3-0 L18IN NONABSORBABLE BLK PS-2 L19MM 3/8 1669H

## (undated) DEVICE — SUTURE MCRYL SZ 4-0 L27IN ABSRB UD L19MM PS-2 1/2 CIR PRIM Y426H

## (undated) DEVICE — TOWEL 4 PLY TISS 19X30 SUE WHT

## (undated) DEVICE — PREP SKN CHLRAPRP APL 26ML STR --

## (undated) DEVICE — ELECTRODE,RADIOTRANSLUCENT,FOAM,5PK: Brand: MEDLINE

## (undated) DEVICE — VISUALIZATION SYSTEM: Brand: CLEARIFY

## (undated) DEVICE — ZIMMER® STERILE DISPOSABLE TOURNIQUET CUFF WITH PLC, DUAL PORT, SINGLE BLADDER, 18 IN. (46 CM)

## (undated) DEVICE — C-ARM: Brand: UNBRANDED

## (undated) DEVICE — SUTURE SZ 0 27IN 5/8 CIR UR-6  TAPER PT VIOLET ABSRB VICRYL J603H

## (undated) DEVICE — DECANTER BAG 9": Brand: MEDLINE INDUSTRIES, INC.

## (undated) DEVICE — SOL INJ SOD CL 0.9% 500ML BG --

## (undated) DEVICE — Z DISCONTINUED NO SUB IDED SET EXTN W/ 4 W STPCOCK M SPIN LOK 36IN

## (undated) DEVICE — SURGICAL PROCEDURE KIT GEN LAPAROSCOPY LF